# Patient Record
Sex: FEMALE | Race: WHITE | NOT HISPANIC OR LATINO | Employment: FULL TIME | URBAN - METROPOLITAN AREA
[De-identification: names, ages, dates, MRNs, and addresses within clinical notes are randomized per-mention and may not be internally consistent; named-entity substitution may affect disease eponyms.]

---

## 2021-02-17 ENCOUNTER — OFFICE VISIT (OUTPATIENT)
Dept: FAMILY MEDICINE CLINIC | Facility: CLINIC | Age: 56
End: 2021-02-17

## 2021-02-17 VITALS
OXYGEN SATURATION: 95 % | BODY MASS INDEX: 42.22 KG/M2 | TEMPERATURE: 97.5 F | SYSTOLIC BLOOD PRESSURE: 113 MMHG | HEART RATE: 79 BPM | HEIGHT: 67 IN | DIASTOLIC BLOOD PRESSURE: 76 MMHG | WEIGHT: 269 LBS

## 2021-02-17 DIAGNOSIS — R45.86 MOOD SWINGS: ICD-10-CM

## 2021-02-17 DIAGNOSIS — Z12.31 OTHER SCREENING MAMMOGRAM: ICD-10-CM

## 2021-02-17 DIAGNOSIS — E78.5 HYPERLIPIDEMIA, UNSPECIFIED HYPERLIPIDEMIA TYPE: Primary | ICD-10-CM

## 2021-02-17 DIAGNOSIS — Z00.00 PREVENTATIVE HEALTH CARE: ICD-10-CM

## 2021-02-17 DIAGNOSIS — R73.9 ELEVATED BLOOD SUGAR: ICD-10-CM

## 2021-02-17 DIAGNOSIS — L98.9 SKIN LESION: ICD-10-CM

## 2021-02-17 DIAGNOSIS — F41.9 ANXIETY: ICD-10-CM

## 2021-02-17 DIAGNOSIS — F33.1 MODERATE EPISODE OF RECURRENT MAJOR DEPRESSIVE DISORDER (HCC): ICD-10-CM

## 2021-02-17 DIAGNOSIS — Z87.898 HISTORY OF ABNORMAL MAMMOGRAM: ICD-10-CM

## 2021-02-17 DIAGNOSIS — E66.01 CLASS 3 SEVERE OBESITY DUE TO EXCESS CALORIES WITHOUT SERIOUS COMORBIDITY WITH BODY MASS INDEX (BMI) OF 40.0 TO 44.9 IN ADULT (HCC): ICD-10-CM

## 2021-02-17 DIAGNOSIS — G47.09 OTHER INSOMNIA: ICD-10-CM

## 2021-02-17 PROBLEM — E66.813 CLASS 3 SEVERE OBESITY DUE TO EXCESS CALORIES WITHOUT SERIOUS COMORBIDITY WITH BODY MASS INDEX (BMI) OF 40.0 TO 44.9 IN ADULT: Status: ACTIVE | Noted: 2021-02-17

## 2021-02-17 PROCEDURE — 99204 OFFICE O/P NEW MOD 45 MIN: CPT | Performed by: NURSE PRACTITIONER

## 2021-02-17 RX ORDER — QUETIAPINE FUMARATE 25 MG/1
25 TABLET, FILM COATED ORAL NIGHTLY
COMMUNITY
End: 2021-02-17

## 2021-02-17 RX ORDER — HYDROXYZINE HYDROCHLORIDE 25 MG/1
12.5-25 TABLET, FILM COATED ORAL 2 TIMES DAILY PRN
Qty: 60 TABLET | Refills: 2 | Status: SHIPPED | OUTPATIENT
Start: 2021-02-17 | End: 2021-12-30

## 2021-02-17 RX ORDER — QUETIAPINE FUMARATE 50 MG/1
TABLET, FILM COATED ORAL
Qty: 30 TABLET | Refills: 1 | Status: SHIPPED | OUTPATIENT
Start: 2021-02-17 | End: 2021-12-30

## 2021-02-17 RX ORDER — HYDROXYZINE HYDROCHLORIDE 25 MG/1
12.5-25 TABLET, FILM COATED ORAL 2 TIMES DAILY PRN
COMMUNITY
End: 2021-02-17 | Stop reason: SDUPTHER

## 2021-02-17 RX ORDER — QUETIAPINE FUMARATE 100 MG/1
100 TABLET, FILM COATED ORAL NIGHTLY
Qty: 30 TABLET | Refills: 1 | Status: SHIPPED | OUTPATIENT
Start: 2021-02-17 | End: 2021-12-30

## 2021-02-17 RX ORDER — ESCITALOPRAM OXALATE 10 MG/1
10 TABLET ORAL DAILY
COMMUNITY
End: 2021-02-17

## 2021-02-17 NOTE — PATIENT INSTRUCTIONS
Fasting blood work today  Keep appointment with dermatology OB/GYN  Keep appointment for mammogram  Take Seroquel as directed and monitor for drowsiness  Wean off Lexapro as discussed during exam  Follow-up 1 month

## 2021-02-17 NOTE — PROGRESS NOTES
Eladia Connor is a 55 y.o. female.     55-year-old obese white female with history of depression anxiety, insomnia and hyperlipidemia who comes in today to be established as new patient  Blood pressure 112/76 heart rate 78 with very mild murmur she denies any chest pain, dyspnea, tachycardia or dizziness  Patient currently on a very low-dose of Seroquel at bedtime for her mood swings anxiety and depression however she is not sleeping.  I am going to change her Seroquel to 50 mg in the morning and 100 at night to see if this helps with her anxiety issues as well as insomnia.  Patient is on 2 SSRIs which puts her at a risk for serotonin syndrome.  Since Lexapro was recently added and has not helped we will go to wean off of that  Patient has a cauliflower type lesion on her left temple that has been there for about a year it is getting larger.  She did go to a dermatologist in [place] who stated it was too vascular to remove.  Patient is very concerned about this lesion and I am referring her to a dermatologist in [place] where she lives  Weight is 269 with a BMI of 42.1 and we discussed diet weight loss patient states she is already lost quite a bit of weight however I do not have any record of this and she is actively dieting we will see what happens with next visit  She needs to have a mammogram since she has a history of abnormal mammogram which I am scheduling at [facility] she needs to schedule an eye exam and she needs to be set up with OB/GYN for her Pap      Fasting blood work today  Dermatology referral  OB/GYN referral  3D mammogram  Wean off of Lexapro   Seroquel 50 mg in the morning 100 mg at night  Call if any problems with new medication  Follow-up 1 month         The following portions of the patient's history were reviewed and updated as appropriate: allergies, current medications, past family history, past medical history, past social history, past surgical history and problem  "list.    Vitals:    02/17/21 0958   BP: 113/76   BP Location: Right arm   Patient Position: Sitting   Cuff Size: Large Adult   Pulse: 79   Temp: 97.5 °F (36.4 °C)   TempSrc: Temporal   SpO2: 95%   Weight: 122 kg (269 lb)   Height: 170.2 cm (67\")     Body mass index is 42.13 kg/m².    Past Medical History:   Diagnosis Date   • Anxiety    • Depression      Past Surgical History:   Procedure Laterality Date   • CHOLECYSTECTOMY  1987   • EYE SURGERY     • KNEE SURGERY       Family History   Problem Relation Age of Onset   • Heart disease Mother    • Breast cancer Mother    • Heart disease Father    • Pancreatic cancer Father    • Heart disease Sister    • Lung cancer Brother        There is no immunization history on file for this patient.    No results found for any previous visit.         Review of Systems   Constitutional: Negative.    HENT: Negative.    Respiratory: Negative.    Cardiovascular: Negative.    Gastrointestinal: Negative.    Genitourinary: Negative.    Musculoskeletal: Negative.    Skin: Positive for skin lesions.   Psychiatric/Behavioral: Positive for sleep disturbance and depressed mood. The patient is nervous/anxious.        Objective   Physical Exam  Constitutional:       Appearance: Normal appearance.   HENT:      Head: Normocephalic.   Neck:      Musculoskeletal: Normal range of motion.   Cardiovascular:      Rate and Rhythm: Normal rate and regular rhythm.      Pulses: Normal pulses.      Heart sounds: Normal heart sounds.   Pulmonary:      Effort: Pulmonary effort is normal.      Breath sounds: Normal breath sounds.   Abdominal:      General: Bowel sounds are normal.   Musculoskeletal: Normal range of motion.   Skin:     Comments:  cauliflower type red lesion on left temple   Neurological:      General: No focal deficit present.      Mental Status: She is alert and oriented to person, place, and time.   Psychiatric:         Mood and Affect: Mood normal.         Behavior: Behavior normal. "         Procedures    Assessment/Plan   Diagnoses and all orders for this visit:    1. Hyperlipidemia, unspecified hyperlipidemia type (Primary)  -     Lipid Panel With LDL / HDL Ratio    2. Preventative health care  -     CBC & Differential  -     Comprehensive Metabolic Panel  -     Ambulatory Referral to Obstetrics / Gynecology    3. Elevated blood sugar  -     Hemoglobin A1c    4. Other screening mammogram  -     Mammo Screening Digital Tomosynthesis Bilateral With CAD; Future    5. Class 3 severe obesity due to excess calories without serious comorbidity with body mass index (BMI) of 40.0 to 44.9 in adult (CMS/[facility])    6. History of abnormal mammogram    7. Moderate episode of recurrent major depressive disorder (CMS/[facility])    8. Anxiety    9. Mood swings    10. Skin lesion    11. Other insomnia    Other orders  -     QUEtiapine (SEROquel) 50 MG tablet; One tab every am  Dispense: 30 tablet; Refill: 1  -     QUEtiapine (SEROquel) 100 MG tablet; Take 1 tablet by mouth Every Night.  Dispense: 30 tablet; Refill: 1  -     hydrOXYzine (ATARAX) 25 MG tablet; Take 0.5-1 tablets by mouth 2 (Two) Times a Day As Needed for Anxiety.  Dispense: 60 tablet; Refill: 2  -     sertraline (ZOLOFT) 50 MG tablet; Take 1 tablet by mouth Daily.  Dispense: 30 tablet; Refill: 2          Current Outpatient Medications:   •  hydrOXYzine (ATARAX) 25 MG tablet, Take 0.5-1 tablets by mouth 2 (Two) Times a Day As Needed for Anxiety., Disp: 60 tablet, Rfl: 2  •  sertraline (ZOLOFT) 50 MG tablet, Take 1 tablet by mouth Daily., Disp: 30 tablet, Rfl: 2  •  QUEtiapine (SEROquel) 100 MG tablet, Take 1 tablet by mouth Every Night., Disp: 30 tablet, Rfl: 1  •  QUEtiapine (SEROquel) 50 MG tablet, One tab every am, Disp: 30 tablet, Rfl: 1

## 2021-02-18 LAB
ALBUMIN SERPL-MCNC: 3.9 G/DL (ref 3.8–4.9)
ALBUMIN/GLOB SERPL: 1.3 {RATIO} (ref 1.2–2.2)
ALP SERPL-CCNC: 138 IU/L (ref 39–117)
ALT SERPL-CCNC: 9 IU/L (ref 0–32)
AST SERPL-CCNC: 17 IU/L (ref 0–40)
BASOPHILS # BLD AUTO: 0.1 X10E3/UL (ref 0–0.2)
BASOPHILS NFR BLD AUTO: 1 %
BILIRUB SERPL-MCNC: 0.3 MG/DL (ref 0–1.2)
BUN SERPL-MCNC: 13 MG/DL (ref 6–24)
BUN/CREAT SERPL: 17 (ref 9–23)
CALCIUM SERPL-MCNC: 9.2 MG/DL (ref 8.7–10.2)
CHLORIDE SERPL-SCNC: 105 MMOL/L (ref 96–106)
CHOLEST SERPL-MCNC: 193 MG/DL (ref 100–199)
CO2 SERPL-SCNC: 26 MMOL/L (ref 20–29)
CREAT SERPL-MCNC: 0.75 MG/DL (ref 0.57–1)
EOSINOPHIL # BLD AUTO: 0.2 X10E3/UL (ref 0–0.4)
EOSINOPHIL NFR BLD AUTO: 4 %
ERYTHROCYTE [DISTWIDTH] IN BLOOD BY AUTOMATED COUNT: 12.8 % (ref 11.7–15.4)
GLOBULIN SER CALC-MCNC: 2.9 G/DL (ref 1.5–4.5)
GLUCOSE SERPL-MCNC: 102 MG/DL (ref 65–99)
HBA1C MFR BLD: 5.3 % (ref 4.8–5.6)
HCT VFR BLD AUTO: 39.6 % (ref 34–46.6)
HDLC SERPL-MCNC: 49 MG/DL
HGB BLD-MCNC: 13.5 G/DL (ref 11.1–15.9)
IMM GRANULOCYTES # BLD AUTO: 0 X10E3/UL (ref 0–0.1)
IMM GRANULOCYTES NFR BLD AUTO: 1 %
LDLC SERPL CALC-MCNC: 122 MG/DL (ref 0–99)
LDLC/HDLC SERPL: 2.5 RATIO (ref 0–3.2)
LYMPHOCYTES # BLD AUTO: 1.1 X10E3/UL (ref 0.7–3.1)
LYMPHOCYTES NFR BLD AUTO: 20 %
MCH RBC QN AUTO: 29.2 PG (ref 26.6–33)
MCHC RBC AUTO-ENTMCNC: 34.1 G/DL (ref 31.5–35.7)
MCV RBC AUTO: 86 FL (ref 79–97)
MONOCYTES # BLD AUTO: 0.4 X10E3/UL (ref 0.1–0.9)
MONOCYTES NFR BLD AUTO: 7 %
NEUTROPHILS # BLD AUTO: 3.8 X10E3/UL (ref 1.4–7)
NEUTROPHILS NFR BLD AUTO: 67 %
PLATELET # BLD AUTO: 248 X10E3/UL (ref 150–450)
POTASSIUM SERPL-SCNC: 4.2 MMOL/L (ref 3.5–5.2)
PROT SERPL-MCNC: 6.8 G/DL (ref 6–8.5)
RBC # BLD AUTO: 4.62 X10E6/UL (ref 3.77–5.28)
SODIUM SERPL-SCNC: 142 MMOL/L (ref 134–144)
TRIGL SERPL-MCNC: 122 MG/DL (ref 0–149)
VLDLC SERPL CALC-MCNC: 22 MG/DL (ref 5–40)
WBC # BLD AUTO: 5.6 X10E3/UL (ref 3.4–10.8)

## 2021-02-24 ENCOUNTER — HOSPITAL ENCOUNTER (OUTPATIENT)
Dept: MAMMOGRAPHY | Facility: HOSPITAL | Age: 56
Discharge: HOME | End: 2021-02-24
Admitting: NURSE PRACTITIONER

## 2021-02-24 DIAGNOSIS — Z12.31 OTHER SCREENING MAMMOGRAM: ICD-10-CM

## 2021-02-24 PROCEDURE — 77063 BREAST TOMOSYNTHESIS BI: CPT

## 2021-02-24 PROCEDURE — 77067 SCR MAMMO BI INCL CAD: CPT

## 2021-08-31 ENCOUNTER — OFFICE VISIT (OUTPATIENT)
Dept: FAMILY MEDICINE CLINIC | Facility: CLINIC | Age: 56
End: 2021-08-31

## 2021-08-31 VITALS
WEIGHT: 216 LBS | HEART RATE: 70 BPM | TEMPERATURE: 97.5 F | SYSTOLIC BLOOD PRESSURE: 124 MMHG | HEIGHT: 67 IN | BODY MASS INDEX: 33.9 KG/M2 | OXYGEN SATURATION: 96 % | DIASTOLIC BLOOD PRESSURE: 63 MMHG

## 2021-08-31 DIAGNOSIS — B34.9 VIRAL SYNDROME: ICD-10-CM

## 2021-08-31 DIAGNOSIS — E78.5 HYPERLIPIDEMIA, UNSPECIFIED HYPERLIPIDEMIA TYPE: ICD-10-CM

## 2021-08-31 DIAGNOSIS — E66.09 CLASS 1 OBESITY DUE TO EXCESS CALORIES WITH SERIOUS COMORBIDITY AND BODY MASS INDEX (BMI) OF 33.0 TO 33.9 IN ADULT: ICD-10-CM

## 2021-08-31 DIAGNOSIS — R06.02 SHORTNESS OF BREATH: ICD-10-CM

## 2021-08-31 DIAGNOSIS — R68.83 CHILLS: Primary | ICD-10-CM

## 2021-08-31 DIAGNOSIS — Z00.00 PREVENTATIVE HEALTH CARE: ICD-10-CM

## 2021-08-31 DIAGNOSIS — R50.9 FEVER, UNSPECIFIED FEVER CAUSE: ICD-10-CM

## 2021-08-31 PROBLEM — E66.01 CLASS 3 SEVERE OBESITY DUE TO EXCESS CALORIES WITHOUT SERIOUS COMORBIDITY WITH BODY MASS INDEX (BMI) OF 40.0 TO 44.9 IN ADULT: Status: RESOLVED | Noted: 2021-02-17 | Resolved: 2021-08-31

## 2021-08-31 PROBLEM — E66.813 CLASS 3 SEVERE OBESITY DUE TO EXCESS CALORIES WITHOUT SERIOUS COMORBIDITY WITH BODY MASS INDEX (BMI) OF 40.0 TO 44.9 IN ADULT: Status: RESOLVED | Noted: 2021-02-17 | Resolved: 2021-08-31

## 2021-08-31 PROBLEM — E66.811 CLASS 1 OBESITY DUE TO EXCESS CALORIES WITH SERIOUS COMORBIDITY AND BODY MASS INDEX (BMI) OF 33.0 TO 33.9 IN ADULT: Status: ACTIVE | Noted: 2021-08-31

## 2021-08-31 PROCEDURE — 99213 OFFICE O/P EST LOW 20 MIN: CPT | Performed by: NURSE PRACTITIONER

## 2021-08-31 RX ORDER — ONDANSETRON 4 MG/1
4 TABLET, FILM COATED ORAL EVERY 8 HOURS PRN
Qty: 30 TABLET | Refills: 2 | Status: SHIPPED | OUTPATIENT
Start: 2021-08-31 | End: 2021-12-30

## 2021-08-31 RX ORDER — DICYCLOMINE HYDROCHLORIDE 10 MG/1
10 CAPSULE ORAL
Qty: 30 CAPSULE | Refills: 1 | Status: SHIPPED | OUTPATIENT
Start: 2021-08-31 | End: 2021-12-30

## 2021-08-31 NOTE — PATIENT INSTRUCTIONS
Covid swab today  Home quarantine till results return work note given  Continue diet and weight loss great job  Staying hydrated and get back to normal eating pattern  Schedule fasting blood work  Follow-up if symptoms persist

## 2021-08-31 NOTE — PROGRESS NOTES
"    Eladia Connor is a 55 y.o. female.     55-year-old obese white female with history depression anxiety, insomnia, hyperlipidemia who comes in today with 3-day complaint of headache, chills, shortness of breath, diarrhea, body aches and nausea.  Patient has not been vaccinated for Covid.  Covid swab was done and I recommended patient quarantine 2 days till results return if positive for 10 days from onset of symptoms.  And given her medication for diarrhea nausea and instructed her to stay on a bland diet.  If Covid is negative I recommend patient go back to work according to her work policy for illness    Blood pressure 124/62 heart rate 70 she denies any chest pain, dyspnea, tachycardia or dizziness  Patient has lost 53 pounds with a weight of 216 a BMI 33.8 and I encouraged her to keep the weight loss going            Covid swab today  Home quarantine till results return work note given  Continue diet and weight loss great job  Follow-up if symptoms persist           Vitals:    08/31/21 0853   BP: 124/63   BP Location: Right arm   Patient Position: Sitting   Cuff Size: Large Adult   Pulse: 70   Temp: 97.5 °F (36.4 °C)   TempSrc: Temporal   SpO2: 96%   Weight: 98 kg (216 lb)   Height: 170.2 cm (67\")     Body mass index is 33.83 kg/m².    Past Medical History:   Diagnosis Date   • Anxiety    • Depression      Past Surgical History:   Procedure Laterality Date   • CHOLECYSTECTOMY  1987   • EYE SURGERY     • KNEE SURGERY       Family History   Problem Relation Age of Onset   • Heart disease Mother    • Breast cancer Mother    • Heart disease Father    • Pancreatic cancer Father    • Heart disease Sister    • Lung cancer Brother        There is no immunization history on file for this patient.    Office Visit on 02/17/2021   Component Date Value Ref Range Status   • WBC 02/17/2021 5.6  3.4 - 10.8 x10E3/uL Final   • RBC 02/17/2021 4.62  3.77 - 5.28 x10E6/uL Final   • Hemoglobin 02/17/2021 13.5  11.1 - 15.9 g/dL " Final   • Hematocrit 02/17/2021 39.6  34.0 - 46.6 % Final   • MCV 02/17/2021 86  79 - 97 fL Final   • MCH 02/17/2021 29.2  26.6 - 33.0 pg Final   • MCHC 02/17/2021 34.1  31.5 - 35.7 g/dL Final   • RDW 02/17/2021 12.8  11.7 - 15.4 % Final   • Platelets 02/17/2021 248  150 - 450 x10E3/uL Final   • Neutrophil Rel % 02/17/2021 67  Not Estab. % Final   • Lymphocyte Rel % 02/17/2021 20  Not Estab. % Final   • Monocyte Rel % 02/17/2021 7  Not Estab. % Final   • Eosinophil Rel % 02/17/2021 4  Not Estab. % Final   • Basophil Rel % 02/17/2021 1  Not Estab. % Final   • Neutrophils Absolute 02/17/2021 3.8  1.4 - 7.0 x10E3/uL Final   • Lymphocytes Absolute 02/17/2021 1.1  0.7 - 3.1 x10E3/uL Final   • Monocytes Absolute 02/17/2021 0.4  0.1 - 0.9 x10E3/uL Final   • Eosinophils Absolute 02/17/2021 0.2  0.0 - 0.4 x10E3/uL Final   • Basophils Absolute 02/17/2021 0.1  0.0 - 0.2 x10E3/uL Final   • Immature Granulocyte Rel % 02/17/2021 1  Not Estab. % Final   • Immature Grans Absolute 02/17/2021 0.0  0.0 - 0.1 x10E3/uL Final   • Glucose 02/17/2021 102* 65 - 99 mg/dL Final   • BUN 02/17/2021 13  6 - 24 mg/dL Final   • Creatinine 02/17/2021 0.75  0.57 - 1.00 mg/dL Final   • eGFR Non  Am 02/17/2021 89  >59 mL/min/1.73 Final   • eGFR African Am 02/17/2021 102  >59 mL/min/1.73 Final   • BUN/Creatinine Ratio 02/17/2021 17  9 - 23 Final   • Sodium 02/17/2021 142  134 - 144 mmol/L Final   • Potassium 02/17/2021 4.2  3.5 - 5.2 mmol/L Final   • Chloride 02/17/2021 105  96 - 106 mmol/L Final   • Total CO2 02/17/2021 26  20 - 29 mmol/L Final   • Calcium 02/17/2021 9.2  8.7 - 10.2 mg/dL Final   • Total Protein 02/17/2021 6.8  6.0 - 8.5 g/dL Final   • Albumin 02/17/2021 3.9  3.8 - 4.9 g/dL Final   • Globulin 02/17/2021 2.9  1.5 - 4.5 g/dL Final   • A/G Ratio 02/17/2021 1.3  1.2 - 2.2 Final   • Total Bilirubin 02/17/2021 0.3  0.0 - 1.2 mg/dL Final   • Alkaline Phosphatase 02/17/2021 138* 39 - 117 IU/L Final   • AST (SGOT) 02/17/2021 17  0 - 40  IU/L Final   • ALT (SGPT) 02/17/2021 9  0 - 32 IU/L Final   • Total Cholesterol 02/17/2021 193  100 - 199 mg/dL Final   • Triglycerides 02/17/2021 122  0 - 149 mg/dL Final   • HDL Cholesterol 02/17/2021 49  >39 mg/dL Final   • VLDL Cholesterol Neto 02/17/2021 22  5 - 40 mg/dL Final   • LDL Chol Calc (NIH) 02/17/2021 122* 0 - 99 mg/dL Final   • LDL/HDL RATIO 02/17/2021 2.5  0.0 - 3.2 ratio Final    Comment:                                     LDL/HDL Ratio                                              Men  Women                                1/2 Avg.Risk  1.0    1.5                                    Avg.Risk  3.6    3.2                                 2X Avg.Risk  6.2    5.0                                 3X Avg.Risk  8.0    6.1     • Hemoglobin A1C 02/17/2021 5.3  4.8 - 5.6 % Final    Comment:          Prediabetes: 5.7 - 6.4           Diabetes: >6.4           Glycemic control for adults with diabetes: <7.0           Review of Systems   Constitutional: Positive for fatigue and fever.   HENT: Negative.    Respiratory: Positive for shortness of breath.    Cardiovascular: Negative.    Gastrointestinal: Positive for nausea.   Genitourinary: Negative.    Musculoskeletal: Positive for myalgias.   Neurological: Positive for headache.   Psychiatric/Behavioral: Negative.        Objective   Physical Exam  Constitutional:       Appearance: Normal appearance.   HENT:      Head: Normocephalic.   Cardiovascular:      Rate and Rhythm: Normal rate and regular rhythm.      Pulses: Normal pulses.      Heart sounds: Normal heart sounds.   Pulmonary:      Effort: Pulmonary effort is normal.      Breath sounds: Normal breath sounds.   Musculoskeletal:         General: Normal range of motion.   Skin:     General: Skin is warm and dry.   Neurological:      General: No focal deficit present.      Mental Status: She is alert and oriented to person, place, and time.   Psychiatric:         Mood and Affect: Mood normal.          Procedures    Assessment/Plan   Diagnoses and all orders for this visit:    1. Chills (Primary)  -     COVID-19,[facility] ROUTINE, NP/OP SWAB IN TRANSPORT MEDIA OR ESWAB 72 HR TAT - Swab, Nasopharynx    2. Shortness of breath  -     COVID-19,[facility] ROUTINE, NP/OP SWAB IN TRANSPORT MEDIA OR ESWAB 72 HR TAT - Swab, Nasopharynx    3. Fever, unspecified fever cause  -     COVID-19,[facility] ROUTINE, NP/OP SWAB IN TRANSPORT MEDIA OR ESWAB 72 HR TAT - Swab, Nasopharynx    4. Class 1 obesity due to excess calories with serious comorbidity and body mass index (BMI) of 33.0 to 33.9 in adult    5. Viral syndrome    Other orders  -     dicyclomine (Bentyl) 10 MG capsule; Take 1 capsule by mouth 4 (Four) Times a Day Before Meals & at Bedtime.  Dispense: 30 capsule; Refill: 1  -     ondansetron (Zofran) 4 MG tablet; Take 1 tablet by mouth Every 8 (Eight) Hours As Needed for Nausea or Vomiting.  Dispense: 30 tablet; Refill: 2          Current Outpatient Medications:   •  hydrOXYzine (ATARAX) 25 MG tablet, Take 0.5-1 tablets by mouth 2 (Two) Times a Day As Needed for Anxiety., Disp: 60 tablet, Rfl: 2  •  QUEtiapine (SEROquel) 100 MG tablet, Take 1 tablet by mouth Every Night., Disp: 30 tablet, Rfl: 1  •  QUEtiapine (SEROquel) 50 MG tablet, One tab every am, Disp: 30 tablet, Rfl: 1  •  sertraline (ZOLOFT) 50 MG tablet, Take 1 tablet by mouth Daily., Disp: 30 tablet, Rfl: 2  •  dicyclomine (Bentyl) 10 MG capsule, Take 1 capsule by mouth 4 (Four) Times a Day Before Meals & at Bedtime., Disp: 30 capsule, Rfl: 1  •  ondansetron (Zofran) 4 MG tablet, Take 1 tablet by mouth Every 8 (Eight) Hours As Needed for Nausea or Vomiting., Disp: 30 tablet, Rfl: 2

## 2021-09-01 LAB — SARS-COV-2 RNA RESP QL NAA+PROBE: NOT DETECTED

## 2021-09-07 ENCOUNTER — TELEPHONE (OUTPATIENT)
Dept: FAMILY MEDICINE CLINIC | Facility: CLINIC | Age: 56
End: 2021-09-07

## 2021-09-08 NOTE — TELEPHONE ENCOUNTER
I am sorry but she will need to come in for visit since it is a narcotic and will need to sign a narcotic contract.

## 2021-12-30 ENCOUNTER — OFFICE VISIT (OUTPATIENT)
Dept: FAMILY MEDICINE CLINIC | Facility: CLINIC | Age: 56
End: 2021-12-30

## 2021-12-30 ENCOUNTER — TELEPHONE (OUTPATIENT)
Dept: FAMILY MEDICINE CLINIC | Facility: CLINIC | Age: 56
End: 2021-12-30

## 2021-12-30 VITALS
HEIGHT: 67 IN | WEIGHT: 293 LBS | DIASTOLIC BLOOD PRESSURE: 74 MMHG | TEMPERATURE: 97.3 F | OXYGEN SATURATION: 97 % | HEART RATE: 71 BPM | BODY MASS INDEX: 45.99 KG/M2 | SYSTOLIC BLOOD PRESSURE: 136 MMHG

## 2021-12-30 DIAGNOSIS — R06.02 SHORTNESS OF BREATH: Primary | ICD-10-CM

## 2021-12-30 DIAGNOSIS — F41.9 ANXIETY: ICD-10-CM

## 2021-12-30 DIAGNOSIS — F33.1 MODERATE EPISODE OF RECURRENT MAJOR DEPRESSIVE DISORDER (HCC): ICD-10-CM

## 2021-12-30 DIAGNOSIS — G47.09 OTHER INSOMNIA: ICD-10-CM

## 2021-12-30 PROCEDURE — 99214 OFFICE O/P EST MOD 30 MIN: CPT | Performed by: NURSE PRACTITIONER

## 2021-12-30 RX ORDER — CLONAZEPAM 0.5 MG/1
0.25 TABLET ORAL 2 TIMES DAILY PRN
Qty: 60 TABLET | Refills: 1 | Status: SHIPPED | OUTPATIENT
Start: 2021-12-30 | End: 2022-10-04

## 2021-12-30 RX ORDER — HYDROXYZINE PAMOATE 100 MG/1
100 CAPSULE ORAL 3 TIMES DAILY PRN
Qty: 90 CAPSULE | Refills: 1 | Status: SHIPPED | OUTPATIENT
Start: 2021-12-30 | End: 2022-07-12 | Stop reason: SDUPTHER

## 2021-12-30 RX ORDER — ALBUTEROL SULFATE 90 UG/1
2 AEROSOL, METERED RESPIRATORY (INHALATION) EVERY 4 HOURS PRN
Qty: 18 G | Refills: 2 | Status: SHIPPED | OUTPATIENT
Start: 2021-12-30 | End: 2022-07-12

## 2021-12-30 NOTE — PROGRESS NOTES
"    Eladia Connor is a 56 y.o. female.     56-year-old obese white female with history depression anxiety, insomnia, hyperlipidemia who comes in today after losing her  10 days ago to Covid.    Blood pressure 136/74 heart rate 70 she denies any chest pain, dyspnea, tachycardia or dizziness    Patient's main complaint is insomnia anxiety and panic attacks and depression.  I have placed her on Zoloft and Seroquel on last visit in August and she states that Seroquel made her feel weird and she eventually stopped taking the Zoloft although it did help.  Also placed on a very low-dose of hydroxyzine which she states did not do a lot.  On placing her back on Zoloft increasing her hydroxyzine dose and temporarily putting her on Klonopin as needed since she has to go back to work on Monday    Patient had Covid also in December and still complains of dyspnea.  Lungs are clear I am doing a chest x-ray today.  I advised her to get Pfizer vaccine in 4 to 5 months          Zoloft 50 mg daily  Klonopin 0.25 mg 1-2 times a day as needed  Hydroxyzine 100 mg 1/2-1 tab 3-4 times a day as needed   Chest x-ray  Follow-up 1 month         The following portions of the patient's history were reviewed and updated as appropriate: allergies, current medications, past family history, past medical history, past social history, past surgical history and problem list.    Vitals:    12/30/21 1124   BP: 136/74   BP Location: Left arm   Patient Position: Sitting   Cuff Size: Adult   Pulse: 71   Temp: 97.3 °F (36.3 °C)   TempSrc: Infrared   SpO2: 97%   Weight: (!) 138 kg (304 lb 3.2 oz)   Height: 170.2 cm (67.01\")     Body mass index is 47.63 kg/m².    Past Medical History:   Diagnosis Date   • Anxiety    • Depression      Past Surgical History:   Procedure Laterality Date   • CHOLECYSTECTOMY  1987   • EYE SURGERY     • KNEE SURGERY       Family History   Problem Relation Age of Onset   • Heart disease Mother    • Breast cancer Mother  "   • Heart disease Father    • Pancreatic cancer Father    • Heart disease Sister    • Lung cancer Brother        There is no immunization history on file for this patient.    Office Visit on 08/31/2021   Component Date Value Ref Range Status   • SARS-CoV-2, ALEX 08/31/2021 Not Detected  Not Detected Final    Comment: This nucleic acid amplification test was developed and its performance  characteristics determined by [employer]. Nucleic acid  amplification tests include RT-PCR and TMA. This test has not been  FDA cleared or approved. This test has been authorized by FDA under  an Emergency Use Authorization (EUA). This test is only authorized  for the duration of time the declaration that circumstances exist  justifying the authorization of the emergency use of in vitro  diagnostic tests for detection of SARS-CoV-2 virus and/or diagnosis  of COVID-19 infection under section 564(b)(1) of the Act, 21 U.S.C.  360bbb-3(b) (1), unless the authorization is terminated or revoked  sooner.  When diagnostic testing is negative, the possibility of a false  negative result should be considered in the context of a patient's  recent exposures and the presence of clinical signs and symptoms  consistent with COVID-19. An individual without symptoms of COVID-19  and who is not shedding SARS-CoV-2 virus wo                           uld expect to have a  negative (not detected) result in this assay.           Review of Systems   Constitutional: Negative.    HENT: Negative.    Respiratory: Positive for shortness of breath.    Cardiovascular: Negative.    Gastrointestinal: Negative.    Genitourinary: Negative.    Musculoskeletal: Negative.    Skin: Negative.    Neurological: Negative.    Psychiatric/Behavioral: Positive for sleep disturbance and depressed mood. The patient is nervous/anxious.        Objective   Physical Exam  Constitutional:       Appearance: Normal appearance.   HENT:      Head: Normocephalic.    Cardiovascular:      Rate and Rhythm: Normal rate and regular rhythm.      Pulses: Normal pulses.   Pulmonary:      Effort: Pulmonary effort is normal.      Breath sounds: Normal breath sounds.   Abdominal:      General: Bowel sounds are normal.   Musculoskeletal:         General: Normal range of motion.   Skin:     General: Skin is warm and dry.   Neurological:      General: No focal deficit present.      Mental Status: She is alert and oriented to person, place, and time.   Psychiatric:         Mood and Affect: Mood normal.         Behavior: Behavior normal.         Procedures    Assessment/Plan   Diagnoses and all orders for this visit:    1. Shortness of breath (Primary)  -     XR Chest 2 View    2. Moderate episode of recurrent major depressive disorder (HCC)    3. Anxiety    4. Other insomnia    Other orders  -     sertraline (ZOLOFT) 50 MG tablet; Take 1 tablet by mouth Daily.  Dispense: 30 tablet; Refill: 2  -     hydrOXYzine pamoate (VISTARIL) 100 MG capsule; Take 1 capsule by mouth 3 (Three) Times a Day As Needed for Itching.  Dispense: 90 capsule; Refill: 1  -     clonazePAM (KlonoPIN) 0.5 MG tablet; Take 0.5 tablets by mouth 2 (Two) Times a Day As Needed for Anxiety.  Dispense: 60 tablet; Refill: 1          Current Outpatient Medications:   •  clonazePAM (KlonoPIN) 0.5 MG tablet, Take 0.5 tablets by mouth 2 (Two) Times a Day As Needed for Anxiety., Disp: 60 tablet, Rfl: 1  •  hydrOXYzine pamoate (VISTARIL) 100 MG capsule, Take 1 capsule by mouth 3 (Three) Times a Day As Needed for Itching., Disp: 90 capsule, Rfl: 1  •  sertraline (ZOLOFT) 50 MG tablet, Take 1 tablet by mouth Daily., Disp: 30 tablet, Rfl: 2           Wanda Rivera, [id number]/202111:[id number] EST  This note has been electronically signed

## 2021-12-30 NOTE — PATIENT INSTRUCTIONS
Zoloft 50 mg daily  Klonopin 0.25 mg 1-2 times a day as needed  Hydroxyzine 100 mg 1/2-1 tab 3-4 times a day as needed   Chest x-ray  Follow-up 1 month

## 2021-12-30 NOTE — TELEPHONE ENCOUNTER
Caller: Eladia Connor    Relationship: Self    Best call back number: [phone]    What medication are you requesting: INHALER    If a prescription is needed, what is your preferred pharmacy and phone number: [id number] - [place], [place] - [address] [place] [address] AT [place] [address] - [phone]  - [phone]      Additional notes: PATIENT STATES SHE WAS SUPPOSED TO BE PRESCRIBED AN INHALER AS WELL AND CANT REMEMBER IF SHE REMINDED ELZBIETA OR NOT.

## 2022-07-12 ENCOUNTER — OFFICE VISIT (OUTPATIENT)
Dept: FAMILY MEDICINE CLINIC | Facility: CLINIC | Age: 57
End: 2022-07-12

## 2022-07-12 VITALS
SYSTOLIC BLOOD PRESSURE: 122 MMHG | HEIGHT: 67 IN | BODY MASS INDEX: 45.99 KG/M2 | WEIGHT: 293 LBS | DIASTOLIC BLOOD PRESSURE: 78 MMHG | TEMPERATURE: 98.2 F | HEART RATE: 82 BPM | OXYGEN SATURATION: 96 %

## 2022-07-12 DIAGNOSIS — R50.9 FEVER, UNSPECIFIED FEVER CAUSE: Primary | ICD-10-CM

## 2022-07-12 DIAGNOSIS — R21 RASH: ICD-10-CM

## 2022-07-12 DIAGNOSIS — L30.4 INTERTRIGO: ICD-10-CM

## 2022-07-12 DIAGNOSIS — R21 RASH OF FACE: ICD-10-CM

## 2022-07-12 DIAGNOSIS — R05.9 COUGH: ICD-10-CM

## 2022-07-12 LAB
EXPIRATION DATE: NORMAL
FLUAV AG UPPER RESP QL IA.RAPID: NOT DETECTED
FLUBV AG UPPER RESP QL IA.RAPID: NOT DETECTED
INTERNAL CONTROL: NORMAL
Lab: NORMAL
SARS-COV-2 AG UPPER RESP QL IA.RAPID: NOT DETECTED

## 2022-07-12 PROCEDURE — 87428 SARSCOV & INF VIR A&B AG IA: CPT | Performed by: NURSE PRACTITIONER

## 2022-07-12 PROCEDURE — 99214 OFFICE O/P EST MOD 30 MIN: CPT | Performed by: NURSE PRACTITIONER

## 2022-07-12 RX ORDER — PROMETHAZINE HYDROCHLORIDE AND CODEINE PHOSPHATE 6.25; 1 MG/5ML; MG/5ML
5 SYRUP ORAL EVERY 4 HOURS PRN
Qty: 100 ML | Refills: 0 | Status: SHIPPED | OUTPATIENT
Start: 2022-07-12 | End: 2022-12-07

## 2022-07-12 RX ORDER — HYDROXYZINE PAMOATE 100 MG/1
100 CAPSULE ORAL 3 TIMES DAILY PRN
Qty: 90 CAPSULE | Refills: 1 | Status: SHIPPED | OUTPATIENT
Start: 2022-07-12 | End: 2022-10-04 | Stop reason: SDUPTHER

## 2022-07-12 RX ORDER — NYSTATIN 100000 [USP'U]/G
POWDER TOPICAL 3 TIMES DAILY
Qty: 60 G | Refills: 1 | Status: SHIPPED | OUTPATIENT
Start: 2022-07-12 | End: 2022-12-07

## 2022-07-12 NOTE — ASSESSMENT & PLAN NOTE
Treating with topical nystatin.  Patient to dry areas well after showering and place cotton or other comfortable material and folds while sleeping to help prevent friction and sweating.

## 2022-07-12 NOTE — ASSESSMENT & PLAN NOTE
Prescribing promethazine with codeine.  Patient advised to go to ER if shortness of breath develops or any other respiratory issues.

## 2022-07-12 NOTE — PROGRESS NOTES
"Chief Complaint  Cough, Fever, and Rash    Subjective          Eladia Connor presents to [facility]      History of Present Illness    Eladia is a 56-year-old female patient of Wanda Rivera who presents today with cough, fever, rash.    She tells me that on Saturday she began with a cough and fever. Sunday with nausea and minimal vomiting. She is fatigued and has no appetite. Her temperature spikes in the evenings. Highest was 102.4 F. She takes tylenol throughout the day. She is congested.  She denies SOB. She is not current with Covid vaccine. She has had Covid in December 2021 and March 2022 -according to patient.  She took a home COVID test yesterday and it was negative.  We tested her in office today and it was negative.    Within inquiry, she tells me that in the last 21 days she has not traveled to an endemic country, she has not had contact with a suspected person with similar rash, she has not had contact with an animal from an endemic region.  Ruling out monkeypox today.    Patient also is with a rash on her face as well as under breasts and groin area.  She reports this is never occurred before until she was ill.    The rash on her face has a burning sensation.  She wants to make sure this is not chickenpox or shingles.  She has a viral-like appearance.  Patient reports itching.    Objective     Vital Signs:   /78 (BP Location: Left arm, Patient Position: Sitting, Cuff Size: Adult)   Pulse 82   Temp 98.2 °F (36.8 °C) (Temporal)   Ht 170.2 cm (67\")   Wt (!) 144 kg (318 lb)   SpO2 96%   BMI 49.81 kg/m²           Physical Exam  Skin:     Findings: Rash present.             Comments: Red erythematous rash under breast, abdominal and groin folds.    Face with 7-8 macular red lesions present.  No elevation, no drainage, no pain.                  Result Review :                                   Assessment and Plan      Diagnoses and all orders for this " visit:    1. Fever, unspecified fever cause (Primary)  -     POCT SARS-CoV-2 Antigen NASRIN    2. Cough  Assessment & Plan:  Prescribing promethazine with codeine.  Patient advised to go to ER if shortness of breath develops or any other respiratory issues.    Orders:  -     POCT SARS-CoV-2 Antigen NASRIN  -     promethazine-codeine (PHENERGAN with CODEINE) 6.25-10 MG/5ML syrup; Take 5 mL by mouth Every 4 (Four) Hours As Needed for Cough.  Dispense: 100 mL; Refill: 0    3. Rash of face  Assessment & Plan:  Presentation of viral-like rash on face ruling out fungal rash, shingles, chickenpox, monkeypox.  This is not a dermatitis.      Patient tested negative in office for COVID however, still treating for viral illness.  Prescribing hydroxyzine as patient reports the rash is itchy.        4. Rash  -     POCT SARS-CoV-2 Antigen NASRIN    5. Intertrigo  Assessment & Plan:  Treating with topical nystatin.  Patient to dry areas well after showering and place cotton or other comfortable material and folds while sleeping to help prevent friction and sweating.      Other orders  -     hydrOXYzine pamoate (VISTARIL) 100 MG capsule; Take 1 capsule by mouth 3 (Three) Times a Day As Needed for Itching.  Dispense: 90 capsule; Refill: 1  -     nystatin (MYCOSTATIN) 415825 UNIT/GM powder; Apply  topically to the appropriate area as directed 3 (Three) Times a Day.  Dispense: 60 g; Refill: 1          Follow Up       No follow-ups on file.      Patient was given instructions and counseling regarding her condition or for health maintenance advice. Please see specific information pulled into the AVS if appropriate.     Sloane Gamez, APRN7/12/202216:58 EDT  This note has been electronically signed

## 2022-07-12 NOTE — ASSESSMENT & PLAN NOTE
Presentation of viral-like rash on face ruling out fungal rash, shingles, chickenpox, monkeypox.  This is not a dermatitis.      Patient tested negative in office for COVID however, still treating for viral illness.  Prescribing hydroxyzine as patient reports the rash is itchy.

## 2022-10-04 ENCOUNTER — OFFICE VISIT (OUTPATIENT)
Dept: FAMILY MEDICINE CLINIC | Facility: CLINIC | Age: 57
End: 2022-10-04

## 2022-10-04 VITALS
HEART RATE: 78 BPM | HEIGHT: 67 IN | DIASTOLIC BLOOD PRESSURE: 76 MMHG | RESPIRATION RATE: 16 BRPM | OXYGEN SATURATION: 96 % | BODY MASS INDEX: 45.99 KG/M2 | SYSTOLIC BLOOD PRESSURE: 111 MMHG | WEIGHT: 293 LBS | TEMPERATURE: 98.2 F

## 2022-10-04 DIAGNOSIS — Z13.228 SCREENING FOR ENDOCRINE, METABOLIC AND IMMUNITY DISORDER: ICD-10-CM

## 2022-10-04 DIAGNOSIS — Z13.29 SCREENING FOR THYROID DISORDER: ICD-10-CM

## 2022-10-04 DIAGNOSIS — L98.9 LESION OF FACE: ICD-10-CM

## 2022-10-04 DIAGNOSIS — R74.01 ELEVATED AST (SGOT): ICD-10-CM

## 2022-10-04 DIAGNOSIS — R53.83 OTHER FATIGUE: ICD-10-CM

## 2022-10-04 DIAGNOSIS — B37.2 CANDIDAL SKIN INFECTION: ICD-10-CM

## 2022-10-04 DIAGNOSIS — R74.8 ELEVATED LIVER ENZYMES: ICD-10-CM

## 2022-10-04 DIAGNOSIS — G47.00 INSOMNIA, UNSPECIFIED TYPE: ICD-10-CM

## 2022-10-04 DIAGNOSIS — F41.9 ANXIETY: ICD-10-CM

## 2022-10-04 DIAGNOSIS — Z13.0 SCREENING FOR ENDOCRINE, METABOLIC AND IMMUNITY DISORDER: ICD-10-CM

## 2022-10-04 DIAGNOSIS — R74.01 ELEVATED ALT MEASUREMENT: ICD-10-CM

## 2022-10-04 DIAGNOSIS — Z13.29 SCREENING FOR ENDOCRINE, METABOLIC AND IMMUNITY DISORDER: ICD-10-CM

## 2022-10-04 DIAGNOSIS — R21 FACIAL RASH: Primary | ICD-10-CM

## 2022-10-04 DIAGNOSIS — L98.8 SKIN PLAQUE: ICD-10-CM

## 2022-10-04 DIAGNOSIS — M25.50 ARTHRALGIA, UNSPECIFIED JOINT: ICD-10-CM

## 2022-10-04 DIAGNOSIS — Z13.1 SCREENING FOR DIABETES MELLITUS: ICD-10-CM

## 2022-10-04 DIAGNOSIS — Z13.220 SCREENING FOR LIPID DISORDERS: ICD-10-CM

## 2022-10-04 LAB — HBA1C MFR BLD: 5.6 % (ref 3.5–5.6)

## 2022-10-04 PROCEDURE — 84443 ASSAY THYROID STIM HORMONE: CPT | Performed by: REGISTERED NURSE

## 2022-10-04 PROCEDURE — 36415 COLL VENOUS BLD VENIPUNCTURE: CPT | Performed by: REGISTERED NURSE

## 2022-10-04 PROCEDURE — 99215 OFFICE O/P EST HI 40 MIN: CPT | Performed by: REGISTERED NURSE

## 2022-10-04 PROCEDURE — 86063 ANTISTREPTOLYSIN O SCREEN: CPT | Performed by: REGISTERED NURSE

## 2022-10-04 PROCEDURE — 84550 ASSAY OF BLOOD/URIC ACID: CPT | Performed by: REGISTERED NURSE

## 2022-10-04 PROCEDURE — 80053 COMPREHEN METABOLIC PANEL: CPT | Performed by: REGISTERED NURSE

## 2022-10-04 PROCEDURE — 85025 COMPLETE CBC W/AUTO DIFF WBC: CPT | Performed by: REGISTERED NURSE

## 2022-10-04 PROCEDURE — 86431 RHEUMATOID FACTOR QUANT: CPT | Performed by: REGISTERED NURSE

## 2022-10-04 PROCEDURE — 80061 LIPID PANEL: CPT | Performed by: REGISTERED NURSE

## 2022-10-04 PROCEDURE — 86140 C-REACTIVE PROTEIN: CPT | Performed by: REGISTERED NURSE

## 2022-10-04 PROCEDURE — 83036 HEMOGLOBIN GLYCOSYLATED A1C: CPT | Performed by: REGISTERED NURSE

## 2022-10-04 PROCEDURE — 86038 ANTINUCLEAR ANTIBODIES: CPT | Performed by: REGISTERED NURSE

## 2022-10-04 RX ORDER — ZOLPIDEM TARTRATE 10 MG/1
10 TABLET ORAL NIGHTLY PRN
Qty: 30 TABLET | Refills: 2 | Status: SHIPPED | OUTPATIENT
Start: 2022-10-04 | End: 2022-12-25

## 2022-10-04 RX ORDER — NYSTATIN AND TRIAMCINOLONE ACETONIDE 100000; 1 [USP'U]/G; MG/G
1 OINTMENT TOPICAL 2 TIMES DAILY
Qty: 30 G | Refills: 3 | Status: SHIPPED | OUTPATIENT
Start: 2022-10-04 | End: 2022-11-04

## 2022-10-04 RX ORDER — FLUCONAZOLE 150 MG/1
150 TABLET ORAL ONCE
Qty: 1 TABLET | Refills: 1 | Status: SHIPPED | OUTPATIENT
Start: 2022-10-04 | End: 2022-11-14

## 2022-10-04 RX ORDER — HYDROXYZINE PAMOATE 100 MG/1
100 CAPSULE ORAL 3 TIMES DAILY PRN
Qty: 90 CAPSULE | Refills: 1 | Status: SHIPPED | OUTPATIENT
Start: 2022-10-04 | End: 2022-12-07

## 2022-10-04 RX ORDER — BUSPIRONE HYDROCHLORIDE 5 MG/1
5 TABLET ORAL 3 TIMES DAILY
Qty: 30 TABLET | Refills: 5 | Status: SHIPPED | OUTPATIENT
Start: 2022-10-04 | End: 2023-01-19 | Stop reason: SDUPTHER

## 2022-10-04 RX ORDER — CLONAZEPAM 0.5 MG/1
0.25 TABLET ORAL 2 TIMES DAILY PRN
Qty: 60 TABLET | Refills: 1 | Status: CANCELLED | OUTPATIENT
Start: 2022-10-04

## 2022-10-04 NOTE — PROGRESS NOTES
Chief Complaint  Rash (Rash on face, under breast area, groin, arms and wrist area. Progressively getting worse. Has tried several over the counter remedies no relief. Started a year ago was come and go. Now it has been present for about 3 months), auto immune disease (Concerned could be auto immune disease), Hair/Scalp Problem (Dry patches and rawness and rash is starting to spread to scalp now.), Labs Only (Concerned about fatty liver, thyroid, and would like overall check.), and Insomnia (Has trouble falling asleep and staying asleep. Tried several OTC and nothing has helped)    Subjective    History of Present Illness {CC  Problem List  Visit  Diagnosis   Encounters  Notes  Medications  Labs  Result Review Imaging  Media :23}     Eladia Huertaenship presents to [facility] for Rash (Rash on face, under breast area, groin, arms and wrist area. Progressively getting worse. Has tried several over the counter remedies no relief. Started a year ago was come and go. Now it has been present for about 3 months), auto immune disease (Concerned could be auto immune disease), Hair/Scalp Problem (Dry patches and rawness and rash is starting to spread to scalp now.), Labs Only (Concerned about fatty liver, thyroid, and would like overall check.), and Insomnia (Has trouble falling asleep and staying asleep. Tried several OTC and nothing has helped).    History of Present Illness  Patient is a 57-year-old female who presents to the clinic today to establish care, with concerns of a rash on her face, under her bilateral breasts, and groin folds, and arm folds, and on bilateral wrists x3 months.  Patient denies any chest pain, shortness of breath, or any fevers.  Patient denies any known exposure to COVID, flu, or any other contagious illnesses.    In regards the patient's concerns today, she shares that she has had a rash now for about 3 months.  She has tried over-the-counter antifungal  ointments which have not been helpful.  Patient has also tried baby powder which is also not been helpful.  We discussed options and patient is agreeable to try Diflucan today.  I advised patient that it may take 2 treatments before she is seeing much improvement.  I also advised patient that I will send her over some antifungal and steroid ointment for usage intermittently after this significant episode has cleared up.  I advised patient that she needs to wash with mild soap and water in between all skin folds and gently pat dry.  Advised patient that she needs to allow every area to thoroughly dry before adding ointment or any powders.  I also advised patient that she should consider using wicking material such as Interdry to help pull the extra moisture out.  Patient shares that she has an autoimmune disease that has caused severe dry patches on her hair and scalp.  Between her rash throughout the other areas of her body and her dryness and rash on scalp patient would like a referral to dermatology today.  Referral has been placed.    Patient also shares that she has concerns about anxiety and insomnia.  She shares that she is having difficulty falling asleep and frequently when she does fall asleep she has difficulty staying asleep.  Patient shares that her anxiety does not help with her insomnia.  She shares that she gets really anxious about trying to fall asleep.    Patient would like labs screening today.  She is would like her thyroid checked and her liver checked to rule out any possibility of fatty liver disease or any thyroid issues.  We discussed multiple options for labs today and patient is agreeable to get several screening labs today.       Review of Systems   Constitutional: Negative.  Negative for activity change, chills, fatigue and fever.   HENT: Negative.  Negative for congestion, dental problem, ear pain, hearing loss, rhinorrhea, sinus pain, sore throat, tinnitus and trouble swallowing.   "  Eyes: Negative.  Negative for pain and visual disturbance.   Respiratory: Negative.  Negative for cough, chest tightness, shortness of breath and wheezing.    Cardiovascular: Negative.  Negative for chest pain, palpitations and leg swelling.   Gastrointestinal: Negative.  Negative for abdominal pain, diarrhea, nausea and vomiting.   Endocrine: Negative.  Negative for polydipsia, polyphagia and polyuria.   Genitourinary: Negative.  Negative for difficulty urinating, dysuria, frequency and urgency.   Musculoskeletal: Negative.  Negative for arthralgias, back pain and myalgias.   Skin: Positive for rash (Several areas of fungal rash in skin folds.  Dry skin and rash on scalp). Negative for color change, pallor and wound.   Allergic/Immunologic: Negative.  Negative for environmental allergies.   Neurological: Negative.  Negative for dizziness, speech difficulty, weakness, light-headedness, numbness and headaches.   Hematological: Negative.    Psychiatric/Behavioral: Positive for sleep disturbance. Negative for confusion, decreased concentration, self-injury and suicidal ideas. The patient is nervous/anxious.    All other systems reviewed and are negative.       Objective     Vital Signs:   /76 (BP Location: Left arm, Patient Position: Sitting, Cuff Size: Large Adult)   Pulse 78   Temp 98.2 °F (36.8 °C) (Temporal)   Resp 16   Ht 170.2 cm (67\")   Wt (!) 141 kg (311 lb 3.2 oz)   SpO2 96%   BMI 48.74 kg/m²   Current Outpatient Medications on File Prior to Visit   Medication Sig Dispense Refill   • nystatin (MYCOSTATIN) 400054 UNIT/GM powder Apply  topically to the appropriate area as directed 3 (Three) Times a Day. 60 g 1   • promethazine-codeine (PHENERGAN with CODEINE) 6.25-10 MG/5ML syrup Take 5 mL by mouth Every 4 (Four) Hours As Needed for Cough. 100 mL 0   • sertraline (ZOLOFT) 50 MG tablet Take 1 tablet by mouth Daily. 30 tablet 2     No current facility-administered medications on file prior to visit. "        Past Medical History:   Diagnosis Date   • Anxiety    • Depression       Past Surgical History:   Procedure Laterality Date   • CHOLECYSTECTOMY  1987   • EYE SURGERY     • KNEE SURGERY        Family History   Problem Relation Age of Onset   • Heart disease Mother    • Breast cancer Mother    • Heart disease Father    • Pancreatic cancer Father    • Heart disease Sister    • Lung cancer Brother       Social History     Socioeconomic History   • Marital status:    Tobacco Use   • Smoking status: Never   • Smokeless tobacco: Never   Substance and Sexual Activity   • Alcohol use: Never   • Drug use: Never   • Sexual activity: Defer         Office Visit on 10/04/2022   Component Date Value Ref Range Status   • Uric Acid 10/04/2022 5.6  2.4 - 5.7 mg/dL Final   • ASO 10/04/2022 Negative  Negative Final   • Rheumatoid Factor Quantitative 10/04/2022 <10.0  0.0 - 14.0 IU/mL Final   • C-Reactive Protein 10/04/2022 0.53 (H)  0.00 - 0.50 mg/dL Final   • Antinuclear Antibodies (CURT) 10/04/2022 Negative  Negative Final   • Hemoglobin A1C 10/04/2022 5.6  3.5 - 5.6 % Final   • Glucose 10/04/2022 124 (H)  65 - 99 mg/dL Final   • BUN 10/04/2022 8  6 - 20 mg/dL Final   • Creatinine 10/04/2022 0.75  0.57 - 1.00 mg/dL Final   • Sodium 10/04/2022 139  136 - 145 mmol/L Final   • Potassium 10/04/2022 4.6  3.5 - 5.2 mmol/L Final   • Chloride 10/04/2022 103  98 - 107 mmol/L Final   • CO2 10/04/2022 25.3  22.0 - 29.0 mmol/L Final   • Calcium 10/04/2022 9.6  8.6 - 10.5 mg/dL Final   • Total Protein 10/04/2022 7.9  6.0 - 8.5 g/dL Final   • Albumin 10/04/2022 4.50  3.50 - 5.20 g/dL Final   • ALT (SGPT) 10/04/2022 23  1 - 33 U/L Final   • AST (SGOT) 10/04/2022 35 (H)  1 - 32 U/L Final   • Alkaline Phosphatase 10/04/2022 159 (H)  39 - 117 U/L Final   • Total Bilirubin 10/04/2022 0.5  0.0 - 1.2 mg/dL Final   • Globulin 10/04/2022 3.4  gm/dL Final   • A/G Ratio 10/04/2022 1.3  g/dL Final   • BUN/Creatinine Ratio 10/04/2022 10.7  7.0 -  25.0 Final   • Anion Gap 10/04/2022 10.7  5.0 - 15.0 mmol/L Final   • eGFR 10/04/2022 91.8  >60.0 mL/min/1.73 Final    National Kidney Foundation and American Society of Nephrology (ASN) Task Force recommended calculation based on the Chronic Kidney Disease Epidemiology Collaboration (CKD-EPI) equation refit without adjustment for race.   • Total Cholesterol 10/04/2022 201 (H)  0 - 200 mg/dL Final   • Triglycerides 10/04/2022 253 (H)  0 - 150 mg/dL Final   • HDL Cholesterol 10/04/2022 37 (L)  40 - 60 mg/dL Final   • LDL Cholesterol  10/04/2022 120 (H)  0 - 100 mg/dL Final   • VLDL Cholesterol 10/04/2022 44 (H)  5 - 40 mg/dL Final   • LDL/HDL Ratio 10/04/2022 3.06   Final   • TSH 10/04/2022 2.960  0.270 - 4.200 uIU/mL Final   • WBC 10/04/2022 9.62  3.40 - 10.80 10*3/mm3 Final   • RBC 10/04/2022 5.23  3.77 - 5.28 10*6/mm3 Final   • Hemoglobin 10/04/2022 14.8  12.0 - 15.9 g/dL Final   • Hematocrit 10/04/2022 45.7  34.0 - 46.6 % Final   • MCV 10/04/2022 87.4  79.0 - 97.0 fL Final   • MCH 10/04/2022 28.3  26.6 - 33.0 pg Final   • MCHC 10/04/2022 32.4  31.5 - 35.7 g/dL Final   • RDW 10/04/2022 13.1  12.3 - 15.4 % Final   • RDW-SD 10/04/2022 41.9  37.0 - 54.0 fl Final   • MPV 10/04/2022 9.2  6.0 - 12.0 fL Final   • Platelets 10/04/2022 267  140 - 450 10*3/mm3 Final   • Neutrophil % 10/04/2022 71.1  42.7 - 76.0 % Final   • Lymphocyte % 10/04/2022 18.0 (L)  19.6 - 45.3 % Final   • Monocyte % 10/04/2022 6.1  5.0 - 12.0 % Final   • Eosinophil % 10/04/2022 3.7  0.3 - 6.2 % Final   • Basophil % 10/04/2022 0.7  0.0 - 1.5 % Final   • Immature Grans % 10/04/2022 0.4  0.0 - 0.5 % Final   • Neutrophils, Absolute 10/04/2022 6.83  1.70 - 7.00 10*3/mm3 Final   • Lymphocytes, Absolute 10/04/2022 1.73  0.70 - 3.10 10*3/mm3 Final   • Monocytes, Absolute 10/04/2022 0.59  0.10 - 0.90 10*3/mm3 Final   • Eosinophils, Absolute 10/04/2022 0.36  0.00 - 0.40 10*3/mm3 Final   • Basophils, Absolute 10/04/2022 0.07  0.00 - 0.20 10*3/mm3 Final   •  Immature Grans, Absolute 10/04/2022 0.04  0.00 - 0.05 10*3/mm3 Final   • nRBC 10/04/2022 0.0  0.0 - 0.2 /100 WBC Final   Office Visit on 07/12/2022   Component Date Value Ref Range Status   • SARS Antigen 07/12/2022 Not Detected  Not Detected, Presumptive Negative Final   • Influenza A Antigen NASRIN 07/12/2022 Not Detected  Not Detected Final   • Influenza B Antigen NASRIN 07/12/2022 Not Detected  Not Detected Final   • Internal Control 07/12/2022 Passed  Passed Final   • Lot Number 07/12/2022 [id number]   Final   • Expiration Date 07/12/2022 2/4/23   Final         Physical Exam  Vitals and nursing note reviewed.   Constitutional:       Appearance: Normal appearance. She is normal weight.   HENT:      Head: Normocephalic and atraumatic.   Cardiovascular:      Rate and Rhythm: Normal rate and regular rhythm.      Pulses: Normal pulses.      Heart sounds: Normal heart sounds. No murmur heard.    No friction rub. No gallop.   Pulmonary:      Effort: Pulmonary effort is normal. No respiratory distress.      Breath sounds: Normal breath sounds. No stridor. No wheezing, rhonchi or rales.   Chest:      Chest wall: No tenderness.   Abdominal:      General: Abdomen is flat. Bowel sounds are normal. There is no distension.      Palpations: Abdomen is soft. There is no mass.      Tenderness: There is no abdominal tenderness. There is no right CVA tenderness, left CVA tenderness, guarding or rebound.      Hernia: No hernia is present.   Skin:     General: Skin is warm and dry.      Capillary Refill: Capillary refill takes less than 2 seconds.      Coloration: Skin is not jaundiced or pale.      Findings: Rash present.             Comments: Candida rash in skin folds.    Dry skin on scalp.   Neurological:      General: No focal deficit present.      Mental Status: She is alert and oriented to person, place, and time. Mental status is at baseline.      Motor: No weakness.      Coordination: Coordination normal.      Gait: Gait normal.    Psychiatric:         Mood and Affect: Mood normal.         Behavior: Behavior normal.         Thought Content: Thought content normal.         Judgment: Judgment normal.          Result Review  Data Reviewed:{ Labs  Result Review  Imaging  Med Tab  Media :23}   I have reviewed this patient's chart.  I have reviewed previous labs, previous imaging, previous medications, and previous encounters with notes that were available in this patient's chart.           Assessment and Plan {CC Problem List  Visit Diagnosis  ROS  Review (Popup)  [facility]  Quality  BestPractice  Medications  SmartSets  SnapShot Encounters  Media :23}   Diagnoses and all orders for this visit:    1. Facial rash (Primary)  -     Uric acid  -     Antistreptolysin O screen  -     Rheumatoid factor  -     C-reactive protein  -     CURT  -     Cancel: Ambulatory Referral to Dermatology  -     Ambulatory Referral to Dermatology    2. Skin plaque  -     Uric acid  -     Antistreptolysin O screen  -     Rheumatoid factor  -     C-reactive protein  -     CURT  -     Cancel: Ambulatory Referral to Dermatology  -     Ambulatory Referral to Dermatology    3. Screening for diabetes mellitus  -     Hemoglobin A1c    4. Screening for thyroid disorder  -     TSH    5. Screening for lipid disorders  -     Lipid Panel    6. Screening for endocrine, metabolic and immunity disorder  -     Comprehensive Metabolic Panel  -     CBC & Differential    7. Other fatigue  -     Uric acid  -     Antistreptolysin O screen  -     Rheumatoid factor  -     C-reactive protein  -     CURT    8. Arthralgia, unspecified joint  -     Uric acid  -     Antistreptolysin O screen  -     Rheumatoid factor  -     C-reactive protein  -     CURT    9. Lesion of face  -     Cancel: Ambulatory Referral to Dermatology    10. Insomnia, unspecified type  -     zolpidem (AMBIEN) 10 MG tablet; Take 1 tablet by mouth At Night As Needed for Sleep.  Dispense: 30 tablet; Refill:  2    11. Candidal skin infection  -     nystatin-triamcinolone (MYCOLOG) 934561-1.1 UNIT/GM-% ointment; Apply 1 application topically to the appropriate area as directed 2 (Two) Times a Day.  Dispense: 30 g; Refill: 3  -     fluconazole (Diflucan) 150 MG tablet; Take 1 tablet by mouth 1 (One) Time for 1 dose.  Dispense: 1 tablet; Refill: 1    12. Anxiety  -     hydrOXYzine pamoate (VISTARIL) 100 MG capsule; Take 1 capsule by mouth 3 (Three) Times a Day As Needed for Itching.  Dispense: 90 capsule; Refill: 1  -     busPIRone (BUSPAR) 5 MG tablet; Take 1 tablet by mouth 3 (Three) Times a Day.  Dispense: 30 tablet; Refill: 5    13. Elevated liver enzymes  -     US Liver; Future    14. Elevated AST (SGOT)  -     US Liver; Future    15. Elevated ALT measurement  -     US Liver; Future      -Fasting labs today.  -Diflucan for fungal infection.  -Mycolog for preventative in future with skin fold fungal infections.  Significant education provided to help prevent further fungal infections  -Ambien 10 mg for insomnia at patient's request.  She shares she has taken this before and done well with this in the past.  -Hydroxyzine to help with anxiety.  BuSpar 5 mg as needed to help with anxiety.  Patient would like to try both will not try both at the same time educated on both medications.  -Referral to dermatology for multiple issues including fungal infections that are chronic and dry plaques skin.  -Follow-up in 4 weeks or sooner if needed.    **Updated after labs-Ultrasound of the liver to rule out cause of elevated AST and ALT.  Patient shares that she was previously diagnosed with fatty liver but is unsure of this.**     I spent 50 minutes caring for Eladia on this date of service. This time includes time spent by me in the following activities:preparing for the visit, reviewing tests, obtaining and/or reviewing a separately obtained history, performing a medically appropriate examination and/or evaluation , counseling  and educating the patient/family/caregiver, ordering medications, tests, or procedures, referring and communicating with other health care professionals , documenting information in the medical record, independently interpreting results and communicating that information with the patient/family/caregiver and care coordination.    Follow Up {Instructions Charge Capture  Follow-up Communications :23}     Patient was given instructions and counseling regarding her condition or for health maintenance advice. Please see specific information pulled into the AVS (placed there by myself) if appropriate.    Return in about 4 weeks (around 11/1/2022) for Recheck.    MDM  Number of Diagnoses or Management Options  Arthralgia, unspecified joint: established, worsening  Facial rash: established, worsening  Insomnia, unspecified type: established, worsening  Lesion of face: established, worsening  Other fatigue: established, worsening  Screening for diabetes mellitus: new, needed workup  Screening for endocrine, metabolic and immunity disorder: new, needed workup  Screening for lipid disorders: new, needed workup  Screening for thyroid disorder: new, needed workup  Skin plaque: established, worsening     Amount and/or Complexity of Data Reviewed  Clinical lab tests: ordered and reviewed  Tests in the radiology section of CPT®: reviewed  Tests in the medicine section of CPT®: reviewed  Discussion of test results with the performing providers: no  Decide to obtain previous medical records or to obtain history from someone other than the patient: no  Obtain history from someone other than the patient: no  Review and summarize past medical records: yes  Discuss the patient with other providers: no  Independent visualization of images, tracings, or specimens: no    Risk of Complications, Morbidity, and/or Mortality  Presenting problems: high  Diagnostic procedures: low  Management options: high    Critical Care  Total time  providing critical care: 30-74 minutes    Patient Progress  Patient progress: stable       Class 3 Severe Obesity (BMI >=40). Obesity-related health conditions include the following: none. Obesity is unchanged. BMI is is above average; BMI management plan is completed. We discussed low calorie, low carb based diet program, portion control and increasing exercise.       SCOOBY Cisneros, FNP-BC

## 2022-10-05 LAB
ALBUMIN SERPL-MCNC: 4.5 G/DL (ref 3.5–5.2)
ALBUMIN/GLOB SERPL: 1.3 G/DL
ALP SERPL-CCNC: 159 U/L (ref 39–117)
ALT SERPL W P-5'-P-CCNC: 23 U/L (ref 1–33)
ANA SER QL: NEGATIVE
ANION GAP SERPL CALCULATED.3IONS-SCNC: 10.7 MMOL/L (ref 5–15)
ASO AB SERPL-ACNC: NEGATIVE [IU]/ML
AST SERPL-CCNC: 35 U/L (ref 1–32)
BASOPHILS # BLD AUTO: 0.07 10*3/MM3 (ref 0–0.2)
BASOPHILS NFR BLD AUTO: 0.7 % (ref 0–1.5)
BILIRUB SERPL-MCNC: 0.5 MG/DL (ref 0–1.2)
BUN SERPL-MCNC: 8 MG/DL (ref 6–20)
BUN/CREAT SERPL: 10.7 (ref 7–25)
CALCIUM SPEC-SCNC: 9.6 MG/DL (ref 8.6–10.5)
CHLORIDE SERPL-SCNC: 103 MMOL/L (ref 98–107)
CHOLEST SERPL-MCNC: 201 MG/DL (ref 0–200)
CHROMATIN AB SERPL-ACNC: <10 IU/ML (ref 0–14)
CO2 SERPL-SCNC: 25.3 MMOL/L (ref 22–29)
CREAT SERPL-MCNC: 0.75 MG/DL (ref 0.57–1)
CRP SERPL-MCNC: 0.53 MG/DL (ref 0–0.5)
DEPRECATED RDW RBC AUTO: 41.9 FL (ref 37–54)
EGFRCR SERPLBLD CKD-EPI 2021: 91.8 ML/MIN/1.73
EOSINOPHIL # BLD AUTO: 0.36 10*3/MM3 (ref 0–0.4)
EOSINOPHIL NFR BLD AUTO: 3.7 % (ref 0.3–6.2)
ERYTHROCYTE [DISTWIDTH] IN BLOOD BY AUTOMATED COUNT: 13.1 % (ref 12.3–15.4)
GLOBULIN UR ELPH-MCNC: 3.4 GM/DL
GLUCOSE SERPL-MCNC: 124 MG/DL (ref 65–99)
HCT VFR BLD AUTO: 45.7 % (ref 34–46.6)
HDLC SERPL-MCNC: 37 MG/DL (ref 40–60)
HGB BLD-MCNC: 14.8 G/DL (ref 12–15.9)
IMM GRANULOCYTES # BLD AUTO: 0.04 10*3/MM3 (ref 0–0.05)
IMM GRANULOCYTES NFR BLD AUTO: 0.4 % (ref 0–0.5)
LDLC SERPL CALC-MCNC: 120 MG/DL (ref 0–100)
LDLC/HDLC SERPL: 3.06 {RATIO}
LYMPHOCYTES # BLD AUTO: 1.73 10*3/MM3 (ref 0.7–3.1)
LYMPHOCYTES NFR BLD AUTO: 18 % (ref 19.6–45.3)
MCH RBC QN AUTO: 28.3 PG (ref 26.6–33)
MCHC RBC AUTO-ENTMCNC: 32.4 G/DL (ref 31.5–35.7)
MCV RBC AUTO: 87.4 FL (ref 79–97)
MONOCYTES # BLD AUTO: 0.59 10*3/MM3 (ref 0.1–0.9)
MONOCYTES NFR BLD AUTO: 6.1 % (ref 5–12)
NEUTROPHILS NFR BLD AUTO: 6.83 10*3/MM3 (ref 1.7–7)
NEUTROPHILS NFR BLD AUTO: 71.1 % (ref 42.7–76)
NRBC BLD AUTO-RTO: 0 /100 WBC (ref 0–0.2)
PLATELET # BLD AUTO: 267 10*3/MM3 (ref 140–450)
PMV BLD AUTO: 9.2 FL (ref 6–12)
POTASSIUM SERPL-SCNC: 4.6 MMOL/L (ref 3.5–5.2)
PROT SERPL-MCNC: 7.9 G/DL (ref 6–8.5)
RBC # BLD AUTO: 5.23 10*6/MM3 (ref 3.77–5.28)
SODIUM SERPL-SCNC: 139 MMOL/L (ref 136–145)
TRIGL SERPL-MCNC: 253 MG/DL (ref 0–150)
TSH SERPL DL<=0.05 MIU/L-ACNC: 2.96 UIU/ML (ref 0.27–4.2)
URATE SERPL-MCNC: 5.6 MG/DL (ref 2.4–5.7)
VLDLC SERPL-MCNC: 44 MG/DL (ref 5–40)
WBC NRBC COR # BLD: 9.62 10*3/MM3 (ref 3.4–10.8)

## 2022-11-04 ENCOUNTER — TELEPHONE (OUTPATIENT)
Dept: FAMILY MEDICINE CLINIC | Facility: CLINIC | Age: 57
End: 2022-11-04

## 2022-11-04 ENCOUNTER — OFFICE VISIT (OUTPATIENT)
Dept: FAMILY MEDICINE CLINIC | Facility: CLINIC | Age: 57
End: 2022-11-04
Payer: COMMERCIAL

## 2022-11-04 ENCOUNTER — DOCUMENTATION (OUTPATIENT)
Dept: FAMILY MEDICINE CLINIC | Facility: CLINIC | Age: 57
End: 2022-11-04

## 2022-11-04 VITALS
TEMPERATURE: 98.7 F | HEIGHT: 67 IN | WEIGHT: 293 LBS | OXYGEN SATURATION: 99 % | HEART RATE: 81 BPM | SYSTOLIC BLOOD PRESSURE: 115 MMHG | BODY MASS INDEX: 45.99 KG/M2 | DIASTOLIC BLOOD PRESSURE: 80 MMHG
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
## 2022-11-04 DIAGNOSIS — B37.2 CANDIDAL SKIN INFECTION: ICD-10-CM

## 2022-11-04 DIAGNOSIS — B85.0 PEDICULOSIS CAPITIS: ICD-10-CM

## 2022-11-04 DIAGNOSIS — M25.561 ACUTE PAIN OF RIGHT KNEE: ICD-10-CM

## 2022-11-04 DIAGNOSIS — B86 SCABIES: Primary | ICD-10-CM

## 2022-11-04 DIAGNOSIS — B85.0 PEDICULOSIS CAPITIS: Primary | ICD-10-CM

## 2022-11-04 DIAGNOSIS — B86 SCABIES: ICD-10-CM

## 2022-11-04 PROCEDURE — 99215 OFFICE O/P EST HI 40 MIN: CPT | Performed by: REGISTERED NURSE

## 2022-11-04 RX ORDER — IVERMECTIN 3 MG/1
27 TABLET ORAL ONCE
Qty: 9 TABLET | Refills: 0 | Status: SHIPPED | OUTPATIENT
Start: 2022-11-04 | End: 2022-11-14

## 2022-11-04 RX ORDER — NYSTATIN 100000 U/G
1 CREAM TOPICAL 2 TIMES DAILY
Qty: 30 G | Refills: 3 | Status: SHIPPED | OUTPATIENT
Start: 2022-11-04 | End: 2022-12-25

## 2022-11-04 NOTE — TELEPHONE ENCOUNTER
Caller: Eladia Connor    Relationship: Self    Best call back number: [phone]    What medication are you requesting: CREAM    What are your current symptoms: SCABIES    If a prescription is needed, what is your preferred pharmacy and phone number:  [id number] - [place], [place] - [address] [place] [address] AT [place] [address] - [phone]  - [phone]   [phone]    Additional notes: PATIENT DECIDED SHE WANTS A PRESCRIPTION OF THE CREAM TO TREAT THE SCABIES, INSTEAD OF THE PILL THAT WAS PRESCRIBED.

## 2022-11-04 NOTE — PROGRESS NOTES
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 Pt describes this rash to be itchy and painful. It is located on the top of her right thigh, chest and on both arms.) and Knee Injury (Fell 2 weeks ago and hurt her right knee. 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 Pt describes this rash to be itchy and painful. It is located on the top of her right thigh, chest and on both arms.) and Knee Injury (Fell 2 weeks ago and hurt her right knee. Wants to discuss imaging./).    History of Present Illness  Patient is a 57-year-old female who presents to the clinic today with concerns of a rash x1 week and right knee pain x2 weeks.  Patient denies any chest pain, shortness of breath, or any fevers.  Patient denies any known exposure to COVID, flu, or any other contagious illnesses.    In regards to rash, patient shares that this rash started about a week ago.  She shares that the rash has spread to her thighs, chest, both arms, and on her bilateral hands.  Patient shares that she is exposed to scabies at work.  Patient has requested treatment for this today.    In regards to right knee pain, patient shares that she fell 2 weeks ago and injured her right knee.  Patient shares that her knee is still hurting and she would like to pursue imaging now.       Review of Systems   Constitutional: Negative.  Negative for activity change, chills, fatigue and fever.   HENT: Negative.  Negative for congestion, dental problem, ear pain, hearing loss, rhinorrhea, sinus  "pain, sore throat, tinnitus and trouble swallowing.    Eyes: Negative.  Negative for pain and visual disturbance.   Respiratory: Negative.  Negative for cough, chest tightness, shortness of breath and wheezing.    Cardiovascular: Negative.  Negative for chest pain, palpitations and leg swelling.   Gastrointestinal: Negative.  Negative for abdominal pain, diarrhea, nausea and vomiting.   Endocrine: Negative.  Negative for polydipsia, polyphagia and polyuria.   Genitourinary: Negative.  Negative for difficulty urinating, dysuria, frequency and urgency.   Musculoskeletal: Positive for arthralgias (Right knee). Negative for back pain and myalgias.   Skin: Positive for rash. Negative for color change, pallor and wound.   Allergic/Immunologic: Negative.  Negative for environmental allergies.   Neurological: Negative.  Negative for dizziness, speech difficulty, weakness, light-headedness, numbness and headaches.   Hematological: Negative.    Psychiatric/Behavioral: Negative.  Negative for confusion, decreased concentration, self-injury and suicidal ideas. The patient is not nervous/anxious.    All other systems reviewed and are negative.       Objective     Vital Signs:   /80 (BP Location: Right arm, Patient Position: Sitting, Cuff Size: Large Adult)   Pulse 81   Temp 98.7 °F (37.1 °C) (Temporal)   Ht 170.2 cm (67\")   Wt (!) 141 kg (310 lb)   SpO2 99%   BMI 48.55 kg/m²   No current outpatient medications on file prior to visit.     No current facility-administered medications on file prior to visit.        Past Medical History:   Diagnosis Date   • Anxiety    • Asthma    • Cancer (HCC)     SKIN   • Depression    • Psoriasis    • Seizures (HCC) 12/06/2022      Past Surgical History:   Procedure Laterality Date   • CHOLECYSTECTOMY  1987   • ENDOSCOPY N/A 12/30/2022    Procedure: ESOPHAGOGASTRODUODENOSCOPY with gastric biopsy and esophageal dilation #50,#54,#56,#58 bougernestina;  Surgeon: Patience Arana MD;  Location: " [facility] ENDOSCOPY;  Service: Gastroenterology;  Laterality: N/A;  gastritis   • EYE SURGERY     • KNEE SURGERY        Family History   Problem Relation Age of Onset   • Heart disease Mother    • Breast cancer Mother    • Heart disease Father    • Pancreatic cancer Father    • Heart disease Sister    • Lung cancer Brother       Social History     Socioeconomic History   • Marital status:    Tobacco Use   • Smoking status: Never   • Smokeless tobacco: Never   Vaping Use   • Vaping Use: Never used   Substance and Sexual Activity   • Alcohol use: Never   • Drug use: Never   • Sexual activity: Defer         Office Visit on 10/04/2022   Component Date Value Ref Range Status   • Uric Acid 10/04/2022 5.6  2.4 - 5.7 mg/dL Final   • ASO 10/04/2022 Negative  Negative Final   • Rheumatoid Factor Quantitative 10/04/2022 <10.0  0.0 - 14.0 IU/mL Final   • C-Reactive Protein 10/04/2022 0.53 (H)  0.00 - 0.50 mg/dL Final   • Antinuclear Antibodies (CURT) 10/04/2022 Negative  Negative Final   • Hemoglobin A1C 10/04/2022 5.6  3.5 - 5.6 % Final   • Glucose 10/04/2022 124 (H)  65 - 99 mg/dL Final   • BUN 10/04/2022 8  6 - 20 mg/dL Final   • Creatinine 10/04/2022 0.75  0.57 - 1.00 mg/dL Final   • Sodium 10/04/2022 139  136 - 145 mmol/L Final   • Potassium 10/04/2022 4.6  3.5 - 5.2 mmol/L Final   • Chloride 10/04/2022 103  98 - 107 mmol/L Final   • CO2 10/04/2022 25.3  22.0 - 29.0 mmol/L Final   • Calcium 10/04/2022 9.6  8.6 - 10.5 mg/dL Final   • Total Protein 10/04/2022 7.9  6.0 - 8.5 g/dL Final   • Albumin 10/04/2022 4.50  3.50 - 5.20 g/dL Final   • ALT (SGPT) 10/04/2022 23  1 - 33 U/L Final   • AST (SGOT) 10/04/2022 35 (H)  1 - 32 U/L Final   • Alkaline Phosphatase 10/04/2022 159 (H)  39 - 117 U/L Final   • Total Bilirubin 10/04/2022 0.5  0.0 - 1.2 mg/dL Final   • Globulin 10/04/2022 3.4  gm/dL Final   • A/G Ratio 10/04/2022 1.3  g/dL Final   • BUN/Creatinine Ratio 10/04/2022 10.7  7.0 - 25.0 Final   • Anion Gap 10/04/2022 10.7   5.0 - 15.0 mmol/L Final   • eGFR 10/04/2022 91.8  >60.0 mL/min/1.73 Final    National Kidney Foundation and American Society of Nephrology (ASN) Task Force recommended calculation based on the Chronic Kidney Disease Epidemiology Collaboration (CKD-EPI) equation refit without adjustment for race.   • Total Cholesterol 10/04/2022 201 (H)  0 - 200 mg/dL Final   • Triglycerides 10/04/2022 253 (H)  0 - 150 mg/dL Final   • HDL Cholesterol 10/04/2022 37 (L)  40 - 60 mg/dL Final   • LDL Cholesterol  10/04/2022 120 (H)  0 - 100 mg/dL Final   • VLDL Cholesterol 10/04/2022 44 (H)  5 - 40 mg/dL Final   • LDL/HDL Ratio 10/04/2022 3.06   Final   • TSH 10/04/2022 2.960  0.270 - 4.200 uIU/mL Final   • WBC 10/04/2022 9.62  3.40 - 10.80 10*3/mm3 Final   • RBC 10/04/2022 5.23  3.77 - 5.28 10*6/mm3 Final   • Hemoglobin 10/04/2022 14.8  12.0 - 15.9 g/dL Final   • Hematocrit 10/04/2022 45.7  34.0 - 46.6 % Final   • MCV 10/04/2022 87.4  79.0 - 97.0 fL Final   • MCH 10/04/2022 28.3  26.6 - 33.0 pg Final   • MCHC 10/04/2022 32.4  31.5 - 35.7 g/dL Final   • RDW 10/04/2022 13.1  12.3 - 15.4 % Final   • RDW-SD 10/04/2022 41.9  37.0 - 54.0 fl Final   • MPV 10/04/2022 9.2  6.0 - 12.0 fL Final   • Platelets 10/04/2022 267  140 - 450 10*3/mm3 Final   • Neutrophil % 10/04/2022 71.1  42.7 - 76.0 % Final   • Lymphocyte % 10/04/2022 18.0 (L)  19.6 - 45.3 % Final   • Monocyte % 10/04/2022 6.1  5.0 - 12.0 % Final   • Eosinophil % 10/04/2022 3.7  0.3 - 6.2 % Final   • Basophil % 10/04/2022 0.7  0.0 - 1.5 % Final   • Immature Grans % 10/04/2022 0.4  0.0 - 0.5 % Final   • Neutrophils, Absolute 10/04/2022 6.83  1.70 - 7.00 10*3/mm3 Final   • Lymphocytes, Absolute 10/04/2022 1.73  0.70 - 3.10 10*3/mm3 Final   • Monocytes, Absolute 10/04/2022 0.59  0.10 - 0.90 10*3/mm3 Final   • Eosinophils, Absolute 10/04/2022 0.36  0.00 - 0.40 10*3/mm3 Final   • Basophils, Absolute 10/04/2022 0.07  0.00 - 0.20 10*3/mm3 Final   • Immature Grans, Absolute 10/04/2022 0.04   0.00 - 0.05 10*3/mm3 Final   • nRBC 10/04/2022 0.0  0.0 - 0.2 /100 WBC Final         Physical Exam  Vitals and nursing note reviewed.   Constitutional:       Appearance: Normal appearance. She is normal weight.   HENT:      Head: Normocephalic and atraumatic.   Cardiovascular:      Rate and Rhythm: Normal rate and regular rhythm.      Pulses: Normal pulses.      Heart sounds: Normal heart sounds. No murmur heard.    No friction rub. No gallop.   Pulmonary:      Effort: Pulmonary effort is normal. No respiratory distress.      Breath sounds: Normal breath sounds. No stridor. No wheezing, rhonchi or rales.   Chest:      Chest wall: No tenderness.   Abdominal:      General: Abdomen is flat. Bowel sounds are normal. There is no distension.      Palpations: Abdomen is soft. There is no mass.      Tenderness: There is no abdominal tenderness. There is no right CVA tenderness, left CVA tenderness, guarding or rebound.      Hernia: No hernia is present.   Musculoskeletal:      Right knee: Decreased range of motion. Tenderness present.      Left knee: Normal.   Skin:     General: Skin is warm and dry.      Capillary Refill: Capillary refill takes less than 2 seconds.      Coloration: Skin is not jaundiced or pale.      Findings: Rash present. Rash is papular.          Neurological:      General: No focal deficit present.      Mental Status: She is alert and oriented to person, place, and time. Mental status is at baseline.      Motor: No weakness.      Coordination: Coordination normal.      Gait: Gait normal.   Psychiatric:         Mood and Affect: Mood normal.         Behavior: Behavior normal.         Thought Content: Thought content normal.         Judgment: Judgment normal.          Result Review  Data Reviewed:{ Labs  Result Review  Imaging  Med Tab  Media :23}   I have reviewed this patient's chart.  I have reviewed previous labs, previous imaging, previous medications, and previous encounters with notes that were  available in this patient's chart.             Assessment and Plan {CC Problem List  Visit Diagnosis  ROS  Review (Popup)  [facility]  Quality  BestPractice  Medications  SmartSets  SnapShot Encounters  Media :23}   Diagnoses and all orders for this visit:    1. Fall, initial encounter (Primary)  -     XR Knee 1 or 2 View Right; Future    2. Acute pain of right knee  -     XR Knee 1 or 2 View Right; Future    3. Scabies  -     Discontinue: ivermectin (STROMECTOL) 3 MG tablet tablet; Take 9 tablets by mouth 1 (One) Time for 1 dose.  Dispense: 9 tablet; Refill: 0    4. Candidal skin infection  -     Discontinue: nystatin (MYCOSTATIN) 168815 UNIT/GM cream; Apply 1 application topically to the appropriate area as directed 2 (Two) Times a Day.  Dispense: 30 g; Refill: 3    5. Pediculosis capitis  -     Discontinue: permethrin (NIX) 1 % liquid; Apply  topically to the appropriate area as directed 1 (One) Time for 1 dose. May repeat in 7 days if lice are still present.  Dispense: 120 mL; Refill: 1      -X-ray of right knee ordered due to injury.  -Treatment for scabies sent to pharmacy.  -Follow-up in 2 weeks or sooner if needed.    I spent 40 minutes caring for Eladia on this date of service. This time includes time spent by me in the following activities:preparing for the visit, reviewing tests, obtaining and/or reviewing a separately obtained history, performing a medically appropriate examination and/or evaluation , counseling and educating the patient/family/caregiver, ordering medications, tests, or procedures, referring and communicating with other health care professionals , documenting information in the medical record, independently interpreting results and communicating that information with the patient/family/caregiver and care coordination.    Follow Up {Instructions Charge Capture  Follow-up Communications :23}     Patient was given instructions and counseling regarding her condition  or for health maintenance advice. Please see specific information pulled into the AVS (placed there by myself) if appropriate.    Return in about 2 weeks (around 11/18/2022) for Recheck.    MDM  Number of Diagnoses or Management Options  Acute pain of right knee: new, needed workup  Candidal skin infection: new, needed workup  Fall, initial encounter: established, worsening  Pediculosis capitis: new, needed workup  Scabies: new, needed workup     Amount and/or Complexity of Data Reviewed  Clinical lab tests: reviewed  Tests in the radiology section of CPT®: ordered and reviewed  Tests in the medicine section of CPT®: reviewed  Discussion of test results with the performing providers: no  Decide to obtain previous medical records or to obtain history from someone other than the patient: no  Obtain history from someone other than the patient: no  Review and summarize past medical records: yes  Discuss the patient with other providers: no  Independent visualization of images, tracings, or specimens: no    Risk of Complications, Morbidity, and/or Mortality  Presenting problems: high  Diagnostic procedures: low  Management options: high    Critical Care  Total time providing critical care: 30-74 minutes    Patient Progress  Patient progress: stable       Class 3 Severe Obesity (BMI >=40). Obesity-related health conditions include the following: hypertension. Obesity is unchanged. BMI is is above average; BMI management plan is completed. We discussed portion control and increasing exercise.       SCOOBY Cisneros, FNP-BC

## 2022-11-06 RX ORDER — PERMETHRIN 50 MG/G
1 CREAM TOPICAL ONCE
Qty: 1 G | Refills: 0 | Status: SHIPPED | OUTPATIENT
Start: 2022-11-06 | End: 2022-11-06

## 2022-11-09 NOTE — TELEPHONE ENCOUNTER
Hub is instructed to read the documentation below to patient    Called patient, no answer. Left a voicemail informing that prescription was sent over today (11/9/22) to [facility] in [place]. Pt needs to contact pharmacy for  status.

## 2022-11-13 DIAGNOSIS — B86 SCABIES: ICD-10-CM

## 2022-11-13 DIAGNOSIS — B37.2 CANDIDAL SKIN INFECTION: ICD-10-CM

## 2022-11-14 RX ORDER — FLUCONAZOLE 150 MG/1
TABLET ORAL
Qty: 1 TABLET | Refills: 1 | Status: SHIPPED | OUTPATIENT
Start: 2022-11-14 | End: 2022-12-07

## 2022-11-14 RX ORDER — IVERMECTIN 3 MG/1
TABLET ORAL
Qty: 9 TABLET | Refills: 0 | Status: SHIPPED | OUTPATIENT
Start: 2022-11-14 | End: 2022-12-07

## 2022-11-14 NOTE — TELEPHONE ENCOUNTER
I called patient to verify symptoms for needing a refill on the fluconazole. She stated that it improved slightly after taking the initial dose but now is back under her breasts.         Rx Refill Note  Requested Prescriptions     Pending Prescriptions Disp Refills   • ivermectin (STROMECTOL) 3 MG tablet tablet [Pharmacy Med Name: IVERMECTIN 3 MG TABLET] 9 tablet 0     Sig: TAKE 9 TABLETS BY MOUTH ONCE   • fluconazole (DIFLUCAN) 150 MG tablet [Pharmacy Med Name: FLUCONAZOLE 150 MG TABLET] 1 tablet 1     Sig: TAKE 1 TABLET BY MOUTH 1 TIME FOR 1 DOSE.      Last office visit with prescribing clinician: 11/4/2022      Next office visit with prescribing clinician: Visit date not found            Edie Holliday MA  11/14/22, 11:26 EST

## 2022-11-19 RX ORDER — CLONAZEPAM 0.5 MG/1
0.25 TABLET ORAL 2 TIMES DAILY PRN
Qty: 30 TABLET | OUTPATIENT
Start: 2022-11-19

## 2022-11-29 ENCOUNTER — TELEPHONE (OUTPATIENT)
Dept: FAMILY MEDICINE CLINIC | Facility: CLINIC | Age: 57
End: 2022-11-29

## 2022-11-29 DIAGNOSIS — R76.12 POSITIVE QUANTIFERON-TB GOLD TEST: Primary | ICD-10-CM

## 2022-11-29 NOTE — TELEPHONE ENCOUNTER
Can you please reach out to the patient and ask for a copy of those results and I be happy to put in that order ASAP for them.  Thank you.

## 2022-11-29 NOTE — TELEPHONE ENCOUNTER
Caller: ELIZABETH    Relationship: Other/[facility]     Best call back number: [phone]    What orders are you requesting (i.e. lab or imaging): CHEST X-RAY     In what timeframe would the patient need to come in: AS SOON AS POSSIBLE       Additional notes: PT REPORTED POSITIVE BLOOD TEST FOR TB-REQUESTING CHEST X-RAY TO BE ORDERED FOR PATIENT. THEY ARE TRYING TO OBTAIN THE LAB RESULTS FROM THE LAB SHE HAD THEM DONE AT.

## 2022-11-30 ENCOUNTER — HOSPITAL ENCOUNTER (OUTPATIENT)
Dept: GENERAL RADIOLOGY | Facility: HOSPITAL | Age: 57
Discharge: HOME | End: 2022-11-30

## 2022-11-30 DIAGNOSIS — M25.561 ACUTE PAIN OF RIGHT KNEE: ICD-10-CM
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
## 2022-11-30 DIAGNOSIS — R76.12 POSITIVE QUANTIFERON-TB GOLD TEST: ICD-10-CM

## 2022-11-30 PROCEDURE — 73560 X-RAY EXAM OF KNEE 1 OR 2: CPT

## 2022-11-30 PROCEDURE — 71046 X-RAY EXAM CHEST 2 VIEWS: CPT

## 2022-11-30 NOTE — TELEPHONE ENCOUNTER
Called and spoke with patient. She is at dermatology appointment but plans on going to [facility] for x-ray afterwards. Informed patient we would be in contact once the results are in. Verbalized understanding.

## 2022-11-30 NOTE — TELEPHONE ENCOUNTER
I returned call to the patient and the health department.  Chest x-ray has been ordered and we will follow-up after the results are in.  Chloe at the health department shares that standard protocol is to order medication treatment for it and she is willing to share with me their protocol guidelines.

## 2022-12-01 ENCOUNTER — TELEPHONE (OUTPATIENT)
Dept: FAMILY MEDICINE CLINIC | Facility: CLINIC | Age: 57
End: 2022-12-01

## 2022-12-01 NOTE — TELEPHONE ENCOUNTER
Caller: Eladia Connor    Relationship: Self    Best call back number:[phone]    What is the best time to reach you: ANY    Who are you requesting to speak with (clinical staff, provider,  specific staff member): CLINICAL     What was the call regarding: PATIENT WOULD LIKE A CALL BACK ABOUT HER RESULTS FROM HER TB XRAY    [facility]    Do you require a callback: YES

## 2022-12-02 ENCOUNTER — TELEPHONE (OUTPATIENT)
Dept: FAMILY MEDICINE CLINIC | Facility: CLINIC | Age: 57
End: 2022-12-02

## 2022-12-02 DIAGNOSIS — F41.9 ANXIETY: Primary | ICD-10-CM

## 2022-12-02 DIAGNOSIS — R45.86 MOOD SWINGS: ICD-10-CM

## 2022-12-02 NOTE — PROGRESS NOTES
Can we please reach out to Chloe at the health department.  Her number is [phone].  She needs to be faxed these results since the patient had tested positive on the QuantiFERON gold.    Patient was informed of these results by Dr. Olguin last night when she called the on- for it.    We will wait on the health departments guidance as to what to do next.    Thank you.

## 2022-12-02 NOTE — TELEPHONE ENCOUNTER
Pt is requesting a prescription for clonazepam. She states that the buspar is no longer working for her and that clonazepam has worked for her in the past. I see where clonazepam 0.5mg is on her past medications from 12/20/21. If refill is appropriate, please send to [facility] in [place]. Thank you.

## 2022-12-02 NOTE — TELEPHONE ENCOUNTER
farheen     Caller: Eladia Connor    Relationship: Self    Best call back number: [phone]    What medication are you requesting: CLONAZEPAM     What are your current symptoms: ANXIETY     How long have you been experiencing symptoms: ONGOING     Have you had these symptoms before:    [x] Yes  [] No    Have you been treated for these symptoms before:   [x] Yes  [] No    If a prescription is needed, what is your preferred pharmacy and phone number:  [facility]/pharmacy #[id number] - [place] [address] - [phone] [facility] [phone]         Additional notes: PATIENT FEELS THE BUSPAR IS NOT WORKING AND WOULD LIKE TO KNOW IF IT CAN BE CHANGED TO CLONAZEPAM AS IT HAS WORKED IN THE PAST.

## 2022-12-02 NOTE — TELEPHONE ENCOUNTER
Can you please reach out to patient and let her know I do not prescribe benzodiazepines unless patient was already previously on it.  Benzodiazepines have a really high risk of death due to respiratory suppression.  In regards of the BuSpar, that she increase the dose as I recommended?  Patient can take up to 3 of the 5 mg tablets at a time 3 times a day if needed.  If patient has already done this then I recommend we do a follow-up visit on Monday or Tuesday to discuss alternative options.  Patient has multiple call request and refill at this volume of call request we need a visit to discuss what she needs that her knees can be handled.    Thank you.

## 2022-12-02 NOTE — TELEPHONE ENCOUNTER
I called and spoke to patient. I gave her a thorough rundown of your note and suggestions. Patient states that she has been taking the Buspar 3 times daily but the dosage increase has not helped her. Patient has decided that she would like a referral placed to see Physiatry.

## 2022-12-02 NOTE — PROGRESS NOTES
Can you please inform patient of the following results:    Patient does have osteoarthritic changes within her knee but no significant abnormality or any fracture seen.    NSAIDs would be the course of treatment for this but we can always refer patient to orthopedics to discuss injections into the knee or something else for treatment.    Thank you.

## 2022-12-02 NOTE — TELEPHONE ENCOUNTER
Patient called the service concerned that she had tuberculosis.  She was requesting the results of her chest x-ray.  I explained to her that her chest x-ray appeared to be clear.  I explained to her that she would need to discuss this with her PCP as to what needed to be next.

## 2022-12-06 ENCOUNTER — HOSPITAL ENCOUNTER (INPATIENT)
Facility: HOSPITAL | Age: 57
LOS: 1 days | Discharge: ANOTHER HOSPITAL | End: 2022-12-13
Attending: EMERGENCY MEDICINE | Admitting: INTERNAL MEDICINE

## 2022-12-06 DIAGNOSIS — R51.9 NONINTRACTABLE HEADACHE, UNSPECIFIED CHRONICITY PATTERN, UNSPECIFIED HEADACHE TYPE: ICD-10-CM

## 2022-12-06 DIAGNOSIS — R29.898 RIGHT LEG WEAKNESS: ICD-10-CM

## 2022-12-06 DIAGNOSIS — R47.81 SLURRED SPEECH: ICD-10-CM

## 2022-12-06 DIAGNOSIS — R29.898 LEFT ARM WEAKNESS: ICD-10-CM

## 2022-12-06 DIAGNOSIS — F41.9 ANXIETY: ICD-10-CM

## 2022-12-06 DIAGNOSIS — Z22.7 TB LUNG, LATENT: ICD-10-CM

## 2022-12-06 DIAGNOSIS — R07.9 CHEST PAIN, UNSPECIFIED TYPE: Primary | ICD-10-CM

## 2022-12-06 DIAGNOSIS — Z09 FOLLOW-UP EXAM: ICD-10-CM

## 2022-12-06 PROCEDURE — 84443 ASSAY THYROID STIM HORMONE: CPT | Performed by: NURSE PRACTITIONER

## 2022-12-06 PROCEDURE — 85025 COMPLETE CBC W/AUTO DIFF WBC: CPT | Performed by: NURSE PRACTITIONER

## 2022-12-06 PROCEDURE — 85610 PROTHROMBIN TIME: CPT | Performed by: NURSE PRACTITIONER

## 2022-12-06 PROCEDURE — 36415 COLL VENOUS BLD VENIPUNCTURE: CPT

## 2022-12-06 PROCEDURE — 82607 VITAMIN B-12: CPT | Performed by: NURSE PRACTITIONER

## 2022-12-06 PROCEDURE — 84484 ASSAY OF TROPONIN QUANT: CPT | Performed by: NURSE PRACTITIONER

## 2022-12-06 PROCEDURE — 84145 PROCALCITONIN (PCT): CPT | Performed by: NURSE PRACTITIONER

## 2022-12-06 PROCEDURE — G0378 HOSPITAL OBSERVATION PER HR: HCPCS

## 2022-12-06 PROCEDURE — 99285 EMERGENCY DEPT VISIT HI MDM: CPT

## 2022-12-06 PROCEDURE — 80053 COMPREHEN METABOLIC PANEL: CPT | Performed by: NURSE PRACTITIONER

## 2022-12-06 PROCEDURE — 85730 THROMBOPLASTIN TIME PARTIAL: CPT | Performed by: NURSE PRACTITIONER

## 2022-12-06 RX ORDER — SODIUM CHLORIDE 0.9 % (FLUSH) 0.9 %
10 SYRINGE (ML) INJECTION AS NEEDED
Status: DISCONTINUED | OUTPATIENT
Start: 2022-12-06 | End: 2022-12-13 | Stop reason: HOSPADM

## 2022-12-07 ENCOUNTER — APPOINTMENT (OUTPATIENT)
Dept: CT IMAGING | Facility: HOSPITAL | Age: 57
End: 2022-12-07

## 2022-12-07 ENCOUNTER — APPOINTMENT (OUTPATIENT)
Dept: MRI IMAGING | Facility: HOSPITAL | Age: 57
End: 2022-12-07

## 2022-12-07 ENCOUNTER — APPOINTMENT (OUTPATIENT)
Dept: CARDIOLOGY | Facility: HOSPITAL | Age: 57
End: 2022-12-07

## 2022-12-07 ENCOUNTER — APPOINTMENT (OUTPATIENT)
Dept: OTHER | Facility: HOSPITAL | Age: 57
End: 2022-12-07

## 2022-12-07 PROBLEM — R53.1 WEAKNESS: Status: ACTIVE | Noted: 2022-12-07

## 2022-12-07 PROBLEM — R07.9 CHEST PAIN, UNSPECIFIED TYPE: Status: RESOLVED | Noted: 2022-12-07 | Resolved: 2022-12-07

## 2022-12-07 PROBLEM — R07.9 CHEST PAIN, UNSPECIFIED TYPE: Status: ACTIVE | Noted: 2022-12-07

## 2022-12-07 LAB
ALBUMIN SERPL-MCNC: 4.2 G/DL (ref 3.5–5.2)
ALBUMIN/GLOB SERPL: 1 G/DL
ALP SERPL-CCNC: 156 U/L (ref 39–117)
ALT SERPL W P-5'-P-CCNC: 16 U/L (ref 1–33)
AMMONIA BLD-SCNC: 28 UMOL/L (ref 11–51)
AMPHET+METHAMPHET UR QL: NEGATIVE
ANION GAP SERPL CALCULATED.3IONS-SCNC: 12 MMOL/L (ref 5–15)
ANION GAP SERPL CALCULATED.3IONS-SCNC: 13 MMOL/L (ref 5–15)
APTT PPP: 26.8 SECONDS (ref 61–76.5)
AST SERPL-CCNC: 25 U/L (ref 1–32)
B PARAPERT DNA SPEC QL NAA+PROBE: NOT DETECTED
B PERT DNA SPEC QL NAA+PROBE: NOT DETECTED
BARBITURATES UR QL SCN: NEGATIVE
BASOPHILS # BLD AUTO: 0 10*3/MM3 (ref 0–0.2)
BASOPHILS # BLD AUTO: 0 10*3/MM3 (ref 0–0.2)
BASOPHILS NFR BLD AUTO: 0.3 % (ref 0–1.5)
BASOPHILS NFR BLD AUTO: 0.5 % (ref 0–1.5)
BENZODIAZ UR QL SCN: NEGATIVE
BH CV ECHO MEAS - ACS: 2.6 CM
BH CV ECHO MEAS - AO MAX PG: 8.1 MMHG
BH CV ECHO MEAS - AO MEAN PG: 3.9 MMHG
BH CV ECHO MEAS - AO ROOT DIAM: 3.8 CM
BH CV ECHO MEAS - AO V2 MAX: 142 CM/SEC
BH CV ECHO MEAS - AO V2 VTI: 30.9 CM
BH CV ECHO MEAS - AVA(I,D): 2.41 CM2
BH CV ECHO MEAS - EDV(CUBED): 102.7 ML
BH CV ECHO MEAS - EDV(MOD-SP4): 93.4 ML
BH CV ECHO MEAS - EF(MOD-BP): 65 %
BH CV ECHO MEAS - EF(MOD-SP4): 64.6 %
BH CV ECHO MEAS - ESV(CUBED): 30.4 ML
BH CV ECHO MEAS - ESV(MOD-SP4): 33 ML
BH CV ECHO MEAS - FS: 33.3 %
BH CV ECHO MEAS - IVS/LVPW: 1.01 CM
BH CV ECHO MEAS - IVSD: 1 CM
BH CV ECHO MEAS - LA DIMENSION: 4.1 CM
BH CV ECHO MEAS - LV DIASTOLIC VOL/BSA (35-75): 38.1 CM2
BH CV ECHO MEAS - LV MASS(C)D: 162.5 GRAMS
BH CV ECHO MEAS - LV MAX PG: 3.7 MMHG
BH CV ECHO MEAS - LV MEAN PG: 1.79 MMHG
BH CV ECHO MEAS - LV SYSTOLIC VOL/BSA (12-30): 13.5 CM2
BH CV ECHO MEAS - LV V1 MAX: 96.6 CM/SEC
BH CV ECHO MEAS - LV V1 VTI: 22.7 CM
BH CV ECHO MEAS - LVIDD: 4.7 CM
BH CV ECHO MEAS - LVIDS: 3.1 CM
BH CV ECHO MEAS - LVOT AREA: 3.3 CM2
BH CV ECHO MEAS - LVOT DIAM: 2.05 CM
BH CV ECHO MEAS - LVPWD: 0.99 CM
BH CV ECHO MEAS - MV A MAX VEL: 103.2 CM/SEC
BH CV ECHO MEAS - MV DEC SLOPE: 420.3 CM/SEC2
BH CV ECHO MEAS - MV DEC TIME: 0.27 MSEC
BH CV ECHO MEAS - MV E MAX VEL: 114.5 CM/SEC
BH CV ECHO MEAS - MV E/A: 1.11
BH CV ECHO MEAS - MV MAX PG: 6.1 MMHG
BH CV ECHO MEAS - MV MEAN PG: 2.8 MMHG
BH CV ECHO MEAS - MV V2 VTI: 40.1 CM
BH CV ECHO MEAS - MVA(VTI): 1.86 CM2
BH CV ECHO MEAS - PA ACC TIME: 0.12 SEC
BH CV ECHO MEAS - PA PR(ACCEL): 24.5 MMHG
BH CV ECHO MEAS - PA V2 MAX: 92.2 CM/SEC
BH CV ECHO MEAS - PULM A REVS DUR: 0.12 SEC
BH CV ECHO MEAS - PULM A REVS VEL: 29 CM/SEC
BH CV ECHO MEAS - PULM DIAS VEL: 41.9 CM/SEC
BH CV ECHO MEAS - PULM S/D: 1.19
BH CV ECHO MEAS - PULM SYS VEL: 50 CM/SEC
BH CV ECHO MEAS - RAP SYSTOLE: 8 MMHG
BH CV ECHO MEAS - RV MAX PG: 2.8 MMHG
BH CV ECHO MEAS - RV V1 MAX: 83.5 CM/SEC
BH CV ECHO MEAS - RV V1 VTI: 22.3 CM
BH CV ECHO MEAS - RVDD: 2.9 CM
BH CV ECHO MEAS - SI(MOD-SP4): 24.6 ML/M2
BH CV ECHO MEAS - SV(LVOT): 74.5 ML
BH CV ECHO MEAS - SV(MOD-SP4): 60.4 ML
BILIRUB SERPL-MCNC: 0.8 MG/DL (ref 0–1.2)
BUN SERPL-MCNC: 8 MG/DL (ref 6–20)
BUN SERPL-MCNC: 9 MG/DL (ref 6–20)
BUN/CREAT SERPL: 11.4 (ref 7–25)
BUN/CREAT SERPL: 12.7 (ref 7–25)
C PNEUM DNA NPH QL NAA+NON-PROBE: NOT DETECTED
CALCIUM SPEC-SCNC: 9 MG/DL (ref 8.6–10.5)
CALCIUM SPEC-SCNC: 9.5 MG/DL (ref 8.6–10.5)
CANNABINOIDS SERPL QL: NEGATIVE
CHLORIDE SERPL-SCNC: 105 MMOL/L (ref 98–107)
CHLORIDE SERPL-SCNC: 106 MMOL/L (ref 98–107)
CHOLEST SERPL-MCNC: 182 MG/DL (ref 0–200)
CO2 SERPL-SCNC: 21 MMOL/L (ref 22–29)
CO2 SERPL-SCNC: 22 MMOL/L (ref 22–29)
COCAINE UR QL: NEGATIVE
CREAT SERPL-MCNC: 0.7 MG/DL (ref 0.57–1)
CREAT SERPL-MCNC: 0.71 MG/DL (ref 0.57–1)
DEPRECATED RDW RBC AUTO: 41.1 FL (ref 37–54)
DEPRECATED RDW RBC AUTO: 42.4 FL (ref 37–54)
EGFRCR SERPLBLD CKD-EPI 2021: 98.1 ML/MIN/1.73
EGFRCR SERPLBLD CKD-EPI 2021: 99.8 ML/MIN/1.73
EOSINOPHIL # BLD AUTO: 0 10*3/MM3 (ref 0–0.4)
EOSINOPHIL # BLD AUTO: 0.1 10*3/MM3 (ref 0–0.4)
EOSINOPHIL NFR BLD AUTO: 0.5 % (ref 0.3–6.2)
EOSINOPHIL NFR BLD AUTO: 1 % (ref 0.3–6.2)
ERYTHROCYTE [DISTWIDTH] IN BLOOD BY AUTOMATED COUNT: 13.7 % (ref 12.3–15.4)
ERYTHROCYTE [DISTWIDTH] IN BLOOD BY AUTOMATED COUNT: 13.9 % (ref 12.3–15.4)
FLUAV SUBTYP SPEC NAA+PROBE: NOT DETECTED
FLUBV RNA ISLT QL NAA+PROBE: NOT DETECTED
GLOBULIN UR ELPH-MCNC: 4.3 GM/DL
GLUCOSE BLDC GLUCOMTR-MCNC: 115 MG/DL (ref 70–105)
GLUCOSE BLDC GLUCOMTR-MCNC: 123 MG/DL (ref 70–105)
GLUCOSE SERPL-MCNC: 128 MG/DL (ref 65–99)
GLUCOSE SERPL-MCNC: 137 MG/DL (ref 65–99)
HADV DNA SPEC NAA+PROBE: NOT DETECTED
HBA1C MFR BLD: 5.7 % (ref 3.5–5.6)
HCOV 229E RNA SPEC QL NAA+PROBE: NOT DETECTED
HCOV HKU1 RNA SPEC QL NAA+PROBE: NOT DETECTED
HCOV NL63 RNA SPEC QL NAA+PROBE: NOT DETECTED
HCOV OC43 RNA SPEC QL NAA+PROBE: NOT DETECTED
HCT VFR BLD AUTO: 40.4 % (ref 34–46.6)
HCT VFR BLD AUTO: 40.8 % (ref 34–46.6)
HDLC SERPL-MCNC: 42 MG/DL (ref 40–60)
HGB BLD-MCNC: 13.4 G/DL (ref 12–15.9)
HGB BLD-MCNC: 13.6 G/DL (ref 12–15.9)
HMPV RNA NPH QL NAA+NON-PROBE: NOT DETECTED
HOLD SPECIMEN: NORMAL
HOLD SPECIMEN: NORMAL
HPIV1 RNA ISLT QL NAA+PROBE: NOT DETECTED
HPIV2 RNA SPEC QL NAA+PROBE: NOT DETECTED
HPIV3 RNA NPH QL NAA+PROBE: NOT DETECTED
HPIV4 P GENE NPH QL NAA+PROBE: NOT DETECTED
INR PPP: 1.05 (ref 0.93–1.1)
LDLC SERPL CALC-MCNC: 121 MG/DL (ref 0–100)
LDLC/HDLC SERPL: 2.84 {RATIO}
LYMPHOCYTES # BLD AUTO: 1.1 10*3/MM3 (ref 0.7–3.1)
LYMPHOCYTES # BLD AUTO: 1.4 10*3/MM3 (ref 0.7–3.1)
LYMPHOCYTES NFR BLD AUTO: 12.9 % (ref 19.6–45.3)
LYMPHOCYTES NFR BLD AUTO: 18.5 % (ref 19.6–45.3)
M PNEUMO IGG SER IA-ACNC: NOT DETECTED
MAXIMAL PREDICTED HEART RATE: 161 BPM
MCH RBC QN AUTO: 28.6 PG (ref 26.6–33)
MCH RBC QN AUTO: 29 PG (ref 26.6–33)
MCHC RBC AUTO-ENTMCNC: 32.8 G/DL (ref 31.5–35.7)
MCHC RBC AUTO-ENTMCNC: 33.6 G/DL (ref 31.5–35.7)
MCV RBC AUTO: 86.2 FL (ref 79–97)
MCV RBC AUTO: 87.2 FL (ref 79–97)
METHADONE UR QL SCN: NEGATIVE
MONOCYTES # BLD AUTO: 0.6 10*3/MM3 (ref 0.1–0.9)
MONOCYTES # BLD AUTO: 0.6 10*3/MM3 (ref 0.1–0.9)
MONOCYTES NFR BLD AUTO: 6.5 % (ref 5–12)
MONOCYTES NFR BLD AUTO: 7.3 % (ref 5–12)
NEUTROPHILS NFR BLD AUTO: 5.6 10*3/MM3 (ref 1.7–7)
NEUTROPHILS NFR BLD AUTO: 7 10*3/MM3 (ref 1.7–7)
NEUTROPHILS NFR BLD AUTO: 72.7 % (ref 42.7–76)
NEUTROPHILS NFR BLD AUTO: 79.8 % (ref 42.7–76)
NRBC BLD AUTO-RTO: 0 /100 WBC (ref 0–0.2)
NRBC BLD AUTO-RTO: 0.1 /100 WBC (ref 0–0.2)
OPIATES UR QL: NEGATIVE
OXYCODONE UR QL SCN: NEGATIVE
PLATELET # BLD AUTO: 285 10*3/MM3 (ref 140–450)
PLATELET # BLD AUTO: 330 10*3/MM3 (ref 140–450)
PMV BLD AUTO: 7.3 FL (ref 6–12)
PMV BLD AUTO: 7.3 FL (ref 6–12)
POTASSIUM SERPL-SCNC: 3.9 MMOL/L (ref 3.5–5.2)
POTASSIUM SERPL-SCNC: 4.3 MMOL/L (ref 3.5–5.2)
PROCALCITONIN SERPL-MCNC: 0.02 NG/ML (ref 0–0.25)
PROT SERPL-MCNC: 8.5 G/DL (ref 6–8.5)
PROTHROMBIN TIME: 10.8 SECONDS (ref 9.6–11.7)
QT INTERVAL: 445 MS
RBC # BLD AUTO: 4.67 10*6/MM3 (ref 3.77–5.28)
RBC # BLD AUTO: 4.69 10*6/MM3 (ref 3.77–5.28)
RHINOVIRUS RNA SPEC NAA+PROBE: NOT DETECTED
RSV RNA NPH QL NAA+NON-PROBE: NOT DETECTED
SARS-COV-2 RNA NPH QL NAA+NON-PROBE: NOT DETECTED
SODIUM SERPL-SCNC: 139 MMOL/L (ref 136–145)
SODIUM SERPL-SCNC: 140 MMOL/L (ref 136–145)
STRESS TARGET HR: 137 BPM
TRIGL SERPL-MCNC: 104 MG/DL (ref 0–150)
TROPONIN T SERPL-MCNC: <0.01 NG/ML (ref 0–0.03)
TSH SERPL DL<=0.05 MIU/L-ACNC: 2.13 UIU/ML (ref 0.27–4.2)
VIT B12 BLD-MCNC: 391 PG/ML (ref 211–946)
VLDLC SERPL-MCNC: 19 MG/DL (ref 5–40)
WBC NRBC COR # BLD: 7.7 10*3/MM3 (ref 3.4–10.8)
WBC NRBC COR # BLD: 8.8 10*3/MM3 (ref 3.4–10.8)
WHOLE BLOOD HOLD COAG: NORMAL
WHOLE BLOOD HOLD SPECIMEN: NORMAL

## 2022-12-07 PROCEDURE — 80307 DRUG TEST PRSMV CHEM ANLYZR: CPT | Performed by: INTERNAL MEDICINE

## 2022-12-07 PROCEDURE — G0378 HOSPITAL OBSERVATION PER HR: HCPCS

## 2022-12-07 PROCEDURE — 71250 CT THORAX DX C-: CPT

## 2022-12-07 PROCEDURE — 0202U NFCT DS 22 TRGT SARS-COV-2: CPT | Performed by: NURSE PRACTITIONER

## 2022-12-07 PROCEDURE — 93306 TTE W/DOPPLER COMPLETE: CPT | Performed by: INTERNAL MEDICINE

## 2022-12-07 PROCEDURE — 93005 ELECTROCARDIOGRAM TRACING: CPT | Performed by: INTERNAL MEDICINE

## 2022-12-07 PROCEDURE — 25010000002 SULFUR HEXAFLUORIDE MICROSPH 60.7-25 MG RECONSTITUTED SUSPENSION: Performed by: INTERNAL MEDICINE

## 2022-12-07 PROCEDURE — 80048 BASIC METABOLIC PNL TOTAL CA: CPT | Performed by: INTERNAL MEDICINE

## 2022-12-07 PROCEDURE — 85025 COMPLETE CBC W/AUTO DIFF WBC: CPT | Performed by: INTERNAL MEDICINE

## 2022-12-07 PROCEDURE — 82140 ASSAY OF AMMONIA: CPT | Performed by: INTERNAL MEDICINE

## 2022-12-07 PROCEDURE — 86480 TB TEST CELL IMMUN MEASURE: CPT | Performed by: INTERNAL MEDICINE

## 2022-12-07 PROCEDURE — 82962 GLUCOSE BLOOD TEST: CPT

## 2022-12-07 PROCEDURE — 80061 LIPID PANEL: CPT | Performed by: INTERNAL MEDICINE

## 2022-12-07 PROCEDURE — 83036 HEMOGLOBIN GLYCOSYLATED A1C: CPT | Performed by: INTERNAL MEDICINE

## 2022-12-07 PROCEDURE — 25010000002 ONDANSETRON PER 1 MG: Performed by: NURSE PRACTITIONER

## 2022-12-07 PROCEDURE — 0 IOPAMIDOL PER 1 ML: Performed by: EMERGENCY MEDICINE

## 2022-12-07 PROCEDURE — 70450 CT HEAD/BRAIN W/O DYE: CPT

## 2022-12-07 PROCEDURE — 36415 COLL VENOUS BLD VENIPUNCTURE: CPT | Performed by: INTERNAL MEDICINE

## 2022-12-07 PROCEDURE — 25010000002 ONDANSETRON PER 1 MG: Performed by: INTERNAL MEDICINE

## 2022-12-07 PROCEDURE — 70498 CT ANGIOGRAPHY NECK: CPT

## 2022-12-07 PROCEDURE — 70496 CT ANGIOGRAPHY HEAD: CPT

## 2022-12-07 PROCEDURE — 99214 OFFICE O/P EST MOD 30 MIN: CPT | Performed by: NURSE PRACTITIONER

## 2022-12-07 PROCEDURE — 0042T HC CT CEREBRAL PERFUSION W/WO CONTRAST: CPT

## 2022-12-07 PROCEDURE — 99254 IP/OBS CNSLTJ NEW/EST MOD 60: CPT | Performed by: PSYCHIATRY & NEUROLOGY

## 2022-12-07 PROCEDURE — 93306 TTE W/DOPPLER COMPLETE: CPT

## 2022-12-07 PROCEDURE — 70551 MRI BRAIN STEM W/O DYE: CPT

## 2022-12-07 RX ORDER — SODIUM CHLORIDE 9 MG/ML
40 INJECTION, SOLUTION INTRAVENOUS AS NEEDED
Status: DISCONTINUED | OUTPATIENT
Start: 2022-12-07 | End: 2022-12-13 | Stop reason: HOSPADM

## 2022-12-07 RX ORDER — ASPIRIN 300 MG/1
300 SUPPOSITORY RECTAL DAILY
Status: DISCONTINUED | OUTPATIENT
Start: 2022-12-07 | End: 2022-12-07

## 2022-12-07 RX ORDER — SODIUM CHLORIDE 0.9 % (FLUSH) 0.9 %
10 SYRINGE (ML) INJECTION AS NEEDED
Status: DISCONTINUED | OUTPATIENT
Start: 2022-12-07 | End: 2022-12-13 | Stop reason: HOSPADM

## 2022-12-07 RX ORDER — ENOXAPARIN SODIUM 100 MG/ML
40 INJECTION SUBCUTANEOUS EVERY 12 HOURS SCHEDULED
Status: DISCONTINUED | OUTPATIENT
Start: 2022-12-08 | End: 2022-12-13 | Stop reason: HOSPADM

## 2022-12-07 RX ORDER — BUSPIRONE HYDROCHLORIDE 5 MG/1
5 TABLET ORAL 3 TIMES DAILY
Status: DISCONTINUED | OUTPATIENT
Start: 2022-12-07 | End: 2022-12-13 | Stop reason: HOSPADM

## 2022-12-07 RX ORDER — ACETAMINOPHEN 325 MG/1
650 TABLET ORAL EVERY 4 HOURS PRN
Status: DISCONTINUED | OUTPATIENT
Start: 2022-12-07 | End: 2022-12-13 | Stop reason: HOSPADM

## 2022-12-07 RX ORDER — NYSTATIN 100000 [USP'U]/G
POWDER TOPICAL EVERY 12 HOURS SCHEDULED
Status: DISCONTINUED | OUTPATIENT
Start: 2022-12-07 | End: 2022-12-13 | Stop reason: HOSPADM

## 2022-12-07 RX ORDER — ATORVASTATIN CALCIUM 40 MG/1
80 TABLET, FILM COATED ORAL NIGHTLY
Status: DISCONTINUED | OUTPATIENT
Start: 2022-12-07 | End: 2022-12-13 | Stop reason: HOSPADM

## 2022-12-07 RX ORDER — SODIUM CHLORIDE 0.9 % (FLUSH) 0.9 %
10 SYRINGE (ML) INJECTION EVERY 12 HOURS SCHEDULED
Status: DISCONTINUED | OUTPATIENT
Start: 2022-12-07 | End: 2022-12-13 | Stop reason: HOSPADM

## 2022-12-07 RX ORDER — ZOLPIDEM TARTRATE 5 MG/1
5 TABLET ORAL NIGHTLY PRN
Status: DISCONTINUED | OUTPATIENT
Start: 2022-12-07 | End: 2022-12-13 | Stop reason: HOSPADM

## 2022-12-07 RX ORDER — ASPIRIN 81 MG/1
162 TABLET, CHEWABLE ORAL DAILY
Status: DISCONTINUED | OUTPATIENT
Start: 2022-12-07 | End: 2022-12-07

## 2022-12-07 RX ORDER — ONDANSETRON 2 MG/ML
4 INJECTION INTRAMUSCULAR; INTRAVENOUS ONCE
Status: COMPLETED | OUTPATIENT
Start: 2022-12-07 | End: 2022-12-07

## 2022-12-07 RX ORDER — ASPIRIN 81 MG/1
162 TABLET, CHEWABLE ORAL DAILY
Status: DISCONTINUED | OUTPATIENT
Start: 2022-12-07 | End: 2022-12-13 | Stop reason: HOSPADM

## 2022-12-07 RX ORDER — SODIUM CHLORIDE 0.9 % (FLUSH) 0.9 %
10 SYRINGE (ML) INJECTION AS NEEDED
Status: DISCONTINUED | OUTPATIENT
Start: 2022-12-06 | End: 2022-12-13 | Stop reason: HOSPADM

## 2022-12-07 RX ORDER — ASPIRIN 300 MG/1
300 SUPPOSITORY RECTAL DAILY
Status: DISCONTINUED | OUTPATIENT
Start: 2022-12-07 | End: 2022-12-13 | Stop reason: HOSPADM

## 2022-12-07 RX ORDER — ONDANSETRON 2 MG/ML
4 INJECTION INTRAMUSCULAR; INTRAVENOUS EVERY 6 HOURS PRN
Status: DISCONTINUED | OUTPATIENT
Start: 2022-12-07 | End: 2022-12-13 | Stop reason: HOSPADM

## 2022-12-07 RX ORDER — FAMOTIDINE 20 MG/1
20 TABLET, FILM COATED ORAL
Status: DISCONTINUED | OUTPATIENT
Start: 2022-12-07 | End: 2022-12-13 | Stop reason: HOSPADM

## 2022-12-07 RX ADMIN — SULFUR HEXAFLUORIDE 2 ML: KIT at 14:27

## 2022-12-07 RX ADMIN — ASPIRIN 81 MG CHEWABLE TABLET 162 MG: 81 TABLET CHEWABLE at 06:09

## 2022-12-07 RX ADMIN — ACETAMINOPHEN 650 MG: 325 TABLET, FILM COATED ORAL at 17:10

## 2022-12-07 RX ADMIN — SODIUM CHLORIDE 1000 ML: 9 INJECTION, SOLUTION INTRAVENOUS at 00:53

## 2022-12-07 RX ADMIN — IOPAMIDOL 150 ML: 755 INJECTION, SOLUTION INTRAVENOUS at 00:42

## 2022-12-07 RX ADMIN — ONDANSETRON 4 MG: 2 INJECTION INTRAMUSCULAR; INTRAVENOUS at 00:53

## 2022-12-07 RX ADMIN — ONDANSETRON 4 MG: 2 INJECTION INTRAMUSCULAR; INTRAVENOUS at 15:57

## 2022-12-07 NOTE — CONSULTS
"  Referring Provider: Dr Azul   Reason for Consultation: depression/anxiety/mental status changes       Chief complaint depression anxiety     Subjective .     History of present illness:  The patient is a 57 y.o. female who was admitted secondary to weakness. PMHx: anxiety and depression. Psych consult was requested by Dr Azul 2ry to mental status changes and depression.   The pt reported history of depression in the past, approximately 20 years ago the patient had \"nervous breakdown, she had to be admitted to the hospital  In [place] secondary to suicidal ideations, but no attempts.  At that time she was started  on Paxil, Ambien and lorazepam, depression improved at the time, she stopped medications  Few  years ago.  Last  year her  passed away, the patient became more depressed, increased crying spells, increased self-isolation.  The patient was prescribed clonazepam by her previous primary care provider.  Also was prescribed Ambien and lorazepam for anxiety.  Her  passed away around holidays, his anniversary is approaching, the patient found old bottle of clonazepam 0.5 mg and she was taking 1 pill daily to alleviate anxiety  until she ran out 1 week ago.  Depression is rated as a 7 out of 10, she denied suicidal and homicidal ideations, the patient denied any perceptual disturbances  Anxiety is persistent and intense, denied AVH/SI/HI  The pt developed stroke like sxs with numbness and muscle weakness   Past psych hx: depression anxiety, hx of SI no SA     Review of Systems   All systems were reviewed and negative except for:  Constitution:  positive for fatigue  Gastrointestinal: positive for  nausea  Musculoskeletal: positive for  muscle pain and muscle weakness  Behavioral/Psych: positive for  anxiety and depression    History    Past Medical History:   Diagnosis Date   • Anxiety    • Depression           Family History   Problem Relation Age of Onset   • Heart disease Mother    • Breast cancer " "Mother    • Heart disease Father    • Pancreatic cancer Father    • Heart disease Sister    • Lung cancer Brother         Social History     Tobacco Use   • Smoking status: Never   • Smokeless tobacco: Never   Substance Use Topics   • Alcohol use: Never   • Drug use: Never        (Not in a hospital admission)       Scheduled Meds:  aspirin, 162 mg, Oral, Daily   Or  aspirin, 300 mg, Rectal, Daily  atorvastatin, 80 mg, Oral, Nightly  busPIRone, 5 mg, Oral, TID  [START ON 12/8/2022] enoxaparin, 40 mg, Subcutaneous, Q12H  famotidine, 20 mg, Oral, BID AC  sodium chloride, 10 mL, Intravenous, Q12H  sodium chloride, 10 mL, Intravenous, Q12H         Continuous Infusions:       PRN Meds:  •  acetaminophen  •  ondansetron  •  sodium chloride  •  [COMPLETED] Insert Peripheral IV **AND** sodium chloride  •  sodium chloride  •  sodium chloride  •  sodium chloride  •  sodium chloride  •  zolpidem      Allergies:  Patient has no known allergies.      Objective     Vital Signs   /59   Pulse 68   Temp 98.1 °F (36.7 °C) (Oral)   Resp 18   Ht 170.2 cm (67\")   Wt (!) 143 kg (315 lb)   LMP  (LMP Unknown)   SpO2 100%   BMI 49.34 kg/m²     Physical Exam:    Musculoskeletal:   Muscle strength and tone: decreased in UE   Abnormal Movements: none   Gait: unable to assess, the pt was in bed      General Appearance:    In NAD       Mental Status Exam:   Hygiene:   good  Cooperation:  Cooperative  Eye Contact:  Good  Behavior and Psychomotor Activity: Slow  Speech:  Monotone and soft voice   Mood: depressed and anxious   Affect:  Restricted and Congruent  Thought Process:  Goal directed, Linear and Organized  Associations: Intact   Thought Content:  mood congruent   Language: appropriate   Suicidal Ideations:  None  Homicidal:  None  Hallucinations:  None  Delusion:  None  Orientation:  To person, Place, Time and Situation  Memory:  fair   Concentration and computation: fair   Attention span: fair   Fund of knowledge: appropriate "   Reliability:  good  Insight:  Good  Judgement:  Good  Impulse Control:  Good      Medications and allergies reviewed     Lab Results   Component Value Date    GLUCOSE 128 (H) 12/07/2022    CALCIUM 9.0 12/07/2022     12/07/2022    K 3.9 12/07/2022    CO2 22.0 12/07/2022     12/07/2022    BUN 8 12/07/2022    CREATININE 0.70 12/07/2022    EGFRIFAFRI 102 02/17/2021    EGFRIFNONA 89 02/17/2021    BCR 11.4 12/07/2022    ANIONGAP 12.0 12/07/2022       Last Urine Toxicity     LAST URINE TOXICITY RESULTS Latest Ref Rng & Units 12/7/2022    BARBITURATES SCREEN Negative Negative    BENZODIAZEPINE SCREEN, URINE Negative Negative    COCAINE SCREEN, URINE Negative Negative    METHADONE SCREEN, URINE Negative Negative          No results found for: PHENYTOIN, PHENOBARB, VALPROATE, CBMZ    Lab Results   Component Value Date     12/07/2022    BUN 8 12/07/2022    CREATININE 0.70 12/07/2022    TSH 2.130 12/06/2022    WBC 7.70 12/07/2022       Brief Urine Lab Results     None          Assessment & Plan       Weakness          Assessment: Major depressive disorder moderate recurrent  Generalized anxiety disorder  Bereavement  Treatment Plan: The patient presented with symptoms of depression, anxiety, and anniversary of her 's death is approaching.  Continue BuSpar at current dose, continue Ambien as needed for insomnia  The patient was on Paxil in the past, did not report with experience  She did not respond to Zoloft and Lexapro  Start Viibryd 10 mg p.o. every morning tomorrow for anxiety and depression  The patient will benefit from psychotherapy Outpatient basis and grief therapy  Safety plan was discussed with the patient and her daughter   They verbalized understanding   Treatment Plan discussed with: Patient, Family and nursing     I discussed the patients findings and my recommendations with patient, family and nursing staff    I have reviewed and approved the behavioral health treatment plans and  problem list. Yes  Thank you for the consult   Referring MD has access to consult report and progress notes in EMR     Kavya Barillas MD  12/07/22  13:40 EST

## 2022-12-07 NOTE — H&P
[facility] Medicine Services      Patient Name: Eladia Connor  : 1965  MRN: [id number]  Primary Care Physician:  Quincy Lemos APRN  Date of admission: 2022      Subjective      Chief Complaint: weakness    History of Present Illness: Eladia Connor is a 57 y.o. female who presented to [facility] on 2022 complaining of above.   She reportedly has a history of a CVA in  without any residual deficits. She notes over the last week or so she has noted intermittent though right leg weakness and numbness with associated tingling.  Around 11 AM this morning she developed slurred speech and left arm weakness.  Symptoms lasted for about 30 minutes and she presented to [facility] for further evaluation.  She was subsequently discharged indicating that testing/evaluation was largely unremarkable with questionable history of a pneumonia on chest x-ray.   She describes a nonproductive cough without hemoptysis.  + Sick contacts with grandson with the flu.  She denies any fevers. She describes having possible positive TB test few weeks ago. No known TB contacts however.  She indicates her son just returned from the  though is not sick.  No significant personal travel history noted.  When inquiring about new medications she identifies that she started Otezla this last week for her psoriasis and her symptoms seem to began after this.  She also recently began Ambien as needed for sleep approximately a month ago but she does not want to take this currently because of her symptoms. She endorses using an old prescription of Klonopin for the last month though was out of pills approximately a week ago  She indicates that her psoriasis was diagnosed by her dermatologist  .  She notes some chest tightness and substernal pressure that was nonradiating earlier today that is nonexertional  Unfortunately she has been suffering from increased stress and  anxiety as her  passed away approximately a year ago. She is on BuSpar her PCP and was recently advised to titrate though she has not done so at this time.    She has not taken Lexapro for years  She is no longer taking ivermectin/Diflucan states that this was given to her by her PCP while evaluation for her psoriasis.        ROS: 10 point ROS negative aside from noted per HPI: No dysuria. No hemoptysis      Personal History     Past Medical History:   Diagnosis Date   • Anxiety    • Depression        Past Surgical History:   Procedure Laterality Date   • CHOLECYSTECTOMY  1987   • EYE SURGERY     • KNEE SURGERY         Family History: family history includes Breast cancer in her mother; Heart disease in her father, mother, and sister; Lung cancer in her brother; Pancreatic cancer in her father. Otherwise pertinent FHx was reviewed and not pertinent to current issue.    Social History:  reports that she has never smoked. She has never used smokeless tobacco. She reports that she does not drink alcohol and does not use drugs.    Home Medications:  Prior to Admission Medications     Prescriptions Last Dose Informant Patient Reported? Taking?    busPIRone (BUSPAR) 5 MG tablet   No No    Take 1 tablet by mouth 3 (Three) Times a Day.    fluconazole (DIFLUCAN) 150 MG tablet   No No    TAKE 1 TABLET BY MOUTH 1 TIME FOR 1 DOSE.    hydrOXYzine pamoate (VISTARIL) 100 MG capsule   No No    Take 1 capsule by mouth 3 (Three) Times a Day As Needed for Itching.    ivermectin (STROMECTOL) 3 MG tablet tablet   No No    TAKE 9 TABLETS BY MOUTH ONCE    nystatin (MYCOSTATIN) 485495 UNIT/GM cream   No No    Apply 1 application topically to the appropriate area as directed 2 (Two) Times a Day.    nystatin (MYCOSTATIN) 269098 UNIT/GM powder   No No    Apply  topically to the appropriate area as directed 3 (Three) Times a Day.    promethazine-codeine (PHENERGAN with CODEINE) 6.25-10 MG/5ML syrup   No No    Take 5 mL by mouth Every 4  (Four) Hours As Needed for Cough.    sertraline (ZOLOFT) 50 MG tablet   No No    Take 1 tablet by mouth Daily.    zolpidem (AMBIEN) 10 MG tablet   No No    Take 1 tablet by mouth At Night As Needed for Sleep.            Allergies:  No Known Allergies    Objective      Vitals:   Temp:  [98 °F (36.7 °C)] 98 °F (36.7 °C)  Heart Rate:  [65-87] 67  Resp:  [17-18] 18  BP: (129-149)/(65-75) 129/68    Physical Exam   Physical Exam:     GEN/CONS:   Vitals noted above, NAD, AOX3, morbidly obese white female in 1 to 2 L   EYES:  Conjunctiva pink   ENMT:  NECK:  Atraumatic external nose/ears, Oropharynx clear  Symmetric, trachea midline   CV:  Regular, Reg S1/S2, no murmur   PULM:  Diminished breath sounds at bases no wheezes   GI:  : Soft, Nontender, Nondistended   Deferred.    MSK:  Scattered macular papular erythematous patches on extremities (suspect guttate psoriasis)    NEURO:     Aside from some range of motion limitations involving her right knee due to chronic pain sensation and motor appear to be grossly intact   Cranial nerves II to XII intact   Finger-nose heel-to-shin intact (again with some limitation regarding right lower extremity due to right knee pain)  Mild slurring of speech   PSYCH:    Appears anxious         Result Review    Result Review:  I have personally reviewed the results from the time of this admission to 12/7/2022 03:16 EST and agree with these findings:  [x]  Laboratory  []  Microbiology  [x]  Radiology  []  EKG/Telemetry   []  Cardiology/Vascular   []  Pathology  [x]  Old records  []  Other:  Most notable findings include:    LABS      Lab 12/06/22  2314   WBC 8.80   HEMOGLOBIN 13.6   HEMATOCRIT 40.4   PLATELETS 330   NEUTROS ABS 7.00   LYMPHS ABS 1.10   MONOS ABS 0.60   EOS ABS 0.00   MCV 86.2   PROCALCITONIN 0.02   PROTIME 10.8   APTT 26.8*         Lab 12/06/22  2314   SODIUM 139   POTASSIUM 4.3   CHLORIDE 105   CO2 21.0*   ANION GAP 13.0   BUN 9   CREATININE 0.71   EGFR 98.1   GLUCOSE 137*    CALCIUM 9.5   TSH 2.130         Lab 12/06/22  2314   TOTAL PROTEIN 8.5   ALBUMIN 4.20   GLOBULIN 4.3   ALT (SGPT) 16   AST (SGOT) 25   BILIRUBIN 0.8   ALK PHOS 156*         Lab 12/06/22  2314   TROPONIN T <0.010   INR 1.05                 Brief Urine Lab Results     None        ________________________________  MICRO  Microbiology Results (last 10 days)     Procedure Component Value - Date/Time    Respiratory Panel PCR w/COVID-19(SARS-CoV-2) TESS/MEDARDO/DAVID/PAD/COR/MAD/SHAMIKA In-House, NP Swab in UTM/VTM, 3-4 HR TAT - Swab, Nasopharynx [[id number]]  (Normal) Collected: 12/07/22 0050    Lab Status: Final result Specimen: Swab from Nasopharynx Updated: 12/07/22 0142     ADENOVIRUS, PCR Not Detected     Coronavirus 229E Not Detected     Coronavirus HKU1 Not Detected     Coronavirus NL63 Not Detected     Coronavirus OC43 Not Detected     COVID19 Not Detected     Human Metapneumovirus Not Detected     Human Rhinovirus/Enterovirus Not Detected     Influenza A PCR Not Detected     Influenza B PCR Not Detected     Parainfluenza Virus 1 Not Detected     Parainfluenza Virus 2 Not Detected     Parainfluenza Virus 3 Not Detected     Parainfluenza Virus 4 Not Detected     RSV, PCR Not Detected     Bordetella pertussis pcr Not Detected     Bordetella parapertussis PCR Not Detected     Chlamydophila pneumoniae PCR Not Detected     Mycoplasma pneumo by PCR Not Detected    Narrative:      In the setting of a positive respiratory panel with a viral infection PLUS a negative procalcitonin without other underlying concern for bacterial infection, consider observing off antibiotics or discontinuation of antibiotics and continue supportive care. If the respiratory panel is positive for atypical bacterial infection (Bordetella pertussis, Chlamydophila pneumoniae, or Mycoplasma pneumoniae), consider antibiotic de-escalation to target atypical bacterial infection.        ________________________________  RAD  XR Chest 2 View    Result Date:  11/30/2022  Impression: No active cardiopulmonary disease  Electronically Signed By-Ish Maravilla On:11/30/2022 3:50 PM This report was finalized on [id number] by  Ish Maravilla, .    XR Knee 1 or 2 View Right    Result Date: 12/1/2022  Impression: No acute osseous abnormality. Moderate to severe tricompartmental osteoarthritic changes are present, most pronounced within the medial and patellofemoral compartments. A small joint effusion is present.  Electronically Signed By-Nivia Olivas MD On:12/1/2022 11:46 AM This report was finalized on [id number] by  Nivia Olivas MD.    CT Angiogram Neck    Result Date: 12/7/2022  Impression: 1.  No acute intracranial abnormality is identified on noncontrast head CT. If there is high clinical suspicion for acute ischemic infarct, MRI may be more sensitive. 2.  Within the limitations of motion, no occlusion or high-grade stenosis of the major arteries in the head and neck. Artur Eubanks MD Neuroradiologist [facility] [email or web address] Thank you for this referral. This exam was interpreted by a fellowship trained neuroradiologist. If the patient's healthcare provider has any questions, a [facility] neuroradiologist can be reached directly at [phone] at any time. SLOT  21 Electronically signed by:  Artur Eubanks M.D.  12/6/2022 11:20 PM Mountain Time    CT Chest Without Contrast Diagnostic    Result Date: 12/7/2022  Impression: 1.  No evidence of pulmonary tuberculosis. No acute abnormality in the chest. Electronically signed by:  Edouard Marquez M.D.  12/6/2022 11:09 PM Mountain Time    CT Angiogram Head    Result Date: 12/7/2022  Impression: 1.  No acute intracranial abnormality is identified on noncontrast head CT. If there is high clinical suspicion for acute ischemic infarct, MRI may be more sensitive. 2.  Within the limitations of motion, no occlusion or high-grade stenosis of the major arteries in the head and neck. Artur  MD Cha Neuroradiologist [facility] [email or web address] Thank you for this referral. This exam was interpreted by a fellowship trained neuroradiologist. If the patient's healthcare provider has any questions, a [facility] neuroradiologist can be reached directly at [phone] at any time. SLOT  21 Electronically signed by:  Artur Eubanks M.D.  12/6/2022 11:20 PM Mountain Time    CT CEREBRAL PERFUSION WITH & WITHOUT CONTRAST    Result Date: 12/7/2022  Impression: 1.  No acute intracranial abnormality is identified on noncontrast head CT. If there is high clinical suspicion for acute ischemic infarct, MRI may be more sensitive. 2.  Within the limitations of motion, no occlusion or high-grade stenosis of the major arteries in the head and neck. Artur Eubanks MD Neuroradiologist [facility] [email or web address] Thank you for this referral. This exam was interpreted by a fellowship trained neuroradiologist. If the patient's healthcare provider has any questions, a [facility] neuroradiologist can be reached directly at [phone] at any time. SLOT  21 Electronically signed by:  Artur Eubanks M.D.  12/6/2022 11:20 PM Mountain Time    ________________________________  CARDS     No results found for this or any previous visit.        Assessment & Plan        Active Hospital Problems:  There are no active hospital problems to display for this patient.      Assessment:    Suspected TIA- Slurred speech, Weakness (R leg/L arm)   • Admission: Afebrile, WBC within normal limits, troponin negative, TSH within normal limits, procalcitonin within normal limits, COVID-19/viral respiratory panel negative  • Imaging: CT head, CTA head neck noted above-see report for full details.  Briefly no acute intracranial process or high-grade stenosis of the major arteries of the head and neck.  • Misc/Context: Reported questionable history of CVA with no residual  deficits 2013.  Increased stress and anxiety currently with unfortunately anniversary of 's passing approximately a year ago.  Symptoms greater than 4-1/2 hours prior to presentation.  States that recently started new medication for psoriasis and symptoms seem to began afterwards.  Additionally started Ambien about a month ago and recently on a new antidepressant Buspar.    Depression with anxiety  · On BuSpar prior to admission recently started.  PCP reportedly recently advised to titrate BuSpar.  She states that she has not taken Lexapro for years    Abnormal TB Screening- Reportedly + QuantiFERON as outpatient  · Admission: Afebrile, saturating well on room air, HD stable, WBC wnl, COVID-19 rapid and influenza panel negative  · Imaging: CT chest noted above no acute pulmonary abnormality noted.  · Asymptomatic without hemoptysis, with no known TB contact, no foreign travel/residence, no nightsweats/weightloss    Psoriasis-Suspect Guttate based on appearance   · Reports recently starting Otezla   · States that she was formally diagnosed by her dermatologist.     Right Knee osteoarthritis  · Chronic right knee range of motion limitation    Suspected possible obstructive sleep apnea  · States that she was diagnosed previously though does not have a CPAP at home    Obesity-class III  · Body mass index is 49.34 kg/m².    Full code  · Discussed and verified    Discussion/Plan:     Admit for observation  Rule out CVA with hx reported previous hx.  Tentative ASA + HI statin at this time  MRI brain in the morning  Obtain baseline ECG   Telemetry monitoring.   Additional [facility] Stroke protocol/Neurochecks as ordered.   Neurology consultation in AM    Question some iatrogenic causes of her symptoms.  Recommend holding Otezla at this time based on temporal relation with symptoms.  Ambien was also started approximately a month ago per her report and she no longer wants to take this.  Consider holding though unfortunately  her  passed away approximately a year ago and has been undergoing increased stress and anxiety recently.  Anxiety may be contributory to acute symptoms noted above as well though she states she was fine and usual state of health until about a week ago.    No CT evidence of TB, saturating well on room air. Will recheck Quantiferon.    Would benefit from outpatient sleep study for possible NERY    Pharmacy/RN to confirm home medications and will resume/adjust appropriate medications if necessary once confirmed and notified as ordered.  Resume antidepressants once confirmed    Lovenox prophylaxis  Surveillance labs.  See orders for additional details.    Further recommendations to follow as inpatient course and evaluation proceeds.   Daytime hospitalist provider will continue forward care in the morning.       DVT prophylaxis:  No DVT prophylaxis order currently exists.    CODE STATUS:       Admission Status:  I believe this patient meets obs status.    I discussed the patient's findings and my recommendations with patient.    This patient has been examined wearing appropriate Personal Protective Equipment     Signature: Adolph Azul MD

## 2022-12-07 NOTE — PLAN OF CARE
Goal Outcome Evaluation:       Workup on patient is ongoing d/t changes since admission. Patient not appropriate for evaluation at this time. ST will f/u tomorrow upon completion of diagnostic testing.

## 2022-12-07 NOTE — ED PROVIDER NOTES
Subjective   History of Present Illness  Chief complaint: Multiple complaints      Context: Patient is a histrionic 57-year-old female past medical history significant for anxiety depression psoriasis and obesity, prior history of CVA in 2012 without any deficits who comes in with multiple complaints.  She states over the last 5 weeks she has had some right leg progressing weakness and numbness and tingling.  She states today around 11:00 she had an episode lasting approximately 30 minutes of slurred speech confusion and left arm weakness.  She was sent to [facility] and had another 30-minute episode while at that facility.  She reports she had a questionable pneumonia on chest x-ray but was told she had a negative CAT scan and could go home.  She did have a positive Gold test a couple weeks ago.  She started with a cough approximately week ago that seemed worse today, nonproductive, grandson has the flu.  She denies any fever.  She started with some chest tightness and pressure nonradiating today.  She denies any neck or back pain or falls.  No saddle anesthesia bowel or bladder incontinence or retention.  She states she has had some increased stress and anxiety lately because her   this time of year approximately a year ago.  States her family doctor told her she could increase her BuSpar but she has not done that and has been taking her medication as prescribed.  She states she had an old prescription for clonazepam that she took for approximately a month and quit taking it a week ago because she ran out.  She denies any urinary symptoms.  She was recently started on Otezla last week for psoriasis otherwise denies any other new medications.    Location: As above  Duration: As above  Timing: Waxes and wanes  Quality/Severity: As above  Modifying factors: Worse with range of motion  Associated symptoms: As above daughter        Additional hx provided by:     PCP: Canelo Kaye  [facility]   Constitutional: Negative for fever.   HENT: Negative for congestion.    Eyes: Negative.    Respiratory: Positive for cough.    Cardiovascular: Positive for chest pain. Negative for palpitations and leg swelling.   Gastrointestinal: Positive for nausea.   Genitourinary: Negative.    Skin: Positive for rash.   Neurological: Positive for weakness and numbness. Negative for syncope.   Psychiatric/Behavioral: Negative for confusion.       Past Medical History:   Diagnosis Date   • Anxiety    • Depression        No Known Allergies    Past Surgical History:   Procedure Laterality Date   • CHOLECYSTECTOMY  1987   • EYE SURGERY     • KNEE SURGERY         Family History   Problem Relation Age of Onset   • Heart disease Mother    • Breast cancer Mother    • Heart disease Father    • Pancreatic cancer Father    • Heart disease Sister    • Lung cancer Brother        Social History     Socioeconomic History   • Marital status:    Tobacco Use   • Smoking status: Never   • Smokeless tobacco: Never   Substance and Sexual Activity   • Alcohol use: Never   • Drug use: Never   • Sexual activity: Defer           Objective   Physical Exam    Vital signs in triage nurse note reviewed.  Constitutional: Awake, alert, anxious and tearful.  Daughter at bedside  HEENT: Normocephalic, atraumatic; pupils are PERRL with intact EOM; oropharynx is pink and moist without exudate or erythema.  Neck: Supple, full range of motion without pain; negative Brudzinski  Cardiovascular: Regular rate and rhythm, normal S1-S2.    Pulmonary: Respiratory effort regular, nonlabored; breath sounds clear to auscultation all fields.  Abdomen: Soft, nontender, nondistended with normoactive bowel sounds; no rebound or guarding.  Musculoskeletal: Independent range of motion of all extremities, no palpable tenderness or edema. Spine is midline without obvious curvature scoliosis. No bony tenderness, soft tissue swelling, deformity is noted. Paraspinal  musculature is soft, nontender.  Neuro: Alert oriented x3, speech is mildly slurred and appropriate.  Normal shoulder shrug, equal palate rise,  , no facial asymmetry,no pronator drift, normal coordination.  Left arm weakness, right leg mildly weak with straight leg raise  Skin: Psoriatic plaques    Procedures      EKG viewed by me and interpreted by Dr. Alicea, sinus rhythm rate of 84  comparison: None        ED Course  ED Course as of 12/07/22 0259   Wed Dec 07, 2022   0222 Spoke with Dr. Azul [JW]      ED Course User Index  [JW] Mora Izaguirre, SCOOBY           Labs Reviewed   COMPREHENSIVE METABOLIC PANEL - Abnormal; Notable for the following components:       Result Value    Glucose 137 (*)     CO2 21.0 (*)     Alkaline Phosphatase 156 (*)     All other components within normal limits    Narrative:     GFR Normal >60  Chronic Kidney Disease <60  Kidney Failure <15     APTT - Abnormal; Notable for the following components:    PTT 26.8 (*)     All other components within normal limits   CBC WITH AUTO DIFFERENTIAL - Abnormal; Notable for the following components:    Neutrophil % 79.8 (*)     Lymphocyte % 12.9 (*)     All other components within normal limits   RESPIRATORY PANEL PCR W/ COVID-19 (SARS-COV-2) TESS/MEDARDO/DAVID/PAD/COR/MAD/SHAMIKA IN-HOUSE, NP SWAB IN UT/VTP, 3-4 HR TAT - Normal    Narrative:     In the setting of a positive respiratory panel with a viral infection PLUS a negative procalcitonin without other underlying concern for bacterial infection, consider observing off antibiotics or discontinuation of antibiotics and continue supportive care. If the respiratory panel is positive for atypical bacterial infection (Bordetella pertussis, Chlamydophila pneumoniae, or Mycoplasma pneumoniae), consider antibiotic de-escalation to target atypical bacterial infection.   PROCALCITONIN - Normal    Narrative:     As a Marker for Sepsis (Non-Neonates):    1. <0.5 ng/mL represents a low risk of severe sepsis and/or  "septic shock.  2. >2 ng/mL represents a high risk of severe sepsis and/or septic shock.    As a Marker for Lower Respiratory Tract Infections that require antibiotic therapy:    PCT on Admission    Antibiotic Therapy       6-12 Hrs later    >0.5                Strongly Recommended  >0.25 - <0.5        Recommended   0.1 - 0.25          Discouraged              Remeasure/reassess PCT  <0.1                Strongly Discouraged     Remeasure/reassess PCT    As 28 day mortality risk marker: \"Change in Procalcitonin Result\" (>80% or <=80%) if Day 0 (or Day 1) and Day 4 values are available. Refer to [email or web address] [facility] [email or web address]    Change in PCT <=80%  A decrease of PCT levels below or equal to 80% defines a positive change in PCT test result representing a higher risk for 28-day all-cause mortality of patients diagnosed with severe sepsis for septic shock.    Change in PCT >80%  A decrease of PCT levels of more than 80% defines a negative change in PCT result representing a lower risk for 28-day all-cause mortality of patients diagnosed with severe sepsis or septic shock.      PROTIME-INR - Normal   TROPONIN (IN-HOUSE) - Normal    Narrative:     Troponin T Reference Range:  <= 0.03 ng/mL-   Negative for AMI  >0.03 ng/mL-     Abnormal for myocardial necrosis.  Clinicians would have to utilize clinical acumen, EKG, Troponin and serial changes to determine if it is an Acute Myocardial Infarction or myocardial injury due to an underlying chronic condition.       Results may be falsely decreased if patient taking Biotin.     TSH - Normal   RAINBOW DRAW    Narrative:     The following orders were created for panel order Mobile Draw.  Procedure                               Abnormality         Status                     ---------                               -----------         ------                     Green Top (Gel)[[id number]]                                  Final result               Lavender Top[[id number]]        "                              Final result               Gold Top - SST[[id number]]                                   Final result               Light Blue Top[[id number]]                                   Final result                 Please view results for these tests on the individual orders.   GREEN TOP   LAVENDER TOP   GOLD TOP - SST   LIGHT BLUE TOP   CBC AND DIFFERENTIAL    Narrative:     The following orders were created for panel order CBC & Differential.  Procedure                               Abnormality         Status                     ---------                               -----------         ------                     CBC Auto Differential[[id number]]        Abnormal            Final result                 Please view results for these tests on the individual orders.     Medications   sodium chloride 0.9 % flush 10 mL (has no administration in time range)   sodium chloride 0.9 % flush 10 mL (has no administration in time range)   ondansetron (ZOFRAN) injection 4 mg (4 mg Intravenous Given 12/7/22 0053)   sodium chloride 0.9 % bolus 1,000 mL (1,000 mL Intravenous New Bag 12/7/22 0053)   iopamidol (ISOVUE-370) 76 % injection 150 mL (150 mL Intravenous Given 12/7/22 0042)     CT Angiogram Neck    Result Date: 12/7/2022  1.  No acute intracranial abnormality is identified on noncontrast head CT. If there is high clinical suspicion for acute ischemic infarct, MRI may be more sensitive. 2.  Within the limitations of motion, no occlusion or high-grade stenosis of the major arteries in the head and neck. Artur Eubanks MD Neuroradiologist [facility] [email or web address] Thank you for this referral. This exam was interpreted by a fellowship trained neuroradiologist. If the patient's healthcare provider has any questions, a [facility] neuroradiologist can be reached directly at [phone] at any time. SLOT  21 Electronically signed by:  Artur Eubanks M.D.  12/6/2022 11:20 PM  [place]ountain [facility]    CT Chest Without Contrast Diagnostic    Result Date: 12/7/2022  1.  No evidence of pulmonary tuberculosis. No acute abnormality in the chest. Electronically signed by:  Edouard Marquez M.D.  12/6/2022 11:09 PM Mountain Time    CT Angiogram Head    Result Date: 12/7/2022  1.  No acute intracranial abnormality is identified on noncontrast head CT. If there is high clinical suspicion for acute ischemic infarct, MRI may be more sensitive. 2.  Within the limitations of motion, no occlusion or high-grade stenosis of the major arteries in the head and neck. Artur Eubanks MD Neuroradiologist [facility] [email or web address] Thank you for this referral. This exam was interpreted by a fellowship trained neuroradiologist. If the patient's healthcare provider has any questions, a [facility] neuroradiologist can be reached directly at [phone] at any time. SLOT  21 Electronically signed by:  Artur Eubanks M.D.  12/6/2022 11:20 PM Mountain Time    CT CEREBRAL PERFUSION WITH & WITHOUT CONTRAST    Result Date: 12/7/2022  1.  No acute intracranial abnormality is identified on noncontrast head CT. If there is high clinical suspicion for acute ischemic infarct, MRI may be more sensitive. 2.  Within the limitations of motion, no occlusion or high-grade stenosis of the major arteries in the head and neck. Artur Eubanks MD Neuroradiologist [facility] [email or web address] Thank you for this referral. This exam was interpreted by a fellowship trained neuroradiologist. If the patient's healthcare provider has any questions, a [facility] neuroradiologist can be reached directly at [phone] at any time. SLOT  21 Electronically signed by:  Artur Eubanks M.D.  12/6/2022 11:20 PM Mountain Time    Prior to Admission medications    Medication Sig Start Date End Date Taking? Authorizing Provider   busPIRone (BUSPAR) 5 MG tablet Take 1 tablet by mouth 3  "(Three) Times a Day. 10/4/22   Quincy Lemos APRN   fluconazole (DIFLUCAN) 150 MG tablet TAKE 1 TABLET BY MOUTH 1 TIME FOR 1 DOSE. 11/14/22   Quincy Lemos APRN   hydrOXYzine pamoate (VISTARIL) 100 MG capsule Take 1 capsule by mouth 3 (Three) Times a Day As Needed for Itching. 10/4/22   Quincy Lemos APRN   ivermectin (STROMECTOL) 3 MG tablet tablet TAKE 9 TABLETS BY MOUTH ONCE 11/14/22   Quincy Lemos APRN   nystatin (MYCOSTATIN) 161844 UNIT/GM cream Apply 1 application topically to the appropriate area as directed 2 (Two) Times a Day. 11/4/22   Quincy Lemos APRN   nystatin (MYCOSTATIN) 257766 UNIT/GM powder Apply  topically to the appropriate area as directed 3 (Three) Times a Day. 7/12/22   Sloane Gamez APRN   promethazine-codeine (PHENERGAN with CODEINE) 6.25-10 MG/5ML syrup Take 5 mL by mouth Every 4 (Four) Hours As Needed for Cough. 7/12/22   Sloane Gamez APRN   sertraline (ZOLOFT) 50 MG tablet Take 1 tablet by mouth Daily. 12/30/21   Wanda Rivera APRN   zolpidem (AMBIEN) 10 MG tablet Take 1 tablet by mouth At Night As Needed for Sleep. 10/4/22   Quincy Lemos APRN                                     [facility]  Number of Diagnoses or Management Options  Diagnosis management comments: /68   Pulse 67   Temp 98 °F (36.7 °C) (Oral)   Resp 18   Ht 170.2 cm (67\")   Wt (!) 143 kg (315 lb)   LMP  (LMP Unknown)   SpO2 100%   BMI 49.34 kg/m²      Chart review: Records obtained from [facility] from today at 1850: CT head negative, questional bowl perihilar infiltrate on chest x-ray, glucose 164 normal renal function White count 7.7 hemoglobin 13.3 negative drug screen, normal urinalysis negative troponin      Comorbidities:  has a past medical history of Anxiety and Depression.  Differentials: Medication side effect; LVO infection atypical migraine not all inclusive of differentials considered  Discussion with provider: " Jorge Alberto  Radiology interpretation:  X-rays reviewed by me and interpreted by radiologist,   CT Angiogram Neck    Result Date: 12/7/2022  1.  No acute intracranial abnormality is identified on noncontrast head CT. If there is high clinical suspicion for acute ischemic infarct, MRI may be more sensitive. 2.  Within the limitations of motion, no occlusion or high-grade stenosis of the major arteries in the head and neck. Artur Eubanks MD Neuroradiologist [facility] [email or web address] Thank you for this referral. This exam was interpreted by a fellowship trained neuroradiologist. If the patient's healthcare provider has any questions, a [facility] neuroradiologist can be reached directly at [phone] at any time. SLOT  21 Electronically signed by:  Artur Eubanks M.D.  12/6/2022 11:20 PM Mountain Time    CT Chest Without Contrast Diagnostic    Result Date: 12/7/2022  1.  No evidence of pulmonary tuberculosis. No acute abnormality in the chest. Electronically signed by:  Edouard Marquez M.D.  12/6/2022 11:09 PM Mountain Time    CT Angiogram Head    Result Date: 12/7/2022  1.  No acute intracranial abnormality is identified on noncontrast head CT. If there is high clinical suspicion for acute ischemic infarct, MRI may be more sensitive. 2.  Within the limitations of motion, no occlusion or high-grade stenosis of the major arteries in the head and neck. Artur Eubanks MD Neuroradiologist [facility] [email or web address] Thank you for this referral. This exam was interpreted by a fellowship trained neuroradiologist. If the patient's healthcare provider has any questions, a [facility] neuroradiologist can be reached directly at [phone] at any time. SLOT  21 Electronically signed by:  Artur Eubanks M.D.  12/6/2022 11:20 PM Mountain Time    CT CEREBRAL PERFUSION WITH & WITHOUT CONTRAST    Result Date: 12/7/2022  1.  No acute intracranial abnormality is  identified on noncontrast head CT. If there is high clinical suspicion for acute ischemic infarct, MRI may be more sensitive. 2.  Within the limitations of motion, no occlusion or high-grade stenosis of the major arteries in the head and neck. Artur Eubanks MD Neuroradiologist [facility] [email or web address] Thank you for this referral. This exam was interpreted by a fellowship trained neuroradiologist. If the patient's healthcare provider has any questions, a [facility] neuroradiologist can be reached directly at [phone] at any time. SLOT  21 Electronically signed by:  Artur Eubanks M.D.  12/6/2022 11:20 PM Mountain Time    Lab interpretation:  Labs viewed by me significant for, unremarkable    Appropriate PPE worn during exam.  I discussed with Dr. Alicea.  Not in a timeframe window for tPA.  CTAs and perfusion obtained.  Patient will be kept in precautions for latent tuberculosis.    i discussed findings with patient who voices understanding of discharge instructions, signs and symptoms requiring return to ED; discharged improved and in stable condition with follow up for re-evaluation.  This document is intended for medical expert use only. Reading of this document by patients and/or patient's family without participating medical staff guidance may result in misinterpretation and unintended morbidity.  Any interpretation of such data is the responsibility of the patient and/or family member responsible for the patient in concert with their primary or specialist providers, not to be left for sources of online searches such as [employer]idy, [employer]gar or similar queries. Relying on these approaches to knowledge may result in misinterpretation, misguided goals of care and even death should patients or family members try recommendations outside of the realm of professional medical care in a supervised inpatient environment.         Final diagnoses:   Slurred speech   Chest pain,  unspecified type   TB lung, latent   Right leg weakness   Left arm weakness       ED Disposition  ED Disposition     ED Disposition   Decision to Admit    Condition   --    Comment   Level of Care: Telemetry [5]   Admitting Physician: ERIBERTO HOOPER [[id number]]   Attending Physician: ERIBERTO HOOPER [[id number]]   Bed Request Comments: droplet precautions               No follow-up provider specified.       Medication List      No changes were made to your prescriptions during this visit.          Mora Izaguirre, APRN  12/07/22 0259

## 2022-12-07 NOTE — CONSULTS
Primary Care Provider: Quincy Lemos, *     Consult requested by:  Dr. Azul     Reason for Consultation: Neurological evaluation, weakness    History taken from: patient chart RN    Chief complaint: slurred speech, left arm weakness        SUBJECTIVE:    History of present illness: Background per H&P: Eladia Connor is a 57 y.o. female who presented to [facility] on 12/6/2022 complaining of above.   She reportedly has a history of a CVA in 2012 without any residual deficits. She notes over the last week or so she has noted intermittent though right leg weakness and numbness with associated tingling.  Around 11 AM this morning she developed slurred speech and left arm weakness.  Symptoms lasted for about 30 minutes and she presented to [facility] for further evaluation.  She was subsequently discharged indicating that testing/evaluation was largely unremarkable with questionable history of a pneumonia on chest x-ray.   She describes a nonproductive cough without hemoptysis.  + Sick contacts with grandson with the flu.  She denies any fevers. She describes having possible positive TB test few weeks ago. No known TB contacts however.  She indicates her son just returned from the [place] though is not sick.  No significant personal travel history noted.  When inquiring about new medications she identifies that she started Otezla this last week for her psoriasis and her symptoms seem to began after this.  She also recently began Ambien as needed for sleep approximately a month ago but she does not want to take this currently because of her symptoms. She endorses using an old prescription of Klonopin for the last month though was out of pills approximately a week ago  She indicates that her psoriasis was diagnosed by her dermatologist  .  She notes some chest tightness and substernal pressure that was nonradiating earlier today that is nonexertional  Unfortunately she has been suffering from  increased stress and anxiety as her  passed away approximately a year ago. She is on BuSpar her PCP and was recently advised to titrate though she has not done so at this time.    She has not taken Lexapro for years  She is no longer taking ivermectin/Diflucan states that this was given to her by her PCP while evaluation for her psoriasis.    - Portions of the above HPI were copied from previous encounters and edited as appropriate. PMH as detailed below.     Patient evaluated after MRI.  Initially patient had significant slurred speech on exam but it did improve the longer I spoke to the patient.  Patient states that she had some left arm weakness and slurred speech.  No issues like this in the past.  When asked about stress and anxiety she is becomes very tearful and states that she is very stressed.  Her  passed away a year ago, her daughter and 3 children moved into their 1 bedroom home.  Her job as well as stressful.  Patient thinks a lot of her chest tightness and pressure  is due to anxiety.  Today patient states she still does not feel great.  There is no focal weakness.  Nursing staff states that patient had an episode early this morning around 7 AM that she was altered.  There was an episode where her tongue was protruding and the patient was unable to speak but she was still interacting and withdrawing to pain.  This episode does not sound like a seizure and it is very atypical.  Patient awake and alert now.  No family at bedside    Review of Systems   Constitutional: Positive for fatigue.   Psychiatric/Behavioral: Positive for sleep disturbance. The patient is nervous/anxious.            PATIENT HISTORY:  Past Medical History:   Diagnosis Date   • Anxiety    • Depression    ,   Past Surgical History:   Procedure Laterality Date   • CHOLECYSTECTOMY  1987   • EYE SURGERY     • KNEE SURGERY     ,   Family History   Problem Relation Age of Onset   • Heart disease Mother    • Breast cancer  Mother    • Heart disease Father    • Pancreatic cancer Father    • Heart disease Sister    • Lung cancer Brother    ,   Social History     Tobacco Use   • Smoking status: Never   • Smokeless tobacco: Never   Substance Use Topics   • Alcohol use: Never   • Drug use: Never   ,   Prior to Admission medications    Medication Sig Start Date End Date Taking? Authorizing Provider   busPIRone (BUSPAR) 5 MG tablet Take 1 tablet by mouth 3 (Three) Times a Day. 10/4/22   Quincy Lemos APRN   fluconazole (DIFLUCAN) 150 MG tablet TAKE 1 TABLET BY MOUTH 1 TIME FOR 1 DOSE. 11/14/22   Quincy Lemos APRN   hydrOXYzine pamoate (VISTARIL) 100 MG capsule Take 1 capsule by mouth 3 (Three) Times a Day As Needed for Itching. 10/4/22   Quincy Lemos APRN   ivermectin (STROMECTOL) 3 MG tablet tablet TAKE 9 TABLETS BY MOUTH ONCE 11/14/22   Quincy Lemos APRN   nystatin (MYCOSTATIN) 098902 UNIT/GM cream Apply 1 application topically to the appropriate area as directed 2 (Two) Times a Day. 11/4/22   Quincy Lemos APRN   nystatin (MYCOSTATIN) 939656 UNIT/GM powder Apply  topically to the appropriate area as directed 3 (Three) Times a Day. 7/12/22   Sloane Gamez APRN   promethazine-codeine (PHENERGAN with CODEINE) 6.25-10 MG/5ML syrup Take 5 mL by mouth Every 4 (Four) Hours As Needed for Cough. 7/12/22   Sloane Gamez APRN   sertraline (ZOLOFT) 50 MG tablet Take 1 tablet by mouth Daily. 12/30/21   Wanda Rivera APRN   zolpidem (AMBIEN) 10 MG tablet Take 1 tablet by mouth At Night As Needed for Sleep. 10/4/22   Quincy Lemos APRN    Allergies:  Patient has no known allergies.    Current Facility-Administered Medications   Medication Dose Route Frequency Provider Last Rate Last Admin   • acetaminophen (TYLENOL) tablet 650 mg  650 mg Oral Q4H PRN Adolph Azul MD       • aspirin chewable tablet 162 mg  162 mg Oral Daily Adolph Azul MD   162 mg at 12/07/22 0609    Or   •  aspirin suppository 300 mg  300 mg Rectal Daily Adolph Azul MD       • atorvastatin (LIPITOR) tablet 80 mg  80 mg Oral Nightly Adolph Azul MD       • busPIRone (BUSPAR) tablet 5 mg  5 mg Oral TID Delores Azul DO       • [START ON 12/8/2022] Enoxaparin Sodium (LOVENOX) syringe 40 mg  40 mg Subcutaneous Q12H Adolph Azul MD       • famotidine (PEPCID) tablet 20 mg  20 mg Oral BID AC Adolph Azul MD       • ondansetron (ZOFRAN) injection 4 mg  4 mg Intravenous Q6H PRN Adolph Azul MD       • sertraline (ZOLOFT) tablet 50 mg  50 mg Oral Daily Delores Azul DO       • sodium chloride 0.9 % flush 10 mL  10 mL Intravenous PRN Rafael Cantrell MD       • sodium chloride 0.9 % flush 10 mL  10 mL Intravenous PRN Mora Izaguirre APRN       • sodium chloride 0.9 % flush 10 mL  10 mL Intravenous Q12H Adolph Azul MD       • sodium chloride 0.9 % flush 10 mL  10 mL Intravenous PRN Adolph Azul MD       • sodium chloride 0.9 % flush 10 mL  10 mL Intravenous Q12H Adolph Azul MD       • sodium chloride 0.9 % flush 10 mL  10 mL Intravenous PRN Adolph Azul MD       • sodium chloride 0.9 % infusion 40 mL  40 mL Intravenous PRN Adolph Azul MD       • sodium chloride 0.9 % infusion 40 mL  40 mL Intravenous PRN Adolph Azul MD       • zolpidem (AMBIEN) tablet 5 mg  5 mg Oral Nightly PRN Delores Azul DO         Current Outpatient Medications   Medication Sig Dispense Refill   • busPIRone (BUSPAR) 5 MG tablet Take 1 tablet by mouth 3 (Three) Times a Day. 30 tablet 5   • fluconazole (DIFLUCAN) 150 MG tablet TAKE 1 TABLET BY MOUTH 1 TIME FOR 1 DOSE. 1 tablet 1   • hydrOXYzine pamoate (VISTARIL) 100 MG capsule Take 1 capsule by mouth 3 (Three) Times a Day As Needed for Itching. 90 capsule 1   • ivermectin (STROMECTOL) 3 MG tablet tablet TAKE 9 TABLETS BY MOUTH ONCE 9 tablet 0   • nystatin (MYCOSTATIN) 203062 UNIT/GM cream Apply 1 application topically to the appropriate area as directed 2 (Two)  Times a Day. 30 g 3   • nystatin (MYCOSTATIN) 589265 UNIT/GM powder Apply  topically to the appropriate area as directed 3 (Three) Times a Day. 60 g 1   • promethazine-codeine (PHENERGAN with CODEINE) 6.25-10 MG/5ML syrup Take 5 mL by mouth Every 4 (Four) Hours As Needed for Cough. 100 mL 0   • sertraline (ZOLOFT) 50 MG tablet Take 1 tablet by mouth Daily. 30 tablet 2   • zolpidem (AMBIEN) 10 MG tablet Take 1 tablet by mouth At Night As Needed for Sleep. 30 tablet 2        ________________________________________________________        OBJECTIVE:    PHYSICAL EXAM:    Constitutional: The patient is in no apparent distress, awake and alert. There is no shortness of breath.     PSYCHIATRIC: Tearful    HEENT:   Normocephalic, atraumatic.     Chest: Breathing unlabored    Cardiac: Regular rate and rhythm.     Extremities:  No clubbing, cyanosis or edema.    NEUROLOGICAL:    Cognition:   Fully oriented.  Fund of knowledge decent.  Speech is slurred but does improve with time    Cranial nerves;    II - pupils bilaterally equal reacting to light,  No new Visual field deficits;  Fundoscopic exam- Not able to be done, non-dilated exam  III,IV,VI: EOMI with no diplopia  V: Normal facial sensations  VII: No facial asymmetry,  VIII: No New hearing abnormality  IX, X, XI: normal gag and shoulder shrug;  XII: tongue is in the midline.    Sensory:  Intact to light touch in all extremities.     Motor: Strength 5/5 bilaterally upper and lower extremities. No involuntary movements present. Normal tone and bulk.  Deep tendon reflexes: 2/4 and symmetrical in biceps, brachioradialis, triceps, bilateral 2/4 knees and ankles. Both plantars are flexor.    Cerebellar: Finger to nose and mirror movements normal bilaterally.    Gait and balance: Deferred.     Physical exam performed by EDWAR Neumann.  ________________________________________________________   RESULTS REVIEW:    VITAL SIGNS:   Temp:  [98 °F (36.7 °C)-98.1 °F (36.7 °C)]  98.1 °F (36.7 °C)  Heart Rate:  [63-87] 63  Resp:  [17-18] 18  BP: (112-149)/(65-82) 112/66     LABS:      Lab 12/07/22  0604 12/06/22  2314   WBC 7.70 8.80   HEMOGLOBIN 13.4 13.6   HEMATOCRIT 40.8 40.4   PLATELETS 285 330   NEUTROS ABS 5.60 7.00   LYMPHS ABS 1.40 1.10   MONOS ABS 0.60 0.60   EOS ABS 0.10 0.00   MCV 87.2 86.2   PROCALCITONIN  --  0.02   PROTIME  --  10.8   APTT  --  26.8*         Lab 12/07/22  0604 12/06/22  2314   SODIUM 140 139   POTASSIUM 3.9 4.3   CHLORIDE 106 105   CO2 22.0 21.0*   ANION GAP 12.0 13.0   BUN 8 9   CREATININE 0.70 0.71   EGFR 99.8 98.1   GLUCOSE 128* 137*   CALCIUM 9.0 9.5   TSH  --  2.130         Lab 12/06/22  2314   TOTAL PROTEIN 8.5   ALBUMIN 4.20   GLOBULIN 4.3   ALT (SGPT) 16   AST (SGOT) 25   BILIRUBIN 0.8   ALK PHOS 156*         Lab 12/06/22  2314   TROPONIN T <0.010   PROTIME 10.8   INR 1.05         Lab 12/07/22  0604   CHOLESTEROL 182   LDL CHOL 121*   HDL CHOL 42   TRIGLYCERIDES 104                 Lab Results   Component Value Date    TSH 2.130 12/06/2022     (H) 12/07/2022    HGBA1C 5.6 10/04/2022       IMAGING STUDIES:  CT Head Without Contrast    Result Date: 12/7/2022   1. No acute findings or interval change from the outside CT of the head performed yesterday. 2. I would recommend an MRI of the brain without contrast for follow-up as the patient had a CT angiogram of the head and neck and CT cerebral perfusion study overnight.  Electronically Signed By-Ronen Martinez MD On:12/7/2022 7:52 AM This report was finalized on [id number] by  Ronen Martinez MD.    CT Angiogram Neck    Result Date: 12/7/2022  1.  No acute intracranial abnormality is identified on noncontrast head CT. If there is high clinical suspicion for acute ischemic infarct, MRI may be more sensitive. 2.  Within the limitations of motion, no occlusion or high-grade stenosis of the major arteries in the head and neck. Artur Eubanks MD Neuroradiologist [facility]  [email or web address] Thank you for this referral. This exam was interpreted by a fellowship trained neuroradiologist. If the patient's healthcare provider has any questions, a [employer] neuroradiologist can be reached directly at [phone] at any time. SLOT  21 Electronically signed by:  Artur Eubanks M.D.  12/6/2022 11:20 PM Mountain Time    CT Chest Without Contrast Diagnostic    Result Date: 12/7/2022  1.  No evidence of pulmonary tuberculosis. No acute abnormality in the chest. Electronically signed by:  Edouard Marquez M.D.  12/6/2022 11:09 PM Mountain Time    CT Angiogram Head    Result Date: 12/7/2022  1.  No acute intracranial abnormality is identified on noncontrast head CT. If there is high clinical suspicion for acute ischemic infarct, MRI may be more sensitive. 2.  Within the limitations of motion, no occlusion or high-grade stenosis of the major arteries in the head and neck. Artur Eubanks MD Neuroradiologist [facility] [email or web address] Thank you for this referral. This exam was interpreted by a fellowship trained neuroradiologist. If the patient's healthcare provider has any questions, a [employer] neuroradiologist can be reached directly at [phone] at any time. SLOT  21 Electronically signed by:  Artur Eubanks M.D.  12/6/2022 11:20 PM Mountain Time    CT CEREBRAL PERFUSION WITH & WITHOUT CONTRAST    Result Date: 12/7/2022  1.  No acute intracranial abnormality is identified on noncontrast head CT. If there is high clinical suspicion for acute ischemic infarct, MRI may be more sensitive. 2.  Within the limitations of motion, no occlusion or high-grade stenosis of the major arteries in the head and neck. Artur Eubanks MD Neuroradiologist [facility] [email or web address] Thank you for this referral. This exam was interpreted by a fellowship trained neuroradiologist. If the patient's healthcare provider has any questions, a  [employer] neuroradiologist can be reached directly at [phone] at any time. SLOT  21 Electronically signed by:  Artur Eubanks M.D.  12/6/2022 11:20 PM Mountain Time      I reviewed the patient's new clinical results.    ________________________________________________________     PROBLEM LIST:    Weakness            ASSESSMENT/PLAN:  1.  Transient slurred speech and arm weakness.  No stroke on MRI. Possible TIA vs anxiety.   - CT head: No acute findings  - MRI brain: Pending  - CTA head and neck: Within the limitations of motion, no occlusion or high-grade stenosis of the major arteries in the head and neck.  - Echo: EF is 61 to 65%, negative saline test  - EKG: Sinus rhythm, rate 65  - Labs: A1C: 5.7, B12: 391, LDL: 121, TSH: 2.130  - Antithrombotics:     - Statin: Lipitor 80  - PT/OT/ST as appropriate, Neuro checks per protocol, DVT prophylaxis, Stroke education      2.  Episode of tongue protrusion and mental status change.  Episode  did not sound like aseizure, very atypical.   -Continue to monitor  -MRI was negative    3. Chest tightening, improved  -Management per primary    4. Possible NERY  - Outpatient sleep study     5. Depression/anxiety  - Resume home medications once verified     6. Modification of stroke risk factors:   - Blood pressure should be less than 130/80 outpatient, HbA1c less than 6.5, LDL less than 70; b12>500 and smoking cessation if applicable. We would be grateful if the primary team / primary care physician would keep a close watch on the above targets.  - Stroke education  - Follow up with Stroke Clinic       I discussed the patient's findings and my recommendations with patient and nursing staff    Kim Lam, APRN  12/07/22  08:33 EST

## 2022-12-07 NOTE — NURSING NOTE
Pt was alert and oriented during shift change rounds this am. About 30 minutes later patient was unable to speak, unable to follow commands, tongue was protruding out her mouth, and unequal pupils. Spoke with Dr. Davis and CT head was repeated. CT head was negative.     Later spoke with patient's daughter to go over MRI questions. Pt then completed MRI brain and was also negative for acute changes.     After MRI patient was awake and alert and oriented x 4. Pt was able to speak and follow commands. Speech slightly slurred/garbled at times.  Neurology evaluated at bedside and recommended outpatient EEG and pysch consult.

## 2022-12-07 NOTE — CONSULTS
"Diabetes Education  Assessment/Teaching    Patient Name:  Eladia Connor  YOB: 1965  MRN: [id number]  Admit Date:  12/6/2022      Assessment Date:  12/7/2022  Flowsheet Row Most Recent Value   General Information     Height 170.2 cm (67\")   Height Method Stated   Weight 143 kg (315 lb)   Pregnancy Assessment    Diabetes History    Education Preferences    Nutrition Information    Assessment Topics    DM Goals                 Other Comments:  MD consult per stroke protocol. On 12/7/2022 A1c was 5.7% with no history of diabetes. A1c info sheet given with discussion on pre-diabetes. Patient stated she drinks about 4 regular sodas a day. Discussed with patient that drinking unsweetened beverages would be better for blood sugar control. Also discussed the importance of regular exercise in blood sugar control. Patient stated she is going to make some lifestyle changes. Patient has no further questions or concerns related to diabetes at this time.  Electronically signed by:  Yamel Harper RN  12/07/22 14:34 EST  "

## 2022-12-07 NOTE — ED NOTES
Nursing report ED to floor  Eladia Connor  57 y.o.  female    HPI:   Chief Complaint   Patient presents with    Speech Problem     Pt was at work today and had anxiety attack, was home with family stated they started with slurred speech and was transported to [facility],  with anxiety attack pt states they sent her home when patient got home she was sitting and started again with slurred speech, confusion. Pt pt states her left leg feels weak, her right arm feels weak.  Her chest feels numb and she has a headache.           Admitting doctor:   Adolph Azul MD    Admitting diagnosis:   The primary encounter diagnosis was Chest pain, unspecified type. Diagnoses of Slurred speech, TB lung, latent, Right leg weakness, and Left arm weakness were also pertinent to this visit.    Code status:   Current Code Status       Date Active Code Status Order ID Comments User Context       Not on file            Allergies:   Patient has no known allergies.    Isolation:  No active isolations     Fall Risk:  Fall Risk Assessment was completed, and patient is at moderate risk for falls.   Predictive Model Details         4 (Low) Factor Value    Calculated 12/7/2022 02:08 Age 57    Risk of Fall Model Musculoskeletal Assessment WDL     Active Peripheral IV Present     Imaging order in this encounter Present     Respiratory Rate 18     Skin Assessment WDL     Magnesium not on file     Number of Distinct Medication Classes administered 3     Drug Use No     Carlos Scale not on file     Financial Class Private Insurance     Diastolic BP 75     Peripheral Vascular Assessment WDL     Total Bilirubin 0.8 mg/dL     Gastrointestinal Assessment WDL     Cardiac Assessment WDL     Albumin 4.2 g/dL     Days after Admission 0.133     Potassium 4.3 mmol/L     Calcium 9.5 mg/dL     ALT 16 U/L     Creatinine 0.71 mg/dL     Chloride 105 mmol/L         Weight:       12/06/22  9193   Weight: (!) 143 kg (315 lb)       Intake and  Output  No intake or output data in the 24 hours ending 12/07/22 0504    Diet:   Dietary Orders (From admission, onward)       Start     Ordered    12/07/22 0320  NPO Diet NPO Type: Strict NPO  Diet Effective Now        Comments: Strict NPO Until Nursing Dysphagia Screen Passed   Question:  NPO Type  Answer:  Strict NPO    12/07/22 0322                     Most recent vitals:   Vitals:    12/07/22 0047 12/07/22 0216 12/07/22 0232 12/07/22 0331   BP: 145/75 130/65 129/68 137/82   Pulse: 70 65 67 71   Resp: 18 17 18 18   Temp:       TempSrc:       SpO2: 100% 100% 100% 98%   Weight:       Height:           Active LDAs/IV Access:   Lines, Drains & Airways       Active LDAs       Name Placement date Placement time Site Days    Peripheral IV 12/06/22 2314 Left Antecubital 12/06/22 2314  Antecubital  less than 1                    Skin Condition:   Skin Assessments (last day)       None             Labs (abnormal labs have a star):   Labs Reviewed   COMPREHENSIVE METABOLIC PANEL - Abnormal; Notable for the following components:       Result Value    Glucose 137 (*)     CO2 21.0 (*)     Alkaline Phosphatase 156 (*)     All other components within normal limits    Narrative:     GFR Normal >60  Chronic Kidney Disease <60  Kidney Failure <15     APTT - Abnormal; Notable for the following components:    PTT 26.8 (*)     All other components within normal limits   CBC WITH AUTO DIFFERENTIAL - Abnormal; Notable for the following components:    Neutrophil % 79.8 (*)     Lymphocyte % 12.9 (*)     All other components within normal limits   RESPIRATORY PANEL PCR W/ COVID-19 (SARS-COV-2) TESS/MEDARDO/DAVID/PAD/COR/MAD/SHAMIKA IN-HOUSE, NP SWAB IN [facility], 3-4 HR TAT - Normal    Narrative:     In the setting of a positive respiratory panel with a viral infection PLUS a negative procalcitonin without other underlying concern for bacterial infection, consider observing off antibiotics or discontinuation of antibiotics and continue supportive  "care. If the respiratory panel is positive for atypical bacterial infection (Bordetella pertussis, Chlamydophila pneumoniae, or Mycoplasma pneumoniae), consider antibiotic de-escalation to target atypical bacterial infection.   PROCALCITONIN - Normal    Narrative:     As a Marker for Sepsis (Non-Neonates):    1. <0.5 ng/mL represents a low risk of severe sepsis and/or septic shock.  2. >2 ng/mL represents a high risk of severe sepsis and/or septic shock.    As a Marker for Lower Respiratory Tract Infections that require antibiotic therapy:    PCT on Admission    Antibiotic Therapy       6-12 Hrs later    >0.5                Strongly Recommended  >0.25 - <0.5        Recommended   0.1 - 0.25          Discouraged              Remeasure/reassess PCT  <0.1                Strongly Discouraged     Remeasure/reassess PCT    As 28 day mortality risk marker: \"Change in Procalcitonin Result\" (>80% or <=80%) if Day 0 (or Day 1) and Day 4 values are available. Refer to [email or web address].[email or web address]    Change in PCT <=80%  A decrease of PCT levels below or equal to 80% defines a positive change in PCT test result representing a higher risk for 28-day all-cause mortality of patients diagnosed with severe sepsis for septic shock.    Change in PCT >80%  A decrease of PCT levels of more than 80% defines a negative change in PCT result representing a lower risk for 28-day all-cause mortality of patients diagnosed with severe sepsis or septic shock.      PROTIME-INR - Normal   TROPONIN (IN-HOUSE) - Normal    Narrative:     Troponin T Reference Range:  <= 0.03 ng/mL-   Negative for AMI  >0.03 ng/mL-     Abnormal for myocardial necrosis.  Clinicians would have to utilize clinical acumen, EKG, Troponin and serial changes to determine if it is an Acute Myocardial Infarction or myocardial injury due to an underlying chronic condition.       Results may be falsely decreased if patient taking Biotin.     TSH - Normal   RAINBOW DRAW    " Narrative:     The following orders were created for panel order [place] Draw.  Procedure                               Abnormality         Status                     ---------                               -----------         ------                     Green Top (Gel)[[id number]]                                  Final result               Lavender Top[[id number]]                                     Final result               Gold Top - SST[[id number]]                                   Final result               Light Blue Top[[id number]]                                   Final result                 Please view results for these tests on the individual orders.   URINE DRUG SCREEN   BASIC METABOLIC PANEL   CBC WITH AUTO DIFFERENTIAL   HEMOGLOBIN A1C   LIPID PANEL   POCT GLUCOSE FINGERSTICK   POCT GLUCOSE FINGERSTICK   POCT GLUCOSE FINGERSTICK   POCT GLUCOSE FINGERSTICK   GREEN TOP   LAVENDER TOP   GOLD TOP - SST   LIGHT BLUE TOP   CBC AND DIFFERENTIAL    Narrative:     The following orders were created for panel order CBC & Differential.  Procedure                               Abnormality         Status                     ---------                               -----------         ------                     CBC Auto Differential[[id number]]        Abnormal            Final result                 Please view results for these tests on the individual orders.       LOC: Person, Place, Time, and Situation    Telemetry:  Telemetry    Cardiac Monitoring Ordered: yes    EKG:   No orders to display       Medications Given in the ED:   Medications   sodium chloride 0.9 % flush 10 mL (has no administration in time range)   sodium chloride 0.9 % flush 10 mL (has no administration in time range)   sodium chloride 0.9 % flush 10 mL (has no administration in time range)   sodium chloride 0.9 % flush 10 mL (has no administration in time range)   sodium chloride 0.9 % infusion 40 mL (has no administration in time range)    acetaminophen (TYLENOL) tablet 650 mg (has no administration in time range)   ondansetron (ZOFRAN) injection 4 mg (has no administration in time range)   Enoxaparin Sodium (LOVENOX) syringe 40 mg (has no administration in time range)   sodium chloride 0.9 % flush 10 mL (has no administration in time range)   sodium chloride 0.9 % flush 10 mL (has no administration in time range)   sodium chloride 0.9 % infusion 40 mL (has no administration in time range)   atorvastatin (LIPITOR) tablet 80 mg (has no administration in time range)   aspirin chewable tablet 162 mg (has no administration in time range)     Or   aspirin suppository 300 mg (has no administration in time range)   ondansetron (ZOFRAN) injection 4 mg (4 mg Intravenous Given 12/7/22 0053)   sodium chloride 0.9 % bolus 1,000 mL (0 mL Intravenous Stopped 12/7/22 0344)   iopamidol (ISOVUE-370) 76 % injection 150 mL (150 mL Intravenous Given 12/7/22 0042)       Imaging results:  CT Angiogram Neck    Result Date: 12/7/2022  1.  No acute intracranial abnormality is identified on noncontrast head CT. If there is high clinical suspicion for acute ischemic infarct, MRI may be more sensitive. 2.  Within the limitations of motion, no occlusion or high-grade stenosis of the major arteries in the head and neck. Artur Eubanks MD Neuroradiologist [facility] [email or web address] Thank you for this referral. This exam was interpreted by a fellowship trained neuroradiologist. If the patient's healthcare provider has any questions, a [employer] neuroradiologist can be reached directly at [phone] at any time. SLOT  21 Electronically signed by:  Artur Eubanks M.D.  12/6/2022 11:20 PM [place] Time    CT Chest Without Contrast Diagnostic    Result Date: 12/7/2022  1.  No evidence of pulmonary tuberculosis. No acute abnormality in the chest. Electronically signed by:  Edouard Marquez M.D.  12/6/2022 11:09 PM [place] Time    CT Angiogram  Head    Result Date: 12/7/2022  1.  No acute intracranial abnormality is identified on noncontrast head CT. If there is high clinical suspicion for acute ischemic infarct, MRI may be more sensitive. 2.  Within the limitations of motion, no occlusion or high-grade stenosis of the major arteries in the head and neck. Artur Eubanks MD Neuroradiologist [facility] [email or web address] Thank you for this referral. This exam was interpreted by a fellowship trained neuroradiologist. If the patient's healthcare provider has any questions, a [employer] neuroradiologist can be reached directly at [phone] at any time. SLOT  21 Electronically signed by:  Artur Eubanks M.D.  12/6/2022 11:20 PM [place] Time    CT CEREBRAL PERFUSION WITH & WITHOUT CONTRAST    Result Date: 12/7/2022  1.  No acute intracranial abnormality is identified on noncontrast head CT. If there is high clinical suspicion for acute ischemic infarct, MRI may be more sensitive. 2.  Within the limitations of motion, no occlusion or high-grade stenosis of the major arteries in the head and neck. Artur Eubanks MD Neuroradiologist [facility] [email or web address] Thank you for this referral. This exam was interpreted by a fellowship trained neuroradiologist. If the patient's healthcare provider has any questions, a [facility] neuroradiologist can be reached directly at [phone] at any time. SLOT  21 Electronically signed by:  Artur Eubanks M.D.  12/6/2022 11:20 PM [place] Time     Social issues:   Social History     Socioeconomic History    Marital status:    Tobacco Use    Smoking status: Never    Smokeless tobacco: Never   Substance and Sexual Activity    Alcohol use: Never    Drug use: Never    Sexual activity: Defer       NIH Stroke Scale:  Interval: (not recorded)  1a. Level of Consciousness: 0-->Alert, keenly responsive (12/07/22 0343)  1b. LOC Questions: 0-->Answers both  questions correctly (12/07/22 0343)  1c. LOC Commands: 0-->Performs both tasks correctly (12/07/22 0343)  2. Best Gaze: 0-->Normal (12/07/22 0343)  3. Visual: (not recorded)  4. Facial Palsy: (not recorded)  5a. Motor Arm, Left: (not recorded)  5b. Motor Arm, Right: (not recorded)  6a. Motor Leg, Left: (not recorded)  6b. Motor Leg, Right: (not recorded)  7. Limb Ataxia: (not recorded)  8. Sensory: (not recorded)  9. Best Language: (not recorded)  10. Dysarthria: (not recorded)  11. Extinction and Inattention (formerly Neglect): (not recorded)    Total (NIH Stroke Scale): (not recorded)     Additional notable assessment information:     Nursing report ED to floor:      Jess Alcantar RN   12/07/22 05:04 EST

## 2022-12-07 NOTE — SIGNIFICANT NOTE
12/07/22 0936   Rehab Time/Intention   Session Not Performed unable to evaluate, medical status change  (had episode this am; RN report patient not appropriate for eval this time)   Recommendation   PT - Next Appointment 12/08/22   Recommendation   OT - Next Appointment 12/08/22

## 2022-12-07 NOTE — SIGNIFICANT NOTE
12/07/22 1014   OTHER   Discipline occupational therapist   Rehab Time/Intention   Session Not Performed unable to evaluate, medical status change  (Pt going for MRI and with episode this am is not yet stable for OT jonh GRANGER tomorrow.)   Recommendation   OT - Next Appointment 12/08/22

## 2022-12-07 NOTE — PLAN OF CARE
Goal Outcome Evaluation:         Bedside swallow eval done with patient. Pt ate ice chips and drank water with no difficulty. Voice was clear after drinking and no signs of coughing. Diet ordered for patient. Pt states she is afraid to eat because she doesn't want to get nauseous. Zofran given.       Problem: Confusion Acute  Goal: Optimal Cognitive Function  Outcome: Ongoing, Progressing

## 2022-12-07 NOTE — CASE MANAGEMENT/SOCIAL WORK
Discharge Planning Assessment  [facility]     Patient Name: Eladia Connor  MRN: [id number]  Today's Date: 12/7/2022    Admit Date: 12/6/2022    Plan: From Home with Family, Pending PT Eval and choices   Discharge Needs Assessment     Row Name 12/07/22 1152       Living Environment    People in Home child(michelle), adult    Current Living Arrangements home    Primary Care Provided by self    Able to Return to Prior Arrangements yes       Resource/Environmental Concerns    Resource/Environmental Concerns none    Transportation Concerns none       Transition Planning    Patient/Family Anticipates Transition to home with family    Patient/Family Anticipated Services at Transition none    Transportation Anticipated family or friend will provide       Discharge Needs Assessment    Readmission Within the Last 30 Days no previous admission in last 30 days    Equipment Currently Used at Home cane, straight    Concerns to be Addressed discharge planning    Anticipated Changes Related to Illness none    Discharge Facility/Level of Care Needs --  Pending clinical course and PT/OT eval    Provided Post Acute Provider List? Yes    Post Acute Provider List Nursing Home;Home Health;Outpatient Therapy    Provided Post Acute Provider Quality & Resource List? Yes    Post Acute Provider Quality and Resource List Home Health;Nursing Home    Delivered To Patient    Method of Delivery In person               Discharge Plan     Row Name 12/07/22 1157       Plan    Plan From Home with Family, Pending PT Eval and choices    Patient/Family in Agreement with Plan yes    Plan Comments Met with PAtient at bedside. Lives at home with children. IADL's PCP and Pharmacy verified, able to afford medications. Spoke about discharge planning and unsure what services patient will need at [place] pending pt/ot eval. Gave Lists for patient to review and select choices. D/C BArriers: MRI pending, Neuro Consult              Continued Care and Services -  Admitted Since 12/6/2022    Coordination has not been started for this encounter.       Expected Discharge Date and Time     Expected Discharge Date Expected Discharge Time    Dec 8, 2022          Demographic Summary     Row Name 12/07/22 1151       General Information    Admission Type observation    Arrived From emergency department    Referral Source admission list    Reason for Consult discharge planning    Preferred Language English               Functional Status     Row Name 12/07/22 1152       Functional Status    Usual Activity Tolerance good    Current Activity Tolerance fair       Functional Status, IADL    Medications independent    Meal Preparation independent    Housekeeping independent    Laundry independent    Shopping independent       Mental Status    General Appearance WDL WDL       Mental Status Summary    Recent Changes in Mental Status/Cognitive Functioning no changes              Met with patient at bedside wearing mask and goggles, Spent less than 15 minutes in room at greater than 6 feet distance.       Leigha Payne RN

## 2022-12-08 ENCOUNTER — TELEPHONE (OUTPATIENT)
Dept: FAMILY MEDICINE CLINIC | Facility: CLINIC | Age: 57
End: 2022-12-08

## 2022-12-08 ENCOUNTER — APPOINTMENT (OUTPATIENT)
Dept: MRI IMAGING | Facility: HOSPITAL | Age: 57
End: 2022-12-08

## 2022-12-08 LAB
ALBUMIN SERPL-MCNC: 3.6 G/DL (ref 3.5–5.2)
ALBUMIN/GLOB SERPL: 1 G/DL
ALP SERPL-CCNC: 125 U/L (ref 39–117)
ALT SERPL W P-5'-P-CCNC: 14 U/L (ref 1–33)
ANION GAP SERPL CALCULATED.3IONS-SCNC: 10 MMOL/L (ref 5–15)
AST SERPL-CCNC: 26 U/L (ref 1–32)
BASOPHILS # BLD AUTO: 0 10*3/MM3 (ref 0–0.2)
BASOPHILS NFR BLD AUTO: 0.6 % (ref 0–1.5)
BILIRUB SERPL-MCNC: 0.5 MG/DL (ref 0–1.2)
BUN SERPL-MCNC: 10 MG/DL (ref 6–20)
BUN/CREAT SERPL: 14.3 (ref 7–25)
CALCIUM SPEC-SCNC: 8.8 MG/DL (ref 8.6–10.5)
CHLORIDE SERPL-SCNC: 109 MMOL/L (ref 98–107)
CO2 SERPL-SCNC: 21 MMOL/L (ref 22–29)
CREAT SERPL-MCNC: 0.7 MG/DL (ref 0.57–1)
DEPRECATED RDW RBC AUTO: 43.3 FL (ref 37–54)
EGFRCR SERPLBLD CKD-EPI 2021: 99.8 ML/MIN/1.73
EOSINOPHIL # BLD AUTO: 0.1 10*3/MM3 (ref 0–0.4)
EOSINOPHIL NFR BLD AUTO: 2.1 % (ref 0.3–6.2)
ERYTHROCYTE [DISTWIDTH] IN BLOOD BY AUTOMATED COUNT: 13.9 % (ref 12.3–15.4)
GLOBULIN UR ELPH-MCNC: 3.5 GM/DL
GLUCOSE BLDC GLUCOMTR-MCNC: 113 MG/DL (ref 70–105)
GLUCOSE BLDC GLUCOMTR-MCNC: 135 MG/DL (ref 70–105)
GLUCOSE BLDC GLUCOMTR-MCNC: 249 MG/DL (ref 70–105)
GLUCOSE BLDC GLUCOMTR-MCNC: 99 MG/DL (ref 70–105)
GLUCOSE SERPL-MCNC: 105 MG/DL (ref 65–99)
HCT VFR BLD AUTO: 37.6 % (ref 34–46.6)
HGB BLD-MCNC: 12.2 G/DL (ref 12–15.9)
LYMPHOCYTES # BLD AUTO: 1.4 10*3/MM3 (ref 0.7–3.1)
LYMPHOCYTES NFR BLD AUTO: 19.6 % (ref 19.6–45.3)
MCH RBC QN AUTO: 28.8 PG (ref 26.6–33)
MCHC RBC AUTO-ENTMCNC: 32.5 G/DL (ref 31.5–35.7)
MCV RBC AUTO: 88.7 FL (ref 79–97)
MONOCYTES # BLD AUTO: 0.5 10*3/MM3 (ref 0.1–0.9)
MONOCYTES NFR BLD AUTO: 7.6 % (ref 5–12)
NEUTROPHILS NFR BLD AUTO: 4.9 10*3/MM3 (ref 1.7–7)
NEUTROPHILS NFR BLD AUTO: 70.1 % (ref 42.7–76)
NRBC BLD AUTO-RTO: 0 /100 WBC (ref 0–0.2)
PLATELET # BLD AUTO: 262 10*3/MM3 (ref 140–450)
PMV BLD AUTO: 6.8 FL (ref 6–12)
POTASSIUM SERPL-SCNC: 4.1 MMOL/L (ref 3.5–5.2)
PROT SERPL-MCNC: 7.1 G/DL (ref 6–8.5)
RBC # BLD AUTO: 4.23 10*6/MM3 (ref 3.77–5.28)
SODIUM SERPL-SCNC: 140 MMOL/L (ref 136–145)
WBC NRBC COR # BLD: 7 10*3/MM3 (ref 3.4–10.8)

## 2022-12-08 PROCEDURE — 36415 COLL VENOUS BLD VENIPUNCTURE: CPT | Performed by: INTERNAL MEDICINE

## 2022-12-08 PROCEDURE — 85025 COMPLETE CBC W/AUTO DIFF WBC: CPT | Performed by: INTERNAL MEDICINE

## 2022-12-08 PROCEDURE — 97166 OT EVAL MOD COMPLEX 45 MIN: CPT

## 2022-12-08 PROCEDURE — 80053 COMPREHEN METABOLIC PANEL: CPT | Performed by: INTERNAL MEDICINE

## 2022-12-08 PROCEDURE — 97162 PT EVAL MOD COMPLEX 30 MIN: CPT

## 2022-12-08 PROCEDURE — 99214 OFFICE O/P EST MOD 30 MIN: CPT | Performed by: PSYCHIATRY & NEUROLOGY

## 2022-12-08 PROCEDURE — 99232 SBSQ HOSP IP/OBS MODERATE 35: CPT | Performed by: NURSE PRACTITIONER

## 2022-12-08 PROCEDURE — 25010000002 ENOXAPARIN PER 10 MG: Performed by: INTERNAL MEDICINE

## 2022-12-08 PROCEDURE — 99213 OFFICE O/P EST LOW 20 MIN: CPT | Performed by: NURSE PRACTITIONER

## 2022-12-08 PROCEDURE — G0378 HOSPITAL OBSERVATION PER HR: HCPCS

## 2022-12-08 PROCEDURE — 82962 GLUCOSE BLOOD TEST: CPT

## 2022-12-08 PROCEDURE — 72148 MRI LUMBAR SPINE W/O DYE: CPT

## 2022-12-08 RX ORDER — VILAZODONE HYDROCHLORIDE 10 MG/1
10 TABLET ORAL DAILY
Status: DISCONTINUED | OUTPATIENT
Start: 2022-12-08 | End: 2022-12-13 | Stop reason: HOSPADM

## 2022-12-08 RX ADMIN — Medication 10 ML: at 03:32

## 2022-12-08 RX ADMIN — Medication 10 ML: at 09:49

## 2022-12-08 RX ADMIN — Medication 10 ML: at 22:05

## 2022-12-08 RX ADMIN — Medication 10 ML: at 09:56

## 2022-12-08 RX ADMIN — FAMOTIDINE 20 MG: 20 TABLET ORAL at 18:06

## 2022-12-08 RX ADMIN — NYSTATIN: 100000 POWDER TOPICAL at 09:49

## 2022-12-08 RX ADMIN — NYSTATIN: 100000 POWDER TOPICAL at 22:06

## 2022-12-08 RX ADMIN — ENOXAPARIN SODIUM 40 MG: 100 INJECTION SUBCUTANEOUS at 09:46

## 2022-12-08 RX ADMIN — ASPIRIN 81 MG CHEWABLE TABLET 162 MG: 81 TABLET CHEWABLE at 09:47

## 2022-12-08 RX ADMIN — FAMOTIDINE 20 MG: 20 TABLET ORAL at 09:48

## 2022-12-08 RX ADMIN — ENOXAPARIN SODIUM 40 MG: 100 INJECTION SUBCUTANEOUS at 22:05

## 2022-12-08 NOTE — PROGRESS NOTES
"  Chief complaint anxiety     Subjective .     History of present illness:  The patient is a 57 y.o. female who was admitted secondary to  weakness. PMHx: anxiety and depression. Psych consult was requested by Dr Jorge Alberto osullivan to mental status changes and depression.   The pt reported history of depression in the past, approximately 20 years ago the patient had \"nervous breakdown, she had to be admitted to the hospital  [place] secondary to suicidal ideations, but no attempts.  At that time she was started  on Paxil, Ambien and lorazepam, depression improved at the time, she stopped medications  Few  years ago.  Last  year her  passed away, the patient became more depressed, increased crying spells, increased self-isolation.  The patient was prescribed clonazepam by her previous primary care provider.  Also was prescribed Ambien and lorazepam for anxiety.  Her  passed away around holidays, his anniversary is approaching, the patient found old bottle of clonazepam 0.5 mg and she was taking 1 pill daily to alleviate anxiety  until she ran out 1 week ago.  Depression is rated as a 7 out of 10, she denied suicidal and homicidal ideations, the patient denied any perceptual disturbances  Anxiety is persistent and intense, denied AVH/SI/HI  The pt developed stroke like sxs with numbness and muscle weakness   Past psych hx: depression anxiety, hx of SI no SA     Today the pt was more alert and awake, still very anxious about her condition, worries about TIA/stroke. She wanted to get more detailed work up. The pt feels depressed, but denied feeling hopeless/helpless, denied AVH/SI/HI       Review of Systems   All systems were reviewed and negative except for:  Constitution:  positive for fatigue  Musculoskeletal: positive for  muscle weakness  Neurological: positive for  difficulty walking and weakness  Behavioral/Psych: positive for  anxiety and depression    History       Medications Prior to Admission   Medication " "Sig Dispense Refill Last Dose   • busPIRone (BUSPAR) 5 MG tablet Take 1 tablet by mouth 3 (Three) Times a Day. 30 tablet 5    • nystatin (MYCOSTATIN) 375484 UNIT/GM cream Apply 1 application topically to the appropriate area as directed 2 (Two) Times a Day. 30 g 3    • zolpidem (AMBIEN) 10 MG tablet Take 1 tablet by mouth At Night As Needed for Sleep. 30 tablet 2         Scheduled Meds:  aspirin, 162 mg, Oral, Daily   Or  aspirin, 300 mg, Rectal, Daily  atorvastatin, 80 mg, Oral, Nightly  busPIRone, 5 mg, Oral, TID  enoxaparin, 40 mg, Subcutaneous, Q12H  famotidine, 20 mg, Oral, BID AC  nystatin, , Topical, Q12H  sodium chloride, 10 mL, Intravenous, Q12H  sodium chloride, 10 mL, Intravenous, Q12H  vilazodone, 10 mg, Oral, Daily         Continuous Infusions:       PRN Meds:  •  acetaminophen  •  ondansetron  •  sodium chloride  •  [COMPLETED] Insert Peripheral IV **AND** sodium chloride  •  sodium chloride  •  sodium chloride  •  sodium chloride  •  sodium chloride  •  zolpidem      Allergies:  Patient has no known allergies.      Objective     Vital Signs   /57   Pulse 59   Temp 98.1 °F (36.7 °C) (Oral)   Resp 17   Ht 170.2 cm (67\")   Wt (!) 143 kg (315 lb)   LMP  (LMP Unknown)   SpO2 98%   BMI 49.34 kg/m²     Physical Exam:     General Appearance:    In NAD       Mental Status Exam:    Hygiene:   good  Cooperation:  Cooperative  Eye Contact:  Good  Psychomotor Behavior:  Slow  Affect:  Blunted  Hopelessness: Denies  Speech:  Normal  Thought Progress:  Goal directed and Linear  Thought Content:  Mood congruent  Suicidal:  None  Homicidal:  None  Hallucinations:  None  Delusion:  None  Memory:  fair   Orientation:  Person, Place and Situation  Reliability:  fair  Insight:  Fair  Judgement:  Fair  Impulse Control:  Fair  Physical/Medical Issues:  Yes      Medications and allergies reviewed     Lab Results   Component Value Date    GLUCOSE 105 (H) 12/08/2022    CALCIUM 8.8 12/08/2022     12/08/2022 "    K 4.1 12/08/2022    CO2 21.0 (L) 12/08/2022     (H) 12/08/2022    BUN 10 12/08/2022    CREATININE 0.70 12/08/2022    EGFRIFAFRI 102 02/17/2021    EGFRIFNONA 89 02/17/2021    BCR 14.3 12/08/2022    ANIONGAP 10.0 12/08/2022       Last Urine Toxicity     LAST URINE TOXICITY RESULTS Latest Ref Rng & Units 12/7/2022    BARBITURATES SCREEN Negative Negative    BENZODIAZEPINE SCREEN, URINE Negative Negative    COCAINE SCREEN, URINE Negative Negative    METHADONE SCREEN, URINE Negative Negative          No results found for: PHENYTOIN, PHENOBARB, VALPROATE, CBMZ    Lab Results   Component Value Date     12/08/2022    BUN 10 12/08/2022    CREATININE 0.70 12/08/2022    TSH 2.130 12/06/2022    WBC 7.00 12/08/2022       Brief Urine Lab Results     None          Assessment & Plan       Weakness        Assessment: Major depressive disorder moderate recurrent  Generalized anxiety disorder  Bereavement  Treatment Plan: The patient presented with symptoms of depression, anxiety, and anniversary of her 's death is approaching.  Continue BuSpar at current dose, continue Ambien as needed for insomnia  The patient was on Paxil in the past, did not report with experience  She did not respond to Zoloft and Lexapro  Cont Viibryd 10 mg p.o. every morning tomorrow for anxiety and depression - however the pt wanted to hold for now, she worried about getting more sxs to her clinical presentation in case of side effects     The patient will benefit from psychotherapy Outpatient basis and grief therapy  Safety plan was discussed with the patient and her daughter   They verbalized understanding   Treatment Plan discussed with: Patient, Family and nursing     I discussed the patients findings and my recommendations with patient, family and nursing staff    I have reviewed and approved the behavioral health treatment plans and problem list. Yes     Referring MD has access to consult report and progress notes in EMR     Kavya  GER Barillas MD  12/08/22  12:53 EST

## 2022-12-08 NOTE — PLAN OF CARE
Goal Outcome Evaluation:      Routine stroke order received. Pt passed swallow screen and has been placed on a regular diet. Imaging (CT & MRI) negative for acute findings. Will sign off per protocol, please reconsult if indicated.

## 2022-12-08 NOTE — PLAN OF CARE
Goal Outcome Evaluation:              Outcome Evaluation: Pt is a 56 y/o female with intermittent c/o RLE weakness and numbness with associated tingling.  Day of hospital admission pt be slurred speech and L arm weakness x 30 minutes.  CT angiogram head/neck: (-) acute, CT chest (-) acute, MRI brain (-) acute.  Pt reports she lives with her daughter in an apartment with 14 steps to enter into home.  Daughter assists with some ADLs such as donning socks and taking showers.  She has been experiencing increased weakness over the past 5 weeks and has been using a cane for mobility.  This date pt has been sitting in chair and reports fatigue.  She requires CGA to get to BSC and complete toileting.  She demos general weakness and fatigues quickly.  Recommend SNF at discharge.

## 2022-12-08 NOTE — THERAPY EVALUATION
Patient Name: Eladia Connor  : 1965    MRN: [id number]                              Today's Date: 2022       Admit Date: 2022    Visit Dx:     ICD-10-CM ICD-9-CM   1. Chest pain, unspecified type  R07.9 786.50   2. Slurred speech  R47.81 784.59   3. TB lung, latent  Z22.7 795.51   4. Right leg weakness  R29.898 729.89   5. Left arm weakness  R29.898 729.89   6. Follow-up exam  Z09 V67.9     Patient Active Problem List   Diagnosis   • Moderate episode of recurrent major depressive disorder (HCC)   • Anxiety   • Other insomnia   • Skin lesion   • History of abnormal mammogram   • Mood swings   • Class 1 obesity due to excess calories with serious comorbidity and body mass index (BMI) of 33.0 to 33.9 in adult   • Rash of face   • Fever   • Cough   • Rash   • Intertrigo   • Weakness     Past Medical History:   Diagnosis Date   • Anxiety    • Depression      Past Surgical History:   Procedure Laterality Date   • CHOLECYSTECTOMY     • EYE SURGERY     • KNEE SURGERY        General Information     Row Name 22 1635          OT Time and Intention    Document Type evaluation  -SR     Mode of Treatment occupational therapy  -SR     Row Name 22 1635          Occupational Profile    Reason for Services/Referral (Occupational Profile) Pt is a 58 y/o female with intermittent c/o RLE weakness and numbness with associated tingling.  Day of hospital admission pt be slurred speech and L arm weakness x 30 minutes.  CT angiogram head/neck: (-) acute, CT chest (-) acute, MRI brain (-) acute.  Pt reports she lives with her daughter in an apartment with 14 steps to enter into home.  Daughter assists with some ADLs such as donning socks and taking showers.  -SR     Row Name 22 1635          Cognition    Orientation Status (Cognition) oriented x 4  -SR     Row Name 22 1639          Safety Issues, Functional Mobility    Impairments Affecting Function (Mobility) balance;endurance/activity  tolerance;pain;strength  -SR           User Key  (r) = Recorded By, (t) = Taken By, (c) = Cosigned By    Initials Name Provider Type    SR Leigha Best OT Occupational Therapist                 Mobility/ADL's     Row Name 12/08/22 1639          Bed Mobility    Bed Mobility bed mobility (all) activities  -SR     All Activities, Martin (Bed Mobility) contact guard  -SR     Assistive Device (Bed Mobility) bed rails;head of bed elevated  -SR     Row Name 12/08/22 1639          Sit-Stand Transfer    Sit-Stand Martin (Transfers) minimum assist (75% patient effort);1 person assist  -SR     Assistive Device (Sit-Stand Transfers) walker, 4-wheeled  -SR     Row Name 12/08/22 1639          Activities of Daily Living    BADL Assessment/Intervention toileting  -SR     Row Name 12/08/22 1639          Toileting Assessment/Training    Martin Level (Toileting) contact guard assist  -SR           User Key  (r) = Recorded By, (t) = Taken By, (c) = Cosigned By    Initials Name Provider Type    SR Leigha Best OT Occupational Therapist               Obj/Interventions     Row Name 12/08/22 1641          Range of Motion Comprehensive    Comment, General Range of Motion Shoulders slighlty limited due to weakness.  Good distal UE ROM.  -SR     Row Name 12/08/22 1641          Strength Comprehensive (MMT)    Comment, General Manual Muscle Testing (MMT) Assessment B shoulders 3/5, distal UE 4-/5  -SR     Row Name 12/08/22 1641          Balance    Balance Interventions sitting;standing;sit to stand;supported;dynamic;static;minimal challenge  -SR           User Key  (r) = Recorded By, (t) = Taken By, (c) = Cosigned By    Initials Name Provider Type    SR Leigha Best, ELIO Occupational Therapist               Goals/Plan     Row Name 12/08/22 1645          Bathing Goal 1 (OT)    Activity/Device (Bathing Goal 1, OT) bathing skills, all  -SR     Martin Level/Cues Needed (Bathing Goal 1, OT)  minimum assist (75% or more patient effort)  -SR     Time Frame (Bathing Goal 1, OT) 2 weeks  -SR     Row Name 12/08/22 1647          Dressing Goal 1 (OT)    Activity/Device (Dressing Goal 1, OT) dressing skills, all  -SR     [place]/Cues Needed (Dressing Goal 1, OT) contact guard required  -SR     Time Frame (Dressing Goal 1, OT) 2 weeks  -SR     Row Name 12/08/22 1642          Therapy Assessment/Plan (OT)    Planned Therapy Interventions (OT) activity tolerance training;BADL retraining;functional balance retraining;IADL retraining;occupation/activity based interventions;patient/caregiver education/training;ROM/therapeutic exercise;strengthening exercise  -SR           User Key  (r) = Recorded By, (t) = Taken By, (c) = Cosigned By    Initials Name Provider Type    SR Leigha Best, OT Occupational Therapist               Clinical Impression     Row Name 12/08/22 1648          Pain Assessment    Pretreatment Pain Rating 2/10  -SR     Posttreatment Pain Rating 2/10  -SR     Row Name 12/08/22 1643          Plan of Care Review    Outcome Evaluation Pt is a 56 y/o female with intermittent c/o RLE weakness and numbness with associated tingling.  Day of hospital admission pt be slurred speech and L arm weakness x 30 minutes.  CT angiogram head/neck: (-) acute, CT chest (-) acute, MRI brain (-) acute.  Pt reports she lives with her daughter in an apartment with 14 steps to enter into home.  Daughter assists with some ADLs such as donning socks and taking showers.  She has been experiencing increased weakness over the past 5 weeks and has been using a cane for mobility.  This date pt has been sitting in chair and reports fatigue.  She requires CGA to get to BSC and complete toileting.  She demos general weakness and fatigues quickly.  Recommend SNF at discharge.  -SR     Row Name 12/08/22 1644          Therapy Assessment/Plan (OT)    Rehab Potential (OT) good, to achieve stated therapy goals  -SR     Criteria  for Skilled Therapeutic Interventions Met (OT) yes  -SR     Therapy Frequency (OT) 5 times/wk  -SR     Predicted Duration of Therapy Intervention (OT) Until discharge  -SR     Row Name 12/08/22 1643          Therapy Plan Review/Discharge Plan (OT)    Anticipated Discharge Disposition (OT) inpatient rehabilitation facility  -SR     Row Name 12/08/22 1643          Positioning and Restraints    Pre-Treatment Position sitting in chair/recliner  -SR     In Bed call light within reach;encouraged to call for assist;exit alarm on  -SR           User Key  (r) = Recorded By, (t) = Taken By, (c) = Cosigned By    Initials Name Provider Type    SR Leigha Best, OT Occupational Therapist               Outcome Measures     Row Name 12/08/22 1646          How much help from another is currently needed...    Putting on and taking off regular lower body clothing? 2  -SR     Bathing (including washing, rinsing, and drying) 3  -SR     Toileting (which includes using toilet bed pan or urinal) 3  -SR     Putting on and taking off regular upper body clothing 3  -SR     Taking care of personal grooming (such as brushing teeth) 3  -SR     Eating meals 4  -SR     AM-PAC 6 Clicks Score (OT) 18  -SR     Row Name 12/08/22 1115          How much help from another person do you currently need...    Turning from your back to your side while in flat bed without using bedrails? 3  -AM     Moving from lying on back to sitting on the side of a flat bed without bedrails? 3  -AM     Moving to and from a bed to a chair (including a wheelchair)? 3  -AM     Standing up from a chair using your arms (e.g., wheelchair, bedside chair)? 3  -AM     Climbing 3-5 steps with a railing? 2  -AM     To walk in hospital room? 1  -AM     AM-PAC 6 Clicks Score (PT) 15  -AM     Highest level of mobility 4 --> Transferred to chair/commode  -AM     Row Name 12/08/22 1646 12/08/22 1115       Functional Assessment    Outcome Measure Options AM-PAC 6 Clicks Daily  Activity (OT)  -SR AM-PAC 6 Clicks Basic Mobility (PT)  -AM          User Key  (r) = Recorded By, (t) = Taken By, (c) = Cosigned By    Initials Name Provider Type    SR Leigha Best OT Occupational Therapist    AM Tonio Monte PT Physical Therapist                Occupational Therapy Education     Title: PT OT SLP Therapies (In Progress)     Topic: Occupational Therapy (In Progress)     Point: ADL training (In Progress)     Description:   Instruct learner(s) on proper safety adaptation and remediation techniques during self care or transfers.   Instruct in proper use of assistive devices.              Learning Progress Summary           Patient Acceptance, E,TB, NR by  at 12/8/2022 1648                   Point: Home exercise program (Not Started)     Description:   Instruct learner(s) on appropriate technique for monitoring, assisting and/or progressing therapeutic exercises/activities.              Learner Progress:  Not documented in this visit.          Point: Precautions (Not Started)     Description:   Instruct learner(s) on prescribed precautions during self-care and functional transfers.              Learner Progress:  Not documented in this visit.          Point: Body mechanics (In Progress)     Description:   Instruct learner(s) on proper positioning and spine alignment during self-care, functional mobility activities and/or exercises.              Learning Progress Summary           Patient Acceptance, E,TB, NR by  at 12/8/2022 1648                               User Key     Initials Effective Dates Name Provider Type [facility] 06/16/21 -  Leigha Best OT Occupational Therapist OT              OT Recommendation and Plan  Planned Therapy Interventions (OT): activity tolerance training, BADL retraining, functional balance retraining, IADL retraining, occupation/activity based interventions, patient/caregiver education/training, ROM/therapeutic exercise, strengthening  exercise  Therapy Frequency (OT): 5 times/wk  Plan of Care Review  Outcome Evaluation: Pt is a 58 y/o female with intermittent c/o RLE weakness and numbness with associated tingling.  Day of hospital admission pt be slurred speech and L arm weakness x 30 minutes.  CT angiogram head/neck: (-) acute, CT chest (-) acute, MRI brain (-) acute.  Pt reports she lives with her daughter in an apartment with 14 steps to enter into home.  Daughter assists with some ADLs such as donning socks and taking showers.  She has been experiencing increased weakness over the past 5 weeks and has been using a cane for mobility.  This date pt has been sitting in chair and reports fatigue.  She requires CGA to get to BSC and complete toileting.  She demos general weakness and fatigues quickly.  Recommend SNF at discharge.     Time Calculation:    Time Calculation- OT     Row Name 12/08/22 1648             Time Calculation- OT    OT Start Time 1100  -SR      OT Stop Time 1115  -SR      OT Time Calculation (min) 15 min  -SR      Total Timed Code Minutes- OT 0 minute(s)  -SR      OT Received On 12/08/22  -SR      OT - Next Appointment 12/09/22  -SR      OT Goal Re-Cert Due Date 12/22/22  -SR            User Key  (r) = Recorded By, (t) = Taken By, (c) = Cosigned By    Initials Name Provider Type    SR Leigha Best OT Occupational Therapist              Therapy Charges for Today     Code Description Service Date Service Provider Modifiers Qty    [id number]  OT EVAL MOD COMPLEXITY 3 12/8/2022 Leigha Best OT GO 1               Leigha Best OT  12/8/2022

## 2022-12-08 NOTE — PAYOR COMM NOTE
"CLINICALS FOR PENDING OBSERVATION PRECERT:        Eladia Connor (57 y.o. Female) 1965  PENDING AUTH # [id number]            AUTHORIZATION PENDING:   PLEASE CALL OR FAX DETERMINATION TO CONTACT BELOW. THANK YOU.        Kia Richardson, RN MSN  /UR  [facility]  [phone] office  [phone] fax  [email or web address]    [facility]  NPI: [id number]  Tax: [phone]          Eladia Connor (57 y.o. Female)     Date of Birth   1965    Social Security Number       Address   [address] [place] [address] [place] [facility]    Home Phone   [phone]    MRN   [id number]       Scientologist   Spiritism    Marital Status                               Admission Date   12/6/22    Admission Type   Emergency    Admitting Provider       Attending Provider   Delores Azul DO    Department, Room/Bed   [facility]A [facility], 207/1       Discharge Date       Discharge Disposition       Discharge Destination                               Attending Provider: Delores Azul DO    Allergies: No Known Allergies    Isolation: None   Infection: None   Code Status: CPR    Ht: 170.2 cm (67\")   Wt: 143 kg (315 lb)    Admission Cmt: None   Principal Problem: Weakness [R53.1]                 Active Insurance as of 12/6/2022     Primary Coverage     Payor Plan Insurance Group Employer/Plan Group    MISC COMMERCIAL MISC COMMERCIAL [id number]     Coverage Address Coverage Phone Number Coverage Fax Number Effective Dates    [address] [phone]  1/1/2021 - None Entered    CHRISTOPHER KATHLEEN [id number]       Subscriber Name Subscriber Birth Date Member ID       ELADIA CONNOR 1965 [id number]                 Emergency Contacts      (Rel.) Home Phone Work Phone Mobile Phone    BLADEGIRISHDIANA (Daughter) -- -- [phone]    BLADEFARA STOUT (Daughter) -- -- [phone]        12/07/22 031  Initiate Observation Status  Once     Completed     Level of " Care: Telemetry    Diagnosis: Chest pain, unspecified type [2778599]    Admitting Physician: ADOLPH HOOPER [[id number]]    Attending Physician: ADOLPH HOOPER [[id number]]                   History & Physical      Adolph Hooper MD at 22 0316              [facility] Services      Patient Name: Eladia Connor  : 1965  MRN: [id number]  Primary Care Physician:  Quincy Lemos APRN  Date of admission: 2022      Subjective       Chief Complaint: weakness    History of Present Illness: Eladia Connor is a 57 y.o. female who presented to [facility] on 2022 complaining of above.   She reportedly has a history of a CVA in  without any residual deficits. She notes over the last week or so she has noted intermittent though right leg weakness and numbness with associated tingling.  Around 11 AM this morning she developed slurred speech and left arm weakness.  Symptoms lasted for about 30 minutes and she presented to [facility] for further evaluation.  She was subsequently discharged indicating that testing/evaluation was largely unremarkable with questionable history of a pneumonia on chest x-ray.   She describes a nonproductive cough without hemoptysis.  + Sick contacts with grandson with the flu.  She denies any fevers. She describes having possible positive TB test few weeks ago. No known TB contacts however.  She indicates her son just returned from the [place] though is not sick.  No significant personal travel history noted.  When inquiring about new medications she identifies that she started Otezla this last week for her psoriasis and her symptoms seem to began after this.  She also recently began Ambien as needed for sleep approximately a month ago but she does not want to take this currently because of her symptoms. She endorses using an old prescription of Klonopin for the last month though was out of pills approximately a week  ago  She indicates that her psoriasis was diagnosed by her dermatologist  .  She notes some chest tightness and substernal pressure that was nonradiating earlier today that is nonexertional  Unfortunately she has been suffering from increased stress and anxiety as her  passed away approximately a year ago. She is on BuSpar her PCP and was recently advised to titrate though she has not done so at this time.    She has not taken Lexapro for years  She is no longer taking ivermectin/Diflucan states that this was given to her by her PCP while evaluation for her psoriasis.        ROS: 10 point ROS negative aside from noted per HPI: No dysuria. No hemoptysis      Personal History     Past Medical History:   Diagnosis Date   • Anxiety    • Depression        Past Surgical History:   Procedure Laterality Date   • CHOLECYSTECTOMY  1987   • EYE SURGERY     • KNEE SURGERY         Family History: family history includes Breast cancer in her mother; Heart disease in her father, mother, and sister; Lung cancer in her brother; Pancreatic cancer in her father. Otherwise pertinent FHx was reviewed and not pertinent to current issue.    Social History:  reports that she has never smoked. She has never used smokeless tobacco. She reports that she does not drink alcohol and does not use drugs.    Home Medications:  Prior to Admission Medications     Prescriptions Last Dose Informant Patient Reported? Taking?    busPIRone (BUSPAR) 5 MG tablet   No No    Take 1 tablet by mouth 3 (Three) Times a Day.    fluconazole (DIFLUCAN) 150 MG tablet   No No    TAKE 1 TABLET BY MOUTH 1 TIME FOR 1 DOSE.    hydrOXYzine pamoate (VISTARIL) 100 MG capsule   No No    Take 1 capsule by mouth 3 (Three) Times a Day As Needed for Itching.    ivermectin (STROMECTOL) 3 MG tablet tablet   No No    TAKE 9 TABLETS BY MOUTH ONCE    nystatin (MYCOSTATIN) 727646 UNIT/GM cream   No No    Apply 1 application topically to the appropriate area as directed 2 (Two)  Times a Day.    nystatin (MYCOSTATIN) 503083 UNIT/GM powder   No No    Apply  topically to the appropriate area as directed 3 (Three) Times a Day.    promethazine-codeine (PHENERGAN with CODEINE) 6.25-10 MG/5ML syrup   No No    Take 5 mL by mouth Every 4 (Four) Hours As Needed for Cough.    sertraline (ZOLOFT) 50 MG tablet   No No    Take 1 tablet by mouth Daily.    zolpidem (AMBIEN) 10 MG tablet   No No    Take 1 tablet by mouth At Night As Needed for Sleep.            Allergies:  No Known Allergies    Objective       Vitals:   Temp:  [98 °F (36.7 °C)] 98 °F (36.7 °C)  Heart Rate:  [65-87] 67  Resp:  [17-18] 18  BP: (129-149)/(65-75) 129/68    Physical Exam   Physical Exam:     GEN/CONS:   Vitals noted above, NAD, AOX3, morbidly obese white female in 1 to 2 L   EYES:  Conjunctiva pink   ENMT:  NECK:  Atraumatic external nose/ears, Oropharynx clear  Symmetric, trachea midline   CV:  Regular, Reg S1/S2, no murmur   PULM:  Diminished breath sounds at bases no wheezes   GI:  : Soft, Nontender, Nondistended   Deferred.    MSK:  Scattered macular papular erythematous patches on extremities (suspect guttate psoriasis)    NEURO:     Aside from some range of motion limitations involving her right knee due to chronic pain sensation and motor appear to be grossly intact   Cranial nerves II to XII intact   Finger-nose heel-to-shin intact (again with some limitation regarding right lower extremity due to right knee pain)  Mild slurring of speech   PSYCH:    Appears anxious         Result Review    Result Review:  I have personally reviewed the results from the time of this admission to 12/7/2022 03:16 EST and agree with these findings:  [x]  Laboratory  []  Microbiology  [x]  Radiology  []  EKG/Telemetry   []  Cardiology/Vascular   []  Pathology  [x]  Old records  []  Other:  Most notable findings include:    LABS      Lab 12/06/22  2314   WBC 8.80   HEMOGLOBIN 13.6   HEMATOCRIT 40.4   PLATELETS 330   NEUTROS ABS 7.00    LYMPHS ABS 1.10   MONOS ABS 0.60   EOS ABS 0.00   MCV 86.2   PROCALCITONIN 0.02   PROTIME 10.8   APTT 26.8*         Lab 12/06/22  2314   SODIUM 139   POTASSIUM 4.3   CHLORIDE 105   CO2 21.0*   ANION GAP 13.0   BUN 9   CREATININE 0.71   EGFR 98.1   GLUCOSE 137*   CALCIUM 9.5   TSH 2.130         Lab 12/06/22  2314   TOTAL PROTEIN 8.5   ALBUMIN 4.20   GLOBULIN 4.3   ALT (SGPT) 16   AST (SGOT) 25   BILIRUBIN 0.8   ALK PHOS 156*         Lab 12/06/22  2314   TROPONIN T <0.010   INR 1.05                 Brief Urine Lab Results     None        ________________________________  MICRO  Microbiology Results (last 10 days)     Procedure Component Value - Date/Time    Respiratory Panel PCR w/COVID-19(SARS-CoV-2) TESS/MEDARDO/DAVID/PAD/COR/MAD/SHAMIKA In-House, NP Swab in UTM/VTM, 3-4 HR TAT - Swab, Nasopharynx [[id number]]  (Normal) Collected: 12/07/22 0050    Lab Status: Final result Specimen: Swab from Nasopharynx Updated: 12/07/22 0142     ADENOVIRUS, PCR Not Detected     Coronavirus 229E Not Detected     Coronavirus HKU1 Not Detected     Coronavirus NL63 Not Detected     Coronavirus OC43 Not Detected     COVID19 Not Detected     Human Metapneumovirus Not Detected     Human Rhinovirus/Enterovirus Not Detected     Influenza A PCR Not Detected     Influenza B PCR Not Detected     Parainfluenza Virus 1 Not Detected     Parainfluenza Virus 2 Not Detected     Parainfluenza Virus 3 Not Detected     Parainfluenza Virus 4 Not Detected     RSV, PCR Not Detected     Bordetella pertussis pcr Not Detected     Bordetella parapertussis PCR Not Detected     Chlamydophila pneumoniae PCR Not Detected     Mycoplasma pneumo by PCR Not Detected    Narrative:      In the setting of a positive respiratory panel with a viral infection PLUS a negative procalcitonin without other underlying concern for bacterial infection, consider observing off antibiotics or discontinuation of antibiotics and continue supportive care. If the respiratory panel is positive for  atypical bacterial infection (Bordetella pertussis, Chlamydophila pneumoniae, or Mycoplasma pneumoniae), consider antibiotic de-escalation to target atypical bacterial infection.        ________________________________  RAD  XR Chest 2 View    Result Date: 11/30/2022  Impression: No active cardiopulmonary disease  Electronically Signed By-Ish Maravilla On:11/30/2022 3:50 PM This report was finalized on [id number] by  Ish Maravilla, .    XR Knee 1 or 2 View Right    Result Date: 12/1/2022  Impression: No acute osseous abnormality. Moderate to severe tricompartmental osteoarthritic changes are present, most pronounced within the medial and patellofemoral compartments. A small joint effusion is present.  Electronically Signed By-Nivia Olivas MD On:12/1/2022 11:46 AM This report was finalized on [id number] by  Nivia Olivas MD.    CT Angiogram Neck    Result Date: 12/7/2022  Impression: 1.  No acute intracranial abnormality is identified on noncontrast head CT. If there is high clinical suspicion for acute ischemic infarct, MRI may be more sensitive. 2.  Within the limitations of motion, no occlusion or high-grade stenosis of the major arteries in the head and neck. Artur Eubanks MD Neuroradiologist [facility] [email or web address] Thank you for this referral. This exam was interpreted by a fellowship trained neuroradiologist. If the patient's healthcare provider has any questions, a [facility] neuroradi[facility] can be reached directly at [phone] at any time. SLOT  21 Electronically signed by:  Artur Eubanks M.D.  12/6/2022 11:20 PM Mountain Time    CT Chest Without Contrast Diagnostic    Result Date: 12/7/2022  Impression: 1.  No evidence of pulmonary tuberculosis. No acute abnormality in the chest. Electronically signed by:  Edouard Marquez M.D.  12/6/2022 11:09 PM Mountain Time    CT Angiogram Head    Result Date: 12/7/2022  Impression: 1.  No acute intracranial  abnormality is identified on noncontrast head CT. If there is high clinical suspicion for acute ischemic infarct, MRI may be more sensitive. 2.  Within the limitations of motion, no occlusion or high-grade stenosis of the major arteries in the head and neck. Artur Eubanks MD Neuroradiologist [facility] [email or web address] Thank you for this referral. This exam was interpreted by a fellowship trained neuroradiologist. If the patient's healthcare provider has any questions, a [facility] neuroradiologist can be reached directly at [phone] at any time. SLOT  21 Electronically signed by:  Artur Eubanks M.D.  12/6/2022 11:20 PM Mountain Time    CT CEREBRAL PERFUSION WITH & WITHOUT CONTRAST    Result Date: 12/7/2022  Impression: 1.  No acute intracranial abnormality is identified on noncontrast head CT. If there is high clinical suspicion for acute ischemic infarct, MRI may be more sensitive. 2.  Within the limitations of motion, no occlusion or high-grade stenosis of the major arteries in the head and neck. Artur Eubanks MD Neuroradiologist [facility] [email or web address] Thank you for this referral. This exam was interpreted by a fellowship trained neuroradiologist. If the patient's healthcare provider has any questions, a [facility] neuroradiologist can be reached directly at [phone] at any time. SLOT  21 Electronically signed by:  Artur Eubanks M.D.  12/6/2022 11:20 PM Mountain Time    ________________________________  CARDS     No results found for this or any previous visit.        Assessment & Plan        Active Hospital Problems:  There are no active hospital problems to display for this patient.      Assessment:    Suspected TIA- Slurred speech, Weakness (R leg/L arm)   • Admission: Afebrile, WBC within normal limits, troponin negative, TSH within normal limits, procalcitonin within normal limits, COVID-19/viral respiratory panel  negative  • Imaging: CT head, CTA head neck noted above-see report for full details.  Briefly no acute intracranial process or high-grade stenosis of the major arteries of the head and neck.  • Misc/Context: Reported questionable history of CVA with no residual deficits 2013.  Increased stress and anxiety currently with unfortunately anniversary of 's passing approximately a year ago.  Symptoms greater than 4-1/2 hours prior to presentation.  States that recently started new medication for psoriasis and symptoms seem to began afterwards.  Additionally started Ambien about a month ago and recently on a new antidepressant Buspar.    Depression with anxiety  · On BuSpar prior to admission recently started.  PCP reportedly recently advised to titrate BuSpar.  She states that she has not taken Lexapro for years    Abnormal TB Screening- Reportedly + QuantiFERON as outpatient  · Admission: Afebrile, saturating well on room air, HD stable, WBC wnl, COVID-19 rapid and influenza panel negative  · Imaging: CT chest noted above no acute pulmonary abnormality noted.  · Asymptomatic without hemoptysis, with no known TB contact, no foreign travel/residence, no nightsweats/weightloss    Psoriasis-Suspect Guttate based on appearance   · Reports recently starting Otezla   · States that she was formally diagnosed by her dermatologist.     Right Knee osteoarthritis  · Chronic right knee range of motion limitation    Suspected possible obstructive sleep apnea  · States that she was diagnosed previously though does not have a CPAP at home    Obesity-class III  · Body mass index is 49.34 kg/m².    Full code  · Discussed and verified    Discussion/Plan:     Admit for observation  Rule out CVA with hx reported previous hx.  Tentative ASA + HI statin at this time  MRI brain in the morning  Obtain baseline ECG   Telemetry monitoring.   Additional NIH Stroke protocol/Neurochecks as ordered.   Neurology consultation in AM    Question  some iatrogenic causes of her symptoms.  Recommend holding Otezla at this time based on temporal relation with symptoms.  Ambien was also started approximately a month ago per her report and she no longer wants to take this.  Consider holding though unfortunately her  passed away approximately a year ago and has been undergoing increased stress and anxiety recently.  Anxiety may be contributory to acute symptoms noted above as well though she states she was fine and usual state of health until about a week ago.    No CT evidence of TB, saturating well on room air. Will recheck Quantiferon.    Would benefit from outpatient sleep study for possible NERY    Pharmacy/RN to confirm home medications and will resume/adjust appropriate medications if necessary once confirmed and notified as ordered.  Resume antidepressants once confirmed    Lovenox prophylaxis  Surveillance labs.  See orders for additional details.    Further recommendations to follow as inpatient course and evaluation proceeds.   Daytime hospitalist provider will continue forward care in the morning.       DVT prophylaxis:  No DVT prophylaxis order currently exists.    CODE STATUS:       Admission Status:  I believe this patient meets obs status.    I discussed the patient's findings and my recommendations with patient.    This patient has been examined wearing appropriate Personal Protective Equipment     Signature: Adolph Azul MD      Electronically signed by Adolph Azul MD at 12/07/22 0702          Emergency Department Notes      Mora Izaguirre APRN at 12/07/22 0142     Attestation signed by Jeff Alicea MD at 12/07/22 0423        NON FACE TO FACE: This visit was performed by BOTH a physician and an APC. I performed all aspects of the MDM as documented.  Jeff Alicea MD 12/7/2022 04:22 EST                         Subjective   History of Present Illness  Chief complaint: Multiple complaints      Context: Patient is a histrionic  57-year-old female past medical history significant for anxiety depression psoriasis and obesity, prior history of CVA in 2012 without any deficits who comes in with multiple complaints.  She states over the last 5 weeks she has had some right leg progressing weakness and numbness and tingling.  She states today around 11:00 she had an episode lasting approximately 30 minutes of slurred speech confusion and left arm weakness.  She was sent to [facility] and had another 30-minute episode while at that facility.  She reports she had a questionable pneumonia on chest x-ray but was told she had a negative CAT scan and could go home.  She did have a positive Gold test a couple weeks ago.  She started with a cough approximately week ago that seemed worse today, nonproductive, grandson has the flu.  She denies any fever.  She started with some chest tightness and pressure nonradiating today.  She denies any neck or back pain or falls.  No saddle anesthesia bowel or bladder incontinence or retention.  She states she has had some increased stress and anxiety lately because her   this time of year approximately a year ago.  States her family doctor told her she could increase her BuSpar but she has not done that and has been taking her medication as prescribed.  She states she had an old prescription for clonazepam that she took for approximately a month and quit taking it a week ago because she ran out.  She denies any urinary symptoms.  She was recently started on Otezla last week for psoriasis otherwise denies any other new medications.    Location: As above  Duration: As above  Timing: Waxes and wanes  Quality/Severity: As above  Modifying factors: Worse with range of motion  Associated symptoms: As above daughter        Additional hx provided by:     PCP: Canelo                 Review of Systems   Constitutional: Negative for fever.   HENT: Negative for congestion.    Eyes: Negative.    Respiratory:  Positive for cough.    Cardiovascular: Positive for chest pain. Negative for palpitations and leg swelling.   Gastrointestinal: Positive for nausea.   Genitourinary: Negative.    Skin: Positive for rash.   Neurological: Positive for weakness and numbness. Negative for syncope.   Psychiatric/Behavioral: Negative for confusion.       Past Medical History:   Diagnosis Date   • Anxiety    • Depression        No Known Allergies    Past Surgical History:   Procedure Laterality Date   • CHOLECYSTECTOMY  1987   • EYE SURGERY     • KNEE SURGERY         Family History   Problem Relation Age of Onset   • Heart disease Mother    • Breast cancer Mother    • Heart disease Father    • Pancreatic cancer Father    • Heart disease Sister    • Lung cancer Brother        Social History     Socioeconomic History   • Marital status:    Tobacco Use   • Smoking status: Never   • Smokeless tobacco: Never   Substance and Sexual Activity   • Alcohol use: Never   • Drug use: Never   • Sexual activity: Defer           Objective   Physical Exam    Vital signs in triage nurse note reviewed.  Constitutional: Awake, alert, anxious and tearful.  Daughter at bedside  HEENT: Normocephalic, atraumatic; pupils are PERRL with intact EOM; oropharynx is pink and moist without exudate or erythema.  Neck: Supple, full range of motion without pain; negative Brudzinski  Cardiovascular: Regular rate and rhythm, normal S1-S2.    Pulmonary: Respiratory effort regular, nonlabored; breath sounds clear to auscultation all fields.  Abdomen: Soft, nontender, nondistended with normoactive bowel sounds; no rebound or guarding.  Musculoskeletal: Independent range of motion of all extremities, no palpable tenderness or edema. Spine is midline without obvious curvature scoliosis. No bony tenderness, soft tissue swelling, deformity is noted. Paraspinal musculature is soft, nontender.  Neuro: Alert oriented x3, speech is mildly slurred and appropriate.  Normal  shoulder shrug, equal palate rise,  , no facial asymmetry,no pronator drift, normal coordination.  Left arm weakness, right leg mildly weak with straight leg raise  Skin: Psoriatic plaques    Procedures     EKG viewed by me and interpreted by Dr. Alicea, sinus rhythm rate of 84  comparison: None        ED Course  ED Course as of 12/07/22 0259   Wed Dec 07, 2022   0222 Spoke with Dr. Azul [JW]      ED Course User Index  [JW] Mora Izaguirre, SCOOBY           Labs Reviewed   COMPREHENSIVE METABOLIC PANEL - Abnormal; Notable for the following components:       Result Value    Glucose 137 (*)     CO2 21.0 (*)     Alkaline Phosphatase 156 (*)     All other components within normal limits    Narrative:     GFR Normal >60  Chronic Kidney Disease <60  Kidney Failure <15     APTT - Abnormal; Notable for the following components:    PTT 26.8 (*)     All other components within normal limits   CBC WITH AUTO DIFFERENTIAL - Abnormal; Notable for the following components:    Neutrophil % 79.8 (*)     Lymphocyte % 12.9 (*)     All other components within normal limits   RESPIRATORY PANEL PCR W/ COVID-19 (SARS-COV-2) TESS/MEDARDO/DAVID/PAD/COR/MAD/SHAMIKA IN-HOUSE, NP SWAB IN [facility], 3-4 HR TAT - Normal    Narrative:     In the setting of a positive respiratory panel with a viral infection PLUS a negative procalcitonin without other underlying concern for bacterial infection, consider observing off antibiotics or discontinuation of antibiotics and continue supportive care. If the respiratory panel is positive for atypical bacterial infection (Bordetella pertussis, Chlamydophila pneumoniae, or Mycoplasma pneumoniae), consider antibiotic de-escalation to target atypical bacterial infection.   PROCALCITONIN - Normal    Narrative:     As a Marker for Sepsis (Non-Neonates):    1. <0.5 ng/mL represents a low risk of severe sepsis and/or septic shock.  2. >2 ng/mL represents a high risk of severe sepsis and/or septic shock.    As a Marker for  "Lower Respiratory Tract Infections that require antibiotic therapy:    PCT on Admission    Antibiotic Therapy       6-12 Hrs later    >0.5                Strongly Recommended  >0.25 - <0.5        Recommended   0.1 - 0.25          Discouraged              Remeasure/reassess PCT  <0.1                Strongly Discouraged     Remeasure/reassess PCT    As 28 day mortality risk marker: \"Change in Procalcitonin Result\" (>80% or <=80%) if Day 0 (or Day 1) and Day 4 values are available. Refer to [email or web address]    Change in PCT <=80%  A decrease of PCT levels below or equal to 80% defines a positive change in PCT test result representing a higher risk for 28-day all-cause mortality of patients diagnosed with severe sepsis for septic shock.    Change in PCT >80%  A decrease of PCT levels of more than 80% defines a negative change in PCT result representing a lower risk for 28-day all-cause mortality of patients diagnosed with severe sepsis or septic shock.      PROTIME-INR - Normal   TROPONIN (IN-HOUSE) - Normal    Narrative:     Troponin T Reference Range:  <= 0.03 ng/mL-   Negative for AMI  >0.03 ng/mL-     Abnormal for myocardial necrosis.  Clinicians would have to utilize clinical acumen, EKG, Troponin and serial changes to determine if it is an Acute Myocardial Infarction or myocardial injury due to an underlying chronic condition.       Results may be falsely decreased if patient taking Biotin.     TSH - Normal   RAINBOW DRAW    Narrative:     The following orders were created for panel order [place] Draw.  Procedure                               Abnormality         Status                     ---------                               -----------         ------                     Green Top (Gel)[[id number]]                                  Final result               Lavender Top[[id number]]                                     Final result               Gold Top - SST[[id number]]                          "          Final result               Light Blue Top[[id number]]                                   Final result                 Please view results for these tests on the individual orders.   GREEN TOP   LAVENDER TOP   GOLD TOP - SST   LIGHT BLUE TOP   CBC AND DIFFERENTIAL    Narrative:     The following orders were created for panel order CBC & Differential.  Procedure                               Abnormality         Status                     ---------                               -----------         ------                     CBC Auto Differential[[id number]]        Abnormal            Final result                 Please view results for these tests on the individual orders.     Medications   sodium chloride 0.9 % flush 10 mL (has no administration in time range)   sodium chloride 0.9 % flush 10 mL (has no administration in time range)   ondansetron (ZOFRAN) injection 4 mg (4 mg Intravenous Given 12/7/22 0053)   sodium chloride 0.9 % bolus 1,000 mL (1,000 mL Intravenous New Bag 12/7/22 0053)   iopamidol (ISOVUE-370) 76 % injection 150 mL (150 mL Intravenous Given 12/7/22 0042)     CT Angiogram Neck    Result Date: 12/7/2022  1.  No acute intracranial abnormality is identified on noncontrast head CT. If there is high clinical suspicion for acute ischemic infarct, MRI may be more sensitive. 2.  Within the limitations of motion, no occlusion or high-grade stenosis of the major arteries in the head and neck. Artur Eubanks MD Neuroradiologist [facility] [email or web address] Thank you for this referral. This exam was interpreted by a fellowship trained neuroradiologist. If the patient's healthcare provider has any questions, a [facility] neur[facility]ologist can be reached directly at [phone] at any time. SLOT  21 Electronically signed by:  Artur Eubanks M.D.  12/6/2022 11:20 PM Mountain Time    CT Chest Without Contrast Diagnostic    Result Date: 12/7/2022  1.  No evidence of  pulmonary tuberculosis. No acute abnormality in the chest. Electronically signed by:  Edouard Marquez M.D.  12/6/2022 11:09 PM Mountain Time    CT Angiogram Head    Result Date: 12/7/2022  1.  No acute intracranial abnormality is identified on noncontrast head CT. If there is high clinical suspicion for acute ischemic infarct, MRI may be more sensitive. 2.  Within the limitations of motion, no occlusion or high-grade stenosis of the major arteries in the head and neck. Artur Eubanks MD Neuroradiologist [facility] [email or web address] Thank you for this referral. This exam was interpreted by a fellowship trained neuroradiologist. If the patient's healthcare provider has any questions, a [facility] neuroradiologist can be reached directly at [phone] at any time. SLOT  21 Electronically signed by:  Artur Eubanks M.D.  12/6/2022 11:20 PM Mountain Time    CT CEREBRAL PERFUSION WITH & WITHOUT CONTRAST    Result Date: 12/7/2022  1.  No acute intracranial abnormality is identified on noncontrast head CT. If there is high clinical suspicion for acute ischemic infarct, MRI may be more sensitive. 2.  Within the limitations of motion, no occlusion or high-grade stenosis of the major arteries in the head and neck. Artur Eubanks MD Neuroradiologist [facility] [email or web address] Thank you for this referral. This exam was interpreted by a fellowship trained neuroradiologist. If the patient's healthcare provider has any questions, a [facility] neuroradiologist can be reached directly at [phone] at any time. SLOT  21 Electronically signed by:  Artur Eubanks M.D.  12/6/2022 11:20 PM Mountain Time    Prior to Admission medications    Medication Sig Start Date End Date Taking? Authorizing Provider   busPIRone (BUSPAR) 5 MG tablet Take 1 tablet by mouth 3 (Three) Times a Day. 10/4/22   Quincy Lemos APRN   fluconazole (DIFLUCAN) 150 MG tablet TAKE  "1 TABLET BY MOUTH 1 TIME FOR 1 DOSE. 11/14/22   Quincy Lemos APRN   hydrOXYzine pamoate (VISTARIL) 100 MG capsule Take 1 capsule by mouth 3 (Three) Times a Day As Needed for Itching. 10/4/22   Quincy Lemos APRN   ivermectin (STROMECTOL) 3 MG tablet tablet TAKE 9 TABLETS BY MOUTH ONCE 11/14/22   Quincy Lemos APRN   nystatin (MYCOSTATIN) 523461 UNIT/GM cream Apply 1 application topically to the appropriate area as directed 2 (Two) Times a Day. 11/4/22   Quincy Lemos APRN   nystatin (MYCOSTATIN) 259986 UNIT/GM powder Apply  topically to the appropriate area as directed 3 (Three) Times a Day. 7/12/22   Sloane Gamez APRN   promethazine-codeine (PHENERGAN with CODEINE) 6.25-10 MG/5ML syrup Take 5 mL by mouth Every 4 (Four) Hours As Needed for Cough. 7/12/22   Sloane Gamez APRN   sertraline (ZOLOFT) 50 MG tablet Take 1 tablet by mouth Daily. 12/30/21   Wanda Rivera APRN   zolpidem (AMBIEN) 10 MG tablet Take 1 tablet by mouth At Night As Needed for Sleep. 10/4/22   Quincy Lemos APRN                                     [facility]  Number of Diagnoses or Management Options  Diagnosis management comments: /68   Pulse 67   Temp 98 °F (36.7 °C) (Oral)   Resp 18   Ht 170.2 cm (67\")   Wt (!) 143 kg (315 lb)   LMP  (LMP Unknown)   SpO2 100%   BMI 49.34 kg/m²      Chart review: Records obtained from [facility] from today at 1850: CT head negative, questional bowl perihilar infiltrate on chest x-ray, glucose 164 normal renal function White count 7.7 hemoglobin 13.3 negative drug screen, normal urinalysis negative troponin      Comorbidities:  has a past medical history of Anxiety and Depression.  Differentials: Medication side effect; LVO infection atypical migraine not all inclusive of differentials considered  Discussion with provider: Jorge Alberto  Radiology interpretation:  X-rays reviewed by me and interpreted by radiologist,   CT Angiogram " Neck    Result Date: 12/7/2022  1.  No acute intracranial abnormality is identified on noncontrast head CT. If there is high clinical suspicion for acute ischemic infarct, MRI may be more sensitive. 2.  Within the limitations of motion, no occlusion or high-grade stenosis of the major arteries in the head and neck. Artur Eubanks MD Neuroradiologist [facility] [email or web address] Thank you for this referral. This exam was interpreted by a fellowship trained neuroradiologist. If the patient's healthcare provider has any questions, a [facility] neuroradiologist can be reached directly at [phone] at any time. SLOT  21 Electronically signed by:  Artur Eubanks M.D.  12/6/2022 11:20 PM Mountain Time    CT Chest Without Contrast Diagnostic    Result Date: 12/7/2022  1.  No evidence of pulmonary tuberculosis. No acute abnormality in the chest. Electronically signed by:  Edouard Marquez M.D.  12/6/2022 11:09 PM Mountain Time    CT Angiogram Head    Result Date: 12/7/2022  1.  No acute intracranial abnormality is identified on noncontrast head CT. If there is high clinical suspicion for acute ischemic infarct, MRI may be more sensitive. 2.  Within the limitations of motion, no occlusion or high-grade stenosis of the major arteries in the head and neck. Artur Eubanks MD Neuroradiologist [facility] [email or web address] Thank you for this referral. This exam was interpreted by a fellowship trained neuroradiologist. If the patient's healthcare provider has any questions, a [facility] neuroradiologist can be reached directly at [phone] at any time. SLOT  21 Electronically signed by:  Artur Eubanks M.D.  12/6/2022 11:20 PM Mountain Time    CT CEREBRAL PERFUSION WITH & WITHOUT CONTRAST    Result Date: 12/7/2022  1.  No acute intracranial abnormality is identified on noncontrast head CT. If there is high clinical suspicion for acute ischemic infarct, MRI  may be more sensitive. 2.  Within the limitations of motion, no occlusion or high-grade stenosis of the major arteries in the head and neck. Artur Eubanks MD Neuroradiologist [facility] [email or web address] Thank you for this referral. This exam was interpreted by a fellowship trained neuroradiologist. If the patient's healthcare provider has any questions, a [facility] can be reached directly at [phone] at any time. SLOT  21 Electronically signed by:  Artur Eubanks M.D.  12/6/2022 11:20 PM Mountain Time    Lab interpretation:  Labs viewed by me significant for, unremarkable    Appropriate PPE worn during exam.  I discussed with Dr. Alicea.  Not in a timeframe window for tPA.  CTAs and perfusion obtained.  Patient will be kept in precautions for latent tuberculosis.    i discussed findings with patient who voices understanding of discharge instructions, signs and symptoms requiring return to ED; discharged improved and in stable condition with follow up for re-evaluation.  This document is intended for medical expert use only. Reading of this document by patients and/or patient's family without participating medical staff guidance may result in misinterpretation and unintended morbidity.  Any interpretation of such data is the responsibility of the patient and/or family member responsible for the patient in concert with their primary or specialist providers, not to be left for sources of online searches such as [employer] or similar queries. Relying on these approaches to knowledge may result in misinterpretation, misguided goals of care and even death should patients or family members try recommendations outside of the realm of professional medical care in a supervised inpatient environment.         Final diagnoses:   Slurred speech   Chest pain, unspecified type   TB lung, latent   Right leg weakness   Left arm weakness       ED Disposition  ED  Disposition     ED Disposition   Decision to Admit    Condition   --    Comment   Level of Care: Telemetry [5]   Admitting Physician: ADOLPH AZUL [[id number]]   Attending Physician: ADOLPH AZUL [[id number]]   Bed Request Comments: droplet precautions               No follow-up provider specified.       Medication List      No changes were made to your prescriptions during this visit.          Mora Izaguirre, APRN  12/07/22 0259      Electronically signed by Jeff Alicea MD at 12/07/22 0424     Jess Alcantar, RN at 12/07/22 0503          Nursing report ED to floor  Eladia Connor  57 y.o.  female    HPI:   Chief Complaint   Patient presents with   • Speech Problem     Pt was at work today and had anxiety attack, was home with family stated they started with slurred speech and was transported to [facility],  with anxiety attack pt states they sent her home when patient got home she was sitting and started again with slurred speech, confusion. Pt pt states her left leg feels weak, her right arm feels weak.  Her chest feels numb and she has a headache.           Admitting doctor:   Adolph Azul MD    Admitting diagnosis:   The primary encounter diagnosis was Chest pain, unspecified type. Diagnoses of Slurred speech, TB lung, latent, Right leg weakness, and Left arm weakness were also pertinent to this visit.    Code status:   Current Code Status       Date Active Code Status Order ID Comments User Context       Not on file            Allergies:   Patient has no known allergies.    Isolation:  No active isolations     Fall Risk:  Fall Risk Assessment was completed, and patient is at moderate risk for falls.   Predictive Model Details         4 (Low) Factor Value    Calculated 12/7/2022 02:08 Age 57    Risk of Fall Model Musculoskeletal Assessment WDL     Active Peripheral IV Present     Imaging order in this encounter Present     Respiratory Rate 18     Skin Assessment WDL     Magnesium not on  file     Number of Distinct Medication Classes administered 3     Drug Use No     Carlos Scale not on file     Financial Class Private Insurance     Diastolic BP 75     Peripheral Vascular Assessment WDL     Total Bilirubin 0.8 mg/dL     Gastrointestinal Assessment WDL     Cardiac Assessment WDL     Albumin 4.2 g/dL     Days after Admission 0.133     Potassium 4.3 mmol/L     Calcium 9.5 mg/dL     ALT 16 U/L     Creatinine 0.71 mg/dL     Chloride 105 mmol/L         Weight:       12/06/22  2255   Weight: (!) 143 kg (315 lb)       Intake and Output  No intake or output data in the 24 hours ending 12/07/22 0504    Diet:   Dietary Orders (From admission, onward)       Start     Ordered    12/07/22 0320  NPO Diet NPO Type: Strict NPO  Diet Effective Now        Comments: Strict NPO Until Nursing Dysphagia Screen Passed   Question:  NPO Type  Answer:  Strict NPO    12/07/22 0322                     Most recent vitals:   Vitals:    12/07/22 0047 12/07/22 0216 12/07/22 0232 12/07/22 0331   BP: 145/75 130/65 129/68 137/82   Pulse: 70 65 67 71   Resp: 18 17 18 18   Temp:       TempSrc:       SpO2: 100% 100% 100% 98%   Weight:       Height:           Active LDAs/IV Access:   Lines, Drains & Airways       Active LDAs       Name Placement date Placement time Site Days    Peripheral IV 12/06/22 2314 Left Antecubital 12/06/22 2314  Antecubital  less than 1                    Skin Condition:   Skin Assessments (last day)       None             Labs (abnormal labs have a star):   Labs Reviewed   COMPREHENSIVE METABOLIC PANEL - Abnormal; Notable for the following components:       Result Value    Glucose 137 (*)     CO2 21.0 (*)     Alkaline Phosphatase 156 (*)     All other components within normal limits    Narrative:     GFR Normal >60  Chronic Kidney Disease <60  Kidney Failure <15     APTT - Abnormal; Notable for the following components:    PTT 26.8 (*)     All other components within normal limits   CBC WITH AUTO DIFFERENTIAL -  "Abnormal; Notable for the following components:    Neutrophil % 79.8 (*)     Lymphocyte % 12.9 (*)     All other components within normal limits   RESPIRATORY PANEL PCR W/ COVID-19 (SARS-COV-2) TESS/MEDARDO/DAVID/PAD/COR/MAD/SHAMIKA IN-HOUSE, NP SWAB IN UT/VTP, 3-4 HR TAT - Normal    Narrative:     In the setting of a positive respiratory panel with a viral infection PLUS a negative procalcitonin without other underlying concern for bacterial infection, consider observing off antibiotics or discontinuation of antibiotics and continue supportive care. If the respiratory panel is positive for atypical bacterial infection (Bordetella pertussis, Chlamydophila pneumoniae, or Mycoplasma pneumoniae), consider antibiotic de-escalation to target atypical bacterial infection.   PROCALCITONIN - Normal    Narrative:     As a Marker for Sepsis (Non-Neonates):    1. <0.5 ng/mL represents a low risk of severe sepsis and/or septic shock.  2. >2 ng/mL represents a high risk of severe sepsis and/or septic shock.    As a Marker for Lower Respiratory Tract Infections that require antibiotic therapy:    PCT on Admission    Antibiotic Therapy       6-12 Hrs later    >0.5                Strongly Recommended  >0.25 - <0.5        Recommended   0.1 - 0.25          Discouraged              Remeasure/reassess PCT  <0.1                Strongly Discouraged     Remeasure/reassess PCT    As 28 day mortality risk marker: \"Change in Procalcitonin Result\" (>80% or <=80%) if Day 0 (or Day 1) and Day 4 values are available. Refer to [email or web address]    Change in PCT <=80%  A decrease of PCT levels below or equal to 80% defines a positive change in PCT test result representing a higher risk for 28-day all-cause mortality of patients diagnosed with severe sepsis for septic shock.    Change in PCT >80%  A decrease of PCT levels of more than 80% defines a negative change in PCT result representing a lower risk for 28-day all-cause mortality of " patients diagnosed with severe sepsis or septic shock.      PROTIME-INR - Normal   TROPONIN (IN-HOUSE) - Normal    Narrative:     Troponin T Reference Range:  <= 0.03 ng/mL-   Negative for AMI  >0.03 ng/mL-     Abnormal for myocardial necrosis.  Clinicians would have to utilize clinical acumen, EKG, Troponin and serial changes to determine if it is an Acute Myocardial Infarction or myocardial injury due to an underlying chronic condition.       Results may be falsely decreased if patient taking Biotin.     TSH - Normal   RAINBOW DRAW    Narrative:     The following orders were created for panel order [place] Draw.  Procedure                               Abnormality         Status                     ---------                               -----------         ------                     Green Top (Gel)[[id number]]                                  Final result               Lavender Top[[id number]]                                     Final result               Gold Top - SST[[id number]]                                   Final result               Light Blue Top[[id number]]                                   Final result                 Please view results for these tests on the individual orders.   URINE DRUG SCREEN   BASIC METABOLIC PANEL   CBC WITH AUTO DIFFERENTIAL   HEMOGLOBIN A1C   LIPID PANEL   POCT GLUCOSE FINGERSTICK   POCT GLUCOSE FINGERSTICK   POCT GLUCOSE FINGERSTICK   POCT GLUCOSE FINGERSTICK   GREEN TOP   LAVENDER TOP   GOLD TOP - SST   LIGHT BLUE TOP   CBC AND DIFFERENTIAL    Narrative:     The following orders were created for panel order CBC & Differential.  Procedure                               Abnormality         Status                     ---------                               -----------         ------                     CBC Auto Differential[[id number]]        Abnormal            Final result                 Please view results for these tests on the individual orders.       LOC: Person, Place,  Time, and Situation    Telemetry:  Telemetry    Cardiac Monitoring Ordered: yes    EKG:   No orders to display       Medications Given in the ED:   Medications   sodium chloride 0.9 % flush 10 mL (has no administration in time range)   sodium chloride 0.9 % flush 10 mL (has no administration in time range)   sodium chloride 0.9 % flush 10 mL (has no administration in time range)   sodium chloride 0.9 % flush 10 mL (has no administration in time range)   sodium chloride 0.9 % infusion 40 mL (has no administration in time range)   acetaminophen (TYLENOL) tablet 650 mg (has no administration in time range)   ondansetron (ZOFRAN) injection 4 mg (has no administration in time range)   Enoxaparin Sodium (LOVENOX) syringe 40 mg (has no administration in time range)   sodium chloride 0.9 % flush 10 mL (has no administration in time range)   sodium chloride 0.9 % flush 10 mL (has no administration in time range)   sodium chloride 0.9 % infusion 40 mL (has no administration in time range)   atorvastatin (LIPITOR) tablet 80 mg (has no administration in time range)   aspirin chewable tablet 162 mg (has no administration in time range)     Or   aspirin suppository 300 mg (has no administration in time range)   ondansetron (ZOFRAN) injection 4 mg (4 mg Intravenous Given 12/7/22 0053)   sodium chloride 0.9 % bolus 1,000 mL (0 mL Intravenous Stopped 12/7/22 0344)   iopamidol (ISOVUE-370) 76 % injection 150 mL (150 mL Intravenous Given 12/7/22 0042)       Imaging results:  CT Angiogram Neck    Result Date: 12/7/2022  1.  No acute intracranial abnormality is identified on noncontrast head CT. If there is high clinical suspicion for acute ischemic infarct, MRI may be more sensitive. 2.  Within the limitations of motion, no occlusion or high-grade stenosis of the major arteries in the head and neck. Artur Eubanks MD Neuroradiologist [facility] [email or web address] Thank you for this referral. This exam  was interpreted by a fellowship trained neuroradiologist. If the patient's healthcare provider has any questions, a [facility] neuroradiologist can be reached directly at [phone] at any time. SLOT  21 Electronically signed by:  Artur Eubanks M.D.  12/6/2022 11:20 PM Mountain Time    CT Chest Without Contrast Diagnostic    Result Date: 12/7/2022  1.  No evidence of pulmonary tuberculosis. No acute abnormality in the chest. Electronically signed by:  Edouard Marquez M.D.  12/6/2022 11:09 PM Mountain Time    CT Angiogram Head    Result Date: 12/7/2022  1.  No acute intracranial abnormality is identified on noncontrast head CT. If there is high clinical suspicion for acute ischemic infarct, MRI may be more sensitive. 2.  Within the limitations of motion, no occlusion or high-grade stenosis of the major arteries in the head and neck. Artur Eubanks MD Neuroradiologist [facility] Radiology [facility] [email or web address] - [employer] Thank you for this referral. This exam was interpreted by a fellowship trained neuroradiologist. If the patient's healthcare provider has any questions, a [facility] neuroradiologist can be reached directly at [phone] at any time. SLOT  21 Electronically signed by:  Artur Eubanks M.D.  12/6/2022 11:20 PM Mountain Time    CT CEREBRAL PERFUSION WITH & WITHOUT CONTRAST    Result Date: 12/7/2022  1.  No acute intracranial abnormality is identified on noncontrast head CT. If there is high clinical suspicion for acute ischemic infarct, MRI may be more sensitive. 2.  Within the limitations of motion, no occlusion or high-grade stenosis of the major arteries in the head and neck. Artur Eubanks MD Neuroradiologist [facility] Radiology [facility] [email or web address] - [employer] Thank you for this referral. This exam was interpreted by a fellowship trained neuroradiologist. If the patient's healthcare provider has any questions, a [facility] neuroradiologist can be reached directly at  [phone] at any time. SLOT  21 Electronically signed by:  Artur Eubanks M.D.  12/6/2022 11:20 PM Mountain Time     Social issues:   Social History     Socioeconomic History   • Marital status:    Tobacco Use   • Smoking status: Never   • Smokeless tobacco: Never   Substance and Sexual Activity   • Alcohol use: Never   • Drug use: Never   • Sexual activity: Defer       NIH Stroke Scale:  Interval: (not recorded)  1a. Level of Consciousness: 0-->Alert, keenly responsive (12/07/22 0343)  1b. LOC Questions: 0-->Answers both questions correctly (12/07/22 0343)  1c. LOC Commands: 0-->Performs both tasks correctly (12/07/22 0343)  2. Best Gaze: 0-->Normal (12/07/22 0343)  3. Visual: (not recorded)  4. Facial Palsy: (not recorded)  5a. Motor Arm, Left: (not recorded)  5b. Motor Arm, Right: (not recorded)  6a. Motor Leg, Left: (not recorded)  6b. Motor Leg, Right: (not recorded)  7. Limb Ataxia: (not recorded)  8. Sensory: (not recorded)  9. Best Language: (not recorded)  10. Dysarthria: (not recorded)  11. Extinction and Inattention (formerly Neglect): (not recorded)    Total (NIH Stroke Scale): (not recorded)     Additional notable assessment information:     Nursing report ED to floor:      Jess Alcantar RN   12/07/22 05:04 EST      Electronically signed by Jess Alcantar RN at 12/07/22 0505            Consult Notes (all)      Kavya Barillas MD at 12/07/22 1340      Consult Orders    1. Inpatient Psychiatrist Consult [[id number]] ordered by Delores Azul DO at 12/07/22 1157                 Referring Provider: Dr Azul   Reason for Consultation: depression/anxiety/mental status changes       Chief complaint depression anxiety     Subjective .     History of present illness:  The patient is a 57 y.o. female who was admitted secondary to weakness. PMHx: anxiety and depression. Psych consult was requested by Dr Azul 2ry to mental status changes and depression.   The pt reported history of depression in  "the past, approximately 20 years ago the patient had \"nervous breakdown, she had to be admitted to the hospital  In [place] secondary to suicidal ideations, but no attempts.  At that time she was started  on Paxil, Ambien and lorazepam, depression improved at the time, she stopped medications  Few  years ago.  Last  year her  passed away, the patient became more depressed, increased crying spells, increased self-isolation.  The patient was prescribed clonazepam by her previous primary care provider.  Also was prescribed Ambien and lorazepam for anxiety.  Her  passed away around holidays, his anniversary is approaching, the patient found old bottle of clonazepam 0.5 mg and she was taking 1 pill daily to alleviate anxiety  until she ran out 1 week ago.  Depression is rated as a 7 out of 10, she denied suicidal and homicidal ideations, the patient denied any perceptual disturbances  Anxiety is persistent and intense, denied AVH/SI/HI  The pt developed stroke like sxs with numbness and muscle weakness   Past psych hx: depression anxiety, hx of SI no SA     Review of Systems   All systems were reviewed and negative except for:  Constitution:  positive for fatigue  Gastrointestinal: positive for  nausea  Musculoskeletal: positive for  muscle pain and muscle weakness  Behavioral/Psych: positive for  anxiety and depression    History    Past Medical History:   Diagnosis Date   • Anxiety    • Depression           Family History   Problem Relation Age of Onset   • Heart disease Mother    • Breast cancer Mother    • Heart disease Father    • Pancreatic cancer Father    • Heart disease Sister    • Lung cancer Brother         Social History     Tobacco Use   • Smoking status: Never   • Smokeless tobacco: Never   Substance Use Topics   • Alcohol use: Never   • Drug use: Never        (Not in a hospital admission)       Scheduled Meds:  aspirin, 162 mg, Oral, Daily   Or  aspirin, 300 mg, Rectal, Daily  atorvastatin, 80 mg, " "Oral, Nightly  busPIRone, 5 mg, Oral, TID  [START ON 12/8/2022] enoxaparin, 40 mg, Subcutaneous, Q12H  famotidine, 20 mg, Oral, BID AC  sodium chloride, 10 mL, Intravenous, Q12H  sodium chloride, 10 mL, Intravenous, Q12H         Continuous Infusions:       PRN Meds:  •  acetaminophen  •  ondansetron  •  sodium chloride  •  [COMPLETED] Insert Peripheral IV **AND** sodium chloride  •  sodium chloride  •  sodium chloride  •  sodium chloride  •  sodium chloride  •  zolpidem      Allergies:  Patient has no known allergies.      Objective     Vital Signs   /59   Pulse 68   Temp 98.1 °F (36.7 °C) (Oral)   Resp 18   Ht 170.2 cm (67\")   Wt (!) 143 kg (315 lb)   LMP  (LMP Unknown)   SpO2 100%   BMI 49.34 kg/m²     Physical Exam:    Musculoskeletal:   Muscle strength and tone: decreased in UE   Abnormal Movements: none   Gait: unable to assess, the pt was in bed      General Appearance:    In NAD       Mental Status Exam:   Hygiene:   good  Cooperation:  Cooperative  Eye Contact:  Good  Behavior and Psychomotor Activity: Slow  Speech:  Monotone and soft voice   Mood: depressed and anxious   Affect:  Restricted and Congruent  Thought Process:  Goal directed, Linear and Organized  Associations: Intact   Thought Content:  mood congruent   Language: appropriate   Suicidal Ideations:  None  Homicidal:  None  Hallucinations:  None  Delusion:  None  Orientation:  To person, Place, Time and Situation  Memory:  fair   Concentration and computation: fair   Attention span: fair   Fund of knowledge: appropriate   Reliability:  good  Insight:  Good  Judgement:  Good  Impulse Control:  Good      Medications and allergies reviewed     Lab Results   Component Value Date    GLUCOSE 128 (H) 12/07/2022    CALCIUM 9.0 12/07/2022     12/07/2022    K 3.9 12/07/2022    CO2 22.0 12/07/2022     12/07/2022    BUN 8 12/07/2022    CREATININE 0.70 12/07/2022    EGFRIFAFRI 102 02/17/2021    EGFRIFNONA 89 02/17/2021    BCR 11.4 " 12/07/2022    ANIONGAP 12.0 12/07/2022       Last Urine Toxicity     LAST URINE TOXICITY RESULTS Latest Ref Rng & Units 12/7/2022    BARBITURATES SCREEN Negative Negative    BENZODIAZEPINE SCREEN, URINE Negative Negative    COCAINE SCREEN, URINE Negative Negative    METHADONE SCREEN, URINE Negative Negative          No results found for: PHENYTOIN, PHENOBARB, VALPROATE, CBMZ    Lab Results   Component Value Date     12/07/2022    BUN 8 12/07/2022    CREATININE 0.70 12/07/2022    TSH 2.130 12/06/2022    WBC 7.70 12/07/2022       Brief Urine Lab Results     None          Assessment & Plan       Weakness          Assessment: Major depressive disorder moderate recurrent  Generalized anxiety disorder  Bereavement  Treatment Plan: The patient presented with symptoms of depression, anxiety, and anniversary of her 's death is approaching.  Continue BuSpar at current dose, continue Ambien as needed for insomnia  The patient was on Paxil in the past, did not report with experience  She did not respond to Zoloft and Lexapro  Start Viibryd 10 mg p.o. every morning tomorrow for anxiety and depression  The patient will benefit from psychotherapy Outpatient basis and grief therapy  Safety plan was discussed with the patient and her daughter   They verbalized understanding   Treatment Plan discussed with: Patient, Family and nursing     I discussed the patients findings and my recommendations with patient, family and nursing staff    I have reviewed and approved the behavioral health treatment plans and problem list. Yes  Thank you for the consult   Referring MD has access to consult report and progress notes in EMR     Kavya Barillas MD  12/07/22  13:40 EST            Electronically signed by Kavya Barillas MD at 12/07/22 1357     Kim Lam APRN at 12/07/22 0833      Consult Orders    1. Inpatient Neurology Consult General [[id number]] ordered by Adolph Azul MD at 12/07/22 0701                Primary Care Provider: Quincy Lemos, *     Consult requested by:  Dr. Azul     Reason for Consultation: Neurological evaluation, weakness    History taken from: patient chart RN    Chief complaint: slurred speech, left arm weakness     Subjective   SUBJECTIVE:    History of present illness: Background per H&P: Eladia Connor is a 57 y.o. female who presented to [facility] on 12/[facility]/2022 complaining of above.   She reportedly has a history of a CVA in 2012 without any residual deficits. She notes over the last week or so she has noted intermittent though right leg weakness and numbness with associated tingling.  Around 11 AM this morning she developed slurred speech and left arm weakness.  Symptoms lasted for about 30 minutes and she presented to [facility] for further evaluation.  She was subsequently discharged indicating that testing/evaluation was largely unremarkable with questionable history of a pneumonia on chest x-ray.   She describes a nonproductive cough without hemoptysis.  + Sick contacts with grandson with the flu.  She denies any fevers. She describes having possible positive TB test few weeks ago. No known TB contacts however.  She indicates her son just returned from the [place] though is not sick.  No significant personal travel history noted.  When inquiring about new medications she identifies that she started Otezla this last week for her psoriasis and her symptoms seem to began after this.  She also recently began Ambien as needed for sleep approximately a month ago but she does not want to take this currently because of her symptoms. She endorses using an old prescription of Klonopin for the last month though was out of pills approximately a week ago  She indicates that her psoriasis was diagnosed by her dermatologist  .  She notes some chest tightness and substernal pressure that was nonradiating earlier today that is nonexertional  Unfortunately she has been  suffering from increased stress and anxiety as her  passed away approximately a year ago. She is on BuSpar her PCP and was recently advised to titrate though she has not done so at this time.    She has not taken Lexapro for years  She is no longer taking ivermectin/Diflucan states that this was given to her by her PCP while evaluation for her psoriasis.    - Portions of the above HPI were copied from previous encounters and edited as appropriate. PMH as detailed below.     Patient evaluated after MRI.  Initially patient had significant slurred speech on exam but it did improve the longer I spoke to the patient.  Patient states that she had some left arm weakness and slurred speech.  No issues like this in the past.  When asked about stress and anxiety she is becomes very tearful and states that she is very stressed.  Her  passed away a year ago, her daughter and 3 children moved into their 1 bedroom home.  Her job as well as stressful.  Patient thinks a lot of her chest tightness and pressure  is due to anxiety.  Today patient states she still does not feel great.  There is no focal weakness.  Nursing staff states that patient had an episode early this morning around 7 AM that she was altered.  There was an episode where her tongue was protruding and the patient was unable to speak but she was still interacting and withdrawing to pain.  This episode does not sound like a seizure and it is very atypical.  Patient awake and alert now.  No family at bedside    Review of Systems   Constitutional: Positive for fatigue.   Psychiatric/Behavioral: Positive for sleep disturbance. The patient is nervous/anxious.            PATIENT HISTORY:  Past Medical History:   Diagnosis Date   • Anxiety    • Depression    ,   Past Surgical History:   Procedure Laterality Date   • CHOLECYSTECTOMY  1987   • EYE SURGERY     • KNEE SURGERY     ,   Family History   Problem Relation Age of Onset   • Heart disease Mother    •  Breast cancer Mother    • Heart disease Father    • Pancreatic cancer Father    • Heart disease Sister    • Lung cancer Brother    ,   Social History     Tobacco Use   • Smoking status: Never   • Smokeless tobacco: Never   Substance Use Topics   • Alcohol use: Never   • Drug use: Never   ,   Prior to Admission medications    Medication Sig Start Date End Date Taking? Authorizing Provider   busPIRone (BUSPAR) 5 MG tablet Take 1 tablet by mouth 3 (Three) Times a Day. 10/4/22   Quincy Lemos APRN   fluconazole (DIFLUCAN) 150 MG tablet TAKE 1 TABLET BY MOUTH 1 TIME FOR 1 DOSE. 11/14/22   Quincy Lemos APRN   hydrOXYzine pamoate (VISTARIL) 100 MG capsule Take 1 capsule by mouth 3 (Three) Times a Day As Needed for Itching. 10/4/22   Quinyc Lemos APRN   ivermectin (STROMECTOL) 3 MG tablet tablet TAKE 9 TABLETS BY MOUTH ONCE 11/14/22   Quincy Lemos APRN   nystatin (MYCOSTATIN) 649411 UNIT/GM cream Apply 1 application topically to the appropriate area as directed 2 (Two) Times a Day. 11/4/22   Quincy Lemos APRN   nystatin (MYCOSTATIN) 860393 UNIT/GM powder Apply  topically to the appropriate area as directed 3 (Three) Times a Day. 7/12/22   Sloane Gamez APRN   promethazine-codeine (PHENERGAN with CODEINE) 6.25-10 MG/5ML syrup Take 5 mL by mouth Every 4 (Four) Hours As Needed for Cough. 7/12/22   Sloane Gamez APRN   sertraline (ZOLOFT) 50 MG tablet Take 1 tablet by mouth Daily. 12/30/21   Wanda Rivera APRN   zolpidem (AMBIEN) 10 MG tablet Take 1 tablet by mouth At Night As Needed for Sleep. 10/4/22   Quincy Lemos APRN    Allergies:  Patient has no known allergies.    Current Facility-Administered Medications   Medication Dose Route Frequency Provider Last Rate Last Admin   • acetaminophen (TYLENOL) tablet 650 mg  650 mg Oral Q4H PRN Adolph Azul MD       • aspirin chewable tablet 162 mg  162 mg Oral Daily Adolph Azul MD   162 mg at 12/07/22  0609    Or   • aspirin suppository 300 mg  300 mg Rectal Daily Adolph Azul MD       • atorvastatin (LIPITOR) tablet 80 mg  80 mg Oral Nightly Adolph Azul MD       • busPIRone (BUSPAR) tablet 5 mg  5 mg Oral TID Delores Azul DO       • [START ON 12/8/2022] Enoxaparin Sodium (LOVENOX) syringe 40 mg  40 mg Subcutaneous Q12H Adolph Azul MD       • famotidine (PEPCID) tablet 20 mg  20 mg Oral BID AC Adolph Azul MD       • ondansetron (ZOFRAN) injection 4 mg  4 mg Intravenous Q6H PRN Adolph Azul MD       • sertraline (ZOLOFT) tablet 50 mg  50 mg Oral Daily Delores Azul DO       • sodium chloride 0.9 % flush 10 mL  10 mL Intravenous PRN Rafael Cantrell MD       • sodium chloride 0.9 % flush 10 mL  10 mL Intravenous PRN Mora Izaguirre APRN       • sodium chloride 0.9 % flush 10 mL  10 mL Intravenous Q12H Adolph Azul MD       • sodium chloride 0.9 % flush 10 mL  10 mL Intravenous PRN Adolph Azul MD       • sodium chloride 0.9 % flush 10 mL  10 mL Intravenous Q12H Adolph Azul MD       • sodium chloride 0.9 % flush 10 mL  10 mL Intravenous PRN Adolph Azul MD       • sodium chloride 0.9 % infusion 40 mL  40 mL Intravenous PRN Adolph Azul MD       • sodium chloride 0.9 % infusion 40 mL  40 mL Intravenous PRN Adolph Azul MD       • zolpidem (AMBIEN) tablet 5 mg  5 mg Oral Nightly PRN Delores Azul DO         Current Outpatient Medications   Medication Sig Dispense Refill   • busPIRone (BUSPAR) 5 MG tablet Take 1 tablet by mouth 3 (Three) Times a Day. 30 tablet 5   • fluconazole (DIFLUCAN) 150 MG tablet TAKE 1 TABLET BY MOUTH 1 TIME FOR 1 DOSE. 1 tablet 1   • hydrOXYzine pamoate (VISTARIL) 100 MG capsule Take 1 capsule by mouth 3 (Three) Times a Day As Needed for Itching. 90 capsule 1   • ivermectin (STROMECTOL) 3 MG tablet tablet TAKE 9 TABLETS BY MOUTH ONCE 9 tablet 0   • nystatin (MYCOSTATIN) 362817 UNIT/GM cream Apply 1 application topically to the appropriate area as  directed 2 (Two) Times a Day. 30 g 3   • nystatin (MYCOSTATIN) 561346 UNIT/GM powder Apply  topically to the appropriate area as directed 3 (Three) Times a Day. 60 g 1   • promethazine-codeine (PHENERGAN with CODEINE) 6.25-10 MG/5ML syrup Take 5 mL by mouth Every 4 (Four) Hours As Needed for Cough. 100 mL 0   • sertraline (ZOLOFT) 50 MG tablet Take 1 tablet by mouth Daily. 30 tablet 2   • zolpidem (AMBIEN) 10 MG tablet Take 1 tablet by mouth At Night As Needed for Sleep. 30 tablet 2        ________________________________________________________     Objective   OBJECTIVE:    PHYSICAL EXAM:    Constitutional: The patient is in no apparent distress, awake and alert. There is no shortness of breath.     PSYCHIATRIC: Tearful    HEENT:   Normocephalic, atraumatic.     Chest: Breathing unlabored    Cardiac: Regular rate and rhythm.     Extremities:  No clubbing, cyanosis or edema.    NEUROLOGICAL:    Cognition:   Fully oriented.  Fund of knowledge decent.  Speech is slurred but does improve with time    Cranial nerves;    II - pupils bilaterally equal reacting to light,  No new Visual field deficits;  Fundoscopic exam- Not able to be done, non-dilated exam  III,IV,VI: EOMI with no diplopia  V: Normal facial sensations  VII: No facial asymmetry,  VIII: No New hearing abnormality  IX, X, XI: normal gag and shoulder shrug;  XII: tongue is in the midline.    Sensory:  Intact to light touch in all extremities.     Motor: Strength 5/5 bilaterally upper and lower extremities. No involuntary movements present. Normal tone and bulk.  Deep tendon reflexes: 2/4 and symmetrical in biceps, brachioradialis, triceps, bilateral 2/4 knees and ankles. Both plantars are flexor.    Cerebellar: Finger to nose and mirror movements normal bilaterally.    Gait and balance: Deferred.     Physical exam performed by EDWAR Neumann.  ________________________________________________________   RESULTS REVIEW:    VITAL SIGNS:   Temp:  [98 °F (36.7  °C)-98.1 °F (36.7 °C)] 98.1 °F (36.7 °C)  Heart Rate:  [63-87] 63  Resp:  [17-18] 18  BP: (112-149)/(65-82) 112/66     LABS:      Lab 12/07/22  0604 12/06/22  2314   WBC 7.70 8.80   HEMOGLOBIN 13.4 13.6   HEMATOCRIT 40.8 40.4   PLATELETS 285 330   NEUTROS ABS 5.60 7.00   LYMPHS ABS 1.40 1.10   MONOS ABS 0.60 0.60   EOS ABS 0.10 0.00   MCV 87.2 86.2   PROCALCITONIN  --  0.02   PROTIME  --  10.8   APTT  --  26.8*         Lab 12/07/22  0604 12/06/22  2314   SODIUM 140 139   POTASSIUM 3.9 4.3   CHLORIDE 106 105   CO2 22.0 21.0*   ANION GAP 12.0 13.0   BUN 8 9   CREATININE 0.70 0.71   EGFR 99.8 98.1   GLUCOSE 128* 137*   CALCIUM 9.0 9.5   TSH  --  2.130         Lab 12/06/22  2314   TOTAL PROTEIN 8.5   ALBUMIN 4.20   GLOBULIN 4.3   ALT (SGPT) 16   AST (SGOT) 25   BILIRUBIN 0.8   ALK PHOS 156*         Lab 12/06/22  2314   TROPONIN T <0.010   PROTIME 10.8   INR 1.05         Lab 12/07/22  0604   CHOLESTEROL 182   LDL CHOL 121*   HDL CHOL 42   TRIGLYCERIDES 104                 Lab Results   Component Value Date    TSH 2.130 12/06/2022     (H) 12/07/2022    HGBA1C 5.6 10/04/2022       IMAGING STUDIES:  CT Head Without Contrast    Result Date: 12/7/2022   1. No acute findings or interval change from the outside CT of the head performed yesterday. 2. I would recommend an MRI of the brain without contrast for follow-up as the patient had a CT angiogram of the head and neck and CT cerebral perfusion study overnight.  Electronically Signed By-Ronen Martinez MD On:12/7/2022 7:52 AM This report was finalized on [id number] by  Ronen Martinez MD.    CT Angiogram Neck    Result Date: 12/7/2022  1.  No acute intracranial abnormality is identified on noncontrast head CT. If there is high clinical suspicion for acute ischemic infarct, MRI may be more sensitive. 2.  Within the limitations of motion, no occlusion or high-grade stenosis of the major arteries in the head and neck. Artur Eubanks MD Neuroradiologist [facility]  [facility] [email or web address] Thank you for this referral. This exam was interpreted by a fellowship trained neuroradiologist. If the patient's healthcare provider has any questions, a [facility] neuroradiologist can be reached directly at [phone] at any time. SLOT  21 Electronically signed by:  Artur Eubanks M.D.  12/6/2022 11:20 PM Mountain Time    CT Chest Without Contrast Diagnostic    Result Date: 12/7/2022  1.  No evidence of pulmonary tuberculosis. No acute abnormality in the chest. Electronically signed by:  Edouard Marquez M.D.  12/6/2022 11:09 PM Mountain Time    CT Angiogram Head    Result Date: 12/7/2022  1.  No acute intracranial abnormality is identified on noncontrast head CT. If there is high clinical suspicion for acute ischemic infarct, MRI may be more sensitive. 2.  Within the limitations of motion, no occlusion or high-grade stenosis of the major arteries in the head and neck. Artur Eubanks MD Neuroradiologist [facility] [email or web address] Thank you for this referral. This exam was interpreted by a fellowship trained neuroradiologist. If the patient's healthcare provider has any questions, a [facility] neuroradiologist can be reached directly at [phone] at any time. SLOT  21 Electronically signed by:  Artur Eubanks M.D.  12/6/2022 11:20 PM Mountain Time    CT CEREBRAL PERFUSION WITH & WITHOUT CONTRAST    Result Date: 12/7/2022  1.  No acute intracranial abnormality is identified on noncontrast head CT. If there is high clinical suspicion for acute ischemic infarct, MRI may be more sensitive. 2.  Within the limitations of motion, no occlusion or high-grade stenosis of the major arteries in the head and neck. Artur Eubanks MD Neuroradiologist [facility] [email or web address] Thank you for this referral. This exam was interpreted by a fellowship trained neuroradiologist. If the patient's healthcare provider has  any questions, a [facility] neuroradiologist can be reached directly at [phone] at any time. SLOT  21 Electronically signed by:  Artur Eubanks M.D.  12/6/2022 11:20 PM Mountain Time      I reviewed the patient's new clinical results.    ________________________________________________________     PROBLEM LIST:    Weakness            ASSESSMENT/PLAN:  1.  Transient slurred speech and arm weakness.  No stroke on MRI. Possible TIA vs anxiety.   - CT head: No acute findings  - MRI brain: Pending  - CTA head and neck: Within the limitations of motion, no occlusion or high-grade stenosis of the major arteries in the head and neck.  - Echo: EF is 61 to 65%, negative saline test  - EKG: Sinus rhythm, rate 65  - Labs: A1C: 5.7, B12: 391, LDL: 121, TSH: 2.130  - Antithrombotics:     - Statin: Lipitor 80  - PT/OT/ST as appropriate, Neuro checks per protocol, DVT prophylaxis, Stroke education      2.  Episode of tongue protrusion and mental status change.  Episode  did not sound like aseizure, very atypical.   -Continue to monitor  -MRI was negative    3. Chest tightening, improved  -Management per primary    4. Possible NERY  - Outpatient sleep study     5. Depression/anxiety  - Resume home medications once verified     6. Modification of stroke risk factors:   - Blood pressure should be less than 130/80 outpatient, HbA1c less than 6.5, LDL less than 70; b12>500 and smoking cessation if applicable. We would be grateful if the primary team / primary care physician would keep a close watch on the above targets.  - Stroke education  - Follow up with Stroke Clinic       I discussed the patient's findings and my recommendations with patient and nursing staff    SCOOBY Riggs  12/07/22  08:33 EST          Electronically signed by Kim Lam APRN at 12/08/22 3128

## 2022-12-08 NOTE — PLAN OF CARE
Goal Outcome Evaluation:  Plan of Care Reviewed With: patient           Outcome Evaluation: Pt is a 56 y/o female with intermittent c/o RLE weakness and numbness with associated tingling.  Day of hospital admission- pt with slurred speech and L arm weakness x 30 minutes.  CT angiogram head/neck: (-) acute, CT chest (-) acute, MRI brain (-) acute.  Pt reports she lives with her daughter in an apartment wiht 14 steps to enter into home. Pt was ambulating with st cane prior to hospitalization.  Pt reporting 's death 1 year ago and she is struggling with the anniversary of his death.  Pt on telemetry and 2L O2 this date.  Pt required cga/min A for bed mobility, Min A for transfers and ambulated 20' with RW with min/mod A for ambulation. Pt required chair to be brought behind her for ambulation secondary to decreased balance and fatigue.  Pt is far below her normal baseline of mobility and has 14 steps to enter into her home.  Recommend SNF.

## 2022-12-08 NOTE — THERAPY EVALUATION
Patient Name: Eladia Connor  : 1965    MRN: [id number]                              Today's Date: 2022       Admit Date: 2022    Visit Dx:     ICD-10-CM ICD-9-CM   1. Chest pain, unspecified type  R07.9 786.50   2. Slurred speech  R47.81 784.59   3. TB lung, latent  Z22.7 795.51   4. Right leg weakness  R29.898 729.89   5. Left arm weakness  R29.898 729.89   6. Follow-up exam  Z09 V67.9     Patient Active Problem List   Diagnosis   • Moderate episode of recurrent major depressive disorder (HCC)   • Anxiety   • Other insomnia   • Skin lesion   • History of abnormal mammogram   • Mood swings   • Class 1 obesity due to excess calories with serious comorbidity and body mass index (BMI) of 33.0 to 33.9 in adult   • Rash of face   • Fever   • Cough   • Rash   • Intertrigo   • Weakness     Past Medical History:   Diagnosis Date   • Anxiety    • Depression      Past Surgical History:   Procedure Laterality Date   • CHOLECYSTECTOMY     • EYE SURGERY     • KNEE SURGERY        General Information     [facility] Name 22 1058          Physical Therapy Time and Intention    Document Type evaluation  -AM     Mode of Treatment physical therapy  -AM     Row Name 22 1058          General Information    Patient Profile Reviewed yes  -AM     Prior Level of Function independent:;community mobility;gait;transfer;bed mobility;using stairs  -AM     Existing Precautions/Restrictions fall;oxygen therapy device and L/min;other (see comments)  2L O2  -AM     Barriers to Rehab --  per pt report struggling with upcoming anniversary of 's death  -AM     Row Name 22 1058          Living Environment    People in Home alone  -AM     Row Name 22 1058          Home Main Entrance    Number of Stairs, Main Entrance other (see comments)  14 steps  -AM     Stair Railings, Main Entrance railings safe and in good condition  -AM     Row Name 22 1058          Stairs Within Home, Primary    Number of  Stairs, Within Home, Primary none  -AM     Row Name 12/08/22 1058          Cognition    Orientation Status (Cognition) oriented x 4  -AM     Row Name 12/08/22 1058          Safety Issues, Functional Mobility    Impairments Affecting Function (Mobility) balance;endurance/activity tolerance;pain;strength  -AM     Comment, Safety Issues/Impairments (Mobility) gait belt utilized  -AM           User Key  (r) = Recorded By, (t) = Taken By, (c) = Cosigned By    Initials Name Provider Type    AM Tonio Monte, DARIANA Physical Therapist               Mobility     Row Name 12/08/22 1100          Bed Mobility    Bed Mobility bed mobility (all) activities  -AM     All Activities, [place] (Bed Mobility) contact guard;minimum assist (75% patient effort)  -AM     Assistive Device (Bed Mobility) bed rails;head of bed elevated  -AM     Comment, (Bed Mobility) with increased time and effort  -AM     Row Name 12/08/22 1100          Sit-Stand Transfer    Sit-Stand [place] (Transfers) minimum assist (75% patient effort);1 person assist  -AM     Assistive Device (Sit-Stand Transfers) walker, 4-wheeled  -AM     Comment, (Sit-Stand Transfer) cues for hand placement  -AM     Row Name 12/08/22 1100          Gait/Stairs (Locomotion)    [place] Level (Gait) minimum assist (75% patient effort);moderate assist (50% patient effort)  -AM     Assistive Device (Gait) walker, front-wheeled  -AM     Distance in Feet (Gait) 20'  -AM     Deviations/Abnormal Patterns (Gait) base of support, narrow;singh decreased;gait speed decreased  -AM     Bilateral Gait Deviations forward flexed posture  -AM     Comment, (Gait/Stairs) chair needed to be brought behind pt secondary to fatigue and progressively decreased balance  -AM           User Key  (r) = Recorded By, (t) = Taken By, (c) = Cosigned By    Initials Name Provider Type    AM Tonio Monte, PT Physical Therapist               Obj/Interventions     Row Name 12/08/22 1103          Range of  Motion Comprehensive    General Range of Motion no range of motion deficits identified  -AM     Row Name 12/08/22 1103          Strength Comprehensive (MMT)    Comment, General Manual Muscle Testing (MMT) Assessment Defer BUE MMT to OT.  RLE: 4/5  LLE: 4+/5  -AM     Row Name 12/08/22 1103          Motor Skills    Motor Skills functional endurance  -AM     Functional Endurance fair  -AM     Row Name 12/08/22 1103          Balance    Balance Assessment sitting static balance;sitting dynamic balance;sit to stand dynamic balance;standing static balance;standing dynamic balance  -AM     Static Sitting Balance supervision  -AM     Dynamic Sitting Balance supervision  -AM     Position, Sitting Balance unsupported;sitting in chair  -AM     Sit to Stand Dynamic Balance contact guard;minimal assist  -AM     Static Standing Balance contact guard;minimal assist  -AM     Dynamic Standing Balance minimal assist;moderate assist  -AM     Position/Device Used, Standing Balance supported;walker, rolling  -AM     Row Name 12/08/22 1103          Sensory Assessment (Somatosensory)    Sensory Assessment (Somatosensory) --  Pt reports decreased sensation in B feet L>R  -AM           User Key  (r) = Recorded By, (t) = Taken By, (c) = Cosigned By    Initials Name Provider Type    AM Tonio Monte, PT Physical Therapist               Goals/Plan     Row Name 12/08/22 1115          Bed Mobility Goal 1 (PT)    Activity/Assistive Device (Bed Mobility Goal 1, PT) bed mobility activities, all  -AM     [place] Level/Cues Needed (Bed Mobility Goal 1, PT) modified independence  -AM     Time Frame (Bed Mobility Goal 1, PT) long term goal (LTG)  -AM     Row Name 12/08/22 1115          Transfer Goal 1 (PT)    Activity/Assistive Device (Transfer Goal 1, PT) transfers, all  -AM     [place] Level/Cues Needed (Transfer Goal 1, PT) modified independence  -AM     Time Frame (Transfer Goal 1, PT) long term goal (LTG)  -AM     Row Name 12/08/22 1115           Gait Training Goal 1 (PT)    Activity/Assistive Device (Gait Training Goal 1, PT) gait (walking locomotion);assistive device use  -AM     [place] Level (Gait Training Goal 1, PT) modified independence  -AM     Distance (Gait Training Goal 1, PT) 150'  -AM     Time Frame (Gait Training Goal 1, PT) long term goal (LTG)  -AM     Row Name 12/08/22 1115          Stairs Goal 1 (PT)    Activity/Assistive Device (Stairs Goal 1, PT) stairs, all skills  -AM     [place] Level/Cues Needed (Stairs Goal 1, PT) modified independence  -AM     Number of Stairs (Stairs Goal 1, PT) 14  -AM     Time Frame (Stairs Goal 1, PT) long term goal (LTG)  -AM     Row Name 12/08/22 1115          Therapy Assessment/Plan (PT)    Planned Therapy Interventions (PT) balance training;neuromuscular re-education;bed mobility training;gait training;patient/family education;strengthening;stair training;transfer training  -AM           User Key  (r) = Recorded By, (t) = Taken By, (c) = Cosigned By    Initials Name Provider Type    AM Tonio Monte, PT Physical Therapist               Clinical Impression     Row Name 12/08/22 1105          Pain    Pretreatment Pain Rating 2/10  -AM     Posttreatment Pain Rating 2/10  -AM     Pain Location - Side/Orientation Right  -AM     Pain Location - knee  -AM     Pain Intervention(s) Repositioned;Emotional support  -AM     Row Name 12/08/22 1101          Plan of Care Review    Plan of Care Reviewed With patient  -AM     Outcome Evaluation Pt is a 56 y/o female with intermittent c/o RLE weakness and numbness with associated tingling.  Day of hospital admission- pt with slurred speech and L arm weakness x 30 minutes.  CT angiogram head/neck: (-) acute, CT chest (-) acute, MRI brain (-) acute.  Pt reports she lives with her daughter in an apartment wiht 14 steps to enter into home. Pt was ambulating with st cane prior to hospitalization.  Pt reporting 's death 1 year ago and she is struggling  with the anniversary of his death.  Pt on telemetry and 2L O2 this date.  Pt required cga/min A for bed mobility, Min A for transfers and ambulated 20' with RW with min/mod A for ambulation. Pt required chair to be brought behind her for ambulation secondary to decreased balance and fatigue.  Pt is far below her normal baseline of mobility and has 14 steps to enter into her home.  Recommend SNF.  -AM     Row Name 12/08/22 1105          Therapy Assessment/Plan (PT)    Patient/Family Therapy Goals Statement (PT) To get stronger  -AM     Rehab Potential (PT) good, to achieve stated therapy goals  -AM     Criteria for Skilled Interventions Met (PT) yes;skilled treatment is necessary  -AM     Therapy Frequency (PT) 5 times/wk  -AM     Predicted Duration of Therapy Intervention (PT) until d/c  -AM     Row Name 12/08/22 1105          Vital Signs    Pre SpO2 (%) 100  -AM     O2 Delivery Pre Treatment nasal cannula  2L O2  -AM     Intra SpO2 (%) 97  -AM     O2 Delivery Intra Treatment nasal cannula  -AM     Post SpO2 (%) 98  -AM     O2 Delivery Post Treatment nasal cannula  -AM     Pre Patient Position Supine  -AM     Intra Patient Position Standing  -AM     Post Patient Position Supine  -AM     Row Name 12/08/22 1105          Positioning and Restraints    Pre-Treatment Position in bed  -AM     Post Treatment Position chair  -AM     In Bed --  unable to locate chair alarm box, CNA found and attached to chair alarm pad  -AM     In Chair notified nsg;sitting;encouraged to call for assist;exit alarm on;call light within reach  unable to locate chair alarm box, CNA found and attached to chair alarm pad  -AM           User Key  (r) = Recorded By, (t) = Taken By, (c) = Cosigned By    Initials Name Provider Type    AM Tonio Monte, PT Physical Therapist               Outcome Measures     Row Name 12/08/22 1115          How much help from another person do you currently need...    Turning from your back to your side while in flat  bed without using bedrails? 3  -AM     Moving from lying on back to sitting on the side of a flat bed without bedrails? 3  -AM     Moving to and from a bed to a chair (including a wheelchair)? 3  -AM     Standing up from a chair using your arms (e.g., wheelchair, bedside chair)? 3  -AM     Climbing 3-5 steps with a railing? 2  -AM     To walk in hospital room? 1  -AM     AM-PAC 6 Clicks Score (PT) 15  -AM     Highest level of mobility 4 --> Transferred to chair/commode  -AM     Row Name 12/08/22 1115          Functional Assessment    Outcome Measure Options AM-PAC 6 Clicks Basic Mobility (PT)  -AM           User Key  (r) = Recorded By, (t) = Taken By, (c) = Cosigned By    Initials Name Provider Type    AM Tonio Monte, PT Physical Therapist                             Physical Therapy Education     Title: PT OT SLP Therapies (Done)     Topic: Physical Therapy (Done)     Point: Mobility training (Done)     Learning Progress Summary           Patient Acceptance, E,TB, VU by AM at 12/8/2022 1116                   Point: Home exercise program (Done)     Learning Progress Summary           Patient Acceptance, E,TB, VU by AM at 12/8/2022 1116                   Point: Body mechanics (Done)     Learning Progress Summary           Patient Acceptance, E,TB, VU by AM at 12/8/2022 1116                   Point: Precautions (Done)     Learning Progress Summary           Patient Acceptance, E,TB, VU by AM at 12/8/2022 1116                               User Key     Initials Effective Dates Name Provider Type Discipline    AM 05/10/21 -  Tonio Monte, PT Physical Therapist PT              PT Recommendation and Plan  Planned Therapy Interventions (PT): balance training, neuromuscular re-education, bed mobility training, gait training, patient/family education, strengthening, stair training, transfer training  Plan of Care Reviewed With: patient  Outcome Evaluation: Pt is a 56 y/o female with intermittent c/o RLE weakness and  numbness with associated tingling.  Day of hospital admission- pt with slurred speech and L arm weakness x 30 minutes.  CT angiogram head/neck: (-) acute, CT chest (-) acute, MRI brain (-) acute.  Pt reports she lives with her daughter in an apartment wiht 14 steps to enter into home. Pt was ambulating with st cane prior to hospitalization.  Pt reporting 's death 1 year ago and she is struggling with the anniversary of his death.  Pt on telemetry and 2L O2 this date.  Pt required cga/min A for bed mobility, Min A for transfers and ambulated 20' with RW with min/mod A for ambulation. Pt required chair to be brought behind her for ambulation secondary to decreased balance and fatigue.  Pt is far below her normal baseline of mobility and has 14 steps to enter into her home.  Recommend SNF.     Time Calculation:    PT Charges     Row Name 12/08/22 1116             Time Calculation    Start Time 0825  -AM      Stop Time 0855  -AM      Time Calculation (min) 30 min  -AM      PT Received On 12/08/22  -AM      PT - Next Appointment 12/09/22  -AM      PT Goal Re-Cert Due Date 12/22/22  -AM            User Key  (r) = Recorded By, (t) = Taken By, (c) = Cosigned By    Initials Name Provider Type    Tonio Horner PT Physical Therapist              Therapy Charges for Today     Code Description Service Date Service Provider Modifiers Qty    02024689029  PT EVAL MOD COMPLEXITY 4 12/8/2022 Tonio Monte, PT GP 1          PT G-Codes  Outcome Measure Options: AM-PAC 6 Clicks Basic Mobility (PT)  AM-PAC 6 Clicks Score (PT): 15  PT Discharge Summary  Anticipated Discharge Disposition (PT): skilled nursing facility    Tonio Monte PT  12/8/2022

## 2022-12-08 NOTE — TELEPHONE ENCOUNTER
Please advise below. I have attached the ER admission date.    ED to Hosp-Admission (Current) with Delores Azul DO; Rafael Cantrell MD (12/06/2022)

## 2022-12-08 NOTE — TELEPHONE ENCOUNTER
Caller: DIANA MADRIGAL    Relationship: Emergency Contact    Best call back number: [phone]    PATIENT IS CURRENTLY AT [facility] AND THEY ARE DIAGNOSING PATIENT WITH PSYCHOTHERAPY.     DAUGHTER IS CONCERNED BECAUSE TWO DAYS AGO WHEN SHE CALLED 911 SUSANNAH WAS PARALYZED FROM NECK DOWN, TALKING JIBBERESH, WITH SHORTNESS OF BREATH, MOUTH DROOPING, AND LIGHTHEADED. SUSANNAH HAS HAD FOUR EPISODES HAVE HAPPENED SINCE THEN.    DAUGHTER IS VERY CONCERNED THAT SHE IS MISDIAGNOSED AND MAY BE HAVING TIA.

## 2022-12-08 NOTE — PLAN OF CARE
Problem: Confusion Acute  Goal: Optimal Cognitive Function  Outcome: Ongoing, Progressing     Problem: Fatigue  Goal: Improved Activity Tolerance  Outcome: Ongoing, Progressing  Intervention: Promote Improved Energy  Recent Flowsheet Documentation  Taken 12/8/2022 0400 by Rea Bingham LPN  Sleep/Rest Enhancement: awakenings minimized  Taken 12/7/2022 2130 by Rea Bingham LPN  Sleep/Rest Enhancement: awakenings minimized     Problem: Skin Injury Risk Increased  Goal: Skin Health and Integrity  Outcome: Ongoing, Progressing  Intervention: Optimize Skin Protection  Recent Flowsheet Documentation  Taken 12/7/2022 2130 by Rea Bingham LPN  Pressure Reduction Devices: pressure-redistributing mattress utilized  Skin Protection: adhesive use limited     Problem: Fall Injury Risk  Goal: Absence of Fall and Fall-Related Injury  Outcome: Ongoing, Progressing  Intervention: Identify and Manage Contributors  Recent Flowsheet Documentation  Taken 12/8/2022 0400 by Rea Bingham LPN  Medication Review/Management: medications reviewed  Taken 12/8/2022 0200 by Rea Bingham LPN  Medication Review/Management: medications reviewed  Taken 12/8/2022 0000 by Rea Bingham LPN  Medication Review/Management: medications reviewed  Taken 12/7/2022 2200 by Rea Bingham LPN  Medication Review/Management: medications reviewed  Taken 12/7/2022 2130 by Rea Bingham LPN  Medication Review/Management: medications reviewed  Intervention: Promote Injury-Free Environment  Recent Flowsheet Documentation  Taken 12/8/2022 0400 by Rea Bingham LPN  Safety Promotion/Fall Prevention: safety round/check completed  Taken 12/8/2022 0200 by Rea Bingham LPN  Safety Promotion/Fall Prevention: safety round/check completed  Taken 12/8/2022 0000 by Rea Bingham LPN  Safety Promotion/Fall Prevention: safety round/check completed  Taken 12/7/2022 2200 by Rea Bingham LPN  Safety Promotion/Fall Prevention: safety  round/check completed  Taken 12/7/2022 2130 by Rea Bingham LPN  Safety Promotion/Fall Prevention: safety round/check completed   Goal Outcome Evaluation:   Pt rested well throughout night, stated she did not want any of her meds, stated that she thinks her meds may have caused her issues.

## 2022-12-08 NOTE — CASE MANAGEMENT/SOCIAL WORK
Continued Stay Note   [facility]     Patient Name: Eladia Connor  MRN: [id number]  Today's Date: 12/8/2022    Admit Date: 12/6/2022    Plan: D/C plan: SNF, pending pt decision. Will need precert. PASRR per facility.   Discharge Plan     Row Name 12/08/22 1521       Plan    Plan D/C plan: SNF, pending pt decision. Will need precert. PASRR per facility.    Patient/Family in Agreement with Plan yes    Provided Post Acute Provider List? Yes    Post Acute Provider List Inpatient Rehab  SNF    Delivered To Patient    Method of Delivery In person    Plan Comments CM was contacted that PT recommends SNF and pt is agreeable. CM met with pt at bedside to discuss and provide list of choices. Pt would like to review and research before making any decisions. CM provided contact number when pt makes decisions for placement. Will need precert when accepted at facility.                   Expected Discharge Date and Time     Expected Discharge Date Expected Discharge Time    Dec 9, 2022         Met with patient in room wearing PPE: mask  Maintained distance greater than six feet and spent less than 15 minutes in the room.          Dexter Devlin, RN

## 2022-12-08 NOTE — PROGRESS NOTES
[facility] Daily Progress Note    Patient Name: Eladia Connor  : 1965  MRN: [id number]  Primary Care Physician:  Quincy Lemos APRN  Date of admission: 2022      Subjective      Chief Complaint: Weakness    Patient seen and examined this morning.  Significantly improved compared to yesterday, alert and oriented x3.  Denies any further focal weakness or speech issues.  No chest pain, shortness of breath, palpitations, fever, chills, or abdominal pain.  Patient's possible diagnosis and treatment plan discussed with patient and daughter at bedside, all questions answered.  Daughter wanted to speak to neurology service, discussed with nursing at bedside.    Pertinent positives as noted in HPI/subjective.  All other systems were reviewed and are negative.      Objective      Vitals:   Temp:  [98.1 °F (36.7 °C)-98.4 °F (36.9 °C)] 98.1 °F (36.7 °C)  Heart Rate:  [55-75] 59  Resp:  [14-17] 17  BP: ()/(45-78) 100/57  Flow (L/min):  [2] 2    Physical Exam:    General: Awake, alert, lying in bed, NAD  Eyes: PERRL, EOMI, conjunctivae are clear  Cardiovascular: Regular rate and rhythm, no murmurs  Respiratory: Clear to auscultation bilaterally, no wheezing or rales, unlabored breathing  Abdomen: Soft, nontender, positive bowel sounds, no guarding  Neurologic: A&O, CN grossly intact, speech normal, moves all extremities spontaneously  Musculoskeletal: Generalized weakness, no deformities  Skin: Warm, dry, intact         Result Review    Result Review:  I have personally reviewed the results from the time of this admission to 2022 12:10 EST and agree with these findings:  [x]  Laboratory  [x]  Microbiology  [x]  Radiology  [x]  EKG/Telemetry   [x]  Cardiology/Vascular   []  Pathology  []  Old records  []  Other:          Assessment & Plan      Brief Patient Summary:  Eladia Connor is a 57 y.o. female who presented to [facility] on  12/6/2022 complaining of weakness.   She reportedly has a history of a CVA in 2012 without any residual deficits. She notes over the last week or so she has noted intermittent though right leg weakness and numbness with associated tingling.  Around 11 AM in the morning she developed slurred speech and left arm weakness.  Symptoms lasted for about 30 minutes and she presented to [facility] for further evaluation.  She was subsequently discharged indicating that testing/evaluation was largely unremarkable with questionable history of a pneumonia on chest x-ray.   She describes a nonproductive cough without hemoptysis.  + Sick contacts with grandson with the flu.  She denies any fevers. She describes having possible positive TB test few weeks ago. No known TB contacts however.  She indicates her son just returned from the [place] though is not sick.  No significant personal travel history noted. When inquiring about new medications she identifies that she started Otezla this last week for her psoriasis and her symptoms seem to began after this.  She also reports being on Klonopin but has not taken for 1 week as she ran out of it.  Patient was noted to have multiple episodes of altered mental status with slurred speech and left-sided weakness.  Stroke work-up x2 including MRI brain appears to be negative.  Neurology following, recommending outpatient EEG study to rule out seizure activity.  Patient also appears to be having major depressive episode, psych was consulted.  Recommended antidepression but patient refusing at this time.  PT/OT recommending rehab.      aspirin, 162 mg, Oral, Daily   Or  aspirin, 300 mg, Rectal, Daily  atorvastatin, 80 mg, Oral, Nightly  busPIRone, 5 mg, Oral, TID  enoxaparin, 40 mg, Subcutaneous, Q12H  famotidine, 20 mg, Oral, BID AC  nystatin, , Topical, Q12H  sodium chloride, 10 mL, Intravenous, Q12H  sodium chloride, 10 mL, Intravenous, Q12H  vilazodone, 10 mg, Oral, Daily              Active Hospital Problems:  Active Hospital Problems    Diagnosis    • **Weakness      Plan:   Speech disturbance with right leg and left arm weakness, improved  -Possible TIA, or seizure, unclear at this time  -Patient has had few episodes of same symptoms over the past few days, possibly medication induced but unclear at this time  -Stroke work-up including MRI brain negative for new acute findings  -Previously had TIA back in 2013 without any residual deficits  -Possible major depression episode  -Continue aspirin, statin  -Neurology following, recommending outpatient EEG, no in-house EEG available at this time  -PT/OT    Major depression with anxiety  -Patient had episodes of altered mentation with speech disturbances however stroke work-up and metabolic work-up so far negative  -Possibly suffering from major depression episode  -Psych following and recommended antidepressant but patient refusing at this time  -Continue home BuSpar  -Continue psych follow-up    Abnormal TB screening  -Reportedly positive QuantiFERON as outpatient however no symptoms or signs of TB infection noted at this time  -QuantiFERON has been ordered however can follow-up as outpatient with PCP for further work-up    Psoriasis  -Possibly having side effects from Otezla, hold for now  -Continue outpatient follow-up    Suspected NERY  -States she did have sleep study years ago but did not follow-up on CPAP  -Recommend outpatient sleep study, will refer to neurology/pulmonology    Obesity  -BMI of 49.34 noted  -Lifestyle modifications recommended    DVT prophylaxis  -Lovenox      CODE STATUS:    Code Status (Patient has no pulse and is not breathing): CPR (Attempt to Resuscitate)  Medical Interventions (Patient has pulse or is breathing): Full Support  Release to patient: Routine Release      Disposition: Rehab placement.  Medically stable.    Electronically signed by Delores Azul DO, 12/08/22, 12:10 EST.  [facility]  Team      Part of this note may be an electronic transcription/translation of spoken language to printed text using the Dragon Dictation System.

## 2022-12-08 NOTE — PLAN OF CARE
Goal Outcome Evaluation:         Patient alert, oriented x 4, vital signs stable. No evidence of seizure or stroke activity noted this shift. Patient to MRI today. Tolerated well. No distress noted.

## 2022-12-08 NOTE — PROGRESS NOTES
LOS: 0 days     Chief Complaint: Arm weakness and slurred speech       SUBJECTIVE:    History taken from: patient chart RN    Patient Complaints: Patient's daughter at bedside and wants to rule out spinal stroke, discussed with her that this is not how patient presents.  Patient has bilateral hand and feet numbness and tingling that comes and goes.  Her slurred speech is much better today.  No focal weakness, just generalized weakness.       Review of Systems   Eyes: Negative for visual disturbance.   Neurological: Positive for weakness and numbness. Negative for dizziness, tremors, seizures, syncope, facial asymmetry, speech difficulty, light-headedness and headaches.   Psychiatric/Behavioral: Negative for confusion. The patient is nervous/anxious.         Pertinent PMH:  has a past medical history of Anxiety and Depression.   ________________________________________________     OBJECTIVE:    On exam:  GENERAL: NAD  CARDIO: RRR  NEURO:  Awake and alert  Oriented x3  EOMI, PERRL, no visual field deficits  CN 2-12 intact, No facial asymmetry  Speech clear without dysarthria  Subjective numbness to bilateral feet although able to ambulate  Strength 5/5 and equal in all extremities no focal weakness  No ataxia    ________________________________________________   RESULTS REVIEW    VITAL SIGNS:  Temp:  [98.1 °F (36.7 °C)-98.4 °F (36.9 °C)] 98.1 °F (36.7 °C)  Heart Rate:  [55-75] 59  Resp:  [14-17] 17  BP: ()/(45-78) 100/57    LABS:       Lab 12/08/22  0544 12/07/22  0604 12/06/22  2314   WBC 7.00 7.70 8.80   HEMOGLOBIN 12.2 13.4 13.6   HEMATOCRIT 37.6 40.8 40.4   PLATELETS 262 285 330   NEUTROS ABS 4.90 5.60 7.00   LYMPHS ABS 1.40 1.40 1.10   MONOS ABS 0.50 0.60 0.60   EOS ABS 0.10 0.10 0.00   MCV 88.7 87.2 86.2   PROCALCITONIN  --   --  0.02   PROTIME  --   --  10.8   APTT  --   --  26.8*         Lab 12/08/22  0544 12/07/22  0604 12/06/22  2314   SODIUM 140 140 139   POTASSIUM 4.1 3.9 4.3   CHLORIDE 109* 106  105   CO2 21.0* 22.0 21.0*   ANION GAP 10.0 12.0 13.0   BUN 10 8 9   CREATININE 0.70 0.70 0.71   EGFR 99.8 99.8 98.1   GLUCOSE 105* 128* 137*   CALCIUM 8.8 9.0 9.5   HEMOGLOBIN A1C  --  5.7*  --    TSH  --   --  2.130         Lab 12/08/22  0544 12/06/22  2314   TOTAL PROTEIN 7.1 8.5   ALBUMIN 3.60 4.20   GLOBULIN 3.5 4.3   ALT (SGPT) 14 16   AST (SGOT) 26 25   BILIRUBIN 0.5 0.8   ALK PHOS 125* 156*         Lab 12/06/22  2314   TROPONIN T <0.010   PROTIME 10.8   INR 1.05         Lab 12/07/22  0604   CHOLESTEROL 182   LDL CHOL 121*   HDL CHOL 42   TRIGLYCERIDES 104         Lab 12/06/22  2314   VITAMIN B 12 391             Lab Results   Component Value Date    TSH 2.130 12/06/2022     (H) 12/07/2022    HGBA1C 5.7 (H) 12/07/2022    [id number] 391 12/06/2022         IMAGING STUDIES:  CT Head Without Contrast    Result Date: 12/7/2022   1. No acute findings or interval change from the outside CT of the head performed yesterday. 2. I would recommend an MRI of the brain without contrast for follow-up as the patient had a CT angiogram of the head and neck and CT cerebral perfusion study overnight.  Electronically Signed By-Ronen Martinez MD On:12/7/2022 7:52 AM This report was finalized on [id number] by  Ronen Martinez MD.    CT Angiogram Neck    Result Date: 12/7/2022  1.  No acute intracranial abnormality is identified on noncontrast head CT. If there is high clinical suspicion for acute ischemic infarct, MRI may be more sensitive. 2.  Within the limitations of motion, no occlusion or high-grade stenosis of the major arteries in the head and neck. Artur Eubanks MD Neuroradiologist [facility] [email or web address] Thank you for this referral. This exam was interpreted by a fellowship trained neuroradiologist. If the patient's healthcare provider has any questions, a [facility] neuroradiologist can be reached directly at [phone] at any time. SLOT  21 Electronically signed by:  Artur  JUAN Eubanks  12/6/2022 11:20 PM Mountain Time    CT Chest Without Contrast Diagnostic    Result Date: 12/7/2022  1.  No evidence of pulmonary tuberculosis. No acute abnormality in the chest. Electronically signed by:  Edouard Marquez M.D.  12/6/2022 11:09 PM Mountain Time    MRI Brain Without Contrast    Result Date: 12/7/2022   1. No acute intracranial pathology. 2. Chronic changes suggestive of microvascular ischemic disease.   Electronically Signed By-Derek Branham MD On:12/7/2022 11:34 AM This report was finalized on [id number] by  Derek Branham MD.    CT Angiogram Head    Result Date: 12/7/2022  1.  No acute intracranial abnormality is identified on noncontrast head CT. If there is high clinical suspicion for acute ischemic infarct, MRI may be more sensitive. 2.  Within the limitations of motion, no occlusion or high-grade stenosis of the major arteries in the head and neck. Artur Eubanks MD Neuroradiologist [facility]  [email or web address] Thank you for this referral. This exam was interpreted by a fellowship trained neuroradiologist. If the patient's healthcare provider has any questions, a [facility] neuroradiologist can be reached directly at [phone] at any time. SLOT  21 Electronically signed by:  Artur Eubanks M.D.  12/6/2022 11:20 PM Mountain Time    CT CEREBRAL PERFUSION WITH & WITHOUT CONTRAST    Result Date: 12/7/2022  1.  No acute intracranial abnormality is identified on noncontrast head CT. If there is high clinical suspicion for acute ischemic infarct, MRI may be more sensitive. 2.  Within the limitations of motion, no occlusion or high-grade stenosis of the major arteries in the head and neck. Artur Eubanks MD Neuroradiologist [facility] [email or web address] Thank you for this referral. This exam was interpreted by a fellowship trained neuroradiologist. If the patient's healthcare provider has any questions, a  [facility] neuroradiologist can be reached directly at [phone] at any time. SLOT  21 Electronically signed by:  Artur Eubanks M.D.  12/6/2022 11:20 PM Mountain Time      I reviewed the patient's new clinical results.    ________________________________________________      PROBLEM LIST:    Weakness        ASSESSMENT/PLAN:  1.  Transient slurred speech and left arm weakness, resolved prior to admission. Possible TIA, MRI brain negative.   - CT head: No acute findings  - MRI brain: reviewed, no acute findings, chronic microvascular disease.  - CTA head and neck: Within the limitations of motion, no occlusion or high-grade stenosis of the major arteries in the head and neck.  - Echo: EF is 61 to 65%, negative saline test  - EKG: Sinus rhythm, rate 65  - Labs: A1C: 5.7, B12: 391, LDL: 121, TSH: 2.130  - Antithrombotics:     - Statin: Lipitor 80  - PT/OT/ST as appropriate, Neuro checks per protocol, DVT prophylaxis, Stroke education  - Event monitor x 30 days to rule out arrhythmias     2. Spells of unknown origin, multiple episode of tongue protrusion and generalized weakness. No loss of consciousness, very atypical   - MRI was negative  - Start Keppra 500 mg bid   - Follow up outpatient at a facility that has continuous EEG capability.      3. Bilateral feet numbness, acute on chronic, low back pain   - Check MRI lumbar spine, if negative, nothing else to offer inpatient- patient can be discharged with plans for EMG/Nerve conduction studies outpatient     4. Chest tightening, improved  -Management per primary     5. Uncontrolled NERY  - Outpatient sleep study      6. Depression/anxiety  - Resume home medications once verified     7. Vitamin B12 deficiency  - Replacement ordered  - Recommend monthly B12 injections outpt or 1000 mcg p.o. daily    8 . Modification of stroke risk factors:   - Blood pressure should be less than 130/80 outpatient, HbA1c less than 6.5, LDL less than 70; b12>500 and smoking  cessation if applicable. We would be grateful if the primary team / primary care physician would keep a close watch on the above targets.  - Stroke education  - Follow up with Stroke Clinic       If Lumbar MRI is negative patient can be d/c from Neurology.   There are no further recommendations. Will sign off, please call with any questions or concerns.  I discussed the patient's findings and my recommendations with patient, family and nursing staff    SCOOBY Riggs  12/08/22  14:36 EST

## 2022-12-09 ENCOUNTER — APPOINTMENT (OUTPATIENT)
Dept: CT IMAGING | Facility: HOSPITAL | Age: 57
End: 2022-12-09

## 2022-12-09 LAB
ALBUMIN SERPL-MCNC: 3.7 G/DL (ref 3.5–5.2)
ALBUMIN/GLOB SERPL: 1.2 G/DL
ALP SERPL-CCNC: 123 U/L (ref 39–117)
ALT SERPL W P-5'-P-CCNC: 12 U/L (ref 1–33)
ANION GAP SERPL CALCULATED.3IONS-SCNC: 7 MMOL/L (ref 5–15)
AST SERPL-CCNC: 21 U/L (ref 1–32)
BASOPHILS # BLD AUTO: 0 10*3/MM3 (ref 0–0.2)
BASOPHILS NFR BLD AUTO: 0.6 % (ref 0–1.5)
BILIRUB SERPL-MCNC: 0.3 MG/DL (ref 0–1.2)
BUN SERPL-MCNC: 15 MG/DL (ref 6–20)
BUN/CREAT SERPL: 16.5 (ref 7–25)
CALCIUM SPEC-SCNC: 8.9 MG/DL (ref 8.6–10.5)
CHLORIDE SERPL-SCNC: 105 MMOL/L (ref 98–107)
CO2 SERPL-SCNC: 28 MMOL/L (ref 22–29)
CREAT SERPL-MCNC: 0.91 MG/DL (ref 0.57–1)
DEPRECATED RDW RBC AUTO: 42 FL (ref 37–54)
EGFRCR SERPLBLD CKD-EPI 2021: 72.8 ML/MIN/1.73
EOSINOPHIL # BLD AUTO: 0.2 10*3/MM3 (ref 0–0.4)
EOSINOPHIL NFR BLD AUTO: 2.8 % (ref 0.3–6.2)
ERYTHROCYTE [DISTWIDTH] IN BLOOD BY AUTOMATED COUNT: 13.7 % (ref 12.3–15.4)
GAMMA INTERFERON BACKGROUND BLD IA-ACNC: 0 IU/ML
GLOBULIN UR ELPH-MCNC: 3.2 GM/DL
GLUCOSE BLDC GLUCOMTR-MCNC: 107 MG/DL (ref 70–105)
GLUCOSE BLDC GLUCOMTR-MCNC: 87 MG/DL (ref 70–105)
GLUCOSE SERPL-MCNC: 113 MG/DL (ref 65–99)
HCT VFR BLD AUTO: 37.9 % (ref 34–46.6)
HGB BLD-MCNC: 12.3 G/DL (ref 12–15.9)
LYMPHOCYTES # BLD AUTO: 1.7 10*3/MM3 (ref 0.7–3.1)
LYMPHOCYTES NFR BLD AUTO: 21.3 % (ref 19.6–45.3)
M TB IFN-G BLD-IMP: POSITIVE
M TB IFN-G CD4+ BCKGRND COR BLD-ACNC: 0.82 IU/ML
M TB IFN-G CD4+CD8+ BCKGRND COR BLD-ACNC: 0.72 IU/ML
MCH RBC QN AUTO: 28.5 PG (ref 26.6–33)
MCHC RBC AUTO-ENTMCNC: 32.4 G/DL (ref 31.5–35.7)
MCV RBC AUTO: 87.9 FL (ref 79–97)
MITOGEN IGNF BLD-ACNC: 2.35 IU/ML
MONOCYTES # BLD AUTO: 0.7 10*3/MM3 (ref 0.1–0.9)
MONOCYTES NFR BLD AUTO: 8.2 % (ref 5–12)
NEUTROPHILS NFR BLD AUTO: 5.5 10*3/MM3 (ref 1.7–7)
NEUTROPHILS NFR BLD AUTO: 67.1 % (ref 42.7–76)
NRBC BLD AUTO-RTO: 0 /100 WBC (ref 0–0.2)
PLATELET # BLD AUTO: 244 10*3/MM3 (ref 140–450)
PMV BLD AUTO: 7.2 FL (ref 6–12)
POTASSIUM SERPL-SCNC: 4.2 MMOL/L (ref 3.5–5.2)
PROT SERPL-MCNC: 6.9 G/DL (ref 6–8.5)
QT INTERVAL: 397 MS
QUANTIFERON INCUBATION: ABNORMAL
RBC # BLD AUTO: 4.3 10*6/MM3 (ref 3.77–5.28)
SERVICE CMNT-IMP: NORMAL
SODIUM SERPL-SCNC: 140 MMOL/L (ref 136–145)
WBC NRBC COR # BLD: 8.2 10*3/MM3 (ref 3.4–10.8)

## 2022-12-09 PROCEDURE — 82962 GLUCOSE BLOOD TEST: CPT

## 2022-12-09 PROCEDURE — 99214 OFFICE O/P EST MOD 30 MIN: CPT | Performed by: PSYCHIATRY & NEUROLOGY

## 2022-12-09 PROCEDURE — G0378 HOSPITAL OBSERVATION PER HR: HCPCS

## 2022-12-09 PROCEDURE — 70450 CT HEAD/BRAIN W/O DYE: CPT

## 2022-12-09 PROCEDURE — 25010000002 ENOXAPARIN PER 10 MG: Performed by: INTERNAL MEDICINE

## 2022-12-09 PROCEDURE — 80053 COMPREHEN METABOLIC PANEL: CPT | Performed by: INTERNAL MEDICINE

## 2022-12-09 PROCEDURE — 93005 ELECTROCARDIOGRAM TRACING: CPT | Performed by: NURSE PRACTITIONER

## 2022-12-09 PROCEDURE — 93010 ELECTROCARDIOGRAM REPORT: CPT | Performed by: INTERNAL MEDICINE

## 2022-12-09 PROCEDURE — 99213 OFFICE O/P EST LOW 20 MIN: CPT | Performed by: NURSE PRACTITIONER

## 2022-12-09 PROCEDURE — 94799 UNLISTED PULMONARY SVC/PX: CPT

## 2022-12-09 PROCEDURE — 85025 COMPLETE CBC W/AUTO DIFF WBC: CPT | Performed by: INTERNAL MEDICINE

## 2022-12-09 RX ORDER — BUTALBITAL, ACETAMINOPHEN AND CAFFEINE 50; 325; 40 MG/1; MG/1; MG/1
2 TABLET ORAL EVERY 8 HOURS PRN
Status: DISCONTINUED | OUTPATIENT
Start: 2022-12-09 | End: 2022-12-13 | Stop reason: HOSPADM

## 2022-12-09 RX ORDER — LORAZEPAM 0.5 MG/1
0.5 TABLET ORAL DAILY PRN
Status: DISCONTINUED | OUTPATIENT
Start: 2022-12-09 | End: 2022-12-13 | Stop reason: HOSPADM

## 2022-12-09 RX ORDER — LEVETIRACETAM 500 MG/1
500 TABLET ORAL EVERY 12 HOURS SCHEDULED
Status: DISCONTINUED | OUTPATIENT
Start: 2022-12-09 | End: 2022-12-10

## 2022-12-09 RX ADMIN — ENOXAPARIN SODIUM 40 MG: 100 INJECTION SUBCUTANEOUS at 09:29

## 2022-12-09 RX ADMIN — Medication 10 ML: at 09:28

## 2022-12-09 RX ADMIN — FAMOTIDINE 20 MG: 20 TABLET ORAL at 09:28

## 2022-12-09 RX ADMIN — Medication 10 ML: at 09:26

## 2022-12-09 RX ADMIN — ASPIRIN 81 MG CHEWABLE TABLET 162 MG: 81 TABLET CHEWABLE at 09:26

## 2022-12-09 RX ADMIN — FAMOTIDINE 20 MG: 20 TABLET ORAL at 16:28

## 2022-12-09 NOTE — NURSING NOTE
Upon arrival of ICU chrage nurse to code stroke, patient was very anxious, weakness, tachypneaic, shakey, unable to keep tongue in mouth, and slurred speech. Gretel ABDI, NP with neurology arrived to evaluate patient. Stated that this is an anxiety attack and symptoms do not indicate stroke at this time. After speaking with the patient and helping her calm down, Gretel was able to understand more of what she was trying to communicate. Ordered that she could have 1mg ativan when back at her baseline mentation. Primary nurse aware.

## 2022-12-09 NOTE — PROGRESS NOTES
LOS: 0 days     Chief Complaint:  Headache, dizziness        SUBJECTIVE:    History taken from: patient chart RN    Interval History:      Patient Complaints:       Review of Systems   Constitutional: Negative.    Eyes: Negative for visual disturbance.   Cardiovascular: Negative.    Neurological: Positive for dizziness and headaches. Negative for tremors, seizures, syncope, facial asymmetry, speech difficulty, weakness, light-headedness and numbness.          Pertinent PMH:  has a past medical history of Anxiety and Depression.   ________________________________________________     OBJECTIVE:    On exam:  GENERAL: NAD  CARDIO: RRR  NEURO:  Awake and alert  Sitting up in the chair   Oriented x3  EOMI, PERRL, no visual field deficits  CN 2-12 intact, No facial asymmetry  Speech clear without dysarthria  Sensations intact and equal bilaterally  Strength 5/5 and equal in all extremities  No ataxia      ________________________________________________   RESULTS REVIEW    VITAL SIGNS:  Temp:  [97.6 °F (36.4 °C)-98.1 °F (36.7 °C)] 97.8 °F (36.6 °C)  Heart Rate:  [59-62] 59  Resp:  [10-23] 13  BP: ()/(45-70) 95/58    LABS:       Lab 12/09/22  0407 12/08/22  0544 12/07/22  0604 12/06/22  2314   WBC 8.20 7.00 7.70 8.80   HEMOGLOBIN 12.3 12.2 13.4 13.6   HEMATOCRIT 37.9 37.6 40.8 40.4   PLATELETS 244 262 285 330   NEUTROS ABS 5.50 4.90 5.60 7.00   LYMPHS ABS 1.70 1.40 1.40 1.10   MONOS ABS 0.70 0.50 0.60 0.60   EOS ABS 0.20 0.10 0.10 0.00   MCV 87.9 88.7 87.2 86.2   PROCALCITONIN  --   --   --  0.02   PROTIME  --   --   --  10.8   APTT  --   --   --  26.8*         Lab 12/09/22  0407 12/08/22  0544 12/07/22  0604 12/06/22  2314   SODIUM 140 140 140 139   POTASSIUM 4.2 4.1 3.9 4.3   CHLORIDE 105 109* 106 105   CO2 28.0 21.0* 22.0 21.0*   ANION GAP 7.0 10.0 12.0 13.0   BUN 15 10 8 9   CREATININE 0.91 0.70 0.70 0.71   EGFR 72.8 99.8 99.8 98.1   GLUCOSE 113* 105* 128* 137*   CALCIUM 8.9 8.8 9.0 9.5   HEMOGLOBIN A1C  --    --  5.7*  --    TSH  --   --   --  2.130         Lab 12/09/22  0407 12/08/22  0544 12/06/22  2314   TOTAL PROTEIN 6.9 7.1 8.5   ALBUMIN 3.70 3.60 4.20   GLOBULIN 3.2 3.5 4.3   ALT (SGPT) 12 14 16   AST (SGOT) 21 26 25   BILIRUBIN 0.3 0.5 0.8   ALK PHOS 123* 125* 156*         Lab 12/06/22  2314   TROPONIN T <0.010   PROTIME 10.8   INR 1.05         Lab 12/07/22  0604   CHOLESTEROL 182   LDL CHOL 121*   HDL CHOL 42   TRIGLYCERIDES 104         Lab 12/06/22  2314   VITAMIN B 12 391             Lab Results   Component Value Date    TSH 2.130 12/06/2022     (H) 12/07/2022    HGBA1C 5.7 (H) 12/07/2022    [id number] 391 12/06/2022         IMAGING STUDIES:  MRI Brain Without Contrast    Result Date: 12/7/2022   1. No acute intracranial pathology. 2. Chronic changes suggestive of microvascular ischemic disease.   Electronically Signed By-Derek Branham MD On:12/7/2022 11:34 AM This report was finalized on [id number] by  Derek Branham MD.    MRI Lumbar Spine Without Contrast    Result Date: 12/8/2022  1.     Transitional anatomy at the lumbosacral junction with suspected partial sacralization of the S1 vertebrae. Close correlation with imaging is recommended prior to any planned intervention. 2.     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 3.     Multilevel disc and facet joint degeneration with moderate to severe canal narrowing at L4-5 and L5-S1. 4.     Moderate bilateral foraminal narrowing at L4-5 and moderate to severe foraminal narrowing at L5-S1.  Electronically Signed By-Riccardo Ramirez MD On:12/8/2022 4:53 PM This report was finalized on [id number] by  Riccardo Ramirez MD.      I reviewed the patient's new clinical results.    ________________________________________________      PROBLEM LIST:    Weakness        ASSESSMENT/PLAN:  1.  Transient slurred speech and left arm weakness, resolved prior to admission. Possible TIA, MRI brain negative.   - CT head: No acute findings  - MRI  brain: reviewed, no acute findings, chronic microvascular disease.  - CTA head and neck: Within the limitations of motion, no occlusion or high-grade stenosis of the major arteries in the head and neck.  - Echo: EF is 61 to 65%, negative saline test  - EKG: Sinus rhythm, rate 65  - Labs: A1C: 5.7, B12: 391, LDL: 121, TSH: 2.130  - Antithrombotics:  mg daily   - Statin: Lipitor 80  - PT/OT/ST as appropriate, Neuro checks per protocol, DVT prophylaxis, Stroke education  - Event monitor x 30 days to rule out arrhythmias     2. Spells of unknown origin, multiple episode of tongue protrusion and generalized weakness. No loss of consciousness, very atypical   - MRI was negative  - Start Keppra 500 mg bid   - Follow up outpatient at a facility that has continuous EEG capability.      3. Bilateral feet numbness, acute on chronic, low back pain   - MRI lumbar spine reviewed. Moderate to severe degenerative changes and narrowing.  - Follow up with Neurosurgery or orthospine outpatient     4. Headache  - Fioricet 2 tablets PRN q8h     4. Chest tightening, improved  -Management per primary     5. Uncontrolled NERY  - Outpatient sleep study      6. Depression/anxiety  - Resume home medications once verified   - Recommend patient see psychiatry outpatient for uncontrolled anxiety and depression     7. Vitamin B12 deficiency  - Replacement ordered  - Recommend monthly B12 injections outpt or 1000 mcg p.o. daily     8 . Modification of stroke risk factors:   - Blood pressure should be less than 130/80 outpatient, HbA1c less than 6.5, LDL less than 70; b12>500 and smoking cessation if applicable. We would be grateful if the primary team / primary care physician would keep a close watch on the above targets.  - Stroke education  - Follow up with Stroke Clinic     I personally had a very thorough conversation with both patient and her daughter via telephone.  Discussed all including plans outpatient.  Both patient and her  daughter are agreeable.  Nothing more to add from a neurology standpoint inpatient.  Thank you for this consult.     I discussed the patient's findings and my recommendations with patient and nursing staff    SCOOBY Riggs  12/09/22  09:39 EST

## 2022-12-09 NOTE — PROGRESS NOTES
"  Chief complaint anxiety, panic attacks      Subjective .     History of present illness:  The patient is a 57 y.o. female who was admitted secondary to  weakness. PMHx: anxiety and depression. Psych consult was requested by Dr Jorge Alberto osullivan to mental status changes and depression.   The pt reported history of depression in the past, approximately 20 years ago the patient had \"nervous breakdown, she had to be admitted to the hospital  In [place] secondary to suicidal ideations, but no attempts.  At that time she was started  on Paxil, Ambien and lorazepam, depression improved at the time, she stopped medications  Few  years ago.  Last  year her  passed away, the patient became more depressed, increased crying spells, increased self-isolation.  The patient was prescribed clonazepam by her previous primary care provider.  Also was prescribed Ambien and lorazepam for anxiety.  Her  passed away around holidays, his anniversary is approaching, the patient found old bottle of clonazepam 0.5 mg and she was taking 1 pill daily to alleviate anxiety  until she ran out 1 week ago.  Depression is rated as a 7 out of 10, she denied suicidal and homicidal ideations, the patient denied any perceptual disturbances  Anxiety is persistent and intense, denied AVH/SI/HI  The pt developed stroke like sxs with numbness and muscle weakness   Past psych hx: depression anxiety, hx of SI no SA     Today the pt was   alert and awake,   very anxious about her condition, about d/c plan.  Pt's daughter was in the room and helped the pt to express her concerns.   The pt confirmed she was taking old rx of clonazepam 0.5 mg QD ORN for anxiety, denied taking more , she found clonazepam to be effective. But she noticed that lorazepam 0.5 mg was more effective for panic attacks    The pt denied feeling hopeless/helpless, denied AVH/SI/HI       Review of Systems   All systems were reviewed and negative except for:  Constitution:  positive for " "fatigue  Musculoskeletal: positive for  muscle weakness  Neurological: positive for  difficulty walking and weakness  Behavioral/Psych: positive for  anxiety and depression    History       Medications Prior to Admission   Medication Sig Dispense Refill Last Dose   • busPIRone (BUSPAR) 5 MG tablet Take 1 tablet by mouth 3 (Three) Times a Day. 30 tablet 5    • nystatin (MYCOSTATIN) 310020 UNIT/GM cream Apply 1 application topically to the appropriate area as directed 2 (Two) Times a Day. 30 g 3    • zolpidem (AMBIEN) 10 MG tablet Take 1 tablet by mouth At Night As Needed for Sleep. 30 tablet 2         Scheduled Meds:  aspirin, 162 mg, Oral, Daily   Or  aspirin, 300 mg, Rectal, Daily  atorvastatin, 80 mg, Oral, Nightly  busPIRone, 5 mg, Oral, TID  enoxaparin, 40 mg, Subcutaneous, Q12H  famotidine, 20 mg, Oral, BID AC  levETIRAcetam, 500 mg, Oral, Q12H  nystatin, , Topical, Q12H  sodium chloride, 10 mL, Intravenous, Q12H  sodium chloride, 10 mL, Intravenous, Q12H  vilazodone, 10 mg, Oral, Daily         Continuous Infusions:       PRN Meds:  •  acetaminophen  •  butalbital-acetaminophen-caffeine  •  ondansetron  •  sodium chloride  •  [COMPLETED] Insert Peripheral IV **AND** sodium chloride  •  sodium chloride  •  sodium chloride  •  sodium chloride  •  sodium chloride  •  zolpidem      Allergies:  Patient has no known allergies.      Objective     Vital Signs   /62 (BP Location: Left arm, Patient Position: Lying)   Pulse 58   Temp 97.9 °F (36.6 °C) (Oral)   Resp 18   Ht 170.2 cm (67\")   Wt (!) 143 kg (315 lb)   LMP  (LMP Unknown)   SpO2 100%   BMI 49.34 kg/m²     Physical Exam:     General Appearance:    In NAD       Mental Status Exam:    Hygiene:   good  Cooperation:  Cooperative  Eye Contact:  Good  Psychomotor Behavior:  Appropriate  Affect:  Appropriate  Hopelessness: Denies  Speech:  Normal  Thought Progress:  Goal directed and Linear  Thought Content:  Mood congruent  Suicidal:  None  Homicidal:  " None  Hallucinations:  None  Delusion:  None  Memory:  fair   Orientation:  Person, Place and Situation  Reliability:  fair  Insight:  Fair  Judgement:  Fair  Impulse Control:  Fair  Physical/Medical Issues:  Yes      Medications and allergies reviewed     Lab Results   Component Value Date    GLUCOSE 113 (H) 12/09/2022    CALCIUM 8.9 12/09/2022     12/09/2022    K 4.2 12/09/2022    CO2 28.0 12/09/2022     12/09/2022    BUN 15 12/09/2022    CREATININE 0.91 12/09/2022    EGFRIFAFRI 102 02/17/2021    EGFRIFNONA 89 02/17/2021    BCR 16.5 12/09/2022    ANIONGAP 7.0 12/09/2022       Last Urine Toxicity     LAST URINE TOXICITY RESULTS Latest Ref Rng & Units 12/7/2022    BARBITURATES SCREEN Negative Negative    BENZODIAZEPINE SCREEN, URINE Negative Negative    COCAINE SCREEN, URINE Negative Negative    METHADONE SCREEN, URINE Negative Negative          No results found for: PHENYTOIN, PHENOBARB, VALPROATE, CBMZ    Lab Results   Component Value Date     12/09/2022    BUN 15 12/09/2022    CREATININE 0.91 12/09/2022    TSH 2.130 12/06/2022    WBC 8.20 12/09/2022       Brief Urine Lab Results     None          Assessment & Plan       Weakness        Assessment: Major depressive disorder moderate recurrent  Generalized anxiety disorder  Bereavement    Treatment Plan: The patient presented with symptoms of depression, anxiety, and anniversary of her 's death is approaching.  Continue BuSpar at current dose, continue Ambien as needed for insomnia  The patient was on Paxil in the past, did not report with experience  She did not respond to Zoloft and Lexapro  Cont Viibryd 10 mg p.o. every morning tomorrow for anxiety and depression - the pt agreed to start tasking it      Start lorazepam 0.5 mg po QD PRN  For anxiety/panic attacks and she needs Rx for #14 tabs as take home med    The patient will benefit from psychotherapy on Outpatient basis and grief therapy    The pt can call [facility] [phone]  to make appt with [facility]ora [facility] if she takes her ins   If Leonora is still not in providers network, contact [facility], [facility] or [facility]   She also can explore tele counseling options - [facility], [facility] or [facility]     Safety plan was discussed with the patient and her daughter   They verbalized understanding   For med management she also might call [facility] but they were informed that it might take long time to get in   The pt can be d/c when medically stable     Treatment Plan discussed with: Patient, Family and nursing     I discussed the patients findings and my recommendations with patient, family and nursing staff    I have reviewed and approved the behavioral health treatment plans and problem list. Yes     Referring MD has access to consult report and progress notes in EMR     Kavya Barillas MD  12/09/22  14:36 EST

## 2022-12-09 NOTE — PLAN OF CARE
Goal Outcome Evaluation:         Pt in bed resting, had shower at beginning of shift w assistance from family. No complaints

## 2022-12-09 NOTE — DISCHARGE PLACEMENT REQUEST
"Eladia Jimenez (57 y.o. Female)     Date of Birth   1965    Social Security Number       Address   [address] [place] [address] [place] [facility]    Home Phone   [phone]    MRN   [id number]       [place]   [employer]    Marital Status                               Admission Date   12/6/22    Admission Type   Emergency    Admitting Provider       Attending Provider   Delores Azul DO    Department, Room/Bed   [facility] 2A PEDIATRICS, 207/1       Discharge Date       Discharge Disposition       Discharge Destination                               Attending Provider: Delores Azul DO    Allergies: No Known Allergies    Isolation: None   Infection: None   Code Status: CPR    Ht: 170.2 cm (67\")   Wt: 143 kg (315 lb)    Admission Cmt: None   Principal Problem: Weakness [R53.1]                 Active Insurance as of 12/6/2022     Primary Coverage     Payor Plan Insurance Group Employer/Plan Group    MISC COMMERCIAL MISC COMMERCIAL [id number]     Coverage Address Coverage Phone Number Coverage Fax Number Effective Dates    [address] [phone]  1/1/2021 - None Entered    [place] [address]       Subscriber Name Subscriber Birth Date Member ID       ELADIA JIMENEZ 1965 [id number]                 Emergency Contacts      (Rel.) Home Phone Work Phone Mobile Phone    DIANA MARDIGAL (Daughter) -- -- [phone]    ROHANYANNIFARA (Daughter) -- -- [phone]              "

## 2022-12-09 NOTE — PROGRESS NOTES
[facility] Medicine Services Daily Progress Note    Patient Name: Eladia Connor  : 1965  MRN: [id number]  Primary Care Physician:  Quincy Lemos APRN  Date of admission: 2022      Subjective      Chief Complaint: Weakness    Patient seen and examined this morning.  Doing well complaining of some dizziness with room spinning sensation.  Awaiting rehab placement.  Imaging reports discussed with her, Keppra started yesterday per neurology.  No other complaints.    Pertinent positives as noted in HPI/subjective.  All other systems were reviewed and are negative.      Objective      Vitals:   Temp:  [97.6 °F (36.4 °C)-98.1 °F (36.7 °C)] 97.8 °F (36.6 °C)  Heart Rate:  [59-62] 59  Resp:  [10-23] 13  BP: ()/(45-70) 95/58  Flow (L/min):  [2] 2    Physical Exam:    General: Awake, alert, lying in bed, NAD  Eyes: PERRL, EOMI, conjunctivae are clear  Cardiovascular: Regular rate and rhythm, no murmurs  Respiratory: Clear to auscultation bilaterally, no wheezing or rales, unlabored breathing  Abdomen: Soft, nontender, positive bowel sounds, no guarding  Neurologic: A&O, CN grossly intact, speech normal, moves all extremities spontaneously  Musculoskeletal: Generalized weakness, no deformities  Skin: Warm, dry, intact         Result Review    Result Review:  I have personally reviewed the results from the time of this admission to 2022 10:18 EST and agree with these findings:  [x]  Laboratory  [x]  Microbiology  [x]  Radiology  [x]  EKG/Telemetry   [x]  Cardiology/Vascular   []  Pathology  []  Old records  []  Other:          Assessment & Plan      Brief Patient Summary:  Eladia Connor is a 57 y.o. female who presented to [facility] on 2022 complaining of weakness.   She reportedly has a history of a CVA in  without any residual deficits. She notes over the last week or so she has noted intermittent though right leg weakness and numbness with  associated tingling.  Around 11 AM in the morning she developed slurred speech and left arm weakness.  Symptoms lasted for about 30 minutes and she presented to [facility] for further evaluation.  She was subsequently discharged indicating that testing/evaluation was largely unremarkable with questionable history of a pneumonia on chest x-ray.   She describes a nonproductive cough without hemoptysis.  + Sick contacts with grandson with the flu.  She denies any fevers. She describes having possible positive TB test few weeks ago. No known TB contacts however.  She indicates her son just returned from the [place] though is not sick.  No significant personal travel history noted. When inquiring about new medications she identifies that she started Otezla this last week for her psoriasis and her symptoms seem to began after this.  She also reports being on Klonopin but has not taken for 1 week as she ran out of it.  Patient was noted to have multiple episodes of altered mental status with slurred speech and left-sided weakness.  Stroke work-up x2 including MRI brain appears to be negative.  Neurology following, recommending outpatient EEG study to rule out seizure activity.  Patient also appears to be having major depressive episode, psych was consulted.  Recommended antidepression but patient refusing at this time.  PT/OT recommending rehab.      aspirin, 162 mg, Oral, Daily   Or  aspirin, 300 mg, Rectal, Daily  atorvastatin, 80 mg, Oral, Nightly  busPIRone, 5 mg, Oral, TID  enoxaparin, 40 mg, Subcutaneous, Q12H  famotidine, 20 mg, Oral, BID AC  levETIRAcetam, 500 mg, Oral, Q12H  nystatin, , Topical, Q12H  sodium chloride, 10 mL, Intravenous, Q12H  sodium chloride, 10 mL, Intravenous, Q12H  vilazodone, 10 mg, Oral, Daily             Active Hospital Problems:  Active Hospital Problems    Diagnosis    • **Weakness      Plan:   Speech disturbance with right leg and left arm weakness, improved  -Possible TIA, or  seizure, unclear at this time  -Patient has had few episodes of same symptoms over the past few days, possibly medication induced but unclear at this time  -Stroke work-up including MRI brain negative for new acute findings  -Previously had TIA back in 2013 without any residual deficits  -Possible major depression episode  -Continue aspirin, statin  -Keppra added empirically per neurology  -Neurology following, recommending outpatient EEG, no in-house EEG available at this time  -PT/OT recommending rehab  -Medically stable for discharge, awaiting placement    Major depression with anxiety  -Patient had episodes of altered mentation with speech disturbances however stroke work-up and metabolic work-up so far negative  -Possibly suffering from major depression episode  -Psych following and recommended antidepressant but patient refusing at this time  -Continue home BuSpar  -Continue psych follow-up    Abnormal TB screening  -Reportedly positive QuantiFERON as outpatient however no symptoms or signs of TB infection noted at this time  -QuantiFERON has been ordered however can follow-up as outpatient with PCP for further work-up    Psoriasis  -Possibly having side effects from Otezla, hold for now  -Continue outpatient follow-up    Suspected NERY  -States she did have sleep study years ago but did not follow-up on CPAP  -Recommend outpatient sleep study, will refer to neurology/pulmonology    Obesity  -BMI of 49.34 noted  -Lifestyle modifications recommended    DVT prophylaxis  -Lovenox      CODE STATUS:    Code Status (Patient has no pulse and is not breathing): CPR (Attempt to Resuscitate)  Medical Interventions (Patient has pulse or is breathing): Full Support  Release to patient: Routine Release      Disposition: Rehab placement.  Medically stable.    Electronically signed by Delores Azul DO, 12/09/22, 10:18 EST.  [facility]ist [facility]      Part of this note may be an electronic transcription/translation  of spoken language to printed text using the Dragon Dictation System.

## 2022-12-09 NOTE — CASE MANAGEMENT/SOCIAL WORK
Continued Stay Note  REJI Grupo     Patient Name: Eladia Connor  MRN: [id number]  Today's Date: 12/9/2022    Admit Date: 12/6/2022    Plan: D/C plan: [facility], pending acceptance. Will need precert. PASRR per facility.   Discharge Plan     Row Name 12/09/22 1308       Plan    Plan D/C plan: [facility], pending acceptance. Will need precert. PASRR per facility.    Patient/Family in Agreement with Plan yes    Plan Comments CM met with pt at bedside to follow up on SNF choices. Pt provided the following choices: 1)[facility] 2) [facility]. CM advised pt to make back up choices incase either of these can not accept her. Pt states her daughter is on the way and they will further review facilities. CM created DCP with requested info and had emailed to [email or web address] per [facility] DEBRA Winn. Pending acceptance. Will require precert once accepting facility is found.                   Expected Discharge Date and Time     Expected Discharge Date Expected Discharge Time    Dec 12, 2022         Met with patient in room wearing PPE: mask  Maintained distance greater than six feet and spent less than 15 minutes in the room.          Dexter Devlin RN

## 2022-12-09 NOTE — DISCHARGE PLACEMENT REQUEST
"    Referral for [facility]    Dexter Devlin, RN BSN    [facility] [phone]            Eladia Connor (57 y.o. Female)     Date of Birth   1965    Social Security Number       Address   [address] [place] [address]    Home Phone   [phone]    MRN   [id number]       Taoist   Rastafarian    Marital Status                               Admission Date   22    Admission Type   Emergency    Admitting Provider       Attending Provider   Delores Azul DO    Department, Room/Bed   [facility] 2A PEDIATRICS,        Discharge Date       Discharge Disposition       Discharge Destination                               Attending Provider: Delores Azul DO    Allergies: No Known Allergies    Isolation: None   Infection: None   Code Status: CPR    Ht: 170.2 cm (67\")   Wt: 143 kg (315 lb)    Admission Cmt: None   Principal Problem: Weakness [R53.1]                 Active Insurance as of 2022     Primary Coverage     Payor Plan Insurance Group Employer/Plan Group    MISC COMMERCIAL MISC COMMERCIAL [id number]     Coverage Address Coverage Phone Number Coverage Fax Number Effective Dates    [address] [phone]  2021 - None Entered    [facility] [id number]       Subscriber Name Subscriber Birth Date Member ID       ELADIA CONNOR 1965 [id number]                 Emergency Contacts      (Rel.) Home Phone Work Phone Mobile Phone    DIANA MADRIGAL (Daughter) -- -- [phone]    FARA MADRIGAL (Daughter) -- -- [phone]               History & Physical      Adolph Azul MD at 22 0316              [facility]      Patient Name: Eladia Connor  : 1965  MRN: [id number]  Primary Care Physician:  Quincy Lemos APRN  Date of admission: 2022      Subjective       Chief Complaint: weakness    History of Present Illness: Eladia Connor " is a 57 y.o. female who presented to [facility] on 12/6/2022 complaining of above.   She reportedly has a history of a CVA in 2012 without any residual deficits. She notes over the last week or so she has noted intermittent though right leg weakness and numbness with associated tingling.  Around 11 AM this morning she developed slurred speech and left arm weakness.  Symptoms lasted for about 30 minutes and she presented to [facility] for further evaluation.  She was subsequently discharged indicating that testing/evaluation was largely unremarkable with questionable history of a pneumonia on chest x-ray.   She describes a nonproductive cough without hemoptysis.  + Sick contacts with grandson with the flu.  She denies any fevers. She describes having possible positive TB test few weeks ago. No known TB contacts however.  She indicates her son just returned from the [place] though is not sick.  No significant personal travel history noted.  When inquiring about new medications she identifies that she started Otezla this last week for her psoriasis and her symptoms seem to began after this.  She also recently began Ambien as needed for sleep approximately a month ago but she does not want to take this currently because of her symptoms. She endorses using an old prescription of Klonopin for the last month though was out of pills approximately a week ago  She indicates that her psoriasis was diagnosed by her dermatologist  .  She notes some chest tightness and substernal pressure that was nonradiating earlier today that is nonexertional  Unfortunately she has been suffering from increased stress and anxiety as her  passed away approximately a year ago. She is on BuSpar her PCP and was recently advised to titrate though she has not done so at this time.    She has not taken Lexapro for years  She is no longer taking ivermectin/Diflucan states that this was given to her by her PCP while evaluation  for her psoriasis.        ROS: 10 point ROS negative aside from noted per HPI: No dysuria. No hemoptysis      Personal History     Past Medical History:   Diagnosis Date   • Anxiety    • Depression        Past Surgical History:   Procedure Laterality Date   • CHOLECYSTECTOMY  1987   • EYE SURGERY     • KNEE SURGERY         Family History: family history includes Breast cancer in her mother; Heart disease in her father, mother, and sister; Lung cancer in her brother; Pancreatic cancer in her father. Otherwise pertinent FHx was reviewed and not pertinent to current issue.    Social History:  reports that she has never smoked. She has never used smokeless tobacco. She reports that she does not drink alcohol and does not use drugs.    Home Medications:  Prior to Admission Medications     Prescriptions Last Dose Informant Patient Reported? Taking?    busPIRone (BUSPAR) 5 MG tablet   No No    Take 1 tablet by mouth 3 (Three) Times a Day.    fluconazole (DIFLUCAN) 150 MG tablet   No No    TAKE 1 TABLET BY MOUTH 1 TIME FOR 1 DOSE.    hydrOXYzine pamoate (VISTARIL) 100 MG capsule   No No    Take 1 capsule by mouth 3 (Three) Times a Day As Needed for Itching.    ivermectin (STROMECTOL) 3 MG tablet tablet   No No    TAKE 9 TABLETS BY MOUTH ONCE    nystatin (MYCOSTATIN) 371528 UNIT/GM cream   No No    Apply 1 application topically to the appropriate area as directed 2 (Two) Times a Day.    nystatin (MYCOSTATIN) 980804 UNIT/GM powder   No No    Apply  topically to the appropriate area as directed 3 (Three) Times a Day.    promethazine-codeine (PHENERGAN with CODEINE) 6.25-10 MG/5ML syrup   No No    Take 5 mL by mouth Every 4 (Four) Hours As Needed for Cough.    sertraline (ZOLOFT) 50 MG tablet   No No    Take 1 tablet by mouth Daily.    zolpidem (AMBIEN) 10 MG tablet   No No    Take 1 tablet by mouth At Night As Needed for Sleep.            Allergies:  No Known Allergies    Objective       Vitals:   Temp:  [98 °F (36.7 °C)] 98  °F (36.7 °C)  Heart Rate:  [65-87] 67  Resp:  [17-18] 18  BP: (129-149)/(65-75) 129/68    Physical Exam   Physical Exam:     GEN/CONS:   Vitals noted above, NAD, AOX3, morbidly obese white female in 1 to 2 L   EYES:  Conjunctiva pink   ENMT:  NECK:  Atraumatic external nose/ears, Oropharynx clear  Symmetric, trachea midline   CV:  Regular, Reg S1/S2, no murmur   PULM:  Diminished breath sounds at bases no wheezes   GI:  : Soft, Nontender, Nondistended   Deferred.    MSK:  Scattered macular papular erythematous patches on extremities (suspect guttate psoriasis)    NEURO:     Aside from some range of motion limitations involving her right knee due to chronic pain sensation and motor appear to be grossly intact   Cranial nerves II to XII intact   Finger-nose heel-to-shin intact (again with some limitation regarding right lower extremity due to right knee pain)  Mild slurring of speech   PSYCH:    Appears anxious         Result Review    Result Review:  I have personally reviewed the results from the time of this admission to 12/7/2022 03:16 EST and agree with these findings:  [x]  Laboratory  []  Microbiology  [x]  Radiology  []  EKG/Telemetry   []  Cardiology/Vascular   []  Pathology  [x]  Old records  []  Other:  Most notable findings include:    LABS      Lab 12/06/22 2314   WBC 8.80   HEMOGLOBIN 13.6   HEMATOCRIT 40.4   PLATELETS 330   NEUTROS ABS 7.00   LYMPHS ABS 1.10   MONOS ABS 0.60   EOS ABS 0.00   MCV 86.2   PROCALCITONIN 0.02   PROTIME 10.8   APTT 26.8*         Lab 12/06/22  2314   SODIUM 139   POTASSIUM 4.3   CHLORIDE 105   CO2 21.0*   ANION GAP 13.0   BUN 9   CREATININE 0.71   EGFR 98.1   GLUCOSE 137*   CALCIUM 9.5   TSH 2.130         Lab 12/06/22  2314   TOTAL PROTEIN 8.5   ALBUMIN 4.20   GLOBULIN 4.3   ALT (SGPT) 16   AST (SGOT) 25   BILIRUBIN 0.8   ALK PHOS 156*         Lab 12/06/22  2314   TROPONIN T <0.010   INR 1.05                 Brief Urine Lab Results     None         ________________________________  MICRO  Microbiology Results (last 10 days)     Procedure Component Value - Date/Time    Respiratory Panel PCR w/COVID-19(SARS-CoV-2) TESS/MEDARDO/DAVID/PAD/COR/MAD/SHAMIKA In-House, NP Swab in UTM/VTM, 3-4 HR TAT - Swab, Nasopharynx [[id number]]  (Normal) Collected: 12/07/22 0050    Lab Status: Final result Specimen: Swab from Nasopharynx Updated: 12/07/22 0142     ADENOVIRUS, PCR Not Detected     Coronavirus 229E Not Detected     Coronavirus HKU1 Not Detected     Coronavirus NL63 Not Detected     Coronavirus OC43 Not Detected     COVID19 Not Detected     Human Metapneumovirus Not Detected     Human Rhinovirus/Enterovirus Not Detected     Influenza A PCR Not Detected     Influenza B PCR Not Detected     Parainfluenza Virus 1 Not Detected     Parainfluenza Virus 2 Not Detected     Parainfluenza Virus 3 Not Detected     Parainfluenza Virus 4 Not Detected     RSV, PCR Not Detected     Bordetella pertussis pcr Not Detected     Bordetella parapertussis PCR Not Detected     Chlamydophila pneumoniae PCR Not Detected     Mycoplasma pneumo by PCR Not Detected    Narrative:      In the setting of a positive respiratory panel with a viral infection PLUS a negative procalcitonin without other underlying concern for bacterial infection, consider observing off antibiotics or discontinuation of antibiotics and continue supportive care. If the respiratory panel is positive for atypical bacterial infection (Bordetella pertussis, Chlamydophila pneumoniae, or Mycoplasma pneumoniae), consider antibiotic de-escalation to target atypical bacterial infection.        ________________________________  RAD  XR Chest 2 View    Result Date: 11/30/2022  Impression: No active cardiopulmonary disease  Electronically Signed ByHenri Maravilla On:11/30/2022 3:50 PM This report was finalized on [id number] by  Ish Maravilla, .    XR Knee 1 or 2 View Right    Result Date: 12/1/2022  Impression: No acute osseous  abnormality. Moderate to severe tricompartmental osteoarthritic changes are present, most pronounced within the medial and patellofemoral compartments. A small joint effusion is present.  Electronically Signed By-Nivia Olivas MD On:12/1/2022 11:46 AM This report was finalized on [id number] by  Nivia Olivas MD.    CT Angiogram Neck    Result Date: 12/7/2022  Impression: 1.  No acute intracranial abnormality is identified on noncontrast head CT. If there is high clinical suspicion for acute ischemic infarct, MRI may be more sensitive. 2.  Within the limitations of motion, no occlusion or high-grade stenosis of the major arteries in the head and neck. Artur Eubanks MD Neuroradiologist [facility] Radiology [facility] [email or web address] Thank you for this referral. This exam was interpreted by a fellowship trained neuroradiologist. If the patient's healthcare provider has any questions, a [facility] neuroradiologist can be reached directly at [phone] at any time. SLOT  21 Electronically signed by:  Artur Eubanks M.D.  12/6/2022 11:20 PM Mountain Time    CT Chest Without Contrast Diagnostic    Result Date: 12/7/2022  Impression: 1.  No evidence of pulmonary tuberculosis. No acute abnormality in the chest. Electronically signed by:  Edouard Marquez M.D.  12/6/2022 11:09 PM Mountain Time    CT Angiogram Head    Result Date: 12/7/2022  Impression: 1.  No acute intracranial abnormality is identified on noncontrast head CT. If there is high clinical suspicion for acute ischemic infarct, MRI may be more sensitive. 2.  Within the limitations of motion, no occlusion or high-grade stenosis of the major arteries in the head and neck. Artur Eubanks MD Neuroradiologist [facility] Radiology [facility]  [email or web address] Thank you for this referral. This exam was interpreted by a fellowship trained neuroradiologist. If the patient's healthcare provider has any questions, a [facility]  neuroradiologist can be reached directly at [phone] at any time. SLOT  21 Electronically signed by:  Artur Eubanks M.D.  12/6/2022 11:20 PM Mountain Time    CT CEREBRAL PERFUSION WITH & WITHOUT CONTRAST    Result Date: 12/7/2022  Impression: 1.  No acute intracranial abnormality is identified on noncontrast head CT. If there is high clinical suspicion for acute ischemic infarct, MRI may be more sensitive. 2.  Within the limitations of motion, no occlusion or high-grade stenosis of the major arteries in the head and neck. Artur Eubanks MD Neuroradiologist [facility] [email or web address] Thank you for this referral. This exam was interpreted by a fellowship trained neuroradiologist. If the patient's healthcare provider has any questions, a [employer] neuroradiologist can be reached directly at [phone] at any time. SLOT  21 Electronically signed by:  Artur Eubanks M.D.  12/6/2022 11:20 PM Mountain Time    ________________________________  CARDS     No results found for this or any previous visit.        Assessment & Plan        Active Hospital Problems:  There are no active hospital problems to display for this patient.      Assessment:    Suspected TIA- Slurred speech, Weakness (R leg/L arm)   • Admission: Afebrile, WBC within normal limits, troponin negative, TSH within normal limits, procalcitonin within normal limits, COVID-19/viral respiratory panel negative  • Imaging: CT head, CTA head neck noted above-see report for full details.  Briefly no acute intracranial process or high-grade stenosis of the major arteries of the head and neck.  • Misc/Context: Reported questionable history of CVA with no residual deficits 2013.  Increased stress and anxiety currently with unfortunately anniversary of 's passing approximately a year ago.  Symptoms greater than 4-1/2 hours prior to presentation.  States that recently started new medication for psoriasis and symptoms  seem to began afterwards.  Additionally started Ambien about a month ago and recently on a new antidepressant Buspar.    Depression with anxiety  · On BuSpar prior to admission recently started.  PCP reportedly recently advised to titrate BuSpar.  She states that she has not taken Lexapro for years    Abnormal TB Screening- Reportedly + QuantiFERON as outpatient  · Admission: Afebrile, saturating well on room air, HD stable, WBC wnl, COVID-19 rapid and influenza panel negative  · Imaging: CT chest noted above no acute pulmonary abnormality noted.  · Asymptomatic without hemoptysis, with no known TB contact, no foreign travel/residence, no nightsweats/weightloss    Psoriasis-Suspect Guttate based on appearance   · Reports recently starting Otezla   · States that she was formally diagnosed by her dermatologist.     Right Knee osteoarthritis  · Chronic right knee range of motion limitation    Suspected possible obstructive sleep apnea  · States that she was diagnosed previously though does not have a CPAP at home    Obesity-class III  · Body mass index is 49.34 kg/m².    Full code  · Discussed and verified    Discussion/Plan:     Admit for observation  Rule out CVA with hx reported previous hx.  Tentative ASA + HI statin at this time  MRI brain in the morning  Obtain baseline ECG   Telemetry monitoring.   Additional NIH Stroke protocol/Neurochecks as ordered.   Neurology consultation in AM    Question some iatrogenic causes of her symptoms.  Recommend holding Otezla at this time based on temporal relation with symptoms.  Ambien was also started approximately a month ago per her report and she no longer wants to take this.  Consider holding though unfortunately her  passed away approximately a year ago and has been undergoing increased stress and anxiety recently.  Anxiety may be contributory to acute symptoms noted above as well though she states she was fine and usual state of health until about a week  ago.    No CT evidence of TB, saturating well on room air. Will recheck Quantiferon.    Would benefit from outpatient sleep study for possible NERY    Pharmacy/RN to confirm home medications and will resume/adjust appropriate medications if necessary once confirmed and notified as ordered.  Resume antidepressants once confirmed    Lovenox prophylaxis  Surveillance labs.  See orders for additional details.    Further recommendations to follow as inpatient course and evaluation proceeds.   Daytime hospitalist provider will continue forward care in the morning.       DVT prophylaxis:  No DVT prophylaxis order currently exists.    CODE STATUS:       Admission Status:  I believe this patient meets obs status.    I discussed the patient's findings and my recommendations with patient.    This patient has been examined wearing appropriate Personal Protective Equipment     Signature: Adolph Azul MD      Electronically signed by Adolph Azul MD at 12/07/22 0702       Prior to Admission Medications     Prescriptions Last Dose Informant Patient Reported? Taking?    busPIRone (BUSPAR) 5 MG tablet   No Yes    Take 1 tablet by mouth 3 (Three) Times a Day.    nystatin (MYCOSTATIN) 157700 UNIT/GM cream   No Yes    Apply 1 application topically to the appropriate area as directed 2 (Two) Times a Day.    zolpidem (AMBIEN) 10 MG tablet   No Yes    Take 1 tablet by mouth At Night As Needed for Sleep.          Current Facility-Administered Medications   Medication Dose Route Frequency Provider Last Rate Last Admin   • acetaminophen (TYLENOL) tablet 650 mg  650 mg Oral Q4H PRN Adolph Azul MD   650 mg at 12/07/22 1710   • aspirin chewable tablet 162 mg  162 mg Oral Daily Adolph Azul MD   162 mg at 12/09/22 0926    Or   • aspirin suppository 300 mg  300 mg Rectal Daily Adolph Azul MD       • atorvastatin (LIPITOR) tablet 80 mg  80 mg Oral Nightly Adolph Azul MD       • busPIRone (BUSPAR) tablet 5 mg  5 mg Oral TID  Delores Azul DO       • butalbital-acetaminophen-caffeine (FIORICET, ESGIC) -40 MG per tablet 2 tablet  2 tablet Oral Q8H PRN Kim Lam APRN       • Enoxaparin Sodium (LOVENOX) syringe 40 mg  40 mg Subcutaneous Q12H Adolph Azul MD   40 mg at 22 0929   • famotidine (PEPCID) tablet 20 mg  20 mg Oral BID AC Adolph Azul MD   20 mg at 22 0928   • levETIRAcetam (KEPPRA) tablet 500 mg  500 mg Oral Q12H Kim Lam APRN       • nystatin (MYCOSTATIN) powder   Topical Q12H Delores Azul DO   Given at 22 2206   • ondansetron (ZOFRAN) injection 4 mg  4 mg Intravenous Q6H PRN Adolph Azul MD   4 mg at 22 1557   • sodium chloride 0.9 % flush 10 mL  10 mL Intravenous PRN Rafael Cantrell MD       • sodium chloride 0.9 % flush 10 mL  10 mL Intravenous PRN Mora Izaguirre APRN       • sodium chloride 0.9 % flush 10 mL  10 mL Intravenous Q12H Adolph Azul MD   10 mL at 22 0928   • sodium chloride 0.9 % flush 10 mL  10 mL Intravenous PRN Adolph Azul MD       • sodium chloride 0.9 % flush 10 mL  10 mL Intravenous Q12H Adolph Azul MD   10 mL at 22 0926   • sodium chloride 0.9 % flush 10 mL  10 mL Intravenous PRN Adolph Azul MD       • sodium chloride 0.9 % infusion 40 mL  40 mL Intravenous PRN Adolph Azul MD       • sodium chloride 0.9 % infusion 40 mL  40 mL Intravenous PRN Adolph Azul MD       • vilazodone (VIIBRYD) tablet 10 mg  10 mg Oral Daily Kavya Barillas MD       • zolpidem (AMBIEN) tablet 5 mg  5 mg Oral Nightly PRN Delores Azul DO            Physician Progress Notes (last 72 hours)      Delores Azul DO at 22 1018              [facility] Medicine Services Daily Progress Note    Patient Name: Eladia Connor  : 1965  MRN: [id number]  Primary Care Physician:  Quincy Lemos APRN  Date of admission: 2022      Subjective       Chief Complaint: Weakness    Patient seen  and examined this morning.  Doing well complaining of some dizziness with room spinning sensation.  Awaiting rehab placement.  Imaging reports discussed with her, Keppra started yesterday per neurology.  No other complaints.    Pertinent positives as noted in HPI/subjective.  All other systems were reviewed and are negative.      Objective       Vitals:   Temp:  [97.6 °F (36.4 °C)-98.1 °F (36.7 °C)] 97.8 °F (36.6 °C)  Heart Rate:  [59-62] 59  Resp:  [10-23] 13  BP: ()/(45-70) 95/58  Flow (L/min):  [2] 2    Physical Exam:    General: Awake, alert, lying in bed, NAD  Eyes: PERRL, EOMI, conjunctivae are clear  Cardiovascular: Regular rate and rhythm, no murmurs  Respiratory: Clear to auscultation bilaterally, no wheezing or rales, unlabored breathing  Abdomen: Soft, nontender, positive bowel sounds, no guarding  Neurologic: A&O, CN grossly intact, speech normal, moves all extremities spontaneously  Musculoskeletal: Generalized weakness, no deformities  Skin: Warm, dry, intact         Result Review    Result Review:  I have personally reviewed the results from the time of this admission to 12/9/2022 10:18 EST and agree with these findings:  [x]  Laboratory  [x]  Microbiology  [x]  Radiology  [x]  EKG/Telemetry   [x]  Cardiology/Vascular   []  Pathology  []  Old records  []  Other:          Assessment & Plan      Brief Patient Summary:  Eladia Connor is a 57 y.o. female who presented to [facility] on 12/6/2022 complaining of weakness.   She reportedly has a history of a CVA in 2012 without any residual deficits. She notes over the last week or so she has noted intermittent though right leg weakness and numbness with associated tingling.  Around 11 AM in the morning she developed slurred speech and left arm weakness.  Symptoms lasted for about 30 minutes and she presented to [facility] for further evaluation.  She was subsequently discharged indicating that testing/evaluation was largely  unremarkable with questionable history of a pneumonia on chest x-ray.   She describes a nonproductive cough without hemoptysis.  + Sick contacts with grandson with the flu.  She denies any fevers. She describes having possible positive TB test few weeks ago. No known TB contacts however.  She indicates her son just returned from the [place] though is not sick.  No significant personal travel history noted. When inquiring about new medications she identifies that she started Otezla this last week for her psoriasis and her symptoms seem to began after this.  She also reports being on Klonopin but has not taken for 1 week as she ran out of it.  Patient was noted to have multiple episodes of altered mental status with slurred speech and left-sided weakness.  Stroke work-up x2 including MRI brain appears to be negative.  Neurology following, recommending outpatient EEG study to rule out seizure activity.  Patient also appears to be having major depressive episode, psych was consulted.  Recommended antidepression but patient refusing at this time.  PT/OT recommending rehab.      aspirin, 162 mg, Oral, Daily   Or  aspirin, 300 mg, Rectal, Daily  atorvastatin, 80 mg, Oral, Nightly  busPIRone, 5 mg, Oral, TID  enoxaparin, 40 mg, Subcutaneous, Q12H  famotidine, 20 mg, Oral, BID AC  levETIRAcetam, 500 mg, Oral, Q12H  nystatin, , Topical, Q12H  sodium chloride, 10 mL, Intravenous, Q12H  sodium chloride, 10 mL, Intravenous, Q12H  vilazodone, 10 mg, Oral, Daily             Active Hospital Problems:  Active Hospital Problems    Diagnosis    • **Weakness      Plan:   Speech disturbance with right leg and left arm weakness, improved  -Possible TIA, or seizure, unclear at this time  -Patient has had few episodes of same symptoms over the past few days, possibly medication induced but unclear at this time  -Stroke work-up including MRI brain negative for new acute findings  -Previously had TIA back in 2013 without any residual  deficits  -Possible major depression episode  -Continue aspirin, statin  -Keppra added empirically per neurology  -Neurology following, recommending outpatient EEG, no in-house EEG available at this time  -PT/OT recommending rehab  -Medically stable for discharge, awaiting placement    Major depression with anxiety  -Patient had episodes of altered mentation with speech disturbances however stroke work-up and metabolic work-up so far negative  -Possibly suffering from major depression episode  -Psych following and recommended antidepressant but patient refusing at this time  -Continue home BuSpar  -Continue psych follow-up    Abnormal TB screening  -Reportedly positive QuantiFERON as outpatient however no symptoms or signs of TB infection noted at this time  -QuantiFERON has been ordered however can follow-up as outpatient with PCP for further work-up    Psoriasis  -Possibly having side effects from Otezla, hold for now  -Continue outpatient follow-up    Suspected NERY  -States she did have sleep study years ago but did not follow-up on CPAP  -Recommend outpatient sleep study, will refer to neurology/pulmonology    Obesity  -BMI of 49.34 noted  -Lifestyle modifications recommended    DVT prophylaxis  -Lovenox      CODE STATUS:    Code Status (Patient has no pulse and is not breathing): CPR (Attempt to Resuscitate)  Medical Interventions (Patient has pulse or is breathing): Full Support  Release to patient: Routine Release      Disposition: Rehab placement.  Medically stable.    Electronically signed by Delores Azul DO, 12/09/22, 10:18 EST.  [facility] Hospitalist Team      Part of this note may be an electronic transcription/translation of spoken language to printed text using the Dragon Dictation System.      Electronically signed by Delores Azul DO at 12/09/22 1019     Kim Lam APRN at 12/09/22 0938               LOS: 0 days     Chief Complaint:  Headache, dizziness     Subjective    SUBJECTIVE:    History taken from: patient chart RN    Interval History:      Patient Complaints:       Review of Systems   Constitutional: Negative.    Eyes: Negative for visual disturbance.   Cardiovascular: Negative.    Neurological: Positive for dizziness and headaches. Negative for tremors, seizures, syncope, facial asymmetry, speech difficulty, weakness, light-headedness and numbness.          Pertinent PMH:  has a past medical history of Anxiety and Depression.   ________________________________________________  Objective   OBJECTIVE:    On exam:  GENERAL: NAD  CARDIO: RRR  NEURO:  Awake and alert  Sitting up in the chair   Oriented x3  EOMI, PERRL, no visual field deficits  CN 2-12 intact, No facial asymmetry  Speech clear without dysarthria  Sensations intact and equal bilaterally  Strength 5/5 and equal in all extremities  No ataxia      ________________________________________________   RESULTS REVIEW    VITAL SIGNS:  Temp:  [97.6 °F (36.4 °C)-98.1 °F (36.7 °C)] 97.8 °F (36.6 °C)  Heart Rate:  [59-62] 59  Resp:  [10-23] 13  BP: ()/(45-70) 95/58    LABS:       Lab 12/09/22  0407 12/08/22  0544 12/07/22  0604 12/06/22  2314   WBC 8.20 7.00 7.70 8.80   HEMOGLOBIN 12.3 12.2 13.4 13.6   HEMATOCRIT 37.9 37.6 40.8 40.4   PLATELETS 244 262 285 330   NEUTROS ABS 5.50 4.90 5.60 7.00   LYMPHS ABS 1.70 1.40 1.40 1.10   MONOS ABS 0.70 0.50 0.60 0.60   EOS ABS 0.20 0.10 0.10 0.00   MCV 87.9 88.7 87.2 86.2   PROCALCITONIN  --   --   --  0.02   PROTIME  --   --   --  10.8   APTT  --   --   --  26.8*         Lab 12/09/22  0407 12/08/22  0544 12/07/22  0604 12/06/22  2314   SODIUM 140 140 140 139   POTASSIUM 4.2 4.1 3.9 4.3   CHLORIDE 105 109* 106 105   CO2 28.0 21.0* 22.0 21.0*   ANION GAP 7.0 10.0 12.0 13.0   BUN 15 10 8 9   CREATININE 0.91 0.70 0.70 0.71   EGFR 72.8 99.8 99.8 98.1   GLUCOSE 113* 105* 128* 137*   CALCIUM 8.9 8.8 9.0 9.5   HEMOGLOBIN A1C  --   --  5.7*  --    TSH  --   --   --  2.130         Lab  12/09/22  0407 12/08/22  0544 12/06/22  2314   TOTAL PROTEIN 6.9 7.1 8.5   ALBUMIN 3.70 3.60 4.20   GLOBULIN 3.2 3.5 4.3   ALT (SGPT) 12 14 16   AST (SGOT) 21 26 25   BILIRUBIN 0.3 0.5 0.8   ALK PHOS 123* 125* 156*         Lab 12/06/22  2314   TROPONIN T <0.010   PROTIME 10.8   INR 1.05         Lab 12/07/22  0604   CHOLESTEROL 182   LDL CHOL 121*   HDL CHOL 42   TRIGLYCERIDES 104         Lab 12/06/22  2314   VITAMIN B 12 391             Lab Results   Component Value Date    TSH 2.130 12/06/2022     (H) 12/07/2022    HGBA1C 5.7 (H) 12/07/2022    [id number] 391 12/06/2022         IMAGING STUDIES:  MRI Brain Without Contrast    Result Date: 12/7/2022   1. No acute intracranial pathology. 2. Chronic changes suggestive of microvascular ischemic disease.   Electronically Signed By-Derek Branham MD On:12/7/2022 11:34 AM This report was finalized on [id number] by  Derek Branham MD.    MRI Lumbar Spine Without Contrast    Result Date: 12/8/2022  1.     Transitional anatomy at the lumbosacral junction with suspected partial sacralization of the S1 vertebrae. Close correlation with imaging is recommended prior to any planned intervention. 2.     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 3.     Multilevel disc and facet joint degeneration with moderate to severe canal narrowing at L4-5 and L5-S1. 4.     Moderate bilateral foraminal narrowing at L4-5 and moderate to severe foraminal narrowing at L5-S1.  Electronically Signed By-Riccardo Ramirez MD On:12/8/2022 4:53 PM This report was finalized on [id number] by  Riccardo Ramirez MD.      I reviewed the patient's new clinical results.    ________________________________________________      PROBLEM LIST:    Weakness        ASSESSMENT/PLAN:  1.  Transient slurred speech and left arm weakness, resolved prior to admission. Possible TIA, MRI brain negative.   - CT head: No acute findings  - MRI brain: reviewed, no acute findings, chronic  microvascular disease.  - CTA head and neck: Within the limitations of motion, no occlusion or high-grade stenosis of the major arteries in the head and neck.  - Echo: EF is 61 to 65%, negative saline test  - EKG: Sinus rhythm, rate 65  - Labs: A1C: 5.7, B12: 391, LDL: 121, TSH: 2.130  - Antithrombotics:  mg daily   - Statin: Lipitor 80  - PT/OT/ST as appropriate, Neuro checks per protocol, DVT prophylaxis, Stroke education  - Event monitor x 30 days to rule out arrhythmias     2. Spells of unknown origin, multiple episode of tongue protrusion and generalized weakness. No loss of consciousness, very atypical   - MRI was negative  - Start Keppra 500 mg bid   - Follow up outpatient at a facility that has continuous EEG capability.      3. Bilateral feet numbness, acute on chronic, low back pain   - MRI lumbar spine reviewed. Moderate to severe degenerative changes and narrowing.  - Follow up with Neurosurgery or orthospine outpatient     4. Headache  - Fioricet 2 tablets PRN q8h     4. Chest tightening, improved  -Management per primary     5. Uncontrolled NERY  - Outpatient sleep study      6. Depression/anxiety  - Resume home medications once verified   - Recommend patient see psychiatry outpatient for uncontrolled anxiety and depression     7. Vitamin B12 deficiency  - Replacement ordered  - Recommend monthly B12 injections outpt or 1000 mcg p.o. daily     8 . Modification of stroke risk factors:   - Blood pressure should be less than 130/80 outpatient, HbA1c less than 6.5, LDL less than 70; b12>500 and smoking cessation if applicable. We would be grateful if the primary team / primary care physician would keep a close watch on the above targets.  - Stroke education  - Follow up with Stroke Clinic     I personally had a very thorough conversation with both patient and her daughter via telephone.  Discussed all including plans outpatient.  Both patient and her daughter are agreeable.  Nothing more to add from  a neurology standpoint inpatient.  Thank you for this consult.     I discussed the patient's findings and my recommendations with patient and nursing staff    SCOOBY Riggs  12/09/22  09:39 EST        Electronically signed by Kim Lam APRN at 12/09/22 1124     Kim Lam APRN at 12/08/22 1435     Attestation signed by Rod Davis MD at 12/08/22 1640    I have reviewed this documentation and agree.                       LOS: 0 days     Chief Complaint: Arm weakness and slurred speech    Subjective   SUBJECTIVE:    History taken from: patient chart RN    Patient Complaints: Patient's daughter at bedside and wants to rule out spinal stroke, discussed with her that this is not how patient presents.  Patient has bilateral hand and feet numbness and tingling that comes and goes.  Her slurred speech is much better today.  No focal weakness, just generalized weakness.       Review of Systems   Eyes: Negative for visual disturbance.   Neurological: Positive for weakness and numbness. Negative for dizziness, tremors, seizures, syncope, facial asymmetry, speech difficulty, light-headedness and headaches.   Psychiatric/Behavioral: Negative for confusion. The patient is nervous/anxious.         Pertinent PMH:  has a past medical history of Anxiety and Depression.   ________________________________________________  Objective   OBJECTIVE:    On exam:  GENERAL: NAD  CARDIO: RRR  NEURO:  Awake and alert  Oriented x3  EOMI, PERRL, no visual field deficits  CN 2-12 intact, No facial asymmetry  Speech clear without dysarthria  Subjective numbness to bilateral feet although able to ambulate  Strength 5/5 and equal in all extremities no focal weakness  No ataxia    ________________________________________________   RESULTS REVIEW    VITAL SIGNS:  Temp:  [98.1 °F (36.7 °C)-98.4 °F (36.9 °C)] 98.1 °F (36.7 °C)  Heart Rate:  [55-75] 59  Resp:  [14-17] 17  BP: ()/(45-78) 100/57    LABS:        Lab 12/08/22  0544 12/07/22  0604 12/06/22  2314   WBC 7.00 7.70 8.80   HEMOGLOBIN 12.2 13.4 13.6   HEMATOCRIT 37.6 40.8 40.4   PLATELETS 262 285 330   NEUTROS ABS 4.90 5.60 7.00   LYMPHS ABS 1.40 1.40 1.10   MONOS ABS 0.50 0.60 0.60   EOS ABS 0.10 0.10 0.00   MCV 88.7 87.2 86.2   PROCALCITONIN  --   --  0.02   PROTIME  --   --  10.8   APTT  --   --  26.8*         Lab 12/08/22  0544 12/07/22  0604 12/06/22  2314   SODIUM 140 140 139   POTASSIUM 4.1 3.9 4.3   CHLORIDE 109* 106 105   CO2 21.0* 22.0 21.0*   ANION GAP 10.0 12.0 13.0   BUN 10 8 9   CREATININE 0.70 0.70 0.71   EGFR 99.8 99.8 98.1   GLUCOSE 105* 128* 137*   CALCIUM 8.8 9.0 9.5   HEMOGLOBIN A1C  --  5.7*  --    TSH  --   --  2.130         Lab 12/08/22  0544 12/06/22  2314   TOTAL PROTEIN 7.1 8.5   ALBUMIN 3.60 4.20   GLOBULIN 3.5 4.3   ALT (SGPT) 14 16   AST (SGOT) 26 25   BILIRUBIN 0.5 0.8   ALK PHOS 125* 156*         Lab 12/06/22  2314   TROPONIN T <0.010   PROTIME 10.8   INR 1.05         Lab 12/07/22  0604   CHOLESTEROL 182   LDL CHOL 121*   HDL CHOL 42   TRIGLYCERIDES 104         Lab 12/06/22  2314   VITAMIN B 12 391             Lab Results   Component Value Date    TSH 2.130 12/06/2022     (H) 12/07/2022    HGBA1C 5.7 (H) 12/07/2022    [id number] 391 12/06/2022         IMAGING STUDIES:  CT Head Without Contrast    Result Date: 12/7/2022   1. No acute findings or interval change from the outside CT of the head performed yesterday. 2. I would recommend an MRI of the brain without contrast for follow-up as the patient had a CT angiogram of the head and neck and CT cerebral perfusion study overnight.  Electronically Signed By-Ronen Martinez MD On:12/7/2022 7:52 AM This report was finalized on [id number] by  Ronen Martinez MD.    CT Angiogram Neck    Result Date: 12/7/2022  1.  No acute intracranial abnormality is identified on noncontrast head CT. If there is high clinical suspicion for acute ischemic infarct, MRI may be more sensitive. 2.  Within  the limitations of motion, no occlusion or high-grade stenosis of the major arteries in the head and neck. Artur Eubanks MD Neuroradiologist [facility] [email or web address] Thank you for this referral. This exam was interpreted by a fellowship trained neuroradiologist. If the patient's healthcare provider has any questions, a [employer] neuroradiologist can be reached directly at [phone] at any time. SLOT  21 Electronically signed by:  Artur Eubanks M.D.  12/6/2022 11:20 PM Mountain Time    CT Chest Without Contrast Diagnostic    Result Date: 12/7/2022  1.  No evidence of pulmonary tuberculosis. No acute abnormality in the chest. Electronically signed by:  Edouard Marquez M.D.  12/6/2022 11:09 PM Mountain Time    MRI Brain Without Contrast    Result Date: 12/7/2022   1. No acute intracranial pathology. 2. Chronic changes suggestive of microvascular ischemic disease.   Electronically Signed By-Derek Branham MD On:12/7/2022 11:34 AM This report was finalized on [id number] by  Derek Branham MD.    CT Angiogram Head    Result Date: 12/7/2022  1.  No acute intracranial abnormality is identified on noncontrast head CT. If there is high clinical suspicion for acute ischemic infarct, MRI may be more sensitive. 2.  Within the limitations of motion, no occlusion or high-grade stenosis of the major arteries in the head and neck. Artur Eubanks MD Neuroradiologist [facility] Radiology [facility] [email or web address] Thank you for this referral. This exam was interpreted by a fellowship trained neuroradiologist. If the patient's healthcare provider has any questions, a [employer] neuroradiologist can be reached directly at [phone] at any time. SLOT  21 Electronically signed by:  Artur Eubanks M.D.  12/6/2022 11:20 PM Mountain Time    CT CEREBRAL PERFUSION WITH & WITHOUT CONTRAST    Result Date: 12/7/2022  1.  No acute intracranial abnormality is identified on  noncontrast head CT. If there is high clinical suspicion for acute ischemic infarct, MRI may be more sensitive. 2.  Within the limitations of motion, no occlusion or high-grade stenosis of the major arteries in the head and neck. Artur Eubanks MD Neuroradiologist [facility] Rad[facility] [email or web address] Thank you for this referral. This exam was interpreted by a fellowship trained neuroradiologist. If the patient's healthcare provider has any questions, a [employer] neuroradiologist can be reached directly at [phone] at any time. SLOT  21 Electronically signed by:  Artur Eubanks M.D.  12/6/2022 11:20 PM Mountain Time      I reviewed the patient's new clinical results.    ________________________________________________      PROBLEM LIST:    Weakness        ASSESSMENT/PLAN:  1.  Transient slurred speech and left arm weakness, resolved prior to admission. Possible TIA, MRI brain negative.   - CT head: No acute findings  - MRI brain: reviewed, no acute findings, chronic microvascular disease.  - CTA head and neck: Within the limitations of motion, no occlusion or high-grade stenosis of the major arteries in the head and neck.  - Echo: EF is 61 to 65%, negative saline test  - EKG: Sinus rhythm, rate 65  - Labs: A1C: 5.7, B12: 391, LDL: 121, TSH: 2.130  - Antithrombotics:     - Statin: Lipitor 80  - PT/OT/ST as appropriate, Neuro checks per protocol, DVT prophylaxis, Stroke education  - Event monitor x 30 days to rule out arrhythmias     2. Spells of unknown origin, multiple episode of tongue protrusion and generalized weakness. No loss of consciousness, very atypical   - MRI was negative  - Start Keppra 500 mg bid   - Follow up outpatient at a facility that has continuous EEG capability.      3. Bilateral feet numbness, acute on chronic, low back pain   - Check MRI lumbar spine, if negative, nothing else to offer inpatient- patient can be discharged with plans for EMG/Nerve  "conduction studies outpatient     4. Chest tightening, improved  -Management per primary     5. Uncontrolled NERY  - Outpatient sleep study      6. Depression/anxiety  - Resume home medications once verified     7. Vitamin B12 deficiency  - Replacement ordered  - Recommend monthly B12 injections outpt or 1000 mcg p.o. daily    8 . Modification of stroke risk factors:   - Blood pressure should be less than 130/80 outpatient, HbA1c less than 6.5, LDL less than 70; b12>500 and smoking cessation if applicable. We would be grateful if the primary team / primary care physician would keep a close watch on the above targets.  - Stroke education  - Follow up with Stroke Clinic       If Lumbar MRI is negative patient can be d/c from Neurology.   There are no further recommendations. Will sign off, please call with any questions or concerns.  I discussed the patient's findings and my recommendations with patient, family and nursing staff    Kim Victor Genaro, SCOOBY  12/08/22  14:36 EST        Electronically signed by Rod Davis MD at 12/08/22 1640     Kavya Barillas MD at 12/08/22 1252            Chief complaint anxiety     Subjective .     History of present illness:  The patient is a 57 y.o. female who was admitted secondary to  weakness. PMHx: anxiety and depression. Psych consult was requested by Dr Jorge Alberto osullivan to mental status changes and depression.   The pt reported history of depression in the past, approximately 20 years ago the patient had \"nervous breakdown, she had to be admitted to the hospital  In [place] secondary to suicidal ideations, but no attempts.  At that time she was started  on Paxil, Ambien and lorazepam, depression improved at the time, she stopped medications  Few  years ago.  Last  year her  passed away, the patient became more depressed, increased crying spells, increased self-isolation.  The patient was prescribed clonazepam by her previous primary care provider.  Also was prescribed " Ambien and lorazepam for anxiety.  Her  passed away around holidays, his anniversary is approaching, the patient found old bottle of clonazepam 0.5 mg and she was taking 1 pill daily to alleviate anxiety  until she ran out 1 week ago.  Depression is rated as a 7 out of 10, she denied suicidal and homicidal ideations, the patient denied any perceptual disturbances  Anxiety is persistent and intense, denied AVH/SI/HI  The pt developed stroke like sxs with numbness and muscle weakness   Past psych hx: depression anxiety, hx of SI no SA     Today the pt was more alert and awake, still very anxious about her condition, worries about TIA/stroke. She wanted to get more detailed work up. The pt feels depressed, but denied feeling hopeless/helpless, denied AVH/SI/HI       Review of Systems   All systems were reviewed and negative except for:  Constitution:  positive for fatigue  Musculoskeletal: positive for  muscle weakness  Neurological: positive for  difficulty walking and weakness  Behavioral/Psych: positive for  anxiety and depression    History       Medications Prior to Admission   Medication Sig Dispense Refill Last Dose   • busPIRone (BUSPAR) 5 MG tablet Take 1 tablet by mouth 3 (Three) Times a Day. 30 tablet 5    • nystatin (MYCOSTATIN) 369203 UNIT/GM cream Apply 1 application topically to the appropriate area as directed 2 (Two) Times a Day. 30 g 3    • zolpidem (AMBIEN) 10 MG tablet Take 1 tablet by mouth At Night As Needed for Sleep. 30 tablet 2         Scheduled Meds:  aspirin, 162 mg, Oral, Daily   Or  aspirin, 300 mg, Rectal, Daily  atorvastatin, 80 mg, Oral, Nightly  busPIRone, 5 mg, Oral, TID  enoxaparin, 40 mg, Subcutaneous, Q12H  famotidine, 20 mg, Oral, BID AC  nystatin, , Topical, Q12H  sodium chloride, 10 mL, Intravenous, Q12H  sodium chloride, 10 mL, Intravenous, Q12H  vilazodone, 10 mg, Oral, Daily         Continuous Infusions:       PRN Meds:  •  acetaminophen  •  ondansetron  •  sodium  "chloride  •  [COMPLETED] Insert Peripheral IV **AND** sodium chloride  •  sodium chloride  •  sodium chloride  •  sodium chloride  •  sodium chloride  •  zolpidem      Allergies:  Patient has no known allergies.      Objective     Vital Signs   /57   Pulse 59   Temp 98.1 °F (36.7 °C) (Oral)   Resp 17   Ht 170.2 cm (67\")   Wt (!) 143 kg (315 lb)   LMP  (LMP Unknown)   SpO2 98%   BMI 49.34 kg/m²     Physical Exam:     General Appearance:    In NAD       Mental Status Exam:    Hygiene:   good  Cooperation:  Cooperative  Eye Contact:  Good  Psychomotor Behavior:  Slow  Affect:  Blunted  Hopelessness: Denies  Speech:  Normal  Thought Progress:  Goal directed and Linear  Thought Content:  Mood congruent  Suicidal:  None  Homicidal:  None  Hallucinations:  None  Delusion:  None  Memory:  fair   Orientation:  Person, Place and Situation  Reliability:  fair  Insight:  Fair  Judgement:  Fair  Impulse Control:  Fair  Physical/Medical Issues:  Yes      Medications and allergies reviewed     Lab Results   Component Value Date    GLUCOSE 105 (H) 12/08/2022    CALCIUM 8.8 12/08/2022     12/08/2022    K 4.1 12/08/2022    CO2 21.0 (L) 12/08/2022     (H) 12/08/2022    BUN 10 12/08/2022    CREATININE 0.70 12/08/2022    EGFRIFAFRI 102 02/17/2021    EGFRIFNONA 89 02/17/2021    BCR 14.3 12/08/2022    ANIONGAP 10.0 12/08/2022       Last Urine Toxicity     LAST URINE TOXICITY RESULTS Latest Ref Rng & Units 12/7/2022    BARBITURATES SCREEN Negative Negative    BENZODIAZEPINE SCREEN, URINE Negative Negative    COCAINE SCREEN, URINE Negative Negative    METHADONE SCREEN, URINE Negative Negative          No results found for: PHENYTOIN, PHENOBARB, VALPROATE, CBMZ    Lab Results   Component Value Date     12/08/2022    BUN 10 12/08/2022    CREATININE 0.70 12/08/2022    TSH 2.130 12/06/2022    WBC 7.00 12/08/2022       Brief Urine Lab Results     None          Assessment & Plan       Weakness        Assessment: " Major depressive disorder moderate recurrent  Generalized anxiety disorder  Bereavement  Treatment Plan: The patient presented with symptoms of depression, anxiety, and anniversary of her 's death is approaching.  Continue BuSpar at current dose, continue Ambien as needed for insomnia  The patient was on Paxil in the past, did not report with experience  She did not respond to Zoloft and Lexapro  Cont Viibryd 10 mg p.o. every morning tomorrow for anxiety and depression - however the pt wanted to hold for now, she worried about getting more sxs to her clinical presentation in case of side effects     The patient will benefit from psychotherapy Outpatient basis and grief therapy  Safety plan was discussed with the patient and her daughter   They verbalized understanding   Treatment Plan discussed with: Patient, Family and nursing     I discussed the patients findings and my recommendations with patient, family and nursing staff    I have reviewed and approved the behavioral health treatment plans and problem list. Yes     Referring MD has access to consult report and progress notes in EMR     Kavya Barillas MD  22  12:53 EST              Electronically signed by Kavya Barillas MD at 22 1300     Delores Azul DO at 22 1210              [facility] Medicine Services Daily Progress Note    Patient Name: Eladia Connor  : 1965  MRN: [id number]  Primary Care Physician:  Quincy Lemos APRN  Date of admission: 2022      Subjective       Chief Complaint: Weakness    Patient seen and examined this morning.  Significantly improved compared to yesterday, alert and oriented x3.  Denies any further focal weakness or speech issues.  No chest pain, shortness of breath, palpitations, fever, chills, or abdominal pain.  Patient's possible diagnosis and treatment plan discussed with patient and daughter at bedside, all questions answered.  Daughter wanted to  speak to neurology service, discussed with nursing at bedside.    Pertinent positives as noted in HPI/subjective.  All other systems were reviewed and are negative.      Objective       Vitals:   Temp:  [98.1 °F (36.7 °C)-98.4 °F (36.9 °C)] 98.1 °F (36.7 °C)  Heart Rate:  [55-75] 59  Resp:  [14-17] 17  BP: ()/(45-78) 100/57  Flow (L/min):  [2] 2    Physical Exam:    General: Awake, alert, lying in bed, NAD  Eyes: PERRL, EOMI, conjunctivae are clear  Cardiovascular: Regular rate and rhythm, no murmurs  Respiratory: Clear to auscultation bilaterally, no wheezing or rales, unlabored breathing  Abdomen: Soft, nontender, positive bowel sounds, no guarding  Neurologic: A&O, CN grossly intact, speech normal, moves all extremities spontaneously  Musculoskeletal: Generalized weakness, no deformities  Skin: Warm, dry, intact         Result Review    Result Review:  I have personally reviewed the results from the time of this admission to 12/8/2022 12:10 EST and agree with these findings:  [x]  Laboratory  [x]  Microbiology  [x]  Radiology  [x]  EKG/Telemetry   [x]  Cardiology/Vascular   []  Pathology  []  Old records  []  Other:          Assessment & Plan      Brief Patient Summary:  Eladia Connor is a 57 y.o. female who presented to [facility] on 12/6/2022 complaining of weakness.   She reportedly has a history of a CVA in 2012 without any residual deficits. She notes over the last week or so she has noted intermittent though right leg weakness and numbness with associated tingling.  Around 11 AM in the morning she developed slurred speech and left arm weakness.  Symptoms lasted for about 30 minutes and she presented to [facility] for further evaluation.  She was subsequently discharged indicating that testing/evaluation was largely unremarkable with questionable history of a pneumonia on chest x-ray.   She describes a nonproductive cough without hemoptysis.  + Sick contacts with grandson  with the flu.  She denies any fevers. She describes having possible positive TB test few weeks ago. No known TB contacts however.  She indicates her son just returned from the [place] though is not sick.  No significant personal travel history noted. When inquiring about new medications she identifies that she started Otezla this last week for her psoriasis and her symptoms seem to began after this.  She also reports being on Klonopin but has not taken for 1 week as she ran out of it.  Patient was noted to have multiple episodes of altered mental status with slurred speech and left-sided weakness.  Stroke work-up x2 including MRI brain appears to be negative.  Neurology following, recommending outpatient EEG study to rule out seizure activity.  Patient also appears to be having major depressive episode, psych was consulted.  Recommended antidepression but patient refusing at this time.  PT/OT recommending rehab.      aspirin, 162 mg, Oral, Daily   Or  aspirin, 300 mg, Rectal, Daily  atorvastatin, 80 mg, Oral, Nightly  busPIRone, 5 mg, Oral, TID  enoxaparin, 40 mg, Subcutaneous, Q12H  famotidine, 20 mg, Oral, BID AC  nystatin, , Topical, Q12H  sodium chloride, 10 mL, Intravenous, Q12H  sodium chloride, 10 mL, Intravenous, Q12H  vilazodone, 10 mg, Oral, Daily             Active Hospital Problems:  Active Hospital Problems    Diagnosis    • **Weakness      Plan:   Speech disturbance with right leg and left arm weakness, improved  -Possible TIA, or seizure, unclear at this time  -Patient has had few episodes of same symptoms over the past few days, possibly medication induced but unclear at this time  -Stroke work-up including MRI brain negative for new acute findings  -Previously had TIA back in 2013 without any residual deficits  -Possible major depression episode  -Continue aspirin, statin  -Neurology following, recommending outpatient EEG, no in-house EEG available at this time  -PT/OT    Major depression with  anxiety  -Patient had episodes of altered mentation with speech disturbances however stroke work-up and metabolic work-up so far negative  -Possibly suffering from major depression episode  -Psych following and recommended antidepressant but patient refusing at this time  -Continue home BuSpar  -Continue psych follow-up    Abnormal TB screening  -Reportedly positive QuantiFERON as outpatient however no symptoms or signs of TB infection noted at this time  -QuantiFERON has been ordered however can follow-up as outpatient with PCP for further work-up    Psoriasis  -Possibly having side effects from Otezla, hold for now  -Continue outpatient follow-up    Suspected NERY  -States she did have sleep study years ago but did not follow-up on CPAP  -Recommend outpatient sleep study, will refer to neurology/pulmonology    Obesity  -BMI of 49.34 noted  -Lifestyle modifications recommended    DVT prophylaxis  -Lovenox      CODE STATUS:    Code Status (Patient has no pulse and is not breathing): CPR (Attempt to Resuscitate)  Medical Interventions (Patient has pulse or is breathing): Full Support  Release to patient: Routine Release      Disposition: Rehab placement.  Medically stable.    Electronically signed by Delores Azul DO, 12/08/22, 12:10 [facility].  [facility] Hospitalist Team      Part of this note may be an electronic transcription/translation of spoken language to printed text using the Dragon Dictation System.      Electronically signed by Delores Azul DO at 12/08/22 1221          Consult Notes (last 48 hours)      Kavya Barillas MD at 12/07/22 1340      Consult Orders    1. Inpatient Psychiatrist Consult [[id number]] ordered by Delores Azul DO at 12/07/22 1157                 Referring Provider: Dr Azul   Reason for Consultation: depression/anxiety/mental status changes       Chief complaint depression anxiety     Subjective .     History of present illness:  The patient is a 57 y.o. female who was admitted  "secondary to weakness. PMHx: anxiety and depression. Psych consult was requested by Dr Jorge Alberto osullivan to mental status changes and depression.   The pt reported history of depression in the past, approximately 20 years ago the patient had \"nervous breakdown, she had to be admitted to the hospital  [place] secondary to suicidal ideations, but no attempts.  At that time she was started  on Paxil, Ambien and lorazepam, depression improved at the time, she stopped medications  Few  years ago.  Last  year her  passed away, the patient became more depressed, increased crying spells, increased self-isolation.  The patient was prescribed clonazepam by her previous primary care provider.  Also was prescribed Ambien and lorazepam for anxiety.  Her  passed away around holidays, his anniversary is approaching, the patient found old bottle of clonazepam 0.5 mg and she was taking 1 pill daily to alleviate anxiety  until she ran out 1 week ago.  Depression is rated as a 7 out of 10, she denied suicidal and homicidal ideations, the patient denied any perceptual disturbances  Anxiety is persistent and intense, denied AVH/SI/HI  The pt developed stroke like sxs with numbness and muscle weakness   Past psych hx: depression anxiety, hx of SI no SA     Review of Systems   All systems were reviewed and negative except for:  Constitution:  positive for fatigue  Gastrointestinal: positive for  nausea  Musculoskeletal: positive for  muscle pain and muscle weakness  Behavioral/Psych: positive for  anxiety and depression    History    Past Medical History:   Diagnosis Date   • Anxiety    • Depression           Family History   Problem Relation Age of Onset   • Heart disease Mother    • Breast cancer Mother    • Heart disease Father    • Pancreatic cancer Father    • Heart disease Sister    • Lung cancer Brother         Social History     Tobacco Use   • Smoking status: Never   • Smokeless tobacco: Never   Substance Use Topics   • " "Alcohol use: Never   • Drug use: Never        (Not in a hospital admission)       Scheduled Meds:  aspirin, 162 mg, Oral, Daily   Or  aspirin, 300 mg, Rectal, Daily  atorvastatin, 80 mg, Oral, Nightly  busPIRone, 5 mg, Oral, TID  [START ON 12/8/2022] enoxaparin, 40 mg, Subcutaneous, Q12H  famotidine, 20 mg, Oral, BID AC  sodium chloride, 10 mL, Intravenous, Q12H  sodium chloride, 10 mL, Intravenous, Q12H         Continuous Infusions:       PRN Meds:  •  acetaminophen  •  ondansetron  •  sodium chloride  •  [COMPLETED] Insert Peripheral IV **AND** sodium chloride  •  sodium chloride  •  sodium chloride  •  sodium chloride  •  sodium chloride  •  zolpidem      Allergies:  Patient has no known allergies.      Objective     Vital Signs   /59   Pulse 68   Temp 98.1 °F (36.7 °C) (Oral)   Resp 18   Ht 170.2 cm (67\")   Wt (!) 143 kg (315 lb)   LMP  (LMP Unknown)   SpO2 100%   BMI 49.34 kg/m²     Physical Exam:    Musculoskeletal:   Muscle strength and tone: decreased in UE   Abnormal Movements: none   Gait: unable to assess, the pt was in bed      General Appearance:    In NAD       Mental Status Exam:   Hygiene:   good  Cooperation:  Cooperative  Eye Contact:  Good  Behavior and Psychomotor Activity: Slow  Speech:  Monotone and soft voice   Mood: depressed and anxious   Affect:  Restricted and Congruent  Thought Process:  Goal directed, Linear and Organized  Associations: Intact   Thought Content:  mood congruent   Language: appropriate   Suicidal Ideations:  None  Homicidal:  None  Hallucinations:  None  Delusion:  None  Orientation:  To person, Place, Time and Situation  Memory:  fair   Concentration and computation: fair   Attention span: fair   Fund of knowledge: appropriate   Reliability:  good  Insight:  Good  Judgement:  Good  Impulse Control:  Good      Medications and allergies reviewed     Lab Results   Component Value Date    GLUCOSE 128 (H) 12/07/2022    CALCIUM 9.0 12/07/2022     12/07/2022 "    K 3.9 12/07/2022    CO2 22.0 12/07/2022     12/07/2022    BUN 8 12/07/2022    CREATININE 0.70 12/07/2022    EGFRIFAFRI 102 02/17/2021    EGFRIFNONA 89 02/17/2021    BCR 11.4 12/07/2022    ANIONGAP 12.0 12/07/2022       Last Urine Toxicity     LAST URINE TOXICITY RESULTS Latest Ref Rng & Units 12/7/2022    BARBITURATES SCREEN Negative Negative    BENZODIAZEPINE SCREEN, URINE Negative Negative    COCAINE SCREEN, URINE Negative Negative    METHADONE SCREEN, URINE Negative Negative          No results found for: PHENYTOIN, PHENOBARB, VALPROATE, CBMZ    Lab Results   Component Value Date     12/07/2022    BUN 8 12/07/2022    CREATININE 0.70 12/07/2022    TSH 2.130 12/06/2022    WBC 7.70 12/07/2022       Brief Urine Lab Results     None          Assessment & Plan       Weakness          Assessment: Major depressive disorder moderate recurrent  Generalized anxiety disorder  Bereavement  Treatment Plan: The patient presented with symptoms of depression, anxiety, and anniversary of her 's death is approaching.  Continue BuSpar at current dose, continue Ambien as needed for insomnia  The patient was on Paxil in the past, did not report with experience  She did not respond to Zoloft and Lexapro  Start Viibryd 10 mg p.o. every morning tomorrow for anxiety and depression  The patient will benefit from psychotherapy Outpatient basis and grief therapy  Safety plan was discussed with the patient and her daughter   They verbalized understanding   Treatment Plan discussed with: Patient, Family and nursing     I discussed the patients findings and my recommendations with patient, family and nursing staff    I have reviewed and approved the behavioral health treatment plans and problem list. Yes  Thank you for the consult   Referring MD has access to consult report and progress notes in EMR     Kavya Barillas MD  12/07/22  13:40 EST            Electronically signed by Kavya Barillas MD at 12/07/22 9373           Physical Therapy Notes (last 72 hours)      Reyes, Carmela, PT at 22  Version 1 of 22   Rehab Time/Intention   Session Not Performed unable to evaluate, medical status change  (had episode this am; RN report patient not appropriate for eval this time)   Recommendation   PT - Next Appointment 22   Recommendation   OT - Next Appointment 22       Electronically signed by Reyes, Carmela, PT at 22     Tonio Monte PT at 22  Version 1 of        Goal Outcome Evaluation:  Plan of Care Reviewed With: patient           Outcome Evaluation: Pt is a 58 y/o female with intermittent c/o RLE weakness and numbness with associated tingling.  Day of hospital admission- pt with slurred speech and L arm weakness x 30 minutes.  CT angiogram head/neck: (-) acute, CT chest (-) acute, MRI brain (-) acute.  Pt reports she lives with her daughter in an apartment wiht 14 steps to enter into home. Pt was ambulating with st cane prior to hospitalization.  Pt reporting 's death 1 year ago and she is struggling with the anniversary of his death.  Pt on telemetry and 2L O2 this date.  Pt required cga/min A for bed mobility, Min A for transfers and ambulated 20' with RW with min/mod A for ambulation. Pt required chair to be brought behind her for ambulation secondary to decreased balance and fatigue.  Pt is far below her normal baseline of mobility and has 14 steps to enter into her home.  Recommend SNF.    Electronically signed by Tonio Monte PT at 22     Tonio Monte PT at 22  Version 1 of          Patient Name: Eladia Connor  : 1965    MRN: [id number]                              Today's Date: 2022       Admit Date: 2022    Visit Dx:     ICD-10-CM ICD-9-CM   1. Chest pain, unspecified type  R07.9 786.50   2. Slurred speech  R47.81 784.59   3. TB lung, latent  Z22.7 795.51   4. Right leg weakness  R29.898  729.89   5. Left arm weakness  R29.898 729.89   6. Follow-up exam  Z09 V67.9     Patient Active Problem List   Diagnosis   • Moderate episode of recurrent major depressive disorder (HCC)   • Anxiety   • Other insomnia   • Skin lesion   • History of abnormal mammogram   • Mood swings   • Class 1 obesity due to excess calories with serious comorbidity and body mass index (BMI) of 33.0 to 33.9 in adult   • Rash of face   • Fever   • Cough   • Rash   • Intertrigo   • Weakness     Past Medical History:   Diagnosis Date   • Anxiety    • Depression      Past Surgical History:   Procedure Laterality Date   • CHOLECYSTECTOMY  1987   • EYE SURGERY     • KNEE SURGERY        General Information     Row Name 12/08/22 1058          Physical Therapy Time and Intention    Document Type evaluation  -AM     Mode of Treatment physical therapy  -AM     Row Name 12/08/22 1058          General Information    Patient Profile Reviewed yes  -AM     Prior Level of Function independent:;community mobility;gait;transfer;bed mobility;using stairs  -AM     Existing Precautions/Restrictions fall;oxygen therapy device and L/min;other (see comments)  2L O2  -AM     Barriers to Rehab --  per pt report struggling with upcoming anniversary of 's death  -AM     Row Name 12/08/22 1058          Living Environment    People in Home alone  -AM     Row Name 12/08/22 1058          Home Main Entrance    Number of Stairs, Main Entrance other (see comments)  14 steps  -AM     Stair Railings, Main Entrance railings safe and in good condition  -AM     Row Name 12/08/22 1058          Stairs Within Home, Primary    Number of Stairs, Within Home, Primary none  -AM     Row Name 12/08/22 1058          Cognition    Orientation Status (Cognition) oriented x 4  -AM     Row Name 12/08/22 1058          Safety Issues, Functional Mobility    Impairments Affecting Function (Mobility) balance;endurance/activity tolerance;pain;strength  -AM     Comment, Safety  Issues/Impairments (Mobility) gait belt utilized  -AM           User Key  (r) = Recorded By, (t) = Taken By, (c) = Cosigned By    Initials Name Provider Type    AM Tonio Mnote PT Physical Therapist               Mobility     Row Name 12/08/22 1100          Bed Mobility    Bed Mobility bed mobility (all) activities  -AM     All Activities, Gordon (Bed Mobility) contact guard;minimum assist (75% patient effort)  -AM     Assistive Device (Bed Mobility) bed rails;head of bed elevated  -AM     Comment, (Bed Mobility) with increased time and effort  -AM     Row Name 12/08/22 1100          Sit-Stand Transfer    Sit-Stand Gordon (Transfers) minimum assist (75% patient effort);1 person assist  -AM     Assistive Device (Sit-Stand Transfers) walker, 4-wheeled  -AM     Comment, (Sit-Stand Transfer) cues for hand placement  -AM     Row Name 12/08/22 1100          Gait/Stairs (Locomotion)    [place] Level (Gait) minimum assist (75% patient effort);moderate assist (50% patient effort)  -AM     Assistive Device (Gait) walker, front-wheeled  -AM     Distance in Feet (Gait) 20'  -AM     Deviations/Abnormal Patterns (Gait) base of support, narrow;singh decreased;gait speed decreased  -AM     Bilateral Gait Deviations forward flexed posture  -AM     Comment, (Gait/Stairs) chair needed to be brought behind pt secondary to fatigue and progressively decreased balance  -AM           User Key  (r) = Recorded By, (t) = Taken By, (c) = Cosigned By    Initials Name Provider Type    AM Tonio Monte PT Physical Therapist               Obj/Interventions     Row Name 12/08/22 1103          Range of Motion Comprehensive    General Range of Motion no range of motion deficits identified  -AM     Row Name 12/08/22 1103          Strength Comprehensive (MMT)    Comment, General Manual Muscle Testing (MMT) Assessment Defer BUE MMT to OT.  RLE: 4/5  LLE: 4+/5  -AM     Row Name 12/08/22 1103          Motor Skills    Motor Skills  functional endurance  -AM     Functional Endurance fair  -AM     Row Name 12/08/22 1103          Balance    Balance Assessment sitting static balance;sitting dynamic balance;sit to stand dynamic balance;standing static balance;standing dynamic balance  -AM     Static Sitting Balance supervision  -AM     Dynamic Sitting Balance supervision  -AM     Position, Sitting Balance unsupported;sitting in chair  -AM     Sit to Stand Dynamic Balance contact guard;minimal assist  -AM     Static Standing Balance contact guard;minimal assist  -AM     Dynamic Standing Balance minimal assist;moderate assist  -AM     Position/Device Used, Standing Balance supported;walker, rolling  -AM     Row Name 12/08/22 1103          Sensory Assessment (Somatosensory)    Sensory Assessment (Somatosensory) --  Pt reports decreased sensation in B feet L>R  -AM           User Key  (r) = Recorded By, (t) = Taken By, (c) = Cosigned By    Initials Name Provider Type    AM Tonio Monte, PT Physical Therapist               Goals/Plan     Row Name 12/08/22 1115          Bed Mobility Goal 1 (PT)    Activity/Assistive Device (Bed Mobility Goal 1, PT) bed mobility activities, all  -AM     [place] Level/Cues Needed (Bed Mobility Goal 1, PT) modified independence  -AM     Time Frame (Bed Mobility Goal 1, PT) long term goal (LTG)  -AM     Row Name 12/08/22 1115          Transfer Goal 1 (PT)    Activity/Assistive Device (Transfer Goal 1, PT) transfers, all  -AM     [place] Level/Cues Needed (Transfer Goal 1, PT) modified independence  -AM     Time Frame (Transfer Goal 1, PT) long term goal (LTG)  -AM     Row Name 12/08/22 1115          Gait Training Goal 1 (PT)    Activity/Assistive Device (Gait Training Goal 1, PT) gait (walking locomotion);assistive device use  -AM     [place] Level (Gait Training Goal 1, PT) modified independence  -AM     Distance (Gait Training Goal 1, PT) 150'  -AM     Time Frame (Gait Training Goal 1, PT) long term goal  (LTG)  -AM     Row Name 12/08/22 1115          Stairs Goal 1 (PT)    Activity/Assistive Device (Stairs Goal 1, PT) stairs, all skills  -AM     [place] Level/Cues Needed (Stairs Goal 1, PT) modified independence  -AM     Number of Stairs (Stairs Goal 1, PT) 14  -AM     Time Frame (Stairs Goal 1, PT) long term goal (LTG)  -AM     Row Name 12/08/22 1115          Therapy Assessment/Plan (PT)    Planned Therapy Interventions (PT) balance training;neuromuscular re-education;bed mobility training;gait training;patient/family education;strengthening;stair training;transfer training  -AM           User Key  (r) = Recorded By, (t) = Taken By, (c) = Cosigned By    Initials Name Provider Type    AM Tonio Monte, PT Physical Therapist               Clinical Impression     Row Name 12/08/22 1105          Pain    Pretreatment Pain Rating 2/10  -AM     Posttreatment Pain Rating 2/10  -AM     Pain Location - Side/Orientation Right  -AM     Pain Location - knee  -AM     Pain Intervention(s) Repositioned;Emotional support  -AM     Row Name 12/08/22 110          Plan of Care Review    Plan of Care Reviewed With patient  -AM     Outcome Evaluation Pt is a 56 y/o female with intermittent c/o RLE weakness and numbness with associated tingling.  Day of hospital admission- pt with slurred speech and L arm weakness x 30 minutes.  CT angiogram head/neck: (-) acute, CT chest (-) acute, MRI brain (-) acute.  Pt reports she lives with her daughter in an apartment wiht 14 steps to enter into home. Pt was ambulating with st cane prior to hospitalization.  Pt reporting 's death 1 year ago and she is struggling with the anniversary of his death.  Pt on telemetry and 2L O2 this date.  Pt required cga/min A for bed mobility, Min A for transfers and ambulated 20' with RW with min/mod A for ambulation. Pt required chair to be brought behind her for ambulation secondary to decreased balance and fatigue.  Pt is far below her normal baseline  of mobility and has 14 steps to enter into her home.  Recommend SNF.  -AM     Row Name 12/08/22 1105          Therapy Assessment/Plan (PT)    Patient/Family Therapy Goals Statement (PT) To get stronger  -AM     Rehab Potential (PT) good, to achieve stated therapy goals  -AM     Criteria for Skilled Interventions Met (PT) yes;skilled treatment is necessary  -AM     Therapy Frequency (PT) 5 times/wk  -AM     Predicted Duration of Therapy Intervention (PT) until d/c  -AM     Row Name 12/08/22 1105          Vital Signs    Pre SpO2 (%) 100  -AM     O2 Delivery Pre Treatment nasal cannula  2L O2  -AM     Intra SpO2 (%) 97  -AM     O2 Delivery Intra Treatment nasal cannula  -AM     Post SpO2 (%) 98  -AM     O2 Delivery Post Treatment nasal cannula  -AM     Pre Patient Position Supine  -AM     Intra Patient Position Standing  -AM     Post Patient Position Supine  -AM     Row Name 12/08/22 1105          Positioning and Restraints    Pre-Treatment Position in bed  -AM     Post Treatment Position chair  -AM     In Bed --  unable to locate chair alarm box, CNA found and attached to chair alarm pad  -AM     In Chair notified nsg;sitting;encouraged to call for assist;exit alarm on;call light within reach  unable to locate chair alarm box, CNA found and attached to chair alarm pad  -AM           User Key  (r) = Recorded By, (t) = Taken By, (c) = Cosigned By    Initials Name Provider Type    AM Tonio Monte, PT Physical Therapist               Outcome Measures     Row Name 12/08/22 1115          How much help from another person do you currently need...    Turning from your back to your side while in flat bed without using bedrails? 3  -AM     Moving from lying on back to sitting on the side of a flat bed without bedrails? 3  -AM     Moving to and from a bed to a chair (including a wheelchair)? 3  -AM     Standing up from a chair using your arms (e.g., wheelchair, bedside chair)? 3  -AM     Climbing 3-5 steps with a railing? 2   -AM     To walk in hospital room? 1  -AM     AM-PAC 6 Clicks Score (PT) 15  -AM     Highest level of mobility 4 --> Transferred to chair/commode  -AM     Row Name 12/08/22 1115          Functional Assessment    Outcome Measure Options AM-PAC 6 Clicks Basic Mobility (PT)  -AM           User Key  (r) = Recorded By, (t) = Taken By, (c) = Cosigned By    Initials Name Provider Type    AM Tonio Monte PT Physical Therapist                             Physical Therapy Education     Title: PT OT SLP Therapies (Done)     Topic: Physical Therapy (Done)     Point: Mobility training (Done)     Learning Progress Summary           Patient Acceptance, E,TB, VU by AM at 12/8/2022 1116                   Point: Home exercise program (Done)     Learning Progress Summary           Patient Acceptance, E,TB, VU by AM at 12/8/2022 1116                   Point: Body mechanics (Done)     Learning Progress Summary           Patient Acceptance, E,TB, VU by AM at 12/8/2022 1116                   Point: Precautions (Done)     Learning Progress Summary           Patient Acceptance, E,TB, VU by AM at 12/8/2022 1116                               User Key     Initials Effective Dates Name Provider Type Discipline    AM 05/10/21 -  Tonio Monte PT Physical Therapist PT              PT Recommendation and Plan  Planned Therapy Interventions (PT): balance training, neuromuscular re-education, bed mobility training, gait training, patient/family education, strengthening, stair training, transfer training  Plan of Care Reviewed With: patient  Outcome Evaluation: Pt is a 58 y/o female with intermittent c/o RLE weakness and numbness with associated tingling.  Day of hospital admission- pt with slurred speech and L arm weakness x 30 minutes.  CT angiogram head/neck: (-) acute, CT chest (-) acute, MRI brain (-) acute.  Pt reports she lives with her daughter in an apartment wiht 14 steps to enter into home. Pt was ambulating with st cane prior to  hospitalization.  Pt reporting 's death 1 year ago and she is struggling with the anniversary of his death.  Pt on telemetry and 2L O2 this date.  Pt required cga/min A for bed mobility, Min A for transfers and ambulated 20' with RW with min/mod A for ambulation. Pt required chair to be brought behind her for ambulation secondary to decreased balance and fatigue.  Pt is far below her normal baseline of mobility and has 14 steps to enter into her home.  Recommend SNF.     Time Calculation:    PT Charges     Row Name 22 1116             Time Calculation    Start Time 0825  -AM      Stop Time 0855  -AM      Time Calculation (min) 30 min  -AM      PT Received On 22  -AM      PT - Next Appointment 22  -AM      PT Goal Re-Cert Due Date 22  -AM            User Key  (r) = Recorded By, (t) = Taken By, (c) = Cosigned By    Initials Name Provider Type    AM Tonio Monte, PT Physical Therapist              Therapy Charges for Today     Code Description Service Date Service Provider Modifiers Qty    70798837822 HC PT EVAL MOD COMPLEXITY 4 2022 Tonio Monte, PT GP 1          PT G-Codes  Outcome Measure Options: AM-PAC 6 Clicks Basic Mobility (PT)  AM-PAC 6 Clicks Score (PT): 15  PT Discharge Summary  Anticipated Discharge Disposition (PT): skilled nursing facility    Tonio Monte PT  2022      Electronically signed by Tonio Monte, PT at 22 1118          Occupational Therapy Notes (last 72 hours)      Leigah Bset, OT at 22 1649          Patient Name: Eladia Connor  : 1965    MRN: [id number]                              Today's Date: 2022       Admit Date: 2022    Visit Dx:     ICD-10-CM ICD-9-CM   1. Chest pain, unspecified type  R07.9 786.50   2. Slurred speech  R47.81 784.59   3. TB lung, latent  Z22.7 795.51   4. Right leg weakness  R29.898 729.89   5. Left arm weakness  R29.898 729.89   6. Follow-up exam  Z09 V67.9     Patient  Active Problem List   Diagnosis   • Moderate episode of recurrent major depressive disorder (HCC)   • Anxiety   • Other insomnia   • Skin lesion   • History of abnormal mammogram   • Mood swings   • Class 1 obesity due to excess calories with serious comorbidity and body mass index (BMI) of 33.0 to 33.9 in adult   • Rash of face   • Fever   • Cough   • Rash   • Intertrigo   • Weakness     Past Medical History:   Diagnosis Date   • Anxiety    • Depression      Past Surgical History:   Procedure Laterality Date   • CHOLECYSTECTOMY  1987   • EYE SURGERY     • KNEE SURGERY        General Information     Row Name 12/08/22 1635          OT Time and Intention    Document Type evaluation  -SR     Mode of Treatment occupational therapy  -SR     Row Name 12/08/22 1635          Occupational Profile    Reason for Services/Referral (Occupational Profile) Pt is a 56 y/o female with intermittent c/o RLE weakness and numbness with associated tingling.  Day of hospital admission pt be slurred speech and L arm weakness x 30 minutes.  CT angiogram head/neck: (-) acute, CT chest (-) acute, MRI brain (-) acute.  Pt reports she lives with her daughter in an apartment with 14 steps to enter into home.  Daughter assists with some ADLs such as donning socks and taking showers.  -SR     Row Name 12/08/22 1635          Cognition    Orientation Status (Cognition) oriented x 4  -SR     Row Name 12/08/22 1634          Safety Issues, Functional Mobility    Impairments Affecting Function (Mobility) balance;endurance/activity tolerance;pain;strength  -SR           User Key  (r) = Recorded By, (t) = Taken By, (c) = Cosigned By    Initials Name Provider Type    SR Leigha Best, OT Occupational Therapist                 Mobility/ADL's     Row Name 12/08/22 1633          Bed Mobility    Bed Mobility bed mobility (all) activities  -SR     All Activities, [place] (Bed Mobility) contact guard  -SR     Assistive Device (Bed Mobility) bed  rails;head of bed elevated  -SR     Row Name 12/08/22 1639          Sit-Stand Transfer    Sit-Stand [place] (Transfers) minimum assist (75% patient effort);1 person assist  -SR     Assistive Device (Sit-Stand Transfers) walker, 4-wheeled  -SR     Row Name 12/08/22 1639          Activities of Daily Living    BADL Assessment/Intervention toileting  -SR     Row Name 12/08/22 1639          Toileting Assessment/Training    [place]raham Level (Toileting) contact guard assist  -SR           User Key  (r) = Recorded By, (t) = Taken By, (c) = Cosigned By    Initials Name Provider Type    SR Leigha Best OT Occupational Therapist               Obj/Interventions     Row Name 12/08/22 1641          Range of Motion Comprehensive    Comment, General Range of Motion Shoulders slighlty limited due to weakness.  Good distal UE ROM.  -SR     [facility] Name 12/08/22 1641          Strength Comprehensive (MMT)    Comment, General Manual Muscle Testing (MMT) Assessment B shoulders 3/5, distal UE 4-/5  -SR     [facility] Name 12/08/22 1641          Balance    Balance Interventions sitting;standing;sit to stand;supported;dynamic;static;minimal challenge  -SR           User Key  (r) = Recorded By, (t) = Taken By, (c) = Cosigned By    Initials Name Provider Type    SR Leigha Best, OT Occupational Therapist               Goals/Plan     [facility] Name 12/08/22 1645          Bathing Goal 1 (OT)    Activity/Device (Bathing Goal 1, OT) bathing skills, all  -SR     [place] Level/Cues Needed (Bathing Goal 1, OT) minimum assist (75% or more patient effort)  -SR     Time Frame (Bathing Goal 1, OT) 2 weeks  -SR     Row Name 12/08/22 1645          Dressing Goal 1 (OT)    Activity/Device (Dressing Goal 1, OT) dressing skills, all  -SR     [place]/Cues Needed (Dressing Goal 1, OT) contact guard required  -SR     Time Frame (Dressing Goal 1, OT) 2 weeks  -SR     [facility] Name 12/08/22 1645          Therapy Assessment/Plan (OT)    Planned  Therapy Interventions (OT) activity tolerance training;BADL retraining;functional balance retraining;IADL retraining;occupation/activity based interventions;patient/caregiver education/training;ROM/therapeutic exercise;strengthening exercise  -SR           User Key  (r) = Recorded By, (t) = Taken By, (c) = Cosigned By    Initials Name Provider Type    SR Leigha Best, OT Occupational Therapist               Clinical Impression     Row Name 12/08/22 1643          Pain Assessment    Pretreatment Pain Rating 2/10  -SR     Posttreatment Pain Rating 2/10  -SR     Row Name 12/08/22 1643          Plan of Care Review    Outcome Evaluation Pt is a 56 y/o female with intermittent c/o RLE weakness and numbness with associated tingling.  Day of hospital admission pt be slurred speech and L arm weakness x 30 minutes.  CT angiogram head/neck: (-) acute, CT chest (-) acute, MRI brain (-) acute.  Pt reports she lives with her daughter in an apartment with 14 steps to enter into home.  Daughter assists with some ADLs such as donning socks and taking showers.  She has been experiencing increased weakness over the past 5 weeks and has been using a cane for mobility.  This date pt has been sitting in chair and reports fatigue.  She requires CGA to get to BSC and complete toileting.  She demos general weakness and fatigues quickly.  Recommend SNF at discharge.  -SR     Row Name 12/08/22 1643          Therapy Assessment/Plan (OT)    Rehab Potential (OT) good, to achieve stated therapy goals  -SR     Criteria for Skilled Therapeutic Interventions Met (OT) yes  -SR     Therapy Frequency (OT) 5 times/wk  -SR     Predicted Duration of Therapy Intervention (OT) Until discharge  -SR     Row Name 12/08/22 1643          Therapy Plan Review/Discharge Plan (OT)    Anticipated Discharge Disposition (OT) inpatient rehabilitation facility  -SR     Row Name 12/08/22 1643          Positioning and Restraints    Pre-Treatment Position sitting  in chair/recliner  -SR     In Bed call light within reach;encouraged to call for assist;exit alarm on  -SR           User Key  (r) = Recorded By, (t) = Taken By, (c) = Cosigned By    Initials Name Provider Type    SR Leigha Best OT Occupational Therapist               Outcome Measures     Row Name 12/08/22 1646          How much help from another is currently needed...    Putting on and taking off regular lower body clothing? 2  -SR     Bathing (including washing, rinsing, and drying) 3  -SR     Toileting (which includes using toilet bed pan or urinal) 3  -SR     Putting on and taking off regular upper body clothing 3  -SR     Taking care of personal grooming (such as brushing teeth) 3  -SR     Eating meals 4  -SR     AM-PAC 6 Clicks Score (OT) 18  -SR     Row Name 12/08/22 1115          How much help from another person do you currently need...    Turning from your back to your side while in flat bed without using bedrails? 3  -AM     Moving from lying on back to sitting on the side of a flat bed without bedrails? 3  -AM     Moving to and from a bed to a chair (including a wheelchair)? 3  -AM     Standing up from a chair using your arms (e.g., wheelchair, bedside chair)? 3  -AM     Climbing 3-5 steps with a railing? 2  -AM     To walk in hospital room? 1  -AM     AM-PAC 6 Clicks Score (PT) 15  -AM     Highest level of mobility 4 --> Transferred to chair/commode  -AM     Row Name 12/08/22 1646 12/08/22 1115       Functional Assessment    Outcome Measure Options AM-PAC 6 Clicks Daily Activity (OT)  -SR AM-PAC 6 Clicks Basic Mobility (PT)  -AM          User Key  (r) = Recorded By, (t) = Taken By, (c) = Cosigned By    Initials Name Provider Type    Leigha Suarez OT Occupational Therapist    Tonio Horner PT Physical Therapist                Occupational Therapy Education     Title: PT OT SLP Therapies (In Progress)     Topic: Occupational Therapy (In Progress)     Point: ADL training (In  Progress)     Description:   Instruct learner(s) on proper safety adaptation and remediation techniques during self care or transfers.   Instruct in proper use of assistive devices.              Learning Progress Summary           Patient Acceptance, E,TB, NR by SR at 12/8/2022 1648                   Point: Home exercise program (Not Started)     Description:   Instruct learner(s) on appropriate technique for monitoring, assisting and/or progressing therapeutic exercises/activities.              Learner Progress:  Not documented in this visit.          Point: Precautions (Not Started)     Description:   Instruct learner(s) on prescribed precautions during self-care and functional transfers.              Learner Progress:  Not documented in this visit.          Point: Body mechanics (In Progress)     Description:   Instruct learner(s) on proper positioning and spine alignment during self-care, functional mobility activities and/or exercises.              Learning Progress Summary           Patient Acceptance, E,TB, NR by SR at 12/8/2022 1648                               User Key     Initials Effective Dates Name Provider Type Discipline     06/16/21 -  Leigha Best, OT Occupational Therapist OT              OT Recommendation and Plan  Planned Therapy Interventions (OT): activity tolerance training, BADL retraining, functional balance retraining, IADL retraining, occupation/activity based interventions, patient/caregiver education/training, ROM/therapeutic exercise, strengthening exercise  Therapy Frequency (OT): 5 times/wk  Plan of Care Review  Outcome Evaluation: Pt is a 58 y/o female with intermittent c/o RLE weakness and numbness with associated tingling.  Day of hospital admission pt be slurred speech and L arm weakness x 30 minutes.  CT angiogram head/neck: (-) acute, CT chest (-) acute, MRI brain (-) acute.  Pt reports she lives with her daughter in an apartment with 14 steps to enter into home.   Daughter assists with some ADLs such as donning socks and taking showers.  She has been experiencing increased weakness over the past 5 weeks and has been using a cane for mobility.  This date pt has been sitting in chair and reports fatigue.  She requires CGA to get to BSC and complete toileting.  She demos general weakness and fatigues quickly.  Recommend SNF at discharge.     Time Calculation:    Time Calculation- OT     Row Name 12/08/22 1648             Time Calculation- OT    OT Start Time 1100  -SR      OT Stop Time 1115  -SR      OT Time Calculation (min) 15 min  -SR      Total Timed Code Minutes- OT 0 minute(s)  -SR      OT Received On 12/08/22  -SR      OT - Next Appointment 12/09/22  -SR      OT Goal Re-Cert Due Date 12/22/22  -SR            User Key  (r) = Recorded By, (t) = Taken By, (c) = Cosigned By    Initials Name Provider Type    SR Leigha Best OT Occupational Therapist              Therapy Charges for Today     Code Description Service Date Service Provider Modifiers Qty    [id number]  OT EVAL MOD COMPLEXITY 3 12/8/2022 Leigha Best OT GO 1               Leigha Best OT  12/8/2022    Electronically signed by Leigha Best OT at 12/08/22 1649     Leigha Best OT at 12/08/22 1648        Goal Outcome Evaluation:              Outcome Evaluation: Pt is a 56 y/o female with intermittent c/o RLE weakness and numbness with associated tingling.  Day of hospital admission pt be slurred speech and L arm weakness x 30 minutes.  CT angiogram head/neck: (-) acute, CT chest (-) acute, MRI brain (-) acute.  Pt reports she lives with her daughter in an apartment with 14 steps to enter into home.  Daughter assists with some ADLs such as donning socks and taking showers.  She has been experiencing increased weakness over the past 5 weeks and has been using a cane for mobility.  This date pt has been sitting in chair and reports fatigue.  She requires CGA to  get to BSC and complete toileting.  She demos general weakness and fatigues quickly.  Recommend SNF at discharge.    Electronically signed by Leigha Best OT at 12/08/22 1648     Bev Rodas OT at 12/07/22 1015             12/07/22 1014   OTHER   Discipline occupational therapist   Rehab Time/Intention   Session Not Performed unable to evaluate, medical status change  (Pt going for MRI and with episode this am is not yet stable for OT jonh GRANGER tomorrow.)   Recommendation   OT - Next Appointment 12/08/22       Electronically signed by Bev Rodas OT at 12/07/22 1015

## 2022-12-10 PROCEDURE — G0378 HOSPITAL OBSERVATION PER HR: HCPCS

## 2022-12-10 PROCEDURE — 25010000002 LEVETIRACETAM IN NACL 0.82% 500 MG/100ML SOLUTION: Performed by: INTERNAL MEDICINE

## 2022-12-10 PROCEDURE — 25010000002 ENOXAPARIN PER 10 MG: Performed by: INTERNAL MEDICINE

## 2022-12-10 PROCEDURE — 25010000002 ONDANSETRON PER 1 MG: Performed by: INTERNAL MEDICINE

## 2022-12-10 RX ORDER — GABAPENTIN 100 MG/1
100 CAPSULE ORAL 2 TIMES DAILY
Status: DISCONTINUED | OUTPATIENT
Start: 2022-12-10 | End: 2022-12-13 | Stop reason: HOSPADM

## 2022-12-10 RX ORDER — LEVETIRACETAM 5 MG/ML
500 INJECTION INTRAVASCULAR EVERY 12 HOURS SCHEDULED
Status: DISCONTINUED | OUTPATIENT
Start: 2022-12-10 | End: 2022-12-12

## 2022-12-10 RX ADMIN — LORAZEPAM 0.5 MG: 0.5 TABLET ORAL at 11:34

## 2022-12-10 RX ADMIN — Medication 10 ML: at 21:17

## 2022-12-10 RX ADMIN — FAMOTIDINE 20 MG: 20 TABLET ORAL at 18:17

## 2022-12-10 RX ADMIN — BUSPIRONE HYDROCHLORIDE 5 MG: 5 TABLET ORAL at 08:39

## 2022-12-10 RX ADMIN — ONDANSETRON 4 MG: 2 INJECTION INTRAMUSCULAR; INTRAVENOUS at 10:50

## 2022-12-10 RX ADMIN — BUSPIRONE HYDROCHLORIDE 5 MG: 5 TABLET ORAL at 00:15

## 2022-12-10 RX ADMIN — LORAZEPAM 0.5 MG: 0.5 TABLET ORAL at 00:15

## 2022-12-10 RX ADMIN — ENOXAPARIN SODIUM 40 MG: 100 INJECTION SUBCUTANEOUS at 08:35

## 2022-12-10 RX ADMIN — Medication 10 ML: at 08:35

## 2022-12-10 RX ADMIN — BUSPIRONE HYDROCHLORIDE 5 MG: 5 TABLET ORAL at 21:11

## 2022-12-10 RX ADMIN — NYSTATIN: 100000 POWDER TOPICAL at 21:17

## 2022-12-10 RX ADMIN — LEVETIRACETAM 500 MG: 5 INJECTION INTRAVENOUS at 21:17

## 2022-12-10 RX ADMIN — LEVETIRACETAM 500 MG: 5 INJECTION INTRAVENOUS at 12:38

## 2022-12-10 RX ADMIN — FAMOTIDINE 20 MG: 20 TABLET ORAL at 08:37

## 2022-12-10 RX ADMIN — ACETAMINOPHEN 650 MG: 325 TABLET, FILM COATED ORAL at 21:15

## 2022-12-10 RX ADMIN — Medication 10 ML: at 08:37

## 2022-12-10 RX ADMIN — ASPIRIN 81 MG CHEWABLE TABLET 162 MG: 81 TABLET CHEWABLE at 08:36

## 2022-12-10 RX ADMIN — BUSPIRONE HYDROCHLORIDE 5 MG: 5 TABLET ORAL at 15:56

## 2022-12-10 RX ADMIN — ACETAMINOPHEN 650 MG: 325 TABLET, FILM COATED ORAL at 11:48

## 2022-12-10 RX ADMIN — ACETAMINOPHEN 650 MG: 325 TABLET, FILM COATED ORAL at 15:55

## 2022-12-10 RX ADMIN — ENOXAPARIN SODIUM 40 MG: 100 INJECTION SUBCUTANEOUS at 21:15

## 2022-12-10 NOTE — SIGNIFICANT NOTE
Pt not appropriate for PT interventions at this time. Will hold and f/u as appropriate per nursing.
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
## 2022-12-10 NOTE — CODE DOCUMENTATION
RR called for neuro change.  Patient had her tongue sticking out saying she was paralyzed.  She also  stated she couldn't move her arms or legs but she was moving them.  She also said they all know she has paralysis that comes and goes.  Called and spoke with APRN Essing orders given and to call Neuro after CT scan.    Family is wanting the patient to be moved to [facility].  Notified the APRN, however this will be very difficult at this time of night and they will work on it tomorrow morning.

## 2022-12-11 ENCOUNTER — APPOINTMENT (OUTPATIENT)
Dept: GENERAL RADIOLOGY | Facility: HOSPITAL | Age: 57
End: 2022-12-11

## 2022-12-11 LAB
AMMONIA BLD-SCNC: 45 UMOL/L (ref 11–51)
ARTERIAL PATENCY WRIST A: POSITIVE
ATMOSPHERIC PRESS: ABNORMAL MM[HG]
BASE EXCESS BLDA CALC-SCNC: 0.8 MMOL/L (ref 0–3)
BDY SITE: ABNORMAL
CA-I BLDA-SCNC: 1.22 MMOL/L (ref 1.15–1.33)
D-LACTATE SERPL-SCNC: 1 MMOL/L (ref 0.5–2)
GLUCOSE BLDC GLUCOMTR-MCNC: 111 MG/DL (ref 74–100)
GLUCOSE BLDC GLUCOMTR-MCNC: 111 MG/DL (ref 74–100)
GLUCOSE BLDC GLUCOMTR-MCNC: 97 MG/DL (ref 70–105)
HCO3 BLDA-SCNC: 24.7 MMOL/L (ref 21–28)
HCT VFR BLDA CALC: 42 % (ref 38–51)
HEMODILUTION: NO
HGB BLDA-MCNC: 14.2 G/DL (ref 12–17)
INHALED O2 CONCENTRATION: 28 %
MODALITY: ABNORMAL
PCO2 BLDA: 36.6 MM HG (ref 35–48)
PH BLDA: 7.44 PH UNITS (ref 7.35–7.45)
PO2 BLDA: 59.3 MM HG (ref 83–108)
POTASSIUM BLDA-SCNC: 3.9 MMOL/L (ref 3.5–4.5)
SAO2 % BLDCOA: 91.3 % (ref 94–98)
SODIUM BLD-SCNC: 141 MMOL/L (ref 138–146)
TSH SERPL DL<=0.05 MIU/L-ACNC: 1.59 UIU/ML (ref 0.27–4.2)

## 2022-12-11 PROCEDURE — 84443 ASSAY THYROID STIM HORMONE: CPT | Performed by: INTERNAL MEDICINE

## 2022-12-11 PROCEDURE — 93005 ELECTROCARDIOGRAM TRACING: CPT | Performed by: INTERNAL MEDICINE

## 2022-12-11 PROCEDURE — 71045 X-RAY EXAM CHEST 1 VIEW: CPT

## 2022-12-11 PROCEDURE — 83605 ASSAY OF LACTIC ACID: CPT

## 2022-12-11 PROCEDURE — 82140 ASSAY OF AMMONIA: CPT | Performed by: INTERNAL MEDICINE

## 2022-12-11 PROCEDURE — 25010000002 LEVETIRACETAM IN NACL 0.82% 500 MG/100ML SOLUTION: Performed by: INTERNAL MEDICINE

## 2022-12-11 PROCEDURE — 82962 GLUCOSE BLOOD TEST: CPT

## 2022-12-11 PROCEDURE — 80051 ELECTROLYTE PANEL: CPT

## 2022-12-11 PROCEDURE — 74018 RADEX ABDOMEN 1 VIEW: CPT

## 2022-12-11 PROCEDURE — 93010 ELECTROCARDIOGRAM REPORT: CPT | Performed by: INTERNAL MEDICINE

## 2022-12-11 PROCEDURE — 82330 ASSAY OF CALCIUM: CPT

## 2022-12-11 PROCEDURE — 85018 HEMOGLOBIN: CPT

## 2022-12-11 PROCEDURE — 82803 BLOOD GASES ANY COMBINATION: CPT

## 2022-12-11 PROCEDURE — G0378 HOSPITAL OBSERVATION PER HR: HCPCS

## 2022-12-11 PROCEDURE — 36600 WITHDRAWAL OF ARTERIAL BLOOD: CPT

## 2022-12-11 PROCEDURE — 25010000002 ONDANSETRON PER 1 MG: Performed by: INTERNAL MEDICINE

## 2022-12-11 PROCEDURE — 25010000002 ENOXAPARIN PER 10 MG: Performed by: INTERNAL MEDICINE

## 2022-12-11 RX ORDER — BISACODYL 10 MG
10 SUPPOSITORY, RECTAL RECTAL DAILY PRN
Status: DISCONTINUED | OUTPATIENT
Start: 2022-12-11 | End: 2022-12-13 | Stop reason: HOSPADM

## 2022-12-11 RX ORDER — POLYETHYLENE GLYCOL 3350 17 G/17G
17 POWDER, FOR SOLUTION ORAL DAILY
Status: DISCONTINUED | OUTPATIENT
Start: 2022-12-11 | End: 2022-12-13 | Stop reason: HOSPADM

## 2022-12-11 RX ORDER — AMOXICILLIN 250 MG
1 CAPSULE ORAL 2 TIMES DAILY
Status: DISCONTINUED | OUTPATIENT
Start: 2022-12-11 | End: 2022-12-13 | Stop reason: HOSPADM

## 2022-12-11 RX ADMIN — GABAPENTIN 100 MG: 100 CAPSULE ORAL at 21:39

## 2022-12-11 RX ADMIN — Medication 10 ML: at 09:09

## 2022-12-11 RX ADMIN — Medication 10 ML: at 21:40

## 2022-12-11 RX ADMIN — ENOXAPARIN SODIUM 40 MG: 100 INJECTION SUBCUTANEOUS at 21:32

## 2022-12-11 RX ADMIN — BUSPIRONE HYDROCHLORIDE 5 MG: 5 TABLET ORAL at 09:08

## 2022-12-11 RX ADMIN — NYSTATIN: 100000 POWDER TOPICAL at 09:09

## 2022-12-11 RX ADMIN — ASPIRIN 81 MG CHEWABLE TABLET 162 MG: 81 TABLET CHEWABLE at 09:08

## 2022-12-11 RX ADMIN — LEVETIRACETAM 500 MG: 5 INJECTION INTRAVENOUS at 09:09

## 2022-12-11 RX ADMIN — BUSPIRONE HYDROCHLORIDE 5 MG: 5 TABLET ORAL at 21:37

## 2022-12-11 RX ADMIN — Medication 10 ML: at 11:58

## 2022-12-11 RX ADMIN — FAMOTIDINE 20 MG: 20 TABLET ORAL at 09:09

## 2022-12-11 RX ADMIN — FAMOTIDINE 20 MG: 20 TABLET ORAL at 16:47

## 2022-12-11 RX ADMIN — LEVETIRACETAM 500 MG: 5 INJECTION INTRAVENOUS at 21:31

## 2022-12-11 RX ADMIN — POLYETHYLENE GLYCOL 3350 17 G: 17 POWDER, FOR SOLUTION ORAL at 14:15

## 2022-12-11 RX ADMIN — ENOXAPARIN SODIUM 40 MG: 100 INJECTION SUBCUTANEOUS at 09:08

## 2022-12-11 RX ADMIN — ONDANSETRON 4 MG: 2 INJECTION INTRAMUSCULAR; INTRAVENOUS at 11:58

## 2022-12-11 RX ADMIN — ATORVASTATIN CALCIUM 80 MG: 40 TABLET, FILM COATED ORAL at 21:32

## 2022-12-11 RX ADMIN — SENNOSIDES AND DOCUSATE SODIUM 1 TABLET: 50; 8.6 TABLET ORAL at 21:37

## 2022-12-11 NOTE — PROGRESS NOTES
[facility] Medicine Services Daily Progress Note    Patient Name: Eladia Connor  : 1965  MRN: [id number]  Primary Care Physician:  Quincy Lemos APRN  Date of admission: 2022      Subjective      Chief Complaint: Weakness    Patient seen and examined this morning.  Doing better this morning but still having some intermittent left upper extremity shooting/nerve pain radiating from neck and some mild headache.  Discussed CT results with her and has C5-6 stenosis.  Gabapentin added and patient willing to try.  Still discussed with patient and daughter yesterday regarding her treatment plan and diagnosis.  Daughter and patient still wants transferred to [facility] or [facility].  They understand that the process may take a while, will try to talk to transfer center today.    Pertinent positives as noted in HPI/subjective.  All other systems were reviewed and are negative.      Objective      Vitals:   Temp:  [98.4 °F (36.9 °C)-100.3 °F (37.9 °C)] 100.3 °F (37.9 °C)  Heart Rate:  [] 103  Resp:  [15-19] 19  BP: ()/(49-87) 159/87  Flow (L/min):  [2] 2    Physical Exam:    General: Awake, alert, lying in bed, NAD  Eyes: PERRL, EOMI, conjunctivae are clear  Cardiovascular: Regular rate and rhythm, no murmurs  Respiratory: Clear to auscultation bilaterally, no wheezing or rales, unlabored breathing  Abdomen: Soft, nontender, positive bowel sounds, no guarding  Neurologic: A&O, CN grossly intact, speech normal, moves all extremities spontaneously  Skin: Warm, dry, intact         Result Review    Result Review:  I have personally reviewed the results from the time of this admission to 2022 12:17 EST and agree with these findings:  [x]  Laboratory  [x]  Microbiology  [x]  Radiology  []  EKG/Telemetry   []  Cardiology/Vascular   []  Pathology  []  Old records  []  Other:    Wounds (last 24 hours)     LDA Wound     Row Name 12/10/22 7147  12/10/22 2020          Rash 12/10/22 0218 upper sternal other (see comments)    Rash - Properties Group Placement Date: 12/10/22  -TS Placement Time: 0218 -TS Orientation: upper  -TS Location: sternal  -TS Type: other (see comments)  -TS, Post RAPID/post-sternal rub      Distribution generalized  -MW generalized  -AH     Color red  -MW red  -AH     Configuration/Shape oval  -MW round  -AH     Borders irregular  -MW irregular  -AH     Characteristics dry;raised;itching  -MW dry;raised;itching  -AH     Lesion Size -- 0.5 to 1 cm  -AH     Care, Rash -- open to air  -AH     Retired Rash - Properties Group Placement Date: 12/10/22  -TS Placement Time: 0218 -TS Orientation: upper  -TS Location: sternal  -TS Type: other (see comments)  -TS, Post RAPID/post-sternal rub      Retired Rash - Properties Group Date first assessed: 12/10/22  -TS Time first assessed: 0218 -TS Orientation: upper  -TS Location: sternal  -TS Type: other (see comments)  -TS, Post RAPID/post-sternal rub            User Key  (r) = Recorded By, (t) = Taken By, (c) = Cosigned By    Initials Name Provider Type    Alcira Miner, RN Registered Nurse    Laura Jett LPN Licensed Nurse    Leslee Sorto RN Registered Nurse                  Assessment & Plan      Brief Patient Summary:  Eladia Connor is a 57 y.o. female who presented to [facility] on 12/6/2022 complaining of weakness.   She reportedly has a history of a CVA in 2012 without any residual deficits. She notes over the last week or so she has noted intermittent though right leg weakness and numbness with associated tingling.  Around 11 AM in the morning she developed slurred speech and left arm weakness.  Symptoms lasted for about 30 minutes and she presented to [facility] for further evaluation.  She was subsequently discharged indicating that testing/evaluation was largely unremarkable with questionable history of a pneumonia on chest x-ray.   She  describes a nonproductive cough without hemoptysis.  + Sick contacts with grandson with the flu.  She denies any fevers. She describes having possible positive TB test few weeks ago. No known TB contacts however.  She indicates her son just returned from the [place] though is not sick.  No significant personal travel history noted. When inquiring about new medications she identifies that she started Otezla this last week for her psoriasis and her symptoms seem to began after this.  She also reports being on Klonopin but has not taken for 1 week as she ran out of it.  Patient was noted to have multiple episodes of altered mental status with slurred speech and left-sided weakness.  Stroke work-up x2 including MRI brain appears to be negative.  Neurology following, recommending outpatient EEG study to rule out seizure activity.  Patient also appears to be having major depressive episode, psych was consulted.  Recommended antidepression but patient refusing at this time.  PT/OT recommending rehab.      aspirin, 162 mg, Oral, Daily   Or  aspirin, 300 mg, Rectal, Daily  atorvastatin, 80 mg, Oral, Nightly  busPIRone, 5 mg, Oral, TID  enoxaparin, 40 mg, Subcutaneous, Q12H  famotidine, 20 mg, Oral, BID AC  gabapentin, 100 mg, Oral, BID  levETIRAcetam, 500 mg, Intravenous, Q12H  nystatin, , Topical, Q12H  sodium chloride, 10 mL, Intravenous, Q12H  sodium chloride, 10 mL, Intravenous, Q12H  vilazodone, 10 mg, Oral, Daily             Active Hospital Problems:  Active Hospital Problems    Diagnosis    • **Weakness      Plan:     Speech disturbance with right leg and left arm weakness, recurrent episodes  -Possible TIA, or seizure, unclear at this time  -Patient has had few episodes of same symptoms over the past few days and during this admission  -Stroke work-up including MRI brain negative for new acute findings  -Previously had TIA back in 2013 without any residual deficits  -Possible major depression episode  -Continue aspirin,  statin  -Keppra added empirically per neurology however patient refusing  -Neurology following, recommending outpatient EEG, no in-house EEG available at this time, event monitor also ordered by neurology  -PT/OT recommending rehab  -Patient and family wants transfer to [facility], will try to initiate process as able    Major depression with anxiety  -Patient had episodes of altered mentation with speech disturbances however stroke work-up and metabolic work-up so far negative  -Patient likely suffering from major depression episode  -Psych following and recommended antidepressant but patient refusing at this time  -Per nursing patient has also been refusing home BuSpar at times  -No suicidal ideations at this time  -Continue psych follow-up    Dyspnea  Suspected NERY  -Patient states that she was diagnosed with NEYR few years ago after sleep study but never started on CPAP  -Patient and family both requesting pulmonology eval  -Respiratory status remains stable at this time  -Pulmonology following    Abnormal TB screening  -Reportedly positive QuantiFERON as outpatient however no symptoms or signs of TB infection noted at this time  -QuantiFERON has been ordered however can follow-up as outpatient with PCP for further work-up    Psoriasis  -Possibly having side effects from Otezla, hold for now  -Continue outpatient follow-up    Obesity  -BMI of 49.34 noted  -Lifestyle modifications recommended    DVT prophylaxis  -Lovenox      CODE STATUS:    Code Status (Patient has no pulse and is not breathing): CPR (Attempt to Resuscitate)  Medical Interventions (Patient has pulse or is breathing): Full Support  Release to patient: Routine Release      Disposition: Rehab placement.  Possible transfer to other hospital if accepted    Electronically signed by Delores Azul DO, 12/11/22, 12:17 EST.  [facility] Hospitalist Team      Part of this note may be an electronic transcription/translation of spoken  language to printed text using the Dragon Dictation System.

## 2022-12-11 NOTE — PLAN OF CARE
Goal Outcome Evaluation:   Pt resting in between care at this time. Had episode of anxiety/panic this shift x1, prn ativan given per MD order. Dr. Azul notified. HR, RR, O2, B/P WNL. Tylenol given for fever, will cont. To monitor.

## 2022-12-11 NOTE — CONSULTS
Group: [facility]         CONSULT NOTE    Patient Identification:  Eladia Connor  57 y.o.  female  1965  [id number]            Requesting physician: Attending physician    Reason for Consultation: Obstructive sleep apnea      History of Present Illness:    Eladia Connor is a 57-year-old female who presents to [facility] ED on 12/6/2022 with complaints of intermittent right leg weakness and numbness associated with tingling.  She developed slurred speech later that day and left arm weakness.  She presented to [facility] ED for further evaluation and was discharged.  Since that time she has developed a cough.    Assessment:    ?  NERY  -Patient reports she was diagnosed with NERY several years ago but never started on treatment with PAP    CT chest 12/7/2022 no active lung disease, history of positive PPD    History of TIA, 2013  Generalized weakness  Speech disturbance with right leg and left arm weakness, recurrent episodes    Psoriasis, on Otezla    Anxiety\depression    Recommendations:    Titrate oxygen, currently requiring 2 L per NC  -We will need 6-minute walk prior to discharge    DVT prophylaxis Lovenox    Patient will need work-up for obstructive sleep apnea after discharge        Review of Sytems:  Review of Systems   Respiratory: Positive for cough. Negative for shortness of breath.        Past Medical History:  Past Medical History:   Diagnosis Date   • Anxiety    • Depression        Past Surgical History:  Past Surgical History:   Procedure Laterality Date   • CHOLECYSTECTOMY  1987   • EYE SURGERY     • KNEE SURGERY          Home Meds:  Medications Prior to Admission   Medication Sig Dispense Refill Last Dose   • busPIRone (BUSPAR) 5 MG tablet Take 1 tablet by mouth 3 (Three) Times a Day. 30 tablet 5    • nystatin (MYCOSTATIN) 316428 UNIT/GM cream Apply 1 application topically to the appropriate area as directed 2 (Two) Times a Day. 30 g 3    • zolpidem (AMBIEN)  "10 MG tablet Take 1 tablet by mouth At Night As Needed for Sleep. 30 tablet 2        Allergies:  No Known Allergies    Social History:   Social History     Socioeconomic History   • Marital status:    Tobacco Use   • Smoking status: Never   • Smokeless tobacco: Never   Substance and Sexual Activity   • Alcohol use: Never   • Drug use: Never   • Sexual activity: Defer       Family History:  Family History   Problem Relation Age of Onset   • Heart disease Mother    • Breast cancer Mother    • Heart disease Father    • Pancreatic cancer Father    • Heart disease Sister    • Lung cancer Brother        Physical Exam:  BP 98/61   Pulse 79   Temp 98.7 °F (37.1 °C) (Oral)   Resp 16   Ht 170.2 cm (67\")   Wt (!) 143 kg (315 lb)   LMP  (LMP Unknown)   SpO2 97%   BMI 49.34 kg/m²  Body mass index is 49.34 kg/m². 97% (!) 143 kg (315 lb)  Physical Exam  Cardiovascular:      Heart sounds: Murmur heard.   Pulmonary:      Breath sounds: Rhonchi present.         LABS:  Lab Results   Component Value Date    CALCIUM 8.9 12/09/2022     Results from last 7 days   Lab Units 12/09/22  0407 12/08/22  0544 12/07/22  0604 12/06/22  2314   SODIUM mmol/L 140 140 140 139   POTASSIUM mmol/L 4.2 4.1 3.9 4.3   CHLORIDE mmol/L 105 109* 106 105   CO2 mmol/L 28.0 21.0* 22.0 21.0*   BUN mg/dL 15 10 8 9   CREATININE mg/dL 0.91 0.70 0.70 0.71   GLUCOSE mg/dL 113* 105* 128* 137*   CALCIUM mg/dL 8.9 8.8 9.0 9.5   WBC 10*3/mm3 8.20 7.00 7.70 8.80   HEMOGLOBIN g/dL 12.3 12.2 13.4 13.6   PLATELETS 10*3/mm3 244 262 285 330   ALT (SGPT) U/L 12 14  --  16   AST (SGOT) U/L 21 26  --  25   PROCALCITONIN ng/mL  --   --   --  0.02     Lab Results   Component Value Date    TROPONINT <0.010 12/06/2022     Results from last 7 days   Lab Units 12/06/22  2314   TROPONIN T ng/mL <0.010         Results from last 7 days   Lab Units 12/06/22  2314   PROCALCITONIN ng/mL 0.02         Results from last 7 days   Lab Units 12/07/22  0050   ADENOVIRUS DETECTION BY " PCR  Not Detected   CORONAVIRUS 229E  Not Detected   CORONAVIRUS HKU1  Not Detected   CORONAVIRUS NL63  Not Detected   CORONAVIRUS OC43  Not Detected   HUMAN METAPNEUMOVIRUS  Not Detected   HUMAN RHINOVIRUS/ENTEROVIRUS  Not Detected   INFLUENZA B PCR  Not Detected   PARAINFLUENZA 1  Not Detected   PARAINFLUENZA VIRUS 2  Not Detected   PARAINFLUENZA VIRUS 3  Not Detected   PARAINFLUENZA VIRUS 4  Not Detected   BORDETELLA PERTUSSIS PCR  Not Detected   CHLAMYDOPHILA PNEUMONIAE PCR  Not Detected   MYCOPLAMA PNEUMO PCR  Not Detected   INFLUENZA A PCR  Not Detected   RSV, PCR  Not Detected     Results from last 7 days   Lab Units 12/06/22  2314   INR  1.05         Lab Results   Component Value Date    TSH 2.130 12/06/2022     Estimated Creatinine Clearance: 99 mL/min (by C-G formula based on SCr of 0.91 mg/dL).         Imaging:  Imaging Results (Last 24 Hours)     ** No results found for the last 24 hours. **            Current Meds:   SCHEDULE  aspirin, 162 mg, Oral, Daily   Or  aspirin, 300 mg, Rectal, Daily  atorvastatin, 80 mg, Oral, Nightly  busPIRone, 5 mg, Oral, TID  enoxaparin, 40 mg, Subcutaneous, Q12H  famotidine, 20 mg, Oral, BID AC  gabapentin, 100 mg, Oral, BID  levETIRAcetam, 500 mg, Intravenous, Q12H  nystatin, , Topical, Q12H  sodium chloride, 10 mL, Intravenous, Q12H  sodium chloride, 10 mL, Intravenous, Q12H  vilazodone, 10 mg, Oral, Daily      Infusions     PRNs  •  acetaminophen  •  butalbital-acetaminophen-caffeine  •  LORazepam  •  ondansetron  •  sodium chloride  •  [COMPLETED] Insert Peripheral IV **AND** sodium chloride  •  sodium chloride  •  sodium chloride  •  sodium chloride  •  sodium chloride  •  zolpidem        SCOOBY Garcia  12/11/2022  09:08 EST      Much of this encounter note is an electronic transcription/translation of spoken language to printed text using Dragon Software.

## 2022-12-11 NOTE — PLAN OF CARE
Goal Outcome Evaluation:   Patient transferred from , no new complaints at this time, pt reminded to turn throughout the night, no complaints of seizure/stroke-like activity at this time.

## 2022-12-11 NOTE — PLAN OF CARE
Goal Outcome Evaluation:      Patient shows no s/s of distress and vitals are stable. Pt had an episode of shaking, staring into space, foaming from mouth but patient was talking to staff, MD was in the room when it was happening and daughter was at bedside. Patient is now awake and alert with no episodes since. MD was notified that daughter and patient was patient to be transferred to another facility and stated he would see if anyone would accept her as a patient neurologist wise for EEG. Bed alarm on and call light within reach. Will continue to observe.

## 2022-12-12 PROBLEM — R07.9 CHEST PAIN, UNSPECIFIED TYPE: Status: ACTIVE | Noted: 2022-12-12

## 2022-12-12 LAB — QT INTERVAL: 328 MS

## 2022-12-12 PROCEDURE — 94799 UNLISTED PULMONARY SVC/PX: CPT

## 2022-12-12 PROCEDURE — 97110 THERAPEUTIC EXERCISES: CPT

## 2022-12-12 PROCEDURE — 25010000002 ENOXAPARIN PER 10 MG: Performed by: INTERNAL MEDICINE

## 2022-12-12 PROCEDURE — 25010000002 LEVETIRACETAM IN NACL 0.82% 500 MG/100ML SOLUTION: Performed by: INTERNAL MEDICINE

## 2022-12-12 PROCEDURE — 97535 SELF CARE MNGMENT TRAINING: CPT

## 2022-12-12 PROCEDURE — 97530 THERAPEUTIC ACTIVITIES: CPT

## 2022-12-12 PROCEDURE — 94762 N-INVAS EAR/PLS OXIMTRY CONT: CPT

## 2022-12-12 PROCEDURE — 94640 AIRWAY INHALATION TREATMENT: CPT

## 2022-12-12 RX ORDER — GABAPENTIN 100 MG/1
100 CAPSULE ORAL 2 TIMES DAILY
Start: 2022-12-12 | End: 2023-01-19 | Stop reason: SDUPTHER

## 2022-12-12 RX ORDER — FAMOTIDINE 20 MG/1
20 TABLET, FILM COATED ORAL
Start: 2022-12-12 | End: 2023-01-19 | Stop reason: SDUPTHER

## 2022-12-12 RX ORDER — LORAZEPAM 0.5 MG/1
0.5 TABLET ORAL DAILY PRN
Start: 2022-12-12 | End: 2022-12-16

## 2022-12-12 RX ORDER — VILAZODONE HYDROCHLORIDE 10 MG/1
10 TABLET ORAL DAILY
Qty: 7 TABLET
Start: 2022-12-13 | End: 2022-12-25

## 2022-12-12 RX ORDER — LEVETIRACETAM 5 MG/ML
500 INJECTION INTRAVASCULAR EVERY 12 HOURS SCHEDULED
Qty: 4000 ML
Start: 2022-12-12 | End: 2022-12-25

## 2022-12-12 RX ORDER — ATORVASTATIN CALCIUM 80 MG/1
80 TABLET, FILM COATED ORAL NIGHTLY
Qty: 90 TABLET
Start: 2022-12-12 | End: 2023-01-19 | Stop reason: SDUPTHER

## 2022-12-12 RX ORDER — BUDESONIDE 0.5 MG/2ML
0.5 INHALANT ORAL
Status: DISCONTINUED | OUTPATIENT
Start: 2022-12-12 | End: 2022-12-13 | Stop reason: HOSPADM

## 2022-12-12 RX ORDER — IPRATROPIUM BROMIDE AND ALBUTEROL SULFATE 2.5; .5 MG/3ML; MG/3ML
3 SOLUTION RESPIRATORY (INHALATION)
Status: DISCONTINUED | OUTPATIENT
Start: 2022-12-12 | End: 2022-12-13 | Stop reason: HOSPADM

## 2022-12-12 RX ORDER — BUTALBITAL, ACETAMINOPHEN AND CAFFEINE 50; 325; 40 MG/1; MG/1; MG/1
2 TABLET ORAL EVERY 8 HOURS PRN
Start: 2022-12-12 | End: 2022-12-25

## 2022-12-12 RX ORDER — ASPIRIN 81 MG/1
162 TABLET, CHEWABLE ORAL DAILY
Start: 2022-12-13 | End: 2023-01-19 | Stop reason: SDUPTHER

## 2022-12-12 RX ORDER — LEVETIRACETAM 500 MG/1
500 TABLET ORAL EVERY 12 HOURS SCHEDULED
Status: DISCONTINUED | OUTPATIENT
Start: 2022-12-12 | End: 2022-12-13 | Stop reason: HOSPADM

## 2022-12-12 RX ADMIN — ASPIRIN 81 MG CHEWABLE TABLET 162 MG: 81 TABLET CHEWABLE at 08:53

## 2022-12-12 RX ADMIN — GABAPENTIN 100 MG: 100 CAPSULE ORAL at 08:53

## 2022-12-12 RX ADMIN — Medication 10 ML: at 08:54

## 2022-12-12 RX ADMIN — BUSPIRONE HYDROCHLORIDE 5 MG: 5 TABLET ORAL at 20:21

## 2022-12-12 RX ADMIN — BUSPIRONE HYDROCHLORIDE 5 MG: 5 TABLET ORAL at 15:37

## 2022-12-12 RX ADMIN — ENOXAPARIN SODIUM 40 MG: 100 INJECTION SUBCUTANEOUS at 20:21

## 2022-12-12 RX ADMIN — POLYETHYLENE GLYCOL 3350 17 G: 17 POWDER, FOR SOLUTION ORAL at 08:53

## 2022-12-12 RX ADMIN — BUSPIRONE HYDROCHLORIDE 5 MG: 5 TABLET ORAL at 08:53

## 2022-12-12 RX ADMIN — LORAZEPAM 0.5 MG: 0.5 TABLET ORAL at 22:59

## 2022-12-12 RX ADMIN — FAMOTIDINE 20 MG: 20 TABLET ORAL at 08:53

## 2022-12-12 RX ADMIN — SENNOSIDES AND DOCUSATE SODIUM 1 TABLET: 50; 8.6 TABLET ORAL at 08:53

## 2022-12-12 RX ADMIN — LEVETIRACETAM 500 MG: 500 TABLET, FILM COATED ORAL at 22:56

## 2022-12-12 RX ADMIN — BUDESONIDE 0.5 MG: 0.5 INHALANT RESPIRATORY (INHALATION) at 19:19

## 2022-12-12 RX ADMIN — LEVETIRACETAM 500 MG: 5 INJECTION INTRAVENOUS at 08:54

## 2022-12-12 RX ADMIN — FAMOTIDINE 20 MG: 20 TABLET ORAL at 17:17

## 2022-12-12 RX ADMIN — VILAZODONE HYDROCHLORIDE 10 MG: 10 TABLET, FILM COATED ORAL at 08:54

## 2022-12-12 RX ADMIN — Medication 10 ML: at 20:21

## 2022-12-12 RX ADMIN — ATORVASTATIN CALCIUM 80 MG: 40 TABLET, FILM COATED ORAL at 20:21

## 2022-12-12 RX ADMIN — ENOXAPARIN SODIUM 40 MG: 100 INJECTION SUBCUTANEOUS at 08:53

## 2022-12-12 RX ADMIN — IPRATROPIUM BROMIDE AND ALBUTEROL SULFATE 3 ML: 2.5; .5 SOLUTION RESPIRATORY (INHALATION) at 19:19

## 2022-12-12 RX ADMIN — NYSTATIN: 100000 POWDER TOPICAL at 08:58

## 2022-12-12 RX ADMIN — IPRATROPIUM BROMIDE AND ALBUTEROL SULFATE 3 ML: 2.5; .5 SOLUTION RESPIRATORY (INHALATION) at 15:16

## 2022-12-12 RX ADMIN — GABAPENTIN 100 MG: 100 CAPSULE ORAL at 20:21

## 2022-12-12 NOTE — THERAPY TREATMENT NOTE
Subjective: Pt agreeable to supine BLE therapeutic exercise but reports she just returned to bed and does not wish to completed transfer training or gait training.    Objective:     Bed mobility - N/A or Not attempted.  Transfers - N/A or Not attempted.  Ambulation - N/A or Not attempted.     Therapeutic exercise: Pt completed 2x10 supine APs, heel slides, SLR, hip abduction/adduction, and glute bridges.    Vitals: Hypotensive (BP: 94/59 mmHg)    Pain: 5 VAS   Location: LUE and RLE  Intervention for pain: Repositioned    Education: Provided education on the importance of mobility in the acute care setting and Verbal/Tactile Cues    Assessment: Pt with symptomatic orthostatic hypotension this date per pt report and OT report. Upon arrival, pt supine in bed. Pt agreeable to bed level therapeutic exercise but declines transfer/gait training secondary to just returning to bed. Pt fatigues quickly and still with c/o tingling in B feet, LUE, and L face and with reports of RLE/LUE pain. Recommending IP rehab at d/c, as pt is far from baseline, and continue seeing 5x/week at [facility] for progression of mobility. PPE: gloves, mask    Plan/Recommendations:   High Intensity Therapy recommended post-acute care. This is recommended as therapy feels the patient would require 5-6 days per week, 2-3 hours per day. At this time, inpatient rehabilitation (acute rehab) would be the first choice and SNF would be second.. Pt requires no DME at discharge.     Pt desires Inpatient Rehabilitation placement at discharge. Pt cooperative; agreeable to therapeutic recommendations and plan of care.         Basic Mobility 6-click:  Rollin = Total, A lot = 2, A little = 3; 4 = None  Supine>Sit:   1 = Total, A lot = 2, A little = 3; 4 = None   Sit>Stand with arms:  1 = Total, A lot = 2, A little = 3; 4 = None  Bed>Chair:   1 = Total, A lot = 2, A little = 3; 4 = None  Ambulate in room:  1 = Total, A lot = 2, A little = 3; 4 = None  3-5  Steps with railin = Total, A lot = 2, A little = 3; 4 = None  Score: 15    Modified Servando: 4 = Moderately severe disability (Unable to attend to own bodily needs without assistance, and unable to walk unassisted)     Post-Tx Position: Supine with HOB Elevated and Call light and personal items within reach  PPE: gloves and surgical mask

## 2022-12-12 NOTE — PLAN OF CARE
Goal Outcome Evaluation:            Pt has had no complaints today. Pt been in bed resting. Will continue to monitor

## 2022-12-12 NOTE — CASE MANAGEMENT/SOCIAL WORK
Continued Stay Note   [facility]     Patient Name: Eladia Connor  MRN: [id number]  Today's Date: 12/12/2022    Admit Date: 12/6/2022    Plan: From home. Referral to [facility] remains pending. Will require precert. No PASRR required.   Discharge Plan     Row Name 12/12/22 1604       Plan    Plan From home. Referral to [facility] remains pending. Will require precert. No PASRR required.    Patient/Family in Agreement with Plan yes    Plan Comments CM emailed facility liaison [facility] for [facility] for update regarding referral. No return communication received. Previous CM had voicemail from patient with additional choices of [facility] and [facility]. Dr Azul notified CM of pending transfer to [facility], awaiting bed availability.              Phone communication or documentation only - no physical contact with patient or family.      Megan Naegele, RN      Office Phone: [phone]  Office Cell: [phone]

## 2022-12-12 NOTE — PLAN OF CARE
Assessment: Eladia Connor presents with ADL impairments below baseline abilities which indicate the need for continued skilled intervention while inpatient. Pt required increased time this date secondary to low BP in supine. Pt educated on BLE AROM in supine with increased BP following exercises. Pt with increased tolerance for being upright following seated and standing AROM. Pt SBA for toileting and grooming this date for safety. OT recommending IPR at d/c. OT will follow. Tolerating session today without incident. Will continue to follow and progress as tolerated.     Plan/Recommendations:   High Intensity Therapy recommended post-acute care. This is recommended as therapy feels the patient would require 5-6 days per week, 2-3 hours per day. At this time, inpatient rehabilitation (acute rehab) would be the first choice and SNF would be second.. Pt requires no DME at discharge.

## 2022-12-12 NOTE — PLAN OF CARE
Goal Outcome Evaluation:       Pt with symptomatic orthostatic hypotension this date per pt report and OT report. Upon arrival, pt supine in bed. Pt agreeable to bed level therapeutic exercise but declines transfer/gait training secondary to just returning to bed. Pt fatigues quickly and still with c/o tingling in B feet, LUE, and L face and with reports of RLE/LUE pain. Recommending IP rehab at d/c, as pt is far from baseline, and continue seeing 5x/week at [facility] for progression of mobility. PPE: gloves, mask     Adina Mak, PT, DPT

## 2022-12-12 NOTE — PROGRESS NOTES
[facility] Medicine Services Daily Progress Note    Patient Name: Eladia Connor  : 1965  MRN: [id number]  Primary Care Physician:  Quincy Lemos APRN  Date of admission: 2022      Subjective      Chief Complaint: Weakness    Patient seen and examined this morning.  Doing okay this morning, having some dizziness but no headache, chest pain, shortness of breath.  Left arm pain/numbness/tingling is about the same.  No episodes overnight.  Discussed with transfer center at [facility], neurology and hospital service has accepted transfer.  Pending bed availability.    Pertinent positives as noted in HPI/subjective.  All other systems were reviewed and are negative.      Objective      Vitals:   Temp:  [98 °F (36.7 °C)-100.3 °F (37.9 °C)] 98 °F (36.7 °C)  Heart Rate:  [] 60  Resp:  [16-20] 17  BP: ()/(56-87) 99/63  Flow (L/min):  [2] 2    Physical Exam:    General: Awake, alert, lying in bed, NAD  Eyes: PERRL, EOMI, conjunctivae are clear  Cardiovascular: Regular rate and rhythm, no murmurs  Respiratory: Clear to auscultation bilaterally, no wheezing or rales, unlabored breathing  Abdomen: Soft, nontender, positive bowel sounds, no guarding  Neurologic: A&O, CN grossly intact, speech normal, moves all extremities spontaneously  Skin: Warm, dry, intact         Result Review    Result Review:  I have personally reviewed the results from the time of this admission to 2022 10:18 EST and agree with these findings:  [x]  Laboratory  [x]  Microbiology  [x]  Radiology  []  EKG/Telemetry   []  Cardiology/Vascular   []  Pathology  []  Old records  []  Other:    Wounds (last 24 hours)     LDA Wound     Row Name 22 0845 22          Rash 12/10/22 0218 upper sternal other (see comments)    Rash - Properties Group Placement Date: 12/10/22  -TS Placement Time: 218 Orientation: upper  -TS Location: sternal  -TS Type: other (see  comments)  -TS, Post RAPID/post-sternal rub      Distribution generalized  -PG generalized  -LJ     Color red  -PG --     Configuration/Shape oval  -PG --     Borders irregular  -PG irregular  -LJ     Characteristics -- dry;raised;burning  Pt reported not itching just burning  -LJ     Lesion Size -- 0.5 to 1 cm  -LJ     Care, Rash -- open to air  -LJ     Retired Rash - Properties Group Placement Date: 12/10/22  -TS Placement Time: 0218 -TS Orientation: upper  -TS Location: sternal  -TS Type: other (see comments)  -TS, Post RAPID/post-sternal rub      Retired Rash - Properties Group Date first assessed: 12/10/22  -TS Time first assessed: 0218 -TS Orientation: upper  -TS Location: sternal  -TS Type: other (see comments)  -TS, Post RAPID/post-sternal rub            User Key  (r) = Recorded By, (t) = Taken By, (c) = Cosigned By    Initials Name Provider Type    Krystal Panda LPN Licensed Nurse    Sosa Lopez RN Registered Nurse    Leslee Sorto RN Registered Nurse                  Assessment & Plan      Brief Patient Summary:  Eladia Connor is a 57 y.o. female who [facility] complaining of weakness.   She reportedly has a history of a CVA in 2012 without any residual deficits. She notes over the last week or so she has noted intermittent though right leg weakness and numbness with associated tingling.  Around 11 AM in the morning she developed slurred speech and left arm weakness.  Symptoms lasted for about 30 minutes and she presented to [facility] for [facility].  She was subsequently discharged indicating that testing/evaluation was largely unremarkable with questionable history of a pneumonia on chest x-ray.   She describes a nonproductive cough without hemoptysis.  + Sick contacts with grandson with the flu.  She denies any fevers. She describes having possible positive TB test few weeks ago. No known TB contacts however.  She  indicates her son just returned from the [place] though is not sick.  No significant personal travel history noted. When inquiring about new medications she identifies that she started Otezla this last week for her psoriasis and her symptoms seem to began after this.  She also reports being on Klonopin but has not taken for 1 week as she ran out of it.  Patient was noted to have multiple episodes of altered mental status with slurred speech and left-sided weakness.  Stroke work-up x2 including MRI brain appears to be negative.  Neurology following, recommending outpatient EEG study to rule out seizure activity.  Patient also appears to be having major depressive episode, psych was consulted.  Recommended antidepression but patient refusing at this time.  PT/OT recommending rehab.      aspirin, 162 mg, Oral, Daily   Or  aspirin, 300 mg, Rectal, Daily  atorvastatin, 80 mg, Oral, Nightly  busPIRone, 5 mg, Oral, TID  enoxaparin, 40 mg, Subcutaneous, Q12H  famotidine, 20 mg, Oral, BID AC  gabapentin, 100 mg, Oral, BID  levETIRAcetam, 500 mg, Intravenous, Q12H  nystatin, , Topical, Q12H  polyethylene glycol, 17 g, Oral, Daily  senna-docusate sodium, 1 tablet, Oral, BID  sodium chloride, 10 mL, Intravenous, Q12H  sodium chloride, 10 mL, Intravenous, Q12H  vilazodone, 10 mg, Oral, Daily             Active Hospital Problems:  Active Hospital Problems    Diagnosis    • **Weakness      Plan:     Speech disturbance with right leg and left arm weakness, recurrent episodes  -Possible TIA, or seizure, unclear at this time  -Patient has had few episodes of same symptoms over the past few days and during this admission  -Stroke work-up including MRI brain negative for new acute findings  -Previously had TIA back in 2013 without any residual deficits  -Possible major depression episode  -Continue aspirin, statin  -Keppra added empirically per neurology however patient refusing  -Neurology following, recommending outpatient EEG, no  in-house EEG available at this time, event monitor also ordered by neurology  -PT/OT recommending rehab  -Patient and family wants transfer to [facility]   -Discussed with [facility], neurologist Dr. YOLANDA Jenkins and hospitalist Dr. Us have accepted the patient to their service.  Patient will be transferred to [facility] once bed available    Major depression with anxiety  -Patient had episodes of altered mentation with speech disturbances however stroke work-up and metabolic work-up so far negative  -Patient likely suffering from major depression episode  -Psych following and recommended antidepressant but patient refusing at this time  -Per nursing patient has also been refusing home BuSpar at times  -No suicidal ideations at this time  -Continue psych follow-up    Dyspnea  Suspected NERY  -Patient states that she was 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 but never started on CPAP  -Patient and family both requesting pulmonology eval  -Respiratory status remains stable at this time  -Pulmonology following, recommending outpatient sleep study    Abnormal TB screening  -Reportedly positive QuantiFERON as outpatient however no symptoms or signs of TB infection noted at this time  -QuantiFERON has been ordered however can follow-up as outpatient with PCP for further work-up    Psoriasis  -Possibly having side effects from Otezla, hold for now  -Continue outpatient follow-up    Obesity  -BMI of 49.34 noted  -Lifestyle modifications recommended    DVT prophylaxis  -Lovenox      CODE STATUS:    Code Status (Patient has no pulse and is not breathing): CPR (Attempt to Resuscitate)  Medical Interventions (Patient has pulse or is breathing): Full Support  Release to patient: Routine Release      Disposition: Pending transfer to [facility]    Electronically signed by Delores Azul DO, 12/12/22, 10:18 EST.  [facility] Hospitalist Team      Part of this note may be an electronic  transcription/translation of spoken language to printed text using the Dragon Dictation System.

## 2022-12-12 NOTE — PLAN OF CARE
Goal Outcome Evaluation:  Pt has rested throughout the shift and no c/o of pain ,SOB or s/s of seizures.    Will continue to monitor.

## 2022-12-12 NOTE — PROGRESS NOTES
"PULMONARY CRITICAL CARE PROGRESS  NOTE      PATIENT IDENTIFICATION:  Name: Eladia Connor  MRN: [id number]  :  1965     Age: 57 y.o.  Sex: female    DATE OF Note:  2022   Referring Physician: No admitting provider for patient encounter.                  Subjective:   Feeling better, no overnight issues, om 2 L O2, no SOB, no chest or abd pain, no bowel or bladder issues     Objective:  tMax 24 hrs: Temp (24hrs), Av.7 °F (37.1 °C), Min:98 °F (36.7 °C), Max:100.3 °F (37.9 °C)      Vitals Ranges:   Temp:  [98 °F (36.7 °C)-100.3 °F (37.9 °C)] 98 °F (36.7 °C)  Heart Rate:  [] 60  Resp:  [16-20] 17  BP: ()/(56-87) 99/63    Intake and Output Last 3 Shifts:   I/O last 3 completed shifts:  In: 560 [P.O.:560]  Out: 350 [Urine:350]    Exam:  BP 99/63 (BP Location: Left arm, Patient Position: Lying)   Pulse 60   Temp 98 °F (36.7 °C) (Oral)   Resp 17   Ht 170.2 cm (67\")   Wt (!) 143 kg (315 lb)   LMP  (LMP Unknown)   SpO2 98%   BMI 49.34 kg/m²     General Appearance:  Alert   HEENT:  Normocephalic, without obvious abnormality. Conjunctivae/corneas clear.  Normal external ear canals. Nares normal, no drainage     Neck:  Supple, symmetrical, trachea midline. No JVD.  Lungs /Chest wall:   Bilateral basal rhonchi, respirations unlabored, symmetrical wall movement.     Heart:  Regular rate and rhythm, systolic murmur PMI left sternal border  Abdomen: Soft, nontender, no masses, no organomegaly.    Extremities: Trace edema, no clubbing or cyanosis        Medications:  aspirin, 162 mg, Oral, Daily   Or  aspirin, 300 mg, Rectal, Daily  atorvastatin, 80 mg, Oral, Nightly  busPIRone, 5 mg, Oral, TID  enoxaparin, 40 mg, Subcutaneous, Q12H  famotidine, 20 mg, Oral, BID AC  gabapentin, 100 mg, Oral, BID  levETIRAcetam, 500 mg, Intravenous, Q12H  nystatin, , Topical, Q12H  polyethylene glycol, 17 g, Oral, Daily  senna-docusate sodium, 1 tablet, Oral, BID  sodium chloride, 10 mL, Intravenous, " Q12H  sodium chloride, 10 mL, Intravenous, Q12H  vilazodone, 10 mg, Oral, Daily        Infusion:        PRN:  •  acetaminophen  •  bisacodyl  •  butalbital-acetaminophen-caffeine  •  LORazepam  •  ondansetron  •  sodium chloride  •  [COMPLETED] Insert Peripheral IV **AND** sodium chloride  •  sodium chloride  •  sodium chloride  •  sodium chloride  •  sodium chloride  •  zolpidem  Data Review:  All labs (24hrs):   Recent Results (from the past 24 hour(s))   ECG 12 Lead Chest Pain    Collection Time: 12/11/22 11:42 AM   Result Value Ref Range    QT Interval 328 ms   POC Glucose Once    Collection Time: 12/11/22 11:53 AM    Specimen: Blood   Result Value Ref Range    Glucose 97 70 - 105 mg/dL   Blood Gas, Arterial -    Collection Time: 12/11/22 12:08 PM    Specimen: Arterial Blood   Result Value Ref Range    Site Right Radial     Tod's Test Positive     pH, Arterial 7.437 7.350 - 7.450 pH units    pCO2, Arterial 36.6 35.0 - 48.0 mm Hg    pO2, Arterial 59.3 (L) 83.0 - 108.0 mm Hg    HCO3, Arterial 24.7 21.0 - 28.0 mmol/L    Base Excess, Arterial 0.8 0.0 - 3.0 mmol/L    O2 Saturation, Arterial 91.3 (L) 94.0 - 98.0 %    Barometric Pressure for Blood Gas      Modality Cannula     FIO2 28 %    Hemodilution No    POCT Electrolytes +HGB +HCT    Collection Time: 12/11/22 12:08 PM    Specimen: Blood   Result Value Ref Range    Sodium 141 138 - 146 mmol/L    POC Potassium 3.9 3.5 - 4.5 mmol/L    Ionized Calcium 1.22 1.15 - 1.33 mmol/L    Glucose 111 (H) 74 - 100 mg/dL    Hematocrit 42 38 - 51 %    Hemoglobin 14.2 12.0 - 17.0 g/dL   POC Lactate    Collection Time: 12/11/22 12:08 PM    Specimen: Blood   Result Value Ref Range    Lactate 1.0 0.5 - 2.0 mmol/L   POC Glucose Once    Collection Time: 12/11/22 12:08 PM    Specimen: Blood   Result Value Ref Range    Glucose 111 (H) 74 - 100 mg/dL   TSH    Collection Time: 12/11/22  3:06 PM    Specimen: Blood   Result Value Ref Range    TSH 1.590 0.270 - 4.200 uIU/mL   Ammonia     Collection Time: 12/11/22  3:06 PM    Specimen: Blood   Result Value Ref Range    Ammonia 45 11 - 51 umol/L        Imaging:  XR Chest 1 View  Narrative: EXAMINATION: XR CHEST 1 VW-     DATE OF EXAM: 12/11/2022 12:40 PM     INDICATION: change in status; R07.9-Chest pain, unspecified;  R47.81-Slurred speech; Z22.7-Latent tuberculosis; R29.898-Other symptoms  and signs involving the musculoskeletal system; R29.898-Other symptoms  and signs involving the musculoskeletal system; P93-Ulhjvqigk for  follow-up examination after completed treatment for conditions other  than malignant neoplasm.     COMPARISON: Chest radiograph dated 11/30/2022     TECHNIQUE: Portable AP view of the chest was obtained.     FINDINGS:  There is cardiomegaly. There are bilateral interstitial opacities  throughout both lungs which could represent pulmonary edema or  bronchitis/atypical infection. There are low lung volumes. There is no  pleural effusion or pneumothorax. There are multilevel degenerative  changes of the thoracic spine.     Impression: 1. Low lung volumes with new bilateral interstitial opacities which  could represent pulmonary edema or bronchitis/atypical infection.  2. Mild cardiomegaly.     Electronically Signed By-Dru Rosas MD On:12/11/2022 1:43 PM  This report was finalized on [id number] by  Dru Rosas MD.  XR Abdomen KUB  Narrative: DATE OF EXAM:  12/11/2022 12:40 PM     PROCEDURE:  XR ABDOMEN KUB-     INDICATIONS:  constipation; R07.9-Chest pain, unspecified; R47.81-Slurred speech;  Z22.7-Latent tuberculosis; R29.898-Other symptoms and signs involving  the musculoskeletal system; R29.898-Other symptoms and signs involving  the musculoskeletal system; V48-[id number] for follow-up examination  after completed treatment for conditions other than malignant neoplasm     COMPARISON:  None available     TECHNIQUE:   Single radiographic view of the abdomen was obtained.     FINDINGS:  There are multiple right upper  quadrant surgical clips. There is a  nonspecific nonobstructive small bowel gas pattern. There is an overall  mild colonic stool burden within the rectosigmoid colon. There are no  visible renal calcifications. There are calcified pelvic phleboliths.  There are multilevel degenerative changes of the lumbar spine.      Impression: 1. Mild rectosigmoid stool burden.  2. Nonspecific nonobstructive bowel gas pattern.     Electronically Signed By-Dru Rosas MD On:12/11/2022 1:41 PM  This report was finalized on [id number] by  Dru Rosas MD.       ASSESSMENT:  NERY  Cough   Hx + PPD  Hx TIA  Generalized weakness   Psoriasis  Anxiety/Depression        PLAN:  OOB daily for longer periods of time   Decrease O2 to keep sats > 88%, currently on 2 L   Bronchodilators  Inhaled corticosteroids  Incentive spirometer  Electrolytes/ glycemic control  DVT and GI prophylaxis-Lovenox    Discussed with Dr Jessica Castellon, SCOOBY    12/12/2022  11:18 EST    I personally have examined  and interviewed the patient. I have reviewed the history, data, problems, assessment and plan with our NP.  Total Critical care time in direct medical management (   ) minutes, This time specifically excludes time spent performing procedures.    Negrito Lopez MD   12/12/2022  19:40 EST

## 2022-12-12 NOTE — TELEPHONE ENCOUNTER
I called and left message on Lazara's voicemail.  I did confirm that she is on the hip that she last scanned in in [place].  I will be more than happy to discuss this with her when she returns the call.    Thank you.

## 2022-12-12 NOTE — THERAPY TREATMENT NOTE
Subjective: Pt agreeable to therapeutic plan of care.  Cognition: oriented to Person, Place, Time, and Situation and safety/judgement: good and fair    Objective:     Bed Mobility: CGA   Functional Transfers: CGA and with rolling walker  Functional Ambulation: CGA and with rolling walker    BLEs AROM in supine, sitting, and standing     Toileting: SBA  ADL Position: supported standing and unsupported sitting  ADL Comments: Pt able to manage clothes and perineal hygiene with SBA     Grooming: SBA  ADL Position: at sink  ADL Comments: standing at sink for grooming     Vitals: Hypotensive   Before activity 74/46 (56); 98%, HR 60  Following activity at bed level: 98/63 (74); 98%; HR 60  Sitting EOB: 81/56 (66); 97%; HR 65  Standin/53 (64); 96%; HR 66     Pain: 0 VAS  Location:   Interventions for pain: N/A  Education: ADL training and Transfer Training    Assessment: Eladia Connor presents with ADL impairments below baseline abilities which indicate the need for continued skilled intervention while inpatient. Pt required increased time this date secondary to low BP in supine. Pt educated on BLE AROM in supine with increased BP following exercises. Pt with increased tolerance for being upright following seated and standing AROM. Pt SBA for toileting and grooming this date for safety. OT recommending IPR at d/c. OT will follow. Tolerating session today without incident. Will continue to follow and progress as tolerated.     Plan/Recommendations:   High Intensity Therapy recommended post-acute care. This is recommended as therapy feels the patient would require 5-6 days per week, 2-3 hours per day. At this time, inpatient rehabilitation (acute rehab) would be the first choice and SNF would be second.. Pt requires no DME at discharge.     Pt desires Inpatient Rehabilitation placement at discharge. Pt cooperative; agreeable to therapeutic recommendations and plan of care.     Modified [place]: N/A = No pre-op  stroke/TIA    Post-Tx Position: Up in Chair, Alarms activated, and Call light and personal items within reach  PPE: gloves and surgical mask

## 2022-12-13 VITALS
DIASTOLIC BLOOD PRESSURE: 61 MMHG | RESPIRATION RATE: 14 BRPM | TEMPERATURE: 97.7 F | BODY MASS INDEX: 45.99 KG/M2 | WEIGHT: 293 LBS | HEIGHT: 67 IN | SYSTOLIC BLOOD PRESSURE: 90 MMHG | HEART RATE: 65 BPM | OXYGEN SATURATION: 98 %

## 2022-12-13 PROBLEM — R07.9 CHEST PAIN, UNSPECIFIED TYPE: Status: RESOLVED | Noted: 2022-12-12 | Resolved: 2022-12-13

## 2022-12-13 PROBLEM — R53.1 WEAKNESS: Status: RESOLVED | Noted: 2022-12-07 | Resolved: 2022-12-13

## 2022-12-13 PROCEDURE — 94799 UNLISTED PULMONARY SVC/PX: CPT

## 2022-12-13 PROCEDURE — 25010000002 ENOXAPARIN PER 10 MG: Performed by: INTERNAL MEDICINE

## 2022-12-13 PROCEDURE — 94664 DEMO&/EVAL PT USE INHALER: CPT

## 2022-12-13 RX ORDER — LEVETIRACETAM 500 MG/1
500 TABLET ORAL EVERY 12 HOURS SCHEDULED
Start: 2022-12-13 | End: 2022-12-25

## 2022-12-13 RX ORDER — IPRATROPIUM BROMIDE AND ALBUTEROL SULFATE 2.5; .5 MG/3ML; MG/3ML
3 SOLUTION RESPIRATORY (INHALATION)
Qty: 360 ML
Start: 2022-12-13 | End: 2022-12-25

## 2022-12-13 RX ADMIN — GABAPENTIN 100 MG: 100 CAPSULE ORAL at 09:01

## 2022-12-13 RX ADMIN — ASPIRIN 81 MG CHEWABLE TABLET 162 MG: 81 TABLET CHEWABLE at 09:01

## 2022-12-13 RX ADMIN — IPRATROPIUM BROMIDE AND ALBUTEROL SULFATE 3 ML: 2.5; .5 SOLUTION RESPIRATORY (INHALATION) at 11:02

## 2022-12-13 RX ADMIN — BUDESONIDE 0.5 MG: 0.5 INHALANT RESPIRATORY (INHALATION) at 07:18

## 2022-12-13 RX ADMIN — SODIUM CHLORIDE 500 ML: 9 INJECTION, SOLUTION INTRAVENOUS at 09:07

## 2022-12-13 RX ADMIN — BUSPIRONE HYDROCHLORIDE 5 MG: 5 TABLET ORAL at 09:01

## 2022-12-13 RX ADMIN — FAMOTIDINE 20 MG: 20 TABLET ORAL at 09:01

## 2022-12-13 RX ADMIN — IPRATROPIUM BROMIDE AND ALBUTEROL SULFATE 3 ML: 2.5; .5 SOLUTION RESPIRATORY (INHALATION) at 07:18

## 2022-12-13 RX ADMIN — SODIUM CHLORIDE, POTASSIUM CHLORIDE, SODIUM LACTATE AND CALCIUM CHLORIDE 250 ML: 600; 310; 30; 20 INJECTION, SOLUTION INTRAVENOUS at 05:26

## 2022-12-13 RX ADMIN — LEVETIRACETAM 500 MG: 500 TABLET, FILM COATED ORAL at 09:01

## 2022-12-13 RX ADMIN — ENOXAPARIN SODIUM 40 MG: 100 INJECTION SUBCUTANEOUS at 09:00

## 2022-12-13 NOTE — CASE MANAGEMENT/SOCIAL WORK
Case Management Discharge Note      Final Note: [facility].    Selected Continued Care - Discharged on 12/13/2022 Admission date: 12/6/2022 - Discharge disposition: Short Term Hospital (DC - External)     Transportation Services  Ambulance: [facility]    Final Discharge Disposition Code: 02 - short term hospital for  care

## 2022-12-13 NOTE — CASE MANAGEMENT/SOCIAL WORK
Continued Stay Note  REJI Grupo     Patient Name: Eladia Connor  MRN: [id number]  Today's Date: 12/13/2022    Admit Date: 12/6/2022    Plan: From home with family. [facility] declined (insurance OON).   Discharge Plan     Row Name 12/13/22 1358       Plan    Plan From home with family. [facility] declined (insurance OON).    Plan Comments  called [facility] liaison [facility] and reviewed insurance info. [facility] reports they had still been trying to contact insurance company to see if they were in network. While up on the unit, notified by nursing that pt has a bed available at [facility]. Received email from [facility] this afternoon stating patient's insurance is OON.              Phone communication or documentation only - no physical contact with patient or family.      Megan Naegele, RN      Office Phone: [phone]  Office Cell: [phone]

## 2022-12-13 NOTE — PROGRESS NOTES
"PULMONARY CRITICAL CARE PROGRESS  NOTE      PATIENT IDENTIFICATION:  Name: Eladia Connor  MRN: [id number]  :  1965     Age: 57 y.o.  Sex: female    DATE OF Note:  2022   Referring Physician: Adolph Azul MD                  Subjective:   Feeling better more energy getting up  2 L O2, no SOB, no chest or abd pain, no bowel or bladder issues     Objective:  tMax 24 hrs: Temp (24hrs), Av.7 °F (36.5 °C), Min:97.4 °F (36.3 °C), Max:98 °F (36.7 °C)      Vitals Ranges:   Temp:  [97.4 °F (36.3 °C)-98 °F (36.7 °C)] 97.7 °F (36.5 °C)  Heart Rate:  [57-75] 65  Resp:  [14-18] 14  BP: ()/(48-64) 90/61    Intake and Output Last 3 Shifts:   I/O last 3 completed shifts:  In: 520 [P.O.:520]  Out: -     Exam:  BP 90/61 (BP Location: Right arm, Patient Position: Lying)   Pulse 65   Temp 97.7 °F (36.5 °C) (Oral)   Resp 14   Ht 170.2 cm (67\")   Wt (!) 143 kg (315 lb)   LMP  (LMP Unknown)   SpO2 98%   BMI 49.34 kg/m²     General Appearance:  Alert   HEENT:  Normocephalic, without obvious abnormality. Conjunctivae/corneas clear.  Normal external ear canals. Nares normal, no drainage     Neck:  Supple, symmetrical, trachea midline. No JVD.  Lungs /Chest wall:   Bilateral basal rhonchi, respirations unlabored, symmetrical wall movement.     Heart:  Regular rate and rhythm, systolic murmur PMI left sternal border  Abdomen: Soft, nontender, no masses, no organomegaly.    Extremities: Trace edema, no clubbing or cyanosis        Medications:  aspirin, 162 mg, Oral, Daily   Or  aspirin, 300 mg, Rectal, Daily  atorvastatin, 80 mg, Oral, Nightly  budesonide, 0.5 mg, Nebulization, BID - RT  busPIRone, 5 mg, Oral, TID  enoxaparin, 40 mg, Subcutaneous, Q12H  famotidine, 20 mg, Oral, BID AC  gabapentin, 100 mg, Oral, BID  ipratropium-albuterol, 3 mL, Nebulization, 4x Daily - RT  levETIRAcetam, 500 mg, Oral, Q12H  nystatin, , Topical, Q12H  polyethylene glycol, 17 g, Oral, Daily  senna-docusate sodium, 1 " tablet, Oral, BID  sodium chloride, 10 mL, Intravenous, Q12H  sodium chloride, 10 mL, Intravenous, Q12H  vilazodone, 10 mg, Oral, Daily        Infusion:        PRN:  •  acetaminophen  •  bisacodyl  •  butalbital-acetaminophen-caffeine  •  LORazepam  •  ondansetron  •  sodium chloride  •  [COMPLETED] Insert Peripheral IV **AND** sodium chloride  •  sodium chloride  •  sodium chloride  •  sodium chloride  •  sodium chloride  •  zolpidem  Data Review:  All labs (24hrs):   No results found for this or any previous visit (from the past 24 hour(s)).     Imaging:  XR Chest 1 View  Narrative: EXAMINATION: XR CHEST 1 VW-     DATE OF EXAM: 12/11/2022 12:40 PM     INDICATION: change in status; R07.9-Chest pain, unspecified;  R47.81-Slurred speech; Z22.7-Latent tuberculosis; R29.898-Other symptoms  and signs involving the musculoskeletal system; R29.898-Other symptoms  and signs involving the musculoskeletal system; P74-[id number] for  follow-up examination after completed treatment for conditions other  than malignant neoplasm.     COMPARISON: Chest radiograph dated 11/30/2022     TECHNIQUE: Portable AP view of the chest was obtained.     FINDINGS:  There is cardiomegaly. There are bilateral interstitial opacities  throughout both lungs which could represent pulmonary edema or  bronchitis/atypical infection. There are low lung volumes. There is no  pleural effusion or pneumothorax. There are multilevel degenerative  changes of the thoracic spine.     Impression: 1. Low lung volumes with new bilateral interstitial opacities which  could represent pulmonary edema or bronchitis/atypical infection.  2. Mild cardiomegaly.     Electronically Signed By-Dru Rosas MD On:12/11/2022 1:43 PM  This report was finalized on [id number] by  Dru Rosas MD.  XR Abdomen KUB  Narrative: DATE OF EXAM:  12/11/2022 12:40 PM     PROCEDURE:  XR ABDOMEN KUB-     INDICATIONS:  constipation; R07.9-Chest pain, unspecified; R47.81-Slurred  speech;  Z22.7-Latent tuberculosis; R29.898-Other symptoms and signs involving  the musculoskeletal system; R29.898-Other symptoms and signs involving  the musculoskeletal system; F53-[id number] for follow-up examination  after completed treatment for conditions other than malignant neoplasm     COMPARISON:  None available     TECHNIQUE:   Single radiographic view of the abdomen was obtained.     FINDINGS:  There are multiple right upper quadrant surgical clips. There is a  nonspecific nonobstructive small bowel gas pattern. There is an overall  mild colonic stool burden within the rectosigmoid colon. There are no  visible renal calcifications. There are calcified pelvic phleboliths.  There are multilevel degenerative changes of the lumbar spine.      Impression: 1. Mild rectosigmoid stool burden.  2. Nonspecific nonobstructive bowel gas pattern.     Electronically Signed By-Dru Rosas MD On:12/11/2022 1:41 PM  This report was finalized on [id number] by  Dru Rosas MD.       ASSESSMENT:  NERY  Cough   Hx + PPD  Hx TIA  Generalized weakness   Psoriasis  Anxiety/Depression        PLAN:  Continue to wean oxygen down  PT OT  Bronchodilators  Inhaled corticosteroids  Incentive spirometer  Electrolytes/ glycemic control  DVT and GI prophylaxis-Lovenox      Total Critical care time in direct medical management (   ) minutes, This time specifically excludes time spent performing procedures.    Negrito Lopez MD   12/13/2022  13:45 EST

## 2022-12-13 NOTE — DISCHARGE SUMMARY
[facility]  Discharge Summary    Date of Service: 2022  Patient Name: Eladia Connor  : 1965  MRN: [id number]    Date of Admission: 2022  Discharge Diagnosis:  1.  Intermittent episodes of difficulty speaking along with right leg and left arm weakness and sometimes numbness stroke was ruled out suspected TIA, rule out seizures negative EEG, Keppra was added, patient will be transferred for further work-up to [facility] other neurologist Dr. Harry per request of family.  2.  Major depressive disorder.  3.  Dyspnea suspect obstructive sleep apnea CPAP was initiated in the hospital.  4.  Abnormal TB screening with reported positive QuantiFERON test.  5.  Psoriasis patient is taking Otezla.  6.  Mild hypotension.  7.  Obesity BMI 49.34    Date of Discharge: 2022  Primary Care Physician: Quincy Lemos APRN      Presenting Problem:   Slurred speech [R47.81]  Follow-up exam [Z09]  Right leg weakness [R29.898]  Left arm weakness [R29.898]  TB lung, latent [Z22.7]  Chest pain, unspecified type [R07.9]    Active and Resolved Hospital Problems:  Active Hospital Problems   No active problems to display.      Resolved Hospital Problems    Diagnosis POA   • **Weakness [R53.1] Yes   • Chest pain, unspecified type [R07.9] Yes   • Chest pain, unspecified type [R07.9] Yes         Hospital Course     Hospital Course:  Eladia Connor is a 57 y.o. female admitted to the hospital because of weakness, work-up diagnosis was suspected CVA, right leg weakness and numbness and associated tingling, work-up of stroke came back negative, patient also was taking Otezla for psoriasis last dose 2 weeks before of the current symptoms, there were concerns about TB that was ruled out, patient continued to feel tired generally with intermittent weakness and numbness, her blood pressure is borderline low, family requested the patient will be transferred to  [facility] for further investigations.    DISCHARGE Follow Up Recommendations for labs and diagnostics: Patient was transferred to a different hospital per family request    Reasons For Change In Medications and Indications for New Medications:      Day of Discharge     Vital Signs:  Temp:  [97.4 °F (36.3 °C)-98 °F (36.7 °C)] 97.7 °F (36.5 °C)  Heart Rate:  [57-75] 65  Resp:  [14-18] 14  BP: ()/(48-64) 90/61  Flow (L/min):  [2] 2    Physical Exam:  Physical Exam  Constitutional:       Appearance: Normal appearance.   HENT:      Head: Normocephalic.      Right Ear: Tympanic membrane normal.      Left Ear: Tympanic membrane normal.      Nose: Nose normal.      Mouth/Throat:      Mouth: Mucous membranes are moist.   Eyes:      Pupils: Pupils are equal, round, and reactive to light.   Cardiovascular:      Rate and Rhythm: Normal rate.      Pulses: Normal pulses.   Pulmonary:      Effort: Pulmonary effort is normal.   Abdominal:      General: Abdomen is flat.   Musculoskeletal:         General: Normal range of motion.      Cervical back: Normal range of motion.   Skin:     Capillary Refill: Capillary refill takes less than 2 seconds.   Neurological:      General: No focal deficit present.      Mental Status: She is alert.   Psychiatric:         Mood and Affect: Mood normal.          Pertinent  and/or Most Recent Results     LAB RESULTS:      Lab 12/11/22  1208 12/09/22  0407 12/08/22  0544 12/07/22  0604 12/06/22  2314   WBC  --  8.20 7.00 7.70 8.80   HEMOGLOBIN  --  12.3 12.2 13.4 13.6   HEMOGLOBIN, POC 14.2  --   --   --   --    HEMATOCRIT  --  37.9 37.6 40.8 40.4   HEMATOCRIT POC 42  --   --   --   --    PLATELETS  --  244 262 285 330   NEUTROS ABS  --  5.50 4.90 5.60 7.00   LYMPHS ABS  --  1.70 1.40 1.40 1.10   MONOS ABS  --  0.70 0.50 0.60 0.60   EOS ABS  --  0.20 0.10 0.10 0.00   MCV  --  87.9 88.7 87.2 86.2   PROCALCITONIN  --   --   --   --  0.02   LACTATE 1.0  --   --   --   --    PROTIME  --    --   --   --  10.8   APTT  --   --   --   --  26.8*         Lab 12/11/22  1506 12/09/22  0407 12/08/22  0544 12/07/22  0604 12/06/22  2314   SODIUM  --  140 140 140 139   POTASSIUM  --  4.2 4.1 3.9 4.3   CHLORIDE  --  105 109* 106 105   CO2  --  28.0 21.0* 22.0 21.0*   ANION GAP  --  7.0 10.0 12.0 13.0   BUN  --  15 10 8 9   CREATININE  --  0.91 0.70 0.70 0.71   EGFR  --  72.8 99.8 99.8 98.1   GLUCOSE  --  113* 105* 128* 137*   CALCIUM  --  8.9 8.8 9.0 9.5   HEMOGLOBIN A1C  --   --   --  5.7*  --    TSH 1.590  --   --   --  2.130         Lab 12/09/22  0407 12/08/22  0544 12/06/22  2314   TOTAL PROTEIN 6.9 7.1 8.5   ALBUMIN 3.70 3.60 4.20   GLOBULIN 3.2 3.5 4.3   ALT (SGPT) 12 14 16   AST (SGOT) 21 26 25   BILIRUBIN 0.3 0.5 0.8   ALK PHOS 123* 125* 156*         Lab 12/06/22  2314   TROPONIN T <0.010   PROTIME 10.8   INR 1.05         Lab 12/07/22  0604   CHOLESTEROL 182   LDL CHOL 121*   HDL CHOL 42   TRIGLYCERIDES 104         Lab 12/06/22  2314   VITAMIN B 12 391         Lab 12/11/22  1208   PH, ARTERIAL 7.437   PCO2, ARTERIAL 36.6   PO2 ART 59.3*   O2 SATURATION ART 91.3*   FIO2 28   HCO3 ART 24.7   BASE EXCESS ART 0.8     Brief Urine Lab Results     None        Microbiology Results (last 10 days)     Procedure Component Value - Date/Time    Respiratory Panel PCR w/COVID-19(SARS-CoV-2) TESS/MEDARDO/DAVID/PAD/COR/MAD/SHAMIKA In-House, [facility], 3-4 HR TAT - Swab, Nasopharynx [[id number]]  (Normal) Collected: 12/07/22 0050    Lab Status: Final result Specimen: Swab from Nasopharynx Updated: 12/07/22 0142     ADENOVIRUS, PCR Not Detected     Coronavirus 229E Not Detected     Coronavirus HKU1 Not Detected     Coronavirus NL63 Not Detected     Coronavirus OC43 Not Detected     COVID19 Not Detected     Human Metapneumovirus Not Detected     Human Rhinovirus/Enterovirus Not Detected     Influenza A PCR Not Detected     Influenza B PCR Not Detected     Parainfluenza Virus 1 Not Detected     Parainfluenza Virus 2 Not  Detected     Parainfluenza Virus 3 Not Detected     Parainfluenza Virus 4 Not Detected     RSV, PCR Not Detected     Bordetella pertussis pcr Not Detected     Bordetella parapertussis PCR Not Detected     Chlamydophila pneumoniae PCR Not Detected     Mycoplasma pneumo by PCR Not Detected    Narrative:      In the setting of a positive respiratory panel with a viral infection PLUS a negative procalcitonin without other underlying concern for bacterial infection, consider observing off antibiotics or discontinuation of antibiotics and continue supportive care. If the respiratory panel is positive for atypical bacterial infection (Bordetella pertussis, Chlamydophila pneumoniae, or Mycoplasma pneumoniae), consider antibiotic de-escalation to target atypical bacterial infection.          XR Chest 2 View    Result Date: 11/30/2022  Impression: No active cardiopulmonary disease  Electronically Signed By-Ish Maravilla On:11/30/2022 3:50 PM This report was finalized on [id number] by  Ish Maravilla, .    XR Knee 1 or 2 View Right    Result Date: 12/1/2022  Impression: No acute osseous abnormality. Moderate to severe tricompartmental osteoarthritic changes are present, most pronounced within the medial and patellofemoral compartments. A small joint effusion is present.  Electronically Signed By-Nivia Olivas MD On:12/1/2022 11:46 AM This report was finalized on [id number] by  Nivia Olivas MD.    CT Head Without Contrast    Result Date: 12/9/2022  Impression: No acute intracranial abnormality or significant change since 12/7/2022. Electronically signed by:  Artur Eubanks M.D.  12/9/2022 9:21 PM Mountain Time    CT Head Without Contrast    Result Date: 12/7/2022  Impression:  1. No acute findings or interval change from the outside CT of the head performed yesterday. 2. I would recommend an MRI of the brain without contrast for follow-up as the patient had a CT angiogram of the head and neck and CT cerebral perfusion  study overnight.  Electronically Signed By-Ronen Martinez MD On:12/7/2022 7:52 AM This report was finalized on [id number] by  Ronen Martinez MD.    CT Angiogram Neck    Result Date: 12/7/2022  Impression: 1.  No acute intracranial abnormality is identified on noncontrast head CT. If there is high clinical suspicion for acute ischemic infarct, MRI may be more sensitive. 2.  Within the limitations of motion, no occlusion or high-grade stenosis of the major arteries in the head and neck. Artur Eubanks MD Neuroradiologist [facility] [email or web address] Thank you for this referral. This exam was interpreted by a fellowship trained neuroradiologist. If the patient's healthcare provider has any questions, a [facility] neuroradiologist can be reached directly at [phone] at any time. SLOT  21 Electronically signed by:  Artur Eubanks M.D.  12/6/2022 11:20 PM Mountain Time    CT Chest Without Contrast Diagnostic    Result Date: 12/7/2022  Impression: 1.  No evidence of pulmonary tuberculosis. No acute abnormality in the chest. Electronically signed by:  Edouard Marquez M.D.  12/6/2022 11:09 PM Mountain Time    MRI Brain Without Contrast    Result Date: 12/7/2022  Impression:  1. No acute intracranial pathology. 2. Chronic changes suggestive of microvascular ischemic disease.   Electronically Signed By-Derek Branham MD On:12/7/2022 11:34 AM This report was finalized on [id number] by  Derek Branham MD.    MRI Lumbar Spine Without Contrast    Result Date: 12/8/2022  Impression: 1.     Transitional anatomy at the lumbosacral junction with suspected partial sacralization of the S1 vertebrae. Close correlation with imaging is recommended prior to any planned intervention. 2.     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 3.     Multilevel disc and facet joint degeneration with moderate to severe canal narrowing at L4-5 and L5-S1. 4.     Moderate bilateral foraminal  narrowing at L4-5 and moderate to severe foraminal narrowing at L5-S1.  Electronically Signed By-Riccardo Ramirez MD On:12/8/2022 4:53 PM This report was finalized on [id number] by  Riccardo Ramirez MD.    XR Chest 1 View    Result Date: 12/11/2022  Impression: 1. Low lung volumes with new bilateral interstitial opacities which could represent pulmonary edema or bronchitis/atypical infection. 2. Mild cardiomegaly.  Electronically Signed By-Dru Rosas MD On:12/11/2022 1:43 PM This report was finalized on [id number] by  Dru Rosas MD.    CT Angiogram Head    Result Date: 12/7/2022  Impression: 1.  No acute intracranial abnormality is identified on noncontrast head CT. If there is high clinical suspicion for acute ischemic infarct, MRI may be more sensitive. 2.  Within the limitations of motion, no occlusion or high-grade stenosis of the major arteries in the head and neck. Artur Eubanks MD Neuroradiologist [facility] [email or web address] Thank you for this referral. This exam was interpreted by a fellowship trained neuroradiologist. If the patient's healthcare provider has any questions, a [facility] neur[facility] can be reached directly at [phone] at any time. SLOT  21 Electronically signed by:  Artur Eubanks M.D.  12/6/2022 11:20 PM Mountain Time    XR Abdomen KUB    Result Date: 12/11/2022  Impression: 1. Mild rectosigmoid stool burden. 2. Nonspecific nonobstructive bowel gas pattern.  Electronically Signed By-Dru Rosas MD On:12/11/2022 1:41 PM This report was finalized on [id number] by  Dru Rosas MD.    CT CEREBRAL PERFUSION WITH & WITHOUT CONTRAST    Result Date: 12/7/2022  Impression: 1.  No acute intracranial abnormality is identified on noncontrast head CT. If there is high clinical suspicion for acute ischemic infarct, MRI may be more sensitive. 2.  Within the limitations of motion, no occlusion or high-grade stenosis of the major  arteries in the head and neck. Artur Eubanks MD Neuroradiologist [facility] [email or web address] Thank you for this referral. This exam was interpreted by a fellowship trained neuroradiologist. If the patient's healthcare provider has any questions, a [facility] neuroradiologist can be reached directly at [phone] at any time. SLOT  21 Electronically signed by:  Artur Eubanks M.D.  12/6/2022 11:20 PM Mountain Time              Results for orders placed during the hospital encounter of 12/06/22    Adult Transthoracic Echo Complete W/ Cont if Necessary Per Protocol    Interpretation Summary  •  Left ventricular systolic function is normal. Left ventricular ejection fraction appears to be 61 - 65%.  •  Left ventricular diastolic function was normal.  •  Saline test results are negative.  Study was suboptimal.      Labs Pending at Discharge:      Procedures Performed           Consults:   Consults     Date and Time Order Name Status Description    12/10/2022  9:13 AM Inpatient Pulmonology Consult Completed     12/7/2022 11:57 AM Inpatient Psychiatrist Consult Completed     12/7/2022  7:01 AM Inpatient Neurology Consult General Completed     12/7/2022  3:22 AM Inpatient Neurology Consult Stroke      12/7/2022  1:42 AM Hospitalist (on-call MD unless specified)              Discharge Details        Discharge Medications      New Medications      Instructions Start Date   aspirin 81 MG chewable tablet   162 mg, Oral, Daily      atorvastatin 80 MG tablet  Commonly known as: LIPITOR   80 mg, Oral, Nightly      butalbital-acetaminophen-caffeine -40 MG per tablet  Commonly known as: FIORICET, ESGIC   2 tablets, Oral, Every 8 Hours PRN      famotidine 20 MG tablet  Commonly known as: PEPCID   20 mg, Oral, 2 Times Daily Before Meals      gabapentin 100 MG capsule  Commonly known as: NEURONTIN   100 mg, Oral, 2 Times Daily      levETIRAcetam in NaCl 0.82% 500 MG/100ML solution  IVPB  Commonly known as: KEPPRA   500 mg, Intravenous, Every 12 Hours Scheduled      LORazepam 0.5 MG tablet  Commonly known as: ATIVAN   0.5 mg, Oral, Daily PRN      vilazodone 10 MG tablet tablet  Commonly known as: VIIBRYD   10 mg, Oral, Daily         Continue These Medications      Instructions Start Date   busPIRone 5 MG tablet  Commonly known as: BUSPAR   5 mg, Oral, 3 Times Daily      nystatin 242950 UNIT/GM cream  Commonly known as: MYCOSTATIN   1 application, Topical, 2 Times Daily      zolpidem 10 MG tablet  Commonly known as: AMBIEN   10 mg, Oral, Nightly PRN             No Known Allergies      Discharge Disposition:   Short Term Hospital (DC - External)    Diet:  Hospital:  Diet Order   Procedures   • Diet: Regular/House Diet; Texture: Regular Texture (IDDSI 7); Fluid Consistency: Thin (IDDSI 0)         Discharge Activity:         CODE STATUS:  Code Status and Medical Interventions:   Ordered at: 12/07/22 0651     Code Status (Patient has no pulse and is not breathing):    CPR (Attempt to Resuscitate)     Medical Interventions (Patient has pulse or is breathing):    Full Support     Release to patient:    Routine Release         No future appointments.        Time spent on Discharge including face to face service:  45 minutes    This patient has been examined wearing appropriate Personal Protective Equipment and discussed with hospital infection control department. 12/13/22      Signature: Electronically signed by Shannan Powell MD, 12/13/22, 11:57 EST.  [facility] Hospitalist Team

## 2022-12-13 NOTE — SIGNIFICANT NOTE
12/13/22 1259   OTHER   Discipline physical therapy assistant   Rehab Time/Intention   Session Not Performed patient/family declined, not feeling well;other (see comments)  (Pt expressed her BP being low this morning following sitting up in bedside chair. Pt not feeling well asking PT to return this afternoon or tomorrow. This PTA educated pt on importance of working with PT.)   Recommendation   PT - Next Appointment 12/14/22

## 2022-12-13 NOTE — PAYOR COMM NOTE
"ATTENTION ROSA:        CLINICAL UPDATE 12/13/2022 - PATIENT WAS CHANGED TO INPATIENT STATUS ON 12/12/2022 AT 1603. Pending OBS reference # [id number].         12/12/22 1603  Inpatient Admission  Once     Completed     Level of Care: Telemetry    Diagnosis: Chest pain, unspecified type [5874520]    Admitting Physician: ADOLPH AZUL [[id number]]    Attending Physician: YONATHAN AZUL [[id number]]    Certification: I Certify That Inpatient Hospital Services Are Medically Necessary For Greater Than 2 Midnights                12/07/22 0319  Initiate Observation Status  Once     Completed     Level of Care: Telemetry    Diagnosis: Chest pain, unspecified type [5721108]    Admitting Physician: ADOLPH AZUL [[id number]]    Attending Physician: ADOLPH AZUL [[id number]]                    AUTHORIZATION PENDING:   PLEASE CALL OR FAX DETERMINATION TO CONTACT BELOW. THANK YOU.        Kia Richardson RN MSN  /UR  [facility]  [phone] office  [phone] fax  [email or web address]    [facility]  NPI: [id number]  Tax: [id number]            Eladia Connor (57 y.o. Female)     Date of Birth   1965    Social Security Number       Address   [address] [place] [address] [place] [facility]    Home Phone   [phone]    MRN   [id number]       Orthodoxy   Amish    Marital Status                               Admission Date   12/6/22    Admission Type   Emergency    Admitting Provider   Adolph Azul MD    Attending Provider   Shannan Powell MD    Department, Room/Bed   [facility] 3C MEDICAL INPATIENT, 366/1       Discharge Date       Discharge Disposition       Discharge Destination                               Attending Provider: Shannan Powell MD    Allergies: No Known Allergies    Isolation: None   Infection: None   Code Status: CPR    Ht: 170.2 cm (67\")   Wt: 143 kg (315 lb)    Admission Cmt: None   Principal Problem: Weakness [R53.1]                 Active " "Insurance as of 2022     Primary Coverage     Payor Plan Insurance Group Employer/Plan Group    MISC COMMERCIAL MISC COMMERCIAL [id number]     Coverage Address Coverage Phone Number Coverage Fax Number Effective Dates    [address] [phone]  2021 - None Entered    [place] [id number]       Subscriber Name Subscriber Birth Date Member ID       ELADIA CONNOR 1965 [id number]                 Emergency Contacts      (Rel.) Home Phone Work Phone Mobile Phone    DIANA MADRIGAL (Daughter) -- -- [phone]    FARA MADRIGAL (Daughter) -- -- [phone]               Physician Progress Notes (last 72 hours)      Negrito Lopez MD at 22 1118          PULMONARY CRITICAL CARE PROGRESS  NOTE      PATIENT IDENTIFICATION:  Name: Eladia Connor  MRN: [id number]  :  1965     Age: 57 y.o.  Sex: female    DATE OF Note:  2022   Referring Physician: No admitting provider for patient encounter.                  Subjective:   Feeling better, no overnight issues, om 2 L O2, no SOB, no chest or abd pain, no bowel or bladder issues     Objective:  tMax 24 hrs: Temp (24hrs), Av.7 °F (37.1 °C), Min:98 °F (36.7 °C), Max:100.3 °F (37.9 °C)      Vitals Ranges:   Temp:  [98 °F (36.7 °C)-100.3 °F (37.9 °C)] 98 °F (36.7 °C)  Heart Rate:  [] 60  Resp:  [16-20] 17  BP: ()/(56-87) 99/63    Intake and Output Last 3 Shifts:   I/O last 3 completed shifts:  In: 560 [P.O.:560]  Out: 350 [Urine:350]    Exam:  BP 99/63 (BP Location: Left arm, Patient Position: Lying)   Pulse 60   Temp 98 °F (36.7 °C) (Oral)   Resp 17   Ht 170.2 cm (67\")   Wt (!) 143 kg (315 lb)   LMP  (LMP Unknown)   SpO2 98%   BMI 49.34 kg/m²     General Appearance:  Alert   HEENT:  Normocephalic, without obvious abnormality. Conjunctivae/corneas clear.  Normal external ear canals. Nares normal, no drainage     Neck:  Supple, symmetrical, trachea midline. No JVD.  Lungs /Chest wall:   Bilateral basal " rhonchi, respirations unlabored, symmetrical wall movement.     Heart:  Regular rate and rhythm, systolic murmur PMI left sternal border  Abdomen: Soft, nontender, no masses, no organomegaly.    Extremities: Trace edema, no clubbing or cyanosis        Medications:  aspirin, 162 mg, Oral, Daily   Or  aspirin, 300 mg, Rectal, Daily  atorvastatin, 80 mg, Oral, Nightly  busPIRone, 5 mg, Oral, TID  enoxaparin, 40 mg, Subcutaneous, Q12H  famotidine, 20 mg, Oral, BID AC  gabapentin, 100 mg, Oral, BID  levETIRAcetam, 500 mg, Intravenous, Q12H  nystatin, , Topical, Q12H  polyethylene glycol, 17 g, Oral, Daily  senna-docusate sodium, 1 tablet, Oral, BID  sodium chloride, 10 mL, Intravenous, Q12H  sodium chloride, 10 mL, Intravenous, Q12H  vilazodone, 10 mg, Oral, Daily        Infusion:        PRN:  •  acetaminophen  •  bisacodyl  •  butalbital-acetaminophen-caffeine  •  LORazepam  •  ondansetron  •  sodium chloride  •  [COMPLETED] Insert Peripheral IV **AND** sodium chloride  •  sodium chloride  •  sodium chloride  •  sodium chloride  •  sodium chloride  •  zolpidem  Data Review:  All labs (24hrs):   Recent Results (from the past 24 hour(s))   ECG 12 Lead Chest Pain    Collection Time: 12/11/22 11:42 AM   Result Value Ref Range    QT Interval 328 ms   POC Glucose Once    Collection Time: 12/11/22 11:53 AM    Specimen: Blood   Result Value Ref Range    Glucose 97 70 - 105 mg/dL   Blood Gas, Arterial -    Collection Time: 12/11/22 12:08 PM    Specimen: Arterial Blood   Result Value Ref Range    Site Right Radial     Tod's Test Positive     pH, Arterial 7.437 7.350 - 7.450 pH units    pCO2, Arterial 36.6 35.0 - 48.0 mm Hg    pO2, Arterial 59.3 (L) 83.0 - 108.0 mm Hg    HCO3, Arterial 24.7 21.0 - 28.0 mmol/L    Base Excess, Arterial 0.8 0.0 - 3.0 mmol/L    O2 Saturation, Arterial 91.3 (L) 94.0 - 98.0 %    Barometric Pressure for Blood Gas      Modality Cannula     FIO2 28 %    Hemodilution No    POCT Electrolytes +HGB +HCT     Collection Time: 12/11/22 12:08 PM    Specimen: Blood   Result Value Ref Range    Sodium 141 138 - 146 mmol/L    POC Potassium 3.9 3.5 - 4.5 mmol/L    Ionized Calcium 1.22 1.15 - 1.33 mmol/L    Glucose 111 (H) 74 - 100 mg/dL    Hematocrit 42 38 - 51 %    Hemoglobin 14.2 12.0 - 17.0 g/dL   POC Lactate    Collection Time: 12/11/22 12:08 PM    Specimen: Blood   Result Value Ref Range    Lactate 1.0 0.5 - 2.0 mmol/L   POC Glucose Once    Collection Time: 12/11/22 12:08 PM    Specimen: Blood   Result Value Ref Range    Glucose 111 (H) 74 - 100 mg/dL   TSH    Collection Time: 12/11/22  3:06 PM    Specimen: Blood   Result Value Ref Range    TSH 1.590 0.270 - 4.200 uIU/mL   Ammonia    Collection Time: 12/11/22  3:06 PM    Specimen: Blood   Result Value Ref Range    Ammonia 45 11 - 51 umol/L        Imaging:  XR Chest 1 View  Narrative: EXAMINATION: XR CHEST 1 VW-     DATE OF EXAM: 12/11/2022 12:40 PM     INDICATION: change in status; R07.9-Chest pain, unspecified;  R47.81-Slurred speech; Z22.7-Latent tuberculosis; R29.898-Other symptoms  and signs involving the musculoskeletal system; R29.898-Other symptoms  and signs involving the musculoskeletal system; D92-Swethkyrd for  follow-up examination after completed treatment for conditions other  than malignant neoplasm.     COMPARISON: Chest radiograph dated 11/30/2022     TECHNIQUE: Portable AP view of the chest was obtained.     FINDINGS:  There is cardiomegaly. There are bilateral interstitial opacities  throughout both lungs which could represent pulmonary edema or  bronchitis/atypical infection. There are low lung volumes. There is no  pleural effusion or pneumothorax. There are multilevel degenerative  changes of the thoracic spine.     Impression: 1. Low lung volumes with new bilateral interstitial opacities which  could represent pulmonary edema or bronchitis/atypical infection.  2. Mild cardiomegaly.     Electronically Signed By-Dru Rosas MD On:12/11/2022 1:43  PM  This report was finalized on [id number] by  Dru Rosas MD.  XR Abdomen KUB  Narrative: DATE OF EXAM:  12/11/2022 12:40 PM     PROCEDURE:  XR ABDOMEN KUB-     INDICATIONS:  constipation; R07.9-Chest pain, unspecified; R47.81-Slurred speech;  Z22.7-Latent tuberculosis; R29.898-Other symptoms and signs involving  the musculoskeletal system; R29.898-Other symptoms and signs involving  the musculoskeletal system; B95-Xgwmhgaqg for follow-up examination  after completed treatment for conditions other than malignant neoplasm     COMPARISON:  None available     TECHNIQUE:   Single radiographic view of the abdomen was obtained.     FINDINGS:  There are multiple right upper quadrant surgical clips. There is a  nonspecific nonobstructive small bowel gas pattern. There is an overall  mild colonic stool burden within the rectosigmoid colon. There are no  visible renal calcifications. There are calcified pelvic phleboliths.  There are multilevel degenerative changes of the lumbar spine.      Impression: 1. Mild rectosigmoid stool burden.  2. Nonspecific nonobstructive bowel gas pattern.     Electronically Signed By-Dru Rosas MD On:12/11/2022 1:41 PM  This report was finalized on [id number] by  Dru Rosas MD.       ASSESSMENT:  NERY  Cough   Hx + PPD  Hx TIA  Generalized weakness   Psoriasis  Anxiety/Depression        PLAN:  OOB daily for longer periods of time   Decrease O2 to keep sats > 88%, currently on 2 L   Bronchodilators  Inhaled corticosteroids  Incentive spirometer  Electrolytes/ glycemic control  DVT and GI prophylaxis-Lovenox    Discussed with Dr Jessica Castellon, SCOOBY    12/12/2022  11:18 EST    I personally have examined  and interviewed the patient. I have reviewed the history, data, problems, assessment and plan with our NP.  Total Critical care time in direct medical management (   ) minutes, This time specifically excludes time spent performing procedures.    Negrito Lopez,  MD   2022  19:40 EST         Electronically signed by Negrito Lopez MD at 22 1940     Delores Auzl DO at 22 1018              [facility] Medicine Services Daily Progress Note    Patient Name: Eladia Connor  : 1965  MRN: [id number]  Primary Care Physician:  Quincy Lemos APRN  Date of admission: 2022      Subjective       Chief Complaint: Weakness    Patient seen and examined this morning.  Doing okay this morning, having some dizziness but no headache, chest pain, shortness of breath.  Left arm pain/numbness/tingling is about the same.  No episodes overnight.  Discussed with transfer center at [facility], neurology and hospital service has accepted transfer.  Pending bed availability.    Pertinent positives as noted in HPI/subjective.  All other systems were reviewed and are negative.      Objective       Vitals:   Temp:  [98 °F (36.7 °C)-100.3 °F (37.9 °C)] 98 °F (36.7 °C)  Heart Rate:  [] 60  Resp:  [16-20] 17  BP: ()/(56-87) 99/63  Flow (L/min):  [2] 2    Physical Exam:    General: Awake, alert, lying in bed, NAD  Eyes: PERRL, EOMI, conjunctivae are clear  Cardiovascular: Regular rate and rhythm, no murmurs  Respiratory: Clear to auscultation bilaterally, no wheezing or rales, unlabored breathing  Abdomen: Soft, nontender, positive bowel sounds, no guarding  Neurologic: A&O, CN grossly intact, speech normal, moves all extremities spontaneously  Skin: Warm, dry, intact         Result Review    Result Review:  I have personally reviewed the results from the time of this admission to 2022 10:18 EST and agree with these findings:  [x]  Laboratory  [x]  Microbiology  [x]  Radiology  []  EKG/Telemetry   []  Cardiology/Vascular   []  Pathology  []  Old records  []  Other:    Wounds (last 24 hours)     LDA Wound     Row Name 22 0845 22          Rash 12/10/22 0218 upper sternal other (see comments)    Rash  - Properties Group Placement Date: 12/10/22  -TS Placement Time: 0218 -TS Orientation: upper  -TS Location: sternal  -TS Type: other (see comments)  -TS, Post RAPID/post-sternal rub      Distribution generalized  -PG generalized  -LJ     Color red  -PG --     Configuration/Shape oval  -PG --     Borders irregular  -PG irregular  -LJ     Characteristics -- dry;raised;burning  Pt reported not itching just burning  -LJ     Lesion Size -- 0.5 to 1 cm  -LJ     Care, Rash -- open to air  -LJ     Retired Rash - Properties Group Placement Date: 12/10/22  -TS Placement Time: 0218 -TS Orientation: upper  -TS Location: sternal  -TS Type: other (see comments)  -TS, Post RAPID/post-sternal rub      Retired Rash - Properties Group Date first assessed: 12/10/22  -TS Time first assessed: 0218 -TS Orientation: upper  -TS Location: sternal  -TS Type: other (see comments)  -TS, Post RAPID/post-sternal rub            User Key  (r) = Recorded By, (t) = Taken By, (c) = Cosigned By    Initials Name Provider Type    Krystal Panda LPN Licensed Nurse    Sosa Lopez RN Registered Nurse    Leslee Sorto RN Registered Nurse                  Assessment & Plan      Brief Patient Summary:  Eladia Connor is a 57 y.o. female who presented to [facility] on 12/6/2022 complaining of weakness.   She reportedly has a history of a CVA in 2012 without any residual deficits. She notes over the last week or so she has noted intermittent though right leg weakness and numbness with associated tingling.  Around 11 AM in the morning she developed slurred speech and left arm weakness.  Symptoms lasted for about 30 minutes and she presented to [facility] for further evaluation.  She was subsequently discharged indicating that testing/evaluation was largely unremarkable with questionable history of a pneumonia on chest x-ray.   She describes a nonproductive cough without hemoptysis.  + Sick contacts with grandson with  the flu.  She denies any fevers. She describes having possible positive TB test few weeks ago. No known TB contacts however.  She indicates her son just returned from the [place] though is not sick.  No significant personal travel history noted. When inquiring about new medications she identifies that she started Otezla this last week for her psoriasis and her symptoms seem to began after this.  She also reports being on Klonopin but has not taken for 1 week as she ran out of it.  Patient was noted to have multiple episodes of altered mental status with slurred speech and left-sided weakness.  Stroke work-up x2 including MRI brain appears to be negative.  Neurology following, recommending outpatient EEG study to rule out seizure activity.  Patient also appears to be having major depressive episode, psych was consulted.  Recommended antidepression but patient refusing at this time.  PT/OT recommending rehab.      aspirin, 162 mg, Oral, Daily   Or  aspirin, 300 mg, Rectal, Daily  atorvastatin, 80 mg, Oral, Nightly  busPIRone, 5 mg, Oral, TID  enoxaparin, 40 mg, Subcutaneous, Q12H  famotidine, 20 mg, Oral, BID AC  gabapentin, 100 mg, Oral, BID  levETIRAcetam, 500 mg, Intravenous, Q12H  nystatin, , Topical, Q12H  polyethylene glycol, 17 g, Oral, Daily  senna-docusate sodium, 1 tablet, Oral, BID  sodium chloride, 10 mL, Intravenous, Q12H  sodium chloride, 10 mL, Intravenous, Q12H  vilazodone, 10 mg, Oral, Daily             Active Hospital Problems:  Active Hospital Problems    Diagnosis    • **Weakness      Plan:     Speech disturbance with right leg and left arm weakness, recurrent episodes  -Possible TIA, or seizure, unclear at this time  -Patient has had few episodes of same symptoms over the past few days and during this admission  -Stroke work-up including MRI brain negative for new acute findings  -Previously had TIA back in 2013 without any residual deficits  -Possible major depression episode  -Continue aspirin,  statin  -Keppra added empirically per neurology however patient refusing  -Neurology following, recommending outpatient EEG, no in-house EEG available at this time, event monitor also ordered by neurology  -PT/OT recommending rehab  -Patient and family wants transfer to [facility]   -Discussed with [facility], neurologist Dr. YOLANDA Jenkins and hospitalist Dr. Us have accepted the patient to their service.  Patient will be transferred to [facility] once bed available    Major depression with anxiety  -Patient had episodes of altered mentation with speech disturbances however stroke work-up and metabolic work-up so far negative  -Patient likely suffering from major depression episode  -Psych following and recommended antidepressant but patient refusing at this time  -Per nursing patient has also been refusing home BuSpar at times  -No suicidal ideations at this time  -Continue psych follow-up    Dyspnea  Suspected NERY  -Patient states that she was 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 but never started on CPAP  -Patient and family both requesting pulmonology eval  -Respiratory status remains stable at this time  -Pulmonology following, recommending outpatient sleep study    Abnormal TB screening  -Reportedly positive QuantiFERON as outpatient however no symptoms or signs of TB infection noted at this time  -QuantiFERON has been ordered however can follow-up as outpatient with PCP for further work-up    Psoriasis  -Possibly having side effects from Otezla, hold for now  -Continue outpatient follow-up    Obesity  -BMI of 49.34 noted  -Lifestyle modifications recommended    DVT prophylaxis  -Lovenox      CODE STATUS:    Code Status (Patient has no pulse and is not breathing): CPR (Attempt to Resuscitate)  Medical Interventions (Patient has pulse or is breathing): Full Support  Release to patient: Routine Release      Disposition: Pending transfer to [facility]    Electronically  signed by Delores Azul DO, 22, 10:18 EST.  [facility]      Part of this note may be an electronic transcription/translation of spoken language to printed text using the Dragon Dictation System.      Electronically signed by Delores Azul DO at 22 1020     Delores Azul DO at 22 1217              [facility] Medicine Services Daily Progress Note    Patient Name: Eladia Connor  : 1965  MRN: [id number]  Primary Care Physician:  Quincy Lemos APRN  Date of admission: 2022      Subjective       Chief Complaint: Weakness    Patient seen and examined this morning.  Doing better this morning but still having some intermittent left upper extremity shooting/nerve pain radiating from neck and some mild headache.  Discussed CT results with her and has C5-6 stenosis.  Gabapentin added and patient willing to try.  Still discussed with patient and daughter yesterday regarding her treatment plan and diagnosis.  Daughter and patient still wants transferred to [facility] or [facility].  They understand that the process may take a while, will try to talk to transfer center today.    Pertinent positives as noted in HPI/subjective.  All other systems were reviewed and are negative.      Objective       Vitals:   Temp:  [98.4 °F (36.9 °C)-100.3 °F (37.9 °C)] 100.3 °F (37.9 °C)  Heart Rate:  [] 103  Resp:  [15-19] 19  BP: ()/(49-87) 159/87  Flow (L/min):  [2] 2    Physical Exam:    General: Awake, alert, lying in bed, NAD  Eyes: PERRL, EOMI, conjunctivae are clear  Cardiovascular: Regular rate and rhythm, no murmurs  Respiratory: Clear to auscultation bilaterally, no wheezing or rales, unlabored breathing  Abdomen: Soft, nontender, positive bowel sounds, no guarding  Neurologic: A&O, CN grossly intact, speech normal, moves all extremities spontaneously  Skin: Warm, dry, intact         Result Review    Result  Review:  I have personally reviewed the results from the time of this admission to 12/11/2022 12:17 EST and agree with these findings:  [x]  Laboratory  [x]  Microbiology  [x]  Radiology  []  EKG/Telemetry   []  Cardiology/Vascular   []  Pathology  []  Old records  []  Other:    Wounds (last 24 hours)     LDA Wound     Row Name 12/10/22 2309 12/10/22 2020          Rash 12/10/22 0218 upper sternal other (see comments)    Rash - Properties Group Placement Date: 12/10/22  -TS Placement Time: 0218 -TS Orientation: upper  -TS Location: sternal  -TS Type: other (see comments)  -TS, Post RAPID/post-sternal rub      Distribution generalized  -MW generalized  -AH     Color red  -MW red  -AH     Configuration/Shape oval  -MW round  -AH     Borders irregular  -MW irregular  -AH     Characteristics dry;raised;itching  -MW dry;raised;itching  -AH     Lesion Size -- 0.5 to 1 cm  -AH     Care, Rash -- open to air  -AH     Retired Rash - Properties Group Placement Date: 12/10/22  -TS Placement Time: 0218 -TS Orientation: upper  -TS Location: sternal  -TS Type: other (see comments)  -TS, Post RAPID/post-sternal rub      Retired Rash - Properties Group Date first assessed: 12/10/22  -TS Time first assessed: 0218 -TS Orientation: upper  -TS Location: sternal  -TS Type: other (see comments)  -TS, Post RAPID/post-sternal rub            User Key  (r) = Recorded By, (t) = Taken By, (c) = Cosigned By    Initials Name Provider Type    Alcira Miner, RN Registered Nurse     Laura Stratton LPN Licensed Nurse    Leslee Sorto RN Registered Nurse                  Assessment & Plan      Brief Patient Summary:  Eladia Connor is a 57 y.o. female who presented to [facility] on 12/6/2022 complaining of weakness.   She reportedly has a history of a CVA in 2012 without any residual deficits. She notes over the last week or so she has noted intermittent though right leg weakness and numbness with associated tingling.   Around 11 AM in the morning she developed slurred speech and left arm weakness.  Symptoms lasted for about 30 minutes and she presented to [facility] for further evaluation.  She was subsequently discharged indicating that testing/evaluation was largely unremarkable with questionable history of a pneumonia on chest x-ray.   She describes a nonproductive cough without hemoptysis.  + Sick contacts with grandson with the flu.  She denies any fevers. She describes having possible positive TB test few weeks ago. No known TB contacts however.  She indicates her son just returned from the [place] though is not sick.  No significant personal travel history noted. When inquiring about new medications she identifies that she started Otezla this last week for her psoriasis and her symptoms seem to began after this.  She also reports being on Klonopin but has not taken for 1 week as she ran out of it.  Patient was noted to have multiple episodes of altered mental status with slurred speech and left-sided weakness.  Stroke work-up x2 including MRI brain appears to be negative.  Neurology following, recommending outpatient EEG study to rule out seizure activity.  Patient also appears to be having major depressive episode, psych was consulted.  Recommended antidepression but patient refusing at this time.  PT/OT recommending rehab.      aspirin, 162 mg, Oral, Daily   Or  aspirin, 300 mg, Rectal, Daily  atorvastatin, 80 mg, Oral, Nightly  busPIRone, 5 mg, Oral, TID  enoxaparin, 40 mg, Subcutaneous, Q12H  famotidine, 20 mg, Oral, BID AC  gabapentin, 100 mg, Oral, BID  levETIRAcetam, 500 mg, Intravenous, Q12H  nystatin, , Topical, Q12H  sodium chloride, 10 mL, Intravenous, Q12H  sodium chloride, 10 mL, Intravenous, Q12H  vilazodone, 10 mg, Oral, Daily             Active Hospital Problems:  Active Hospital Problems    Diagnosis    • **Weakness      Plan:     Speech disturbance with right leg and left arm weakness, recurrent  episodes  -Possible TIA, or seizure, unclear at this time  -Patient has had few episodes of same symptoms over the past few days and during this admission  -Stroke work-up including MRI brain negative for new acute findings  -Previously had TIA back in 2013 without any residual deficits  -Possible major depression episode  -Continue aspirin, statin  -Keppra added empirically per neurology however patient refusing  -Neurology following, recommending outpatient EEG, no in-house EEG available at this time, event monitor also ordered by neurology  -PT/OT recommending rehab  -Patient and family wants transfer to [facility], will try to initiate process as able    Major depression with anxiety  -Patient had episodes of altered mentation with speech disturbances however stroke work-up and metabolic work-up so far negative  -Patient likely suffering from major depression episode  -Psych following and recommended antidepressant but patient refusing at this time  -Per nursing patient has also been refusing home BuSpar at times  -No suicidal ideations at this time  -Continue psych follow-up    Dyspnea  Suspected NERY  -Patient states that she was 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 but never started on CPAP  -Patient and family both requesting pulmonology eval  -Respiratory status remains stable at this time  -Pulmonology following    Abnormal TB screening  -Reportedly positive QuantiFERON as outpatient however no symptoms or signs of TB infection noted at this time  -QuantiFERON has been ordered however can follow-up as outpatient with PCP for further work-up    Psoriasis  -Possibly having side effects from Otezla, hold for now  -Continue outpatient follow-up    Obesity  -BMI of 49.34 noted  -Lifestyle modifications recommended    DVT prophylaxis  -Lovenox      CODE STATUS:    Code Status (Patient has no pulse and is not breathing): CPR (Attempt to Resuscitate)  Medical Interventions (Patient has  pulse or is breathing): Full Support  Release to patient: Routine Release      Disposition: Rehab placement.  Possible transfer to other hospital if accepted    Electronically signed by Delores Azul DO, 12/11/22, 12:17 EST.  [facility] Hospitalist Team      Part of this note may be an electronic transcription/translation of spoken language to printed text using the Dragon Dictation System.      Electronically signed by Delores Azul DO at 12/11/22 1220          Consult Notes (last 72 hours)      Madison Marmolejo MD at 12/11/22 0908      Consult Orders    1. Inpatient Pulmonology Consult [[id number]] ordered by Delores Azul DO at 12/10/22 0913               [facility]         CONSULT NOTE    Patient Identification:  Eladia Connor  57 y.o.  female  1965  [id number]            Requesting physician: Attending physician    Reason for Consultation: Obstructive sleep apnea      History of Present Illness:    Eladia Connor is a 57-year-old female who presents to [facility] ED on 12/6/2022 with complaints of intermittent right leg weakness and numbness associated with tingling.  She developed slurred speech later that day and left arm weakness.  She presented to [facility] ED for further evaluation and was discharged.  Since that time she has developed a cough.    Assessment:    ?  NERY  -Patient reports she was diagnosed with NERY several years ago but never started on treatment with PAP    CT chest 12/7/2022 no active lung disease, history of positive PPD    History of TIA, 2013  Generalized weakness  Speech disturbance with right leg and left arm weakness, recurrent episodes    Psoriasis, on Otezla    Anxiety\depression    Recommendations:    Titrate oxygen, currently requiring 2 L per NC  -We will need 6-minute walk prior to discharge    DVT prophylaxis Lovenox    Patient will need work-up for obstructive sleep apnea after discharge        Review of Sytems:  Review of  "Systems   Respiratory: Positive for cough. Negative for shortness of breath.        Past Medical History:  Past Medical History:   Diagnosis Date   • Anxiety    • Depression        Past Surgical History:  Past Surgical History:   Procedure Laterality Date   • CHOLECYSTECTOMY  1987   • EYE SURGERY     • KNEE SURGERY          Home Meds:  Medications Prior to Admission   Medication Sig Dispense Refill Last Dose   • busPIRone (BUSPAR) 5 MG tablet Take 1 tablet by mouth 3 (Three) Times a Day. 30 tablet 5    • nystatin (MYCOSTATIN) 465455 UNIT/GM cream Apply 1 application topically to the appropriate area as directed 2 (Two) Times a Day. 30 g 3    • zolpidem (AMBIEN) 10 MG tablet Take 1 tablet by mouth At Night As Needed for Sleep. 30 tablet 2        Allergies:  No Known Allergies    Social History:   Social History     Socioeconomic History   • Marital status:    Tobacco Use   • Smoking status: Never   • Smokeless tobacco: Never   Substance and Sexual Activity   • Alcohol use: Never   • Drug use: Never   • Sexual activity: Defer       Family History:  Family History   Problem Relation Age of Onset   • Heart disease Mother    • Breast cancer Mother    • Heart disease Father    • Pancreatic cancer Father    • Heart disease Sister    • Lung cancer Brother        Physical Exam:  BP 98/61   Pulse 79   Temp 98.7 °F (37.1 °C) (Oral)   Resp 16   Ht 170.2 cm (67\")   Wt (!) 143 kg (315 lb)   LMP  (LMP Unknown)   SpO2 97%   BMI 49.34 kg/m²  Body mass index is 49.34 kg/m². 97% (!) 143 kg (315 lb)  Physical Exam  Cardiovascular:      Heart sounds: Murmur heard.   Pulmonary:      Breath sounds: Rhonchi present.         LABS:  Lab Results   Component Value Date    CALCIUM 8.9 12/09/2022     Results from last 7 days   Lab Units 12/09/22  0407 12/08/22  0544 12/07/22  0604 12/06/22  2314   SODIUM mmol/L 140 140 140 139   POTASSIUM mmol/L 4.2 4.1 3.9 4.3   CHLORIDE mmol/L 105 109* 106 105   CO2 mmol/L 28.0 21.0* 22.0 21.0* "   BUN mg/dL 15 10 8 9   CREATININE mg/dL 0.91 0.70 0.70 0.71   GLUCOSE mg/dL 113* 105* 128* 137*   CALCIUM mg/dL 8.9 8.8 9.0 9.5   WBC 10*3/mm3 8.20 7.00 7.70 8.80   HEMOGLOBIN g/dL 12.3 12.2 13.4 13.6   PLATELETS 10*3/mm3 244 262 285 330   ALT (SGPT) U/L 12 14  --  16   AST (SGOT) U/L 21 26  --  25   PROCALCITONIN ng/mL  --   --   --  0.02     Lab Results   Component Value Date    TROPONINT <0.010 12/06/2022     Results from last 7 days   Lab Units 12/06/22  2314   TROPONIN T ng/mL <0.010         Results from last 7 days   Lab Units 12/06/22  2314   PROCALCITONIN ng/mL 0.02         Results from last 7 days   Lab Units 12/07/22  0050   ADENOVIRUS DETECTION BY PCR  Not Detected   CORONAVIRUS 229E  Not Detected   CORONAVIRUS HKU1  Not Detected   CORONAVIRUS NL63  Not Detected   CORONAVIRUS OC43  Not Detected   HUMAN METAPNEUMOVIRUS  Not Detected   HUMAN RHINOVIRUS/ENTEROVIRUS  Not Detected   INFLUENZA B PCR  Not Detected   PARAINFLUENZA 1  Not Detected   PARAINFLUENZA VIRUS 2  Not Detected   PARAINFLUENZA VIRUS 3  Not Detected   PARAINFLUENZA VIRUS 4  Not Detected   BORDETELLA PERTUSSIS PCR  Not Detected   CHLAMYDOPHILA PNEUMONIAE PCR  Not Detected   MYCOPLAMA PNEUMO PCR  Not Detected   INFLUENZA A PCR  Not Detected   RSV, PCR  Not Detected     Results from last 7 days   Lab Units 12/06/22  2314   INR  1.05         Lab Results   Component Value Date    TSH 2.130 12/06/2022     Estimated Creatinine Clearance: 99 mL/min (by C-G formula based on SCr of 0.91 mg/dL).         Imaging:  Imaging Results (Last 24 Hours)     ** No results found for the last 24 hours. **            Current Meds:   SCHEDULE  aspirin, 162 mg, Oral, Daily   Or  aspirin, 300 mg, Rectal, Daily  atorvastatin, 80 mg, Oral, Nightly  busPIRone, 5 mg, Oral, TID  enoxaparin, 40 mg, Subcutaneous, Q12H  famotidine, 20 mg, Oral, BID AC  gabapentin, 100 mg, Oral, BID  levETIRAcetam, 500 mg, Intravenous, Q12H  nystatin, , Topical, Q12H  sodium chloride, 10 mL,  Intravenous, Q12H  sodium chloride, 10 mL, Intravenous, Q12H  vilazodone, 10 mg, Oral, Daily      Infusions     PRNs  •  acetaminophen  •  butalbital-acetaminophen-caffeine  •  LORazepam  •  ondansetron  •  sodium chloride  •  [COMPLETED] Insert Peripheral IV **AND** sodium chloride  •  sodium chloride  •  sodium chloride  •  sodium chloride  •  sodium chloride  •  zolpidem        Candace Graves, SCOOBY  12/11/2022  09:08 EST      Much of this encounter note is an electronic transcription/translation of spoken language to printed text using Dragon Software.    Electronically signed by Madison Marmolejo MD at 12/11/22 7019

## 2022-12-14 NOTE — PAYOR COMM NOTE
"ATTENTION ROSA:       CLARIFICATION OF CHANGE TO INPATIENT:        Eladia Connor (57 y.o. Female) 1965  REFERENCE # [id number]        Patient was changed to inpatient status by physician due to intermittent episodes of difficulty speaking along with right leg and left arm weakness and sometimes numbness, stroke was ruled out. suspected TIA, rule out seizures negative EEG, Keppra was added, patient will be transferred for further work-up to [facility] to neurologist Dr. Harry per request of family.      Patient was transferred to [facility] on 12/13/2022 at 2:24 pm for further medical work-up not rehab.           AUTHORIZATION PENDING:   PLEASE CALL OR FAX DETERMINATION TO CONTACT BELOW. THANK YOU.        Kia Richardson RN MSN  /UR  [facility]  [phone] office  [phone] fax  [email or web address]    [facility]  NPI: [id number]  Tax: [phone]            Eladia Connor (57 y.o. Female)     Date of Birth   1965    Social Security Number       Address   [address] [place] [address] [place] [address]    Home Phone   [phone]    MRN   [id number]       Zoroastrian   Adventism    Marital Status                               Admission Date   12/6/22    Admission Type   Emergency    Admitting Provider   Adolph Azul MD    Attending Provider       Department, Room/Bed   [facility] 3C MEDICAL INPATIENT, 366/1       Discharge Date   12/13/2022    Discharge Disposition   Short Term Hospital (DC - External)    Discharge Destination                               Attending Provider: (none)   Allergies: No Known Allergies    Isolation: None   Infection: None   Code Status: Prior    Ht: 170.2 cm (67\")   Wt: 143 kg (315 lb)    Admission Cmt: None   Principal Problem: Weakness [R53.1]                 Active Insurance as of 12/6/2022     Primary Coverage     Payor Plan Insurance Group Employer/Plan Group    " "MISC COMMERCIAL MISC COMMERCIAL [id number]     Coverage Address Coverage Phone Number Coverage Fax Number Effective Dates    [address] [phone]  2021 - None Entered    [place] [id number]       Subscriber Name Subscriber Birth Date Member ID       ELADIA CONNOR 1965 [id number]                 Emergency Contacts      (Rel.) Home Phone Work Phone Mobile Phone    DIANA MADRIGAL (Daughter) -- -- [phone]    FARA MADRIGAL (Daughter) -- -- [phone]               Physician Progress Notes (all)      Negrito Lopez MD at 22 1345        PULMONARY CRITICAL CARE PROGRESS  NOTE      PATIENT IDENTIFICATION:  Name: Eladia Connor  MRN: [id number]  :  1965     Age: 57 y.o.  Sex: female    DATE OF Note:  2022   Referring Physician: Adolph Azul MD                  Subjective:   Feeling better more energy getting up  2 L O2, no SOB, no chest or abd pain, no bowel or bladder issues     Objective:  tMax 24 hrs: Temp (24hrs), Av.7 °F (36.5 °C), Min:97.4 °F (36.3 °C), Max:98 °F (36.7 °C)      Vitals Ranges:   Temp:  [97.4 °F (36.3 °C)-98 °F (36.7 °C)] 97.7 °F (36.5 °C)  Heart Rate:  [57-75] 65  Resp:  [14-18] 14  BP: ()/(48-64) 90/61    Intake and Output Last 3 Shifts:   I/O last 3 completed shifts:  In: 520 [P.O.:520]  Out: -     Exam:  BP 90/61 (BP Location: Right arm, Patient Position: Lying)   Pulse 65   Temp 97.7 °F (36.5 °C) (Oral)   Resp 14   Ht 170.2 cm (67\")   Wt (!) 143 kg (315 lb)   LMP  (LMP Unknown)   SpO2 98%   BMI 49.34 kg/m²     General Appearance:  Alert   HEENT:  Normocephalic, without obvious abnormality. Conjunctivae/corneas clear.  Normal external ear canals. Nares normal, no drainage     Neck:  Supple, symmetrical, trachea midline. No JVD.  Lungs /Chest wall:   Bilateral basal rhonchi, respirations unlabored, symmetrical wall movement.     Heart:  Regular rate and rhythm, systolic murmur PMI left sternal border  Abdomen: Soft, " nontender, no masses, no organomegaly.    Extremities: Trace edema, no clubbing or cyanosis        Medications:  aspirin, 162 mg, Oral, Daily   Or  aspirin, 300 mg, Rectal, Daily  atorvastatin, 80 mg, Oral, Nightly  budesonide, 0.5 mg, Nebulization, BID - RT  busPIRone, 5 mg, Oral, TID  enoxaparin, 40 mg, Subcutaneous, Q12H  famotidine, 20 mg, Oral, BID AC  gabapentin, 100 mg, Oral, BID  ipratropium-albuterol, 3 mL, Nebulization, 4x Daily - RT  levETIRAcetam, 500 mg, Oral, Q12H  nystatin, , Topical, Q12H  polyethylene glycol, 17 g, Oral, Daily  senna-docusate sodium, 1 tablet, Oral, BID  sodium chloride, 10 mL, Intravenous, Q12H  sodium chloride, 10 mL, Intravenous, Q12H  vilazodone, 10 mg, Oral, Daily        Infusion:        PRN:  •  acetaminophen  •  bisacodyl  •  butalbital-acetaminophen-caffeine  •  LORazepam  •  ondansetron  •  sodium chloride  •  [COMPLETED] Insert Peripheral IV **AND** sodium chloride  •  sodium chloride  •  sodium chloride  •  sodium chloride  •  sodium chloride  •  zolpidem  Data Review:  All labs (24hrs):   No results found for this or any previous visit (from the past 24 hour(s)).     Imaging:  XR Chest 1 View  Narrative: EXAMINATION: XR CHEST 1 VW-     DATE OF EXAM: 12/11/2022 12:40 PM     INDICATION: change in status; R07.9-Chest pain, unspecified;  R47.81-Slurred speech; Z22.7-Latent tuberculosis; R29.898-Other symptoms  and signs involving the musculoskeletal system; R29.898-Other symptoms  and signs involving the musculoskeletal system; T30-Ytcavilnp for  follow-up examination after completed treatment for conditions other  than malignant neoplasm.     COMPARISON: Chest radiograph dated 11/30/2022     TECHNIQUE: Portable AP view of the chest was obtained.     FINDINGS:  There is cardiomegaly. There are bilateral interstitial opacities  throughout both lungs which could represent pulmonary edema or  bronchitis/atypical infection. There are low lung volumes. There is no  pleural  effusion or pneumothorax. There are multilevel degenerative  changes of the thoracic spine.     Impression: 1. Low lung volumes with new bilateral interstitial opacities which  could represent pulmonary edema or bronchitis/atypical infection.  2. Mild cardiomegaly.     Electronically Signed By-Dru Rosas MD On:12/11/2022 1:43 PM  This report was finalized on [id number] by  Dru Rosas MD.  XR Abdomen KUB  Narrative: DATE OF EXAM:  12/11/2022 12:40 PM     PROCEDURE:  XR ABDOMEN KUB-     INDICATIONS:  constipation; R07.9-Chest pain, unspecified; R47.81-Slurred speech;  Z22.7-Latent tuberculosis; R29.898-Other symptoms and signs involving  the musculoskeletal system; R29.898-Other symptoms and signs involving  the musculoskeletal system; N38-Esbwirjjq for follow-up examination  after completed treatment for conditions other than malignant neoplasm     COMPARISON:  None available     TECHNIQUE:   Single radiographic view of the abdomen was obtained.     FINDINGS:  There are multiple right upper quadrant surgical clips. There is a  nonspecific nonobstructive small bowel gas pattern. There is an overall  mild colonic stool burden within the rectosigmoid colon. There are no  visible renal calcifications. There are calcified pelvic phleboliths.  There are multilevel degenerative changes of the lumbar spine.      Impression: 1. Mild rectosigmoid stool burden.  2. Nonspecific nonobstructive bowel gas pattern.     Electronically Signed By-Dru Rosas MD On:12/11/2022 1:41 PM  This report was finalized on [id number] by  Dru Rosas MD.       ASSESSMENT:  NERY  Cough   Hx + PPD  Hx TIA  Generalized weakness   Psoriasis  Anxiety/Depression        PLAN:  Continue to wean oxygen down  PT OT  Bronchodilators  Inhaled corticosteroids  Incentive spirometer  Electrolytes/ glycemic control  DVT and GI prophylaxis-Lovenox      Total Critical care time in direct medical management (   ) minutes, This time  "specifically excludes time spent performing procedures.    Negrito Lopez MD   2022  13:45 EST         Electronically signed by Negrito Lopez MD at 22 1347     Negrito Lopez MD at 22 1118          PULMONARY CRITICAL CARE PROGRESS  NOTE      PATIENT IDENTIFICATION:  Name: Eladia Connor  MRN: [id number]  :  1965     Age: 57 y.o.  Sex: female    DATE OF Note:  2022   Referring Physician: No admitting provider for patient encounter.                  Subjective:   Feeling better, no overnight issues, om 2 L O2, no SOB, no chest or abd pain, no bowel or bladder issues     Objective:  tMax 24 hrs: Temp (24hrs), Av.7 °F (37.1 °C), Min:98 °F (36.7 °C), Max:100.3 °F (37.9 °C)      Vitals Ranges:   Temp:  [98 °F (36.7 °C)-100.3 °F (37.9 °C)] 98 °F (36.7 °C)  Heart Rate:  [] 60  Resp:  [16-20] 17  BP: ()/(56-87) 99/63    Intake and Output Last 3 Shifts:   I/O last 3 completed shifts:  In: 560 [P.O.:560]  Out: 350 [Urine:350]    Exam:  BP 99/63 (BP Location: Left arm, Patient Position: Lying)   Pulse 60   Temp 98 °F (36.7 °C) (Oral)   Resp 17   Ht 170.2 cm (67\")   Wt (!) 143 kg (315 lb)   LMP  (LMP Unknown)   SpO2 98%   BMI 49.34 kg/m²     General Appearance:  Alert   HEENT:  Normocephalic, without obvious abnormality. Conjunctivae/corneas clear.  Normal external ear canals. Nares normal, no drainage     Neck:  Supple, symmetrical, trachea midline. No JVD.  Lungs /Chest wall:   Bilateral basal rhonchi, respirations unlabored, symmetrical wall movement.     Heart:  Regular rate and rhythm, systolic murmur PMI left sternal border  Abdomen: Soft, nontender, no masses, no organomegaly.    Extremities: Trace edema, no clubbing or cyanosis        Medications:  aspirin, 162 mg, Oral, Daily   Or  aspirin, 300 mg, Rectal, Daily  atorvastatin, 80 mg, Oral, Nightly  busPIRone, 5 mg, Oral, TID  enoxaparin, 40 mg, Subcutaneous, Q12H  famotidine, 20 mg, Oral, BID " AC  gabapentin, 100 mg, Oral, BID  levETIRAcetam, 500 mg, Intravenous, Q12H  nystatin, , Topical, Q12H  polyethylene glycol, 17 g, Oral, Daily  senna-docusate sodium, 1 tablet, Oral, BID  sodium chloride, 10 mL, Intravenous, Q12H  sodium chloride, 10 mL, Intravenous, Q12H  vilazodone, 10 mg, Oral, Daily        Infusion:        PRN:  •  acetaminophen  •  bisacodyl  •  butalbital-acetaminophen-caffeine  •  LORazepam  •  ondansetron  •  sodium chloride  •  [COMPLETED] Insert Peripheral IV **AND** sodium chloride  •  sodium chloride  •  sodium chloride  •  sodium chloride  •  sodium chloride  •  zolpidem  Data Review:  All labs (24hrs):   Recent Results (from the past 24 hour(s))   ECG 12 Lead Chest Pain    Collection Time: 12/11/22 11:42 AM   Result Value Ref Range    QT Interval 328 ms   POC Glucose Once    Collection Time: 12/11/22 11:53 AM    Specimen: Blood   Result Value Ref Range    Glucose 97 70 - 105 mg/dL   Blood Gas, Arterial -    Collection Time: 12/11/22 12:08 PM    Specimen: Arterial Blood   Result Value Ref Range    Site Right Radial     Tod's Test Positive     pH, Arterial 7.437 7.350 - 7.450 pH units    pCO2, Arterial 36.6 35.0 - 48.0 mm Hg    pO2, Arterial 59.3 (L) 83.0 - 108.0 mm Hg    HCO3, Arterial 24.7 21.0 - 28.0 mmol/L    Base Excess, Arterial 0.8 0.0 - 3.0 mmol/L    O2 Saturation, Arterial 91.3 (L) 94.0 - 98.0 %    Barometric Pressure for Blood Gas      Modality Cannula     FIO2 28 %    Hemodilution No    POCT Electrolytes +HGB +HCT    Collection Time: 12/11/22 12:08 PM    Specimen: Blood   Result Value Ref Range    Sodium 141 138 - 146 mmol/L    POC Potassium 3.9 3.5 - 4.5 mmol/L    Ionized Calcium 1.22 1.15 - 1.33 mmol/L    Glucose 111 (H) 74 - 100 mg/dL    Hematocrit 42 38 - 51 %    Hemoglobin 14.2 12.0 - 17.0 g/dL   POC Lactate    Collection Time: 12/11/22 12:08 PM    Specimen: Blood   Result Value Ref Range    Lactate 1.0 0.5 - 2.0 mmol/L   POC Glucose Once    Collection Time: 12/11/22  12:08 PM    Specimen: Blood   Result Value Ref Range    Glucose 111 (H) 74 - 100 mg/dL   TSH    Collection Time: 12/11/22  3:06 PM    Specimen: Blood   Result Value Ref Range    TSH 1.590 0.270 - 4.200 uIU/mL   Ammonia    Collection Time: 12/11/22  3:06 PM    Specimen: Blood   Result Value Ref Range    Ammonia 45 11 - 51 umol/L        Imaging:  XR Chest 1 View  Narrative: EXAMINATION: XR CHEST 1 VW-     DATE OF EXAM: 12/11/2022 12:40 PM     INDICATION: change in status; R07.9-Chest pain, unspecified;  R47.81-Slurred speech; Z22.7-Latent tuberculosis; R29.898-Other symptoms  and signs involving the musculoskeletal system; R29.898-Other symptoms  and signs involving the musculoskeletal system; D19-Xvgbdtysd for  follow-up examination after completed treatment for conditions other  than malignant neoplasm.     COMPARISON: Chest radiograph dated 11/30/2022     TECHNIQUE: Portable AP view of the chest was obtained.     FINDINGS:  There is cardiomegaly. There are bilateral interstitial opacities  throughout both lungs which could represent pulmonary edema or  bronchitis/atypical infection. There are low lung volumes. There is no  pleural effusion or pneumothorax. There are multilevel degenerative  changes of the thoracic spine.     Impression: 1. Low lung volumes with new bilateral interstitial opacities which  could represent pulmonary edema or bronchitis/atypical infection.  2. Mild cardiomegaly.     Electronically Signed By-Dru Rosas MD On:12/11/2022 1:43 PM  This report was finalized on [id number] by  Dru Rosas MD.  XR Abdomen KUB  Narrative: DATE OF EXAM:  12/11/2022 12:40 PM     PROCEDURE:  XR ABDOMEN KUB-     INDICATIONS:  constipation; R07.9-Chest pain, unspecified; R47.81-Slurred speech;  Z22.7-Latent tuberculosis; R29.898-Other symptoms and signs involving  the musculoskeletal system; R29.898-Other symptoms and signs involving  the musculoskeletal system; Z38-Trxsjebei for follow-up  examination  after completed treatment for conditions other than malignant neoplasm     COMPARISON:  None available     TECHNIQUE:   Single radiographic view of the abdomen was obtained.     FINDINGS:  There are multiple right upper quadrant surgical clips. There is a  nonspecific nonobstructive small bowel gas pattern. There is an overall  mild colonic stool burden within the rectosigmoid colon. There are no  visible renal calcifications. There are calcified pelvic phleboliths.  There are multilevel degenerative changes of the lumbar spine.      Impression: 1. Mild rectosigmoid stool burden.  2. Nonspecific nonobstructive bowel gas pattern.     Electronically Signed By-Dru Rosas MD On:2022 1:41 PM  This report was finalized on [id number] by  Dru Rosas MD.       ASSESSMENT:  NERY  Cough   Hx + PPD  Hx TIA  Generalized weakness   Psoriasis  Anxiety/Depression        PLAN:  OOB daily for longer periods of time   Decrease O2 to keep sats > 88%, currently on 2 L   Bronchodilators  Inhaled corticosteroids  Incentive spirometer  Electrolytes/ glycemic control  DVT and GI prophylaxis-Lovenox    Discussed with Dr Jessica Castellon, SCOOBY    2022  11:18 EST    I personally have examined  and interviewed the patient. I have reviewed the history, data, problems, assessment and plan with our NP.  Total Critical care time in direct medical management (   ) minutes, This time specifically excludes time spent performing procedures.    Negrito Lopez MD   2022  19:40 EST         Electronically signed by Negrito Lopez MD at 22     Delores Azul DO at 22 1018              [facility] Medicine Services Daily Progress Note    Patient Name: Eladia Connor  : 1965  MRN: [id number]  Primary Care Physician:  Quincy Lemos, APRN  Date of admission: 2022      Subjective       Chief Complaint: Weakness    Patient seen and examined this  morning.  Doing okay this morning, having some dizziness but no headache, chest pain, shortness of breath.  Left arm pain/numbness/tingling is about the same.  No episodes overnight.  Discussed with transfer center at [facility], neurology and hospital service has accepted transfer.  Pending bed availability.    Pertinent positives as noted in HPI/subjective.  All other systems were reviewed and are negative.      Objective       Vitals:   Temp:  [98 °F (36.7 °C)-100.3 °F (37.9 °C)] 98 °F (36.7 °C)  Heart Rate:  [] 60  Resp:  [16-20] 17  BP: ()/(56-87) 99/63  Flow (L/min):  [2] 2    Physical Exam:    General: Awake, alert, lying in bed, NAD  Eyes: PERRL, EOMI, conjunctivae are clear  Cardiovascular: Regular rate and rhythm, no murmurs  Respiratory: Clear to auscultation bilaterally, no wheezing or rales, unlabored breathing  Abdomen: Soft, nontender, positive bowel sounds, no guarding  Neurologic: A&O, CN grossly intact, speech normal, moves all extremities spontaneously  Skin: Warm, dry, intact         Result Review    Result Review:  I have personally reviewed the results from the time of this admission to 12/12/2022 10:18 EST and agree with these findings:  [x]  Laboratory  [x]  Microbiology  [x]  Radiology  []  EKG/Telemetry   []  Cardiology/Vascular   []  Pathology  []  Old records  []  Other:    Wounds (last 24 hours)     LDA Wound     Row Name 12/12/22 0845 12/11/22 2010          Rash 12/10/22 0218 upper sternal other (see comments)    Rash - Properties Group Placement Date: 12/10/22  -TS Placement Time: 0218 -TS Orientation: upper  -TS Location: sternal  -TS Type: other (see comments)  -TS, Post RAPID/post-sternal rub      Distribution generalized  -PG generalized  -LJ     Color red  -PG --     Configuration/Shape oval  -PG --     Borders irregular  -PG irregular  -LJ     Characteristics -- dry;raised;burning  Pt reported not itching just burning  -LJ     Lesion Size -- 0.5 to 1 cm   -LJ     Care, Rash -- open to air  -LJ     Retired Rash - Properties Group Placement Date: 12/10/22  -TS Placement Time: 0218 -TS Orientation: upper  -TS Location: sternal  -TS Type: other (see comments)  -TS, Post RAPID/post-sternal rub      Retired Rash - Properties Group Date first assessed: 12/10/22  -TS Time first assessed: 0218 -TS Orientation: upper  -TS Location: sternal  -TS Type: other (see comments)  -TS, Post RAPID/post-sternal rub            User Key  (r) = Recorded By, (t) = Taken By, (c) = Cosigned By    Initials Name Provider Type    Krystal Panda LPN Licensed Nurse    Sosa Lopez, RN Registered Nurse    Leslee Sorto RN Registered Nurse                  Assessment & Plan      Brief Patient Summary:  Eladia Connor is a 57 y.o. female who presented to [facility] on 12/6/2022 complaining of weakness.   She reportedly has a history of a CVA in 2012 without any residual deficits. She notes over the last week or so she has noted intermittent though right leg weakness and numbness with associated tingling.  Around 11 AM in the morning she developed slurred speech and left arm weakness.  Symptoms lasted for about 30 minutes and she presented to [facility] for further evaluation.  She was subsequently discharged indicating that testing/evaluation was largely unremarkable with questionable history of a pneumonia on chest x-ray.   She describes a nonproductive cough without hemoptysis.  + Sick contacts with grandson with the flu.  She denies any fevers. She describes having possible positive TB test few weeks ago. No known TB contacts however.  She indicates her son just returned from the [place] though is not sick.  No significant personal travel history noted. When inquiring about new medications she identifies that she started Otezla this last week for her psoriasis and her symptoms seem to began after this.  She also reports being on Klonopin but has not taken for  1 week as she ran out of it.  Patient was noted to have multiple episodes of altered mental status with slurred speech and left-sided weakness.  Stroke work-up x2 including MRI brain appears to be negative.  Neurology following, recommending outpatient EEG study to rule out seizure activity.  Patient also appears to be having major depressive episode, psych was consulted.  Recommended antidepression but patient refusing at this time.  PT/OT recommending rehab.      aspirin, 162 mg, Oral, Daily   Or  aspirin, 300 mg, Rectal, Daily  atorvastatin, 80 mg, Oral, Nightly  busPIRone, 5 mg, Oral, TID  enoxaparin, 40 mg, Subcutaneous, Q12H  famotidine, 20 mg, Oral, BID AC  gabapentin, 100 mg, Oral, BID  levETIRAcetam, 500 mg, Intravenous, Q12H  nystatin, , Topical, Q12H  polyethylene glycol, 17 g, Oral, Daily  senna-docusate sodium, 1 tablet, Oral, BID  sodium chloride, 10 mL, Intravenous, Q12H  sodium chloride, 10 mL, Intravenous, Q12H  vilazodone, 10 mg, Oral, Daily             Active Hospital Problems:  Active Hospital Problems    Diagnosis    • **Weakness      Plan:     Speech disturbance with right leg and left arm weakness, recurrent episodes  -Possible TIA, or seizure, unclear at this time  -Patient has had few episodes of same symptoms over the past few days and during this admission  -Stroke work-up including MRI brain negative for new acute findings  -Previously had TIA back in 2013 without any residual deficits  -Possible major depression episode  -Continue aspirin, statin  -Keppra added empirically per neurology however patient refusing  -Neurology following, recommending outpatient EEG, no in-house EEG available at this time, event monitor also ordered by neurology  -PT/OT recommending rehab  -Patient and family wants transfer to [facility]   -Discussed with [facility], neurologist Dr. YOLANDA Jenkins and hospitalist Dr. Us have accepted the patient to their service.  Patient will be  transferred to [facility] once bed available    Major depression with anxiety  -Patient had episodes of altered mentation with speech disturbances however stroke work-up and metabolic work-up so far negative  -Patient likely suffering from major depression episode  -Psych following and recommended antidepressant but patient refusing at this time  -Per nursing patient has also been refusing home BuSpar at times  -No suicidal ideations at this time  -Continue psych follow-up    Dyspnea  Suspected NERY  -Patient states that she was 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 but never started on CPAP  -Patient and family both requesting pulmonology eval  -Respiratory status remains stable at this time  -Pulmonology following, recommending outpatient sleep study    Abnormal TB screening  -Reportedly positive QuantiFERON as outpatient however no symptoms or signs of TB infection noted at this time  -QuantiFERON has been ordered however can follow-up as outpatient with PCP for further work-up    Psoriasis  -Possibly having side effects from Otezla, hold for now  -Continue outpatient follow-up    Obesity  -BMI of 49.34 noted  -Lifestyle modifications recommended    DVT prophylaxis  -Lovenox      CODE STATUS:    Code Status (Patient has no pulse and is not breathing): CPR (Attempt to Resuscitate)  Medical Interventions (Patient has pulse or is breathing): Full Support  Release to patient: Routine Release      Disposition: Pending transfer to [facility]    Electronically signed by Delores Azul DO, 12/12/22, 10:18 EST.  [facility] Hospitalist Team      Part of this note may be an electronic transcription/translation of spoken language to printed text using the Dragon Dictation System.      Electronically signed by Delores Azul DO at 12/12/22 1020     Delores Azul DO at 12/11/22 1217              [facility] Medicine Services Daily Progress Note    Patient Name: Eladia  Connor  : 1965  MRN: [id number]  Primary Care Physician:  Quincy Lemos APRN  Date of admission: 2022      Subjective       Chief Complaint: Weakness    Patient seen and examined this morning.  Doing better this morning but still having some intermittent left upper extremity shooting/nerve pain radiating from neck and some mild headache.  Discussed CT results with her and has C5-6 stenosis.  Gabapentin added and patient willing to try.  Still discussed with patient and daughter yesterday regarding her treatment plan and diagnosis.  Daughter and patient still wants transferred to [facility] or [facility].  They understand that the process may take a while, will try to talk to transfer center today.    Pertinent positives as noted in HPI/subjective.  All other systems were reviewed and are negative.      Objective       Vitals:   Temp:  [98.4 °F (36.9 °C)-100.3 °F (37.9 °C)] 100.3 °F (37.9 °C)  Heart Rate:  [] 103  Resp:  [15-19] 19  BP: ()/(49-87) 159/87  Flow (L/min):  [2] 2    Physical Exam:    General: Awake, alert, lying in bed, NAD  Eyes: PERRL, EOMI, conjunctivae are clear  Cardiovascular: Regular rate and rhythm, no murmurs  Respiratory: Clear to auscultation bilaterally, no wheezing or rales, unlabored breathing  Abdomen: Soft, nontender, positive bowel sounds, no guarding  Neurologic: A&O, CN grossly intact, speech normal, moves all extremities spontaneously  Skin: Warm, dry, intact         Result Review    Result Review:  I have personally reviewed the results from the time of this admission to 2022 12:17 EST and agree with these findings:  [x]  Laboratory  [x]  Microbiology  [x]  Radiology  []  EKG/Telemetry   []  Cardiology/Vascular   []  Pathology  []  Old records  []  Other:    Wounds (last 24 hours)     LDA Wound     Row Name 12/10/22 2302 12/10/22 2020          Rash 12/10/22 0218 upper sternal other (see comments)    Rash -  Properties Group Placement Date: 12/10/22  -TS Placement Time: 0218 -TS Orientation: upper  -TS Location: sternal  -TS Type: other (see comments)  -TS, Post RAPID/post-sternal rub      Distribution generalized  -MW generalized  -AH     Color red  -MW red  -AH     Configuration/Shape oval  -MW round  -AH     Borders irregular  -MW irregular  -AH     Characteristics dry;raised;itching  -MW dry;raised;itching  -AH     Lesion Size -- 0.5 to 1 cm  -AH     Care, Rash -- open to air  -AH     Retired Rash - Properties Group Placement Date: 12/10/22  -TS Placement Time: 0218 -TS Orientation: upper  -TS Location: sternal  -TS Type: other (see comments)  -TS, Post RAPID/post-sternal rub      Retired Rash - Properties Group Date first assessed: 12/10/22  -TS Time first assessed: 0218 -TS Orientation: upper  -TS Location: sternal  -TS Type: other (see comments)  -TS, Post RAPID/post-sternal rub            User Key  (r) = Recorded By, (t) = Taken By, (c) = Cosigned By    Initials Name Provider Type    Alcira Miner, RN Registered Nurse    Laura Jett LPN Licensed Nurse    Leslee Sorto RN Registered Nurse                  Assessment & Plan      Brief Patient Summary:  Eladia Connor is a 57 y.o. female who presented to [facility] on 12/6/[facility]22 complaining of weakness.   She reportedly has a history of a CVA in 2012 without any residual deficits. She notes over the last week or so she has noted intermittent though right leg weakness and numbness with associated tingling.  Around 11 AM in the morning she developed slurred speech and left arm weakness.  Symptoms lasted for about 30 minutes and she presented to [facility] for further evaluation.  She was subsequently discharged indicating that testing/evaluation was largely unremarkable with questionable history of a pneumonia on chest x-ray.   She describes a nonproductive cough without hemoptysis.  + Sick contacts with grandson with the  flu.  She denies any fevers. She describes having possible positive TB test few weeks ago. No known TB contacts however.  She indicates her son just returned from the [place] though is not sick.  No significant personal travel history noted. When inquiring about new medications she identifies that she started Otezla this last week for her psoriasis and her symptoms seem to began after this.  She also reports being on Klonopin but has not taken for 1 week as she ran out of it.  Patient was noted to have multiple episodes of altered mental status with slurred speech and left-sided weakness.  Stroke work-up x2 including MRI brain appears to be negative.  Neurology following, recommending outpatient EEG study to rule out seizure activity.  Patient also appears to be having major depressive episode, psych was consulted.  Recommended antidepression but patient refusing at this time.  PT/OT recommending rehab.      aspirin, 162 mg, Oral, Daily   Or  aspirin, 300 mg, Rectal, Daily  atorvastatin, 80 mg, Oral, Nightly  busPIRone, 5 mg, Oral, TID  enoxaparin, 40 mg, Subcutaneous, Q12H  famotidine, 20 mg, Oral, BID AC  gabapentin, 100 mg, Oral, BID  levETIRAcetam, 500 mg, Intravenous, Q12H  nystatin, , Topical, Q12H  sodium chloride, 10 mL, Intravenous, Q12H  sodium chloride, 10 mL, Intravenous, Q12H  vilazodone, 10 mg, Oral, Daily             Active Hospital Problems:  Active Hospital Problems    Diagnosis    • **Weakness      Plan:     Speech disturbance with right leg and left arm weakness, recurrent episodes  -Possible TIA, or seizure, unclear at this time  -Patient has had few episodes of same symptoms over the past few days and during this admission  -Stroke work-up including MRI brain negative for new acute findings  -Previously had TIA back in 2013 without any residual deficits  -Possible major depression episode  -Continue aspirin, statin  -Keppra added empirically per neurology however patient refusing  -Neurology  following, recommending outpatient EEG, no in-house EEG available at this time, event monitor also ordered by neurology  -PT/OT recommending rehab  -Patient and family wants transfer to [facility], will try to initiate process as able    Major depression with anxiety  -Patient had episodes of altered mentation with speech disturbances however stroke work-up and metabolic work-up so far negative  -Patient likely suffering from major depression episode  -Psych following and recommended antidepressant but patient refusing at this time  -Per nursing patient has also been refusing home BuSpar at times  -No suicidal ideations at this time  -Continue psych follow-up    Dyspnea  Suspected NERY  -Patient states that she was 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 but never started on CPAP  -Patient and family both requesting pulmonology eval  -Respiratory status remains stable at this time  -Pulmonology following    Abnormal TB screening  -Reportedly positive QuantiFERON as outpatient however no symptoms or signs of TB infection noted at this time  -QuantiFERON has been ordered however can follow-up as outpatient with PCP for further work-up    Psoriasis  -Possibly having side effects from Otezla, hold for now  -Continue outpatient follow-up    Obesity  -BMI of 49.34 noted  -Lifestyle modifications recommended    DVT prophylaxis  -Lovenox      CODE STATUS:    Code Status (Patient has no pulse and is not breathing): CPR (Attempt to Resuscitate)  Medical Interventions (Patient has pulse or is breathing): Full Support  Release to patient: Routine Release      Disposition: Rehab placement.  Possible transfer to other hospital if accepted    Electronically signed by Delores Azul DO, 12/11/22, 12:17 [facility].  [facility] Hospitalist Team      Part of this note may be an electronic transcription/translation of spoken language to printed text using the Dragon Dictation System.      Electronically signed by  Delores Azul,  at 22 1220     Delores Azul, DO at 12/10/22 1015              [facility] Medicine Services Daily Progress Note    Patient Name: Eladia Connor  : 1965  MRN: [id number]  Primary Care Physician:  Quincy Lemos APRN  Date of admission: 2022      Subjective       Chief Complaint: Weakness    Patient seen and examined this morning.  Has had multiple rapid responses since yesterday for episodes of trunk protrusion, speech disturbances and weaknesses, same episodes as in the beginning.  Stroke work-up has so far been negative.  Discussed with patient regarding possible seizure but unable to confirm due to not able to do EEG.  Keppra started by neurology yesterday however patient refusing.  Patient also refusing antidepressants at this time.  Counseled on medication compliance but patient continues to refuse at this time.  Wanting pulmonology evaluation for sleep apnea, consult has been placed per patient and family request.    Pertinent positives as noted in HPI/subjective.  All other systems were reviewed and are negative.      Objective       Vitals:   Temp:  [97.9 °F (36.6 °C)-98.2 °F (36.8 °C)] 98.2 °F (36.8 °C)  Heart Rate:  [57-67] 57  Resp:  [16-19] 19  BP: ()/(45-77) 99/56  Flow (L/min):  [2] 2    Physical Exam:    General: Awake, alert, lying in bed, NAD  Eyes: PERRL, EOMI, conjunctivae are clear  Cardiovascular: Regular rate and rhythm, no murmurs  Respiratory: Clear to auscultation bilaterally, no wheezing or rales, unlabored breathing  Abdomen: Soft, nontender, positive bowel sounds, no guarding  Neurologic: A&O, CN grossly intact, speech normal, moves all extremities spontaneously  Musculoskeletal: Generalized weakness, no deformities  Skin: Warm, dry, intact         Result Review    Result Review:  I have personally reviewed the results from the time of this admission to 12/10/2022 10:15 EST and agree with these findings:  [x]   Laboratory  [x]  Microbiology  [x]  Radiology  []  EKG/Telemetry   []  Cardiology/Vascular   []  Pathology  []  Old records  []  Other:    Wounds (last 24 hours)     LDA Wound     Row Name 12/10/22 0234             Rash 12/10/22 0218 upper sternal other (see comments)    Rash - Properties Group Placement Date: 12/10/22  -TS Placement Time: 0218 -TS Orientation: upper  -TS Location: sternal  -TS Type: other (see comments)  -TS, Post RAPID/post-sternal rub      Wound Image Images linked: 2  -TS      Retired Rash - Properties Group Placement Date: 12/10/22  -TS Placement Time: 0218 -TS Orientation: upper  -TS Location: sternal  -TS Type: other (see comments)  -TS, Post RAPID/post-sternal rub      Retired Rash - Properties Group Date first assessed: 12/10/22  -TS Time first assessed: 0218 -TS Orientation: upper  -TS Location: sternal  -TS Type: other (see comments)  -TS, Post RAPID/post-sternal rub            User Key  (r) = Recorded By, (t) = Taken By, (c) = Cosigned By    Initials Name Provider Type    TS Leslee Shelton RN Registered Nurse                  Assessment & Plan      Brief Patient Summary:  Eladia Connor is a 57 y.o. female who presented to [facility] on 12/6/2022 complaining of weakness.   She reportedly has a history of a CVA in 2012 without any residual deficits. She notes over the last week or so she has noted intermittent though right leg weakness and numbness with associated tingling.  Around 11 AM in the morning she developed slurred speech and left arm weakness.  Symptoms lasted for about 30 minutes and she presented to [facility] for further evaluation.  She was subsequently discharged indicating that testing/evaluation was largely unremarkable with questionable history of a pneumonia on chest x-ray.   She describes a nonproductive cough without hemoptysis.  + Sick contacts with grandson with the flu.  She denies any fevers. She describes having possible positive TB  test few weeks ago. No known TB contacts however.  She indicates her son just returned from the [place] though is not sick.  No significant personal travel history noted. When inquiring about new medications she identifies that she started Otezla this last week for her psoriasis and her symptoms seem to began after this.  She also reports being on Klonopin but has not taken for 1 week as she ran out of it.  Patient was noted to have multiple episodes of altered mental status with slurred speech and left-sided weakness.  Stroke work-up x2 including MRI brain appears to be negative.  Neurology following, recommending outpatient EEG study to rule out seizure activity.  Patient also appears to be having major depressive episode, psych was consulted.  Recommended antidepression but patient refusing at this time.  PT/OT recommending rehab.      aspirin, 162 mg, Oral, Daily   Or  aspirin, 300 mg, Rectal, Daily  atorvastatin, 80 mg, Oral, Nightly  busPIRone, 5 mg, Oral, TID  enoxaparin, 40 mg, Subcutaneous, Q12H  famotidine, 20 mg, Oral, BID AC  levETIRAcetam, 500 mg, Oral, Q12H  nystatin, , Topical, Q12H  sodium chloride, 10 mL, Intravenous, Q12H  sodium chloride, 10 mL, Intravenous, Q12H  vilazodone, 10 mg, Oral, Daily             Active Hospital Problems:  Active Hospital Problems    Diagnosis    • **Weakness      Plan:     Speech disturbance with right leg and left arm weakness, recurrent episodes  -Possible TIA, or seizure, unclear at this time  -Patient has had few episodes of same symptoms over the past few days and during this admission  -Stroke work-up including MRI brain negative for new acute findings  -Previously had TIA back in 2013 without any residual deficits  -Possible major depression episode  -Continue aspirin, statin  -Keppra added empirically per neurology however patient refusing  -Neurology following, recommending outpatient EEG, no in-house EEG available at this time, event monitor also ordered by  neurology  -PT/OT recommending rehab    Major depression with anxiety  -Patient had episodes of altered mentation with speech disturbances however stroke work-up and metabolic work-up so far negative  -Possibly suffering from major depression episode  -Psych following and recommended antidepressant but patient refusing at this time  -Per nursing patient has also been refusing home BuSpar  -Continue psych follow-up    Dyspnea  Suspected NERY  -Patient states that she was 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 but never started on CPAP  -Patient and family both requesting pulmonology eval, consult has been placed  -Respiratory status remained stable at this time    Abnormal TB screening  -Reportedly positive QuantiFERON as outpatient however no symptoms or signs of TB infection noted at this time  -QuantiFERON has been ordered however can follow-up as outpatient with PCP for further work-up    Psoriasis  -Possibly having side effects from Otezla, hold for now  -Continue outpatient follow-up    Obesity  -BMI of 49.34 noted  -Lifestyle modifications recommended    DVT prophylaxis  -Lovenox      CODE STATUS:    Code Status (Patient has no pulse and is not breathing): CPR (Attempt to Resuscitate)  Medical Interventions (Patient has pulse or is breathing): Full Support  Release to patient: Routine Release      Disposition: Rehab placement.     Electronically signed by Delores Azul DO, 12/10/22, 10:15 EST.  [facility]      Part of this note may be an electronic transcription/translation of spoken language to printed text using the Dragon Dictation System.      Electronically signed by Delores Azul DO at 12/10/22 1018     Kavya Barillas MD at 12/09/22 1436            Chief complaint anxiety, panic attacks      Subjective .     History of present illness:  The patient is a 57 y.o. female who was admitted secondary to  weakness. PMHx: anxiety and depression. Psych consult was requested by  "Dr Azul 2ry to mental status changes and depression.   The pt reported history of depression in the past, approximately 20 years ago the patient had \"nervous breakdown, she had to be admitted to the hospital  In [place] secondary to suicidal ideations, but no attempts.  At that time she was started  on Paxil, Ambien and lorazepam, depression improved at the time, she stopped medications  Few  years ago.  Last  year her  passed away, the patient became more depressed, increased crying spells, increased self-isolation.  The patient was prescribed clonazepam by her previous primary care provider.  Also was prescribed Ambien and lorazepam for anxiety.  Her  passed away around holidays, his anniversary is approaching, the patient found old bottle of clonazepam 0.5 mg and she was taking 1 pill daily to alleviate anxiety  until she ran out 1 week ago.  Depression is rated as a 7 out of 10, she denied suicidal and homicidal ideations, the patient denied any perceptual disturbances  Anxiety is persistent and intense, denied AVH/SI/HI  The pt developed stroke like sxs with numbness and muscle weakness   Past psych hx: depression anxiety, hx of SI no SA     Today the pt was   alert and awake,   very anxious about her condition, about d/c plan.  Pt's daughter was in the room and helped the pt to express her concerns.   The pt confirmed she was taking old rx of clonazepam 0.5 mg QD ORN for anxiety, denied taking more , she found clonazepam to be effective. But she noticed that lorazepam 0.5 mg was more effective for panic attacks    The pt denied feeling hopeless/helpless, denied AVH/SI/HI       Review of Systems   All systems were reviewed and negative except for:  Constitution:  positive for fatigue  Musculoskeletal: positive for  muscle weakness  Neurological: positive for  difficulty walking and weakness  Behavioral/Psych: positive for  anxiety and depression    History       Medications Prior to Admission " "  Medication Sig Dispense Refill Last Dose   • busPIRone (BUSPAR) 5 MG tablet Take 1 tablet by mouth 3 (Three) Times a Day. 30 tablet 5    • nystatin (MYCOSTATIN) 065076 UNIT/GM cream Apply 1 application topically to the appropriate area as directed 2 (Two) Times a Day. 30 g 3    • zolpidem (AMBIEN) 10 MG tablet Take 1 tablet by mouth At Night As Needed for Sleep. 30 tablet 2         Scheduled Meds:  aspirin, 162 mg, Oral, Daily   Or  aspirin, 300 mg, Rectal, Daily  atorvastatin, 80 mg, Oral, Nightly  busPIRone, 5 mg, Oral, TID  enoxaparin, 40 mg, Subcutaneous, Q12H  famotidine, 20 mg, Oral, BID AC  levETIRAcetam, 500 mg, Oral, Q12H  nystatin, , Topical, Q12H  sodium chloride, 10 mL, Intravenous, Q12H  sodium chloride, 10 mL, Intravenous, Q12H  vilazodone, 10 mg, Oral, Daily         Continuous Infusions:       PRN Meds:  •  acetaminophen  •  butalbital-acetaminophen-caffeine  •  ondansetron  •  sodium chloride  •  [COMPLETED] Insert Peripheral IV **AND** sodium chloride  •  sodium chloride  •  sodium chloride  •  sodium chloride  •  sodium chloride  •  zolpidem      Allergies:  Patient has no known allergies.      Objective     Vital Signs   /62 (BP Location: Left arm, Patient Position: Lying)   Pulse 58   Temp 97.9 °F (36.6 °C) (Oral)   Resp 18   Ht 170.2 cm (67\")   Wt (!) 143 kg (315 lb)   LMP  (LMP Unknown)   SpO2 100%   BMI 49.34 kg/m²     Physical Exam:     General Appearance:    In NAD       Mental Status Exam:    Hygiene:   good  Cooperation:  Cooperative  Eye Contact:  Good  Psychomotor Behavior:  Appropriate  Affect:  Appropriate  Hopelessness: Denies  Speech:  Normal  Thought Progress:  Goal directed and Linear  Thought Content:  Mood congruent  Suicidal:  None  Homicidal:  None  Hallucinations:  None  Delusion:  None  Memory:  fair   Orientation:  Person, Place and Situation  Reliability:  fair  Insight:  Fair  Judgement:  Fair  Impulse Control:  Fair  Physical/Medical Issues:  Yes  "     Medications and allergies reviewed     Lab Results   Component Value Date    GLUCOSE 113 (H) 12/09/2022    CALCIUM 8.9 12/09/2022     12/09/2022    K 4.2 12/09/2022    CO2 28.0 12/09/2022     12/09/2022    BUN 15 12/09/2022    CREATININE 0.91 12/09/2022    EGFRIFAFRI 102 02/17/2021    EGFRIFNONA 89 02/17/2021    BCR 16.5 12/09/2022    ANIONGAP 7.0 12/09/2022       Last Urine Toxicity     LAST URINE TOXICITY RESULTS Latest Ref Rng & Units 12/7/2022    BARBITURATES SCREEN Negative Negative    BENZODIAZEPINE SCREEN, URINE Negative Negative    COCAINE SCREEN, URINE Negative Negative    METHADONE SCREEN, URINE Negative Negative          No results found for: PHENYTOIN, PHENOBARB, VALPROATE, CBMZ    Lab Results   Component Value Date     12/09/2022    BUN 15 12/09/2022    CREATININE 0.91 12/09/2022    TSH 2.130 12/06/2022    WBC 8.20 12/09/2022       Brief Urine Lab Results     None          Assessment & Plan       Weakness        Assessment: Major depressive disorder moderate recurrent  Generalized anxiety disorder  Bereavement    Treatment Plan: The patient presented with symptoms of depression, anxiety, and anniversary of her 's death is approaching.  Continue BuSpar at current dose, continue Ambien as needed for insomnia  The patient was on Paxil in the past, did not report with experience  She did not respond to Zoloft and Lexapro  Cont Viibryd 10 mg p.o. every morning tomorrow for anxiety and depression - the pt agreed to start tasking it      Start lorazepam 0.5 mg po QD PRN  For anxiety/panic attacks and she needs Rx for #14 tabs as take home med    The patient will benefit from psychotherapy on Outpatient basis and grief therapy    The pt can call [facility] [phone]  to make appt with Leonora [facility] if she takes her ins   If Leonora is still not in providers network, contact [facility], [facility] or [facility]   She also can explore tele counseling options -  [facility], [facility] or [facility]     Safety plan was discussed with the patient and her daughter   They verbalized understanding   For med management she also might call [facility] but they were informed that it might take long time to get in   The pt can be d/c when medically stable     Treatment Plan discussed with: Patient, Family and nursing     I discussed the patients findings and my recommendations with patient, family and nursing staff    I have reviewed and approved the behavioral health treatment plans and problem list. Yes     Referring MD has access to consult report and progress notes in EMR     Kavya Barillas MD  22  14:36 EST            Electronically signed by Kavya Barillas MD at 22 1444     Delores Azul DO at 22 1018              [facility] Medicine Services Daily Progress Note    Patient Name: Eladia Connor  : 1965  MRN: [id number]  Primary Care Physician:  Quincy Lemos APRN  Date of admission: 2022      Subjective       Chief Complaint: Weakness    Patient seen and examined this morning.  Doing well complaining of some dizziness with room spinning sensation.  Awaiting rehab placement.  Imaging reports discussed with her, Keppra started yesterday per neurology.  No other complaints.    Pertinent positives as noted in HPI/subjective.  All other systems were reviewed and are negative.      Objective       Vitals:   Temp:  [97.6 °F (36.4 °C)-98.1 °F (36.7 °C)] 97.8 °F (36.6 °C)  Heart Rate:  [59-62] 59  Resp:  [10-23] 13  BP: ()/(45-70) 95/58  Flow (L/min):  [2] 2    Physical Exam:    General: Awake, alert, lying in bed, NAD  Eyes: PERRL, EOMI, conjunctivae are clear  Cardiovascular: Regular rate and rhythm, no murmurs  Respiratory: Clear to auscultation bilaterally, no wheezing or rales, unlabored breathing  Abdomen: Soft, nontender, positive bowel sounds, no guarding  Neurologic: A&O, CN grossly intact,  speech normal, moves all extremities spontaneously  Musculoskeletal: Generalized weakness, no deformities  Skin: Warm, dry, intact         Result Review    Result Review:  I have personally reviewed the results from the time of this admission to 12/9/2022 10:18 EST and agree with these findings:  [x]  Laboratory  [x]  Microbiology  [x]  Radiology  [x]  EKG/Telemetry   [x]  Cardiology/Vascular   []  Pathology  []  Old records  []  Other:          Assessment & Plan      Brief Patient Summary:  Eladia Connor is a 57 y.o. female who presented to [facility] on 12/6/2022 complaining of weakness.   She reportedly has a history of a CVA in 2012 without any residual deficits. She notes over the last week or so she has noted intermittent though right leg weakness and numbness with associated tingling.  Around 11 AM in the morning she developed slurred speech and left arm weakness.  Symptoms lasted for about 30 minutes and she presented to [facility] for further evaluation.  She was subsequently discharged indicating that testing/evaluation was largely unremarkable with questionable history of a pneumonia on chest x-ray.   She describes a nonproductive cough without hemoptysis.  + Sick contacts with grandson with the flu.  She denies any fevers. She describes having possible positive TB test few weeks ago. No known TB contacts however.  She indicates her son just returned from the [place] though is not sick.  No significant personal travel history noted. When inquiring about new medications she identifies that she started Otezla this last week for her psoriasis and her symptoms seem to began after this.  She also reports being on Klonopin but has not taken for 1 week as she ran out of it.  Patient was noted to have multiple episodes of altered mental status with slurred speech and left-sided weakness.  Stroke work-up x2 including MRI brain appears to be negative.  Neurology following, recommending  outpatient EEG study to rule out seizure activity.  Patient also appears to be having major depressive episode, psych was consulted.  Recommended antidepression but patient refusing at this time.  PT/OT recommending rehab.      aspirin, 162 mg, Oral, Daily   Or  aspirin, 300 mg, Rectal, Daily  atorvastatin, 80 mg, Oral, Nightly  busPIRone, 5 mg, Oral, TID  enoxaparin, 40 mg, Subcutaneous, Q12H  famotidine, 20 mg, Oral, BID AC  levETIRAcetam, 500 mg, Oral, Q12H  nystatin, , Topical, Q12H  sodium chloride, 10 mL, Intravenous, Q12H  sodium chloride, 10 mL, Intravenous, Q12H  vilazodone, 10 mg, Oral, Daily             Active Hospital Problems:  Active Hospital Problems    Diagnosis    • **Weakness      Plan:   Speech disturbance with right leg and left arm weakness, improved  -Possible TIA, or seizure, unclear at this time  -Patient has had few episodes of same symptoms over the past few days, possibly medication induced but unclear at this time  -Stroke work-up including MRI brain negative for new acute findings  -Previously had TIA back in 2013 without any residual deficits  -Possible major depression episode  -Continue aspirin, statin  -Keppra added empirically per neurology  -Neurology following, recommending outpatient EEG, no in-house EEG available at this time  -PT/OT recommending rehab  -Medically stable for discharge, awaiting placement    Major depression with anxiety  -Patient had episodes of altered mentation with speech disturbances however stroke work-up and metabolic work-up so far negative  -Possibly suffering from major depression episode  -Psych following and recommended antidepressant but patient refusing at this time  -Continue home BuSpar  -Continue psych follow-up    Abnormal TB screening  -Reportedly positive QuantiFERON as outpatient however no symptoms or signs of TB infection noted at this time  -QuantiFERON has been ordered however can follow-up as outpatient with PCP for further  work-up    Psoriasis  -Possibly having side effects from Otezla, hold for now  -Continue outpatient follow-up    Suspected NERY  -States she did have sleep study years ago but did not follow-up on CPAP  -Recommend outpatient sleep study, will refer to neurology/pulmonology    Obesity  -BMI of 49.34 noted  -Lifestyle modifications recommended    DVT prophylaxis  -Lovenox      CODE STATUS:    Code Status (Patient has no pulse and is not breathing): CPR (Attempt to Resuscitate)  Medical Interventions (Patient has pulse or is breathing): Full Support  Release to patient: Routine Release      Disposition: Rehab placement.  Medically stable.    Electronically signed by Delores Azul DO, 12/09/22, 10:18 EST.  [facility] Hospitalist Team      Part of this note may be an electronic transcription/translation of spoken language to printed text using the Dragon Dictation System.      Electronically signed by Delores Azul DO at 12/09/22 1019     Kim Lam APRN at 12/09/22 0938               LOS: 0 days     Chief Complaint:  Headache, dizziness     Subjective   SUBJECTIVE:    History taken from: patient chart RN    Interval History:      Patient Complaints:       Review of Systems   Constitutional: Negative.    Eyes: Negative for visual disturbance.   Cardiovascular: Negative.    Neurological: Positive for dizziness and headaches. Negative for tremors, seizures, syncope, facial asymmetry, speech difficulty, weakness, light-headedness and numbness.          Pertinent PMH:  has a past medical history of Anxiety and Depression.   ________________________________________________  Objective   OBJECTIVE:    On exam:  GENERAL: NAD  CARDIO: RRR  NEURO:  Awake and alert  Sitting up in the chair   Oriented x3  EOMI, PERRL, no visual field deficits  CN 2-12 intact, No facial asymmetry  Speech clear without dysarthria  Sensations intact and equal bilaterally  Strength 5/5 and equal in all extremities  No  ataxia      ________________________________________________   RESULTS REVIEW    VITAL SIGNS:  Temp:  [97.6 °F (36.4 °C)-98.1 °F (36.7 °C)] 97.8 °F (36.6 °C)  Heart Rate:  [59-62] 59  Resp:  [10-23] 13  BP: ()/(45-70) 95/58    LABS:       Lab 12/09/22 0407 12/08/22  0544 12/07/22  0604 12/06/22  2314   WBC 8.20 7.00 7.70 8.80   HEMOGLOBIN 12.3 12.2 13.4 13.6   HEMATOCRIT 37.9 37.6 40.8 40.4   PLATELETS 244 262 285 330   NEUTROS ABS 5.50 4.90 5.60 7.00   LYMPHS ABS 1.70 1.40 1.40 1.10   MONOS ABS 0.70 0.50 0.60 0.60   EOS ABS 0.20 0.10 0.10 0.00   MCV 87.9 88.7 87.2 86.2   PROCALCITONIN  --   --   --  0.02   PROTIME  --   --   --  10.8   APTT  --   --   --  26.8*         Lab 12/09/22  0407 12/08/22  0544 12/07/22  0604 12/06/22  2314   SODIUM 140 140 140 139   POTASSIUM 4.2 4.1 3.9 4.3   CHLORIDE 105 109* 106 105   CO2 28.0 21.0* 22.0 21.0*   ANION GAP 7.0 10.0 12.0 13.0   BUN 15 10 8 9   CREATININE 0.91 0.70 0.70 0.71   EGFR 72.8 99.8 99.8 98.1   GLUCOSE 113* 105* 128* 137*   CALCIUM 8.9 8.8 9.0 9.5   HEMOGLOBIN A1C  --   --  5.7*  --    TSH  --   --   --  2.130         Lab 12/09/22  0407 12/08/22  0544 12/06/22  2314   TOTAL PROTEIN 6.9 7.1 8.5   ALBUMIN 3.70 3.60 4.20   GLOBULIN 3.2 3.5 4.3   ALT (SGPT) 12 14 16   AST (SGOT) 21 26 25   BILIRUBIN 0.3 0.5 0.8   ALK PHOS 123* 125* 156*         Lab 12/06/22  2314   TROPONIN T <0.010   PROTIME 10.8   INR 1.05         Lab 12/07/22  0604   CHOLESTEROL 182   LDL CHOL 121*   HDL CHOL 42   TRIGLYCERIDES 104         Lab 12/06/22  2314   VITAMIN B 12 391             Lab Results   Component Value Date    TSH 2.130 12/06/2022     (H) 12/07/2022    HGBA1C 5.7 (H) 12/07/2022    [id number] 391 12/06/2022         IMAGING STUDIES:  MRI Brain Without Contrast    Result Date: 12/7/2022   1. No acute intracranial pathology. 2. Chronic changes suggestive of microvascular ischemic disease.   Electronically Signed By-Derek Branham MD On:12/7/2022 11:34 AM This report was  finalized on [id number] by  Derek Branham MD.    MRI Lumbar Spine Without Contrast    Result Date: 12/8/2022  1.     Transitional anatomy at the lumbosacral junction with suspected partial sacralization of the S1 vertebrae. Close correlation with imaging is recommended prior to any planned intervention. 2.     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 3.     Multilevel disc and facet joint degeneration with moderate to severe canal narrowing at L4-5 and L5-S1. 4.     Moderate bilateral foraminal narrowing at L4-5 and moderate to severe foraminal narrowing at L5-S1.  Electronically Signed By-Riccardo Ramirez MD On:12/8/2022 4:53 PM This report was finalized on [id number] by  Riccardo Ramirez MD.      I reviewed the patient's new clinical results.    ________________________________________________      PROBLEM LIST:    Weakness        ASSESSMENT/PLAN:  1.  Transient slurred speech and left arm weakness, resolved prior to admission. Possible TIA, MRI brain negative.   - CT head: No acute findings  - MRI brain: reviewed, no acute findings, chronic microvascular disease.  - CTA head and neck: Within the limitations of motion, no occlusion or high-grade stenosis of the major arteries in the head and neck.  - Echo: EF is 61 to 65%, negative saline test  - EKG: Sinus rhythm, rate 65  - Labs: A1C: 5.7, B12: 391, LDL: 121, TSH: 2.130  - Antithrombotics:  mg daily   - Statin: Lipitor 80  - PT/OT/ST as appropriate, Neuro checks per protocol, DVT prophylaxis, Stroke education  - Event monitor x 30 days to rule out arrhythmias     2. Spells of unknown origin, multiple episode of tongue protrusion and generalized weakness. No loss of consciousness, very atypical   - MRI was negative  - Start Keppra 500 mg bid   - Follow up outpatient at a facility that has continuous EEG capability.      3. Bilateral feet numbness, acute on chronic, low back pain   - MRI lumbar spine reviewed. Moderate to  severe degenerative changes and narrowing.  - Follow up with Neurosurgery or orthospine outpatient     4. Headache  - Fioricet 2 tablets PRN q8h     4. Chest tightening, improved  -Management per primary     5. Uncontrolled NERY  - Outpatient sleep study      6. Depression/anxiety  - Resume home medications once verified   - Recommend patient see psychiatry outpatient for uncontrolled anxiety and depression     7. Vitamin B12 deficiency  - Replacement ordered  - Recommend monthly B12 injections outpt or 1000 mcg p.o. daily     8 . Modification of stroke risk factors:   - Blood pressure should be less than 130/80 outpatient, HbA1c less than 6.5, LDL less than 70; b12>500 and smoking cessation if applicable. We would be grateful if the primary team / primary care physician would keep a close watch on the above targets.  - Stroke education  - Follow up with Stroke Clinic     I personally had a very thorough conversation with both patient and her daughter via telephone.  Discussed all including plans outpatient.  Both patient and her daughter are agreeable.  Nothing more to add from a neurology standpoint inpatient.  Thank you for this consult.     I discussed the patient's findings and my recommendations with patient and nursing staff    SCOOBY Riggs  12/09/22  09:39 EST        Electronically signed by Kim Lam APRN at 12/09/22 1124     Kim Lam APRN at 12/08/22 1435     Attestation signed by Rod Davis MD at 12/08/22 1640    I have reviewed this documentation and agree.                       LOS: 0 days     Chief Complaint: Arm weakness and slurred speech    Subjective   SUBJECTIVE:    History taken from: patient chart RN    Patient Complaints: Patient's daughter at bedside and wants to rule out spinal stroke, discussed with her that this is not how patient presents.  Patient has bilateral hand and feet numbness and tingling that comes and goes.  Her slurred speech is much  better today.  No focal weakness, just generalized weakness.       Review of Systems   Eyes: Negative for visual disturbance.   Neurological: Positive for weakness and numbness. Negative for dizziness, tremors, seizures, syncope, facial asymmetry, speech difficulty, light-headedness and headaches.   Psychiatric/Behavioral: Negative for confusion. The patient is nervous/anxious.         Pertinent PMH:  has a past medical history of Anxiety and Depression.   ________________________________________________  Objective   OBJECTIVE:    On exam:  GENERAL: NAD  CARDIO: RRR  NEURO:  Awake and alert  Oriented x3  EOMI, PERRL, no visual field deficits  CN 2-12 intact, No facial asymmetry  Speech clear without dysarthria  Subjective numbness to bilateral feet although able to ambulate  Strength 5/5 and equal in all extremities no focal weakness  No ataxia    ________________________________________________   RESULTS REVIEW    VITAL SIGNS:  Temp:  [98.1 °F (36.7 °C)-98.4 °F (36.9 °C)] 98.1 °F (36.7 °C)  Heart Rate:  [55-75] 59  Resp:  [14-17] 17  BP: ()/(45-78) 100/57    LABS:       Lab 12/08/22  0544 12/07/22  0604 12/06/22  2314   WBC 7.00 7.70 8.80   HEMOGLOBIN 12.2 13.4 13.6   HEMATOCRIT 37.6 40.8 40.4   PLATELETS 262 285 330   NEUTROS ABS 4.90 5.60 7.00   LYMPHS ABS 1.40 1.40 1.10   MONOS ABS 0.50 0.60 0.60   EOS ABS 0.10 0.10 0.00   MCV 88.7 87.2 86.2   PROCALCITONIN  --   --  0.02   PROTIME  --   --  10.8   APTT  --   --  26.8*         Lab 12/08/22  0544 12/07/22  0604 12/06/22  2314   SODIUM 140 140 139   POTASSIUM 4.1 3.9 4.3   CHLORIDE 109* 106 105   CO2 21.0* 22.0 21.0*   ANION GAP 10.0 12.0 13.0   BUN 10 8 9   CREATININE 0.70 0.70 0.71   EGFR 99.8 99.8 98.1   GLUCOSE 105* 128* 137*   CALCIUM 8.8 9.0 9.5   HEMOGLOBIN A1C  --  5.7*  --    TSH  --   --  2.130         Lab 12/08/22  0544 12/06/22  2314   TOTAL PROTEIN 7.1 8.5   ALBUMIN 3.60 4.20   GLOBULIN 3.5 4.3   ALT (SGPT) 14 16   AST (SGOT) 26 25   BILIRUBIN  0.5 0.8   ALK PHOS 125* 156*         Lab 12/06/22  2314   TROPONIN T <0.010   PROTIME 10.8   INR 1.05         Lab 12/07/22  0604   CHOLESTEROL 182   LDL CHOL 121*   HDL CHOL 42   TRIGLYCERIDES 104         Lab 12/06/22  2314   VITAMIN B 12 391             Lab Results   Component Value Date    TSH 2.130 12/06/2022     (H) 12/07/2022    HGBA1C 5.7 (H) 12/07/2022    [id number] 391 12/06/2022         IMAGING STUDIES:  CT Head Without Contrast    Result Date: 12/7/2022   1. No acute findings or interval change from the outside CT of the head performed yesterday. 2. I would recommend an MRI of the brain without contrast for follow-up as the patient had a CT angiogram of the head and neck and CT cerebral perfusion study overnight.  Electronically Signed By-Ronen Martinez MD On:12/7/2022 7:52 AM This report was finalized on [id number] by  Ronen Martinez MD.    CT Angiogram Neck    Result Date: 12/7/2022  1.  No acute intracranial abnormality is identified on noncontrast head CT. If there is high clinical suspicion for acute ischemic infarct, MRI may be more sensitive. 2.  Within the limitations of motion, no occlusion or high-grade stenosis of the major arteries in the head and neck. Artur Eubanks MD Neuroradiologist [facility] [place] [email or web address] Thank you for this referral. This exam was interpreted by a fellowship trained neuroradiologist. If the patient's healthcare provider has any questions, a [facility] neuroradiologist can be reached directly at [phone] at any time. SLOT  21 Electronically signed by:  Artur Eubanks M.D.  12/6/2022 11:20 PM Mountain Time    CT Chest Without Contrast Diagnostic    Result Date: 12/7/2022  1.  No evidence of pulmonary tuberculosis. No acute abnormality in the chest. Electronically signed by:  Edouard Marquez M.D.  12/6/2022 11:09 PM Mountain Time    MRI Brain Without Contrast    Result Date: 12/7/2022   1. No acute intracranial pathology. 2.  Chronic changes suggestive of microvascular ischemic disease.   Electronically Signed By-Derek Branham MD On:12/7/2022 11:34 AM This report was finalized on [id number] by  Derek Branham MD.    CT Angiogram Head    Result Date: 12/7/2022  1.  No acute intracranial abnormality is identified on noncontrast head CT. If there is high clinical suspicion for acute ischemic infarct, MRI may be more sensitive. 2.  Within the limitations of motion, no occlusion or high-grade stenosis of the major arteries in the head and neck. Artur Eubanks MD Neuroradiologist [facility] [email or web address] Thank you for this referral. This exam was interpreted by a fellowship trained neuroradiologist. If the patient's healthcare provider has any questions, a [facility] neuroradiologist can be reached directly at [phone] at any time. SLOT  21 Electronically signed by:  Artur Eubanks M.D.  12/6/2022 11:20 PM Mountain Time    CT CEREBRAL PERFUSION WITH & WITHOUT CONTRAST    Result Date: 12/7/2022  1.  No acute intracranial abnormality is identified on noncontrast head CT. If there is high clinical suspicion for acute ischemic infarct, MRI may be more sensitive. 2.  Within the limitations of motion, no occlusion or high-grade stenosis of the major arteries in the head and neck. Artur Eubanks MD Neuroradiologist [facility] [email or web address] Thank you for this referral. This exam was interpreted by a fellowship trained neuroradiologist. If the patient's healthcare provider has any questions, a [facility] neuroradiologist can be reached directly at [phone] at any time. SLOT  21 Electronically signed by:  Artur Eubanks M.D.  12/6/2022 11:20 PM Mountain Time      I reviewed the patient's new clinical results.    ________________________________________________      PROBLEM LIST:    Weakness        ASSESSMENT/PLAN:  1.  Transient slurred speech and left arm  weakness, resolved prior to admission. Possible TIA, MRI brain negative.   - CT head: No acute findings  - MRI brain: reviewed, no acute findings, chronic microvascular disease.  - CTA head and neck: Within the limitations of motion, no occlusion or high-grade stenosis of the major arteries in the head and neck.  - Echo: EF is 61 to 65%, negative saline test  - EKG: Sinus rhythm, rate 65  - Labs: A1C: 5.7, B12: 391, LDL: 121, TSH: 2.130  - Antithrombotics:     - Statin: Lipitor 80  - PT/OT/ST as appropriate, Neuro checks per protocol, DVT prophylaxis, Stroke education  - Event monitor x 30 days to rule out arrhythmias     2. Spells of unknown origin, multiple episode of tongue protrusion and generalized weakness. No loss of consciousness, very atypical   - MRI was negative  - Start Keppra 500 mg bid   - Follow up outpatient at a facility that has continuous EEG capability.      3. Bilateral feet numbness, acute on chronic, low back pain   - Check MRI lumbar spine, if negative, nothing else to offer inpatient- patient can be discharged with plans for EMG/Nerve conduction studies outpatient     4. Chest tightening, improved  -Management per primary     5. Uncontrolled NERY  - Outpatient sleep study      6. Depression/anxiety  - Resume home medications once verified     7. Vitamin B12 deficiency  - Replacement ordered  - Recommend monthly B12 injections outpt or 1000 mcg p.o. daily    8 . Modification of stroke risk factors:   - Blood pressure should be less than 130/80 outpatient, HbA1c less than 6.5, LDL less than 70; b12>500 and smoking cessation if applicable. We would be grateful if the primary team / primary care physician would keep a close watch on the above targets.  - Stroke education  - Follow up with Stroke Clinic       If Lumbar MRI is negative patient can be d/c from Neurology.   There are no further recommendations. Will sign off, please call with any questions or concerns.  I discussed the  "patient's findings and my recommendations with patient, family and nursing staff    Kim Lam, APRN  12/08/22  14:36 EST        Electronically signed by Rod Davis MD at 12/08/22 1640     Kavya Barillas MD at 12/08/22 1252            Chief complaint anxiety     Subjective .     History of present illness:  The patient is a 57 y.o. female who was admitted secondary to  weakness. PMHx: anxiety and depression. Psych consult was requested by Dr Jorge Alberto osullivan to mental status changes and depression.   The pt reported history of depression in the past, approximately 20 years ago the patient had \"nervous breakdown, she had to be admitted to the hospital  In [place] secondary to suicidal ideations, but no attempts.  At that time she was started  on Paxil, Ambien and lorazepam, depression improved at the time, she stopped medications  Few  years ago.  Last  year her  passed away, the patient became more depressed, increased crying spells, increased self-isolation.  The patient was prescribed clonazepam by her previous primary care provider.  Also was prescribed Ambien and lorazepam for anxiety.  Her  passed away around holidays, his anniversary is approaching, the patient found old bottle of clonazepam 0.5 mg and she was taking 1 pill daily to alleviate anxiety  until she ran out 1 week ago.  Depression is rated as a 7 out of 10, she denied suicidal and homicidal ideations, the patient denied any perceptual disturbances  Anxiety is persistent and intense, denied AVH/SI/HI  The pt developed stroke like sxs with numbness and muscle weakness   Past psych hx: depression anxiety, hx of SI no SA     Today the pt was more alert and awake, still very anxious about her condition, worries about TIA/stroke. She wanted to get more detailed work up. The pt feels depressed, but denied feeling hopeless/helpless, denied AVH/SI/HI       Review of Systems   All systems were reviewed and negative except for:  " "Constitution:  positive for fatigue  Musculoskeletal: positive for  muscle weakness  Neurological: positive for  difficulty walking and weakness  Behavioral/Psych: positive for  anxiety and depression    History       Medications Prior to Admission   Medication Sig Dispense Refill Last Dose   • busPIRone (BUSPAR) 5 MG tablet Take 1 tablet by mouth 3 (Three) Times a Day. 30 tablet 5    • nystatin (MYCOSTATIN) 521798 UNIT/GM cream Apply 1 application topically to the appropriate area as directed 2 (Two) Times a Day. 30 g 3    • zolpidem (AMBIEN) 10 MG tablet Take 1 tablet by mouth At Night As Needed for Sleep. 30 tablet 2         Scheduled Meds:  aspirin, 162 mg, Oral, Daily   Or  aspirin, 300 mg, Rectal, Daily  atorvastatin, 80 mg, Oral, Nightly  busPIRone, 5 mg, Oral, TID  enoxaparin, 40 mg, Subcutaneous, Q12H  famotidine, 20 mg, Oral, BID AC  nystatin, , Topical, Q12H  sodium chloride, 10 mL, Intravenous, Q12H  sodium chloride, 10 mL, Intravenous, Q12H  vilazodone, 10 mg, Oral, Daily         Continuous Infusions:       PRN Meds:  •  acetaminophen  •  ondansetron  •  sodium chloride  •  [COMPLETED] Insert Peripheral IV **AND** sodium chloride  •  sodium chloride  •  sodium chloride  •  sodium chloride  •  sodium chloride  •  zolpidem      Allergies:  Patient has no known allergies.      Objective     Vital Signs   /57   Pulse 59   Temp 98.1 °F (36.7 °C) (Oral)   Resp 17   Ht 170.2 cm (67\")   Wt (!) 143 kg (315 lb)   LMP  (LMP Unknown)   SpO2 98%   BMI 49.34 kg/m²     Physical Exam:     General Appearance:    In NAD       Mental Status Exam:    Hygiene:   good  Cooperation:  Cooperative  Eye Contact:  Good  Psychomotor Behavior:  Slow  Affect:  Blunted  Hopelessness: Denies  Speech:  Normal  Thought Progress:  Goal directed and Linear  Thought Content:  Mood congruent  Suicidal:  None  Homicidal:  None  Hallucinations:  None  Delusion:  None  Memory:  fair   Orientation:  Person, Place and " Situation  Reliability:  fair  Insight:  Fair  Judgement:  Fair  Impulse Control:  Fair  Physical/Medical Issues:  Yes      Medications and allergies reviewed     Lab Results   Component Value Date    GLUCOSE 105 (H) 12/08/2022    CALCIUM 8.8 12/08/2022     12/08/2022    K 4.1 12/08/2022    CO2 21.0 (L) 12/08/2022     (H) 12/08/2022    BUN 10 12/08/2022    CREATININE 0.70 12/08/2022    EGFRIFAFRI 102 02/17/2021    EGFRIFNONA 89 02/17/2021    BCR 14.3 12/08/2022    ANIONGAP 10.0 12/08/2022       Last Urine Toxicity     LAST URINE TOXICITY RESULTS Latest Ref Rng & Units 12/7/2022    BARBITURATES SCREEN Negative Negative    BENZODIAZEPINE SCREEN, URINE Negative Negative    COCAINE SCREEN, URINE Negative Negative    METHADONE SCREEN, URINE Negative Negative          No results found for: PHENYTOIN, PHENOBARB, VALPROATE, CBMZ    Lab Results   Component Value Date     12/08/2022    BUN 10 12/08/2022    CREATININE 0.70 12/08/2022    TSH 2.130 12/06/2022    WBC 7.00 12/08/2022       Brief Urine Lab Results     None          Assessment & Plan       Weakness        Assessment: Major depressive disorder moderate recurrent  Generalized anxiety disorder  Bereavement  Treatment Plan: The patient presented with symptoms of depression, anxiety, and anniversary of her 's death is approaching.  Continue BuSpar at current dose, continue Ambien as needed for insomnia  The patient was on Paxil in the past, did not report with experience  She did not respond to Zoloft and Lexapro  Cont Viibryd 10 mg p.o. every morning tomorrow for anxiety and depression - however the pt wanted to hold for now, she worried about getting more sxs to her clinical presentation in case of side effects     The patient will benefit from psychotherapy Outpatient basis and grief therapy  Safety plan was discussed with the patient and her daughter   They verbalized understanding   Treatment Plan discussed with: Patient, Family and nursing      I discussed the patients findings and my recommendations with patient, family and nursing staff    I have reviewed and approved the behavioral health treatment plans and problem list. Yes     Referring MD has access to consult report and progress notes in EMR     Kavya Barillas MD  22  12:53 EST              Electronically signed by Kavya Barillas MD at 22 1300     Delores Azul DO at 22 1210              [facility] Daily Progress Note    Patient Name: Eladia Connor  : 1965  MRN: [id number]  Primary Care Physician:  Quincy Lemos APRN  Date of admission: 2022      Subjective       Chief Complaint: Weakness    Patient seen and examined this morning.  Significantly improved compared to yesterday, alert and oriented x3.  Denies any further focal weakness or speech issues.  No chest pain, shortness of breath, palpitations, fever, chills, or abdominal pain.  Patient's possible diagnosis and treatment plan discussed with patient and daughter at bedside, all questions answered.  Daughter wanted to speak to neurology service, discussed with nursing at bedside.    Pertinent positives as noted in HPI/subjective.  All other systems were reviewed and are negative.      Objective       Vitals:   Temp:  [98.1 °F (36.7 °C)-98.4 °F (36.9 °C)] 98.1 °F (36.7 °C)  Heart Rate:  [55-75] 59  Resp:  [14-17] 17  BP: ()/(45-78) 100/57  Flow (L/min):  [2] 2    Physical Exam:    General: Awake, alert, lying in bed, NAD  Eyes: PERRL, EOMI, conjunctivae are clear  Cardiovascular: Regular rate and rhythm, no murmurs  Respiratory: Clear to auscultation bilaterally, no wheezing or rales, unlabored breathing  Abdomen: Soft, nontender, positive bowel sounds, no guarding  Neurologic: A&O, CN grossly intact, speech normal, moves all extremities spontaneously  Musculoskeletal: Generalized weakness, no deformities  Skin: Warm, dry, intact         Result  Review    Result Review:  I have personally reviewed the results from the time of this admission to 12/8/2022 12:10 EST and agree with these findings:  [x]  Laboratory  [x]  Microbiology  [x]  Radiology  [x]  EKG/Telemetry   [x]  Cardiology/Vascular   []  Pathology  []  Old records  []  Other:          Assessment & Plan      Brief Patient Summary:  Eladia Connor is a 57 y.o. female who presented to [facility] on 12/6/2022 complaining of weakness.   She reportedly has a history of a CVA in 2012 without any residual deficits. She notes over the last week or so she has noted intermittent though right leg weakness and numbness with associated tingling.  Around 11 AM in the morning she developed slurred speech and left arm weakness.  Symptoms lasted for about 30 minutes and she presented to [facility] for further evaluation.  She was subsequently discharged indicating that testing/evaluation was largely unremarkable with questionable history of a pneumonia on chest x-ray.   She describes a nonproductive cough without hemoptysis.  + Sick contacts with grandson with the flu.  She denies any fevers. She describes having possible positive TB test few weeks ago. No known TB contacts however.  She indicates her son just returned from the [place] though is not sick.  No significant personal travel history noted. When inquiring about new medications she identifies that she started Otezla this last week for her psoriasis and her symptoms seem to began after this.  She also reports being on Klonopin but has not taken for 1 week as she ran out of it.  Patient was noted to have multiple episodes of altered mental status with slurred speech and left-sided weakness.  Stroke work-up x2 including MRI brain appears to be negative.  Neurology following, recommending outpatient EEG study to rule out seizure activity.  Patient also appears to be having major depressive episode, psych was consulted.  Recommended  antidepression but patient refusing at this time.  PT/OT recommending rehab.      aspirin, 162 mg, Oral, Daily   Or  aspirin, 300 mg, Rectal, Daily  atorvastatin, 80 mg, Oral, Nightly  busPIRone, 5 mg, Oral, TID  enoxaparin, 40 mg, Subcutaneous, Q12H  famotidine, 20 mg, Oral, BID AC  nystatin, , Topical, Q12H  sodium chloride, 10 mL, Intravenous, Q12H  sodium chloride, 10 mL, Intravenous, Q12H  vilazodone, 10 mg, Oral, Daily             Active Hospital Problems:  Active Hospital Problems    Diagnosis    • **Weakness      Plan:   Speech disturbance with right leg and left arm weakness, improved  -Possible TIA, or seizure, unclear at this time  -Patient has had few episodes of same symptoms over the past few days, possibly medication induced but unclear at this time  -Stroke work-up including MRI brain negative for new acute findings  -Previously had TIA back in 2013 without any residual deficits  -Possible major depression episode  -Continue aspirin, statin  -Neurology following, recommending outpatient EEG, no in-house EEG available at this time  -PT/OT    Major depression with anxiety  -Patient had episodes of altered mentation with speech disturbances however stroke work-up and metabolic work-up so far negative  -Possibly suffering from major depression episode  -Psych following and recommended antidepressant but patient refusing at this time  -Continue home BuSpar  -Continue psych follow-up    Abnormal TB screening  -Reportedly positive QuantiFERON as outpatient however no symptoms or signs of TB infection noted at this time  -QuantiFERON has been ordered however can follow-up as outpatient with PCP for further work-up    Psoriasis  -Possibly having side effects from Otezla, hold for now  -Continue outpatient follow-up    Suspected NERY  -States she did have sleep study years ago but did not follow-up on CPAP  -Recommend outpatient sleep study, will refer to neurology/pulmonology    Obesity  -BMI of 49.34  noted  -Lifestyle modifications recommended    DVT prophylaxis  -Lovenox      CODE STATUS:    Code Status (Patient has no pulse and is not breathing): CPR (Attempt to Resuscitate)  Medical Interventions (Patient has pulse or is breathing): Full Support  Release to patient: Routine Release      Disposition: Rehab placement.  Medically stable.    Electronically signed by Delores Azul DO, 12/08/22, 12:10 EST.  [facility] Hospitalist Team      Part of this note may be an electronic transcription/translation of spoken language to printed text using the Dragon Dictation System.      Electronically signed by Delores Azul DO at 12/08/22 1221          Consult Notes (all)      Madison Marmolejo MD at 12/11/22 0908      Consult Orders    1. Inpatient Pulmonology Consult [[id number]] ordered by Delores Azul DO at 12/10/22 0913               Group: [facility]         CONSULT NOTE    Patient Identification:  Eladia Connor  57 y.o.  female  1965  [id number]            Requesting physician: Attending physician    Reason for Consultation: Obstructive sleep apnea      History of Present Illness:    Eladia Connor is a 57-year-old female who presents to [facility] ED on 12/6/2022 with complaints of intermittent right leg weakness and numbness associated with tingling.  She developed slurred speech later that day and left arm weakness.  She presented to [facility] ED for further evaluation and was discharged.  Since that time she has developed a cough.    Assessment:    ?  NERY  -Patient reports she was diagnosed with NERY several years ago but never started on treatment with PAP    CT chest 12/7/2022 no active lung disease, history of positive PPD    History of TIA, 2013  Generalized weakness  Speech disturbance with right leg and left arm weakness, recurrent episodes    Psoriasis, on Otezla    Anxiety\depression    Recommendations:    Titrate oxygen, currently requiring 2 L per NC  -We will  "need 6-minute walk prior to discharge    DVT prophylaxis Lovenox    Patient will need work-up for obstructive sleep apnea after discharge        Review of Sytems:  Review of Systems   Respiratory: Positive for cough. Negative for shortness of breath.        Past Medical History:  Past Medical History:   Diagnosis Date   • Anxiety    • Depression        Past Surgical History:  Past Surgical History:   Procedure Laterality Date   • CHOLECYSTECTOMY  1987   • EYE SURGERY     • KNEE SURGERY          Home Meds:  Medications Prior to Admission   Medication Sig Dispense Refill Last Dose   • busPIRone (BUSPAR) 5 MG tablet Take 1 tablet by mouth 3 (Three) Times a Day. 30 tablet 5    • nystatin (MYCOSTATIN) 220017 UNIT/GM cream Apply 1 application topically to the appropriate area as directed 2 (Two) Times a Day. 30 g 3    • zolpidem (AMBIEN) 10 MG tablet Take 1 tablet by mouth At Night As Needed for Sleep. 30 tablet 2        Allergies:  No Known Allergies    Social History:   Social History     Socioeconomic History   • Marital status:    Tobacco Use   • Smoking status: Never   • Smokeless tobacco: Never   Substance and Sexual Activity   • Alcohol use: Never   • Drug use: Never   • Sexual activity: Defer       Family History:  Family History   Problem Relation Age of Onset   • Heart disease Mother    • Breast cancer Mother    • Heart disease Father    • Pancreatic cancer Father    • Heart disease Sister    • Lung cancer Brother        Physical Exam:  BP 98/61   Pulse 79   Temp 98.7 °F (37.1 °C) (Oral)   Resp 16   Ht 170.2 cm (67\")   Wt (!) 143 kg (315 lb)   LMP  (LMP Unknown)   SpO2 97%   BMI 49.34 kg/m²  Body mass index is 49.34 kg/m². 97% (!) 143 kg (315 lb)  Physical Exam  Cardiovascular:      Heart sounds: Murmur heard.   Pulmonary:      Breath sounds: Rhonchi present.         LABS:  Lab Results   Component Value Date    CALCIUM 8.9 12/09/2022     Results from last 7 days   Lab Units 12/09/22  0407 " 12/08/22  0544 12/07/22  0604 12/06/22  2314   SODIUM mmol/L 140 140 140 139   POTASSIUM mmol/L 4.2 4.1 3.9 4.3   CHLORIDE mmol/L 105 109* 106 105   CO2 mmol/L 28.0 21.0* 22.0 21.0*   BUN mg/dL 15 10 8 9   CREATININE mg/dL 0.91 0.70 0.70 0.71   GLUCOSE mg/dL 113* 105* 128* 137*   CALCIUM mg/dL 8.9 8.8 9.0 9.5   WBC 10*3/mm3 8.20 7.00 7.70 8.80   HEMOGLOBIN g/dL 12.3 12.2 13.4 13.6   PLATELETS 10*3/mm3 244 262 285 330   ALT (SGPT) U/L 12 14  --  16   AST (SGOT) U/L 21 26  --  25   PROCALCITONIN ng/mL  --   --   --  0.02     Lab Results   Component Value Date    TROPONINT <0.010 12/06/2022     Results from last 7 days   Lab Units 12/06/22  2314   TROPONIN T ng/mL <0.010         Results from last 7 days   Lab Units 12/06/22  2314   PROCALCITONIN ng/mL 0.02         Results from last 7 days   Lab Units 12/07/22  0050   ADENOVIRUS DETECTION BY PCR  Not Detected   CORONAVIRUS 229E  Not Detected   CORONAVIRUS HKU1  Not Detected   CORONAVIRUS NL63  Not Detected   CORONAVIRUS OC43  Not Detected   HUMAN METAPNEUMOVIRUS  Not Detected   HUMAN RHINOVIRUS/ENTEROVIRUS  Not Detected   INFLUENZA B PCR  Not Detected   PARAINFLUENZA 1  Not Detected   PARAINFLUENZA VIRUS 2  Not Detected   PARAINFLUENZA VIRUS 3  Not Detected   PARAINFLUENZA VIRUS 4  Not Detected   BORDETELLA PERTUSSIS PCR  Not Detected   CHLAMYDOPHILA PNEUMONIAE PCR  Not Detected   MYCOPLAMA PNEUMO PCR  Not Detected   INFLUENZA A PCR  Not Detected   RSV, PCR  Not Detected     Results from last 7 days   Lab Units 12/06/22  2314   INR  1.05         Lab Results   Component Value Date    TSH 2.130 12/06/2022     Estimated Creatinine Clearance: 99 mL/min (by C-G formula based on SCr of 0.91 mg/dL).         Imaging:  Imaging Results (Last 24 Hours)     ** No results found for the last 24 hours. **            Current Meds:   SCHEDULE  aspirin, 162 mg, Oral, Daily   Or  aspirin, 300 mg, Rectal, Daily  atorvastatin, 80 mg, Oral, Nightly  busPIRone, 5 mg, Oral, TID  enoxaparin, 40  "mg, Subcutaneous, Q12H  famotidine, 20 mg, Oral, BID AC  gabapentin, 100 mg, Oral, BID  levETIRAcetam, 500 mg, Intravenous, Q12H  nystatin, , Topical, Q12H  sodium chloride, 10 mL, Intravenous, Q12H  sodium chloride, 10 mL, Intravenous, Q12H  vilazodone, 10 mg, Oral, Daily      Infusions     PRNs  •  acetaminophen  •  butalbital-acetaminophen-caffeine  •  LORazepam  •  ondansetron  •  sodium chloride  •  [COMPLETED] Insert Peripheral IV **AND** sodium chloride  •  sodium chloride  •  sodium chloride  •  sodium chloride  •  sodium chloride  •  zolpidem        Candace Graves, APRN  12/11/2022  09:08 EST      Much of this encounter note is an electronic transcription/translation of spoken language to printed text using Dragon Software.    Electronically signed by Madison Marmolejo MD at 12/11/22 1110     Kavya Barillas MD at 12/07/22 1340      Consult Orders    1. Inpatient Psychiatrist Consult [[id number]] ordered by Delores Azul DO at 12/07/22 1157                 Referring Provider: Dr Azul   Reason for Consultation: depression/anxiety/mental status changes       Chief complaint depression anxiety     Subjective .     History of present illness:  The patient is a 57 y.o. female who was admitted secondary to weakness. PMHx: anxiety and depression. Psych consult was requested by Dr Azul 2ry to mental status changes and depression.   The pt reported history of depression in the past, approximately 20 years ago the patient had \"nervous breakdown, she had to be admitted to the hospital  In [place] secondary to suicidal ideations, but no attempts.  At that time she was started  on Paxil, Ambien and lorazepam, depression improved at the time, she stopped medications  Few  years ago.  Last  year her  passed away, the patient became more depressed, increased crying spells, increased self-isolation.  The patient was prescribed clonazepam by her previous primary care provider.  Also was prescribed Ambien and lorazepam for " anxiety.  Her  passed away around holidays, his anniversary is approaching, the patient found old bottle of clonazepam 0.5 mg and she was taking 1 pill daily to alleviate anxiety  until she ran out 1 week ago.  Depression is rated as a 7 out of 10, she denied suicidal and homicidal ideations, the patient denied any perceptual disturbances  Anxiety is persistent and intense, denied AVH/SI/HI  The pt developed stroke like sxs with numbness and muscle weakness   Past psych hx: depression anxiety, hx of SI no SA     Review of Systems   All systems were reviewed and negative except for:  Constitution:  positive for fatigue  Gastrointestinal: positive for  nausea  Musculoskeletal: positive for  muscle pain and muscle weakness  Behavioral/Psych: positive for  anxiety and depression    History    Past Medical History:   Diagnosis Date   • Anxiety    • Depression           Family History   Problem Relation Age of Onset   • Heart disease Mother    • Breast cancer Mother    • Heart disease Father    • Pancreatic cancer Father    • Heart disease Sister    • Lung cancer Brother         Social History     Tobacco Use   • Smoking status: Never   • Smokeless tobacco: Never   Substance Use Topics   • Alcohol use: Never   • Drug use: Never        (Not in a hospital admission)       Scheduled Meds:  aspirin, 162 mg, Oral, Daily   Or  aspirin, 300 mg, Rectal, Daily  atorvastatin, 80 mg, Oral, Nightly  busPIRone, 5 mg, Oral, TID  [START ON 12/8/2022] enoxaparin, 40 mg, Subcutaneous, Q12H  famotidine, 20 mg, Oral, BID AC  sodium chloride, 10 mL, Intravenous, Q12H  sodium chloride, 10 mL, Intravenous, Q12H         Continuous Infusions:       PRN Meds:  •  acetaminophen  •  ondansetron  •  sodium chloride  •  [COMPLETED] Insert Peripheral IV **AND** sodium chloride  •  sodium chloride  •  sodium chloride  •  sodium chloride  •  sodium chloride  •  zolpidem      Allergies:  Patient has no known allergies.      Objective     Vital  "Signs   /59   Pulse 68   Temp 98.1 °F (36.7 °C) (Oral)   Resp 18   Ht 170.2 cm (67\")   Wt (!) 143 kg (315 lb)   LMP  (LMP Unknown)   SpO2 100%   BMI 49.34 kg/m²     Physical Exam:    Musculoskeletal:   Muscle strength and tone: decreased in UE   Abnormal Movements: none   Gait: unable to assess, the pt was in bed      General Appearance:    In NAD       Mental Status Exam:   Hygiene:   good  Cooperation:  Cooperative  Eye Contact:  Good  Behavior and Psychomotor Activity: Slow  Speech:  Monotone and soft voice   Mood: depressed and anxious   Affect:  Restricted and Congruent  Thought Process:  Goal directed, Linear and Organized  Associations: Intact   Thought Content:  mood congruent   Language: appropriate   Suicidal Ideations:  None  Homicidal:  None  Hallucinations:  None  Delusion:  None  Orientation:  To person, Place, Time and Situation  Memory:  fair   Concentration and computation: fair   Attention span: fair   Fund of knowledge: appropriate   Reliability:  good  Insight:  Good  Judgement:  Good  Impulse Control:  Good      Medications and allergies reviewed     Lab Results   Component Value Date    GLUCOSE 128 (H) 12/07/2022    CALCIUM 9.0 12/07/2022     12/07/2022    K 3.9 12/07/2022    CO2 22.0 12/07/2022     12/07/2022    BUN 8 12/07/2022    CREATININE 0.70 12/07/2022    EGFRIFAFRI 102 02/17/2021    EGFRIFNONA 89 02/17/2021    BCR 11.4 12/07/2022    ANIONGAP 12.0 12/07/2022       Last Urine Toxicity     LAST URINE TOXICITY RESULTS Latest Ref Rng & Units 12/7/2022    BARBITURATES SCREEN Negative Negative    BENZODIAZEPINE SCREEN, URINE Negative Negative    COCAINE SCREEN, URINE Negative Negative    METHADONE SCREEN, URINE Negative Negative          No results found for: PHENYTOIN, PHENOBARB, VALPROATE, CBMZ    Lab Results   Component Value Date     12/07/2022    BUN 8 12/07/2022    CREATININE 0.70 12/07/2022    TSH 2.130 12/06/2022    WBC 7.70 12/07/2022       Brief Urine " Lab Results     None          Assessment & Plan       Weakness          Assessment: Major depressive disorder moderate recurrent  Generalized anxiety disorder  Bereavement  Treatment Plan: The patient presented with symptoms of depression, anxiety, and anniversary of her 's death is approaching.  Continue BuSpar at current dose, continue Ambien as needed for insomnia  The patient was on Paxil in the past, did not report with experience  She did not respond to Zoloft and Lexapro  Start Viibryd 10 mg p.o. every morning tomorrow for anxiety and depression  The patient will benefit from psychotherapy Outpatient basis and grief therapy  Safety plan was discussed with the patient and her daughter   They verbalized understanding   Treatment Plan discussed with: Patient, Family and nursing     I discussed the patients findings and my recommendations with patient, family and nursing staff    I have reviewed and approved the behavioral health treatment plans and problem list. Yes  Thank you for the consult   Referring MD has access to consult report and progress notes in EMR     Kavya Barillas MD  12/07/22  13:40 EST            Electronically signed by Kavya Barillas MD at 12/07/22 1357     Kim Lam APRN at 12/07/22 0833      Consult Orders    1. Inpatient Neurology Consult General [[id number]] ordered by Adolph Azul MD at 12/07/22 0701          Attestation signed by Rod Davis MD at 12/08/22 1638    I have reviewed this documentation and agree.  The patient is very atypical findings, will continue evaluation and devise appeared treatment plan                Primary Care Provider: Quincy Lemos, *     Consult requested by:  Dr. Azul     Reason for Consultation: Neurological evaluation, weakness    History taken from: patient chart RN    Chief complaint: slurred speech, left arm weakness     Subjective   SUBJECTIVE:    History of present illness: Background per H&P: Eladia  Geeta is a 57 y.o. female who presented to [facility] on [facility]22 complaining of above.   She reportedly has a history of a CVA in 2012 without any residual deficits. She notes over the last week or so she has noted intermittent though right leg weakness and numbness with associated tingling.  Around 11 AM this morning she developed slurred speech and left arm weakness.  Symptoms lasted for about 30 minutes and she presented to [facility] for further evaluation.  She was subsequently discharged indicating that testing/evaluation was largely unremarkable with questionable history of a pneumonia on chest x-ray.   She describes a nonproductive cough without hemoptysis.  + Sick contacts with [place] with the flu.  She denies any fevers. She describes having possible positive TB test few weeks ago. No known TB contacts however.  She indicates her son just returned from the [place] though is not sick.  No significant personal travel history noted.  When inquiring about new medications she identifies that she started Otezla this last week for her psoriasis and her symptoms seem to began after this.  She also recently began Ambien as needed for sleep approximately a month ago but she does not want to take this currently because of her symptoms. She endorses using an old prescription of Klonopin for the last month though was out of pills approximately a week ago  She indicates that her psoriasis was diagnosed by her dermatologist  .  She notes some chest tightness and substernal pressure that was nonradiating earlier today that is nonexertional  Unfortunately she has been suffering from increased stress and anxiety as her  passed away approximately a year ago. She is on BuSpar her PCP and was recently advised to titrate though she has not done so at this time.    She has not taken Lexapro for years  She is no longer taking ivermectin/Diflucan states that this was given to her by her PCP while  evaluation for her psoriasis.    - Portions of the above HPI were copied from previous encounters and edited as appropriate. PMH as detailed below.     Patient evaluated after MRI.  Initially patient had significant slurred speech on exam but it did improve the longer I spoke to the patient.  Patient states that she had some left arm weakness and slurred speech.  No issues like this in the past.  When asked about stress and anxiety she is becomes very tearful and states that she is very stressed.  Her  passed away a year ago, her daughter and 3 children moved into their 1 bedroom home.  Her job as well as stressful.  Patient thinks a lot of her chest tightness and pressure  is due to anxiety.  Today patient states she still does not feel great.  There is no focal weakness.  Nursing staff states that patient had an episode early this morning around 7 AM that she was altered.  There was an episode where her tongue was protruding and the patient was unable to speak but she was still interacting and withdrawing to pain.  This episode does not sound like a seizure and it is very atypical.  Patient awake and alert now.  No family at bedside    Review of Systems   Constitutional: Positive for fatigue.   Psychiatric/Behavioral: Positive for sleep disturbance. The patient is nervous/anxious.            PATIENT HISTORY:  Past Medical History:   Diagnosis Date   • Anxiety    • Depression    ,   Past Surgical History:   Procedure Laterality Date   • CHOLECYSTECTOMY  1987   • EYE SURGERY     • KNEE SURGERY     ,   Family History   Problem Relation Age of Onset   • Heart disease Mother    • Breast cancer Mother    • Heart disease Father    • Pancreatic cancer Father    • Heart disease Sister    • Lung cancer Brother    ,   Social History     Tobacco Use   • Smoking status: Never   • Smokeless tobacco: Never   Substance Use Topics   • Alcohol use: Never   • Drug use: Never   ,   Prior to Admission medications    Medication  Sig Start Date End Date Taking? Authorizing Provider   busPIRone (BUSPAR) 5 MG tablet Take 1 tablet by mouth 3 (Three) Times a Day. 10/4/22   Quincy Lemos APRN   fluconazole (DIFLUCAN) 150 MG tablet TAKE 1 TABLET BY MOUTH 1 TIME FOR 1 DOSE. 11/14/22   Quincy Lemos APRN   hydrOXYzine pamoate (VISTARIL) 100 MG capsule Take 1 capsule by mouth 3 (Three) Times a Day As Needed for Itching. 10/4/22   Quincy Lemos APRN   ivermectin (STROMECTOL) 3 MG tablet tablet TAKE 9 TABLETS BY MOUTH ONCE 11/14/22   Quincy Lemos APRN   nystatin (MYCOSTATIN) 460314 UNIT/GM cream Apply 1 application topically to the appropriate area as directed 2 (Two) Times a Day. 11/4/22   Quincy Lemos APRN   nystatin (MYCOSTATIN) 626413 UNIT/GM powder Apply  topically to the appropriate area as directed 3 (Three) Times a Day. 7/12/22   Sloane Gamez APRN   promethazine-codeine (PHENERGAN with CODEINE) 6.25-10 MG/5ML syrup Take 5 mL by mouth Every 4 (Four) Hours As Needed for Cough. 7/12/22   Sloane Gamez APRN   sertraline (ZOLOFT) 50 MG tablet Take 1 tablet by mouth Daily. 12/30/21   Wanda Rivera APRN   zolpidem (AMBIEN) 10 MG tablet Take 1 tablet by mouth At Night As Needed for Sleep. 10/4/22   Quincy Lemos APRN    Allergies:  Patient has no known allergies.    Current Facility-Administered Medications   Medication Dose Route Frequency Provider Last Rate Last Admin   • acetaminophen (TYLENOL) tablet 650 mg  650 mg Oral Q4H PRN Adolph Azul MD       • aspirin chewable tablet 162 mg  162 mg Oral Daily Adolph Azul MD   162 mg at 12/07/22 0609    Or   • aspirin suppository 300 mg  300 mg Rectal Daily Adolph Azul MD       • atorvastatin (LIPITOR) tablet 80 mg  80 mg Oral Nightly Adolph Azul MD       • busPIRone (BUSPAR) tablet 5 mg  5 mg Oral TID Delores Azul DO       • [START ON 12/8/2022] Enoxaparin Sodium (LOVENOX) syringe 40 mg  40 mg Subcutaneous Q12H  Adolph Azul MD       • famotidine (PEPCID) tablet 20 mg  20 mg Oral BID AC Adolph Azul MD       • ondansetron (ZOFRAN) injection 4 mg  4 mg Intravenous Q6H PRN Adolph Azul MD       • sertraline (ZOLOFT) tablet 50 mg  50 mg Oral Daily Delores Azul DO       • sodium chloride 0.9 % flush 10 mL  10 mL Intravenous PRN Rafael Cantrell MD       • sodium chloride 0.9 % flush 10 mL  10 mL Intravenous PRN Mora Izaguirre APRN       • sodium chloride 0.9 % flush 10 mL  10 mL Intravenous Q12H Adolph Azul MD       • sodium chloride 0.9 % flush 10 mL  10 mL Intravenous PRN Adolph Azul MD       • sodium chloride 0.9 % flush 10 mL  10 mL Intravenous Q12H Adolph Azul MD       • sodium chloride 0.9 % flush 10 mL  10 mL Intravenous PRN Adolph Azul MD       • sodium chloride 0.9 % infusion 40 mL  40 mL Intravenous PRN Adolph Azul MD       • sodium chloride 0.9 % infusion 40 mL  40 mL Intravenous PRN Adolph Azul MD       • zolpidem (AMBIEN) tablet 5 mg  5 mg Oral Nightly PRN Delores Azul DO         Current Outpatient Medications   Medication Sig Dispense Refill   • busPIRone (BUSPAR) 5 MG tablet Take 1 tablet by mouth 3 (Three) Times a Day. 30 tablet 5   • fluconazole (DIFLUCAN) 150 MG tablet TAKE 1 TABLET BY MOUTH 1 TIME FOR 1 DOSE. 1 tablet 1   • hydrOXYzine pamoate (VISTARIL) 100 MG capsule Take 1 capsule by mouth 3 (Three) Times a Day As Needed for Itching. 90 capsule 1   • ivermectin (STROMECTOL) 3 MG tablet tablet TAKE 9 TABLETS BY MOUTH ONCE 9 tablet 0   • nystatin (MYCOSTATIN) 589355 UNIT/GM cream Apply 1 application topically to the appropriate area as directed 2 (Two) Times a Day. 30 g 3   • nystatin (MYCOSTATIN) 446813 UNIT/GM powder Apply  topically to the appropriate area as directed 3 (Three) Times a Day. 60 g 1   • promethazine-codeine (PHENERGAN with CODEINE) 6.25-10 MG/5ML syrup Take 5 mL by mouth Every 4 (Four) Hours As Needed for Cough. 100 mL 0   • sertraline (ZOLOFT) 50  MG tablet Take 1 tablet by mouth Daily. 30 tablet 2   • zolpidem (AMBIEN) 10 MG tablet Take 1 tablet by mouth At Night As Needed for Sleep. 30 tablet 2        ________________________________________________________     Objective   OBJECTIVE:    PHYSICAL EXAM:    Constitutional: The patient is in no apparent distress, awake and alert. There is no shortness of breath.     PSYCHIATRIC: Tearful    HEENT:   Normocephalic, atraumatic.     Chest: Breathing unlabored    Cardiac: Regular rate and rhythm.     Extremities:  No clubbing, cyanosis or edema.    NEUROLOGICAL:    Cognition:   Fully oriented.  Fund of knowledge decent.  Speech is slurred but does improve with time    Cranial nerves;    II - pupils bilaterally equal reacting to light,  No new Visual field deficits;  Fundoscopic exam- Not able to be done, non-dilated exam  III,IV,VI: EOMI with no diplopia  V: Normal facial sensations  VII: No facial asymmetry,  VIII: No New hearing abnormality  IX, X, XI: normal gag and shoulder shrug;  XII: tongue is in the midline.    Sensory:  Intact to light touch in all extremities.     Motor: Strength 5/5 bilaterally upper and lower extremities. No involuntary movements present. Normal tone and bulk.  Deep tendon reflexes: 2/4 and symmetrical in biceps, brachioradialis, triceps, bilateral 2/4 knees and ankles. Both plantars are flexor.    Cerebellar: Finger to nose and mirror movements normal bilaterally.    Gait and balance: Deferred.     Physical exam performed by EDWAR Neumann.  ________________________________________________________   RESULTS REVIEW:    VITAL SIGNS:   Temp:  [98 °F (36.7 °C)-98.1 °F (36.7 °C)] 98.1 °F (36.7 °C)  Heart Rate:  [63-87] 63  Resp:  [17-18] 18  BP: (112-149)/(65-82) 112/66     LABS:      Lab 12/07/22  0604 12/06/22  2314   WBC 7.70 8.80   HEMOGLOBIN 13.4 13.6   HEMATOCRIT 40.8 40.4   PLATELETS 285 330   NEUTROS ABS 5.60 7.00   LYMPHS ABS 1.40 1.10   MONOS ABS 0.60 0.60   EOS ABS 0.10 0.00    MCV 87.2 86.2   PROCALCITONIN  --  0.02   PROTIME  --  10.8   APTT  --  26.8*         Lab 12/07/22  0604 12/06/22  2314   SODIUM 140 139   POTASSIUM 3.9 4.3   CHLORIDE 106 105   CO2 22.0 21.0*   ANION GAP 12.0 13.0   BUN 8 9   CREATININE 0.70 0.71   EGFR 99.8 98.1   GLUCOSE 128* 137*   CALCIUM 9.0 9.5   TSH  --  2.130         Lab 12/06/22  2314   TOTAL PROTEIN 8.5   ALBUMIN 4.20   GLOBULIN 4.3   ALT (SGPT) 16   AST (SGOT) 25   BILIRUBIN 0.8   ALK PHOS 156*         Lab 12/06/22  2314   TROPONIN T <0.010   PROTIME 10.8   INR 1.05         Lab 12/07/22  0604   CHOLESTEROL 182   LDL CHOL 121*   HDL CHOL 42   TRIGLYCERIDES 104                 Lab Results   Component Value Date    TSH 2.130 12/06/2022     (H) 12/07/2022    HGBA1C 5.6 10/04/2022       IMAGING STUDIES:  CT Head Without Contrast    Result Date: 12/7/2022   1. No acute findings or interval change from the outside CT of the head performed yesterday. 2. I would recommend an MRI of the brain without contrast for follow-up as the patient had a CT angiogram of the head and neck and CT cerebral perfusion study overnight.  Electronically Signed By-Ronen Martinez MD On:12/7/2022 7:52 AM This report was finalized on [id number] by  Ronen Martinez MD.    CT Angiogram Neck    Result Date: 12/7/2022  1.  No acute intracranial abnormality is identified on noncontrast head CT. If there is high clinical suspicion for acute ischemic infarct, MRI may be more sensitive. 2.  Within the limitations of motion, no occlusion or high-grade stenosis of the major arteries in the head and neck. Artur Eubanks MD Neuroradiologist [facility] [email or web address] Thank you for this referral. This exam was interpreted by a fellowship trained neuroradiologist. If the patient's healthcare provider has any questions, a [facility] neuroradiologist can be reached directly at [phone] at any time. SLOT  21 Electronically signed by:  Artur Eubanks M.D.   12/6/2022 11:20 PM [place] Time    CT Chest Without Contrast Diagnostic    Result Date: 12/7/2022  1.  No evidence of pulmonary tuberculosis. No acute abnormality in the chest. Electronically signed by:  Edouard Marquez M.D.  12/6/2022 11:09 PM [place] Time    CT Angiogram Head    Result Date: 12/7/2022  1.  No acute intracranial abnormality is identified on noncontrast head CT. If there is high clinical suspicion for acute ischemic infarct, MRI may be more sensitive. 2.  Within the limitations of motion, no occlusion or high-grade stenosis of the major arteries in the head and neck. Artur Eubanks MD Neuroradiologist [facility] [email or web address] Thank you for this referral. This exam was interpreted by a fellowship trained neuroradiologist. If the patient's healthcare provider has any questions, a [facility] neuroradiologist can be reached directly at [phone] at any time. SLOT  21 Electronically signed by:  Artur Eubanks M.D.  12/6/2022 11:20 PM [place] Time    CT CEREBRAL PERFUSION WITH & WITHOUT CONTRAST    Result Date: 12/7/2022  1.  No acute intracranial abnormality is identified on noncontrast head CT. If there is high clinical suspicion for acute ischemic infarct, MRI may be more sensitive. 2.  Within the limitations of motion, no occlusion or high-grade stenosis of the major arteries in the head and neck. Artur Eubanks MD Neuroradiologist [facility] [email or web address] Thank you for this referral. This exam was interpreted by a fellowship trained neuroradiologist. If the patient's healthcare provider has any questions, a [facility] neuroradiologist can be reached directly at [phone] at any time. SLOT  21 Electronically signed by:  Artur Eubanks M.D.  12/6/2022 11:20 PM [place] Time      I reviewed the patient's new clinical results.    ________________________________________________________     PROBLEM LIST:     Weakness            ASSESSMENT/PLAN:  1.  Transient slurred speech and arm weakness.  No stroke on MRI. Possible TIA vs anxiety.   - CT head: No acute findings  - MRI brain: Pending  - CTA head and neck: Within the limitations of motion, no occlusion or high-grade stenosis of the major arteries in the head and neck.  - Echo: EF is 61 to 65%, negative saline test  - EKG: Sinus rhythm, rate 65  - Labs: A1C: 5.7, B12: 391, LDL: 121, TSH: 2.130  - Antithrombotics:     - Statin: Lipitor 80  - PT/OT/ST as appropriate, Neuro checks per protocol, DVT prophylaxis, Stroke education      2.  Episode of tongue protrusion and mental status change.  Episode  did not sound like aseizure, very atypical.   -Continue to monitor  -MRI was negative    3. Chest tightening, improved  -Management per primary    4. Possible NERY  - Outpatient sleep study     5. Depression/anxiety  - Resume home medications once verified     6. Modification of stroke risk factors:   - Blood pressure should be less than 130/80 outpatient, HbA1c less than 6.5, LDL less than 70; b12>500 and smoking cessation if applicable. We would be grateful if the primary team / primary care physician would keep a close watch on the above targets.  - Stroke education  - Follow up with Stroke Clinic       I discussed the patient's findings and my recommendations with patient and nursing staff    SCOOBY Riggs  22  08:33 EST          Electronically signed by Rod Davis MD at 22 1638          Discharge Summary      Shannan Powell MD at 22 1157                       [facility] Medicine Services  Discharge Summary    Date of Service: 2022  Patient Name: Eladia Connor  : 1965  MRN: [id number]    Date of Admission: 2022  Discharge Diagnosis:  1.  Intermittent episodes of difficulty speaking along with right leg and left arm weakness and sometimes numbness stroke was ruled out suspected TIA, rule out  seizures negative EEG, Keppra was added, patient will be transferred for further work-up to [facility] other neurologist Dr. Harry per request of family.  2.  Major depressive disorder.  3.  Dyspnea suspect obstructive sleep apnea CPAP was initiated in the hospital.  4.  Abnormal TB screening with reported positive QuantiFERON test.  5.  Psoriasis patient is taking Otezla.  6.  Mild hypotension.  7.  Obesity BMI 49.34    Date of Discharge: 12/13/2022  Primary Care Physician: Quincy Lemos APRN      Presenting Problem:   Slurred speech [R47.81]  Follow-up exam [Z09]  Right leg weakness [R29.898]  Left arm weakness [R29.898]  TB lung, latent [Z22.7]  Chest pain, unspecified type [R07.9]    Active and Resolved Hospital Problems:  Active Hospital Problems   No active problems to display.      Resolved Hospital Problems    Diagnosis POA   • **Weakness [R53.1] Yes   • Chest pain, unspecified type [R07.9] Yes   • Chest pain, unspecified type [R07.9] Yes         Hospital Course     Hospital Course:  Eladia Connor is a 57 y.o. female admitted to the hospital because of weakness, work-up diagnosis was suspected CVA, right leg weakness and numbness and associated tingling, work-up of stroke came back negative, patient also was taking Otezla for psoriasis last dose 2 weeks before of the current symptoms, there were concerns about TB that was ruled out, patient continued to feel tired generally with intermittent weakness and numbness, her blood pressure is borderline low, family requested the patient will be transferred to [facility] for further investigations.    DISCHARGE Follow Up Recommendations for labs and diagnostics: Patient was transferred to a different hospital per family request    Reasons For Change In Medications and Indications for New Medications:      Day of Discharge     Vital Signs:  Temp:  [97.4 °F (36.3 °C)-98 °F (36.7 °C)] 97.7 °F (36.5 °C)  Heart Rate:  [57-75]  65  Resp:  [14-18] 14  BP: ()/(48-64) 90/61  Flow (L/min):  [2] 2    Physical Exam:  Physical Exam  Constitutional:       Appearance: Normal appearance.   HENT:      Head: Normocephalic.      Right Ear: Tympanic membrane normal.      Left Ear: Tympanic membrane normal.      Nose: Nose normal.      Mouth/Throat:      Mouth: Mucous membranes are moist.   Eyes:      Pupils: Pupils are equal, round, and reactive to light.   Cardiovascular:      Rate and Rhythm: Normal rate.      Pulses: Normal pulses.   Pulmonary:      Effort: Pulmonary effort is normal.   Abdominal:      General: Abdomen is flat.   Musculoskeletal:         General: Normal range of motion.      Cervical back: Normal range of motion.   Skin:     Capillary Refill: Capillary refill takes less than 2 seconds.   Neurological:      General: No focal deficit present.      Mental Status: She is alert.   Psychiatric:         Mood and Affect: Mood normal.          Pertinent  and/or Most Recent Results     LAB RESULTS:      Lab 12/11/22  1208 12/09/22  0407 12/08/22  0544 12/07/22  0604 12/06/22  2314   WBC  --  8.20 7.00 7.70 8.80   HEMOGLOBIN  --  12.3 12.2 13.4 13.6   HEMOGLOBIN, POC 14.2  --   --   --   --    HEMATOCRIT  --  37.9 37.6 40.8 40.4   HEMATOCRIT POC 42  --   --   --   --    PLATELETS  --  244 262 285 330   NEUTROS ABS  --  5.50 4.90 5.60 7.00   LYMPHS ABS  --  1.70 1.40 1.40 1.10   MONOS ABS  --  0.70 0.50 0.60 0.60   EOS ABS  --  0.20 0.10 0.10 0.00   MCV  --  87.9 88.7 87.2 86.2   PROCALCITONIN  --   --   --   --  0.02   LACTATE 1.0  --   --   --   --    PROTIME  --   --   --   --  10.8   APTT  --   --   --   --  26.8*         Lab 12/11/22  1506 12/09/22  0407 12/08/22  0544 12/07/22  0604 12/06/22  2314   SODIUM  --  140 140 140 139   POTASSIUM  --  4.2 4.1 3.9 4.3   CHLORIDE  --  105 109* 106 105   CO2  --  28.0 21.0* 22.0 21.0*   ANION GAP  --  7.0 10.0 12.0 13.0   BUN  --  15 10 8 9   CREATININE  --  0.91 0.70 0.70 0.71   EGFR  --  72.8  99.8 99.8 98.1   GLUCOSE  --  113* 105* 128* 137*   CALCIUM  --  8.9 8.8 9.0 9.5   HEMOGLOBIN A1C  --   --   --  5.7*  --    TSH 1.590  --   --   --  2.130         Lab 12/09/22  0407 12/08/22  0544 12/06/22  2314   TOTAL PROTEIN 6.9 7.1 8.5   ALBUMIN 3.70 3.60 4.20   GLOBULIN 3.2 3.5 4.3   ALT (SGPT) 12 14 16   AST (SGOT) 21 26 25   BILIRUBIN 0.3 0.5 0.8   ALK PHOS 123* 125* 156*         Lab 12/06/22  2314   TROPONIN T <0.010   PROTIME 10.8   INR 1.05         Lab 12/07/22  0604   CHOLESTEROL 182   LDL CHOL 121*   HDL CHOL 42   TRIGLYCERIDES 104         Lab 12/06/22  2314   VITAMIN B 12 391         Lab 12/11/22  1208   PH, ARTERIAL 7.437   PCO2, ARTERIAL 36.6   PO2 ART 59.3*   O2 SATURATION ART 91.3*   FIO2 28   HCO3 ART 24.7   BASE EXCESS ART 0.8     Brief Urine Lab Results     None        Microbiology Results (last 10 days)     Procedure Component Value - Date/Time    Respiratory Panel PCR w/COVID-19(SARS-CoV-2) TESS/MEDARDO/DAVID/PAD/COR/MAD/SHAMIKA In-House, NP Swab in UTM/VTM, 3-4 HR TAT - Swab, Nasopharynx [[id number]]  (Normal) Collected: 12/07/22 0050    Lab Status: Final result Specimen: Swab from Nasopharynx Updated: 12/07/22 0142     ADENOVIRUS, PCR Not Detected     Coronavirus 229E Not Detected     Coronavirus HKU1 Not Detected     Coronavirus NL63 Not Detected     Coronavirus OC43 Not Detected     COVID19 Not Detected     Human Metapneumovirus Not Detected     Human Rhinovirus/Enterovirus Not Detected     Influenza A PCR Not Detected     Influenza B PCR Not Detected     Parainfluenza Virus 1 Not Detected     Parainfluenza Virus 2 Not Detected     Parainfluenza Virus 3 Not Detected     Parainfluenza Virus 4 Not Detected     RSV, PCR Not Detected     Bordetella pertussis pcr Not Detected     Bordetella parapertussis PCR Not Detected     Chlamydophila pneumoniae PCR Not Detected     Mycoplasma pneumo by PCR Not Detected    Narrative:      In the setting of a positive respiratory panel with a viral infection PLUS a  negative procalcitonin without other underlying concern for bacterial infection, consider observing off antibiotics or discontinuation of antibiotics and continue supportive care. If the respiratory panel is positive for atypical bacterial infection (Bordetella pertussis, Chlamydophila pneumoniae, or Mycoplasma pneumoniae), consider antibiotic de-escalation to target atypical bacterial infection.          XR Chest 2 View    Result Date: 11/30/2022  Impression: No active cardiopulmonary disease  Electronically Signed By-Ish Maravilla On:11/30/2022 3:50 PM This report was finalized on [id number] by  Ish Maravilla, .    XR Knee 1 or 2 View Right    Result Date: 12/1/2022  Impression: No acute osseous abnormality. Moderate to severe tricompartmental osteoarthritic changes are present, most pronounced within the medial and patellofemoral compartments. A small joint effusion is present.  Electronically Signed By-Nivia Olivas MD On:12/1/2022 11:46 AM This report was finalized on [id number] by  Nivia Olivas MD.    CT Head Without Contrast    Result Date: 12/9/2022  Impression: No acute intracranial abnormality or significant change since 12/7/2022. Electronically signed by:  Artur Eubanks M.D.  12/9/2022 9:21 PM [place] Time    CT Head Without Contrast    Result Date: 12/7/2022  Impression:  1. No acute findings or interval change from the outside CT of the head performed yesterday. 2. I would recommend an MRI of the brain without contrast for follow-up as the patient had a CT angiogram of the head and neck and CT cerebral perfusion study overnight.  Electronically Signed By-Ronen Martinez MD On:12/7/2022 7:52 AM This report was finalized on [id number] by  Ronen Martinez MD.    CT Angiogram Neck    Result Date: 12/7/2022  Impression: 1.  No acute intracranial abnormality is identified on noncontrast head CT. If there is high clinical suspicion for acute ischemic infarct, MRI may be more sensitive. 2.  Within the  limitations of motion, no occlusion or high-grade stenosis of the major arteries in the head and neck. Artur Eubanks MD Neuroradiologist [facility] [email or web address] Thank you for this referral. This exam was interpreted by a fellowship trained neuroradiologist. If the patient's healthcare provider has any questions, a [facility] neuroradiologist can be reached directly at [phone] at any time. SLOT  21 Electronically signed by:  Artur Eubanks M.D.  12/6/2022 11:20 PM Mountain Time    CT Chest Without Contrast Diagnostic    Result Date: 12/7/2022  Impression: 1.  No evidence of pulmonary tuberculosis. No acute abnormality in the chest. Electronically signed by:  Edouard Marquez M.D.  12/6/2022 11:09 PM [place] Time    MRI Brain Without Contrast    Result Date: 12/7/2022  Impression:  1. No acute intracranial pathology. 2. Chronic changes suggestive of microvascular ischemic disease.   Electronically Signed By-Derek Branham MD On:12/7/2022 11:34 AM This report was finalized on [id number] by  Derek Branham MD.    MRI Lumbar Spine Without Contrast    Result Date: 12/8/2022  Impression: 1.     Transitional anatomy at the lumbosacral junction with suspected partial sacralization of the S1 vertebrae. Close correlation with imaging is recommended prior to any planned intervention. 2.     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 3.     Multilevel disc and facet joint degeneration with moderate to severe canal narrowing at L4-5 and L5-S1. 4.     Moderate bilateral foraminal narrowing at L4-5 and moderate to severe foraminal narrowing at L5-S1.  Electronically Signed By-Riccardo Ramirez MD On:12/8/2022 4:53 PM This report was finalized on [id number] by  Riccardo Raimrez MD.    XR Chest 1 View    Result Date: 12/11/2022  Impression: 1. Low lung volumes with new bilateral interstitial opacities which could represent pulmonary edema or bronchitis/atypical  infection. 2. Mild cardiomegaly.  Electronically Signed By-Dru Rosas MD On:12/11/2022 1:43 PM This report was finalized on [id number] by  Dru Rosas MD.    CT Angiogram Head    Result Date: 12/7/2022  Impression: 1.  No acute intracranial abnormality is identified on noncontrast head CT. If there is high clinical suspicion for acute ischemic infarct, MRI may be more sensitive. 2.  Within the limitations of motion, no occlusion or high-grade stenosis of the major arteries in the head and neck. Artur Eubanks MD Neuroradiologist [facility] [email or web address] Thank you for this referral. This exam was interpreted by a fellowship trained neuroradiologist. If the patient's healthcare provider has any questions, a [facility] neuroradiologist can be reached directly at [phone] at any time. SLOT  21 Electronically signed by:  Artur Eubanks M.D.  12/6/2022 11:20 PM [place] Time    XR Abdomen KUB    Result Date: 12/11/2022  Impression: 1. Mild rectosigmoid stool burden. 2. Nonspecific nonobstructive bowel gas pattern.  Electronically Signed By-Dru Rosas MD On:12/11/2022 1:41 PM This report was finalized on [id number] by  Dru Rosas MD.    CT CEREBRAL PERFUSION WITH & WITHOUT CONTRAST    Result Date: 12/7/2022  Impression: 1.  No acute intracranial abnormality is identified on noncontrast head CT. If there is high clinical suspicion for acute ischemic infarct, MRI may be more sensitive. 2.  Within the limitations of motion, no occlusion or high-grade stenosis of the major arteries in the head and neck. Artur Eubanks MD Neuroradiologist [facility] [email or web address] Thank you for this referral. This exam was interpreted by a fellowship trained neuroradiologist. If the patient's healthcare provider has any questions, a [facility] neuroradiologist can be reached directly at [phone] at any time. SLOT  21  Electronically signed by:  Artur Eubanks M.D.  12/6/2022 11:20 PM Mountain Time              Results for orders placed during the hospital encounter of 12/06/22    Adult Transthoracic Echo Complete W/ Cont if Necessary Per Protocol    Interpretation Summary  •  Left ventricular systolic function is normal. Left ventricular ejection fraction appears to be 61 - 65%.  •  Left ventricular diastolic function was normal.  •  Saline test results are negative.  Study was suboptimal.      Labs Pending at Discharge:      Procedures Performed           Consults:   Consults     Date and Time Order Name Status Description    12/10/2022  9:13 AM Inpatient Pulmonology Consult Completed     12/7/2022 11:57 AM Inpatient Psychiatrist Consult Completed     12/7/2022  7:01 AM Inpatient Neurology Consult General Completed     12/7/2022  3:22 AM Inpatient Neurology Consult Stroke      12/7/2022  1:42 AM Hospitalist (on-call MD unless specified)              Discharge Details        Discharge Medications      New Medications      Instructions Start Date   aspirin 81 MG chewable tablet   162 mg, Oral, Daily      atorvastatin 80 MG tablet  Commonly known as: LIPITOR   80 mg, Oral, Nightly      butalbital-acetaminophen-caffeine -40 MG per tablet  Commonly known as: FIORICET, ESGIC   2 tablets, Oral, Every 8 Hours PRN      famotidine 20 MG tablet  Commonly known as: PEPCID   20 mg, Oral, 2 Times Daily Before Meals      gabapentin 100 MG capsule  Commonly known as: NEURONTIN   100 mg, Oral, 2 Times Daily      levETIRAcetam in NaCl 0.82% 500 MG/100ML solution IVPB  Commonly known as: KEPPRA   500 mg, Intravenous, Every 12 Hours Scheduled      LORazepam 0.5 MG tablet  Commonly known as: ATIVAN   0.5 mg, Oral, Daily PRN      vilazodone 10 MG tablet tablet  Commonly known as: VIIBRYD   10 mg, Oral, Daily         Continue These Medications      Instructions Start Date   busPIRone 5 MG tablet  Commonly known as: BUSPAR   5 mg, Oral, 3 Times  Daily      nystatin 174806 UNIT/GM cream  Commonly known as: MYCOSTATIN   1 application, Topical, 2 Times Daily      zolpidem 10 MG tablet  Commonly known as: AMBIEN   10 mg, Oral, Nightly PRN             No Known Allergies      Discharge Disposition:   Short Term Hospital (DC - External)    Diet:  Hospital:  Diet Order   Procedures   • Diet: Regular/House Diet; Texture: Regular Texture (IDDSI 7); Fluid Consistency: Thin (IDDSI 0)         Discharge Activity:         CODE STATUS:  Code Status and Medical Interventions:   Ordered at: 12/07/22 0651     Code Status (Patient has no pulse and is not breathing):    CPR (Attempt to Resuscitate)     Medical Interventions (Patient has pulse or is breathing):    Full Support     Release to patient:    Routine Release         No future appointments.        Time spent on Discharge including face to face service:  45 minutes    This patient has been examined wearing appropriate Personal Protective Equipment and discussed with hospital infection control department. 12/13/22      Signature: Electronically signed by Lidya Powell MD, 12/13/22, 11:57 EST.  [facility] Hospitalist Team    Electronically signed by Lidya Powell MD at 12/13/22 1207       Discharge Order (From admission, onward)     Start     Ordered    12/13/22 1156  Discharge patient  Once        Expected Discharge Date: 12/13/22    Expected Discharge Time: Midday    Discharge Disposition: Short Term Hospital (DC - External)    Physician of Record for Attribution - Please select from Treatment Team: LIDYA POWELL [[id number]]    Review needed by CMO to determine Physician of Record: No       Question Answer Comment   Physician of Record for Attribution - Please select from Treatment Team LIDYA POWELL    Review needed by CMO to determine Physician of Record No        12/13/22 2050

## 2022-12-20 ENCOUNTER — TELEPHONE (OUTPATIENT)
Dept: NEUROLOGY | Facility: CLINIC | Age: 57
End: 2022-12-20
Payer: COMMERCIAL

## 2022-12-20 NOTE — TELEPHONE ENCOUNTER
Caller: Eladia Connor    Relationship to patient: Self     Best call back number: [phone]    New or established patient?  [x] New  [] Established    Date of discharge: 12/13/22 AND 12/21/2022    Facility discharged from: [facility]    Diagnosis/Symptoms: SLEEP AND NEURO DX    Length of stay (If applicable): 7 DAYS AT     Specialty Only: Did you see a [facility] provider?    [x] Yes  [] No  If so, who? DR LOW      PLEASE REVIEW AND ADVISE ON SCHEDULING AS PATIENT NEEDS BOTH HOSP FOLLOW UP FOR STROKE & HEADACHE AND FOR SLEEP.

## 2022-12-25 ENCOUNTER — APPOINTMENT (OUTPATIENT)
Dept: CT IMAGING | Facility: HOSPITAL | Age: 57
End: 2022-12-25
Payer: COMMERCIAL

## 2022-12-25 ENCOUNTER — HOSPITAL ENCOUNTER (OUTPATIENT)
Facility: HOSPITAL | Age: 57
Setting detail: OBSERVATION
Discharge: HOME | End: 2022-12-27
Attending: EMERGENCY MEDICINE | Admitting: EMERGENCY MEDICINE
Payer: COMMERCIAL

## 2022-12-25 DIAGNOSIS — K59.00 CONSTIPATION, UNSPECIFIED CONSTIPATION TYPE: Primary | ICD-10-CM

## 2022-12-25 DIAGNOSIS — R10.84 GENERALIZED ABDOMINAL PAIN: ICD-10-CM

## 2022-12-25 LAB
ALBUMIN SERPL-MCNC: 4 G/DL (ref 3.5–5.2)
ALBUMIN/GLOB SERPL: 1 G/DL
ALP SERPL-CCNC: 155 U/L (ref 39–117)
ALT SERPL W P-5'-P-CCNC: 20 U/L (ref 1–33)
ANION GAP SERPL CALCULATED.3IONS-SCNC: 13 MMOL/L (ref 5–15)
AST SERPL-CCNC: 27 U/L (ref 1–32)
BASOPHILS # BLD AUTO: 0.1 10*3/MM3 (ref 0–0.2)
BASOPHILS NFR BLD AUTO: 0.8 % (ref 0–1.5)
BILIRUB SERPL-MCNC: 0.6 MG/DL (ref 0–1.2)
BILIRUB UR QL STRIP: NEGATIVE
BUN SERPL-MCNC: 10 MG/DL (ref 6–20)
BUN/CREAT SERPL: 12.8 (ref 7–25)
CALCIUM SPEC-SCNC: 9.4 MG/DL (ref 8.6–10.5)
CHLORIDE SERPL-SCNC: 101 MMOL/L (ref 98–107)
CLARITY UR: CLEAR
CO2 SERPL-SCNC: 23 MMOL/L (ref 22–29)
COLOR UR: YELLOW
CREAT SERPL-MCNC: 0.78 MG/DL (ref 0.57–1)
DEPRECATED RDW RBC AUTO: 42 FL (ref 37–54)
EGFRCR SERPLBLD CKD-EPI 2021: 87.6 ML/MIN/1.73
EOSINOPHIL # BLD AUTO: 0.3 10*3/MM3 (ref 0–0.4)
EOSINOPHIL NFR BLD AUTO: 2.6 % (ref 0.3–6.2)
ERYTHROCYTE [DISTWIDTH] IN BLOOD BY AUTOMATED COUNT: 13.8 % (ref 12.3–15.4)
GLOBULIN UR ELPH-MCNC: 4 GM/DL
GLUCOSE SERPL-MCNC: 167 MG/DL (ref 65–99)
GLUCOSE UR STRIP-MCNC: NEGATIVE MG/DL
HCT VFR BLD AUTO: 40.4 % (ref 34–46.6)
HGB BLD-MCNC: 13 G/DL (ref 12–15.9)
HGB UR QL STRIP.AUTO: NEGATIVE
HOLD SPECIMEN: NORMAL
KETONES UR QL STRIP: NEGATIVE
LEUKOCYTE ESTERASE UR QL STRIP.AUTO: NEGATIVE
LIPASE SERPL-CCNC: 15 U/L (ref 13–60)
LYMPHOCYTES # BLD AUTO: 1.4 10*3/MM3 (ref 0.7–3.1)
LYMPHOCYTES NFR BLD AUTO: 11.3 % (ref 19.6–45.3)
MCH RBC QN AUTO: 28 PG (ref 26.6–33)
MCHC RBC AUTO-ENTMCNC: 32.1 G/DL (ref 31.5–35.7)
MCV RBC AUTO: 87.1 FL (ref 79–97)
MONOCYTES # BLD AUTO: 0.7 10*3/MM3 (ref 0.1–0.9)
MONOCYTES NFR BLD AUTO: 5.9 % (ref 5–12)
NEUTROPHILS NFR BLD AUTO: 79.4 % (ref 42.7–76)
NEUTROPHILS NFR BLD AUTO: 9.5 10*3/MM3 (ref 1.7–7)
NITRITE UR QL STRIP: NEGATIVE
NRBC BLD AUTO-RTO: 0 /100 WBC (ref 0–0.2)
PH UR STRIP.AUTO: 8.5 [PH] (ref 5–8)
PLATELET # BLD AUTO: 289 10*3/MM3 (ref 140–450)
PMV BLD AUTO: 7.3 FL (ref 6–12)
POTASSIUM SERPL-SCNC: 4.2 MMOL/L (ref 3.5–5.2)
PROT SERPL-MCNC: 8 G/DL (ref 6–8.5)
PROT UR QL STRIP: NEGATIVE
RBC # BLD AUTO: 4.64 10*6/MM3 (ref 3.77–5.28)
SODIUM SERPL-SCNC: 137 MMOL/L (ref 136–145)
SP GR UR STRIP: 1.04 (ref 1–1.03)
UROBILINOGEN UR QL STRIP: ABNORMAL
WBC NRBC COR # BLD: 12 10*3/MM3 (ref 3.4–10.8)
WHOLE BLOOD HOLD COAG: NORMAL

## 2022-12-25 PROCEDURE — 96368 THER/DIAG CONCURRENT INF: CPT

## 2022-12-25 PROCEDURE — G0378 HOSPITAL OBSERVATION PER HR: HCPCS

## 2022-12-25 PROCEDURE — 87040 BLOOD CULTURE FOR BACTERIA: CPT | Performed by: PHYSICIAN ASSISTANT

## 2022-12-25 PROCEDURE — 25010000002 CEFTRIAXONE PER 250 MG: Performed by: PHYSICIAN ASSISTANT

## 2022-12-25 PROCEDURE — 96365 THER/PROPH/DIAG IV INF INIT: CPT

## 2022-12-25 PROCEDURE — 74177 CT ABD & PELVIS W/CONTRAST: CPT

## 2022-12-25 PROCEDURE — 0 IOPAMIDOL PER 1 ML: Performed by: EMERGENCY MEDICINE

## 2022-12-25 PROCEDURE — 81003 URINALYSIS AUTO W/O SCOPE: CPT | Performed by: PHYSICIAN ASSISTANT

## 2022-12-25 PROCEDURE — 83690 ASSAY OF LIPASE: CPT | Performed by: PHYSICIAN ASSISTANT

## 2022-12-25 PROCEDURE — 80053 COMPREHEN METABOLIC PANEL: CPT | Performed by: PHYSICIAN ASSISTANT

## 2022-12-25 PROCEDURE — 85025 COMPLETE CBC W/AUTO DIFF WBC: CPT | Performed by: PHYSICIAN ASSISTANT

## 2022-12-25 PROCEDURE — 99285 EMERGENCY DEPT VISIT HI MDM: CPT

## 2022-12-25 RX ORDER — ASPIRIN 81 MG/1
162 TABLET, CHEWABLE ORAL DAILY
Status: DISCONTINUED | OUTPATIENT
Start: 2022-12-25 | End: 2022-12-27 | Stop reason: HOSPADM

## 2022-12-25 RX ORDER — ACETAMINOPHEN 325 MG/1
650 TABLET ORAL EVERY 4 HOURS PRN
Status: DISCONTINUED | OUTPATIENT
Start: 2022-12-25 | End: 2022-12-27 | Stop reason: HOSPADM

## 2022-12-25 RX ORDER — GABAPENTIN 100 MG/1
100 CAPSULE ORAL 2 TIMES DAILY
Status: DISCONTINUED | OUTPATIENT
Start: 2022-12-25 | End: 2022-12-27 | Stop reason: HOSPADM

## 2022-12-25 RX ORDER — ALBUTEROL SULFATE 90 UG/1
2 AEROSOL, METERED RESPIRATORY (INHALATION) EVERY 6 HOURS PRN
COMMUNITY
End: 2023-01-19 | Stop reason: SDUPTHER

## 2022-12-25 RX ORDER — ZOLPIDEM TARTRATE 5 MG/1
5 TABLET ORAL NIGHTLY PRN
Status: DISCONTINUED | OUTPATIENT
Start: 2022-12-25 | End: 2022-12-25

## 2022-12-25 RX ORDER — LURASIDONE HYDROCHLORIDE 40 MG/1
20 TABLET, FILM COATED ORAL DAILY
COMMUNITY
End: 2022-12-29

## 2022-12-25 RX ORDER — ATORVASTATIN CALCIUM 40 MG/1
80 TABLET, FILM COATED ORAL NIGHTLY
Status: DISCONTINUED | OUTPATIENT
Start: 2022-12-25 | End: 2022-12-27 | Stop reason: HOSPADM

## 2022-12-25 RX ORDER — MIDODRINE HYDROCHLORIDE 5 MG/1
5 TABLET ORAL
COMMUNITY
End: 2023-01-05 | Stop reason: SDUPTHER

## 2022-12-25 RX ORDER — ONDANSETRON 2 MG/ML
4 INJECTION INTRAMUSCULAR; INTRAVENOUS EVERY 6 HOURS PRN
Status: DISCONTINUED | OUTPATIENT
Start: 2022-12-25 | End: 2022-12-27 | Stop reason: HOSPADM

## 2022-12-25 RX ORDER — POLYETHYLENE GLYCOL 3350 17 G/17G
17 POWDER, FOR SOLUTION ORAL DAILY PRN
Status: DISCONTINUED | OUTPATIENT
Start: 2022-12-25 | End: 2022-12-26

## 2022-12-25 RX ORDER — SODIUM CHLORIDE 9 MG/ML
40 INJECTION, SOLUTION INTRAVENOUS AS NEEDED
Status: DISCONTINUED | OUTPATIENT
Start: 2022-12-25 | End: 2022-12-26

## 2022-12-25 RX ORDER — LORAZEPAM 0.5 MG/1
0.5 TABLET ORAL 2 TIMES DAILY PRN
COMMUNITY
End: 2022-12-25

## 2022-12-25 RX ORDER — SODIUM CHLORIDE 0.9 % (FLUSH) 0.9 %
10 SYRINGE (ML) INJECTION AS NEEDED
Status: DISCONTINUED | OUTPATIENT
Start: 2022-12-25 | End: 2022-12-27 | Stop reason: HOSPADM

## 2022-12-25 RX ORDER — LEVETIRACETAM 500 MG/1
500 TABLET ORAL EVERY 12 HOURS SCHEDULED
Status: DISCONTINUED | OUTPATIENT
Start: 2022-12-25 | End: 2022-12-25

## 2022-12-25 RX ORDER — BISACODYL 10 MG
10 SUPPOSITORY, RECTAL RECTAL DAILY PRN
Status: DISCONTINUED | OUTPATIENT
Start: 2022-12-25 | End: 2022-12-26

## 2022-12-25 RX ORDER — LURASIDONE HYDROCHLORIDE 40 MG/1
20 TABLET, FILM COATED ORAL DAILY
Status: DISCONTINUED | OUTPATIENT
Start: 2022-12-25 | End: 2022-12-27 | Stop reason: HOSPADM

## 2022-12-25 RX ORDER — SODIUM CHLORIDE 0.9 % (FLUSH) 0.9 %
10 SYRINGE (ML) INJECTION EVERY 12 HOURS SCHEDULED
Status: DISCONTINUED | OUTPATIENT
Start: 2022-12-25 | End: 2022-12-27 | Stop reason: HOSPADM

## 2022-12-25 RX ORDER — VILAZODONE HYDROCHLORIDE 10 MG/1
10 TABLET ORAL DAILY
Status: DISCONTINUED | OUTPATIENT
Start: 2022-12-25 | End: 2022-12-25

## 2022-12-25 RX ORDER — ALBUTEROL SULFATE 90 UG/1
2 AEROSOL, METERED RESPIRATORY (INHALATION) EVERY 6 HOURS PRN
Status: DISCONTINUED | OUTPATIENT
Start: 2022-12-25 | End: 2022-12-27 | Stop reason: HOSPADM

## 2022-12-25 RX ORDER — MIDODRINE HYDROCHLORIDE 5 MG/1
5 TABLET ORAL
Status: DISCONTINUED | OUTPATIENT
Start: 2022-12-25 | End: 2022-12-27 | Stop reason: HOSPADM

## 2022-12-25 RX ORDER — METRONIDAZOLE 500 MG/100ML
500 INJECTION, SOLUTION INTRAVENOUS ONCE
Status: COMPLETED | OUTPATIENT
Start: 2022-12-25 | End: 2022-12-25

## 2022-12-25 RX ORDER — FAMOTIDINE 20 MG/1
20 TABLET, FILM COATED ORAL
Status: DISCONTINUED | OUTPATIENT
Start: 2022-12-25 | End: 2022-12-27 | Stop reason: HOSPADM

## 2022-12-25 RX ORDER — BISACODYL 5 MG/1
5 TABLET, DELAYED RELEASE ORAL DAILY PRN
Status: DISCONTINUED | OUTPATIENT
Start: 2022-12-25 | End: 2022-12-26

## 2022-12-25 RX ORDER — ONDANSETRON 4 MG/1
4 TABLET, FILM COATED ORAL EVERY 6 HOURS PRN
Status: DISCONTINUED | OUTPATIENT
Start: 2022-12-25 | End: 2022-12-27 | Stop reason: HOSPADM

## 2022-12-25 RX ORDER — IPRATROPIUM BROMIDE AND ALBUTEROL SULFATE 2.5; .5 MG/3ML; MG/3ML
3 SOLUTION RESPIRATORY (INHALATION)
Status: DISCONTINUED | OUTPATIENT
Start: 2022-12-25 | End: 2022-12-25

## 2022-12-25 RX ORDER — AMOXICILLIN 250 MG
2 CAPSULE ORAL 2 TIMES DAILY
Status: DISCONTINUED | OUTPATIENT
Start: 2022-12-25 | End: 2022-12-26

## 2022-12-25 RX ORDER — BUSPIRONE HYDROCHLORIDE 5 MG/1
5 TABLET ORAL 3 TIMES DAILY
Status: DISCONTINUED | OUTPATIENT
Start: 2022-12-25 | End: 2022-12-27 | Stop reason: HOSPADM

## 2022-12-25 RX ADMIN — CEFTRIAXONE 2 G: 2 INJECTION, POWDER, FOR SOLUTION INTRAMUSCULAR; INTRAVENOUS at 18:21

## 2022-12-25 RX ADMIN — SENNOSIDES AND DOCUSATE SODIUM 2 TABLET: 50; 8.6 TABLET ORAL at 20:52

## 2022-12-25 RX ADMIN — MIDODRINE HYDROCHLORIDE 5 MG: 5 TABLET ORAL at 18:20

## 2022-12-25 RX ADMIN — FAMOTIDINE 20 MG: 20 TABLET, FILM COATED ORAL at 18:20

## 2022-12-25 RX ADMIN — ATORVASTATIN CALCIUM 80 MG: 40 TABLET, FILM COATED ORAL at 20:52

## 2022-12-25 RX ADMIN — IOPAMIDOL 100 ML: 755 INJECTION, SOLUTION INTRAVENOUS at 14:54

## 2022-12-25 RX ADMIN — BUSPIRONE HYDROCHLORIDE 5 MG: 5 TABLET ORAL at 20:52

## 2022-12-25 RX ADMIN — BISACODYL 5 MG: 5 TABLET, COATED ORAL at 20:54

## 2022-12-25 RX ADMIN — ASPIRIN 81 MG CHEWABLE TABLET 162 MG: 81 TABLET CHEWABLE at 18:20

## 2022-12-25 RX ADMIN — SODIUM CHLORIDE 500 ML: 9 INJECTION, SOLUTION INTRAVENOUS at 14:12

## 2022-12-25 RX ADMIN — METRONIDAZOLE 500 MG: 500 INJECTION, SOLUTION INTRAVENOUS at 18:21

## 2022-12-25 RX ADMIN — GABAPENTIN 100 MG: 100 CAPSULE ORAL at 20:52

## 2022-12-25 RX ADMIN — BUSPIRONE HYDROCHLORIDE 5 MG: 5 TABLET ORAL at 18:20

## 2022-12-25 NOTE — PLAN OF CARE
Goal Outcome Evaluation:  Plan of Care Reviewed With: patient        Progress: improving  Outcome Evaluation: Pt admitted to observation for constipation. No s/s of distress at this time. Will continue to monitor.

## 2022-12-25 NOTE — ED PROVIDER NOTES
Subjective   History of Present Illness  Patient is a 57-year-old female presents to the ED with complaints of constipation over the past 2 weeks.  Daughter at bedside gives most of the history.  Daughter states she was recently hospitalized worked up for migraines versus pseudoseizures.  She states she was initially seen at our facility transferred to another facility for EEG.  Patient's daughter states since hospitalization she has not had a normal bowel movement.  Patient had a small bowel movement around 15 December.  She denies any hematochezia or melena.  Patient has tried over-the-counter enema and Dulcolax with no relief.  She does report generalized abdominal pain and distention.  She reports associated nausea and dry heaving without any vomiting.  No fever or urinary complaints.  No chest pain or shortness of breath.  She rates her symptoms as moderate in severity.  Denies any other alleviating or exacerbating factors.        Review of Systems   Constitutional: Negative.    Respiratory: Negative.    Cardiovascular: Negative.    Gastrointestinal: Positive for abdominal distention, abdominal pain, constipation and nausea. Negative for blood in stool, diarrhea and vomiting.   Genitourinary: Negative for decreased urine volume, difficulty urinating, dysuria, flank pain, hematuria, urgency, vaginal bleeding, vaginal discharge and vaginal pain.   Musculoskeletal: Negative for back pain, neck pain and neck stiffness.   Skin: Negative.    Neurological: Negative for dizziness, seizures, syncope, weakness, light-headedness and headaches.   Hematological: Negative.    All other systems reviewed and are negative.      Past Medical History:   Diagnosis Date   • Anxiety    • Depression        No Known Allergies    Past Surgical History:   Procedure Laterality Date   • CHOLECYSTECTOMY  1987   • EYE SURGERY     • KNEE SURGERY         Family History   Problem Relation Age of Onset   • Heart disease Mother    • Breast  cancer Mother    • Heart disease Father    • Pancreatic cancer Father    • Heart disease Sister    • Lung cancer Brother        Social History     Socioeconomic History   • Marital status:    Tobacco Use   • Smoking status: Never   • Smokeless tobacco: Never   Substance and Sexual Activity   • Alcohol use: Never   • Drug use: Never   • Sexual activity: Defer           Objective   Physical Exam  Vitals and nursing note reviewed.   Constitutional:       General: She is not in acute distress.     Appearance: She is well-developed. She is obese. She is not ill-appearing, toxic-appearing or diaphoretic.   HENT:      Head: Normocephalic and atraumatic.      Mouth/Throat:      Mouth: Mucous membranes are moist.      Pharynx: Oropharynx is clear.   Eyes:      General: No scleral icterus.     Extraocular Movements: Extraocular movements intact.      Pupils: Pupils are equal, round, and reactive to light.   Cardiovascular:      Rate and Rhythm: Normal rate and regular rhythm.      Pulses: Normal pulses.      Heart sounds: No murmur heard.    No friction rub. No gallop.   Pulmonary:      Effort: Pulmonary effort is normal. No respiratory distress.      Breath sounds: Normal breath sounds. No stridor. No wheezing, rhonchi or rales.   Chest:      Chest wall: No tenderness.   Abdominal:      General: Bowel sounds are normal. There is no distension. There are no signs of injury.      Palpations: Abdomen is soft.      Tenderness: There is generalized abdominal tenderness. There is no right CVA tenderness, left CVA tenderness, guarding or rebound. Negative signs include Rovsing's sign.      Hernia: No hernia is present.   Skin:     General: Skin is warm.      Capillary Refill: Capillary refill takes less than 2 seconds.      Coloration: Skin is not cyanotic, jaundiced or pale.      Findings: No rash.   Neurological:      General: No focal deficit present.      Mental Status: She is alert and oriented to person, place, and  time.   Psychiatric:         Mood and Affect: Mood normal.         Behavior: Behavior normal.         Procedures           ED Course    /65   Pulse 69   Temp 97.8 °F (36.6 °C) (Temporal)   Resp 18   Ht 170.2 cm (67\")   Wt 136 kg (299 lb 9.7 oz)   LMP  (LMP Unknown)   SpO2 100%   BMI 46.92 kg/m²   Medications   sodium chloride 0.9 % flush 10 mL (has no administration in time range)   cefTRIAXone (ROCEPHIN) 1 g in sodium chloride 0.9 % 100 mL IVPB (has no administration in time range)   metroNIDAZOLE (FLAGYL) IVPB 500 mg (has no administration in time range)   sodium chloride 0.9 % bolus 500 mL (500 mL Intravenous New Bag 12/25/22 1412)   iopamidol (ISOVUE-370) 76 % injection 100 mL (100 mL Intravenous Given 12/25/22 1454)     Labs Reviewed   COMPREHENSIVE METABOLIC PANEL - Abnormal; Notable for the following components:       Result Value    Glucose 167 (*)     Alkaline Phosphatase 155 (*)     All other components within normal limits    Narrative:     GFR Normal >60  Chronic Kidney Disease <60  Kidney Failure <15     CBC WITH AUTO DIFFERENTIAL - Abnormal; Notable for the following components:    WBC 12.00 (*)     Neutrophil % 79.4 (*)     Lymphocyte % 11.3 (*)     Neutrophils, Absolute 9.50 (*)     All other components within normal limits   LIPASE - Normal   BLOOD CULTURE   BLOOD CULTURE   URINALYSIS W/ CULTURE IF INDICATED   CBC AND DIFFERENTIAL    Narrative:     The following orders were created for panel order CBC & Differential.  Procedure                               Abnormality         Status                     ---------                               -----------         ------                     CBC Auto Differential[[id number]]        Abnormal            Final result                 Please view results for these tests on the individual orders.   EXTRA TUBES    Narrative:     The following orders were created for panel order Extra Tubes.  Procedure                               Abnormality          Status                     ---------                               -----------         ------                     Gold Top - SST[[id number]]                                   Final result               Light Blue Top[[id number]]                                   Final result                 Please view results for these tests on the individual orders.   GOLD TOP - SST   LIGHT BLUE TOP     CT Abdomen Pelvis With Contrast    Result Date: 12/25/2022  1.     Moderate stool burden within the distal colon with some stranding in the perirectal fat and thickening of the rectal wall which could relate to a stercoral colitis. 2.     Hepatic steatosis. 3.     Myelolipoma left adrenal gland 4.     Degenerative change lower lumbar spine.  Electronically Signed By-Robin Al MD On:12/25/2022 3:08 PM This report was finalized on [id number] by  Robin Al MD.                                           MDM  Number of Diagnoses or Management Options  Constipation, unspecified constipation type  Generalized abdominal pain  Diagnosis management comments: Chart Review:  Hospitalization December 2022 taken from hospital course:  Eladia Connor is a 57 y.o. female admitted to the hospital because of weakness, work-up diagnosis was suspected CVA, right leg weakness and numbness and associated tingling, work-up of stroke came back negative, patient also was taking Otezla for psoriasis last dose 2 weeks before of the current symptoms, there were concerns about TB that was ruled out, patient continued to feel tired generally with intermittent weakness and numbness, her blood pressure is borderline low, family requested the patient will be transferred to [facility] for further investigations.      Comorbidity: As per past medical history  Differentials: DKA, intra-abdominal infection, dissection, peritonitis, peptic ulcer disease, pancreatitis, hepatitis, ischemic bowel, bowel obstruction, appendicitis     ;this  list is not all inclusive and does not constitute the entirety of considered causes  Labs: As above  Imaging: Was interpreted by physician and reviewed by myself:  CT Abdomen Pelvis With Contrast   Final Result    1.     Moderate stool burden within the distal colon with some stranding    in the perirectal fat and thickening of the rectal wall which could    relate to a stercoral colitis.    2.     Hepatic steatosis.    3.     Myelolipoma left adrenal gland    4.     Degenerative change lower lumbar spine.         Electronically Signed By-Robin Al MD On:12/25/2022 3:08 PM    This report was finalized on [id number] by  Robin Al MD.       Disposition/Treatment:  Appropriate PPE was worn during exam and throughout all encounters with the patient.  While in the ED IV was placed and labs were obtained patient was placed on proper monitor she was afebrile and appeared nontoxic upon arrival to triage she was found to be hypotensive with significant improvement on brought back to room which leads me to believe this could have been an inaccurate reading.  Patient initially presented to the ED with complaints of abdominal pain and constipation.    Lab results showed a white count of 12 patient was hemodynamically stable.  Metabolic panel showed glucose 167 alk phos 155 otherwise unremarkable.  Lipase normal urinalysis pending upon placement in observation unit.  Blood cultures are pending.  CT abdomen pelvis did show constipation and signs of possible colitis patient was given Flagyl and Rocephin while in the ED.  Enema was also ordered while in the ED. due to patient's continued abdominal pain and concern for constipation she will be placed in observation unit for bowel cleanse and further evaluation and management of her abdominal pain.  Patient was in agreement with plan.  Spoke to Sarah HERNDON who was in agreement with plan and observation unit.       Amount and/or Complexity of Data Reviewed  Clinical lab tests:  reviewed  Tests in the radiology section of CPT®: reviewed        Final diagnoses:   Constipation, unspecified constipation type   Generalized abdominal pain       ED Disposition  ED Disposition     ED Disposition   Decision to Admit    Condition   --    Comment   --             No follow-up provider specified.       Medication List      No changes were made to your prescriptions during this visit.          Nikki Thomas PA  12/25/22 0497

## 2022-12-26 ENCOUNTER — ON CAMPUS - OUTPATIENT (OUTPATIENT)
Dept: URBAN - METROPOLITAN AREA HOSPITAL 85 | Facility: HOSPITAL | Age: 57
End: 2022-12-26

## 2022-12-26 ENCOUNTER — APPOINTMENT (OUTPATIENT)
Dept: CT IMAGING | Facility: HOSPITAL | Age: 57
End: 2022-12-26
Payer: COMMERCIAL

## 2022-12-26 DIAGNOSIS — K59.00 CONSTIPATION, UNSPECIFIED: ICD-10-CM

## 2022-12-26 DIAGNOSIS — R10.30 LOWER ABDOMINAL PAIN, UNSPECIFIED: ICD-10-CM

## 2022-12-26 DIAGNOSIS — R93.3 ABNORMAL FINDINGS ON DIAGNOSTIC IMAGING OF OTHER PARTS OF DI: ICD-10-CM

## 2022-12-26 LAB
ANION GAP SERPL CALCULATED.3IONS-SCNC: 9 MMOL/L (ref 5–15)
BASOPHILS # BLD AUTO: 0.1 10*3/MM3 (ref 0–0.2)
BASOPHILS NFR BLD AUTO: 0.9 % (ref 0–1.5)
BUN SERPL-MCNC: 8 MG/DL (ref 6–20)
BUN/CREAT SERPL: 12.3 (ref 7–25)
CALCIUM SPEC-SCNC: 8.6 MG/DL (ref 8.6–10.5)
CHLORIDE SERPL-SCNC: 104 MMOL/L (ref 98–107)
CO2 SERPL-SCNC: 27 MMOL/L (ref 22–29)
CREAT SERPL-MCNC: 0.65 MG/DL (ref 0.57–1)
DEPRECATED RDW RBC AUTO: 45.1 FL (ref 37–54)
EGFRCR SERPLBLD CKD-EPI 2021: 101.6 ML/MIN/1.73
EOSINOPHIL # BLD AUTO: 0.5 10*3/MM3 (ref 0–0.4)
EOSINOPHIL NFR BLD AUTO: 5 % (ref 0.3–6.2)
ERYTHROCYTE [DISTWIDTH] IN BLOOD BY AUTOMATED COUNT: 14 % (ref 12.3–15.4)
GLUCOSE BLDC GLUCOMTR-MCNC: 105 MG/DL (ref 70–105)
GLUCOSE BLDC GLUCOMTR-MCNC: 125 MG/DL (ref 70–105)
GLUCOSE BLDC GLUCOMTR-MCNC: 137 MG/DL (ref 70–105)
GLUCOSE BLDC GLUCOMTR-MCNC: 143 MG/DL (ref 70–105)
GLUCOSE SERPL-MCNC: 127 MG/DL (ref 65–99)
HCT VFR BLD AUTO: 34.9 % (ref 34–46.6)
HGB BLD-MCNC: 11.7 G/DL (ref 12–15.9)
LYMPHOCYTES # BLD AUTO: 1.3 10*3/MM3 (ref 0.7–3.1)
LYMPHOCYTES NFR BLD AUTO: 12.8 % (ref 19.6–45.3)
MCH RBC QN AUTO: 28.9 PG (ref 26.6–33)
MCHC RBC AUTO-ENTMCNC: 33.3 G/DL (ref 31.5–35.7)
MCV RBC AUTO: 86.7 FL (ref 79–97)
MONOCYTES # BLD AUTO: 0.8 10*3/MM3 (ref 0.1–0.9)
MONOCYTES NFR BLD AUTO: 7.4 % (ref 5–12)
NEUTROPHILS NFR BLD AUTO: 7.7 10*3/MM3 (ref 1.7–7)
NEUTROPHILS NFR BLD AUTO: 73.9 % (ref 42.7–76)
NRBC BLD AUTO-RTO: 0.1 /100 WBC (ref 0–0.2)
PLATELET # BLD AUTO: 237 10*3/MM3 (ref 140–450)
PMV BLD AUTO: 7.6 FL (ref 6–12)
POTASSIUM SERPL-SCNC: 4.1 MMOL/L (ref 3.5–5.2)
QT INTERVAL: 395 MS
RBC # BLD AUTO: 4.03 10*6/MM3 (ref 3.77–5.28)
SODIUM SERPL-SCNC: 140 MMOL/L (ref 136–145)
TROPONIN T SERPL-MCNC: <0.01 NG/ML (ref 0–0.03)
WBC NRBC COR # BLD: 10.4 10*3/MM3 (ref 3.4–10.8)

## 2022-12-26 PROCEDURE — 96375 TX/PRO/DX INJ NEW DRUG ADDON: CPT

## 2022-12-26 PROCEDURE — 99213 OFFICE O/P EST LOW 20 MIN: CPT

## 2022-12-26 PROCEDURE — G0378 HOSPITAL OBSERVATION PER HR: HCPCS

## 2022-12-26 PROCEDURE — 96361 HYDRATE IV INFUSION ADD-ON: CPT

## 2022-12-26 PROCEDURE — 82962 GLUCOSE BLOOD TEST: CPT

## 2022-12-26 PROCEDURE — 93005 ELECTROCARDIOGRAM TRACING: CPT | Performed by: NURSE PRACTITIONER

## 2022-12-26 PROCEDURE — 85025 COMPLETE CBC W/AUTO DIFF WBC: CPT | Performed by: NURSE PRACTITIONER

## 2022-12-26 PROCEDURE — 93010 ELECTROCARDIOGRAM REPORT: CPT | Performed by: INTERNAL MEDICINE

## 2022-12-26 PROCEDURE — 80048 BASIC METABOLIC PNL TOTAL CA: CPT | Performed by: NURSE PRACTITIONER

## 2022-12-26 PROCEDURE — 84484 ASSAY OF TROPONIN QUANT: CPT | Performed by: NURSE PRACTITIONER

## 2022-12-26 PROCEDURE — 25010000002 LORAZEPAM PER 2 MG

## 2022-12-26 RX ORDER — SODIUM CHLORIDE 9 MG/ML
100 INJECTION, SOLUTION INTRAVENOUS CONTINUOUS
Status: DISCONTINUED | OUTPATIENT
Start: 2022-12-26 | End: 2022-12-27 | Stop reason: HOSPADM

## 2022-12-26 RX ORDER — INSULIN LISPRO 100 [IU]/ML
2-9 INJECTION, SOLUTION INTRAVENOUS; SUBCUTANEOUS
Status: DISCONTINUED | OUTPATIENT
Start: 2022-12-26 | End: 2022-12-27 | Stop reason: HOSPADM

## 2022-12-26 RX ORDER — MAGNESIUM CARB/ALUMINUM HYDROX 105-160MG
150 TABLET,CHEWABLE ORAL ONCE
Status: DISCONTINUED | OUTPATIENT
Start: 2022-12-26 | End: 2022-12-27 | Stop reason: HOSPADM

## 2022-12-26 RX ORDER — DICYCLOMINE HYDROCHLORIDE 10 MG/1
10 CAPSULE ORAL 4 TIMES DAILY
Status: DISCONTINUED | OUTPATIENT
Start: 2022-12-26 | End: 2022-12-27 | Stop reason: HOSPADM

## 2022-12-26 RX ORDER — BISACODYL 10 MG
10 SUPPOSITORY, RECTAL RECTAL DAILY PRN
Status: DISCONTINUED | OUTPATIENT
Start: 2022-12-26 | End: 2022-12-27 | Stop reason: HOSPADM

## 2022-12-26 RX ORDER — SORBITOL SOLUTION 70 %
50 SOLUTION, ORAL MISCELLANEOUS ONCE
Status: DISCONTINUED | OUTPATIENT
Start: 2022-12-26 | End: 2022-12-26

## 2022-12-26 RX ORDER — POLYETHYLENE GLYCOL 3350 17 G/17G
17 POWDER, FOR SOLUTION ORAL DAILY
Status: DISCONTINUED | OUTPATIENT
Start: 2022-12-26 | End: 2022-12-27 | Stop reason: HOSPADM

## 2022-12-26 RX ORDER — LORAZEPAM 2 MG/ML
0.5 INJECTION INTRAMUSCULAR ONCE
Status: COMPLETED | OUTPATIENT
Start: 2022-12-26 | End: 2022-12-26

## 2022-12-26 RX ORDER — OLANZAPINE 10 MG/2ML
1 INJECTION, POWDER, LYOPHILIZED, FOR SOLUTION INTRAMUSCULAR
Status: DISCONTINUED | OUTPATIENT
Start: 2022-12-26 | End: 2022-12-27 | Stop reason: HOSPADM

## 2022-12-26 RX ORDER — NICOTINE POLACRILEX 4 MG
15 LOZENGE BUCCAL
Status: DISCONTINUED | OUTPATIENT
Start: 2022-12-26 | End: 2022-12-27 | Stop reason: HOSPADM

## 2022-12-26 RX ORDER — AMOXICILLIN 250 MG
2 CAPSULE ORAL 2 TIMES DAILY
Status: DISCONTINUED | OUTPATIENT
Start: 2022-12-26 | End: 2022-12-27 | Stop reason: HOSPADM

## 2022-12-26 RX ORDER — LORAZEPAM 2 MG/ML
INJECTION INTRAMUSCULAR
Status: COMPLETED
Start: 2022-12-26 | End: 2022-12-26

## 2022-12-26 RX ORDER — DEXTROSE MONOHYDRATE 25 G/50ML
25 INJECTION, SOLUTION INTRAVENOUS
Status: DISCONTINUED | OUTPATIENT
Start: 2022-12-26 | End: 2022-12-27 | Stop reason: HOSPADM

## 2022-12-26 RX ORDER — BISACODYL 5 MG/1
5 TABLET, DELAYED RELEASE ORAL DAILY PRN
Status: DISCONTINUED | OUTPATIENT
Start: 2022-12-26 | End: 2022-12-27 | Stop reason: HOSPADM

## 2022-12-26 RX ORDER — SORBITOL SOLUTION 70 %
50 SOLUTION, ORAL MISCELLANEOUS
Status: DISPENSED | OUTPATIENT
Start: 2022-12-26 | End: 2022-12-26

## 2022-12-26 RX ADMIN — FAMOTIDINE 20 MG: 20 TABLET, FILM COATED ORAL at 10:36

## 2022-12-26 RX ADMIN — BUSPIRONE HYDROCHLORIDE 5 MG: 5 TABLET ORAL at 10:37

## 2022-12-26 RX ADMIN — DICYCLOMINE HYDROCHLORIDE 10 MG: 10 CAPSULE ORAL at 20:35

## 2022-12-26 RX ADMIN — MIDODRINE HYDROCHLORIDE 5 MG: 5 TABLET ORAL at 10:37

## 2022-12-26 RX ADMIN — LURASIDONE HYDROCHLORIDE 20 MG: 40 TABLET, FILM COATED ORAL at 10:36

## 2022-12-26 RX ADMIN — ASPIRIN 81 MG CHEWABLE TABLET 162 MG: 81 TABLET CHEWABLE at 11:00

## 2022-12-26 RX ADMIN — LORAZEPAM 0.5 MG: 2 INJECTION INTRAMUSCULAR; INTRAVENOUS at 14:44

## 2022-12-26 RX ADMIN — SENNOSIDES AND DOCUSATE SODIUM 2 TABLET: 50; 8.6 TABLET ORAL at 10:39

## 2022-12-26 RX ADMIN — FAMOTIDINE 20 MG: 20 TABLET, FILM COATED ORAL at 17:32

## 2022-12-26 RX ADMIN — MIDODRINE HYDROCHLORIDE 5 MG: 5 TABLET ORAL at 20:35

## 2022-12-26 RX ADMIN — SORBITOL SOLUTION (BULK) 50 ML: 70 SOLUTION at 13:53

## 2022-12-26 RX ADMIN — MIDODRINE HYDROCHLORIDE 5 MG: 5 TABLET ORAL at 15:24

## 2022-12-26 RX ADMIN — Medication 10 ML: at 10:30

## 2022-12-26 RX ADMIN — DICYCLOMINE HYDROCHLORIDE 10 MG: 10 CAPSULE ORAL at 13:53

## 2022-12-26 RX ADMIN — BUSPIRONE HYDROCHLORIDE 5 MG: 5 TABLET ORAL at 20:35

## 2022-12-26 RX ADMIN — BUSPIRONE HYDROCHLORIDE 5 MG: 5 TABLET ORAL at 15:24

## 2022-12-26 RX ADMIN — DICYCLOMINE HYDROCHLORIDE 10 MG: 10 CAPSULE ORAL at 17:32

## 2022-12-26 RX ADMIN — POLYETHYLENE GLYCOL 3350 17 G: 17 POWDER, FOR SOLUTION ORAL at 11:00

## 2022-12-26 RX ADMIN — ATORVASTATIN CALCIUM 80 MG: 40 TABLET, FILM COATED ORAL at 20:35

## 2022-12-26 RX ADMIN — SODIUM CHLORIDE 75 ML/HR: 9 INJECTION, SOLUTION INTRAVENOUS at 10:39

## 2022-12-26 RX ADMIN — GABAPENTIN 100 MG: 100 CAPSULE ORAL at 10:36

## 2022-12-26 RX ADMIN — GABAPENTIN 100 MG: 100 CAPSULE ORAL at 20:35

## 2022-12-26 RX ADMIN — LORAZEPAM 0.5 MG: 2 INJECTION INTRAMUSCULAR at 14:44

## 2022-12-26 NOTE — PAYOR COMM NOTE
PA FORM WITH CLINICALS FOR OBSERVATION ORDER:                        AUTHORIZATION PENDING:   PLEASE CALL OR FAX DETERMINATION TO CONTACT BELOW. THANK YOU.        Kia Richardson RN MSN  /UR  [facility]  [phone] office  [phone] fax  [email or web address]    [facility]  NPI: [id number]  Tax: [phone]          Eladia Connor (57 y.o. Female)     Date of Birth   1965    Social Security Number       Address   [address] [place] [address]    Home Phone   [phone]    MRN   [id number]       Nondenominational   Shinto    Marital Status                               Admission Date   12/25/22    Admission Type   Emergency    Admitting Provider   Jeff Alicea MD    Attending Provider   Jeff Alicea MD    Department, Room/Bed   [facility] OBSERVATION, 112/1       Discharge Date       Discharge Disposition       Discharge Destination                               Attending Provider: Jeff Alicea MD    Allergies: No Known Allergies    Isolation: None   Infection: None   Code Status: CPR    Ht: 170.2 cm (67\")   Wt: 134 kg (294 lb 12.8 oz)    Admission Cmt: None   Principal Problem: Constipation [K59.00]                 Active Insurance as of 12/25/2022     Primary Coverage     Payor Plan Insurance Group Employer/Plan Group    MISC COMMERCIAL MISC COMMERCIAL [id number]     Coverage Address Coverage Phone Number Coverage Fax Number Effective Dates    [address] [phone]  1/1/2021 - None Entered    [place] [id number]       Subscriber Name Subscriber Birth Date Member ID       ELADIA CONNOR 1965 [id number]           Secondary Coverage     Payor Plan Insurance Group Employer/Plan Group    MEDICAID PENDING [place]      Payor Plan Address Payor Plan Phone Number Payor Plan Fax Number Effective Dates       12/25/2022 - None Entered    Subscriber Name Subscriber Birth Date Member ID        ELADIA CONNOR 1965 [id number]                 Emergency Contacts      (Rel.) Home Phone Work Phone Mobile Phone    DIANA MADRIGAL (Daughter) -- -- [phone]    FARA MADRIGAL (Daughter) -- -- [phone]        22 1603  Initiate ED Observation Status  Once     Completed     Level of Care: Telemetry    Diagnosis: Constipation [544977]    Admitting Physician: ADDIE DE LA GARZA [[id number]]    Attending Physician: ADDIE DE LA GARZA [[id number]]                   History & Physical      Sarah Thomas APRN at 22 1147          [facility] Observation [facility] H&P    Patient Name: lEadia Connor  : 1965  MRN: [id number]  Primary Care Physician: Quincy Lemos APRN  Date of admission: 2022     Patient Care Team:  Quincy Lemos APRN as PCP - General (Family Medicine)          Subjective    History Present Illness     Chief Complaint:   Chief Complaint   Patient presents with   • Constipation     Pt reports constipation/impaction.  Pt reports she was recently hospitalized and states last BM was .  Pt reports increased pain and pressure.      Constipation    Ms. Connor is a 57 y.o.  presents to [facility] complaining of constipation     History of Present Illness    ED 22: Patient is a 57-year-old female presents to the ED with complaints of constipation over the past 2 weeks.  Daughter at bedside gives most of the history.  Daughter states she was recently hospitalized worked up for migraines versus pseudoseizures.  She states she was initially seen at our facility transferred to another facility for EEG.  Patient's daughter states since hospitalization she has not had a normal bowel movement.  Patient had a small bowel movement around 15 December.  She denies any hematochezia or melena.  Patient has tried over-the-counter enema and Dulcolax with no relief.  She does report generalized abdominal pain and distention.  She reports associated nausea and  dry heaving without any vomiting.  No fever or urinary complaints.  No chest pain or shortness of breath.  She rates her symptoms as moderate in severity.  Denies any other alleviating or exacerbating factors    OBS 12/26/22: Patient is a 57-year-old female presenting to the hospital with complaints of constipation.  Patient states last bowel movement was 2 weeks ago.  Patient states she used Dulcolax and enema which did not work.  Patient reports nausea without vomiting or fever.  Patient denies chest pain, dyspnea, dysuria, syncope or edema.  Patient states she has never had a colonoscopy.     Review of Systems   Constitutional: Positive for decreased appetite.   HENT: Negative.    Eyes: Negative.    Cardiovascular: Negative.    Respiratory: Negative.    Endocrine: Negative.    Hematologic/Lymphatic: Negative.    Skin: Positive for dry skin.   Musculoskeletal: Negative.    Gastrointestinal: Positive for bloating, abdominal pain, constipation and nausea.   Genitourinary: Negative.    Neurological: Negative.    Psychiatric/Behavioral: Negative.    Allergic/Immunologic: Negative.            Personal History     Past Medical History:   Past Medical History:   Diagnosis Date   • Anxiety    • Depression        Surgical History:      Past Surgical History:   Procedure Laterality Date   • CHOLECYSTECTOMY  1987   • EYE SURGERY     • KNEE SURGERY             Family History: family history includes Breast cancer in her mother; Heart disease in her father, mother, and sister; Lung cancer in her brother; Pancreatic cancer in her father. Otherwise pertinent FHx was reviewed and unremarkable.     Social History:  reports that she has never smoked. She has never used smokeless tobacco. She reports that she does not drink alcohol and does not use drugs.      Medications:  Prior to Admission medications    Medication Sig Start Date End Date Taking? Authorizing Provider   albuterol sulfate  (90 Base) MCG/ACT inhaler Inhale 2  puffs Every 6 (Six) Hours As Needed for Wheezing.   Yes Kamala Banks MD   aspirin 81 MG chewable tablet Chew 2 tablets Daily. 12/13/22  Yes Delores Azul DO   atorvastatin (LIPITOR) 80 MG tablet Take 1 tablet by mouth Every Night. 12/12/22  Yes Delores Azul DO   busPIRone (BUSPAR) 5 MG tablet Take 1 tablet by mouth 3 (Three) Times a Day. 10/4/22  Yes Quincy Lemos APRN   famotidine (PEPCID) 20 MG tablet Take 1 tablet by mouth 2 (Two) Times a Day Before Meals. 12/12/22  Yes Delores Azul DO   gabapentin (NEURONTIN) 100 MG capsule Take 1 capsule by mouth 2 (Two) Times a Day. 12/12/22  Yes Delores Azul DO   lurasidone (LATUDA) 40 MG tablet tablet Take 20 mg by mouth Daily.   Yes Kamala Banks MD   midodrine (PROAMATINE) 5 MG tablet Take 5 mg by mouth 3 (Three) Times a Day Before Meals.   Yes Kamala Banks MD       Allergies:  No Known Allergies    Objective    Objective     Vital Signs  Temp:  [97.8 °F (36.6 °C)-98.3 °F (36.8 °C)] 98.2 °F (36.8 °C)  Heart Rate:  [64-99] 70  Resp:  [16-18] 16  BP: ()/(56-70) 87/57  SpO2:  [98 %-100 %] 99 %  on  Flow (L/min):  [2] 2;   Device (Oxygen Therapy): room air  Body mass index is 46.17 kg/m².    Physical Exam  Vitals and nursing note reviewed.   Constitutional:       Appearance: Normal appearance. She is obese.   HENT:      Head: Normocephalic and atraumatic.      Right Ear: External ear normal.      Left Ear: External ear normal.      Nose: Nose normal.      Mouth/Throat:      Mouth: Mucous membranes are moist.      Pharynx: Oropharynx is clear.   Eyes:      Extraocular Movements: Extraocular movements intact.      Conjunctiva/sclera: Conjunctivae normal.      Pupils: Pupils are equal, round, and reactive to light.   Cardiovascular:      Rate and Rhythm: Normal rate and regular rhythm.      Pulses: Normal pulses.      Heart sounds: Normal heart sounds.   Pulmonary:      Effort: Pulmonary effort is normal.      Breath sounds:  Normal breath sounds.   Abdominal:      General: There is distension.   Musculoskeletal:         General: Normal range of motion.      Cervical back: Normal range of motion.   Skin:     General: Skin is warm.      Capillary Refill: Capillary refill takes less than 2 seconds.   Neurological:      General: No focal deficit present.      Mental Status: She is alert and oriented to person, place, and time. Mental status is at baseline.   Psychiatric:         Mood and Affect: Mood normal.         Behavior: Behavior normal.         Thought Content: Thought content normal.         Judgment: Judgment normal.           Results Review:  I have personally reviewed most recent cardiac tracings, lab results, microbiology results and radiology images and interpretations and agree with findings, most notably: CBC,CMP, CT abdomen pelvis, urinalysis, blood culture.    Results from last 7 days   Lab Units 12/26/22  0351   WBC 10*3/mm3 10.40   HEMOGLOBIN g/dL 11.7*   HEMATOCRIT % 34.9   PLATELETS 10*3/mm3 237     Results from last 7 days   Lab Units 12/26/22  0351 12/25/22  1412   SODIUM mmol/L 140 137   POTASSIUM mmol/L 4.1 4.2   CHLORIDE mmol/L 104 101   CO2 mmol/L 27.0 23.0   BUN mg/dL 8 10   CREATININE mg/dL 0.65 0.78   GLUCOSE mg/dL 127* 167*   CALCIUM mg/dL 8.6 9.4   ALT (SGPT) U/L  --  20   AST (SGOT) U/L  --  27     Estimated Creatinine Clearance: 133.3 mL/min (by C-G formula based on SCr of 0.65 mg/dL).  Brief Urine Lab Results  (Last result in the past 365 days)      Color   Clarity   Blood   Leuk Est   Nitrite   Protein   CREAT   Urine HCG        12/25/22 1558 Yellow   Clear   Negative   Negative   Negative   Negative                 Microbiology Results (last 10 days)     ** No results found for the last 240 hours. **          ECG/EMG Results (most recent)     None              Results for orders placed during the hospital encounter of 12/06/22    Adult Transthoracic Echo Complete W/ Cont if Necessary Per  Protocol    Interpretation Summary  •  Left ventricular systolic function is normal. Left ventricular ejection fraction appears to be 61 - 65%.  •  Left ventricular diastolic function was normal.  •  Saline test results are negative.  Study was suboptimal.      CT Abdomen Pelvis With Contrast    Result Date: 12/25/2022  1.     Moderate stool burden within the distal colon with some stranding in the perirectal fat and thickening of the rectal wall which could relate to a stercoral colitis. 2.     Hepatic steatosis. 3.     Myelolipoma left adrenal gland 4.     Degenerative change lower lumbar spine.  Electronically Signed By-Robin Al MD On:12/25/2022 3:08 PM This report was finalized on [id number] by  Robin Al MD.        Estimated Creatinine Clearance: 133.3 mL/min (by C-G formula based on SCr of 0.65 mg/dL).    Assessment & Plan   Assessment/Plan       Active Hospital Problems    Diagnosis  POA   • **Constipation [K59.00]  Yes      Resolved Hospital Problems   No resolved problems to display.       Constipation  -Continue PPI  -Dicyclomine added  -Alkaline phosphate 155  -Blood cultures pending  -Flagyl and Rocephin given in ED  -Continue IV fluids  -CT ab pelvis shows moderate stool burden with distal colon with some stranding and thickening of rectal wall correlating with colitis, hepatic steatosis  -Enema given  -Sorbitol ordered  -GI consult    Hyperlipidemia  -Continue atorvastatin and aspirin     Bipolar depression  -Continue buspar and latuda   -No signs of HI/SI     Obesity  -BMI of 46.17  -Lifestyle modifications recommended       VTE Prophylaxis -   Mechanical Order History:      Ordered        12/25/22 1608  Place Sequential Compression Device  Once            12/25/22 1608  Maintain Sequential Compression Device  Continuous                    Pharmalogical Order History:     None          CODE STATUS:    Code Status and Medical Interventions:   Ordered at: 12/25/22 1602     Level Of Support  Discussed With:    Patient     Code Status (Patient has no pulse and is not breathing):    CPR (Attempt to Resuscitate)     Medical Interventions (Patient has pulse or is breathing):    Full Support       This patient has been examined wearing personal protective equipment.     I discussed the patient's findings and my recommendations with patient, family, nursing staff, primary care team and consulting provider.      Signature: Electronically signed by SCOOBY Su, 12/26/22, 11:48 AM EST.      Electronically signed by Sarah Thomas APRN at 12/26/22 1347          Emergency Department Notes      Nikki Thomas PA at 12/25/22 1558     Attestation signed by Jeff Alicea MD at 12/25/22 1615        SUPERVISE: For this patient encounter, I reviewed the APC's documentation, treatment plan, and medical decision making.  Jeff Alicea MD 12/25/2022 16:15 EST                         Subjective   History of Present Illness  Patient is a 57-year-old female presents to the ED with complaints of constipation over the past 2 weeks.  Daughter at bedside gives most of the history.  Daughter states she was recently hospitalized worked up for migraines versus pseudoseizures.  She states she was initially seen at our facility transferred to another facility for EEG.  Patient's daughter states since hospitalization she has not had a normal bowel movement.  Patient had a small bowel movement around 15 December.  She denies any hematochezia or melena.  Patient has tried over-the-counter enema and Dulcolax with no relief.  She does report generalized abdominal pain and distention.  She reports associated nausea and dry heaving without any vomiting.  No fever or urinary complaints.  No chest pain or shortness of breath.  She rates her symptoms as moderate in severity.  Denies any other alleviating or exacerbating factors.        Review of Systems   Constitutional: Negative.    Respiratory: Negative.    Cardiovascular:  Negative.    Gastrointestinal: Positive for abdominal distention, abdominal pain, constipation and nausea. Negative for blood in stool, diarrhea and vomiting.   Genitourinary: Negative for decreased urine volume, difficulty urinating, dysuria, flank pain, hematuria, urgency, vaginal bleeding, vaginal discharge and vaginal pain.   Musculoskeletal: Negative for back pain, neck pain and neck stiffness.   Skin: Negative.    Neurological: Negative for dizziness, seizures, syncope, weakness, light-headedness and headaches.   Hematological: Negative.    All other systems reviewed and are negative.      Past Medical History:   Diagnosis Date   • Anxiety    • Depression        No Known Allergies    Past Surgical History:   Procedure Laterality Date   • CHOLECYSTECTOMY  1987   • EYE SURGERY     • KNEE SURGERY         Family History   Problem Relation Age of Onset   • Heart disease Mother    • Breast cancer Mother    • Heart disease Father    • Pancreatic cancer Father    • Heart disease Sister    • Lung cancer Brother        Social History     Socioeconomic History   • Marital status:    Tobacco Use   • Smoking status: Never   • Smokeless tobacco: Never   Substance and Sexual Activity   • Alcohol use: Never   • Drug use: Never   • Sexual activity: Defer           Objective   Physical Exam  Vitals and nursing note reviewed.   Constitutional:       General: She is not in acute distress.     Appearance: She is well-developed. She is obese. She is not ill-appearing, toxic-appearing or diaphoretic.   HENT:      Head: Normocephalic and atraumatic.      Mouth/Throat:      Mouth: Mucous membranes are moist.      Pharynx: Oropharynx is clear.   Eyes:      General: No scleral icterus.     Extraocular Movements: Extraocular movements intact.      Pupils: Pupils are equal, round, and reactive to light.   Cardiovascular:      Rate and Rhythm: Normal rate and regular rhythm.      Pulses: Normal pulses.      Heart sounds: No murmur  heard.    No friction rub. No gallop.   Pulmonary:      Effort: Pulmonary effort is normal. No respiratory distress.      Breath sounds: Normal breath sounds. No stridor. No wheezing, rhonchi or rales.   Chest:      Chest wall: No tenderness.   Abdominal:      General: Bowel sounds are normal. There is no distension. There are no signs of injury.      Palpations: Abdomen is soft.      Tenderness: There is generalized abdominal tenderness. There is no right CVA tenderness, left CVA tenderness, guarding or rebound. Negative signs include Rovsing's sign.      Hernia: No hernia is present.   Skin:     General: Skin is warm.      Capillary Refill: Capillary refill takes less than 2 seconds.      Coloration: Skin is not cyanotic, jaundiced or pale.      Findings: No rash.   Neurological:      General: No focal deficit present.      Mental Status: She is alert and oriented to person, place, and time.   Psychiatric:         Mood and Affect: Mood normal.         Behavior: Behavior normal.         Procedures          ED Course    /65   Pulse 69   Temp 97.8 °F (36.6 °C) (Temporal)   Resp 18   Ht 170.2 cm (67\")   Wt 136 kg (299 lb 9.7 oz)   LMP  (LMP Unknown)   SpO2 100%   BMI 46.92 kg/m²   Medications   sodium chloride 0.9 % flush 10 mL (has no administration in time range)   cefTRIAXone (ROCEPHIN) 1 g in sodium chloride 0.9 % 100 mL IVPB (has no administration in time range)   metroNIDAZOLE (FLAGYL) IVPB 500 mg (has no administration in time range)   sodium chloride 0.9 % bolus 500 mL (500 mL Intravenous New Bag 12/25/22 1412)   iopamidol (ISOVUE-370) 76 % injection 100 mL (100 mL Intravenous Given 12/25/22 1454)     Labs Reviewed   COMPREHENSIVE METABOLIC PANEL - Abnormal; Notable for the following components:       Result Value    Glucose 167 (*)     Alkaline Phosphatase 155 (*)     All other components within normal limits    Narrative:     GFR Normal >60  Chronic Kidney Disease <60  Kidney Failure <15      CBC WITH AUTO DIFFERENTIAL - Abnormal; Notable for the following components:    WBC 12.00 (*)     Neutrophil % 79.4 (*)     Lymphocyte % 11.3 (*)     Neutrophils, Absolute 9.50 (*)     All other components within normal limits   LIPASE - Normal   BLOOD CULTURE   BLOOD CULTURE   URINALYSIS W/ CULTURE IF INDICATED   CBC AND DIFFERENTIAL    Narrative:     The following orders were created for panel order CBC & Differential.  Procedure                               Abnormality         Status                     ---------                               -----------         ------                     CBC Auto Differential[[id number]]        Abnormal            Final result                 Please view results for these tests on the individual orders.   EXTRA TUBES    Narrative:     The following orders were created for panel order Extra Tubes.  Procedure                               Abnormality         Status                     ---------                               -----------         ------                     Gold Top - SST[[id number]]                                   Final result               Light Blue Top[[id number]]                                   Final result                 Please view results for these tests on the individual orders.   GOLD TOP - SST   LIGHT BLUE TOP     CT Abdomen Pelvis With Contrast    Result Date: 12/25/2022  1.     Moderate stool burden within the distal colon with some stranding in the perirectal fat and thickening of the rectal wall which could relate to a stercoral colitis. 2.     Hepatic steatosis. 3.     Myelolipoma left adrenal gland 4.     Degenerative change lower lumbar spine.  Electronically Signed By-Robin Al MD On:12/25/2022 3:08 PM This report was finalized on [id number] by  Robin Al MD.                                           MDM  Number of Diagnoses or Management Options  Constipation, unspecified constipation type  Generalized abdominal pain  Diagnosis  management comments: Chart Review:  Hospitalization December 2022 taken from hospital course:  Eladia Connor is a 57 y.o. female admitted to the hospital because of weakness, work-up diagnosis was suspected CVA, right leg weakness and numbness and associated tingling, work-up of stroke came back negative, patient also was taking Otezla for psoriasis last dose 2 weeks before of the current symptoms, there were concerns about TB that was ruled out, patient continued to feel tired generally with intermittent weakness and numbness, her blood pressure is borderline low, family requested the patient will be transferred to [facility] for further investigations.      Comorbidity: As per past medical history  Differentials: DKA, intra-abdominal infection, dissection, peritonitis, peptic ulcer disease, pancreatitis, hepatitis, ischemic bowel, bowel obstruction, appendicitis     ;this list is not all inclusive and does not constitute the entirety of considered causes  Labs: As above  Imaging: Was interpreted by physician and reviewed by myself:  CT Abdomen Pelvis With Contrast   Final Result    1.     Moderate stool burden within the distal colon with some stranding    in the perirectal fat and thickening of the rectal wall which could    relate to a stercoral colitis.    2.     Hepatic steatosis.    3.     Myelolipoma left adrenal gland    4.     Degenerative change lower lumbar spine.         Electronically Signed By-Robin Al MD On:12/25/2022 3:08 PM    This report was finalized on [id number] by  Robin Al MD.       Disposition/Treatment:  Appropriate PPE was worn during exam and throughout all encounters with the patient.  While in the ED IV was placed and labs were obtained patient was placed on proper monitor she was afebrile and appeared nontoxic upon arrival to triage she was found to be hypotensive with significant improvement on brought back to room which leads me to believe this could  have been an inaccurate reading.  Patient initially presented to the ED with complaints of abdominal pain and constipation.    Lab results showed a white count of 12 patient was hemodynamically stable.  Metabolic panel showed glucose 167 alk phos 155 otherwise unremarkable.  Lipase normal urinalysis pending upon placement in observation unit.  Blood cultures are pending.  CT abdomen pelvis did show constipation and signs of possible colitis patient was given Flagyl and Rocephin while in the ED.  Enema was also ordered while in the ED. due to patient's continued abdominal pain and concern for constipation she will be placed in observation unit for bowel cleanse and further evaluation and management of her abdominal pain.  Patient was in agreement with plan.  Spoke to Sarah HERNDON who was in agreement with plan and observation unit.       Amount and/or Complexity of Data Reviewed  Clinical lab tests: reviewed  Tests in the radiology section of CPT®: reviewed        Final diagnoses:   Constipation, unspecified constipation type   Generalized abdominal pain       ED Disposition  ED Disposition     ED Disposition   Decision to Admit    Condition   --    Comment   --             No follow-up provider specified.       Medication List      No changes were made to your prescriptions during this visit.          Nikki Thomas PA  12/25/22 1607      Electronically signed by Jeff Alicea MD at 12/25/22 1615       Physician Progress Notes (all)    No notes of this type exist for this encounter.            Consult Notes (all)      Marge Stafford APRN at 12/26/22 1247      Consult Orders    1. Inpatient Gastroenterology Consult [[id number]] ordered by Sarah Thomas APRN at 12/26/22 0921               GI CONSULT  NOTE:    Referring Provider: Sarah Thomas NP    Chief complaint: Constipation    Subjective     History of present illness: Eladia Connor is a 57 y.o. female with history of anxiety, depression, and  cholecystectomy who presents with complaints of constipation over the past 2 weeks.  Patient was recently hospitalized here [facility] for migraines versus pseudoseizures and was transferred to [facility] for EEG.  Patient states that since this hospitalization she has had trouble moving her bowels.  She had a very small bowel movement on 12/15/2022.  Denies any hematochezia or melena.  States that she typically would have a bowel movement daily prior to onset of recent constipation.  She reports intermittent lower abdominal cramping pain and pressure which is diffuse in her upper abdomen.  She does have intermittent nausea, but no vomiting or hematemesis.  No heartburn or dysphagia.      Endo History:  No previous endoscopy.    Past Medical History:  Past Medical History:   Diagnosis Date   • Anxiety    • Depression        Past Surgical History:  Past Surgical History:   Procedure Laterality Date   • CHOLECYSTECTOMY  1987   • EYE SURGERY     • KNEE SURGERY         Social History:  Social History     Tobacco Use   • Smoking status: Never   • Smokeless tobacco: Never   Substance Use Topics   • Alcohol use: Never   • Drug use: Never       Family History:  Family History   Problem Relation Age of Onset   • Heart disease Mother    • Breast cancer Mother    • Heart disease Father    • Pancreatic cancer Father    • Heart disease Sister    • Lung cancer Brother        Medications:  Medications Prior to Admission   Medication Sig Dispense Refill Last Dose   • albuterol sulfate  (90 Base) MCG/ACT inhaler Inhale 2 puffs Every 6 (Six) Hours As Needed for Wheezing.      • aspirin 81 MG chewable tablet Chew 2 tablets Daily.      • atorvastatin (LIPITOR) 80 MG tablet Take 1 tablet by mouth Every Night. 90 tablet     • busPIRone (BUSPAR) 5 MG tablet Take 1 tablet by mouth 3 (Three) Times a Day. 30 tablet 5    • famotidine (PEPCID) 20 MG tablet Take 1 tablet by mouth 2 (Two) Times a Day Before Meals.      • gabapentin  (NEURONTIN) 100 MG capsule Take 1 capsule by mouth 2 (Two) Times a Day.      • lurasidone (LATUDA) 40 MG tablet tablet Take 20 mg by mouth Daily.      • midodrine (PROAMATINE) 5 MG tablet Take 5 mg by mouth 3 (Three) Times a Day Before Meals.          Scheduled Meds:aspirin, 162 mg, Oral, Daily  atorvastatin, 80 mg, Oral, Nightly  busPIRone, 5 mg, Oral, TID  dicyclomine, 10 mg, Oral, 4x Daily  famotidine, 20 mg, Oral, BID AC  gabapentin, 100 mg, Oral, BID  insulin lispro, 2-9 Units, Subcutaneous, TID With Meals  lurasidone, 20 mg, Oral, Daily  magnesium citrate, 150 mL, Oral, Once  midodrine, 5 mg, Oral, TID AC  senna-docusate sodium, 2 tablet, Oral, BID   And  polyethylene glycol, 17 g, Oral, Daily  sodium chloride, 10 mL, Intravenous, Q12H  sorbitol, 50 mL, Oral, Q1H      Continuous Infusions:sodium chloride, 100 mL/hr, Last Rate: 75 mL/hr (12/26/22 1039)      PRN Meds:.•  acetaminophen  •  albuterol sulfate HFA  •  senna-docusate sodium **AND** polyethylene glycol **AND** bisacodyl **AND** bisacodyl  •  dextrose  •  dextrose  •  glucagon (human recombinant)  •  ondansetron **OR** ondansetron  •  sodium chloride  •  sodium chloride    ALLERGIES:  Patient has no known allergies.    ROS:  The following systems were reviewed  Constitution:  No fevers, chills, no unintentional weight loss  Skin: no rash, no jaundice  Eyes:  No blurry vision, no eye pain  HENT:  No change in hearing or smell  Resp:  No dyspnea or cough  CV:  No chest pain or palpitations  :  No dysuria, hematuria  Musculoskeletal:  No leg cramps or arthralgias  Neuro:  No tremor, no numbness  Psych:  No depression or confusion    Objective     Vital Signs:   Vitals:    12/25/22 1701 12/25/22 2245 12/26/22 0623 12/26/22 1026   BP: 101/64 93/59 96/61 (!) 87/57   BP Location: Left arm Left arm Left arm Left arm   Patient Position: Lying Lying Lying Lying   Pulse: 72 67 69 70   Resp: 16 17 17 16   Temp: 98 °F (36.7 °C) 98.3 °F (36.8 °C) 98.2 °F (36.8 °C)  98.2 °F (36.8 °C)   TempSrc: Oral Oral Oral Oral   SpO2: 100% 99% 98% 99%   Weight: 134 kg (294 lb 12.8 oz)      Height: 170.2 cm (67\")          Physical Exam:       General Appearance:    Awake and alert, in no acute distress   Head:    Normocephalic, without obvious abnormality, atraumatic   Throat:   No oral lesions, no thrush, oral mucosa moist   Lungs:     Respirations regular, even and unlabored   Chest Wall:    No abnormalities observed   Abdomen:     Soft, obese, non-tender, no rebound or guarding   Rectal:     Deferred   Extremities:   Moves all extremities, no edema, no cyanosis   Pulses:   Pulses palpable and equal bilaterally                   Results Review:   I reviewed the patient's labs and imaging.  CBC    Results from last 7 days   Lab Units 12/26/22  0351 12/25/22  1412   WBC 10*3/mm3 10.40 12.00*   HEMOGLOBIN g/dL 11.7* 13.0   PLATELETS 10*3/mm3 237 289     CMP   Results from last 7 days   Lab Units 12/26/22  0351 12/25/22  1412   SODIUM mmol/L 140 137   POTASSIUM mmol/L 4.1 4.2   CHLORIDE mmol/L 104 101   CO2 mmol/L 27.0 23.0   BUN mg/dL 8 10   CREATININE mg/dL 0.65 0.78   GLUCOSE mg/dL 127* 167*   ALBUMIN g/dL  --  4.00   BILIRUBIN mg/dL  --  0.6   ALK PHOS U/L  --  155*   AST (SGOT) U/L  --  27   ALT (SGPT) U/L  --  20   LIPASE U/L  --  15     Cr Clearance Estimated Creatinine Clearance: 133.3 mL/min (by C-G formula based on SCr of 0.65 mg/dL).  Coag     HbA1C   Lab Results   Component Value Date    HGBA1C 5.7 (H) 12/07/2022    HGBA1C 5.6 10/04/2022    HGBA1C 5.3 02/17/2021     Blood Glucose   Glucose   Date/Time Value Ref Range Status   12/26/2022 1141 143 (H) 70 - 105 mg/dL Final     Comment:     Serial Number: [id number]:  [id number]   12/26/2022 0819 125 (H) 70 - 105 mg/dL Final     Comment:     Serial Number: [id number]:  [id number]     Infection     UA    Results from last 7 days   Lab Units 12/25/22  1558   NITRITE UA  Negative     Radiology(recent) CT Abdomen Pelvis With  Contrast    Result Date: 12/25/2022  1.     Moderate stool burden within the distal colon with some stranding in the perirectal fat and thickening of the rectal wall which could relate to a stercoral colitis. 2.     Hepatic steatosis. 3.     Myelolipoma left adrenal gland 4.     Degenerative change lower lumbar spine.  Electronically Signed By-Robin Al MD On:12/25/2022 3:08 PM This report was finalized on [id number] by  Robin Al MD.         ASSESSMENT:  57-year-old female with history of anxiety, depression, and cholecystectomy who presents with complaints of constipation over the past 2 weeks.     -Constipation   -Abnormal CT showing perirectal colitis likely sterile coral ulcer  -Lower abdominal cramping pain  -Anxiety/depression  -History of migraines/possible pseudoseizures      PLAN:  We will give sorbitol bowel purge.    Agree with enema.  We will advance to regular diet as tolerated.  Recommend outpatient colonoscopy at some point.  Recommend patient be discharged home on bowel regimen.  Supportive care.    I discussed the patients findings and my recommendations with the patient.    We appreciate the referral.    Electronically signed by SCOOBY Cheek, 12/26/22, 12:47 PM EST.                Electronically signed by Marge Stafford APRN at 12/26/22 6572

## 2022-12-26 NOTE — CASE MANAGEMENT/SOCIAL WORK
Discharge Planning Assessment   [facility]     Patient Name: Eladia Connor  MRN: [id number]  Today's Date: 12/26/2022    Admit Date: 12/25/2022    Plan: Anticipate home.  2L O2 with [facility].   Discharge Needs Assessment     Row Name 12/26/22 1743       Living Environment    People in Home alone    Current Living Arrangements home    Primary Care Provided by self    Provides Primary Care For no one    Quality of Family Relationships helpful;involved;supportive    Able to Return to Prior Arrangements yes       Resource/Environmental Concerns    Resource/Environmental Concerns none       Transition Planning    Patient/Family Anticipates Transition to home with family    Patient/Family Anticipated Services at Transition none    Transportation Anticipated car, drives self;family or friend will provide       Discharge Needs Assessment    Readmission Within the Last 30 Days no previous admission in last 30 days    Equipment Currently Used at Home cane, straight;pulse ox;shower chair;oxygen               Discharge Plan     Row Name 12/26/22 1741       Plan    Plan Anticipate home.  2L O2 with [facility].    Patient/Family in Agreement with Plan yes    Plan Comments Met with patietn at bedside.  Lives at home.  IADL.  Denies discharge needs.  Verfied PCP.  Barriers to discharge: GI following.  IVfluids.   Code stroke 12.26.              Continued Care and Services - Admitted Since 12/25/2022    Coordination has not been started for this encounter.       Expected Discharge Date and Time     Expected Discharge Date Expected Discharge Time    Dec 27, 2022          Demographic Summary     Row Name 12/26/22 1743       General Information    Admission Type observation    Arrived From emergency department    Referral Source admission list    Reason for Consult discharge planning    Preferred Language English               Functional Status     Row Name 12/26/22 1741       Functional Status    Usual Activity Tolerance good    Current  Activity Tolerance good       Functional Status, IADL    Medications independent    Meal Preparation independent    Housekeeping independent    Laundry independent    Shopping independent       Mental Status    General Appearance WDL WDL       Mental Status Summary    Recent Changes in Mental Status/Cognitive Functioning no changes            Met with patient in room wearing PPE: mask.    Maintained distance greater than six feet and spent less than 15 minutes in the room.    Yvonne Milligan RN      Office Phone [phone]  Office Cell [phone]

## 2022-12-26 NOTE — NURSING NOTE
Code stroke called at approx 1415, patient unable to respond to verbal commands, vitals stable, patients history shows psuedoseizures. Dr. Davis was the physician present during the code.  Per physician, he examined the patient and cancelled the code stroke at 1435. Ativan 0.5mg IV ordered x1 dose. Gil CANTU.

## 2022-12-26 NOTE — H&P
[facility] Observation Unit H&P    Patient Name: Eladia Connor  : 1965  MRN: [id number]  Primary Care Physician: Quincy Lemos APRN  Date of admission: 2022     Patient Care Team:  Quincy Lemos APRN as PCP - General (Family Medicine)          Subjective   History Present Illness     Chief Complaint:   Chief Complaint   Patient presents with   • Constipation     Pt reports constipation/impaction.  Pt reports she was recently hospitalized and states last BM was .  Pt reports increased pain and pressure.      Constipation    Ms. Connor is a 57 y.o.  presents to [facility] complaining of constipation     History of Present Illness    ED 22: Patient is a 57-year-old female presents to the ED with complaints of constipation over the past 2 weeks.  Daughter at bedside gives most of the history.  Daughter states she was recently hospitalized worked up for migraines versus pseudoseizures.  She states she was initially seen at our facility transferred to another facility for EEG.  Patient's daughter states since hospitalization she has not had a normal bowel movement.  Patient had a small bowel movement around 15 December.  She denies any hematochezia or melena.  Patient has tried over-the-counter enema and Dulcolax with no relief.  She does report generalized abdominal pain and distention.  She reports associated nausea and dry heaving without any vomiting.  No fever or urinary complaints.  No chest pain or shortness of breath.  She rates her symptoms as moderate in severity.  Denies any other alleviating or exacerbating factors    OBS 22: Patient is a 57-year-old female presenting to the hospital with complaints of constipation.  Patient states last bowel movement was 2 weeks ago.  Patient states she used Dulcolax and enema which did not work.  Patient reports nausea without vomiting or fever.  Patient denies chest pain, dyspnea, dysuria, syncope or edema.   Patient states she has never had a colonoscopy.     Review of Systems   Constitutional: Positive for decreased appetite.   HENT: Negative.    Eyes: Negative.    Cardiovascular: Negative.    Respiratory: Negative.    Endocrine: Negative.    Hematologic/Lymphatic: Negative.    Skin: Positive for dry skin.   Musculoskeletal: Negative.    Gastrointestinal: Positive for bloating, abdominal pain, constipation and nausea.   Genitourinary: Negative.    Neurological: Negative.    Psychiatric/Behavioral: Negative.    Allergic/Immunologic: Negative.            Personal History     Past Medical History:   Past Medical History:   Diagnosis Date   • Anxiety    • Depression        Surgical History:      Past Surgical History:   Procedure Laterality Date   • CHOLECYSTECTOMY  1987   • EYE SURGERY     • KNEE SURGERY             Family History: family history includes Breast cancer in her mother; Heart disease in her father, mother, and sister; Lung cancer in her brother; Pancreatic cancer in her father. Otherwise pertinent FHx was reviewed and unremarkable.     Social History:  reports that she has never smoked. She has never used smokeless tobacco. She reports that she does not drink alcohol and does not use drugs.      Medications:  Prior to Admission medications    Medication Sig Start Date End Date Taking? Authorizing Provider   albuterol sulfate  (90 Base) MCG/ACT inhaler Inhale 2 puffs Every 6 (Six) Hours As Needed for Wheezing.   Yes Provider, MD Kamala   aspirin 81 MG chewable tablet Chew 2 tablets Daily. 12/13/22  Yes Delores Azul DO   atorvastatin (LIPITOR) 80 MG tablet Take 1 tablet by mouth Every Night. 12/12/22  Yes Delores Azul DO   busPIRone (BUSPAR) 5 MG tablet Take 1 tablet by mouth 3 (Three) Times a Day. 10/4/22  Yes Quincy Lemos APRN   famotidine (PEPCID) 20 MG tablet Take 1 tablet by mouth 2 (Two) Times a Day Before Meals. 12/12/22  Yes Delores Azul DO   gabapentin (NEURONTIN) 100 MG  capsule Take 1 capsule by mouth 2 (Two) Times a Day. 12/12/22  Yes Delores Azul DO   lurasidone (LATUDA) 40 MG tablet tablet Take 20 mg by mouth Daily.   Yes ProviderKamala MD   midodrine (PROAMATINE) 5 MG tablet Take 5 mg by mouth 3 (Three) Times a Day Before Meals.   Yes Provider, MD Kamala       Allergies:  No Known Allergies    Objective   Objective     Vital Signs  Temp:  [97.8 °F (36.6 °C)-98.3 °F (36.8 °C)] 98.2 °F (36.8 °C)  Heart Rate:  [64-99] 70  Resp:  [16-18] 16  BP: ()/(56-70) 87/57  SpO2:  [98 %-100 %] 99 %  on  Flow (L/min):  [2] 2;   Device (Oxygen Therapy): room air  Body mass index is 46.17 kg/m².    Physical Exam  Vitals and nursing note reviewed.   Constitutional:       Appearance: Normal appearance. She is obese.   HENT:      Head: Normocephalic and atraumatic.      Right Ear: External ear normal.      Left Ear: External ear normal.      Nose: Nose normal.      Mouth/Throat:      Mouth: Mucous membranes are moist.      Pharynx: Oropharynx is clear.   Eyes:      Extraocular Movements: Extraocular movements intact.      Conjunctiva/sclera: Conjunctivae normal.      Pupils: Pupils are equal, round, and reactive to light.   Cardiovascular:      Rate and Rhythm: Normal rate and regular rhythm.      Pulses: Normal pulses.      Heart sounds: Normal heart sounds.   Pulmonary:      Effort: Pulmonary effort is normal.      Breath sounds: Normal breath sounds.   Abdominal:      General: There is distension.   Musculoskeletal:         General: Normal range of motion.      Cervical back: Normal range of motion.   Skin:     General: Skin is warm.      Capillary Refill: Capillary refill takes less than 2 seconds.   Neurological:      General: No focal deficit present.      Mental Status: She is alert and oriented to person, place, and time. Mental status is at baseline.   Psychiatric:         Mood and Affect: Mood normal.         Behavior: Behavior normal.         Thought Content: Thought  content normal.         Judgment: Judgment normal.           Results Review:  I have personally reviewed most recent cardiac tracings, lab results, microbiology results and radiology images and interpretations and agree with findings, most notably: CBC,CMP, CT abdomen pelvis, urinalysis, blood culture.    Results from last 7 days   Lab Units 12/26/22  0351   WBC 10*3/mm3 10.40   HEMOGLOBIN g/dL 11.7*   HEMATOCRIT % 34.9   PLATELETS 10*3/mm3 237     Results from last 7 days   Lab Units 12/26/22  0351 12/25/22  1412   SODIUM mmol/L 140 137   POTASSIUM mmol/L 4.1 4.2   CHLORIDE mmol/L 104 101   CO2 mmol/L 27.0 23.0   BUN mg/dL 8 10   CREATININE mg/dL 0.65 0.78   GLUCOSE mg/dL 127* 167*   CALCIUM mg/dL 8.6 9.4   ALT (SGPT) U/L  --  20   AST (SGOT) U/L  --  27     Estimated Creatinine Clearance: 133.3 mL/min (by C-G formula based on SCr of 0.65 mg/dL).  Brief Urine Lab Results  (Last result in the past 365 days)      Color   Clarity   Blood   Leuk Est   Nitrite   Protein   CREAT   Urine HCG        12/25/22 1558 Yellow   Clear   Negative   Negative   Negative   Negative                 Microbiology Results (last 10 days)     ** No results found for the last 240 hours. **          ECG/EMG Results (most recent)     None              Results for orders placed during the hospital encounter of 12/06/22    Adult Transthoracic Echo Complete W/ Cont if Necessary Per Protocol    Interpretation Summary  •  Left ventricular systolic function is normal. Left ventricular ejection fraction appears to be 61 - 65%.  •  Left ventricular diastolic function was normal.  •  Saline test results are negative.  Study was suboptimal.      CT Abdomen Pelvis With Contrast    Result Date: 12/25/2022  1.     Moderate stool burden within the distal colon with some stranding in the perirectal fat and thickening of the rectal wall which could relate to a stercoral colitis. 2.     Hepatic steatosis. 3.     Myelolipoma left adrenal gland 4.      Degenerative change lower lumbar spine.  Electronically Signed By-Robin Al MD On:12/25/2022 3:08 PM This report was finalized on [id number] by  Robin Al MD.        Estimated Creatinine Clearance: 133.3 mL/min (by C-G formula based on SCr of 0.65 mg/dL).    Assessment & Plan   Assessment/Plan       Active Hospital Problems    Diagnosis  POA   • **Constipation [K59.00]  Yes      Resolved Hospital Problems   No resolved problems to display.       Constipation  -Continue PPI  -Dicyclomine added  -Alkaline phosphate 155  -Blood cultures pending  -Flagyl and Rocephin given in ED  -Continue IV fluids  -CT ab pelvis shows moderate stool burden with distal colon with some stranding and thickening of rectal wall correlating with colitis, hepatic steatosis  -Enema given  -Sorbitol ordered  -GI consult    Hyperlipidemia  -Continue atorvastatin and aspirin     Bipolar depression  -Continue buspar and latuda   -No signs of HI/SI     Obesity  -BMI of 46.17  -Lifestyle modifications recommended       VTE Prophylaxis -   Mechanical Order History:      Ordered        12/25/22 1608  Place Sequential Compression Device  Once            12/25/22 1608  Maintain Sequential Compression Device  Continuous                    Pharmalogical Order History:     None          CODE STATUS:    Code Status and Medical Interventions:   Ordered at: 12/25/22 1602     Level Of Support Discussed With:    Patient     Code Status (Patient has no pulse and is not breathing):    CPR (Attempt to Resuscitate)     Medical Interventions (Patient has pulse or is breathing):    Full Support       This patient has been examined wearing personal protective equipment.     I discussed the patient's findings and my recommendations with patient, family, nursing staff, primary care team and consulting provider.      Signature: Electronically signed by SCOOBY Su, 12/26/22, 11:48 AM EST.

## 2022-12-26 NOTE — PLAN OF CARE
Goal Outcome Evaluation:              Outcome Evaluation: Pt. received multi. different types of bowel medication, waiting on results, will continue to monitor

## 2022-12-26 NOTE — CONSULTS
GI CONSULT  NOTE:    Referring Provider: Sarah Thomas NP    Chief complaint: Constipation    Subjective     History of present illness: Eladia Connor is a 57 y.o. female with history of anxiety, depression, and cholecystectomy who presents with complaints of constipation over the past 2 weeks.  Patient was recently hospitalized here [facility] for migraines versus pseudoseizures and was transferred to [facility] for EEG.  Patient states that since this hospitalization she has had trouble moving her bowels.  She had a very small bowel movement on 12/15/2022.  Denies any hematochezia or melena.  States that she typically would have a bowel movement daily prior to onset of recent constipation.  She reports intermittent lower abdominal cramping pain and pressure which is diffuse in her upper abdomen.  She does have intermittent nausea, but no vomiting or hematemesis.  No heartburn or dysphagia.      Endo History:  No previous endoscopy.    Past Medical History:  Past Medical History:   Diagnosis Date   • Anxiety    • Depression        Past Surgical History:  Past Surgical History:   Procedure Laterality Date   • CHOLECYSTECTOMY  1987   • EYE SURGERY     • KNEE SURGERY         Social History:  Social History     Tobacco Use   • Smoking status: Never   • Smokeless tobacco: Never   Substance Use Topics   • Alcohol use: Never   • Drug use: Never       Family History:  Family History   Problem Relation Age of Onset   • Heart disease Mother    • Breast cancer Mother    • Heart disease Father    • Pancreatic cancer Father    • Heart disease Sister    • Lung cancer Brother        Medications:  Medications Prior to Admission   Medication Sig Dispense Refill Last Dose   • albuterol sulfate  (90 Base) MCG/ACT inhaler Inhale 2 puffs Every 6 (Six) Hours As Needed for Wheezing.      • aspirin 81 MG chewable tablet Chew 2 tablets Daily.      • atorvastatin (LIPITOR) 80 MG tablet Take 1 tablet by mouth Every Night.  90 tablet     • busPIRone (BUSPAR) 5 MG tablet Take 1 tablet by mouth 3 (Three) Times a Day. 30 tablet 5    • famotidine (PEPCID) 20 MG tablet Take 1 tablet by mouth 2 (Two) Times a Day Before Meals.      • gabapentin (NEURONTIN) 100 MG capsule Take 1 capsule by mouth 2 (Two) Times a Day.      • lurasidone (LATUDA) 40 MG tablet tablet Take 20 mg by mouth Daily.      • midodrine (PROAMATINE) 5 MG tablet Take 5 mg by mouth 3 (Three) Times a Day Before Meals.          Scheduled Meds:aspirin, 162 mg, Oral, Daily  atorvastatin, 80 mg, Oral, Nightly  busPIRone, 5 mg, Oral, TID  dicyclomine, 10 mg, Oral, 4x Daily  famotidine, 20 mg, Oral, BID AC  gabapentin, 100 mg, Oral, BID  insulin lispro, 2-9 Units, Subcutaneous, TID With Meals  lurasidone, 20 mg, Oral, Daily  magnesium citrate, 150 mL, Oral, Once  midodrine, 5 mg, Oral, TID AC  senna-docusate sodium, 2 tablet, Oral, BID   And  polyethylene glycol, 17 g, Oral, Daily  sodium chloride, 10 mL, Intravenous, Q12H  sorbitol, 50 mL, Oral, Q1H      Continuous Infusions:sodium chloride, 100 mL/hr, Last Rate: 75 mL/hr (12/26/22 1039)      PRN Meds:.•  acetaminophen  •  albuterol sulfate HFA  •  senna-docusate sodium **AND** polyethylene glycol **AND** bisacodyl **AND** bisacodyl  •  dextrose  •  dextrose  •  glucagon (human recombinant)  •  ondansetron **OR** ondansetron  •  sodium chloride  •  sodium chloride    ALLERGIES:  Patient has no known allergies.    ROS:  The following systems were reviewed  Constitution:  No fevers, chills, no unintentional weight loss  Skin: no rash, no jaundice  Eyes:  No blurry vision, no eye pain  HENT:  No change in hearing or smell  Resp:  No dyspnea or cough  CV:  No chest pain or palpitations  :  No dysuria, hematuria  Musculoskeletal:  No leg cramps or arthralgias  Neuro:  No tremor, no numbness  Psych:  No depression or confusion    Objective     Vital Signs:   Vitals:    12/25/22 1701 12/25/22 2245 12/26/22 0623 12/26/22 1026   BP: 101/64  93/59 96/61 (!) 87/57   BP Location: Left arm Left arm Left arm Left arm   Patient Position: Lying Lying Lying Lying   Pulse: 72 67 69 70   Resp: 16 17 17 16   Temp: 98 °F (36.7 °C) 98.3 °F (36.8 °C) 98.2 °F (36.8 °C) 98.2 °F (36.8 °C)   TempSrc: Oral Oral Oral Oral   SpO2: 100% 99% 98% 99%   Weight: 134 kg (294 lb 12.8 oz)      Height: 170.2 cm (67\")          Physical Exam:       General Appearance:    Awake and alert, in no acute distress   Head:    Normocephalic, without obvious abnormality, atraumatic   Throat:   No oral lesions, no thrush, oral mucosa moist   Lungs:     Respirations regular, even and unlabored   Chest Wall:    No abnormalities observed   Abdomen:     Soft, obese, non-tender, no rebound or guarding   Rectal:     Deferred   Extremities:   Moves all extremities, no edema, no cyanosis   Pulses:   Pulses palpable and equal bilaterally                   Results Review:   I reviewed the patient's labs and imaging.  CBC    Results from last 7 days   Lab Units 12/26/22  0351 12/25/22  1412   WBC 10*3/mm3 10.40 12.00*   HEMOGLOBIN g/dL 11.7* 13.0   PLATELETS 10*3/mm3 237 289     CMP   Results from last 7 days   Lab Units 12/26/22  0351 12/25/22  1412   SODIUM mmol/L 140 137   POTASSIUM mmol/L 4.1 4.2   CHLORIDE mmol/L 104 101   CO2 mmol/L 27.0 23.0   BUN mg/dL 8 10   CREATININE mg/dL 0.65 0.78   GLUCOSE mg/dL 127* 167*   ALBUMIN g/dL  --  4.00   BILIRUBIN mg/dL  --  0.6   ALK PHOS U/L  --  155*   AST (SGOT) U/L  --  27   ALT (SGPT) U/L  --  20   LIPASE U/L  --  15     Cr Clearance Estimated Creatinine Clearance: 133.3 mL/min (by C-G formula based on SCr of 0.65 mg/dL).  Coag     HbA1C   Lab Results   Component Value Date    HGBA1C 5.7 (H) 12/07/2022    HGBA1C 5.6 10/04/2022    HGBA1C 5.3 02/17/2021     Blood Glucose   Glucose   Date/Time Value Ref Range Status   12/26/2022 1141 143 (H) 70 - 105 mg/dL Final     Comment:     Serial Number: [id number]:  [id number]   12/26/2022 0819 125 (H) 70 - 105  mg/dL Final     Comment:     Serial Number: [id number]:  [id number]     Infection     UA    Results from last 7 days   Lab Units 12/25/22  1558   NITRITE UA  Negative     Radiology(recent) CT Abdomen Pelvis With Contrast    Result Date: 12/25/2022  1.     Moderate stool burden within the distal colon with some stranding in the perirectal fat and thickening of the rectal wall which could relate to a stercoral colitis. 2.     Hepatic steatosis. 3.     Myelolipoma left adrenal gland 4.     Degenerative change lower lumbar spine.  Electronically Signed By-Robin Al MD On:12/25/2022 3:08 PM This report was finalized on [id number] by  Robin Al MD.         ASSESSMENT:  57-year-old female with history of anxiety, depression, and cholecystectomy who presents with complaints of constipation over the past 2 weeks.     -Constipation   -Abnormal CT showing perirectal colitis likely sterile coral ulcer  -Lower abdominal cramping pain  -Anxiety/depression  -History of migraines/possible pseudoseizures      PLAN:  We will give sorbitol bowel purge.    Agree with enema.  We will advance to regular diet as tolerated.  Recommend outpatient colonoscopy at some point.  Recommend patient be discharged home on bowel regimen.  Supportive care.    I discussed the patients findings and my recommendations with the patient.    We appreciate the referral.    Electronically signed by SCOOBY Cheek, 12/26/22, 12:47 PM EST.

## 2022-12-27 ENCOUNTER — READMISSION MANAGEMENT (OUTPATIENT)
Dept: CALL CENTER | Facility: HOSPITAL | Age: 57
End: 2022-12-27

## 2022-12-27 VITALS
HEART RATE: 72 BPM | SYSTOLIC BLOOD PRESSURE: 90 MMHG | OXYGEN SATURATION: 99 % | TEMPERATURE: 98.4 F | BODY MASS INDEX: 45.99 KG/M2 | HEIGHT: 67 IN | DIASTOLIC BLOOD PRESSURE: 56 MMHG | RESPIRATION RATE: 18 BRPM | WEIGHT: 293 LBS

## 2022-12-27 LAB
ANION GAP SERPL CALCULATED.3IONS-SCNC: 9 MMOL/L (ref 5–15)
BASOPHILS # BLD AUTO: 0.1 10*3/MM3 (ref 0–0.2)
BASOPHILS NFR BLD AUTO: 0.7 % (ref 0–1.5)
BUN SERPL-MCNC: 6 MG/DL (ref 6–20)
BUN/CREAT SERPL: 8.7 (ref 7–25)
CALCIUM SPEC-SCNC: 8.3 MG/DL (ref 8.6–10.5)
CHLORIDE SERPL-SCNC: 105 MMOL/L (ref 98–107)
CO2 SERPL-SCNC: 24 MMOL/L (ref 22–29)
CREAT SERPL-MCNC: 0.69 MG/DL (ref 0.57–1)
DEPRECATED RDW RBC AUTO: 44.6 FL (ref 37–54)
EGFRCR SERPLBLD CKD-EPI 2021: 100.1 ML/MIN/1.73
EOSINOPHIL # BLD AUTO: 0.5 10*3/MM3 (ref 0–0.4)
EOSINOPHIL NFR BLD AUTO: 4.2 % (ref 0.3–6.2)
ERYTHROCYTE [DISTWIDTH] IN BLOOD BY AUTOMATED COUNT: 13.9 % (ref 12.3–15.4)
GLUCOSE BLDC GLUCOMTR-MCNC: 112 MG/DL (ref 70–105)
GLUCOSE BLDC GLUCOMTR-MCNC: 117 MG/DL (ref 70–105)
GLUCOSE BLDC GLUCOMTR-MCNC: 133 MG/DL (ref 70–105)
GLUCOSE SERPL-MCNC: 109 MG/DL (ref 65–99)
HCT VFR BLD AUTO: 32.3 % (ref 34–46.6)
HGB BLD-MCNC: 10.7 G/DL (ref 12–15.9)
LYMPHOCYTES # BLD AUTO: 1.5 10*3/MM3 (ref 0.7–3.1)
LYMPHOCYTES NFR BLD AUTO: 13.1 % (ref 19.6–45.3)
MCH RBC QN AUTO: 28.7 PG (ref 26.6–33)
MCHC RBC AUTO-ENTMCNC: 33.2 G/DL (ref 31.5–35.7)
MCV RBC AUTO: 86.3 FL (ref 79–97)
MONOCYTES # BLD AUTO: 0.8 10*3/MM3 (ref 0.1–0.9)
MONOCYTES NFR BLD AUTO: 7.1 % (ref 5–12)
NEUTROPHILS NFR BLD AUTO: 74.9 % (ref 42.7–76)
NEUTROPHILS NFR BLD AUTO: 8.6 10*3/MM3 (ref 1.7–7)
NRBC BLD AUTO-RTO: 0 /100 WBC (ref 0–0.2)
PLATELET # BLD AUTO: 226 10*3/MM3 (ref 140–450)
PMV BLD AUTO: 7.8 FL (ref 6–12)
POTASSIUM SERPL-SCNC: 3.6 MMOL/L (ref 3.5–5.2)
RBC # BLD AUTO: 3.74 10*6/MM3 (ref 3.77–5.28)
SODIUM SERPL-SCNC: 138 MMOL/L (ref 136–145)
WBC NRBC COR # BLD: 11.5 10*3/MM3 (ref 3.4–10.8)

## 2022-12-27 PROCEDURE — G0378 HOSPITAL OBSERVATION PER HR: HCPCS

## 2022-12-27 PROCEDURE — 85025 COMPLETE CBC W/AUTO DIFF WBC: CPT | Performed by: NURSE PRACTITIONER

## 2022-12-27 PROCEDURE — 80048 BASIC METABOLIC PNL TOTAL CA: CPT | Performed by: NURSE PRACTITIONER

## 2022-12-27 PROCEDURE — 97162 PT EVAL MOD COMPLEX 30 MIN: CPT

## 2022-12-27 PROCEDURE — 36415 COLL VENOUS BLD VENIPUNCTURE: CPT | Performed by: NURSE PRACTITIONER

## 2022-12-27 PROCEDURE — 97116 GAIT TRAINING THERAPY: CPT

## 2022-12-27 PROCEDURE — 82962 GLUCOSE BLOOD TEST: CPT

## 2022-12-27 RX ORDER — POLYETHYLENE GLYCOL 3350 17 G/17G
17 POWDER, FOR SOLUTION ORAL DAILY
Qty: 30 PACKET | Refills: 0 | Status: SHIPPED | OUTPATIENT
Start: 2022-12-28 | End: 2023-01-27

## 2022-12-27 RX ADMIN — ASPIRIN 81 MG CHEWABLE TABLET 162 MG: 81 TABLET CHEWABLE at 08:57

## 2022-12-27 RX ADMIN — DICYCLOMINE HYDROCHLORIDE 10 MG: 10 CAPSULE ORAL at 08:58

## 2022-12-27 RX ADMIN — MIDODRINE HYDROCHLORIDE 5 MG: 5 TABLET ORAL at 08:58

## 2022-12-27 RX ADMIN — MIDODRINE HYDROCHLORIDE 5 MG: 5 TABLET ORAL at 12:45

## 2022-12-27 RX ADMIN — GABAPENTIN 100 MG: 100 CAPSULE ORAL at 08:58

## 2022-12-27 RX ADMIN — FAMOTIDINE 20 MG: 20 TABLET, FILM COATED ORAL at 08:58

## 2022-12-27 RX ADMIN — SENNOSIDES AND DOCUSATE SODIUM 2 TABLET: 50; 8.6 TABLET ORAL at 08:58

## 2022-12-27 RX ADMIN — BUSPIRONE HYDROCHLORIDE 5 MG: 5 TABLET ORAL at 08:58

## 2022-12-27 RX ADMIN — POLYETHYLENE GLYCOL 3350 17 G: 17 POWDER, FOR SOLUTION ORAL at 08:58

## 2022-12-27 RX ADMIN — DICYCLOMINE HYDROCHLORIDE 10 MG: 10 CAPSULE ORAL at 12:45

## 2022-12-27 NOTE — PLAN OF CARE
Goal Outcome Evaluation:  Plan of Care Reviewed With: patient           Outcome Evaluation: 58 y/o F who presented with constipation which has resolved. Had a code stroke during admission which was cancelled but per chart pseudoseizures vs migraines that are occuring frequently to the point that her daughter does not want her using stairs so she is staying in her baseline. She has begun falling and now uses a rolling walker but has not had PT in the past. She has 2 BRIGHT and a full flight to bedroom and shower. She lives with her daughter who helps as needed. Patient was at her baseline today. She was mod I with ambulation requiring 2L supp O2 with RW. Due to increase in falls and pt reported declining mobility, recommending HHPT at d/c.

## 2022-12-27 NOTE — PLAN OF CARE
Goal Outcome Evaluation:  Plan of Care Reviewed With: patient        Progress: improving  Outcome Evaluation: Patient starting to have more liquid bowel movements with some small formed balls of stool, as day progressed the liquid stool is starting to have more of a consistiency. GI is on board and following the patient. Possible discharge home tomorrow and patient will follow up with GI outpatient for an outpatient colonoscopy.

## 2022-12-27 NOTE — OUTREACH NOTE
Prep Survey    Flowsheet Row Responses   [facility] facility patient discharged from? [facility]   Is LACE score < 7 ? Yes   Eligibility [facility]   [facility]   Date of Admission 12/25/22   Date of Discharge 12/27/22   Discharge Disposition Home or Self Care   Discharge diagnosis Constipation   Does the patient have one of the following disease processes/diagnoses(primary or secondary)? Other   Does the patient have Home health ordered? No   Is there a DME ordered? Yes   What DME was ordered? [facility] O[facility] via [facility]   Prep survey completed? Yes          JULIETA A - Registered Nurse

## 2022-12-27 NOTE — CASE MANAGEMENT/SOCIAL WORK
Case Management/Social Work    Patient Name:  Eladia Connor  YOB: 1965  MRN: [id number]  Admit Date:  12/25/2022    Patient current with [employer] for home 02,portable delivered from [employer] supply closet for transport home.    Met with patient in room wearing PPE: mask, face shield/goggles, gloves, gown.      Maintained distance greater than six feet and spent less than 15 minutes in the room.        Electronically signed by:  Rhea Dillon RN  12/27/22 17:04 EST

## 2022-12-27 NOTE — PLAN OF CARE
Goal Outcome Evaluation:  Plan of Care Reviewed With: patient        Progress: improving  Outcome Evaluation: Pt states that she is feeling better during this shift.

## 2022-12-27 NOTE — THERAPY TREATMENT NOTE
Patient Name: Eladia Connor  : 1965    MRN: [id number]                              Today's Date: 2022       Admit Date: 2022    Visit Dx:     ICD-10-CM ICD-9-CM   1. Constipation, unspecified constipation type  K59.00 564.00   2. Generalized abdominal pain  R10.84 789.07     Patient Active Problem List   Diagnosis   • Moderate episode of recurrent major depressive disorder (HCC)   • Anxiety   • Other insomnia   • Skin lesion   • History of abnormal mammogram   • Mood swings   • Class 1 obesity due to excess calories with serious comorbidity and body mass index (BMI) of 33.0 to 33.9 in adult   • Rash of face   • Fever   • Cough   • Rash   • Intertrigo   • Constipation     Past Medical History:   Diagnosis Date   • Anxiety    • Depression      Past Surgical History:   Procedure Laterality Date   • CHOLECYSTECTOMY     • EYE SURGERY     • KNEE SURGERY        General Information     Row Name 22          Physical Therapy Time and Intention    Document Type evaluation  -     Mode of Treatment physical therapy  -     Row Name 22          General Information    Patient Profile Reviewed yes  -SS     Prior Level of Function independent:  -SS     Existing Precautions/Restrictions oxygen therapy device and L/min  2L at baseline  -     Row Name 22          Living Environment    People in Home --  with daughter  -     Row Name 22          Cognition    Orientation Status (Cognition) oriented x 4  -           User Key  (r) = Recorded By, (t) = Taken By, (c) = Cosigned By    Initials Name Provider Type     Silke Garcia PT Physical Therapist               Mobility     Row Name 22          Bed Mobility    Bed Mobility bed mobility (all) activities  -     All Activities, [place] (Bed Mobility) independent  -     Row Name 22          Bed-Chair Transfer    Bed-Chair [place] (Transfers) modified independence   -     Row Name 12/27/22 1657          Gait/Stairs (Locomotion)    [place] Level (Gait) modified independence  -     Assistive Device (Gait) walker, front-wheeled  -     Distance in Feet (Gait) 50  -           User Key  (r) = Recorded By, (t) = Taken By, (c) = Cosigned By    Initials Name Provider Type     Silke Garcia PT Physical Therapist               Obj/Interventions     Row Name 12/27/22 1659          Range of Motion Comprehensive    General Range of Motion no range of motion deficits identified  -     Row Name 12/27/22 1659          Strength Comprehensive (MMT)    General Manual Muscle Testing (MMT) Assessment no strength deficits identified  -     Row Name 12/27/22 1659          Sensory Assessment (Somatosensory)    Sensory Assessment (Somatosensory) sensation intact  -           User Key  (r) = Recorded By, (t) = Taken By, (c) = Cosigned By    Initials Name Provider Type     Silke Garcia PT Physical Therapist               Goals/Plan    No documentation.                Clinical Impression     [facility] Name 12/27/22 1659          Pain    Additional Documentation Pain Scale: FACES Pre/Post-Treatment (Group)  -SS     Row Name 12/27/22 1659          Pain Scale: FACES Pre/Post-Treatment    Pain: FACES Scale, Pretreatment 2-->hurts little bit  -     Posttreatment Pain Rating 2-->hurts little bit  -     Row Name 12/27/22 1659          Plan of Care Review    Plan of Care Reviewed With patient  -     Outcome Evaluation 58 y/o F who presented with constipation which has resolved. Had a code stroke during admission which was cancelled but per chart pseudoseizures vs migraines that are occuring frequently to the point that her daughter does not want her using stairs so she is staying in her baseline. She has begun falling and now uses a rolling walker but has not had PT in the past. She has 2 BRIGHT and a full flight to bedroom and shower. She lives with her daughter who helps as  needed. Patient was at her baseline today. She was mod I with ambulation requiring 2L supp O2 with RW. Due to increase in falls and pt reported declining mobility, recommending HHPT at d/c.  -     Row Name 12/27/22 1659          Therapy Assessment/Plan (PT)    Criteria for Skilled Interventions Met (PT) no;other (see comments)  safe to d/c home  -     Therapy Frequency (PT) evaluation only  -           User Key  (r) = Recorded By, (t) = Taken By, (c) = Cosigned By    Initials Name Provider Type     Silke Garcia, PT Physical Therapist               Outcome Measures     Row Name 12/27/22 0808          How much help from another person do you currently need...    Turning from your back to your side while in flat bed without using bedrails? 4  -LH     Moving from lying on back to sitting on the side of a flat bed without bedrails? 4  -LH     Moving to and from a bed to a chair (including a wheelchair)? 4  -LH     Standing up from a chair using your arms (e.g., wheelchair, bedside chair)? 4  -LH     Climbing 3-5 steps with a railing? 4  -LH     To walk in hospital room? 4  -LH     AM-PAC 6 Clicks Score (PT) 24  -LH     Highest level of mobility 8 --> Walked 250 feet or more  -           User Key  (r) = Recorded By, (t) = Taken By, (c) = Cosigned By    Initials Name Provider Type     Tess Cordova RN Registered Nurse                             Physical Therapy Education     Title: PT OT SLP Therapies (Done)     Topic: Physical Therapy (Done)     Point: Mobility training (Done)     Learning Progress Summary           Patient Acceptance, E, VU by SS at 12/27/2022 1703    Acceptance, E,TB, VU by TM at 12/25/2022 1708                   Point: Home exercise program (Done)     Learning Progress Summary           Patient Acceptance, E,TB, VU by TM at 12/25/2022 1708                   Point: Body mechanics (Done)     Learning Progress Summary           Patient Acceptance, E,TB, VU by TM at 12/25/2022 1708                    Point: Precautions (Done)     Learning Progress Summary           Patient Acceptance, E,TB, VU by TM at 12/25/2022 1708                               User Key     Initials Effective Dates Name Provider Type Discipline     06/16/21 -  Silke Garcia PT Physical Therapist PT    TM 09/08/22 -  Sidney Andrews RN Registered Nurse Nurse              PT Recommendation and Plan     Plan of Care Reviewed With: patient  Outcome Evaluation: 58 y/o F who presented with constipation which has resolved. Had a code stroke during admission which was cancelled but per chart pseudoseizures vs migraines that are occuring frequently to the point that her daughter does not want her using stairs so she is staying in her baseline. She has begun falling and now uses a rolling walker but has not had PT in the past. She has 2 BRIGHT and a full flight to bedroom and shower. She lives with her daughter who helps as needed. Patient was at her baseline today. She was mod I with ambulation requiring 2L supp O2 with RW. Due to increase in falls and pt reported declining mobility, recommending HHPT at d/c.     Time Calculation:    PT Charges     Row Name 12/27/22 1703             Time Calculation    Start Time 0930  -      Stop Time 1017  -      Time Calculation (min) 47 min  -      PT Received On 12/27/22  -         Time Calculation- PT    Total Timed Code Minutes- PT 20 minute(s)  -            User Key  (r) = Recorded By, (t) = Taken By, (c) = Cosigned By    Initials Name Provider Type     Silke Garcia, PT Physical Therapist              Therapy Charges for Today     Code Description Service Date Service Provider Modifiers Qty    88781368798 HC PT EVAL MOD COMPLEXITY 4 12/27/2022 Silke Garcia, PT GP 1    95485152443 HC GAIT TRAINING EA 15 MIN 12/27/2022 Silke Garcia, PT GP 1          PT G-Codes  AM-PAC 6 Clicks Score (PT): 24  PT Discharge Summary  Anticipated Discharge Disposition  (PT): home with home health (patient is agreeable)    Silke Garcia, PT  12/27/2022

## 2022-12-27 NOTE — SIGNIFICANT NOTE
12/26/22 1400   Coping/Psychosocial   Observed Emotional State tearful/crying;anxious   Verbalized Emotional State fear;anxiety   Trust Relationship/Rapport emotional support provided;empathic listening provided;thoughts/feelings acknowledged   Family/Support Persons family   Involvement in Care not present at bedside   Additional Documentation Spiritual Care (Group)   Spiritual Care   Use of Spiritual Resources prayer;spirituality for coping, indicated strong use of   Spiritual Care Source  initiative  (respond to code stroke in OBS)   Spiritual Care Follow-Up follow-up planned regularly for general support   Response to Spiritual Care thanks expressed;receptive of support;visit helpful;expressing self, indicated difficulty   Spiritual Care Interventions prayer support provided;supportive conversation provided;theological discussion provided  (pt is Orthodox)   Spiritual Care Visit Type initial   Spiritual Care Request coping/stress of illness support;mood/anxiety support;prayer support;spiritual/moral support   Receptivity to Spiritual Care visit welcomed      responded to code stroke in OBS. Pt was anxious and had difficulty expressing herself.  sat ant her bedside and held her hand as staff got the appropriate medicine for her anxiety.  spoke calmly and recited Scripture and the 23 Psalm per her jozef. She calmed down and began to rest, especially after the medicine was administered.  stepped away and she was peaceful at that time. He will follow up tomorrow and assess any spiritual needs. No other needs at this time.

## 2022-12-28 ENCOUNTER — TRANSITIONAL CARE MANAGEMENT TELEPHONE ENCOUNTER (OUTPATIENT)
Dept: CALL CENTER | Facility: HOSPITAL | Age: 57
End: 2022-12-28

## 2022-12-28 ENCOUNTER — TELEPHONE (OUTPATIENT)
Dept: FAMILY MEDICINE CLINIC | Facility: CLINIC | Age: 57
End: 2022-12-28

## 2022-12-28 NOTE — OUTREACH NOTE
Call Center TCM Note    Flowsheet Row Responses   [facility] patient discharged from? [facility]   Does the patient have one of the following disease processes/diagnoses(primary or secondary)? Other   TCM attempt successful? No  [No VR]   Unsuccessful attempts Attempt 1   Call Status Left message          Mary Lou Augustine RN    12/28/2022, 10:57 EST

## 2022-12-28 NOTE — OUTREACH NOTE
"Call Center TCM Note    Flowsheet Row Responses   [facility] patient discharged from? [facility]   Does the patient have one of the following disease processes/diagnoses(primary or secondary)? Other   TCM attempt successful? Yes   Call start time 1523   Call end time 1529   Discharge diagnosis Constipation   Comments 1/5/23 hosp f/u   Does the patient have an appointment with their PCP within 7 days of discharge? Yes   What DME was ordered? Possible O2 via [place]   Has all DME been delivered? Yes   Psychosocial issues? No   Comments PO intake is \"not passing thru, gets stuck at top of belly\". No stool but has passed sm amt of gas this morning. C/o abd pain, tight under breast area, twisting sensation, tender to touch, causes nausea and appears swollen per pt report.    Did the patient receive a copy of their discharge instructions? Yes   What is the patient's perception of their health status since discharge? Worsening   Is the patient/caregiver able to teach back signs and symptoms related to disease process for when to call PCP? Yes   TCM call completed? Yes   Wrap up additional comments quick call as pt was calling PCP for worsening condition   Call end time 1529   Would this patient benefit from a Referral to Amb Social Work? No   Is the patient interested in additional calls from an ambulatory ?  NOTE:  applies to high risk patients requiring additional follow-up. No          Mary Lou Augustine RN    12/28/2022, 15:29 EST      "

## 2022-12-28 NOTE — TELEPHONE ENCOUNTER
Caller: Eladia Connor    Relationship: Self    Best call back number: [phone]    Who are you requesting to speak with (clinical staff, provider,  specific staff member): CLINICAL STAFF     What was the call regarding: PATIENT STATES STILL NOT PASSING FOOD THROUGH OR HAVING BOWEL MOVEMENTS WANTING TO KNOW WHAT HER NEXT STEPS SHOULD BE PLEASE CALL AND ADVISE       Do you require a callback: YES

## 2022-12-28 NOTE — PLAN OF CARE
Goal Outcome Evaluation:  Plan of Care Reviewed With: patient, daughter        Progress: improving  Outcome Evaluation: Patient discharging home and will follow up outpatient.

## 2022-12-28 NOTE — CASE MANAGEMENT/SOCIAL WORK
Case Management Discharge Note                Selected Continued Care - Discharged on 12/27/2022 Admission date: 12/25/2022 - Discharge disposition: Home or Self Care            Transportation Services  Private: Car    Final Discharge Disposition Code: 01 - home or self-care

## 2022-12-29 ENCOUNTER — APPOINTMENT (OUTPATIENT)
Dept: GENERAL RADIOLOGY | Facility: HOSPITAL | Age: 57
End: 2022-12-29
Payer: COMMERCIAL

## 2022-12-29 ENCOUNTER — HOSPITAL ENCOUNTER (OUTPATIENT)
Facility: HOSPITAL | Age: 57
Discharge: HOME | End: 2022-12-30
Attending: EMERGENCY MEDICINE | Admitting: INTERNAL MEDICINE
Payer: COMMERCIAL

## 2022-12-29 ENCOUNTER — APPOINTMENT (OUTPATIENT)
Dept: CT IMAGING | Facility: HOSPITAL | Age: 57
End: 2022-12-29
Payer: COMMERCIAL

## 2022-12-29 DIAGNOSIS — R11.15 PERSISTENT VOMITING: Primary | ICD-10-CM

## 2022-12-29 LAB
ALBUMIN SERPL-MCNC: 3.8 G/DL (ref 3.5–5.2)
ALBUMIN/GLOB SERPL: 1.1 G/DL
ALP SERPL-CCNC: 146 U/L (ref 39–117)
ALT SERPL W P-5'-P-CCNC: 22 U/L (ref 1–33)
ANION GAP SERPL CALCULATED.3IONS-SCNC: 12 MMOL/L (ref 5–15)
AST SERPL-CCNC: 36 U/L (ref 1–32)
BACTERIA UR QL AUTO: ABNORMAL /HPF
BASOPHILS # BLD AUTO: 0.1 10*3/MM3 (ref 0–0.2)
BASOPHILS NFR BLD AUTO: 0.7 % (ref 0–1.5)
BILIRUB SERPL-MCNC: 0.7 MG/DL (ref 0–1.2)
BILIRUB UR QL STRIP: NEGATIVE
BUN SERPL-MCNC: 8 MG/DL (ref 6–20)
BUN/CREAT SERPL: 10 (ref 7–25)
CALCIUM SPEC-SCNC: 9.1 MG/DL (ref 8.6–10.5)
CHLORIDE SERPL-SCNC: 105 MMOL/L (ref 98–107)
CLARITY UR: CLEAR
CO2 SERPL-SCNC: 24 MMOL/L (ref 22–29)
COLOR UR: YELLOW
CREAT SERPL-MCNC: 0.8 MG/DL (ref 0.57–1)
DEPRECATED RDW RBC AUTO: 43.8 FL (ref 37–54)
EGFRCR SERPLBLD CKD-EPI 2021: 85 ML/MIN/1.73
EOSINOPHIL # BLD AUTO: 0.3 10*3/MM3 (ref 0–0.4)
EOSINOPHIL NFR BLD AUTO: 4 % (ref 0.3–6.2)
ERYTHROCYTE [DISTWIDTH] IN BLOOD BY AUTOMATED COUNT: 13.9 % (ref 12.3–15.4)
GLOBULIN UR ELPH-MCNC: 3.4 GM/DL
GLUCOSE SERPL-MCNC: 92 MG/DL (ref 65–99)
GLUCOSE UR STRIP-MCNC: NEGATIVE MG/DL
HCT VFR BLD AUTO: 35.9 % (ref 34–46.6)
HGB BLD-MCNC: 12.3 G/DL (ref 12–15.9)
HGB UR QL STRIP.AUTO: ABNORMAL
HYALINE CASTS UR QL AUTO: ABNORMAL /LPF
KETONES UR QL STRIP: ABNORMAL
LEUKOCYTE ESTERASE UR QL STRIP.AUTO: ABNORMAL
LIPASE SERPL-CCNC: 14 U/L (ref 13–60)
LYMPHOCYTES # BLD AUTO: 1.4 10*3/MM3 (ref 0.7–3.1)
LYMPHOCYTES NFR BLD AUTO: 17.7 % (ref 19.6–45.3)
MCH RBC QN AUTO: 29.2 PG (ref 26.6–33)
MCHC RBC AUTO-ENTMCNC: 34.3 G/DL (ref 31.5–35.7)
MCV RBC AUTO: 85.2 FL (ref 79–97)
MONOCYTES # BLD AUTO: 0.6 10*3/MM3 (ref 0.1–0.9)
MONOCYTES NFR BLD AUTO: 7.9 % (ref 5–12)
NEUTROPHILS NFR BLD AUTO: 5.4 10*3/MM3 (ref 1.7–7)
NEUTROPHILS NFR BLD AUTO: 69.7 % (ref 42.7–76)
NITRITE UR QL STRIP: NEGATIVE
NRBC BLD AUTO-RTO: 0 /100 WBC (ref 0–0.2)
PH UR STRIP.AUTO: 8 [PH] (ref 5–8)
PLATELET # BLD AUTO: 265 10*3/MM3 (ref 140–450)
PMV BLD AUTO: 7.1 FL (ref 6–12)
POTASSIUM SERPL-SCNC: 3.4 MMOL/L (ref 3.5–5.2)
PROT SERPL-MCNC: 7.2 G/DL (ref 6–8.5)
PROT UR QL STRIP: NEGATIVE
RBC # BLD AUTO: 4.21 10*6/MM3 (ref 3.77–5.28)
RBC # UR STRIP: ABNORMAL /HPF
REF LAB TEST METHOD: ABNORMAL
SODIUM SERPL-SCNC: 141 MMOL/L (ref 136–145)
SP GR UR STRIP: 1.01 (ref 1–1.03)
SQUAMOUS #/AREA URNS HPF: ABNORMAL /HPF
TROPONIN T SERPL-MCNC: <0.01 NG/ML (ref 0–0.03)
UROBILINOGEN UR QL STRIP: ABNORMAL
WBC # UR STRIP: ABNORMAL /HPF
WBC NRBC COR # BLD: 7.8 10*3/MM3 (ref 3.4–10.8)

## 2022-12-29 PROCEDURE — G0378 HOSPITAL OBSERVATION PER HR: HCPCS

## 2022-12-29 PROCEDURE — 93005 ELECTROCARDIOGRAM TRACING: CPT | Performed by: EMERGENCY MEDICINE

## 2022-12-29 PROCEDURE — 85025 COMPLETE CBC W/AUTO DIFF WBC: CPT | Performed by: PHYSICIAN ASSISTANT

## 2022-12-29 PROCEDURE — 74176 CT ABD & PELVIS W/O CONTRAST: CPT

## 2022-12-29 PROCEDURE — 96374 THER/PROPH/DIAG INJ IV PUSH: CPT

## 2022-12-29 PROCEDURE — 84484 ASSAY OF TROPONIN QUANT: CPT | Performed by: EMERGENCY MEDICINE

## 2022-12-29 PROCEDURE — 99285 EMERGENCY DEPT VISIT HI MDM: CPT

## 2022-12-29 PROCEDURE — 25010000002 METOCLOPRAMIDE PER 10 MG: Performed by: EMERGENCY MEDICINE

## 2022-12-29 PROCEDURE — 83690 ASSAY OF LIPASE: CPT | Performed by: PHYSICIAN ASSISTANT

## 2022-12-29 PROCEDURE — 36415 COLL VENOUS BLD VENIPUNCTURE: CPT

## 2022-12-29 PROCEDURE — 80053 COMPREHEN METABOLIC PANEL: CPT | Performed by: PHYSICIAN ASSISTANT

## 2022-12-29 PROCEDURE — 71045 X-RAY EXAM CHEST 1 VIEW: CPT

## 2022-12-29 PROCEDURE — 81001 URINALYSIS AUTO W/SCOPE: CPT | Performed by: PHYSICIAN ASSISTANT

## 2022-12-29 RX ORDER — METOCLOPRAMIDE HYDROCHLORIDE 5 MG/ML
10 INJECTION INTRAMUSCULAR; INTRAVENOUS ONCE
Status: COMPLETED | OUTPATIENT
Start: 2022-12-29 | End: 2022-12-29

## 2022-12-29 RX ORDER — ONDANSETRON 2 MG/ML
4 INJECTION INTRAMUSCULAR; INTRAVENOUS EVERY 6 HOURS PRN
Status: DISCONTINUED | OUTPATIENT
Start: 2022-12-29 | End: 2022-12-30 | Stop reason: HOSPADM

## 2022-12-29 RX ORDER — ARIPIPRAZOLE 5 MG/1
5 TABLET ORAL DAILY
COMMUNITY
Start: 2022-12-23 | End: 2023-01-19 | Stop reason: SDUPTHER

## 2022-12-29 RX ORDER — SODIUM CHLORIDE 9 MG/ML
125 INJECTION, SOLUTION INTRAVENOUS CONTINUOUS
Status: DISCONTINUED | OUTPATIENT
Start: 2022-12-29 | End: 2022-12-30

## 2022-12-29 RX ORDER — SODIUM CHLORIDE 9 MG/ML
40 INJECTION, SOLUTION INTRAVENOUS AS NEEDED
Status: DISCONTINUED | OUTPATIENT
Start: 2022-12-29 | End: 2022-12-30 | Stop reason: HOSPADM

## 2022-12-29 RX ORDER — ACETAMINOPHEN 325 MG/1
650 TABLET ORAL EVERY 4 HOURS PRN
Status: DISCONTINUED | OUTPATIENT
Start: 2022-12-29 | End: 2022-12-30 | Stop reason: HOSPADM

## 2022-12-29 RX ORDER — SODIUM CHLORIDE 0.9 % (FLUSH) 0.9 %
10 SYRINGE (ML) INJECTION EVERY 12 HOURS SCHEDULED
Status: DISCONTINUED | OUTPATIENT
Start: 2022-12-29 | End: 2022-12-30 | Stop reason: HOSPADM

## 2022-12-29 RX ORDER — SODIUM CHLORIDE 0.9 % (FLUSH) 0.9 %
10 SYRINGE (ML) INJECTION AS NEEDED
Status: DISCONTINUED | OUTPATIENT
Start: 2022-12-29 | End: 2022-12-30 | Stop reason: HOSPADM

## 2022-12-29 RX ORDER — CHOLECALCIFEROL (VITAMIN D3) 125 MCG
5 CAPSULE ORAL NIGHTLY PRN
Status: DISCONTINUED | OUTPATIENT
Start: 2022-12-29 | End: 2022-12-30 | Stop reason: HOSPADM

## 2022-12-29 RX ORDER — ZOLPIDEM TARTRATE 10 MG/1
10 TABLET ORAL NIGHTLY PRN
COMMUNITY
End: 2023-01-05 | Stop reason: ALTCHOICE

## 2022-12-29 RX ADMIN — METOCLOPRAMIDE 10 MG: 5 INJECTION, SOLUTION INTRAMUSCULAR; INTRAVENOUS at 16:55

## 2022-12-29 RX ADMIN — SODIUM CHLORIDE, POTASSIUM CHLORIDE, SODIUM LACTATE AND CALCIUM CHLORIDE 1000 ML: 600; 310; 30; 20 INJECTION, SOLUTION INTRAVENOUS at 16:32

## 2022-12-29 RX ADMIN — SODIUM CHLORIDE 125 ML/HR: 9 INJECTION, SOLUTION INTRAVENOUS at 20:52

## 2022-12-29 NOTE — TELEPHONE ENCOUNTER
Hub is instructed to read the documentation below to patient]    Response received from Dr. Guzman. He is in agreement of the previous advisement of patient going to nearest urgent care or ER for evaluation.

## 2022-12-29 NOTE — ED PROVIDER NOTES
Subjective    Provider in Triage Note  Patient is a 57-year-old female presents to the ED with complaints of continued abdominal pain and constipation since she was discharged from our facility 12/27/2022 for constipation did have bowel movements while in the observation unit.  Patient states since then she has had continued abdominal pain.  She also reports dysphagia.  She denies any reports of fever or urinary complaints.  She rates her symptoms as moderate in severity.      History of Present Illness  Chief complaint abdominal pain vomiting unable to swallow    History of present illness is a 57-year-old female who has been in the hospital in December initially for some possible seizure versus strokelike symptoms she was ruled out for stroke was found to have some seizures and placed on gabapentin she was in the hospital on [place] Day for constipation and abdominal pain she states she also had a mild infection.  She had a bowel movement was discharged on the 27th.  But she states since that time she has had sharp stabbing upper abdominal pain is nonradiating she is vomiting and she cannot eat or drink or hold anything down or even swallow anything because it comes right back up.  She has been constipated again no bloody vomitus no bilious vomitus no bloody stool.  No fever chills or night sweats no chest pain neck arm jaw pain has been noted.  No one at home with similar illness no foreign travels and no current antibiotic use.  Denies injury.  Nothing really makes it better or worse moderate to severe in nature ongoing continuous for the last few days and she has been at home the last couple days        Review of Systems   Constitutional: Negative for chills and fever.   HENT: Negative for congestion and sinus pressure.    Eyes: Negative for photophobia and visual disturbance.   Respiratory: Negative for chest tightness and shortness of breath.    Cardiovascular: Negative for chest pain and palpitations.    Gastrointestinal: Positive for abdominal pain and vomiting. Negative for blood in stool.   Endocrine: Negative for cold intolerance and heat intolerance.   Genitourinary: Negative for difficulty urinating and dysuria.   Musculoskeletal: Negative for back pain and neck pain.   Skin: Negative for rash and wound.   Neurological: Negative for dizziness and headaches.   Psychiatric/Behavioral: Negative for agitation and confusion.       Past Medical History:   Diagnosis Date   • Anxiety    • Depression        No Known Allergies    Past Surgical History:   Procedure Laterality Date   • CHOLECYSTECTOMY  1987   • EYE SURGERY     • KNEE SURGERY         Family History   Problem Relation Age of Onset   • Heart disease Mother    • Breast cancer Mother    • Heart disease Father    • Pancreatic cancer Father    • Heart disease Sister    • Lung cancer Brother        Social History     Socioeconomic History   • Marital status:    Tobacco Use   • Smoking status: Never   • Smokeless tobacco: Never   Substance and Sexual Activity   • Alcohol use: Never   • Drug use: Never   • Sexual activity: Defer     Prior to Admission medications    Medication Sig Start Date End Date Taking? Authorizing Provider   albuterol sulfate  (90 Base) MCG/ACT inhaler Inhale 2 puffs Every 6 (Six) Hours As Needed for Wheezing.    Provider, MD Kamala   ARIPiprazole (ABILIFY) 5 MG tablet Take 5 mg by mouth Daily. 12/23/22   ProviderKamala MD   aspirin 81 MG chewable tablet Chew 2 tablets Daily. 12/13/22   Delores Azul DO   atorvastatin (LIPITOR) 80 MG tablet Take 1 tablet by mouth Every Night. 12/12/22   Delores Azul DO   busPIRone (BUSPAR) 5 MG tablet Take 1 tablet by mouth 3 (Three) Times a Day. 10/4/22   Quincy Lemos APRN   docusate sodium (COLACE) 50 MG capsule Take 1 capsule by mouth 2 (Two) Times a Day As Needed for Constipation for up to 30 days. 12/27/22 1/26/23  Quincy Moore PA-C   famotidine (PEPCID)  20 MG tablet Take 1 tablet by mouth 2 (Two) Times a Day Before Meals. 12/12/22   Delores Azul DO   gabapentin (NEURONTIN) 100 MG capsule Take 1 capsule by mouth 2 (Two) Times a Day. 12/12/22   Delores Azul DO   lurasidone (LATUDA) 40 MG tablet tablet Take 20 mg by mouth Daily.    Provider, MD Kamala   midodrine (PROAMATINE) 5 MG tablet Take 5 mg by mouth 3 (Three) Times a Day Before Meals.    Provider, MD Kamala   polyethylene glycol (MIRALAX) 17 g packet Take 17 g by mouth Daily for 30 days. 12/28/22 1/27/23  Quincy Moore PA-C           Objective   Physical Exam  Constitutional is a 57-year-old female awake alert no distress temperature is 97.1 blood pressure 169/74 heart rate 75 respirations 26 sats 100 on room air.  HEENT extraocular muscles are intact pupils equal round react sclera clear.  Mouth clear.  Neck supple no adenopathy no JVD no bruits no meningeal signs.  Lungs clear no retractions.  Heart regular without murmur.  Abdomen soft with moderate epigastric tenderness but no rebound no guarding good bowel sounds no peritoneal findings or pulsatile masses.  Back no CVA tenderness.  Extremities pulses equal throughout upper and lower extremities no edema cords or Homans' sign or evidence of DVT.  Skin warm and dry she has psoriasis but no cellulitic changes.  Neurologic awake alert and follows commands motor strength normal without focal weak  Procedures           ED Course      Results for orders placed or performed during the hospital encounter of 12/29/22   Comprehensive Metabolic Panel    Specimen: Blood   Result Value Ref Range    Glucose 92 65 - 99 mg/dL    BUN 8 6 - 20 mg/dL    Creatinine 0.80 0.57 - 1.00 mg/dL    Sodium 141 136 - 145 mmol/L    Potassium 3.4 (L) 3.5 - 5.2 mmol/L    Chloride 105 98 - 107 mmol/L    CO2 24.0 22.0 - 29.0 mmol/L    Calcium 9.1 8.6 - 10.5 mg/dL    Total Protein 7.2 6.0 - 8.5 g/dL    Albumin 3.8 3.5 - 5.2 g/dL    ALT (SGPT) 22 1 - 33 U/L    AST (SGOT)  36 (H) 1 - 32 U/L    Alkaline Phosphatase 146 (H) 39 - 117 U/L    Total Bilirubin 0.7 0.0 - 1.2 mg/dL    Globulin 3.4 gm/dL    A/G Ratio 1.1 g/dL    BUN/Creatinine Ratio 10.0 7.0 - 25.0    Anion Gap 12.0 5.0 - 15.0 mmol/L    eGFR 85.0 >60.0 mL/min/1.73   Lipase    Specimen: Blood   Result Value Ref Range    Lipase 14 13 - 60 U/L   Urinalysis With Microscopic If Indicated (No Culture) - Urine, Clean Catch    Specimen: Urine, Clean Catch   Result Value Ref Range    Color, UA Yellow Yellow, Straw    Appearance, UA Clear Clear    pH, UA 8.0 5.0 - 8.0    Specific Gravity, UA 1.014 1.005 - 1.030    Glucose, UA Negative Negative    Ketones, UA 40 mg/dL (2+) (A) Negative    Bilirubin, UA Negative Negative    Blood, UA Trace (A) Negative    Protein, UA Negative Negative    Leuk Esterase, UA Trace (A) Negative    Nitrite, UA Negative Negative    Urobilinogen, UA 1.0 E.U./dL 0.2 - 1.0 E.U./dL   CBC Auto Differential    Specimen: Blood   Result Value Ref Range    WBC 7.80 3.40 - 10.80 10*3/mm3    RBC 4.21 3.77 - 5.28 10*6/mm3    Hemoglobin 12.3 12.0 - 15.9 g/dL    Hematocrit 35.9 34.0 - 46.6 %    MCV 85.2 79.0 - 97.0 fL    MCH 29.2 26.6 - 33.0 pg    MCHC 34.3 31.5 - 35.7 g/dL    RDW 13.9 12.3 - 15.4 %    RDW-SD 43.8 37.0 - 54.0 fl    MPV 7.1 6.0 - 12.0 fL    Platelets 265 140 - 450 10*3/mm3    Neutrophil % 69.7 42.7 - 76.0 %    Lymphocyte % 17.7 (L) 19.6 - 45.3 %    Monocyte % 7.9 5.0 - 12.0 %    Eosinophil % 4.0 0.3 - 6.2 %    Basophil % 0.7 0.0 - 1.5 %    Neutrophils, Absolute 5.40 1.70 - 7.00 10*3/mm3    Lymphocytes, Absolute 1.40 0.70 - 3.10 10*3/mm3    Monocytes, Absolute 0.60 0.10 - 0.90 10*3/mm3    Eosinophils, Absolute 0.30 0.00 - 0.40 10*3/mm3    Basophils, Absolute 0.10 0.00 - 0.20 10*3/mm3    nRBC 0.0 0.0 - 0.2 /100 WBC   Urinalysis, Microscopic Only - Urine, Clean Catch    Specimen: Urine, Clean Catch   Result Value Ref Range    RBC, UA 0-2 (A) None Seen /HPF    WBC, UA 0-2 (A) None Seen /HPF    Bacteria, UA None  Seen None Seen /HPF    Squamous Epithelial Cells, UA 0-2 None Seen, 0-2 /HPF    Hyaline Casts, UA 0-2 None Seen /LPF    Methodology Manual Light Microscopy    Troponin    Specimen: Blood   Result Value Ref Range    Troponin T <0.010 0.000 - 0.030 ng/mL   ECG 12 Lead Chest Pain   Result Value Ref Range    QT Interval 438 ms     CT Abdomen Pelvis Without Contrast    Result Date: 12/29/2022  1. Proctitis involving the mid and low rectum. No residual rectal stool burden. No abscess or fluid collection. 2. Hepatic steatosis. 3. Mild splenomegaly.  Electronically Signed By-Jack Alvarez MD On:12/29/2022 6:10 PM This report was finalized on [id number] by  Jack Alvarez MD.    XR Chest 1 View    Result Date: 12/29/2022  No acute process.  Electronically Signed By-Jack Alvarez MD On:12/29/2022 6:59 PM This report was finalized on [id number] by  Jack Alvarez MD.    Medications   sodium chloride 0.9 % flush 10 mL (has no administration in time range)   lactated ringers bolus 1,000 mL (1,000 mL Intravenous New Bag 12/29/22 1632)   metoclopramide (REGLAN) injection 10 mg (10 mg Intravenous Given 12/29/22 1655)          EKG my interpretation normal sinus rhythm rate of 62 normal axis hypertrophy QTC of 4 and 45 normal EKG                                  Medical Decision Making  Medical decision making patient was placed on the monitor monitor revealed sinus rhythm she had an IV established she was given a liter lactated Ringer's  The patient has this abdominal pain vomiting and while in the ER she developed some pressure in her chest.  There was no shortness of breath and neck arm or jaw pain.  Consider esophageal foreign body or stricture peptic ulcer disease inflammatory bowel disease myocardial infarction DVT pulmonary embolism aortic dissection.  All considerations with some of her symptoms but she has had some ongoing issues with abdominal pain and actually had a recent mission for this.  Does not complete list of all  possibilities.  She was given a liter lactated Ringer's.  Given Reglan 10 mg IV to try to help her symptoms.  She had an EKG obtained reviewed by me and show normal sinus rhythm without any acute ST elevations rate was 62.  She underwent labs all reviewed by me independently.  CBC electrolytes pancreas liver enzymes troponin were all normal testing slight low at 3.4.  Subsequently patient underwent a CT scan abdomen pelvis obtained reviewed by radiology and independently reviewed by me showed some proctitis involving the lower rectum but no other acute findings are noted fatty liver but mild splenomegaly.  Chest x-ray obtained reviewed by me as well as radiology independently reviewed by me was unremarkable.  The patient repeat exam still complaining of feeling sick to her stomach she is very concerned that she has not been able to eat or drink anything for 2 days because nothing goes down.  She has been at home treating symptomatically she has had several hospitalizations recently.  She was just discharged 2 days ago after having issues with constipation and she subsequently had a bowel movement and was seen in the ER this discharge summary reviewed by me.  The patient repeat exam resting comfortably abdomen soft with some epigastric tenderness but no rebound no guarding good bowel sounds.  Patient probably needs an endoscopy to look for any type of stricture or foreign body or peptic ulcer disease as potential etiology.  There are multiple possibilities.  Extremity x-rays making the patient to be placed in the observation unit.  Patient had stable otherwise unremarkable ER course.    Persistent vomiting: acute illness or injury  Amount and/or Complexity of Data Reviewed  External Data Reviewed: labs.  Labs: ordered. Decision-making details documented in ED Course.  Radiology: ordered and independent interpretation performed. Decision-making details documented in ED Course.  ECG/medicine tests: ordered and  independent interpretation performed.      Risk  Prescription drug management.  Decision regarding hospitalization.          Final diagnoses:   Persistent vomiting       ED Disposition  ED Disposition     ED Disposition   Decision to Admit    Condition   --    Comment   Level of Care: Observation Unit [28]   Diagnosis: Persistent vomiting [536.2.ICD-9-CM]   Admitting Physician: ADDIE ALICEA [[id number]]   Attending Physician: ADDIE ALICEA [[id number]]               No follow-up provider specified.       Medication List      No changes were made to your prescriptions during this visit.          Addie Alicea MD  12/29/22 1914

## 2022-12-29 NOTE — ED NOTES
Patient reports pain and unable to swallow anything. Patient reports epigastric pain. Patient reports pain also goes to right upper abdomen and around right flank. Patient reports nausea. Patient reports unable to take medications for pain. Patient reports needing to 'strain' to urinate. Patient reports being admitted on dexter and having 'some of the impaction' from her bowels come out but patient reports no more bowel movements since. Patient reports being discharged two days ago. Patient rates pain 10/10

## 2022-12-29 NOTE — CASE MANAGEMENT/SOCIAL WORK
Discharge Planning Assessment  [facility]     Patient Name: Eladia Connor  MRN: [id number]  Today's Date: 12/29/2022    Admit Date: 12/29/2022    Plan: Home with family. Currently has O2 through [place]   Discharge Needs Assessment     Row Name 12/29/22 1719       Living Environment    People in Home child(michelle), adult    Name(s) of People in Home Meghan Haile    Current Living Arrangements home    Primary Care Provided by self    Provides Primary Care For no one    Family Caregiver if Needed child(michelle), adult    Family Caregiver Names Daughters Lazara and Paresh    Quality of Family Relationships supportive;involved;helpful    Able to Return to Prior Arrangements yes       Resource/Environmental Concerns    Resource/Environmental Concerns none    Transportation Concerns none       Transition Planning    Patient/Family Anticipates Transition to home with family    Patient/Family Anticipated Services at Transition none    Transportation Anticipated family or friend will provide  Lazara or Paresh       Discharge Needs Assessment    Equipment Currently Used at Home pulse ox;walker, rolling;cane, straight;oxygen;bp cuff;shower chair    Concerns to be Addressed denies needs/concerns at this time    Anticipated Changes Related to Illness none    Equipment Needed After Discharge none    Current Discharge Risk chronically ill               Discharge Plan     Row Name 12/29/22 7066       Plan    Plan Home with family. Currently has O2 through [facility]    Patient/Family in Agreement with Plan yes    Plan Comments Pt reports IADLs. Uses rolling walker.Daughter Lazara lives with her and assists her if needed. PCP and pharmacy confirmed.Pt denies problems affording medications. She verbalizes knowing she has an appointment with PCP on 1/5/23.She reports obtaining her medications after recent dc,but reports inability to take them due to n/v. She intends to return home at dc and denies dc needs. She states one of her daughters  will [place] her up at [place] and will bring a portable O2 tank at that time for her to go home with. Evaluation in progress.Disposition to be determined.              Continued Care and Services - Admitted Since 12/29/2022    Coordination has not been started for this encounter.          Demographic Summary     Row Name 12/29/22 1712       General Information    Admission Type other (see comments)  Evaluation in progress. Disposition to be determined    Arrived From home    Referral Source high risk screening    Reason for Consult discharge planning    Preferred Language English       Contact Information    Permission Granted to Share Info With                Functional Status     Row Name 12/29/22 1714       Functional Status    Usual Activity Tolerance good    Current Activity Tolerance moderate       Functional Status, IADL    Medications independent    Meal Preparation independent    Housekeeping independent    Laundry independent    Shopping independent              Kayla Cabrera RN, [facility]  Office: [phone]  Fax: [phone]  [email or web address]      I met with patient in room wearing PPE: mask and goggles.     Maintained distance greater than six feet and spent </=15 minutes in the room      Kayla Cabrera RN

## 2022-12-29 NOTE — TELEPHONE ENCOUNTER
"Pt called the office again and is very concerned that she hasn't received a callback. I explained that I routed her original message to Adán Lemos yesterday (12/28/22) at 3:56pm, and that I have not gotten a response yet. I also explained that Adán is off today and we do not have any other providers.    Pt explained that she was in the ER a few days ago and that she had a CT scan done and it should colitis and a little blockage. She stated that they gave her \"stuff\" in the hospital and had a bowel movement 2 days ago. Pt's complaint now is that she is unable to eat/swallow. She said that she feels like there is a ball/ lump that is keeping her from swallowing her food. And that everything she attemped to eat \"comes back up\". Pt denies any vomiting. I advised her that she should probably return to the ER or go to the [facility] urgent care/ER to address this issue. I informed her that I would route another message to the provider, but can not guarantee a response today. Pt voiced understanding.     ED to Hosp-Admission (Discharged) with Jeff Alicea MD (12/25/2022)    CT Abdomen Pelvis With Contrast (12/25/2022 14:53)    Pt also had several labs perform on 12/25, 12/26, & 12/27.  "

## 2022-12-30 ENCOUNTER — ON CAMPUS - OUTPATIENT (OUTPATIENT)
Dept: URBAN - METROPOLITAN AREA HOSPITAL 85 | Facility: HOSPITAL | Age: 57
End: 2022-12-30

## 2022-12-30 ENCOUNTER — ANESTHESIA (OUTPATIENT)
Dept: GASTROENTEROLOGY | Facility: HOSPITAL | Age: 57
End: 2022-12-30
Payer: COMMERCIAL

## 2022-12-30 ENCOUNTER — ANESTHESIA EVENT (OUTPATIENT)
Dept: GASTROENTEROLOGY | Facility: HOSPITAL | Age: 57
End: 2022-12-30
Payer: COMMERCIAL

## 2022-12-30 ENCOUNTER — READMISSION MANAGEMENT (OUTPATIENT)
Dept: CALL CENTER | Facility: HOSPITAL | Age: 57
End: 2022-12-30

## 2022-12-30 VITALS
OXYGEN SATURATION: 100 % | HEART RATE: 58 BPM | RESPIRATION RATE: 18 BRPM | BODY MASS INDEX: 44.19 KG/M2 | TEMPERATURE: 97.6 F | WEIGHT: 291.6 LBS | HEIGHT: 68 IN | DIASTOLIC BLOOD PRESSURE: 80 MMHG | SYSTOLIC BLOOD PRESSURE: 120 MMHG

## 2022-12-30 DIAGNOSIS — R11.15 CYCLICAL VOMITING SYNDROME UNRELATED TO MIGRAINE: ICD-10-CM

## 2022-12-30 DIAGNOSIS — R13.10 DYSPHAGIA, UNSPECIFIED: ICD-10-CM

## 2022-12-30 DIAGNOSIS — K31.A11 GASTRIC INTESTINAL METAPLASIA WITHOUT DYSPLASIA, INVOLVING T: ICD-10-CM

## 2022-12-30 DIAGNOSIS — K29.50 UNSPECIFIED CHRONIC GASTRITIS WITHOUT BLEEDING: ICD-10-CM

## 2022-12-30 LAB
ANION GAP SERPL CALCULATED.3IONS-SCNC: 10 MMOL/L (ref 5–15)
BACTERIA SPEC AEROBE CULT: NORMAL
BACTERIA SPEC AEROBE CULT: NORMAL
BASOPHILS # BLD AUTO: 0.1 10*3/MM3 (ref 0–0.2)
BASOPHILS NFR BLD AUTO: 1.2 % (ref 0–1.5)
BUN SERPL-MCNC: 8 MG/DL (ref 6–20)
BUN/CREAT SERPL: 12.7 (ref 7–25)
CALCIUM SPEC-SCNC: 8.5 MG/DL (ref 8.6–10.5)
CHLORIDE SERPL-SCNC: 107 MMOL/L (ref 98–107)
CO2 SERPL-SCNC: 25 MMOL/L (ref 22–29)
CREAT SERPL-MCNC: 0.63 MG/DL (ref 0.57–1)
DEPRECATED RDW RBC AUTO: 43.3 FL (ref 37–54)
EGFRCR SERPLBLD CKD-EPI 2021: 102.3 ML/MIN/1.73
EOSINOPHIL # BLD AUTO: 0.3 10*3/MM3 (ref 0–0.4)
EOSINOPHIL NFR BLD AUTO: 5.5 % (ref 0.3–6.2)
ERYTHROCYTE [DISTWIDTH] IN BLOOD BY AUTOMATED COUNT: 13.7 % (ref 12.3–15.4)
GLUCOSE SERPL-MCNC: 91 MG/DL (ref 65–99)
HCT VFR BLD AUTO: 33.4 % (ref 34–46.6)
HGB BLD-MCNC: 11.4 G/DL (ref 12–15.9)
LYMPHOCYTES # BLD AUTO: 1.5 10*3/MM3 (ref 0.7–3.1)
LYMPHOCYTES NFR BLD AUTO: 23.5 % (ref 19.6–45.3)
MCH RBC QN AUTO: 29.2 PG (ref 26.6–33)
MCHC RBC AUTO-ENTMCNC: 34 G/DL (ref 31.5–35.7)
MCV RBC AUTO: 85.8 FL (ref 79–97)
MONOCYTES # BLD AUTO: 0.5 10*3/MM3 (ref 0.1–0.9)
MONOCYTES NFR BLD AUTO: 8.4 % (ref 5–12)
NEUTROPHILS NFR BLD AUTO: 3.9 10*3/MM3 (ref 1.7–7)
NEUTROPHILS NFR BLD AUTO: 61.4 % (ref 42.7–76)
NRBC BLD AUTO-RTO: 0.3 /100 WBC (ref 0–0.2)
PLATELET # BLD AUTO: 221 10*3/MM3 (ref 140–450)
PMV BLD AUTO: 7.1 FL (ref 6–12)
POTASSIUM SERPL-SCNC: 3.4 MMOL/L (ref 3.5–5.2)
QT INTERVAL: 438 MS
RBC # BLD AUTO: 3.89 10*6/MM3 (ref 3.77–5.28)
SODIUM SERPL-SCNC: 142 MMOL/L (ref 136–145)
WBC NRBC COR # BLD: 6.4 10*3/MM3 (ref 3.4–10.8)

## 2022-12-30 PROCEDURE — G0378 HOSPITAL OBSERVATION PER HR: HCPCS

## 2022-12-30 PROCEDURE — 96375 TX/PRO/DX INJ NEW DRUG ADDON: CPT

## 2022-12-30 PROCEDURE — 25010000002 PROPOFOL 200 MG/20ML EMULSION: Performed by: ANESTHESIOLOGIST ASSISTANT

## 2022-12-30 PROCEDURE — 80048 BASIC METABOLIC PNL TOTAL CA: CPT | Performed by: EMERGENCY MEDICINE

## 2022-12-30 PROCEDURE — 85025 COMPLETE CBC W/AUTO DIFF WBC: CPT | Performed by: EMERGENCY MEDICINE

## 2022-12-30 PROCEDURE — 43239 EGD BIOPSY SINGLE/MULTIPLE: CPT | Performed by: INTERNAL MEDICINE

## 2022-12-30 PROCEDURE — 88305 TISSUE EXAM BY PATHOLOGIST: CPT | Performed by: INTERNAL MEDICINE

## 2022-12-30 PROCEDURE — 25010000002 ONDANSETRON PER 1 MG: Performed by: EMERGENCY MEDICINE

## 2022-12-30 PROCEDURE — 43450 DILATE ESOPHAGUS 1/MULT PASS: CPT | Performed by: INTERNAL MEDICINE

## 2022-12-30 PROCEDURE — 88342 IMHCHEM/IMCYTCHM 1ST ANTB: CPT | Performed by: INTERNAL MEDICINE

## 2022-12-30 RX ORDER — MIDODRINE HYDROCHLORIDE 5 MG/1
5 TABLET ORAL
Status: DISCONTINUED | OUTPATIENT
Start: 2022-12-30 | End: 2022-12-30 | Stop reason: HOSPADM

## 2022-12-30 RX ORDER — FAMOTIDINE 20 MG/1
20 TABLET, FILM COATED ORAL
Status: DISCONTINUED | OUTPATIENT
Start: 2022-12-30 | End: 2022-12-30

## 2022-12-30 RX ORDER — SODIUM CHLORIDE 9 MG/ML
INJECTION, SOLUTION INTRAVENOUS CONTINUOUS PRN
Status: DISCONTINUED | OUTPATIENT
Start: 2022-12-30 | End: 2022-12-30 | Stop reason: SURG

## 2022-12-30 RX ORDER — BUSPIRONE HYDROCHLORIDE 5 MG/1
5 TABLET ORAL 3 TIMES DAILY
Status: DISCONTINUED | OUTPATIENT
Start: 2022-12-30 | End: 2022-12-30 | Stop reason: HOSPADM

## 2022-12-30 RX ORDER — SODIUM CHLORIDE 0.9 % (FLUSH) 0.9 %
3 SYRINGE (ML) INJECTION EVERY 12 HOURS SCHEDULED
Status: DISCONTINUED | OUTPATIENT
Start: 2022-12-30 | End: 2022-12-30 | Stop reason: HOSPADM

## 2022-12-30 RX ORDER — ALBUTEROL SULFATE 2.5 MG/3ML
2.5 SOLUTION RESPIRATORY (INHALATION) EVERY 6 HOURS PRN
Status: DISCONTINUED | OUTPATIENT
Start: 2022-12-30 | End: 2022-12-30 | Stop reason: HOSPADM

## 2022-12-30 RX ORDER — ZOLPIDEM TARTRATE 5 MG/1
5 TABLET ORAL NIGHTLY PRN
Status: DISCONTINUED | OUTPATIENT
Start: 2022-12-30 | End: 2022-12-30 | Stop reason: HOSPADM

## 2022-12-30 RX ORDER — ASPIRIN 81 MG/1
162 TABLET, CHEWABLE ORAL DAILY
Status: DISCONTINUED | OUTPATIENT
Start: 2022-12-30 | End: 2022-12-30

## 2022-12-30 RX ORDER — SODIUM CHLORIDE 9 MG/ML
40 INJECTION, SOLUTION INTRAVENOUS AS NEEDED
Status: DISCONTINUED | OUTPATIENT
Start: 2022-12-30 | End: 2022-12-30 | Stop reason: HOSPADM

## 2022-12-30 RX ORDER — PROPOFOL 10 MG/ML
INJECTION, EMULSION INTRAVENOUS AS NEEDED
Status: DISCONTINUED | OUTPATIENT
Start: 2022-12-30 | End: 2022-12-30 | Stop reason: SURG

## 2022-12-30 RX ORDER — PANTOPRAZOLE SODIUM 40 MG/10ML
40 INJECTION, POWDER, LYOPHILIZED, FOR SOLUTION INTRAVENOUS
Status: DISCONTINUED | OUTPATIENT
Start: 2022-12-30 | End: 2022-12-30 | Stop reason: HOSPADM

## 2022-12-30 RX ORDER — ARIPIPRAZOLE 5 MG/1
5 TABLET ORAL DAILY
Status: DISCONTINUED | OUTPATIENT
Start: 2022-12-30 | End: 2022-12-30 | Stop reason: HOSPADM

## 2022-12-30 RX ORDER — ATORVASTATIN CALCIUM 40 MG/1
80 TABLET, FILM COATED ORAL NIGHTLY
Status: DISCONTINUED | OUTPATIENT
Start: 2022-12-30 | End: 2022-12-30 | Stop reason: HOSPADM

## 2022-12-30 RX ORDER — DOCUSATE SODIUM 100 MG/1
100 CAPSULE, LIQUID FILLED ORAL 2 TIMES DAILY PRN
Status: DISCONTINUED | OUTPATIENT
Start: 2022-12-30 | End: 2022-12-30 | Stop reason: HOSPADM

## 2022-12-30 RX ORDER — SODIUM CHLORIDE 0.9 % (FLUSH) 0.9 %
3-10 SYRINGE (ML) INJECTION AS NEEDED
Status: DISCONTINUED | OUTPATIENT
Start: 2022-12-30 | End: 2022-12-30 | Stop reason: HOSPADM

## 2022-12-30 RX ORDER — POLYETHYLENE GLYCOL 3350 17 G/17G
17 POWDER, FOR SOLUTION ORAL DAILY
Status: DISCONTINUED | OUTPATIENT
Start: 2022-12-30 | End: 2022-12-30 | Stop reason: HOSPADM

## 2022-12-30 RX ORDER — LACTULOSE 10 G/15ML
20 SOLUTION ORAL DAILY
Status: DISCONTINUED | OUTPATIENT
Start: 2022-12-30 | End: 2022-12-30 | Stop reason: HOSPADM

## 2022-12-30 RX ORDER — LACTULOSE 10 G/15ML
20 SOLUTION ORAL DAILY
Qty: 946 ML | Refills: 0 | Status: SHIPPED | OUTPATIENT
Start: 2022-12-31 | End: 2023-01-30

## 2022-12-30 RX ORDER — GABAPENTIN 100 MG/1
100 CAPSULE ORAL 2 TIMES DAILY
Status: DISCONTINUED | OUTPATIENT
Start: 2022-12-30 | End: 2022-12-30 | Stop reason: HOSPADM

## 2022-12-30 RX ORDER — LIDOCAINE HYDROCHLORIDE 10 MG/ML
INJECTION, SOLUTION EPIDURAL; INFILTRATION; INTRACAUDAL; PERINEURAL AS NEEDED
Status: DISCONTINUED | OUTPATIENT
Start: 2022-12-30 | End: 2022-12-30 | Stop reason: SURG

## 2022-12-30 RX ADMIN — BUSPIRONE HYDROCHLORIDE 5 MG: 5 TABLET ORAL at 16:49

## 2022-12-30 RX ADMIN — PROPOFOL 180 MG: 10 INJECTION, EMULSION INTRAVENOUS at 12:25

## 2022-12-30 RX ADMIN — PANTOPRAZOLE SODIUM 40 MG: 40 INJECTION, POWDER, FOR SOLUTION INTRAVENOUS at 08:48

## 2022-12-30 RX ADMIN — ONDANSETRON 4 MG: 2 INJECTION INTRAMUSCULAR; INTRAVENOUS at 02:28

## 2022-12-30 RX ADMIN — MIDODRINE HYDROCHLORIDE 5 MG: 5 TABLET ORAL at 16:49

## 2022-12-30 RX ADMIN — Medication 10 ML: at 08:48

## 2022-12-30 RX ADMIN — SODIUM CHLORIDE: 0.9 INJECTION, SOLUTION INTRAVENOUS at 12:23

## 2022-12-30 RX ADMIN — LIDOCAINE HYDROCHLORIDE 50 MG: 10 INJECTION, SOLUTION EPIDURAL; INFILTRATION; INTRACAUDAL; PERINEURAL at 12:25

## 2022-12-30 NOTE — CONSULTS
GI CONSULT  NOTE:    Referring Provider:  Dr. Alicea    Chief complaint: Persistent vomiting    Subjective .     History of present illness: Eladia Connor is a 57 y.o. female with history of anxiety/depression, hyperlipidemia, and cholecystectomy who presents with complaints of constipation and persistent vomiting.  The patient was recently hospitalized for work-up of migraines versus possible pseudoseizures.  She had had significant constipation since that hospitalization.  Our service has been consulted on 12/26 due to her severe constipation.  At that time, she was given enema and sorbitol for bowel purge.  Outpatient colonoscopy had been recommended.  She now presents with complaints of persistent vomiting.  The patient reports that she has been having persistent vomiting for the past 2 days.  States that she is also been having significant dysphagia since this time.  States that her esophagus \"feels full.\"  She states that she will regurgitate food back up.  Denies any odynophagia.  No heartburn.  Does have epigastric pain.  She is unable to identify any exacerbating or alleviating factors.  States that she continues to struggle with constipation and last bowel movement was 2 days ago.  She denies any bright red blood per rectum or melena.      Endo History:  No previous endoscopy.    Past Medical History:  Past Medical History:   Diagnosis Date   • Anxiety    • Cancer (HCC)     SKIN   • Depression    • Psoriasis    • Seizures (HCC) 12/06/2022       Past Surgical History:  Past Surgical History:   Procedure Laterality Date   • CHOLECYSTECTOMY  1987   • EYE SURGERY     • KNEE SURGERY         Social History:  Social History     Tobacco Use   • Smoking status: Never   • Smokeless tobacco: Never   Vaping Use   • Vaping Use: Never used   Substance Use Topics   • Alcohol use: Never   • Drug use: Never       Family History:  Family History   Problem Relation Age of Onset   • Heart disease Mother    • Breast  cancer Mother    • Heart disease Father    • Pancreatic cancer Father    • Heart disease Sister    • Lung cancer Brother        Medications:  Medications Prior to Admission   Medication Sig Dispense Refill Last Dose   • albuterol sulfate  (90 Base) MCG/ACT inhaler Inhale 2 puffs Every 6 (Six) Hours As Needed for Wheezing.   12/29/2022   • ARIPiprazole (ABILIFY) 5 MG tablet Take 5 mg by mouth Daily.   Past Week   • aspirin 81 MG chewable tablet Chew 2 tablets Daily.   Past Week   • atorvastatin (LIPITOR) 80 MG tablet Take 1 tablet by mouth Every Night. 90 tablet  Past Week   • busPIRone (BUSPAR) 5 MG tablet Take 1 tablet by mouth 3 (Three) Times a Day. 30 tablet 5 Past Week   • docusate sodium (COLACE) 50 MG capsule Take 1 capsule by mouth 2 (Two) Times a Day As Needed for Constipation for up to 30 days. 30 capsule 0 Past Week   • famotidine (PEPCID) 20 MG tablet Take 1 tablet by mouth 2 (Two) Times a Day Before Meals.   Past Week   • gabapentin (NEURONTIN) 100 MG capsule Take 1 capsule by mouth 2 (Two) Times a Day.   Past Week   • midodrine (PROAMATINE) 5 MG tablet Take 5 mg by mouth 3 (Three) Times a Day Before Meals.   Past Week   • polyethylene glycol (MIRALAX) 17 g packet Take 17 g by mouth Daily for 30 days. 30 packet 0 Past Week   • zolpidem (AMBIEN) 10 MG tablet Take 10 mg by mouth At Night As Needed for Sleep.   Past Week       Scheduled Meds:ARIPiprazole, 5 mg, Oral, Daily  aspirin, 162 mg, Oral, Daily  atorvastatin, 80 mg, Oral, Nightly  busPIRone, 5 mg, Oral, TID  gabapentin, 100 mg, Oral, BID  midodrine, 5 mg, Oral, TID AC  pantoprazole, 40 mg, Intravenous, Q AM  polyethylene glycol, 17 g, Oral, Daily  sodium chloride, 10 mL, Intravenous, Q12H      Continuous Infusions:sodium chloride, 125 mL/hr, Last Rate: 125 mL/hr (12/29/22 6133)      PRN Meds:.•  acetaminophen  •  albuterol  •  docusate sodium  •  melatonin  •  ondansetron  •  sodium chloride  •  sodium chloride  •  sodium chloride  •  sodium  chloride  •  zolpidem    ALLERGIES:  Patient has no known allergies.    ROS:  The following systems were reviewed and negative;   Constitution:  No fevers, chills, no unintentional weight loss  Skin: no rash, no jaundice  Eyes:  No blurry vision, no eye pain  HENT:  No change in hearing or smell  Resp:  No dyspnea or cough  CV:  No chest pain or palpitations  :  No dysuria, hematuria  Musculoskeletal:  No leg cramps or arthralgias  Neuro:  No tremor, no numbness  Psych:  No depression or confusion    Objective     Vital Signs:   Vitals:    12/29/22 2101 12/29/22 2147 12/30/22 0500 12/30/22 0854   BP: 106/60 110/66 91/50 108/63   BP Location:  Left arm Right arm Right arm   Patient Position:  Lying Lying Lying   Pulse: 60 61 60    Resp:  17 18    Temp:  98 °F (36.7 °C) 98.1 °F (36.7 °C)    TempSrc:  Oral Oral    SpO2: 100% 100%     Weight:  132 kg (291 lb 9.6 oz)     Height:  172.7 cm (68\")         Physical Exam:       General Appearance:    Awake and alert, in no acute distress   Head:    Normocephalic, without obvious abnormality, atraumatic   Throat:   No oral lesions, no thrush, oral mucosa moist   Lungs:     Respirations regular, even and unlabored   Chest Wall:    No abnormalities observed   Abdomen:     Soft, epigastric tenderness, no rebound or guarding, non-distended   Rectal:     Deferred   Extremities:   Moves all extremities, no edema, no cyanosis   Pulses:   Pulses palpable and equal bilaterally   Skin:   No rash, no jaundice, normal palpation   Lymph nodes:   No cervical, supraclavicular or submandibular palpable adenopathy   Neurologic:   Cranial nerves 2 - 12 grossly intact, no asterixis       Results Review:   I reviewed the patient's labs and imaging.  CBC    Results from last 7 days   Lab Units 12/30/22  0212 12/29/22  1623 12/27/22  0749 12/26/22  0351 12/25/22  1412   WBC 10*3/mm3 6.40 7.80 11.50* 10.40 12.00*   HEMOGLOBIN g/dL 11.4* 12.3 10.7* 11.7* 13.0   PLATELETS 10*3/mm3 221 265 226 237  289     CMP   Results from last 7 days   Lab Units 12/30/22  0212 12/29/22  1623 12/27/22  0749 12/26/22  0351 12/25/22  1412   SODIUM mmol/L 142 141 138 140 137   POTASSIUM mmol/L 3.4* 3.4* 3.6 4.1 4.2   CHLORIDE mmol/L 107 105 105 104 101   CO2 mmol/L 25.0 24.0 24.0 27.0 23.0   BUN mg/dL 8 8 6 8 10   CREATININE mg/dL 0.63 0.80 0.69 0.65 0.78   GLUCOSE mg/dL 91 92 109* 127* 167*   ALBUMIN g/dL  --  3.8  --   --  4.00   BILIRUBIN mg/dL  --  0.7  --   --  0.6   ALK PHOS U/L  --  146*  --   --  155*   AST (SGOT) U/L  --  36*  --   --  27   ALT (SGPT) U/L  --  22  --   --  20   LIPASE U/L  --  14  --   --  15     Cr Clearance Estimated Creatinine Clearance: 138.3 mL/min (by C-G formula based on SCr of 0.63 mg/dL).  Coag     HbA1C   Lab Results   Component Value Date    HGBA1C 5.7 (H) 12/07/2022    HGBA1C 5.6 10/04/2022    HGBA1C 5.3 02/17/2021     Blood Glucose   Glucose   Date/Time Value Ref Range Status   12/27/2022 1635 117 (H) 70 - 105 mg/dL Final     Comment:     Serial Number: [id number]:  [id number]   12/27/2022 1121 133 (H) 70 - 105 mg/dL Final     Comment:     Serial Number: [id number]:  [id number]     Infection   Results from last 7 days   Lab Units 12/25/22  1633 12/25/22  1412   BLOODCX  No growth at 4 days No growth at 4 days     UA    Results from last 7 days   Lab Units 12/29/22  1558   NITRITE UA  Negative   WBC UA /HPF 0-2*   BACTERIA UA /HPF None Seen   SQUAM EPITHEL UA /HPF 0-2     Radiology(recent) CT Abdomen Pelvis Without Contrast    Result Date: 12/29/2022  1. Proctitis involving the mid and low rectum. No residual rectal stool burden. No abscess or fluid collection. 2. Hepatic steatosis. 3. Mild splenomegaly.  Electronically Signed By-Jack Alvarez MD On:12/29/2022 6:10 PM This report was finalized on [id number] by  Jack Alvarez MD.    XR Chest 1 View    Result Date: 12/29/2022  No acute process.  Electronically Signed By-Jack Alvarez MD On:12/29/2022 6:59 PM This report was  finalized on [id number] by  Jack Alvarez MD.         ASSESSMENT:  -Dysphagia  -Vomiting  -Epigastric pain  -Constipation  -Abnormal CT showing proctitis  -Hypokalemia  -Mild normocytic anemia  -Anxiety/depression  -Hyperlipidemia  -History of cholecystectomy    PLAN:  Patient is a 57-year-old female with history of constipation, anxiety/depression, hyperlipidemia, and cholecystectomy who presents with complaints of persistent vomiting.  She reports persistent dysphagia for the past 2 days and states that she has been unable to take her medications.    We will proceed with EGD today for further evaluation.  Maintain NPO.  CT abdomen/pelvis on admission shows changes of proctitis, but no residual stool burden.  Suspect that proctitis is due to recent constipation.  Patient will need outpatient colonoscopy for further evaluation.  Continue PPI.  Start lactulose for bowel regimen.  Antiemetics/analgesics as needed.  Supportive care.        I discussed the patients findings and my recommendations with the patient.  I will discuss the case with Dr. Arana and change the plan accordingly.    We appreciate the referral.    Electronically signed by SCOOBY Perez, 12/30/22, 9:00 AM EST.

## 2022-12-30 NOTE — DISCHARGE SUMMARY
[facility]    Quincy Lemos APRN    CHIEF COMPLAINT:     Dysphagia with nausea and vomiting    HISTORY OF PRESENT ILLNESS:    Obtained from ED provider HPI on 12/29/2022:  History of present illness: Eladia Connor is a 57 y.o. female with history of anxiety/depression, hyperlipidemia, and cholecystectomy who presents with complaints of constipation and persistent vomiting.  The patient was recently hospitalized for work-up of migraines versus possible pseudoseizures.  She had had significant constipation since that hospitalization.  Our service has been consulted on 12/26 due to her severe constipation.  At that time, she was given enema and sorbitol for bowel purge.  Outpatient colonoscopy had been recommended.  She now presents with complaints of persistent vomiting.  The patient reports that she has been having persistent vomiting for the past 2 days.  States that she is also been having significant dysphagia since this time.  States that her esophagus \"feels full.\"  She states that she will regurgitate food back up.  Denies any odynophagia.  No heartburn.  Does have epigastric pain.  She is unable to identify any exacerbating or alleviating factors.  States that she continues to struggle with constipation and last bowel movement was 2 days ago.  She denies any bright red blood per rectum or melena.    12/30/2022:  Patient confirms the HPI noted above including dysphagia with immediate regurgitation/vomiting of any attempted p.o. intake of both solid and liquid.  She does continue to report some discomfort associated with her recent constipation and notes that since her discharge several days prior she has had no further bowel movements (but also minimal p.o. intake).  She denies any fever, other pain including chest pain or dyspnea.        Past Medical History:   Diagnosis Date   • Anxiety    • Cancer (HCC)     SKIN   • Depression    • Psoriasis    • Seizures (HCC)  12/06/2022     Past Surgical History:   Procedure Laterality Date   • CHOLECYSTECTOMY  1987   • EYE SURGERY     • KNEE SURGERY       Family History   Problem Relation Age of Onset   • Heart disease Mother    • Breast cancer Mother    • Heart disease Father    • Pancreatic cancer Father    • Heart disease Sister    • Lung cancer Brother      Social History     Tobacco Use   • Smoking status: Never   • Smokeless tobacco: Never   Vaping Use   • Vaping Use: Never used   Substance Use Topics   • Alcohol use: Never   • Drug use: Never     Medications Prior to Admission   Medication Sig Dispense Refill Last Dose   • albuterol sulfate  (90 Base) MCG/ACT inhaler Inhale 2 puffs Every 6 (Six) Hours As Needed for Wheezing.   12/29/2022   • ARIPiprazole (ABILIFY) 5 MG tablet Take 5 mg by mouth Daily.   Past Week   • aspirin 81 MG chewable tablet Chew 2 tablets Daily.   Past Week   • atorvastatin (LIPITOR) 80 MG tablet Take 1 tablet by mouth Every Night. 90 tablet  Past Week   • busPIRone (BUSPAR) 5 MG tablet Take 1 tablet by mouth 3 (Three) Times a Day. 30 tablet 5 Past Week   • docusate sodium (COLACE) 50 MG capsule Take 1 capsule by mouth 2 (Two) Times a Day As Needed for Constipation for up to 30 days. 30 capsule 0 Past Week   • famotidine (PEPCID) 20 MG tablet Take 1 tablet by mouth 2 (Two) Times a Day Before Meals.   Past Week   • gabapentin (NEURONTIN) 100 MG capsule Take 1 capsule by mouth 2 (Two) Times a Day.   Past Week   • midodrine (PROAMATINE) 5 MG tablet Take 5 mg by mouth 3 (Three) Times a Day Before Meals.   Past Week   • polyethylene glycol (MIRALAX) 17 g packet Take 17 g by mouth Daily for 30 days. 30 packet 0 Past Week   • zolpidem (AMBIEN) 10 MG tablet Take 10 mg by mouth At Night As Needed for Sleep.   Past Week     Allergies:  Patient has no known allergies.    Immunization History   Administered Date(s) Administered   • Hepatitis B 11/24/2015   • Tdap 10/26/2015           REVIEW OF SYSTEMS:    Review  of Systems   Constitutional: Negative.   HENT: Negative.    Eyes: Negative.    Cardiovascular: Negative.    Respiratory: Negative.    Skin: Negative.    Musculoskeletal: Negative.    Gastrointestinal: Positive for abdominal pain, constipation, dysphagia, nausea and vomiting.   Genitourinary: Negative.    Neurological: Negative.    Psychiatric/Behavioral: Negative.        Vital Signs  Temp:  [97.7 °F (36.5 °C)-98.1 °F (36.7 °C)] 97.7 °F (36.5 °C)  Heart Rate:  [60-72] 63  Resp:  [17-25] 19  BP: ()/(50-68) 103/68          Physical Exam:  Physical Exam  Vitals reviewed.   Constitutional:       General: She is not in acute distress.     Appearance: Normal appearance. She is obese. She is not ill-appearing, toxic-appearing or diaphoretic.   HENT:      Head: Normocephalic.      Right Ear: External ear normal.      Left Ear: External ear normal.      Nose: Nose normal.      Mouth/Throat:      Mouth: Mucous membranes are moist.   Eyes:      Extraocular Movements: Extraocular movements intact.   Cardiovascular:      Rate and Rhythm: Normal rate and regular rhythm.      Pulses: Normal pulses.   Pulmonary:      Effort: Pulmonary effort is normal.      Breath sounds: Normal breath sounds.   Abdominal:      General: Bowel sounds are normal.      Palpations: Abdomen is soft.   Musculoskeletal:         General: Normal range of motion.      Cervical back: Normal range of motion.      Right lower leg: No edema.      Left lower leg: No edema.   Skin:     General: Skin is warm and dry.      Capillary Refill: Capillary refill takes less than 2 seconds.   Neurological:      General: No focal deficit present.      Mental Status: She is alert.   Psychiatric:         Mood and Affect: Mood normal.         Behavior: Behavior normal.         Thought Content: Thought content normal.         Judgment: Judgment normal.         Emotional Behavior:   Normal   Debilities:  None  Results Review:    I reviewed the patient's new clinical  results.  Lab Results (most recent)     Procedure Component Value Units Date/Time    CBC Auto Differential [[id number]]  (Abnormal) Collected: 12/30/22 0212    Specimen: Blood Updated: 12/30/22 0257     WBC 6.40 10*3/mm3      RBC 3.89 10*6/mm3      Hemoglobin 11.4 g/dL      Hematocrit 33.4 %      MCV 85.8 fL      MCH 29.2 pg      MCHC 34.0 g/dL      RDW 13.7 %      RDW-SD 43.3 fl      MPV 7.1 fL      Platelets 221 10*3/mm3      Neutrophil % 61.4 %      Lymphocyte % 23.5 %      Monocyte % 8.4 %      Eosinophil % 5.5 %      Basophil % 1.2 %      Neutrophils, Absolute 3.90 10*3/mm3      Lymphocytes, Absolute 1.50 10*3/mm3      Monocytes, Absolute 0.50 10*3/mm3      Eosinophils, Absolute 0.30 10*3/mm3      Basophils, Absolute 0.10 10*3/mm3      nRBC 0.3 /100 WBC     Basic Metabolic Panel [[id number]]  (Abnormal) Collected: 12/30/22 0212    Specimen: Blood Updated: 12/30/22 0242     Glucose 91 mg/dL      BUN 8 mg/dL      Creatinine 0.63 mg/dL      Sodium 142 mmol/L      Potassium 3.4 mmol/L      Chloride 107 mmol/L      CO2 25.0 mmol/L      Calcium 8.5 mg/dL      BUN/Creatinine Ratio 12.7     Anion Gap 10.0 mmol/L      eGFR 102.3 mL/min/1.73      Comment: National Kidney Foundation and American Society of Nephrology (ASN) Task Force recommended calculation based on the Chronic Kidney Disease Epidemiology Collaboration (CKD-EPI) equation refit without adjustment for race.       Narrative:      GFR Normal >60  Chronic Kidney Disease <60  Kidney Failure <15      Troponin [[id number]]  (Normal) Collected: 12/29/22 1623    Specimen: Blood Updated: 12/29/22 1732     Troponin T <0.010 ng/mL     Narrative:      Troponin T Reference Range:  <= 0.03 ng/mL-   Negative for AMI  >0.03 ng/mL-     Abnormal for myocardial necrosis.  Clinicians would have to utilize clinical acumen, EKG, Troponin and serial changes to determine if it is an Acute Myocardial Infarction or myocardial injury due to an underlying chronic condition.       Results  may be falsely decreased if patient taking Biotin.      Comprehensive Metabolic Panel [[id number]]  (Abnormal) Collected: 12/29/22 1623    Specimen: Blood Updated: 12/29/22 1705     Glucose 92 mg/dL      BUN 8 mg/dL      Creatinine 0.80 mg/dL      Sodium 141 mmol/L      Potassium 3.4 mmol/L      Chloride 105 mmol/L      CO2 24.0 mmol/L      Calcium 9.1 mg/dL      Total Protein 7.2 g/dL      Albumin 3.8 g/dL      ALT (SGPT) 22 U/L      AST (SGOT) 36 U/L      Alkaline Phosphatase 146 U/L      Total Bilirubin 0.7 mg/dL      Globulin 3.4 gm/dL      A/G Ratio 1.1 g/dL      BUN/Creatinine Ratio 10.0     Anion Gap 12.0 mmol/L      eGFR 85.0 mL/min/1.73      Comment: National Kidney Foundation and American Society of Nephrology (ASN) Task Force recommended calculation based on the Chronic Kidney Disease Epidemiology Collaboration (CKD-EPI) equation refit without adjustment for race.       Narrative:      GFR Normal >60  Chronic Kidney Disease <60  Kidney Failure <15      Lipase [[id number]]  (Normal) Collected: 12/29/22 1623    Specimen: Blood Updated: 12/29/22 1705     Lipase 14 U/L     Urinalysis, Microscopic Only - Urine, Clean Catch [[id number]]  (Abnormal) Collected: 12/29/22 1558    Specimen: Urine, Clean Catch Updated: 12/29/22 1644     RBC, UA 0-2 /HPF      WBC, UA 0-2 /HPF      Bacteria, UA None Seen /HPF      Squamous Epithelial Cells, UA 0-2 /HPF      Hyaline Casts, UA 0-2 /LPF      Methodology Manual Light Microscopy    CBC & Differential [[id number]]  (Abnormal) Collected: 12/29/22 1623    Specimen: Blood Updated: 12/29/22 1637    Narrative:      The following orders were created for panel order CBC & Differential.  Procedure                               Abnormality         Status                     ---------                               -----------         ------                     CBC Auto Differential[[id number]]        Abnormal            Final result                 Please view results for these tests on  the individual orders.    CBC Auto Differential [[id number]]  (Abnormal) Collected: 12/29/22 1623    Specimen: Blood Updated: 12/29/22 1637     WBC 7.80 10*3/mm3      RBC 4.21 10*6/mm3      Hemoglobin 12.3 g/dL      Hematocrit 35.9 %      MCV 85.2 fL      MCH 29.2 pg      MCHC 34.3 g/dL      RDW 13.9 %      RDW-SD 43.8 fl      MPV 7.1 fL      Platelets 265 10*3/mm3      Neutrophil % 69.7 %      Lymphocyte % 17.7 %      Monocyte % 7.9 %      Eosinophil % 4.0 %      Basophil % 0.7 %      Neutrophils, Absolute 5.40 10*3/mm3      Lymphocytes, Absolute 1.40 10*3/mm3      Monocytes, Absolute 0.60 10*3/mm3      Eosinophils, Absolute 0.30 10*3/mm3      Basophils, Absolute 0.10 10*3/mm3      nRBC 0.0 /100 WBC     Urinalysis With Microscopic If Indicated (No Culture) - Urine, Clean Catch [[id number]]  (Abnormal) Collected: 12/29/22 1558    Specimen: Urine, Clean Catch Updated: 12/29/22 1606     Color, UA Yellow     Appearance, UA Clear     pH, UA 8.0     Specific Gravity, UA 1.014     Glucose, UA Negative     Ketones, UA 40 mg/dL (2+)     Bilirubin, UA Negative     Blood, UA Trace     Protein, UA Negative     Leuk Esterase, UA Trace     Nitrite, UA Negative     Urobilinogen, UA 1.0 E.U./dL          Imaging Results (Most Recent)     Procedure Component Value Units Date/Time    XR Chest 1 View [[id number]] Collected: 12/29/22 1859     Updated: 12/29/22 1901    Narrative:         DATE OF EXAM:   12/29/2022 6:25 PM     PROCEDURE:   XR CHEST 1 VW-     INDICATIONS:   Chest pain; R11.15-Cyclical vomiting syndrome unrelated to migraine     COMPARISON:  12/11/2022     TECHNIQUE:   Portable chest radiograph.     FINDINGS:    The cardiomediastinal silhouette is within normal limits. The lungs are  clear. There is no pneumothorax, focal consolidation, or large pleural  effusion. Osseous structures grossly intact.        Impression:      No acute process.      Electronically Signed By-Jack Alvarez MD On:12/29/2022 6:59 PM  This report was  finalized on [id number] by  Jack Alvarez MD.    CT Abdomen Pelvis Without Contrast [[id number]] Collected: 12/29/22 1800     Updated: 12/29/22 1812    Narrative:         DATE OF EXAM:  12/29/2022 5:27 PM     PROCEDURE:  CT ABDOMEN PELVIS WO CONTRAST-     INDICATIONS:  Abdominal pain now vomiting     COMPARISON:  No Comparisons Available     TECHNIQUE:  Routine transaxial slices were obtained through the abdomen and pelvis  without the administration of intravenous contrast. Reconstructed  coronal and sagittal images were also obtained. Automated exposure  control and iterative construction methods were used.     FINDINGS:  Lower chest demonstrates clear lung bases. Heart is enlarged. Mitral  annular calcifications noted. No pericardial effusion or pleural  effusion. Tiny hiatal hernia.     Heterogeneous areas of low attenuation liver consistent with hepatic  steatosis. Lack of contrast limits assessment of abdominal organs and  vasculature. Spleen mildly enlarged measuring 13.8 cm in craniocaudal  length. The gallbladder is absent. No biliary dilatation. Normal right  adrenal gland. Stable left adrenal gland myelolipoma measuring 1.9 cm.  Fatty replacement of pancreas. Negative for pancreatitis. Kidneys  without hydronephrosis. No ureteral calculus. Urinary bladder thin  walled. Uterus and adnexa without acute abnormality.     Negative for pneumoperitoneum. No bowel obstruction. Scattered colonic  diverticulosis without diverticulitis. The appendix is normal. Mild  atherosclerotic calcifications of the abdominal aorta without aneurysm.  No fluid collection the abdomen or pelvis. There is a diffuse wall  thickening and inflammatory stranding surrounding mid and low rectum  consistent with proctitis. No abscess. No residual rectal stool burden.  Disc disease in the lower lumbar spine at L5-S1. Transitional S1  vertebral body with rudimentary disc at S1-S2. No aggressive osseous  lesion or fracture.        Impression:      1. Proctitis involving the mid and low rectum. No residual rectal stool  burden. No abscess or fluid collection.  2. Hepatic steatosis.  3. Mild splenomegaly.     Electronically Signed By-Jack Alvarez MD On:12/29/2022 6:10 PM  This report was finalized on [id number] by  Jack Alvarez MD.        reviewed    ECG/EMG Results (most recent)     Procedure Component Value Units Date/Time    ECG 12 Lead Chest Pain [[id number]] Collected: 12/29/22 1722     Updated: 12/30/22 1038     QT Interval 438 ms     Narrative:      HEART RATE= 62  bpm  RR Interval= 968  ms  TN Interval= 188  ms  P Horizontal Axis= 15  deg  P Front Axis= 34  deg  QRSD Interval= 77  ms  QT Interval= 438  ms  QRS Axis= 21  deg  T Wave Axis= 2  deg  - NORMAL ECG -  Sinus rhythm  When compared with ECG of 26-Dec-2022 13:46:11,  Nonspecific significant change  Electronically Signed By: Jeff Alicea (DA[facility]ID) 30-Dec-2022 10:38:03  Date and Time of Study: 2022-12-29 17:22:07    SCANNED - TELEMETRY   [[id number]] Resulted: 12/29/22     Updated: 12/30/22 1107        reviewed        Results for orders placed during the hospital encounter of 12/06/22    Adult Transthoracic Echo Complete W/ Cont if Necessary Per Protocol    Interpretation Summary  •  Left ventricular systolic function is normal. Left ventricular ejection fraction appears to be 61 - 65%.  •  Left ventricular diastolic function was normal.  •  Saline test results are negative.  Study was suboptimal.      Microbiology Results (last 10 days)     Procedure Component Value - Date/Time    Blood Culture - Blood, Arm, Right [[id number]]  (Normal) Collected: 12/25/22 1633    Lab Status: Preliminary result Specimen: Blood from Arm, Right Updated: 12/29/22 1645     Blood Culture No growth at 4 days    Narrative:      Less than seven (7) mL's of blood was collected.  Insufficient quantity may yield false negative results.    Blood Culture - Blood, Arm, Right [[id number]]  (Normal) Collected:  12/25/22 1412    Lab Status: Preliminary result Specimen: Blood from Arm, Right Updated: 12/29/22 1645     Blood Culture No growth at 4 days    Narrative:      Less than seven (7) mL's of blood was collected.  Insufficient quantity may yield false negative results.          Assessment & Plan     Persistent vomiting     Dysphagia with nausea and vomiting  -Mildly elevated alk phos at 146 noted along with an AST of 36 and an ALT of 22  -Lipase: 14  -UA showed no bacteria with 0-2 WBCs, 0-2 RBCs, trace leukocyte Estrace, negative nitrites, trace blood and 2+ ketones  -Chest x-ray showed no acute process  -CT of abdomen and pelvis showed proctitis involving the mid and lower rectum with no residual rectal stool burden, abscess or fluid collection along with hepatic steatosis and mild splenomegaly  -EKG shows sinus rhythm at 62 without obvious acute changes and a QTC of 445 ms  -NPO  -GI consulted who performed EGD on 12/30/2022 which showed no obvious esophagitis or esophageal ulcer with esophagus progressively dilated and erosive gastritis noticed with biopsy taken.  Recommendations to continue PPI and follow-up in 8 to 12 weeks for further evaluation of possible fatty liver  -GI also added lactulose      I discussed the patients findings and my recommendations with patient and nursing staff.     Discharge Diagnosis:      Persistent vomiting      Hospital Course  Patient is a 57 y.o. female presented with dysphagia with nausea and vomiting in HPI noted above.  CBC did show mildly elevated alk phos of 146 with an AST of 36 but ALT within normal limits at 22.  Lipase was within normal limits at 14 and UA was generally unremarkable.  Chest x-ray showed no acute process and CT of abdomen and pelvis was obtained in the ED which did show some proctitis involving the mid and lower rectum with no residual stool burden, abscess or fluid collection present.  Hepatic steatosis along with mild splenomegaly was also noted.  EKG was  obtained which showed sinus rhythm without obvious acute changes and she was continued n.p.o. throughout her admission.  GI was consulted who evaluated patient recommending EGD which was performed on 12/30/2022 which showed no obvious esophagitis or esophageal ulcers with esophagus progressively dilated and erosive gastritis seen with biopsy taken.  Gastroenterologist recommended continuing PPI as well as new bowel regimen and following up in 8 to 10 weeks for repeat lab evaluation and possible evaluation for fatty liver disease they also recommended initiation of lactulose therapy for her continued constipation.  At this time patient felt to be in good condition for discharge with close follow-up with her PCP as well as GI on an outpatient basis.  She will need outpatient colonoscopy and this was explained at the time of discharge.  Her full testing/results and plan were discussed with patient and with concerning/alarm symptoms for which to call 911/return to the ED. lactulose will be prescribed at discharge.  All questions were answered and she verbalizes her understanding and agreement.    Past Medical History:     Past Medical History:   Diagnosis Date   • Anxiety    • Cancer (HCC)     SKIN   • Depression    • Psoriasis    • Seizures (HCC) 12/06/2022       Past Surgical History:     Past Surgical History:   Procedure Laterality Date   • CHOLECYSTECTOMY  1987   • EYE SURGERY     • KNEE SURGERY         Social History:   Social History     Socioeconomic History   • Marital status:    Tobacco Use   • Smoking status: Never   • Smokeless tobacco: Never   Vaping Use   • Vaping Use: Never used   Substance and Sexual Activity   • Alcohol use: Never   • Drug use: Never   • Sexual activity: Defer       Procedures Performed    Procedure(s):  ESOPHAGOGASTRODUODENOSCOPY  -------------------  12/30 1223 UPPER GI ENDOSCOPY    Consults:   Consults     Date and Time Order Name Status Description    12/29/2022  7:35 PM  Inpatient Gastroenterology Consult Completed     12/26/2022  9:22 AM Inpatient Gastroenterology Consult Completed     12/10/2022  9:13 AM Inpatient Pulmonology Consult Completed     12/7/2022 11:57 AM Inpatient Psychiatrist Consult Completed     12/7/2022  7:01 AM Inpatient Neurology Consult General Completed           Condition on Discharge:     Stable    Discharge Disposition  Home or Self Care    Discharge Medications     Discharge Medications      New Medications      Instructions Start Date   lactulose 10 GM/15ML solution  Commonly known as: CHRONULAC   20 g, Oral, Daily   Start Date: December 31, 2022        Continue These Medications      Instructions Start Date   albuterol sulfate  (90 Base) MCG/ACT inhaler  Commonly known as: PROVENTIL HFA;VENTOLIN HFA;PROAIR HFA   2 puffs, Inhalation, Every 6 Hours PRN      ARIPiprazole 5 MG tablet  Commonly known as: ABILIFY   5 mg, Oral, Daily      aspirin 81 MG chewable tablet   162 mg, Oral, Daily      atorvastatin 80 MG tablet  Commonly known as: LIPITOR   80 mg, Oral, Nightly      busPIRone 5 MG tablet  Commonly known as: BUSPAR   5 mg, Oral, 3 Times Daily      docusate sodium 50 MG capsule  Commonly known as: COLACE   50 mg, Oral, 2 Times Daily PRN      famotidine 20 MG tablet  Commonly known as: PEPCID   20 mg, Oral, 2 Times Daily Before Meals      gabapentin 100 MG capsule  Commonly known as: NEURONTIN   100 mg, Oral, 2 Times Daily      midodrine 5 MG tablet  Commonly known as: PROAMATINE   5 mg, Oral, 3 Times Daily Before Meals      polyethylene glycol 17 g packet  Commonly known as: MIRALAX   17 g, Oral, Daily      zolpidem 10 MG tablet  Commonly known as: AMBIEN   10 mg, Oral, Nightly PRN             Discharge Diet:     Activity at Discharge:     Follow-up Appointments  Future Appointments   Date Time Provider Department Center   1/5/2023  1:00 PM Brian Jaramillo APRN [facility]     Additional Instructions for the Follow-ups that You Need to  Schedule     Discharge Follow-up with PCP   As directed       Currently Documented PCP:    Quincy Lemos APRN    PCP Phone Number:    [phone]     Follow Up Details: 5 to 7 days         Discharge Follow-up with Specified Provider: GI; 2 Months   As directed      To: GI    Follow Up: 2 Months               Test Results Pending at Discharge  Pending Labs     Order Current Status    Tissue Pathology Exam Collected (12/30/22 1227)           Risk for Readmission (LACE) Score: 3 (12/30/2022  6:00 AM)          Quincy Moore PA-C  12/30/22  15:15 EST

## 2022-12-30 NOTE — OP NOTE
ESOPHAGOGASTRODUODENOSCOPY Procedure Report    Patient Name:  Eladia Connor  YOB: 1965    Date of Surgery:  12/30/2022     Pre-Op Diagnosis:  Persistent vomiting [R11.15]         Procedure/CPT® Codes:      Procedure(s):  ESOPHAGOGASTRODUODENOSCOPY    Staff:  Surgeon(s):  Patience Arana MD      Anesthesia: Monitored Anesthesia Care    Description of Procedure:  A description of the procedure as well as risks, benefits and alternative methods were explained to the patient who voiced understanding and signed the corresponding consent form. A physical exam was performed and vital signs were monitored throughout the procedure.    An upper GI endoscope was placed into the mouth and proceeded through the esophagus, stomach and second portion of the duodenum without difficulty. The scope was then retroflexed and the fundus was visualized. The procedure was not difficult and there were no immediate complications.    Esophageal mucosa looks normal upper middle lobe of the esophagus no evidence of any esophagitis ulceration or inflammation was noticed.  Gastric mucosa does show some nodular gastritis in the antrum area biopsy was performed.  Duodenal mucosa looks normal first and the second part of the duodenum.  Esophagus was progressively dilated 50, 54, 56 and 58 [place] of [place] without any mucosal rent.  Patient Toller procedure very well immediate complication were noticed.      Impression:  1.  No obvious esophagitis or esophageal ulcer noticed.  2.  Esophagus progressively dilated to 58 [place].  3.  Erosive gastritis was seen biopsy was taken.  4.  Normal duodenal mucosa.    Recommendations:  Patient will be started regular diet.  Continue the PPI.  She will be placed on a bowel regimen.  She does have a mildly elevated alkaline phosphatase which is improving, it may be related to a fatty liver.  She will be followed in the GI clinic 8 to 10 weeks.  Call if needed      Patience Arana MD     Date:  12/30/2022    Time: 12:31 EST

## 2022-12-30 NOTE — ANESTHESIA POSTPROCEDURE EVALUATION
Patient: Eladia Connor    Procedure Summary     Date: 12/30/22 Room / Location: [facility] ENDOSCOPY 4 / [facility] ENDOSCOPY    Anesthesia Start: 1223 Anesthesia Stop: 1236    Procedure: ESOPHAGOGASTRODUODENOSCOPY with gastric biopsy and esophageal dilation #50,#54,#56,#58 bougie Diagnosis:       Persistent vomiting      (Persistent vomiting [R11.15])    Surgeons: Patience Arana MD Provider: Vi Murphy MD    Anesthesia Type: Not recorded ASA Status: 3          Anesthesia Type: No value filed.    Vitals  Vitals Value Taken Time   /68 12/30/22 1434   Temp 97.7 °F (36.5 °C) 12/30/22 1434   Pulse 72 12/30/22 1645   Resp 19 12/30/22 1434   SpO2 99 % 12/30/22 1434   Vitals shown include unvalidated device data.        Post Anesthesia Care and Evaluation    Patient location during evaluation: PACU  Patient participation: complete - patient participated  Level of consciousness: awake  Pain management: adequate    Airway patency: patent  Anesthetic complications: No anesthetic complications  PONV Status: controlled  Cardiovascular status: acceptable  Respiratory status: acceptable  Hydration status: acceptable

## 2022-12-30 NOTE — PLAN OF CARE
Problem: Adult Inpatient Plan of Care  Goal: Plan of Care Review  Outcome: Ongoing, Progressing  Flowsheets (Taken 12/29/2022 2227)  Progress: no change  Plan of Care Reviewed With: patient  Outcome Evaluation: new admit for persistent vomittng  slight epigastric pain at this time  will have a GI consult in the morning  Goal: Patient-Specific Goal (Individualized)  Outcome: Ongoing, Progressing  Goal: Absence of Hospital-Acquired Illness or Injury  Outcome: Ongoing, Progressing  Intervention: Identify and Manage Fall Risk  Recent Flowsheet Documentation  Taken 12/29/2022 2158 by Marce Crabtree, RN  Safety Promotion/Fall Prevention:   safety round/check completed   room organization consistent   nonskid shoes/slippers when out of bed   lighting adjusted   assistive device/personal items within reach   clutter free environment maintained   activity supervised  Intervention: Prevent Skin Injury  Recent Flowsheet Documentation  Taken 12/29/2022 2158 by Marce Crabtree, RN  Body Position: position changed independently  Intervention: Prevent and Manage VTE (Venous Thromboembolism) Risk  Recent Flowsheet Documentation  Taken 12/29/2022 2158 by Marce Crabtree, RN  Activity Management: activity adjusted per tolerance  Intervention: Prevent Infection  Recent Flowsheet Documentation  Taken 12/29/2022 2158 by Marce Crabtree, RN  Infection Prevention:   single patient room provided   rest/sleep promoted   personal protective equipment utilized   hand hygiene promoted  Goal: Optimal Comfort and Wellbeing  Outcome: Ongoing, Progressing  Intervention: Provide Person-Centered Care  Recent Flowsheet Documentation  Taken 12/29/2022 2158 by Marce Crabtree, RN  Trust Relationship/Rapport:   care explained   choices provided   questions answered   questions encouraged   reassurance provided   thoughts/feelings acknowledged  Goal: Readiness for Transition of Care  Outcome: Ongoing, Progressing  Intervention:  Mutually Develop Transition Plan  Recent Flowsheet Documentation  Taken 12/29/2022 2201 by Marce Crabtree, RN  Transportation Anticipated: family or friend will provide  Patient/Family Anticipated Services at Transition: none  Patient/Family Anticipates Transition to: home with family  Taken 12/29/2022 2155 by Marce Crabtree, RN  Equipment Currently Used at Home:   walker, rolling   shower chair   oxygen     Problem: Fall Injury Risk  Goal: Absence of Fall and Fall-Related Injury  Outcome: Ongoing, Progressing  Intervention: Identify and Manage Contributors  Recent Flowsheet Documentation  Taken 12/29/2022 2158 by Marce Crabtree RN  Medication Review/Management: medications reviewed  Self-Care Promotion: independence encouraged  Intervention: Promote Injury-Free Environment  Recent Flowsheet Documentation  Taken 12/29/2022 2158 by Marce Crabtree, RN  Safety Promotion/Fall Prevention:   safety round/check completed   room organization consistent   nonskid shoes/slippers when out of bed   lighting adjusted   assistive device/personal items within reach   clutter free environment maintained   activity supervised     Problem: Behavioral Health Comorbidity  Goal: Maintenance of Behavioral Health Symptom Control  Outcome: Ongoing, Progressing  Intervention: Maintain Behavioral Health Symptom Control  Recent Flowsheet Documentation  Taken 12/29/2022 2158 by Marce Crabtree RN  Medication Review/Management: medications reviewed   Goal Outcome Evaluation:  Plan of Care Reviewed With: patient        Progress: no change  Outcome Evaluation: new admit for persistent vomittng  slight epigastric pain at this time  will have a GI consult in the morning

## 2022-12-30 NOTE — PLAN OF CARE
Problem: Adult Inpatient Plan of Care  Goal: Plan of Care Review  Outcome: Met  Goal: Patient-Specific Goal (Individualized)  Outcome: Met  Goal: Absence of Hospital-Acquired Illness or Injury  Outcome: Met  Intervention: Identify and Manage Fall Risk  Recent Flowsheet Documentation  Taken 12/30/2022 1230 by Marce Crabtree RN  Safety Promotion/Fall Prevention: patient off unit  Goal: Optimal Comfort and Wellbeing  Outcome: Met  Goal: Readiness for Transition of Care  Outcome: Met     Problem: Fall Injury Risk  Goal: Absence of Fall and Fall-Related Injury  Outcome: Met  Intervention: Promote Injury-Free Environment  Recent Flowsheet Documentation  Taken 12/30/2022 1230 by Marce Crabtree RN  Safety Promotion/Fall Prevention: patient off unit     Problem: Behavioral Health Comorbidity  Goal: Maintenance of Behavioral Health Symptom Control  Outcome: Met     Problem: Seizure, Active Management  Goal: Absence of Seizure/Seizure-Related Injury  Outcome: Met   Goal Outcome Evaluation:  Plan of Care Reviewed With: patient        Progress: no change  Outcome Evaluation: new admit for persistent vomittng  slight epigastric pain at this time  will have a GI consult in the morning

## 2022-12-31 NOTE — OUTREACH NOTE
Prep Survey    Flowsheet Row Responses   [facility] patient discharged from? [facility]   Is LACE score < 7 ? Yes   Eligibility [facility]   Date of Admission 12/29/22   Date of Discharge 12/30/22   Discharge Disposition Home or Self Care   Discharge diagnosis Persistent vomiting   Does the patient have one of the following disease processes/diagnoses(primary or secondary)? Other   Does the patient have Home health ordered? No   Is there a DME ordered? Yes   What DME was ordered? Currently has O2 through [place]   Prep survey completed? Yes          MORGAN MENENDEZ - Registered Nurse

## 2023-01-02 ENCOUNTER — TRANSITIONAL CARE MANAGEMENT TELEPHONE ENCOUNTER (OUTPATIENT)
Dept: CALL CENTER | Facility: HOSPITAL | Age: 58
End: 2023-01-02
Payer: COMMERCIAL

## 2023-01-02 NOTE — CASE MANAGEMENT/SOCIAL WORK
Case Management Discharge Note      Final Note: home         Selected Continued Care - Discharged on 12/30/2022 Admission date: 12/29/2022 - Discharge disposition: Home or Self Care            Transportation Services  Private: Car    Final Discharge Disposition Code: 01 - home or self-care

## 2023-01-02 NOTE — OUTREACH NOTE
Call Center TCM Note    Flowsheet Row Responses   [facility] patient discharged from? [facility]   Does the patient have one of the following disease processes/diagnoses(primary or secondary)? Other   TCM attempt successful? Yes   Call start time 0922   Call end time 0924   Discharge diagnosis Persistent vomiting   Person spoke with today (if not patient) and relationship Patient   Meds reviewed with patient/caregiver? Yes   Prescription comments Patient was able to  lactulose no concerns   Is the patient taking all medications as directed (includes completed medication regime)? Yes   Comments Patient is aware of hospital fu on 01/05 @ 1p with Brian Jaramillo   Does the patient have an appointment with their PCP within 7 days of discharge? Yes   Has home health visited the patient within 72 hours of discharge? N/A   What DME was ordered? Currently has O2 through [place]   Psychosocial issues? No   Did the patient receive a copy of their discharge instructions? Yes   Nursing interventions Reviewed instructions with patient   What is the patient's perception of their health status since discharge? Improving   Is the patient/caregiver able to teach back signs and symptoms related to disease process for when to call PCP? Yes   Is the patient/caregiver able to teach back signs and symptoms related to disease process for when to call 911? Yes   Is the patient/caregiver able to teach back the hierarchy of who to call/visit for symptoms/problems? PCP, Specialist, Home health nurse, Urgent Care, ED, 911 Yes   TCM call completed? Yes   Wrap up additional comments Patient states she is still a little sore today, no N/V since DC patient has not had a bm since DC- Discussed if stools dont pass with lactulose by tomorrow patient is to call GI office in the morning. Patient is aware.   Call end time 0924   Would this patient benefit from a Referral to [facility] Social Work? No   Is the patient interested in additional calls from an  ambulatory ?  NOTE:  applies to high risk patients requiring additional follow-up. No          Marce Guallpa RN    1/2/2023, 09:25 EST

## 2023-01-03 LAB
LAB AP CASE REPORT: NORMAL
PATH REPORT.FINAL DX SPEC: NORMAL
PATH REPORT.GROSS SPEC: NORMAL

## 2023-01-05 ENCOUNTER — OFFICE VISIT (OUTPATIENT)
Dept: FAMILY MEDICINE CLINIC | Age: 58
End: 2023-01-05
Payer: COMMERCIAL

## 2023-01-05 VITALS
TEMPERATURE: 98 F | HEIGHT: 68 IN | OXYGEN SATURATION: 100 % | RESPIRATION RATE: 18 BRPM | BODY MASS INDEX: 44.41 KG/M2 | WEIGHT: 293 LBS | HEART RATE: 55 BPM | SYSTOLIC BLOOD PRESSURE: 124 MMHG | DIASTOLIC BLOOD PRESSURE: 76 MMHG

## 2023-01-05 DIAGNOSIS — Z09 ENCOUNTER FOR EXAMINATION FOLLOWING TREATMENT AT HOSPITAL: Primary | ICD-10-CM

## 2023-01-05 DIAGNOSIS — I95.9 HYPOTENSION, UNSPECIFIED HYPOTENSION TYPE: ICD-10-CM

## 2023-01-05 DIAGNOSIS — Z76.0 ENCOUNTER FOR MEDICATION REFILL: ICD-10-CM

## 2023-01-05 DIAGNOSIS — R09.02 HYPOXEMIA REQUIRING SUPPLEMENTAL OXYGEN: ICD-10-CM

## 2023-01-05 DIAGNOSIS — Z99.81 HYPOXEMIA REQUIRING SUPPLEMENTAL OXYGEN: ICD-10-CM

## 2023-01-05 DIAGNOSIS — L40.9 PSORIASIS: ICD-10-CM

## 2023-01-05 PROCEDURE — 99214 OFFICE O/P EST MOD 30 MIN: CPT | Performed by: NURSE PRACTITIONER

## 2023-01-05 RX ORDER — MIDODRINE HYDROCHLORIDE 5 MG/1
5 TABLET ORAL
Qty: 90 TABLET | Refills: 0 | Status: SHIPPED | OUTPATIENT
Start: 2023-01-05 | End: 2023-01-19

## 2023-01-06 ENCOUNTER — TELEPHONE (OUTPATIENT)
Dept: FAMILY MEDICINE CLINIC | Age: 58
End: 2023-01-06
Payer: COMMERCIAL

## 2023-01-06 ENCOUNTER — TELEPHONE (OUTPATIENT)
Dept: FAMILY MEDICINE CLINIC | Age: 58
End: 2023-01-06
Payer: MEDICAID

## 2023-01-06 NOTE — TELEPHONE ENCOUNTER
Pt called stating Brian scheduled for labs this susi wanted her to call [facility] to seek a neurologist,& Rheumatologist. This is not a true statement. Pt was scheduled for labs today,& didn't show. Pt was instructed to follow up with Adán. Pt understood instuctions.

## 2023-01-06 NOTE — TELEPHONE ENCOUNTER
Seen patient yesterday, patient called regarding a question about rheumatology referral.  Called patient back regarding her question about rheumatology referral.  Discussed with patient after reviewing her chart I did not see any autoimmune diseases/diagnoses in her chart.  Patient claims at previous visit with her PCP Adán Lemos that they discussed her having rheumatoid arthritis.  I instructed patient to follow-up with her PCP Adán Lemos on this at her next appointment with him.  Patient verbalized understanding.    I notified PCP Adán Lemos of this.  He stated patient's previous [facility]T lab work was negative.

## 2023-01-09 ENCOUNTER — TELEPHONE (OUTPATIENT)
Dept: FAMILY MEDICINE CLINIC | Facility: CLINIC | Age: 58
End: 2023-01-09

## 2023-01-09 ENCOUNTER — NURSE TRIAGE (OUTPATIENT)
Dept: CALL CENTER | Facility: HOSPITAL | Age: 58
End: 2023-01-09
Payer: MEDICAID

## 2023-01-09 NOTE — TELEPHONE ENCOUNTER
Spoke with patient and appt has been moved from 2/8/23 to 1/10/23 to go over referral for pulmonologist.

## 2023-01-09 NOTE — TELEPHONE ENCOUNTER
HUB call - Patient with elevated BP and \"not feeling well\"    Spoke with caller and immediately noted caller was short of breath. States feels very weak, lightheaded. /96 currently with HR of 117. States \" just don't feel right\". Caller is home by herself. Due to notably SOA over phone, instructed to immediately call 911, Verified capability.    Reason for Disposition  • Sounds like a life-threatening emergency to the triager    Answer Assessment - Initial Assessment Questions  1. BLOOD PRESSURE: \"What is the blood pressure?\" \"Did you take at least two measurements 5 minutes apart?\"      168/85, 184/96, Heart rate 117  2. ONSET: \"When did you take your blood pressure?\"      Just now  3. HOW: \"How did you obtain the blood pressure?\" (e.g., visiting nurse, automatic home BP monitor)      Automated arm cuff  4. HISTORY: \"Do you have a history of high blood pressure?\"      Yes  5. MEDICATIONS: \"Are you taking any medications for blood pressure?\" \"Have you missed any doses recently?\"      No  6. OTHER SYMPTOMS: \"Do you have any symptoms?\" (e.g., headache, chest pain, blurred vision, difficulty breathing, weakness)      State is very weak and audibly short of breath  7. PREGNANCY: \"Is there any chance you are pregnant?\" \"When was your last menstrual period?\"      No    Protocols used: BLOOD PRESSURE - HIGH-ADULT-OH

## 2023-01-09 NOTE — TELEPHONE ENCOUNTER
Caller: Eladia Connor    Relationship: Self    Best call back number: [phone]       What was the call regarding: PATIENT ADMITTED TO [facility] ON 12/06/22.  ON DISCHARGE, HOSPITALITIST STATED THAT PATIENT WILL BE SCHEDULED FOR A REFERRAL TO A PULMONOLOGIST.  SHE HAS NOT RECEIVED ANY NOTIFICATION ON THIS REFERRAL.  PLEASE ADVISE    Do you require a callback: YES

## 2023-01-10 ENCOUNTER — OFFICE VISIT (OUTPATIENT)
Dept: FAMILY MEDICINE CLINIC | Facility: CLINIC | Age: 58
End: 2023-01-10
Payer: MEDICAID

## 2023-01-10 VITALS
HEART RATE: 64 BPM | RESPIRATION RATE: 18 BRPM | DIASTOLIC BLOOD PRESSURE: 60 MMHG | SYSTOLIC BLOOD PRESSURE: 120 MMHG | HEIGHT: 68 IN | TEMPERATURE: 97.8 F | OXYGEN SATURATION: 99 % | WEIGHT: 290 LBS | BODY MASS INDEX: 43.95 KG/M2

## 2023-01-10 DIAGNOSIS — Z99.81 OXYGEN DEPENDENT: Primary | ICD-10-CM

## 2023-01-10 DIAGNOSIS — R06.83 SNORING: ICD-10-CM

## 2023-01-10 PROBLEM — I95.9 HYPOTENSION: Status: ACTIVE | Noted: 2023-01-10

## 2023-01-10 PROCEDURE — 99214 OFFICE O/P EST MOD 30 MIN: CPT | Performed by: REGISTERED NURSE

## 2023-01-10 PROCEDURE — T1015 CLINIC SERVICE: HCPCS | Performed by: REGISTERED NURSE

## 2023-01-10 NOTE — ASSESSMENT & PLAN NOTE
Patient was recently placed on Midodrine at hospital. Refilled today. [facility]iology referral sent for follow up visit.

## 2023-01-10 NOTE — PROGRESS NOTES
Eladia Connor  [id number]  1965  female     01/05/2023      Chief Complaint  Hospital Follow Up Visit (Dx of Seizures/Started 12/6/22  episodes present as a stroke (drooping face, slurred speech,) last episode was 12/26/22) and Anxiety    History of Present Illness  57 year old female patient presents today for hospital follow up visit.     December 06, 2022 patient was seen in the ER for a stroke workup, seizures were ruled out with a negative EEG, and patient was transferred to [facility] for further workup per patient/patient family request. While at [facility] patient had a positive quantiferon TB gold test, negative chest CT, negative head CT, and a MRI of the brain that showed no acute intracranial pathology but did showed chronic changes suggestive of microvascular ischemic disease.     December 25, 2022 patient was seen in the ER and treated for constipation with enema and sorbitol, GI consult was obtained for outpatient follow up. Patient had an abdominal CT scan that showed a mild stool burden within the distal colon, hepatic steatosis, myelolipoma of left adrenal gland, and degenerative change of lower lumbar spine.     December 29, 2022 patient was seen in the ER for dysphagia. Patient had a negative chest xray and an abdominal CT scan that showed proctitis, hepatic steatosis, and mild splenomegaly.     December 30, 2022 patient had an EGD performed.     Patient states she seen a neurologist through [facility] on December 14th, 2022 that changed up her seizure medications. Patient was sent home from hospital on Midodrine due to her hypotension while in the hospital. Patient states she was sent home on 1L oxygen nasal cannula. Patient states since her last PCP visit with Adán Lemos she seen dermatology who is treating her for psoriasis. Patient states she feels much better today. Patient states she gets a little short of breath when walking but that her oxygen helps. Patient denies fever,  "headache, lightheadedness, dizziness, chills, body aches, NVD, abdominal pain, chest pain, cough, dyspnea at rest, palpitations, leg swelling, numbness or tingling, or any other symptoms.         Review of Systems   Constitutional: Negative.    HENT: Negative.    Eyes: Negative.    Respiratory: Negative.    Cardiovascular: Negative.    Gastrointestinal: Negative.    Endocrine: Negative.    Genitourinary: Negative.    Musculoskeletal: Negative.    Skin: Negative.    Neurological: Positive for seizures. Negative for dizziness, tremors, syncope, facial asymmetry, speech difficulty, weakness, light-headedness, numbness and headaches.   Hematological: Negative.    Psychiatric/Behavioral: Negative.        Past Medical History:   Diagnosis Date   • Anxiety    • Cancer (HCC)     SKIN   • Depression    • Psoriasis    • Seizures (HCC) 12/06/2022       Past Surgical History:   Procedure Laterality Date   • CHOLECYSTECTOMY  1987   • ENDOSCOPY N/A 12/30/2022    Procedure: ESOPHAGOGASTRODUODENOSCOPY with gastric biopsy and esophageal dilation #50,#54,#56,#58 bougie;  Surgeon: Patience Arana MD;  Location: [facility] ENDOSCOPY;  Service: Gastroenterology;  Laterality: N/A;  gastritis   • EYE SURGERY     • KNEE SURGERY         Family History   Problem Relation Age of Onset   • Heart disease Mother    • Breast cancer Mother    • Heart disease Father    • Pancreatic cancer Father    • Heart disease Sister    • Lung cancer Brother        Social History     Socioeconomic History   • Marital status:    Tobacco Use   • Smoking status: Never   • Smokeless tobacco: Never   Vaping Use   • Vaping Use: Never used   Substance and Sexual Activity   • Alcohol use: Never   • Drug use: Never   • Sexual activity: Defer        No Known Allergies      Objective   Vital Signs:   /76 (BP Location: Right arm, Patient Position: Sitting, Cuff Size: Adult)   Pulse 55   Temp 98 °F (36.7 °C) (Temporal)   Resp 18   Ht 172.7 cm (68\")   Wt 133 kg " (294 lb)   SpO2 100%   BMI 44.70 kg/m²       Physical Exam  Vitals and nursing note reviewed.   Constitutional:       General: She is not in acute distress.     Appearance: Normal appearance. She is not ill-appearing, toxic-appearing or diaphoretic.   HENT:      Head: Normocephalic and atraumatic.      Jaw: There is normal jaw occlusion.      Right Ear: Hearing and external ear normal.      Left Ear: Hearing and external ear normal.      Nose: Nose normal.      Mouth/Throat:      Lips: Pink.   Eyes:      General: Lids are normal. Vision grossly intact. Gaze aligned appropriately.      Extraocular Movements: Extraocular movements intact.      Conjunctiva/sclera: Conjunctivae normal.      Pupils: Pupils are equal, round, and reactive to light.   Cardiovascular:      Rate and Rhythm: Normal rate and regular rhythm.      Pulses: Normal pulses.           Carotid pulses are 2+ on the right side and 2+ on the left side.       Radial pulses are 2+ on the right side and 2+ on the left side.        Dorsalis pedis pulses are 2+ on the right side and 2+ on the left side.        Posterior tibial pulses are 2+ on the right side and 2+ on the left side.      Heart sounds: Normal heart sounds, S1 normal and S2 normal. No murmur heard.  Pulmonary:      Effort: Pulmonary effort is normal.      Breath sounds: Normal breath sounds and air entry.   Abdominal:      General: Abdomen is flat. Bowel sounds are normal. There is no distension or abdominal bruit.      Palpations: Abdomen is soft.      Tenderness: There is no abdominal tenderness.   Musculoskeletal:         General: Normal range of motion.      Cervical back: Full passive range of motion without pain, normal range of motion and neck supple.      Right lower leg: No edema.      Left lower leg: No edema.   Skin:     General: Skin is warm and dry.      Capillary Refill: Capillary refill takes less than 2 seconds.      Coloration: Skin is not cyanotic or pale.      Findings:  Erythema and rash present. No bruising. Rash is scaling.      Comments: Generalized psoriasis.   Neurological:      General: No focal deficit present.      Mental Status: She is alert and oriented to person, place, and time. Mental status is at baseline.      GCS: GCS eye subscore is 4. GCS verbal subscore is 5. GCS motor subscore is 6.      Cranial Nerves: Cranial nerves 2-12 are intact. No cranial nerve deficit.      Sensory: Sensation is intact. No sensory deficit.      Motor: Motor function is intact. No weakness.      Coordination: Coordination is intact. Coordination normal.      Gait: Gait is intact. Gait normal.      Deep Tendon Reflexes: Reflexes normal.      Comments: Alert and oriented x3, no acute distress.  Answers questions appropriately and in a timely manner.   Psychiatric:         Attention and Perception: Attention and perception normal.         Mood and Affect: Mood and affect normal.         Speech: Speech normal.         Behavior: Behavior normal. Behavior is cooperative.         Thought Content: Thought content normal.         Cognition and Memory: Cognition and memory normal.         Judgment: Judgment normal.                 Assessment and Plan   Diagnoses and all orders for this visit:    1. Encounter for examination following treatment at hospital (Primary)  -     midodrine (PROAMATINE) 5 MG tablet; Take 1 tablet by mouth 3 (Three) Times a Day Before Meals.  Dispense: 90 tablet; Refill: 0  -     Ambulatory Referral to Cardiology  -     CBC w AUTO Differential; Future  -     Comprehensive metabolic panel; Future    2. Hypotension, unspecified hypotension type  Assessment & Plan:  Patient was recently placed on Midodrine at hospital. Refilled today. [facility] referral sent for follow up visit.       3. Encounter for medication refill  Comments:  Refilled Midodrine today.     4. Hypoxemia requiring supplemental oxygen  Comments:  Patient states she has been on oxygen since [facility]  discharge.    5. Psoriasis  Comments:  Patient being managed by Dermatology. Patient on Otezla.      Follow Up   Return in about 1 week (around 1/12/2023) for Recheck.    There are no Patient Instructions on file for this visit.

## 2023-01-12 ENCOUNTER — TELEPHONE (OUTPATIENT)
Dept: FAMILY MEDICINE CLINIC | Facility: CLINIC | Age: 58
End: 2023-01-12
Payer: MEDICAID

## 2023-01-12 NOTE — TELEPHONE ENCOUNTER
Patient called regarding blood pressure and pulse being high and reported she was placed on blood pressure medication midodrine to raise her blood pressure because of it being to low. She reported BP was 162/92 with pulse of 92. She stated she had not taken her BP medication last night or this morning. I advised her to hold medication until I talked to Brian or Adán and called her back , she voiced understanding,     After speaking with Brian Jaramillo, he advised me to have patient hold BP medication this afternoon and monitor her BP today and if it reaches 120/60 or lower to take BP medication but if it stays above this to hold medication.     I called patient back and gave her Brian's instructions and she voiced understanding and will call me back tomorrow around 1 pm and give me update on her BP readings and if she has taken any of the BP medication.     Patient also stated that while she was at [facility] for her neurology appt she had a seizure and the  Told her to just let it play out. She also stated she was setting in the ER waiting room waiting for her daughter to pick her up and she had another seizure and her pulse went down to 32, they checked her out in the ER but found nothing wrong and sent her home per patient. She stated she was concerned that all of this is affecting her heart, she has an appt with cardiology next Wednesday 1/18/23. She asked if she should be on seizure medication and I told her she needed to do whatever her neurologist has advised her to do and that he would be the one that would take care of anything like that, she voiced understanding.     Her call back number is : [phone]

## 2023-01-13 ENCOUNTER — TELEPHONE (OUTPATIENT)
Dept: FAMILY MEDICINE CLINIC | Facility: CLINIC | Age: 58
End: 2023-01-13

## 2023-01-13 NOTE — TELEPHONE ENCOUNTER
Caller: Eladia Connor    Relationship: Self    Best call back number: [phone]    What is the best time to reach you: AS SOON AS POSSIBLE    Who are you requesting to speak with (clinical staff, provider,  specific staff member): CLINICAL    Do you know the name of the person who called: MARITZA    What was the call regarding: UNKNOWN    Do you require a callback: YES

## 2023-01-15 ENCOUNTER — APPOINTMENT (OUTPATIENT)
Dept: GENERAL RADIOLOGY | Facility: HOSPITAL | Age: 58
End: 2023-01-15
Payer: MEDICAID

## 2023-01-15 ENCOUNTER — APPOINTMENT (OUTPATIENT)
Dept: CARDIOLOGY | Facility: HOSPITAL | Age: 58
End: 2023-01-15
Payer: MEDICAID

## 2023-01-15 ENCOUNTER — HOSPITAL ENCOUNTER (EMERGENCY)
Facility: HOSPITAL | Age: 58
Discharge: HOME | End: 2023-01-15
Attending: EMERGENCY MEDICINE | Admitting: EMERGENCY MEDICINE
Payer: MEDICAID

## 2023-01-15 VITALS
HEART RATE: 84 BPM | BODY MASS INDEX: 45.52 KG/M2 | OXYGEN SATURATION: 100 % | RESPIRATION RATE: 18 BRPM | HEIGHT: 67 IN | SYSTOLIC BLOOD PRESSURE: 152 MMHG | WEIGHT: 290 LBS | DIASTOLIC BLOOD PRESSURE: 78 MMHG | TEMPERATURE: 98 F

## 2023-01-15 DIAGNOSIS — R07.89 FEELING OF CHEST TIGHTNESS: ICD-10-CM

## 2023-01-15 DIAGNOSIS — R25.2 LEG CRAMPING: ICD-10-CM

## 2023-01-15 DIAGNOSIS — M79.671 RIGHT FOOT PAIN: Primary | ICD-10-CM

## 2023-01-15 LAB
ANION GAP SERPL CALCULATED.3IONS-SCNC: 12 MMOL/L (ref 5–15)
BASOPHILS # BLD AUTO: 0.1 10*3/MM3 (ref 0–0.2)
BASOPHILS NFR BLD AUTO: 1.1 % (ref 0–1.5)
BUN SERPL-MCNC: 9 MG/DL (ref 6–20)
BUN/CREAT SERPL: 12.9 (ref 7–25)
CALCIUM SPEC-SCNC: 9.5 MG/DL (ref 8.6–10.5)
CHLORIDE SERPL-SCNC: 102 MMOL/L (ref 98–107)
CO2 SERPL-SCNC: 25 MMOL/L (ref 22–29)
CREAT SERPL-MCNC: 0.7 MG/DL (ref 0.57–1)
DEPRECATED RDW RBC AUTO: 46.4 FL (ref 37–54)
EGFRCR SERPLBLD CKD-EPI 2021: 99.8 ML/MIN/1.73
EOSINOPHIL # BLD AUTO: 1 10*3/MM3 (ref 0–0.4)
EOSINOPHIL NFR BLD AUTO: 13.4 % (ref 0.3–6.2)
ERYTHROCYTE [DISTWIDTH] IN BLOOD BY AUTOMATED COUNT: 14.8 % (ref 12.3–15.4)
GLUCOSE SERPL-MCNC: 151 MG/DL (ref 65–99)
HCT VFR BLD AUTO: 37.4 % (ref 34–46.6)
HGB BLD-MCNC: 12.7 G/DL (ref 12–15.9)
LYMPHOCYTES # BLD AUTO: 0.9 10*3/MM3 (ref 0.7–3.1)
LYMPHOCYTES NFR BLD AUTO: 12.2 % (ref 19.6–45.3)
MAGNESIUM SERPL-MCNC: 2.1 MG/DL (ref 1.6–2.6)
MCH RBC QN AUTO: 29.2 PG (ref 26.6–33)
MCHC RBC AUTO-ENTMCNC: 34.1 G/DL (ref 31.5–35.7)
MCV RBC AUTO: 85.8 FL (ref 79–97)
MONOCYTES # BLD AUTO: 0.5 10*3/MM3 (ref 0.1–0.9)
MONOCYTES NFR BLD AUTO: 6.7 % (ref 5–12)
NEUTROPHILS NFR BLD AUTO: 5 10*3/MM3 (ref 1.7–7)
NEUTROPHILS NFR BLD AUTO: 66.6 % (ref 42.7–76)
NRBC BLD AUTO-RTO: 0 /100 WBC (ref 0–0.2)
PLATELET # BLD AUTO: 197 10*3/MM3 (ref 140–450)
PMV BLD AUTO: 7 FL (ref 6–12)
POTASSIUM SERPL-SCNC: 3.7 MMOL/L (ref 3.5–5.2)
RBC # BLD AUTO: 4.36 10*6/MM3 (ref 3.77–5.28)
SODIUM SERPL-SCNC: 139 MMOL/L (ref 136–145)
TROPONIN T SERPL-MCNC: <0.01 NG/ML (ref 0–0.03)
WBC NRBC COR # BLD: 7.5 10*3/MM3 (ref 3.4–10.8)

## 2023-01-15 PROCEDURE — 99283 EMERGENCY DEPT VISIT LOW MDM: CPT

## 2023-01-15 PROCEDURE — 80048 BASIC METABOLIC PNL TOTAL CA: CPT

## 2023-01-15 PROCEDURE — 83735 ASSAY OF MAGNESIUM: CPT

## 2023-01-15 PROCEDURE — 85025 COMPLETE CBC W/AUTO DIFF WBC: CPT

## 2023-01-15 PROCEDURE — 71045 X-RAY EXAM CHEST 1 VIEW: CPT

## 2023-01-15 PROCEDURE — 84484 ASSAY OF TROPONIN QUANT: CPT

## 2023-01-15 PROCEDURE — 93005 ELECTROCARDIOGRAM TRACING: CPT

## 2023-01-15 PROCEDURE — 73630 X-RAY EXAM OF FOOT: CPT

## 2023-01-15 RX ORDER — SODIUM CHLORIDE 0.9 % (FLUSH) 0.9 %
10 SYRINGE (ML) INJECTION AS NEEDED
Status: DISCONTINUED | OUTPATIENT
Start: 2023-01-15 | End: 2023-01-15 | Stop reason: HOSPADM

## 2023-01-15 NOTE — ED PROVIDER NOTES
"Subjective   History of Present Illness  Chief Complaint: Right foot pain      HPI: 57-year-old female presents to the ER today by private vehicle complaining of right foot pain.  She states she noticed last night along with a \"bump\" on the vein.  She denies injury to the extremity, she has no prolonged travel, no major surgeries, and no hormone replacement.  She has never had a DVT or PE.  She has no chest pain or shortness of breath.  She adds that she has also had bilateral calf cramping.     PCP: Canelo         Review of Systems   Musculoskeletal: Positive for arthralgias and myalgias.       Past Medical History:   Diagnosis Date   • Anxiety    • Cancer (HCC)     SKIN   • Depression    • Psoriasis    • Seizures (HCC) 12/06/2022       No Known Allergies    Past Surgical History:   Procedure Laterality Date   • CHOLECYSTECTOMY  1987   • ENDOSCOPY N/A 12/30/2022    Procedure: ESOPHAGOGASTRODUODENOSCOPY with gastric biopsy and esophageal dilation #50,#54,#56,#58 bougie;  Surgeon: Patience Arana MD;  Location: [facility] ENDOSCOPY;  Service: Gastroenterology;  Laterality: N/A;  gastritis   • EYE SURGERY     • KNEE SURGERY         Family History   Problem Relation Age of Onset   • Heart disease Mother    • Breast cancer Mother    • Heart disease Father    • Pancreatic cancer Father    • Heart disease Sister    • Lung cancer Brother        Social History     Socioeconomic History   • Marital status:    Tobacco Use   • Smoking status: Never   • Smokeless tobacco: Never   Vaping Use   • Vaping Use: Never used   Substance and Sexual Activity   • Alcohol use: Never   • Drug use: Never   • Sexual activity: Defer           Objective   Physical Exam  Constitutional:       General: She is not in acute distress.     Appearance: She is obese. She is ill-appearing (chronically ).   HENT:      Head: Normocephalic.   Cardiovascular:      Pulses: Normal pulses.           Dorsalis pedis pulses are 2+ on the right side and 2+ on " "the left side.   Pulmonary:      Effort: Pulmonary effort is normal.   Musculoskeletal:         General: Normal range of motion.      Cervical back: Normal range of motion.   Feet:      Right foot:      Skin integrity: Callus present.      Left foot:      Skin integrity: Callus present.      Comments: Pain reproduced with flexion and extension.    No overlying ecchymosis or erythema.    No bumps and lumps noted.   Skin:     General: Skin is warm.      Capillary Refill: Capillary refill takes less than 2 seconds.      Coloration: Skin is pale.   Neurological:      General: No focal deficit present.      Mental Status: She is alert and oriented to person, place, and time. Mental status is at baseline.      Motor: No weakness.         Procedures    EKG obtained reviewed by myself read by Dr. Alicea sinus rhythm with a rate of 73 as compared to previous that was obtained 12/29/2022 no acute changes.             ED Course  ED Course as of 01/15/23 2040   Sun Osei 15, 2023   1630 Patient added after my evaluation that she is occasionally having chest tightness.  No acute pain or shortness of breath.  It has been ongoing for sometime []   1706 Spoke with Edie vascular tech who states that they are unable to complete a Doppler of the patient's right foot as they are unable to visualize the vascularity and confirm DVT also she waited for over 30 minutes for the patient to be transported to her unit and was not completed she can no longer stay as she has to leave and she is going off shift. []   1731 As ED tech was completing the EKG patient began complaining of jaw pain arm pain and neck pain. []      ED Course User Index  [] Chloe Grande, SCOOBY      /78 (BP Location: Left arm, Patient Position: Lying)   Pulse 84   Temp 98 °F (36.7 °C) (Oral)   Resp 18   Ht 170.2 cm (67\")   Wt 132 kg (290 lb)   LMP  (LMP Unknown)   SpO2 100%   BMI 45.42 kg/m²   Labs Reviewed   BASIC METABOLIC PANEL - Abnormal; " Notable for the following components:       Result Value    Glucose 151 (*)     All other components within normal limits    Narrative:     GFR Normal >60  Chronic Kidney Disease <60  Kidney Failure <15     CBC WITH AUTO DIFFERENTIAL - Abnormal; Notable for the following components:    Lymphocyte % 12.2 (*)     Eosinophil % 13.4 (*)     Eosinophils, Absolute 1.00 (*)     All other components within normal limits   MAGNESIUM - Normal   TROPONIN (IN-HOUSE) - Normal    Narrative:     Troponin T Reference Range:  <= 0.03 ng/mL-   Negative for AMI  >0.03 ng/mL-     Abnormal for myocardial necrosis.  Clinicians would have to utilize clinical acumen, EKG, Troponin and serial changes to determine if it is an Acute Myocardial Infarction or myocardial injury due to an underlying chronic condition.       Results may be falsely decreased if patient taking Biotin.     CBC AND DIFFERENTIAL    Narrative:     The following orders were created for panel order CBC & Differential.  Procedure                               Abnormality         Status                     ---------                               -----------         ------                     CBC Auto Differential[[id number]]        Abnormal            Final result                 Please view results for these tests on the individual orders.     Medications   sodium chloride 0.9 % flush 10 mL (has no administration in time range)     XR Foot 3+ View Right    Result Date: 1/15/2023  Impression: No acute findings. Electronically Signed: Ronen Martinez  1/15/2023 6:04 PM EST  Workstation ID: [id number]    XR Chest 1 View    Result Date: 1/15/2023  Impression: 1. Cardiomegaly. 2. No active pulmonary disease. Electronically Signed: Ronen Martinez  1/15/2023 6:03 PM EST  Workstation ID: [id number]                                         Medical Decision Making  As patient initial complaint while she arrived to the emergency room was related to her right foot pain progression of the ER stay she  then added chest tightness that has been intermittent for some time as well as arm and jaw pain.  Initial orders were placed including a chest x-ray EKG and a troponin all of which were negative or without acute changes.  X-rays completed of the right foot were negative as well.  She did also complain of some leg cramping, for this I added electrolyte panel which was essentially normal as well as a CBC which was also within normal limits.  She was monitored in the emergency room for approaching 4 hours without acute changes to vital signs, or cardiac monitoring.  Patient has establish care with cardiologist with appointment in 2 days.  Patient has significant chronic medical problems has a primary care established as well as multiple specialist.  Advised to follow-up with her primary care continue medications as prescribed and return to the ER for new or worsening symptoms.    I spoke with the patient at the bedside regarding their plan of care, discharge instruction, home care, prescriptions, indications to return to the emergency department, and importance follow-up.  We discussed test results at the bedside, including incidental abnormal labs, radiological findings, understands need for follow-up with primary care or specialist if indicated.     Pt is aware that discharge does not mean that nothing is wrong but it indicates no emergency is present and they must continue care with follow-up as given below or physician of their choice    Comorbidities: see above, reviewed     Differentials: Electrolyte imbalance, hypomagnesia, pneumonia, fracture, contusion not all inclusive of differentials considered    This document is intended for medical expert use only.  Reading of this document by patient and/or patient's family without participating medical staff guidance may result misinterpretation and unintended morbidity.  Any interpretation of such data is the responsibility of the patient and or family member  responsible for the patient in concert with their primary specialist providers, not to be left for resources of online searches such as Web MD, [employer] or similar core use.  Relying on these approaches to her knowledge may result in misinterpretation, misguided goals of care and even death should patient's or family history of recommendations outside of drama professional medical care in a supervised inpatient environment.    Appropriate PPE worn throughout the care of this patient.    Part of this note may be an electronic transcription/translation of spoken language to printed text using the Dragon Dictation System.         Feeling of chest tightness: acute illness or injury  Leg cramping: acute illness or injury  Right foot pain: acute illness or injury  Amount and/or Complexity of Data Reviewed  Independent Historian:      Details: patient  External Data Reviewed: labs and notes.     Details: 1/5/2023- PCP visit, hospital follow up  Negative EEG, negative stroke work up   negative chest CT, negative head CT, and a MRI of the brain that showed no acute intracranial pathology but did showed chronic changes suggestive of microvascular ischemic disease.     12/29/2022 ED visit, abdominal pain  1. Proctitis involving the mid and low rectum. No residual rectal stool  burden. No abscess or fluid collection.  2. Hepatic steatosis.  3. Mild splenomegaly.    12/25/2022 ED visit, constipation  1.     Moderate stool burden within the distal colon with some stranding  in the perirectal fat and thickening of the rectal wall which could  relate to a stercoral colitis.  2.     Hepatic steatosis.  3.     Myelolipoma left adrenal gland  4.     Degenerative change lower lumbar spine.    Labs: ordered.  Radiology: ordered. Decision-making details documented in ED Course.  ECG/medicine tests: ordered.      Risk  Prescription drug management.          Final diagnoses:   Right foot pain   Leg cramping   Feeling of chest tightness        ED Disposition  ED Disposition     ED Disposition   Discharge    Condition   Stable    Comment   --             Quincy Lemos, APRN  [address] [facility] [place] [id number] [phone]               Medication List      Changed    busPIRone 5 MG tablet  Commonly known as: BUSPAR  Take 1 tablet by mouth 3 (Three) Times a Day.  What changed: how much to take             Chloe Grande, APRN  01/15/23 2040

## 2023-01-15 NOTE — DISCHARGE INSTRUCTIONS
Follow-up with primary care    Keep appointment that is scheduled for cardiology that is coming up    Return to the ED for new or worsening symptoms

## 2023-01-16 ENCOUNTER — TELEPHONE (OUTPATIENT)
Dept: FAMILY MEDICINE CLINIC | Facility: CLINIC | Age: 58
End: 2023-01-16

## 2023-01-16 NOTE — TELEPHONE ENCOUNTER
Caller: Eladia Connor    Relationship to patient: Self    Patient is needing: PATIENT IS NEEDING AN ER FU FOR POSSIBLE BLOOD CLOT FROM 1/15 AND ALSO STATES THAT SHE HAS A YELLOW TINT TO HER SKIN. PATIENT IS CONCERNED. PLEASE CALL AND ADVISE. [phone]

## 2023-01-17 ENCOUNTER — TELEPHONE (OUTPATIENT)
Dept: FAMILY MEDICINE CLINIC | Facility: CLINIC | Age: 58
End: 2023-01-17

## 2023-01-17 DIAGNOSIS — E66.09 CLASS 1 OBESITY DUE TO EXCESS CALORIES WITH SERIOUS COMORBIDITY AND BODY MASS INDEX (BMI) OF 33.0 TO 33.9 IN ADULT: Primary | ICD-10-CM

## 2023-01-17 DIAGNOSIS — Z13.1 SCREENING FOR DIABETES MELLITUS: ICD-10-CM

## 2023-01-17 LAB — QT INTERVAL: 391 MS

## 2023-01-17 NOTE — TELEPHONE ENCOUNTER
Hub is instructed to read the documentation below to patient     Please feel free to switch her lab appointment to any other day this week or whenever patient would like. Called her and left her a voicemail.      Thanks

## 2023-01-17 NOTE — TELEPHONE ENCOUNTER
Hub staff attempted to follow warm transfer process and was unsuccessful     Caller: Eladia Connor    Relationship to patient: Self    Best call back number: [phone] (Mobile)    Patient is needing: NEEDS TO RESCHEDULE LAB APPOINTMENT.        THANKS

## 2023-01-18 ENCOUNTER — CONSULT (OUTPATIENT)
Dept: CARDIOLOGY | Facility: CLINIC | Age: 58
End: 2023-01-18
Payer: MEDICAID

## 2023-01-18 VITALS
OXYGEN SATURATION: 99 % | WEIGHT: 285 LBS | SYSTOLIC BLOOD PRESSURE: 106 MMHG | BODY MASS INDEX: 44.64 KG/M2 | HEART RATE: 75 BPM | DIASTOLIC BLOOD PRESSURE: 74 MMHG

## 2023-01-18 DIAGNOSIS — E66.01 MORBID OBESITY WITH BMI OF 40.0-44.9, ADULT: ICD-10-CM

## 2023-01-18 DIAGNOSIS — R00.0 TACHYCARDIA: ICD-10-CM

## 2023-01-18 DIAGNOSIS — R07.9 CHEST PAIN IN ADULT: Primary | ICD-10-CM

## 2023-01-18 DIAGNOSIS — R00.2 PALPITATIONS: ICD-10-CM

## 2023-01-18 PROCEDURE — 99215 OFFICE O/P EST HI 40 MIN: CPT | Performed by: NURSE PRACTITIONER

## 2023-01-18 PROCEDURE — 99417 PROLNG OP E/M EACH 15 MIN: CPT | Performed by: NURSE PRACTITIONER

## 2023-01-18 NOTE — PROGRESS NOTES
"Cardiology Office Consultation      Encounter Date:  01/18/2023    Patient ID:   Eladia Connor is a 57 y.o. female.    Reason For Consultation:  Hypotension    History of Present Illness:  Dear  Quincy Lemos APRN  Eladia Connor was seen in new consultation.  She is a 57-year-old female with no known history of ischemic heart disease.  Past medical history includes chronic constipation, depression, dyslipidemia, chronic hypoxic respiratory failure on supplemental oxygen panic attacks.  The patient presents with her daughter today with multiple concerns including: Dysphagia, right foot pain with calf muscle cramping, left-sided chest pain, dizziness/lightheadedness, elevated heart rate, hypotension, swollen neck veins, palpitations.    The patient reports several month history of left-sided chest discomfort described as an ache and pulling sensation.  It is worsened with lying down.  She has associated palpitations and sensation of her heart beating very fast.  She has been able to check her heart rate and states she has had elevations up into the 120s.  She has had hospitalization for possible seizure with difficulty speaking and right leg/left arm weakness and numbness at [facility] on 12/6/2022.  Obstructive sleep apnea was suspected according to the provider note.  She was ultimately transferred to [facility] for EEG.  She reports while in the emergency department she had \"swollen veins on both sides of the neck\" for short period of time that was not witnessed by hospital staff.  CTA neck performed: No hemodynamically significant stenosis, aneurysmal dilatation or embolic cutoff identified within the cervical carotid or vertebral arteries.  CT head showed no acute intracranial abnormality.  She has significant problems with constipation and was seen in the emergency department 12/25/2022 with moderate stool burden noted within the distal colon, hepatic steatosis " "reported on CT.  On 12/29/2022 she was seen at [facility] for persistent nausea vomiting.  GI was consulted and EGD performed on 12/30/2022 with no obvious esophagitis or esophageal ulcers with esophagus progressively dilated and erosive gastritis seen with biopsy taken.  On 1/15/2023 she was seen in the emergency department with complaint of a \"bump\" noted on the vein of her right foot.  She also reported muscle cramping in both legs.  In review of ED note, Doppler of the lower extremity was unable to be performed.  The patient then began complaining of jaw, arm and neck pain.  She reported this had been intermittent for some time.  Chest x-ray, EKG and troponin were negative.  Right foot x-ray was negative as well.  Electrolyte panel and CBC were within normal limits.  She was discharged home to follow-up with consultants including cardiology today.    TTE 12/7/2022: EF 61-65%, negative saline test, trace AI, MR, TR    The patient daughter had several questions about her previous hospitalizations.  I reviewed all of the diagnostics again with them including imaging, labs, echo and treatment plan.  I spent 60 minutes with the patient and daughter to include review of all prior hospital records and diagnostics, explanation of treatment and diagnostics, physical assessment and plan of care.    Of note, during her admission on 12/6/2022 and ultimate transfer to [facility], the patient was noted to be hypotensive and was started on midodrine 5 mg twice daily.  During subsequent hospital visit her blood pressure was noted to be elevated and midodrine was discontinued.  Blood pressure in the office today is 106/74.  Most recent EKG personally reviewed by me from 12/29/2022 shows normal sinus rhythm with nonspecific ST and T wave changes.    Assessment & Plan    Impressions:  Chest pain  Palpitations  Tachycardia  Chronic hypoxic respiratory failure on supplemental oxygen  Chronic " constipation  Anxiety/depression  Seizures  Dysphagia requiring esophageal dilatation  Dizziness/lightheadedness    Recommendations:  I will proceed with further ischemic work-up to include nuclear stress testing to evaluate for any evidence of provokable ischemia.  Ambulatory monitor x4 weeks to evaluate for any evidence of arrhythmia  Recommend follow-up with consultants as instructed  Recommend risk factor modification including weight reduction  Follow-up our office with cardiologist after testing    Diagnoses and all orders for this visit:    1. Chest pain in adult (Primary)  -     Mobile Cardiac Outpatient Telemetry; Future  -     Stress Test With Myocardial Perfusion (1 Day); Future    2. Palpitations  -     Mobile Cardiac Outpatient Telemetry; Future  -     Stress Test With Myocardial Perfusion (1 Day); Future    3. Tachycardia  -     Mobile Cardiac Outpatient Telemetry; Future  -     Stress Test With Myocardial Perfusion (1 Day); Future      Objective:    Vitals:  Vitals:    01/18/23 1322   BP: 106/74   BP Location: Left arm   Patient Position: Sitting   Cuff Size: Large Adult   Pulse: 75   SpO2: 99%   Weight: 129 kg (285 lb)       Review of Systems   Cardiovascular: Positive for chest pain, dyspnea on exertion and palpitations.   Gastrointestinal: Positive for dysphagia.   Neurological: Positive for dizziness.   Psychiatric/Behavioral: The patient is nervous/anxious.        Review of Systems:  The following systems were reviewed as they relate to the cardiovascular system: Constitutional, Eyes, ENT, Cardiovascular, Respiratory, Gastrointestinal, Integumentary, Neurological, Psychiatric, Hematologic, Endocrine, Musculoskeletal, and Genitourinary. The pertinent cardiovascular findings are reported above with all other cardiovascular points within those systems being negative.    Physical Exam:     General: Well-developed, obese female who is alert, cooperative, no distress, appears stated  age  Head:  Normocephalic, atraumatic, mucous membranes moist  Eyes:  Conjunctiva/corneas clear, EOM's intact     Neck:  Supple,  no adenopathy;      Lungs: Very diminished but clear to auscultation bilaterally, no wheezes rhonchi rales are noted  Chest wall: No tenderness  Heart::  Regular rate and rhythm, S1 and S2 normal, no murmur, rub or gallop  Musculoskeletal:   Ambulates slowly without assistance  Abdomen: Soft, non-tender, nondistended bowel sounds active  Extremities: No cyanosis, clubbing, or edema  Pulses: 2+ and symmetric all extremities  Skin:  No rashes or lesions  Neuro/psych: A&O x3. CN II through XII are grossly intact with appropriate affect    History of Present Illness    Procedures    Allergies:  No Known Allergies    Medication Review:     Current Outpatient Medications:   •  albuterol sulfate  (90 Base) MCG/ACT inhaler, Inhale 2 puffs Every 6 (Six) Hours As Needed for Wheezing., Disp: , Rfl:   •  ARIPiprazole (ABILIFY) 5 MG tablet, Take 5 mg by mouth Daily., Disp: , Rfl:   •  aspirin 81 MG chewable tablet, Chew 2 tablets Daily., Disp: , Rfl:   •  atorvastatin (LIPITOR) 80 MG tablet, Take 1 tablet by mouth Every Night., Disp: 90 tablet, Rfl:   •  busPIRone (BUSPAR) 5 MG tablet, Take 1 tablet by mouth 3 (Three) Times a Day. (Patient taking differently: Take 7.5 mg by mouth 3 (Three) Times a Day.), Disp: 30 tablet, Rfl: 5  •  docusate sodium (COLACE) 50 MG capsule, Take 1 capsule by mouth 2 (Two) Times a Day As Needed for Constipation for up to 30 days., Disp: 30 capsule, Rfl: 0  •  famotidine (PEPCID) 20 MG tablet, Take 1 tablet by mouth 2 (Two) Times a Day Before Meals., Disp: , Rfl:   •  gabapentin (NEURONTIN) 100 MG capsule, Take 1 capsule by mouth 2 (Two) Times a Day., Disp: , Rfl:   •  lactulose (CHRONULAC) 10 GM/15ML solution, Take 30 mL by mouth Daily for 30 days., Disp: 946 mL, Rfl: 0  •  midodrine (PROAMATINE) 5 MG tablet, Take 1 tablet by mouth 3 (Three) Times a Day Before  "Meals., Disp: 90 tablet, Rfl: 0  •  polyethylene glycol (MIRALAX) 17 g packet, Take 17 g by mouth Daily for 30 days., Disp: 30 packet, Rfl: 0    Family History:  Family History   Problem Relation Age of Onset   • Heart disease Mother    • Breast cancer Mother    • Heart disease Father    • Pancreatic cancer Father    • Heart disease Sister    • Lung cancer Brother        Past Medical History:  Past Medical History:   Diagnosis Date   • Anxiety    • Asthma    • Cancer (HCC)     SKIN   • Depression    • Psoriasis    • Seizures (HCC) 12/06/2022       Past surgical History:  Past Surgical History:   Procedure Laterality Date   • CHOLECYSTECTOMY  1987   • ENDOSCOPY N/A 12/30/2022    Procedure: ESOPHAGOGASTRODUODENOSCOPY with gastric biopsy and esophageal dilation #50,#54,#56,#58 bougie;  Surgeon: Patience Arana MD;  Location: [facility] ENDOSCOPY;  Service: Gastroenterology;  Laterality: N/A;  gastritis   • EYE SURGERY     • KNEE SURGERY         Social History:  Social History     Socioeconomic History   • Marital status:    Tobacco Use   • Smoking status: Never   • Smokeless tobacco: Never   Vaping Use   • Vaping Use: Never used   Substance and Sexual Activity   • Alcohol use: Never   • Drug use: Never   • Sexual activity: Defer           NOTE: The following portions of the patient's history were reviewed and updated this visit as appropriate: allergies, current medications, past family history, past medical history, past social history, past surgical history and problem list.    EMR Dragon/Transcription:   \"Dictated utilizing Dragon dictation\".     "

## 2023-01-18 NOTE — LETTER
January 19, 2023     SCOOBY Bonilla  [address] [facility] [place] [address]    Patient: Eladia Connor   YOB: 1965   Date of Visit: 1/18/2023       Dear SCOOBY Canales:    Thank you for referring Eladia Connor to me for evaluation. Below are the relevant portions of my assessment and plan of care.    If you have questions, please do not hesitate to call me. I look forward to following Eladia along with you.         Sincerely,        SCOOBY Jones        CC: No Recipients  Dania Samuel APRN  01/19/23 0849  Sign when Signing Visit  Cardiology Office Consultation      Encounter Date:  01/18/2023    Patient ID:   Eladia Connor is a 57 y.o. female.    Reason For Consultation:  Hypotension    History of Present Illness:  Dear  Quincy Lemos APRN  Eladia Connor was seen in new consultation.  She is a 57-year-old female with no known history of ischemic heart disease.  Past medical history includes chronic constipation, depression, dyslipidemia, chronic hypoxic respiratory failure on supplemental oxygen panic attacks.  The patient presents with her daughter today with multiple concerns including: Dysphagia, right foot pain with calf muscle cramping, left-sided chest pain, dizziness/lightheadedness, elevated heart rate, hypotension, swollen neck veins, palpitations.    The patient reports several month history of left-sided chest discomfort described as an ache and pulling sensation.  It is worsened with lying down.  She has associated palpitations and sensation of her heart beating very fast.  She has been able to check her heart rate and states she has had elevations up into the 120s.  She has had hospitalization for possible seizure with difficulty speaking and right leg/left arm weakness and numbness at [facility] on 12/6/2022.  Obstructive sleep apnea was suspected according to the provider note.  She was ultimately transferred to  "[facility] for EEG.  She reports while in the emergency department she had \"swollen veins on both sides of the neck\" for short period of time that was not witnessed by hospital staff.  CTA neck performed: No hemodynamically significant stenosis, aneurysmal dilatation or embolic cutoff identified within the cervical carotid or vertebral arteries.  CT head showed no acute intracranial abnormality.  She has significant problems with constipation and was seen in the emergency department 12/25/2022 with moderate stool burden noted within the distal colon, hepatic steatosis reported on CT.  On 12/29/2022 she was seen at [facility] for persistent nausea vomiting.  GI was consulted and EGD performed on 12/30/2022 with no obvious esophagitis or esophageal ulcers with esophagus progressively dilated and erosive gastritis seen with biopsy taken.  On 1/15/2023 she was seen in the emergency department with complaint of a \"bump\" noted on the vein of her right foot.  She also reported muscle cramping in both legs.  In review of ED note, Doppler of the lower extremity was unable to be performed.  The patient then began complaining of jaw, arm and neck pain.  She reported this had been intermittent for some time.  Chest x-ray, EKG and troponin were negative.  Right foot x-ray was negative as well.  Electrolyte panel and CBC were within normal limits.  She was discharged home to follow-up with consultants including cardiology today.    TTE 12/7/2022: EF 61-65%, negative saline test, trace AI, MR, TR    The patient daughter had several questions about her previous hospitalizations.  I reviewed all of the diagnostics again with them including imaging, labs, echo and treatment plan.  I spent 60 minutes with the patient and daughter to include review of all prior hospital records and diagnostics, explanation of treatment and diagnostics, physical assessment and plan of care.    Of note, during her " admission on 12/6/2022 and ultimate transfer to [facility], the patient was noted to be hypotensive and was started on midodrine 5 mg twice daily.  During subsequent hospital visit her blood pressure was noted to be elevated and midodrine was discontinued.  Blood pressure in the office today is 106/74.  Most recent EKG personally reviewed by me from 12/29/2022 shows normal sinus rhythm with nonspecific ST and T wave changes.    Assessment & Plan    Impressions:  Chest pain  Palpitations  Tachycardia  Chronic hypoxic respiratory failure on supplemental oxygen  Chronic constipation  Anxiety/depression  Seizures  Dysphagia requiring esophageal dilatation  Dizziness/lightheadedness    Recommendations:  I will proceed with further ischemic work-up to include nuclear stress testing to evaluate for any evidence of provokable ischemia.  Ambulatory monitor x4 weeks to evaluate for any evidence of arrhythmia  Recommend follow-up with consultants as instructed  Recommend risk factor modification including weight reduction  Follow-up our office with cardiologist after testing    Diagnoses and all orders for this visit:    1. Chest pain in adult (Primary)  -     Mobile Cardiac Outpatient Telemetry; Future  -     Stress Test With Myocardial Perfusion (1 Day); Future    2. Palpitations  -     Mobile Cardiac Outpatient Telemetry; Future  -     Stress Test With Myocardial Perfusion (1 Day); Future    3. Tachycardia  -     Mobile Cardiac Outpatient Telemetry; Future  -     Stress Test With Myocardial Perfusion (1 Day); Future      Objective:    Vitals:  Vitals:    01/18/23 1322   BP: 106/74   BP Location: Left arm   Patient Position: Sitting   Cuff Size: Large Adult   Pulse: 75   SpO2: 99%   Weight: 129 kg (285 lb)       Review of Systems   Cardiovascular: Positive for chest pain, dyspnea on exertion and palpitations.   Gastrointestinal: Positive for dysphagia.   Neurological: Positive for dizziness.   Psychiatric/Behavioral:  The patient is nervous/anxious.        Review of Systems:  The following systems were reviewed as they relate to the cardiovascular system: Constitutional, Eyes, ENT, Cardiovascular, Respiratory, Gastrointestinal, Integumentary, Neurological, Psychiatric, Hematologic, Endocrine, Musculoskeletal, and Genitourinary. The pertinent cardiovascular findings are reported above with all other cardiovascular points within those systems being negative.    Physical Exam:     General: Well-developed, obese female who is alert, cooperative, no distress, appears stated age  Head:  Normocephalic, atraumatic, mucous membranes moist  Eyes:  Conjunctiva/corneas clear, EOM's intact     Neck:  Supple,  no adenopathy;      Lungs: Very diminished but clear to auscultation bilaterally, no wheezes rhonchi rales are noted  Chest wall: No tenderness  Heart::  Regular rate and rhythm, S1 and S2 normal, no murmur, rub or gallop  Musculoskeletal:   Ambulates slowly without assistance  Abdomen: Soft, non-tender, nondistended bowel sounds active  Extremities: No cyanosis, clubbing, or edema  Pulses: 2+ and symmetric all extremities  Skin:  No rashes or lesions  Neuro/psych: A&O x3. CN II through XII are grossly intact with appropriate affect    History of Present Illness    Procedures    Allergies:  No Known Allergies    Medication Review:     Current Outpatient Medications:   •  albuterol sulfate  (90 Base) MCG/ACT inhaler, Inhale 2 puffs Every 6 (Six) Hours As Needed for Wheezing., Disp: , Rfl:   •  ARIPiprazole (ABILIFY) 5 MG tablet, Take 5 mg by mouth Daily., Disp: , Rfl:   •  aspirin 81 MG chewable tablet, Chew 2 tablets Daily., Disp: , Rfl:   •  atorvastatin (LIPITOR) 80 MG tablet, Take 1 tablet by mouth Every Night., Disp: 90 tablet, Rfl:   •  busPIRone (BUSPAR) 5 MG tablet, Take 1 tablet by mouth 3 (Three) Times a Day. (Patient taking differently: Take 7.5 mg by mouth 3 (Three) Times a Day.), Disp: 30 tablet, Rfl: 5  •  docusate  sodium (COLACE) 50 MG capsule, Take 1 capsule by mouth 2 (Two) Times a Day As Needed for Constipation for up to 30 days., Disp: 30 capsule, Rfl: 0  •  famotidine (PEPCID) 20 MG tablet, Take 1 tablet by mouth 2 (Two) Times a Day Before Meals., Disp: , Rfl:   •  gabapentin (NEURONTIN) 100 MG capsule, Take 1 capsule by mouth 2 (Two) Times a Day., Disp: , Rfl:   •  lactulose (CHRONULAC) 10 GM/15ML solution, Take 30 mL by mouth Daily for 30 days., Disp: 946 mL, Rfl: 0  •  midodrine (PROAMATINE) 5 MG tablet, Take 1 tablet by mouth 3 (Three) Times a Day Before Meals., Disp: 90 tablet, Rfl: 0  •  polyethylene glycol (MIRALAX) 17 g packet, Take 17 g by mouth Daily for 30 days., Disp: 30 packet, Rfl: 0    Family History:  Family History   Problem Relation Age of Onset   • Heart disease Mother    • Breast cancer Mother    • Heart disease Father    • Pancreatic cancer Father    • Heart disease Sister    • Lung cancer Brother        Past Medical History:  Past Medical History:   Diagnosis Date   • Anxiety    • Asthma    • Cancer (HCC)     SKIN   • Depression    • Psoriasis    • Seizures (HCC) 12/06/2022       Past surgical History:  Past Surgical History:   Procedure Laterality Date   • CHOLECYSTECTOMY  1987   • ENDOSCOPY N/A 12/30/2022    Procedure: ESOPHAGOGASTRODUODENOSCOPY with gastric biopsy and esophageal dilation #50,#54,#56,#58 bougie;  Surgeon: Patience Arana MD;  Location: [facility] ENDOSCOPY;  Service: Gastroenterology;  Laterality: N/A;  gastritis   • EYE SURGERY     • KNEE SURGERY         Social History:  Social History     Socioeconomic History   • Marital status:    Tobacco Use   • Smoking status: Never   • Smokeless tobacco: Never   Vaping Use   • Vaping Use: Never used   Substance and Sexual Activity   • Alcohol use: Never   • Drug use: Never   • Sexual activity: Defer           NOTE: The following portions of the patient's history were reviewed and updated this visit as appropriate: allergies, current  "medications, past family history, past medical history, past social history, past surgical history and problem list.    EMR Dragon/Transcription:   \"Dictated utilizing Dragon dictation\".       "

## 2023-01-19 ENCOUNTER — TELEPHONE (OUTPATIENT)
Dept: FAMILY MEDICINE CLINIC | Facility: CLINIC | Age: 58
End: 2023-01-19
Payer: MEDICAID

## 2023-01-19 ENCOUNTER — TELEPHONE (OUTPATIENT)
Dept: CARDIOLOGY | Facility: CLINIC | Age: 58
End: 2023-01-19
Payer: MEDICAID

## 2023-01-19 ENCOUNTER — CLINICAL SUPPORT (OUTPATIENT)
Dept: FAMILY MEDICINE CLINIC | Facility: CLINIC | Age: 58
End: 2023-01-19
Payer: COMMERCIAL

## 2023-01-19 DIAGNOSIS — R50.9 FEVER, UNSPECIFIED FEVER CAUSE: ICD-10-CM

## 2023-01-19 DIAGNOSIS — Z13.1 SCREENING FOR DIABETES MELLITUS: ICD-10-CM

## 2023-01-19 DIAGNOSIS — F41.9 ANXIETY: ICD-10-CM

## 2023-01-19 DIAGNOSIS — Z09 ENCOUNTER FOR EXAMINATION FOLLOWING TREATMENT AT HOSPITAL: ICD-10-CM

## 2023-01-19 DIAGNOSIS — M25.561 ACUTE PAIN OF RIGHT KNEE: Primary | ICD-10-CM

## 2023-01-19 DIAGNOSIS — E66.09 CLASS 1 OBESITY DUE TO EXCESS CALORIES WITH SERIOUS COMORBIDITY AND BODY MASS INDEX (BMI) OF 33.0 TO 33.9 IN ADULT: ICD-10-CM

## 2023-01-19 DIAGNOSIS — G47.09 OTHER INSOMNIA: ICD-10-CM

## 2023-01-19 DIAGNOSIS — E78.5 HYPERLIPIDEMIA, UNSPECIFIED HYPERLIPIDEMIA TYPE: ICD-10-CM

## 2023-01-19 DIAGNOSIS — K21.9 GASTROESOPHAGEAL REFLUX DISEASE, UNSPECIFIED WHETHER ESOPHAGITIS PRESENT: ICD-10-CM

## 2023-01-19 PROBLEM — R00.2 PALPITATIONS: Status: ACTIVE | Noted: 2023-01-19

## 2023-01-19 PROBLEM — R00.0 TACHYCARDIA: Status: ACTIVE | Noted: 2023-01-19

## 2023-01-19 PROBLEM — F41.0 PANIC ATTACK: Status: ACTIVE | Noted: 2023-01-19

## 2023-01-19 PROBLEM — E66.01 MORBID OBESITY WITH BMI OF 40.0-44.9, ADULT: Status: ACTIVE | Noted: 2023-01-19

## 2023-01-19 LAB — HBA1C MFR BLD: 5.3 % (ref 3.5–5.6)

## 2023-01-19 PROCEDURE — 36415 COLL VENOUS BLD VENIPUNCTURE: CPT | Performed by: REGISTERED NURSE

## 2023-01-19 PROCEDURE — 83036 HEMOGLOBIN GLYCOSYLATED A1C: CPT | Performed by: REGISTERED NURSE

## 2023-01-19 PROCEDURE — 85025 COMPLETE CBC W/AUTO DIFF WBC: CPT | Performed by: NURSE PRACTITIONER

## 2023-01-19 PROCEDURE — 80053 COMPREHEN METABOLIC PANEL: CPT | Performed by: NURSE PRACTITIONER

## 2023-01-19 RX ORDER — ARIPIPRAZOLE 5 MG/1
5 TABLET ORAL DAILY
Qty: 30 TABLET | Refills: 1 | Status: SHIPPED | OUTPATIENT
Start: 2023-01-19 | End: 2023-02-22 | Stop reason: SDUPTHER

## 2023-01-19 RX ORDER — BUSPIRONE HYDROCHLORIDE 5 MG/1
5 TABLET ORAL 3 TIMES DAILY
Qty: 30 TABLET | Refills: 5 | Status: SHIPPED | OUTPATIENT
Start: 2023-01-19 | End: 2023-04-03 | Stop reason: SDUPTHER

## 2023-01-19 RX ORDER — ASPIRIN 81 MG/1
162 TABLET, CHEWABLE ORAL DAILY
Start: 2023-01-19 | End: 2023-04-03 | Stop reason: SDUPTHER

## 2023-01-19 RX ORDER — FAMOTIDINE 20 MG/1
20 TABLET, FILM COATED ORAL
Start: 2023-01-19 | End: 2023-03-31

## 2023-01-19 RX ORDER — ALBUTEROL SULFATE 90 UG/1
2 AEROSOL, METERED RESPIRATORY (INHALATION) EVERY 6 HOURS PRN
Qty: 18 G | Refills: 2 | Status: SHIPPED | OUTPATIENT
Start: 2023-01-19 | End: 2023-04-03 | Stop reason: SDUPTHER

## 2023-01-19 RX ORDER — ATORVASTATIN CALCIUM 80 MG/1
80 TABLET, FILM COATED ORAL NIGHTLY
Qty: 90 TABLET
Start: 2023-01-19 | End: 2023-01-19 | Stop reason: SDUPTHER

## 2023-01-19 RX ORDER — ATORVASTATIN CALCIUM 80 MG/1
80 TABLET, FILM COATED ORAL NIGHTLY
Qty: 90 TABLET
Start: 2023-01-19 | End: 2023-02-22 | Stop reason: SDUPTHER

## 2023-01-19 RX ORDER — GABAPENTIN 100 MG/1
100 CAPSULE ORAL 2 TIMES DAILY
Start: 2023-01-19 | End: 2023-01-19 | Stop reason: SDUPTHER

## 2023-01-19 RX ORDER — FAMOTIDINE 20 MG/1
20 TABLET, FILM COATED ORAL
Start: 2023-01-19 | End: 2023-01-19 | Stop reason: SDUPTHER

## 2023-01-19 RX ORDER — GABAPENTIN 100 MG/1
100 CAPSULE ORAL 2 TIMES DAILY
Start: 2023-01-19 | End: 2023-01-25 | Stop reason: SDUPTHER

## 2023-01-19 NOTE — TELEPHONE ENCOUNTER
Rx Refill Note  Requested Prescriptions     Pending Prescriptions Disp Refills   • ARIPiprazole (ABILIFY) 5 MG tablet       Sig: Take 1 tablet by mouth Daily.   • aspirin 81 MG chewable tablet       Sig: Chew 2 tablets Daily.   • busPIRone (BUSPAR) 5 MG tablet 30 tablet 5     Sig: Take 1 tablet by mouth 3 (Three) Times a Day.   • atorvastatin (LIPITOR) 80 MG tablet 90 tablet      Sig: Take 1 tablet by mouth Every Night.   • albuterol sulfate  (90 Base) MCG/ACT inhaler       Sig: Inhale 2 puffs Every 6 (Six) Hours As Needed for Wheezing.   • gabapentin (NEURONTIN) 100 MG capsule       Sig: Take 1 capsule by mouth 2 (Two) Times a Day.   • famotidine (PEPCID) 20 MG tablet       Sig: Take 1 tablet by mouth 2 (Two) Times a Day Before Meals.      Last office visit with prescribing clinician: 1/10/2023   Last telemedicine visit with prescribing clinician: 1/19/2023   Next office visit with prescribing clinician: 2/22/2023                         Would you like a call back once the refill request has been completed: [] Yes [] No    If the office needs to give you a call back, can they leave a voicemail: [] Yes [] No    Laura Lawler, PCT  01/19/23, 08:46 EST

## 2023-01-19 NOTE — TELEPHONE ENCOUNTER
Caller: Eladia Connor    Relationship: Self    Best call back number: [phone]     What orders are you requesting (i.e. lab or imaging): US    In what timeframe would the patient need to come in: ASAP    Where will you receive your lab/imaging services: [facility]    Additional notes: PT STATES SHE MAY HAVE BLOOD CLOTS-CALLED OFFICE ADVISED ME TO PUT IN AN ENC

## 2023-01-19 NOTE — TELEPHONE ENCOUNTER
Attempted to reach pt, unsuccessful, left a v/m for her to return my call ASAP.       Okay for  HUB to release this information.         ----- Message from SCOOBY Bush sent at 1/19/2023  3:40 PM EST -----  Patient should go to the ER for further evaluation  ----- Message -----  From: Rea Olivares MA  Sent: 1/19/2023   1:55 PM EST  To: SCOOBY Bush    Caller: Eladia Connor     Relationship: Self     Best call back number: [phone]      What orders are you requesting (i.e. lab or imaging): US     In what timeframe would the patient need to come in: ASAP     Where will you receive your lab/imaging services: [facility]     Additional notes: PT STATES SHE MAY HAVE BLOOD CLOTS-CALLED OFFICE ADVISED ME TO PUT IN AN ENC

## 2023-01-19 NOTE — TELEPHONE ENCOUNTER
Pt was in office today and three of six medications were sent to pharmacy. I have sent the other 3 in.

## 2023-01-20 ENCOUNTER — APPOINTMENT (OUTPATIENT)
Dept: CT IMAGING | Facility: HOSPITAL | Age: 58
End: 2023-01-20
Payer: MEDICAID

## 2023-01-20 ENCOUNTER — APPOINTMENT (OUTPATIENT)
Dept: ULTRASOUND IMAGING | Facility: HOSPITAL | Age: 58
End: 2023-01-20
Payer: MEDICAID

## 2023-01-20 ENCOUNTER — HOSPITAL ENCOUNTER (EMERGENCY)
Facility: HOSPITAL | Age: 58
Discharge: HOME | End: 2023-01-20
Attending: EMERGENCY MEDICINE | Admitting: EMERGENCY MEDICINE
Payer: MEDICAID

## 2023-01-20 ENCOUNTER — APPOINTMENT (OUTPATIENT)
Dept: GENERAL RADIOLOGY | Facility: HOSPITAL | Age: 58
End: 2023-01-20
Payer: MEDICAID

## 2023-01-20 ENCOUNTER — PATIENT ROUNDING (BHMG ONLY) (OUTPATIENT)
Dept: CARDIOLOGY | Facility: CLINIC | Age: 58
End: 2023-01-20
Payer: MEDICAID

## 2023-01-20 VITALS
WEIGHT: 286.6 LBS | BODY MASS INDEX: 43.44 KG/M2 | HEART RATE: 67 BPM | HEIGHT: 68 IN | RESPIRATION RATE: 15 BRPM | TEMPERATURE: 97.5 F | SYSTOLIC BLOOD PRESSURE: 142 MMHG | DIASTOLIC BLOOD PRESSURE: 70 MMHG | OXYGEN SATURATION: 100 %

## 2023-01-20 DIAGNOSIS — R74.8 ELEVATED LIVER ENZYMES: ICD-10-CM

## 2023-01-20 DIAGNOSIS — R10.10 UPPER ABDOMINAL PAIN: Primary | ICD-10-CM

## 2023-01-20 LAB
ALBUMIN SERPL-MCNC: 4.1 G/DL (ref 3.5–5.2)
ALBUMIN SERPL-MCNC: 4.2 G/DL (ref 3.5–5.2)
ALBUMIN/GLOB SERPL: 1.1 G/DL
ALBUMIN/GLOB SERPL: 1.2 G/DL
ALP SERPL-CCNC: 350 U/L (ref 39–117)
ALP SERPL-CCNC: 364 U/L (ref 39–117)
ALT SERPL W P-5'-P-CCNC: 110 U/L (ref 1–33)
ALT SERPL W P-5'-P-CCNC: 96 U/L (ref 1–33)
ANION GAP SERPL CALCULATED.3IONS-SCNC: 11 MMOL/L (ref 5–15)
ANION GAP SERPL CALCULATED.3IONS-SCNC: 12 MMOL/L (ref 5–15)
AST SERPL-CCNC: 72 U/L (ref 1–32)
AST SERPL-CCNC: 91 U/L (ref 1–32)
BACTERIA UR QL AUTO: ABNORMAL /HPF
BASOPHILS # BLD AUTO: 0.1 10*3/MM3 (ref 0–0.2)
BASOPHILS # BLD AUTO: 0.12 10*3/MM3 (ref 0–0.2)
BASOPHILS NFR BLD AUTO: 1.1 % (ref 0–1.5)
BASOPHILS NFR BLD AUTO: 1.5 % (ref 0–1.5)
BILIRUB SERPL-MCNC: 0.6 MG/DL (ref 0–1.2)
BILIRUB SERPL-MCNC: 0.6 MG/DL (ref 0–1.2)
BILIRUB UR QL STRIP: NEGATIVE
BUN SERPL-MCNC: 12 MG/DL (ref 6–20)
BUN SERPL-MCNC: 13 MG/DL (ref 6–20)
BUN/CREAT SERPL: 16.4 (ref 7–25)
BUN/CREAT SERPL: 18.8 (ref 7–25)
CALCIUM SPEC-SCNC: 9.7 MG/DL (ref 8.6–10.5)
CALCIUM SPEC-SCNC: 9.9 MG/DL (ref 8.6–10.5)
CHLORIDE SERPL-SCNC: 103 MMOL/L (ref 98–107)
CHLORIDE SERPL-SCNC: 104 MMOL/L (ref 98–107)
CLARITY UR: CLEAR
CO2 SERPL-SCNC: 23 MMOL/L (ref 22–29)
CO2 SERPL-SCNC: 25 MMOL/L (ref 22–29)
COLOR UR: YELLOW
CREAT SERPL-MCNC: 0.69 MG/DL (ref 0.57–1)
CREAT SERPL-MCNC: 0.73 MG/DL (ref 0.57–1)
DEPRECATED RDW RBC AUTO: 43.8 FL (ref 37–54)
DEPRECATED RDW RBC AUTO: 45.2 FL (ref 37–54)
EGFRCR SERPLBLD CKD-EPI 2021: 100.1 ML/MIN/1.73
EGFRCR SERPLBLD CKD-EPI 2021: 94.9 ML/MIN/1.73
EOSINOPHIL # BLD AUTO: 0.8 10*3/MM3 (ref 0–0.4)
EOSINOPHIL # BLD AUTO: 0.97 10*3/MM3 (ref 0–0.4)
EOSINOPHIL NFR BLD AUTO: 12.4 % (ref 0.3–6.2)
EOSINOPHIL NFR BLD AUTO: 9.9 % (ref 0.3–6.2)
ERYTHROCYTE [DISTWIDTH] IN BLOOD BY AUTOMATED COUNT: 14.2 % (ref 12.3–15.4)
ERYTHROCYTE [DISTWIDTH] IN BLOOD BY AUTOMATED COUNT: 14.5 % (ref 12.3–15.4)
GLOBULIN UR ELPH-MCNC: 3.6 GM/DL
GLOBULIN UR ELPH-MCNC: 3.9 GM/DL
GLUCOSE SERPL-MCNC: 105 MG/DL (ref 65–99)
GLUCOSE SERPL-MCNC: 128 MG/DL (ref 65–99)
GLUCOSE UR STRIP-MCNC: NEGATIVE MG/DL
HAV IGM SERPL QL IA: NORMAL
HBV CORE IGM SERPL QL IA: NORMAL
HBV SURFACE AG SERPL QL IA: NORMAL
HCT VFR BLD AUTO: 39 % (ref 34–46.6)
HCT VFR BLD AUTO: 39.2 % (ref 34–46.6)
HCV AB SER DONR QL: NORMAL
HGB BLD-MCNC: 12.9 G/DL (ref 12–15.9)
HGB BLD-MCNC: 13.3 G/DL (ref 12–15.9)
HGB UR QL STRIP.AUTO: NEGATIVE
HYALINE CASTS UR QL AUTO: ABNORMAL /LPF
IMM GRANULOCYTES # BLD AUTO: 0.04 10*3/MM3 (ref 0–0.05)
IMM GRANULOCYTES NFR BLD AUTO: 0.5 % (ref 0–0.5)
KETONES UR QL STRIP: NEGATIVE
LEUKOCYTE ESTERASE UR QL STRIP.AUTO: ABNORMAL
LIPASE SERPL-CCNC: 16 U/L (ref 13–60)
LYMPHOCYTES # BLD AUTO: 1.17 10*3/MM3 (ref 0.7–3.1)
LYMPHOCYTES # BLD AUTO: 1.2 10*3/MM3 (ref 0.7–3.1)
LYMPHOCYTES NFR BLD AUTO: 14.5 % (ref 19.6–45.3)
LYMPHOCYTES NFR BLD AUTO: 14.9 % (ref 19.6–45.3)
MCH RBC QN AUTO: 28.6 PG (ref 26.6–33)
MCH RBC QN AUTO: 29.9 PG (ref 26.6–33)
MCHC RBC AUTO-ENTMCNC: 33 G/DL (ref 31.5–35.7)
MCHC RBC AUTO-ENTMCNC: 33.9 G/DL (ref 31.5–35.7)
MCV RBC AUTO: 86.7 FL (ref 79–97)
MCV RBC AUTO: 88.1 FL (ref 79–97)
MONOCYTES # BLD AUTO: 0.46 10*3/MM3 (ref 0.1–0.9)
MONOCYTES # BLD AUTO: 0.5 10*3/MM3 (ref 0.1–0.9)
MONOCYTES NFR BLD AUTO: 5.9 % (ref 5–12)
MONOCYTES NFR BLD AUTO: 6.7 % (ref 5–12)
NEUTROPHILS NFR BLD AUTO: 5.09 10*3/MM3 (ref 1.7–7)
NEUTROPHILS NFR BLD AUTO: 5.6 10*3/MM3 (ref 1.7–7)
NEUTROPHILS NFR BLD AUTO: 64.8 % (ref 42.7–76)
NEUTROPHILS NFR BLD AUTO: 67.8 % (ref 42.7–76)
NITRITE UR QL STRIP: NEGATIVE
NRBC BLD AUTO-RTO: 0 /100 WBC (ref 0–0.2)
NRBC BLD AUTO-RTO: 0 /100 WBC (ref 0–0.2)
NT-PROBNP SERPL-MCNC: 66.8 PG/ML (ref 0–900)
PH UR STRIP.AUTO: 8.5 [PH] (ref 5–8)
PLATELET # BLD AUTO: 225 10*3/MM3 (ref 140–450)
PLATELET # BLD AUTO: 234 10*3/MM3 (ref 140–450)
PMV BLD AUTO: 6.9 FL (ref 6–12)
PMV BLD AUTO: 9.5 FL (ref 6–12)
POTASSIUM SERPL-SCNC: 3.9 MMOL/L (ref 3.5–5.2)
POTASSIUM SERPL-SCNC: 4.1 MMOL/L (ref 3.5–5.2)
PROT SERPL-MCNC: 7.8 G/DL (ref 6–8.5)
PROT SERPL-MCNC: 8 G/DL (ref 6–8.5)
PROT UR QL STRIP: NEGATIVE
QT INTERVAL: 369 MS
RBC # BLD AUTO: 4.45 10*6/MM3 (ref 3.77–5.28)
RBC # BLD AUTO: 4.5 10*6/MM3 (ref 3.77–5.28)
RBC # UR STRIP: ABNORMAL /HPF
REF LAB TEST METHOD: ABNORMAL
SODIUM SERPL-SCNC: 139 MMOL/L (ref 136–145)
SODIUM SERPL-SCNC: 139 MMOL/L (ref 136–145)
SP GR UR STRIP: 1.01 (ref 1–1.03)
SQUAMOUS #/AREA URNS HPF: ABNORMAL /HPF
TROPONIN T SERPL-MCNC: <0.01 NG/ML (ref 0–0.03)
UROBILINOGEN UR QL STRIP: ABNORMAL
WBC # UR STRIP: ABNORMAL /HPF
WBC NRBC COR # BLD: 7.85 10*3/MM3 (ref 3.4–10.8)
WBC NRBC COR # BLD: 8.2 10*3/MM3 (ref 3.4–10.8)

## 2023-01-20 PROCEDURE — 84484 ASSAY OF TROPONIN QUANT: CPT | Performed by: PHYSICIAN ASSISTANT

## 2023-01-20 PROCEDURE — 93005 ELECTROCARDIOGRAM TRACING: CPT | Performed by: PHYSICIAN ASSISTANT

## 2023-01-20 PROCEDURE — 74177 CT ABD & PELVIS W/CONTRAST: CPT

## 2023-01-20 PROCEDURE — 80074 ACUTE HEPATITIS PANEL: CPT | Performed by: PHYSICIAN ASSISTANT

## 2023-01-20 PROCEDURE — 99283 EMERGENCY DEPT VISIT LOW MDM: CPT

## 2023-01-20 PROCEDURE — 0 IOPAMIDOL PER 1 ML: Performed by: EMERGENCY MEDICINE

## 2023-01-20 PROCEDURE — 71045 X-RAY EXAM CHEST 1 VIEW: CPT

## 2023-01-20 PROCEDURE — 83690 ASSAY OF LIPASE: CPT | Performed by: PHYSICIAN ASSISTANT

## 2023-01-20 PROCEDURE — 83880 ASSAY OF NATRIURETIC PEPTIDE: CPT | Performed by: PHYSICIAN ASSISTANT

## 2023-01-20 PROCEDURE — 76705 ECHO EXAM OF ABDOMEN: CPT

## 2023-01-20 PROCEDURE — 81001 URINALYSIS AUTO W/SCOPE: CPT | Performed by: PHYSICIAN ASSISTANT

## 2023-01-20 PROCEDURE — 85025 COMPLETE CBC W/AUTO DIFF WBC: CPT | Performed by: PHYSICIAN ASSISTANT

## 2023-01-20 PROCEDURE — 80053 COMPREHEN METABOLIC PANEL: CPT | Performed by: PHYSICIAN ASSISTANT

## 2023-01-20 RX ORDER — SODIUM CHLORIDE 0.9 % (FLUSH) 0.9 %
10 SYRINGE (ML) INJECTION AS NEEDED
Status: DISCONTINUED | OUTPATIENT
Start: 2023-01-20 | End: 2023-01-20 | Stop reason: HOSPADM

## 2023-01-20 RX ADMIN — IOPAMIDOL 100 ML: 755 INJECTION, SOLUTION INTRAVENOUS at 14:33

## 2023-01-20 NOTE — DISCHARGE INSTRUCTIONS
Continue current home medications.  Drink plenty of fluids.  Close follow-up with primary care provider and gastroenterology.  Return for new or worsening symptoms.

## 2023-01-20 NOTE — PROGRESS NOTES
January 20, 2023    Hello, may I speak with Ealdia Connor?    My name is Bianca    I am  with [facility] [address] [place] [address].    Before we get started may I verify your date of birth? 1965    I am calling to officially welcome you to our practice and ask about your recent visit. Is this a good time to talk?  Yes    Tell me about your visit with us. What things went well?  Visit was great.  Staff was very friendly and nice.  Really liked the NP Kajal.  She was very nice and patient.  Took time for me.     We're always looking for ways to make our patients' experiences even better. Do you have recommendations on ways we may improve?   No    Overall were you satisfied with your first visit to our practice?  Yes     I appreciate you taking the time to speak with me today. Is there anything else I can do for you?  No    Thank you, and have a great day.

## 2023-01-20 NOTE — ED NOTES
Patient evaluated by provider and will return to waiting room with Intravenous line in place.  Patient has been instructed not to inject anything into IV, or leave premises with line in place. Patient pending further evaluation, treatment, testing / monitoring.

## 2023-01-20 NOTE — ED PROVIDER NOTES
Subjective      Provider in Triage Note  Patient is a 57-year-old  female with history of asthma, bronchitis, chronically on 2 L per nasal cannula, seizures presents to the ER with complaints of abnormal lab work.  Patient states that she has been dealing with some epigastric abdominal pain for the last 3 weeks.  Patient states she was started on some new medications after being evaluated for new onset seizures, including atorvastatin, Abilify.  Patient states that she was seen by PCP earlier this week and was told that her liver function tests were elevated and that she needed to be evaluated.  She complains of sharp crampy pain in her upper abdomen that radiates into her chest that she rates a 9/10.  She reports nausea but no vomiting today.  No diarrhea.  No dysuria or hematuria.  No discoloration of her urine.  She denies any cough sore throat headache or lightheadedness.  Abdominal surgical history significant for cholecystectomy.      History of Present Illness    Review of Systems   Constitutional: Negative for chills and fever.   HENT: Negative for sore throat and trouble swallowing.    Respiratory: Negative for wheezing.    Cardiovascular: Negative for palpitations.   Gastrointestinal: Positive for abdominal pain and nausea. Negative for diarrhea and vomiting.   Genitourinary: Negative for dysuria.   Musculoskeletal: Negative for myalgias.   Skin: Negative for rash.   Neurological: Negative for headaches.   Psychiatric/Behavioral: Negative for behavioral problems.   All other systems reviewed and are negative.      Past Medical History:   Diagnosis Date   • Anxiety    • Asthma    • Cancer (HCC)     SKIN   • Depression    • Psoriasis    • Seizures (HCC) 12/06/2022       No Known Allergies    Past Surgical History:   Procedure Laterality Date   • CHOLECYSTECTOMY  1987   • ENDOSCOPY N/A 12/30/2022    Procedure: ESOPHAGOGASTRODUODENOSCOPY with gastric biopsy and esophageal dilation #50,#54,#56,#58  cristino;  Surgeon: Patience Arnaa MD;  Location: [facility] ENDOSCOPY;  Service: Gastroenterology;  Laterality: N/A;  gastritis   • EYE SURGERY     • KNEE SURGERY         Family History   Problem Relation Age of Onset   • Heart disease Mother    • Breast cancer Mother    • Heart disease Father    • Pancreatic cancer Father    • Heart disease Sister    • Lung cancer Brother        Social History     Socioeconomic History   • Marital status:    Tobacco Use   • Smoking status: Never   • Smokeless tobacco: Never   Vaping Use   • Vaping Use: Never used   Substance and Sexual Activity   • Alcohol use: Never   • Drug use: Never   • Sexual activity: Defer           Objective   Physical Exam  Vital signs and triage nurse note reviewed.  Constitutional: Awake, alert; well-developed and well-nourished. No acute distress is noted.  HEENT: Normocephalic, atraumatic; pupils are PERRL with intact EOM; oropharynx is pink and moist without exudate or erythema.  No drooling or pooling of oral secretions.  Neck: Supple, full range of motion without pain; no cervical lymphadenopathy. Normal phonation.  Cardiovascular: Regular rate and rhythm, normal S1-S2.  No murmur noted.  Pulmonary: Respiratory effort regular nonlabored, breath sounds clear to auscultation all fields.  Abdomen: Soft, mildly tender over the epigastrium, nondistended with normoactive bowel sounds; no rebound or guarding.  Musculoskeletal: Independent range of motion of all extremities with no palpable tenderness or edema.  Neuro: Alert oriented x3, speech is clear and appropriate, GCS 15.    Skin: Flesh tone, warm, dry, intact; no erythematous or petechial rash or lesion.  Procedures           ED Course      Labs Reviewed   COMPREHENSIVE METABOLIC PANEL - Abnormal; Notable for the following components:       Result Value    Glucose 128 (*)     ALT (SGPT) 96 (*)     AST (SGOT) 72 (*)     Alkaline Phosphatase 364 (*)     All other components within normal limits     Narrative:     GFR Normal >60  Chronic Kidney Disease <60  Kidney Failure <15     URINALYSIS W/ CULTURE IF INDICATED - Abnormal; Notable for the following components:    pH, UA 8.5 (*)     Leuk Esterase, UA Trace (*)     All other components within normal limits    Narrative:     In absence of clinical symptoms, the presence of pyuria, bacteria, and/or nitrites on the urinalysis result does not correlate with infection.   CBC WITH AUTO DIFFERENTIAL - Abnormal; Notable for the following components:    Lymphocyte % 14.5 (*)     Eosinophil % 9.9 (*)     Eosinophils, Absolute 0.80 (*)     All other components within normal limits   URINALYSIS, MICROSCOPIC ONLY - Abnormal; Notable for the following components:    RBC, UA 3-5 (*)     WBC, UA 0-2 (*)     Bacteria, UA Trace (*)     All other components within normal limits   LIPASE - Normal   TROPONIN (IN-HOUSE) - Normal    Narrative:     Troponin T Reference Range:  <= 0.03 ng/mL-   Negative for AMI  >0.03 ng/mL-     Abnormal for myocardial necrosis.  Clinicians would have to utilize clinical acumen, EKG, Troponin and serial changes to determine if it is an Acute Myocardial Infarction or myocardial injury due to an underlying chronic condition.       Results may be falsely decreased if patient taking Biotin.     BNP (IN-HOUSE) - Normal    Narrative:     Among patients with dyspnea, NT-proBNP is highly sensitive for the detection of acute congestive heart failure. In addition NT-proBNP of <300 pg/ml effectively rules out acute congestive heart failure with 99% negative predictive value.    Results may be falsely decreased if patient taking Biotin.     HEPATITIS PANEL, ACUTE - Normal    Narrative:     Results may be falsely decreased if patient taking Biotin.    CBC AND DIFFERENTIAL    Narrative:     The following orders were created for panel order CBC & Differential.  Procedure                               Abnormality         Status                     ---------                                -----------         ------                     CBC Auto Differential[[id number]]        Abnormal            Final result                 Please view results for these tests on the individual orders.     CT Abdomen Pelvis With Contrast    Result Date: 1/20/2023  Impression: 1. No acute findings in the abdomen or pelvis. The previously described proctitis features have resolved. 2. Stable mild splenic enlargement. 3. Cholecystectomy. Electronically Signed: Ting Mendoza  1/20/2023 2:44 PM EST  Workstation ID: [id number]    US Liver    Result Date: 1/20/2023  Impression: 1. Normal sonographic appearance of the liver. 2. No biliary dilation.: Cholecystectomy. Electronically Signed: Ting Mendoza  1/20/2023 4:10 PM EST  Workstation ID: [id number]    XR Chest 1 View    Result Date: 1/20/2023  Impression: Negative portable chest Electronically Signed: Jeff Stinson  1/20/2023 12:11 PM EST  Workstation ID: [id number]    Medications   sodium chloride 0.9 % flush 10 mL (has no administration in time range)   iopamidol (ISOVUE-370) 76 % injection 100 mL (100 mL Intravenous Given 1/20/23 1433)                                            Medical Decision Making  Comorbidities: Asthma, seizures, depression anxiety  Differentials: Hepatitis, viral infection, cirrhosis, medication reaction;this list is not all inclusive and does not constitute the entirety of considered causes  Discussion with provider:  Radiology interpretation: X-rays reviewed by me and interpreted by radiologist: As above  Lab interpretation: Labs viewed by me significant for: As above    Patient had labs, CT and liver ultrasound obtained.    Work-up: CBC is unremarkable.  Metabolic panel significant for ALT 96, AST 72, alk phos 364.  Normal lipase.  Negative troponin.  Hepatitis panel is negative.  CT abdomen pelvis shows no acute findings.  Liver ultrasound reveals normal appearance of the liver with no biliary dilation.  Cholecystectomy.    On  reexamination patient resting quietly and in no distress.  She has no new complaints.  She remains well-appearing, afebrile and hemodynamically stable.  Her abdomen is soft only mildly tender over the upper abdomen, no signs of peritonitis.    Prior labs were reviewed and noted below.  Mild transaminitis noted on work-up today.  Normal ultrasound of the liver.  Patient is otherwise well-appearing.  She has an appointment to follow-up with her primary care provider in a few days for repeat labs.  She will be instructed to follow-up as scheduled.  She also be given GI follow-up.  She was given warning signs for prompt return and voiced understanding.      Elevated liver enzymes: acute illness or injury  Upper abdominal pain: complicated acute illness or injury  Amount and/or Complexity of Data Reviewed  External Data Reviewed: labs.     Details: Metabolic panel from 1/11/2023 was reviewed and showed an ALT of 83, AST 84 alk phos 168.  Metabolic panel from doctor's office yesterday reveals an ALT of 110, AST 91, alk phos 350.  Labs: ordered. Decision-making details documented in ED Course.  Radiology: ordered. Decision-making details documented in ED Course.      Risk  Prescription drug management.          Final diagnoses:   Upper abdominal pain   Elevated liver enzymes       ED Disposition  ED Disposition     ED Disposition   Discharge    Condition   Stable    Comment   --             Quincy Lemos, APRN  [address] [facility] [place] [facility]  [phone]          [facility] [place] [id number] [facility] [address] [phone]             Medication List      No changes were made to your prescriptions during this visit.          Crista Brooks, SCOOBY  01/20/23 4842

## 2023-01-23 ENCOUNTER — TELEPHONE (OUTPATIENT)
Dept: FAMILY MEDICINE CLINIC | Facility: CLINIC | Age: 58
End: 2023-01-23

## 2023-01-23 ENCOUNTER — CLINICAL SUPPORT (OUTPATIENT)
Dept: FAMILY MEDICINE CLINIC | Facility: CLINIC | Age: 58
End: 2023-01-23
Payer: MEDICAID

## 2023-01-23 DIAGNOSIS — R50.9 FEVER, UNSPECIFIED FEVER CAUSE: ICD-10-CM

## 2023-01-23 DIAGNOSIS — E66.09 CLASS 1 OBESITY DUE TO EXCESS CALORIES WITH SERIOUS COMORBIDITY AND BODY MASS INDEX (BMI) OF 33.0 TO 33.9 IN ADULT: ICD-10-CM

## 2023-01-23 DIAGNOSIS — R00.2 PALPITATIONS: ICD-10-CM

## 2023-01-23 DIAGNOSIS — R05.1 ACUTE COUGH: ICD-10-CM

## 2023-01-23 DIAGNOSIS — R10.84 GENERALIZED ABDOMINAL PAIN: ICD-10-CM

## 2023-01-23 DIAGNOSIS — R11.15 PERSISTENT VOMITING: ICD-10-CM

## 2023-01-23 DIAGNOSIS — R21 RASH OF FACE: ICD-10-CM

## 2023-01-23 DIAGNOSIS — R07.9 CHEST PAIN IN ADULT: ICD-10-CM

## 2023-01-23 DIAGNOSIS — L98.9 SKIN LESION: ICD-10-CM

## 2023-01-23 DIAGNOSIS — I95.0 IDIOPATHIC HYPOTENSION: ICD-10-CM

## 2023-01-23 DIAGNOSIS — R00.0 TACHYCARDIA: ICD-10-CM

## 2023-01-23 DIAGNOSIS — R74.8 ELEVATED LIVER ENZYMES: Primary | ICD-10-CM

## 2023-01-23 DIAGNOSIS — R21 RASH: ICD-10-CM

## 2023-01-23 PROCEDURE — 36415 COLL VENOUS BLD VENIPUNCTURE: CPT | Performed by: REGISTERED NURSE

## 2023-01-23 PROCEDURE — 80053 COMPREHEN METABOLIC PANEL: CPT | Performed by: FAMILY MEDICINE

## 2023-01-23 PROCEDURE — 82977 ASSAY OF GGT: CPT | Performed by: FAMILY MEDICINE

## 2023-01-23 PROCEDURE — 85025 COMPLETE CBC W/AUTO DIFF WBC: CPT | Performed by: FAMILY MEDICINE

## 2023-01-23 NOTE — TELEPHONE ENCOUNTER
UNABLE TO WARM TRANSFER    Caller: Eladia Connor    Relationship to patient: Self    Best call back number: [phone]     Chief complaint:CANCEL TODAY'S LAB APPOINTMENT    Type of visit: LAB    Requested date: CHANGE TODAY'S APPOINTMENT TO TOMORROW.  SHE HAS A 1:00 PM APPOINTMENT TOMORROW

## 2023-01-24 ENCOUNTER — OFFICE VISIT (OUTPATIENT)
Dept: FAMILY MEDICINE CLINIC | Facility: CLINIC | Age: 58
End: 2023-01-24
Payer: MEDICAID

## 2023-01-24 VITALS
HEART RATE: 76 BPM | HEIGHT: 68 IN | TEMPERATURE: 97.7 F | BODY MASS INDEX: 43.19 KG/M2 | WEIGHT: 285 LBS | OXYGEN SATURATION: 98 % | DIASTOLIC BLOOD PRESSURE: 70 MMHG | SYSTOLIC BLOOD PRESSURE: 130 MMHG | RESPIRATION RATE: 18 BRPM

## 2023-01-24 DIAGNOSIS — Z12.11 SCREEN FOR COLON CANCER: ICD-10-CM

## 2023-01-24 DIAGNOSIS — E66.01 MORBID OBESITY WITH BMI OF 40.0-44.9, ADULT: ICD-10-CM

## 2023-01-24 DIAGNOSIS — R10.84 GENERALIZED ABDOMINAL PAIN: ICD-10-CM

## 2023-01-24 DIAGNOSIS — R74.8 ELEVATED LIVER ENZYMES: Primary | ICD-10-CM

## 2023-01-24 DIAGNOSIS — Z20.818 EXPOSURE TO STREP THROAT: ICD-10-CM

## 2023-01-24 LAB
ALBUMIN SERPL-MCNC: 4.1 G/DL (ref 3.5–5.2)
ALBUMIN/GLOB SERPL: 1.1 G/DL
ALP SERPL-CCNC: 298 U/L (ref 39–117)
ALT SERPL W P-5'-P-CCNC: 64 U/L (ref 1–33)
ANION GAP SERPL CALCULATED.3IONS-SCNC: 10.5 MMOL/L (ref 5–15)
AST SERPL-CCNC: 57 U/L (ref 1–32)
BASOPHILS # BLD AUTO: 0.09 10*3/MM3 (ref 0–0.2)
BASOPHILS NFR BLD AUTO: 1.2 % (ref 0–1.5)
BILIRUB SERPL-MCNC: 0.6 MG/DL (ref 0–1.2)
BUN SERPL-MCNC: 11 MG/DL (ref 6–20)
BUN/CREAT SERPL: 15.7 (ref 7–25)
CALCIUM SPEC-SCNC: 9.7 MG/DL (ref 8.6–10.5)
CHLORIDE SERPL-SCNC: 103 MMOL/L (ref 98–107)
CO2 SERPL-SCNC: 24.5 MMOL/L (ref 22–29)
CREAT SERPL-MCNC: 0.7 MG/DL (ref 0.57–1)
DEPRECATED RDW RBC AUTO: 44.7 FL (ref 37–54)
EGFRCR SERPLBLD CKD-EPI 2021: 99.8 ML/MIN/1.73
EOSINOPHIL # BLD AUTO: 0.84 10*3/MM3 (ref 0–0.4)
EOSINOPHIL NFR BLD AUTO: 11.2 % (ref 0.3–6.2)
ERYTHROCYTE [DISTWIDTH] IN BLOOD BY AUTOMATED COUNT: 13.9 % (ref 12.3–15.4)
GGT SERPL-CCNC: 210 U/L (ref 5–36)
GLOBULIN UR ELPH-MCNC: 3.7 GM/DL
GLUCOSE SERPL-MCNC: 224 MG/DL (ref 65–99)
HCT VFR BLD AUTO: 38.9 % (ref 34–46.6)
HGB BLD-MCNC: 12.8 G/DL (ref 12–15.9)
IMM GRANULOCYTES # BLD AUTO: 0.02 10*3/MM3 (ref 0–0.05)
IMM GRANULOCYTES NFR BLD AUTO: 0.3 % (ref 0–0.5)
LYMPHOCYTES # BLD AUTO: 0.98 10*3/MM3 (ref 0.7–3.1)
LYMPHOCYTES NFR BLD AUTO: 13 % (ref 19.6–45.3)
MCH RBC QN AUTO: 28.9 PG (ref 26.6–33)
MCHC RBC AUTO-ENTMCNC: 32.9 G/DL (ref 31.5–35.7)
MCV RBC AUTO: 87.8 FL (ref 79–97)
MONOCYTES # BLD AUTO: 0.41 10*3/MM3 (ref 0.1–0.9)
MONOCYTES NFR BLD AUTO: 5.5 % (ref 5–12)
NEUTROPHILS NFR BLD AUTO: 5.18 10*3/MM3 (ref 1.7–7)
NEUTROPHILS NFR BLD AUTO: 68.8 % (ref 42.7–76)
NRBC BLD AUTO-RTO: 0 /100 WBC (ref 0–0.2)
PLATELET # BLD AUTO: 221 10*3/MM3 (ref 140–450)
PMV BLD AUTO: 9.7 FL (ref 6–12)
POTASSIUM SERPL-SCNC: 3.7 MMOL/L (ref 3.5–5.2)
PROT SERPL-MCNC: 7.8 G/DL (ref 6–8.5)
RBC # BLD AUTO: 4.43 10*6/MM3 (ref 3.77–5.28)
SODIUM SERPL-SCNC: 138 MMOL/L (ref 136–145)
WBC NRBC COR # BLD: 7.52 10*3/MM3 (ref 3.4–10.8)

## 2023-01-24 PROCEDURE — T1015 CLINIC SERVICE: HCPCS | Performed by: FAMILY MEDICINE

## 2023-01-24 PROCEDURE — 99214 OFFICE O/P EST MOD 30 MIN: CPT | Performed by: FAMILY MEDICINE

## 2023-01-24 RX ORDER — AMOXICILLIN 500 MG/1
1000 CAPSULE ORAL 2 TIMES DAILY
Qty: 40 CAPSULE | Refills: 0 | Status: SHIPPED | OUTPATIENT
Start: 2023-01-24 | End: 2023-02-03

## 2023-01-25 DIAGNOSIS — E78.5 HYPERLIPIDEMIA, UNSPECIFIED HYPERLIPIDEMIA TYPE: ICD-10-CM

## 2023-01-25 DIAGNOSIS — K21.9 GASTROESOPHAGEAL REFLUX DISEASE, UNSPECIFIED WHETHER ESOPHAGITIS PRESENT: ICD-10-CM

## 2023-01-25 DIAGNOSIS — M25.561 ACUTE PAIN OF RIGHT KNEE: ICD-10-CM

## 2023-01-25 RX ORDER — FAMOTIDINE 20 MG/1
20 TABLET, FILM COATED ORAL
Start: 2023-01-25

## 2023-01-25 RX ORDER — ATORVASTATIN CALCIUM 80 MG/1
80 TABLET, FILM COATED ORAL NIGHTLY
Qty: 90 TABLET
Start: 2023-01-25

## 2023-01-25 RX ORDER — GABAPENTIN 100 MG/1
100 CAPSULE ORAL 2 TIMES DAILY
Qty: 60 CAPSULE | Refills: 0 | Status: SHIPPED | OUTPATIENT
Start: 2023-01-25 | End: 2023-02-22 | Stop reason: SDUPTHER

## 2023-01-25 NOTE — TELEPHONE ENCOUNTER
Caller: Eladia Connor    Relationship: Self    Best call back number: [phone]      What was the call regarding: PATIENT REQUESTS A CALL BACK BEFORE THE END OF DAY TO CHECK ON STATUS OF HER PRESCRIPTION FOR gabapentin (NEURONTIN) 100 MG capsule    Do you require a callback: YES

## 2023-01-25 NOTE — TELEPHONE ENCOUNTER
Caller: PATIENT     Best call back number: [phone]    Requested Prescriptions:   Requested Prescriptions     Pending Prescriptions Disp Refills   • gabapentin (NEURONTIN) 100 MG capsule       Sig: Take 1 capsule by mouth 2 (Two) Times a Day.   • atorvastatin (LIPITOR) 80 MG tablet 90 tablet      Sig: Take 1 tablet by mouth Every Night.   • famotidine (PEPCID) 20 MG tablet       Sig: Take 1 tablet by mouth 2 (Two) Times a Day Before Meals.        Pharmacy where request should be sent: [address] [phone] [facility] [phone]      Additional details provided by patient: PATIENT IS OUT OF THESE MEDICATIONS    Does the patient have less than a 3 day supply:  [x] Yes  [] No    Would you like a call back once the refill request has been completed: [x] Yes [] No    If the office needs to give you a call back, can they leave a voicemail: [x] Yes [] No    Blayne Keenan Rep   01/25/23 10:09 EST

## 2023-01-25 NOTE — TELEPHONE ENCOUNTER
Hub is instructed to read the documentation below to patient    Called [facility] pharmacy to verify is Rx's were received from 1/19/23 and the tech confirmed that they had not received them. I gave them a verbal script for the famotidine (PEPCID) and atorvastatin (LIPITOR). They are currently working on those refills. Gabapentin is controlled so I will have to route this Rx request to provider to approve.        Rx Refill Note  Requested Prescriptions     Pending Prescriptions Disp Refills   • gabapentin (NEURONTIN) 100 MG capsule       Sig: Take 1 capsule by mouth 2 (Two) Times a Day.     Refused Prescriptions Disp Refills   • atorvastatin (LIPITOR) 80 MG tablet 90 tablet      Sig: Take 1 tablet by mouth Every Night.     Refused By: EDIE JOHNS     Reason for Refusal: Request already responded to by other means (e.g. phone or fax)   • famotidine (PEPCID) 20 MG tablet       Sig: Take 1 tablet by mouth 2 (Two) Times a Day Before Meals.     Refused By: EDIE JOHNS     Reason for Refusal: Request already responded to by other means (e.g. phone or fax)      Last office visit with prescribing clinician: 1/10/2023   Last telemedicine visit with prescribing clinician: 1/30/2023   Next office visit with prescribing clinician: 2/22/2023     [facility] - [facility], [place]/ 1-25-23 (01/25/2023)                      Would you like a call back once the refill request has been completed: [] Yes [] No    If the office needs to give you a call back, can they leave a voicemail: [] Yes [] No    Edie Johns MA  01/25/23, 10:43 EST

## 2023-01-25 NOTE — TELEPHONE ENCOUNTER
I called and spoke with patient to let her know that I personally called the prescriptions into the pharmacy as a verbal order for the famotidine and the atorvastatin and that those should be pending with the pharmacy. I informed that I had to route over the Gabapentin to the provider to approve since it is a controlled substance, and I did so at 10:43am. I explained that the provider has not gotten to the request yet, due to seeing patients in the clinic. Patient stated this medication is for her seizures and that she has nothing left. I advised that patient should call in Rx request at least 5 business days before running out. Patient stated that she however, I do not see any request encounters. Pt did not remember who she spoke with. She asked if I could send in just one pill. I again explained that I could not. I told her that I would route another message to the provider.

## 2023-02-02 ENCOUNTER — HOSPITAL ENCOUNTER (OUTPATIENT)
Dept: CARDIOLOGY | Facility: HOSPITAL | Age: 58
Discharge: HOME | End: 2023-02-02
Payer: MEDICAID

## 2023-02-02 ENCOUNTER — HOSPITAL ENCOUNTER (OUTPATIENT)
Dept: CARDIOLOGY | Facility: HOSPITAL | Age: 58
Discharge: HOME | End: 2023-02-02
Admitting: NURSE PRACTITIONER
Payer: MEDICAID

## 2023-02-02 DIAGNOSIS — R07.9 CHEST PAIN IN ADULT: ICD-10-CM

## 2023-02-02 DIAGNOSIS — R00.0 TACHYCARDIA: ICD-10-CM

## 2023-02-02 DIAGNOSIS — R00.2 PALPITATIONS: ICD-10-CM

## 2023-02-02 LAB
BH CV REST NUCLEAR ISOTOPE DOSE: 11.5 MCI
BH CV STRESS COMMENTS STAGE 1: NORMAL
BH CV STRESS DOSE REGADENOSON STAGE 1: 0.4
BH CV STRESS DURATION MIN STAGE 1: 0
BH CV STRESS DURATION SEC STAGE 1: 10
BH CV STRESS NUCLEAR ISOTOPE DOSE: 28.5 MCI
BH CV STRESS PROTOCOL 1: NORMAL
BH CV STRESS RECOVERY BP: NORMAL MMHG
BH CV STRESS RECOVERY HR: 83 BPM
BH CV STRESS STAGE 1: 1
LV EF NUC BP: 65 %
MAXIMAL PREDICTED HEART RATE: 161 BPM
PERCENT MAX PREDICTED HR: 56.52 %
STRESS BASELINE BP: NORMAL MMHG
STRESS BASELINE HR: 71 BPM
STRESS PERCENT HR: 66 %
STRESS POST PEAK BP: NORMAL MMHG
STRESS POST PEAK HR: 91 BPM
STRESS TARGET HR: 137 BPM

## 2023-02-02 PROCEDURE — A9500 TC99M SESTAMIBI: HCPCS | Performed by: NURSE PRACTITIONER

## 2023-02-02 PROCEDURE — 93017 CV STRESS TEST TRACING ONLY: CPT

## 2023-02-02 PROCEDURE — 93018 CV STRESS TEST I&R ONLY: CPT | Performed by: INTERNAL MEDICINE

## 2023-02-02 PROCEDURE — 93270 REMOTE 30 DAY ECG REV/REPORT: CPT

## 2023-02-02 PROCEDURE — 0 TECHNETIUM SESTAMIBI: Performed by: NURSE PRACTITIONER

## 2023-02-02 PROCEDURE — 78452 HT MUSCLE IMAGE SPECT MULT: CPT

## 2023-02-02 PROCEDURE — 78452 HT MUSCLE IMAGE SPECT MULT: CPT | Performed by: INTERNAL MEDICINE

## 2023-02-02 PROCEDURE — 93016 CV STRESS TEST SUPVJ ONLY: CPT | Performed by: INTERNAL MEDICINE

## 2023-02-02 PROCEDURE — 25010000002 REGADENOSON 0.4 MG/5ML SOLUTION: Performed by: NURSE PRACTITIONER

## 2023-02-02 RX ADMIN — TECHNETIUM TC 99M SESTAMIBI 1 DOSE: 1 INJECTION INTRAVENOUS at 10:06

## 2023-02-02 RX ADMIN — TECHNETIUM TC 99M SESTAMIBI 1 DOSE: 1 INJECTION INTRAVENOUS at 11:13

## 2023-02-02 RX ADMIN — REGADENOSON 0.4 MG: 0.08 INJECTION, SOLUTION INTRAVENOUS at 11:13

## 2023-02-17 ENCOUNTER — HOSPITAL ENCOUNTER (OUTPATIENT)
Facility: HOSPITAL | Age: 58
Setting detail: OBSERVATION
Discharge: HOME | End: 2023-02-20
Attending: EMERGENCY MEDICINE | Admitting: EMERGENCY MEDICINE
Payer: MEDICAID

## 2023-02-17 ENCOUNTER — APPOINTMENT (OUTPATIENT)
Dept: CT IMAGING | Facility: HOSPITAL | Age: 58
End: 2023-02-17
Payer: MEDICAID

## 2023-02-17 ENCOUNTER — TELEPHONE (OUTPATIENT)
Dept: FAMILY MEDICINE CLINIC | Facility: CLINIC | Age: 58
End: 2023-02-17

## 2023-02-17 ENCOUNTER — APPOINTMENT (OUTPATIENT)
Dept: MRI IMAGING | Facility: HOSPITAL | Age: 58
End: 2023-02-17
Payer: MEDICAID

## 2023-02-17 DIAGNOSIS — E66.01 MORBID OBESITY WITH BMI OF 40.0-44.9, ADULT: ICD-10-CM

## 2023-02-17 DIAGNOSIS — R51.9 INTRACTABLE HEADACHE, UNSPECIFIED CHRONICITY PATTERN, UNSPECIFIED HEADACHE TYPE: Primary | ICD-10-CM

## 2023-02-17 DIAGNOSIS — R53.1 WEAKNESS: ICD-10-CM

## 2023-02-17 DIAGNOSIS — F41.9 ANXIETY: ICD-10-CM

## 2023-02-17 LAB
ALBUMIN SERPL-MCNC: 3.9 G/DL (ref 3.5–5.2)
ALBUMIN/GLOB SERPL: 1 G/DL
ALP SERPL-CCNC: 171 U/L (ref 39–117)
ALT SERPL W P-5'-P-CCNC: 22 U/L (ref 1–33)
AMPHET+METHAMPHET UR QL: NEGATIVE
ANION GAP SERPL CALCULATED.3IONS-SCNC: 14 MMOL/L (ref 5–15)
AST SERPL-CCNC: 31 U/L (ref 1–32)
BARBITURATES UR QL SCN: NEGATIVE
BASOPHILS # BLD AUTO: 0.1 10*3/MM3 (ref 0–0.2)
BASOPHILS NFR BLD AUTO: 0.6 % (ref 0–1.5)
BENZODIAZ UR QL SCN: NEGATIVE
BILIRUB SERPL-MCNC: 0.6 MG/DL (ref 0–1.2)
BUN SERPL-MCNC: 14 MG/DL (ref 6–20)
BUN/CREAT SERPL: 17.5 (ref 7–25)
CALCIUM SPEC-SCNC: 10 MG/DL (ref 8.6–10.5)
CANNABINOIDS SERPL QL: NEGATIVE
CHLORIDE SERPL-SCNC: 102 MMOL/L (ref 98–107)
CO2 SERPL-SCNC: 22 MMOL/L (ref 22–29)
COCAINE UR QL: NEGATIVE
COHGB MFR BLD: 1.7 % (ref 0–3)
CREAT SERPL-MCNC: 0.8 MG/DL (ref 0.57–1)
DEPRECATED RDW RBC AUTO: 45.5 FL (ref 37–54)
EGFRCR SERPLBLD CKD-EPI 2021: 85 ML/MIN/1.73
EOSINOPHIL # BLD AUTO: 0.3 10*3/MM3 (ref 0–0.4)
EOSINOPHIL NFR BLD AUTO: 3.7 % (ref 0.3–6.2)
ERYTHROCYTE [DISTWIDTH] IN BLOOD BY AUTOMATED COUNT: 14.6 % (ref 12.3–15.4)
ERYTHROCYTE [SEDIMENTATION RATE] IN BLOOD: 31 MM/HR (ref 0–30)
GLOBULIN UR ELPH-MCNC: 3.9 GM/DL
GLUCOSE BLDC GLUCOMTR-MCNC: 135 MG/DL (ref 70–105)
GLUCOSE SERPL-MCNC: 149 MG/DL (ref 65–99)
HCT VFR BLD AUTO: 40.6 % (ref 34–46.6)
HGB BLD-MCNC: 13.1 G/DL (ref 12–15.9)
HOLD SPECIMEN: NORMAL
HOLD SPECIMEN: NORMAL
LYMPHOCYTES # BLD AUTO: 1.3 10*3/MM3 (ref 0.7–3.1)
LYMPHOCYTES NFR BLD AUTO: 14.6 % (ref 19.6–45.3)
MAGNESIUM SERPL-MCNC: 2.1 MG/DL (ref 1.6–2.6)
MCH RBC QN AUTO: 28.8 PG (ref 26.6–33)
MCHC RBC AUTO-ENTMCNC: 32.2 G/DL (ref 31.5–35.7)
MCV RBC AUTO: 89.4 FL (ref 79–97)
METHADONE UR QL SCN: NEGATIVE
MONOCYTES # BLD AUTO: 0.5 10*3/MM3 (ref 0.1–0.9)
MONOCYTES NFR BLD AUTO: 5.6 % (ref 5–12)
NEUTROPHILS NFR BLD AUTO: 6.9 10*3/MM3 (ref 1.7–7)
NEUTROPHILS NFR BLD AUTO: 75.5 % (ref 42.7–76)
NRBC BLD AUTO-RTO: 0 /100 WBC (ref 0–0.2)
OPIATES UR QL: NEGATIVE
OXYCODONE UR QL SCN: NEGATIVE
PLATELET # BLD AUTO: 252 10*3/MM3 (ref 140–450)
PMV BLD AUTO: 6.8 FL (ref 6–12)
POTASSIUM SERPL-SCNC: 4.1 MMOL/L (ref 3.5–5.2)
PROT SERPL-MCNC: 7.8 G/DL (ref 6–8.5)
RBC # BLD AUTO: 4.54 10*6/MM3 (ref 3.77–5.28)
SODIUM SERPL-SCNC: 138 MMOL/L (ref 136–145)
TSH SERPL DL<=0.05 MIU/L-ACNC: 2.07 UIU/ML (ref 0.27–4.2)
WBC NRBC COR # BLD: 9.1 10*3/MM3 (ref 3.4–10.8)
WHOLE BLOOD HOLD COAG: NORMAL
WHOLE BLOOD HOLD SPECIMEN: NORMAL

## 2023-02-17 PROCEDURE — 82375 ASSAY CARBOXYHB QUANT: CPT | Performed by: EMERGENCY MEDICINE

## 2023-02-17 PROCEDURE — 83735 ASSAY OF MAGNESIUM: CPT | Performed by: EMERGENCY MEDICINE

## 2023-02-17 PROCEDURE — G0378 HOSPITAL OBSERVATION PER HR: HCPCS

## 2023-02-17 PROCEDURE — 85652 RBC SED RATE AUTOMATED: CPT | Performed by: EMERGENCY MEDICINE

## 2023-02-17 PROCEDURE — 96375 TX/PRO/DX INJ NEW DRUG ADDON: CPT

## 2023-02-17 PROCEDURE — 36415 COLL VENOUS BLD VENIPUNCTURE: CPT

## 2023-02-17 PROCEDURE — 70450 CT HEAD/BRAIN W/O DYE: CPT

## 2023-02-17 PROCEDURE — 80050 GENERAL HEALTH PANEL: CPT | Performed by: EMERGENCY MEDICINE

## 2023-02-17 PROCEDURE — 99284 EMERGENCY DEPT VISIT MOD MDM: CPT

## 2023-02-17 PROCEDURE — 70551 MRI BRAIN STEM W/O DYE: CPT

## 2023-02-17 PROCEDURE — 25010000002 METOCLOPRAMIDE PER 10 MG: Performed by: EMERGENCY MEDICINE

## 2023-02-17 PROCEDURE — 93005 ELECTROCARDIOGRAM TRACING: CPT | Performed by: EMERGENCY MEDICINE

## 2023-02-17 PROCEDURE — 80307 DRUG TEST PRSMV CHEM ANLYZR: CPT | Performed by: NURSE PRACTITIONER

## 2023-02-17 PROCEDURE — 82962 GLUCOSE BLOOD TEST: CPT

## 2023-02-17 PROCEDURE — 0 IOPAMIDOL PER 1 ML: Performed by: EMERGENCY MEDICINE

## 2023-02-17 PROCEDURE — 70496 CT ANGIOGRAPHY HEAD: CPT

## 2023-02-17 PROCEDURE — 99285 EMERGENCY DEPT VISIT HI MDM: CPT

## 2023-02-17 PROCEDURE — 70498 CT ANGIOGRAPHY NECK: CPT

## 2023-02-17 RX ORDER — ACETAMINOPHEN 325 MG/1
650 TABLET ORAL EVERY 4 HOURS PRN
Status: DISCONTINUED | OUTPATIENT
Start: 2023-02-17 | End: 2023-02-20 | Stop reason: HOSPADM

## 2023-02-17 RX ORDER — POLYETHYLENE GLYCOL 3350 17 G/17G
17 POWDER, FOR SOLUTION ORAL DAILY PRN
Status: DISCONTINUED | OUTPATIENT
Start: 2023-02-17 | End: 2023-02-20 | Stop reason: HOSPADM

## 2023-02-17 RX ORDER — ASPIRIN 81 MG/1
162 TABLET, CHEWABLE ORAL DAILY
Status: DISCONTINUED | OUTPATIENT
Start: 2023-02-18 | End: 2023-02-20 | Stop reason: HOSPADM

## 2023-02-17 RX ORDER — ATORVASTATIN CALCIUM 40 MG/1
80 TABLET, FILM COATED ORAL NIGHTLY
Status: DISCONTINUED | OUTPATIENT
Start: 2023-02-17 | End: 2023-02-20 | Stop reason: HOSPADM

## 2023-02-17 RX ORDER — SODIUM CHLORIDE 0.9 % (FLUSH) 0.9 %
10 SYRINGE (ML) INJECTION EVERY 12 HOURS SCHEDULED
Status: DISCONTINUED | OUTPATIENT
Start: 2023-02-17 | End: 2023-02-20 | Stop reason: HOSPADM

## 2023-02-17 RX ORDER — SODIUM CHLORIDE 9 MG/ML
40 INJECTION, SOLUTION INTRAVENOUS AS NEEDED
Status: DISCONTINUED | OUTPATIENT
Start: 2023-02-17 | End: 2023-02-20 | Stop reason: HOSPADM

## 2023-02-17 RX ORDER — SODIUM CHLORIDE 0.9 % (FLUSH) 0.9 %
10 SYRINGE (ML) INJECTION AS NEEDED
Status: DISCONTINUED | OUTPATIENT
Start: 2023-02-17 | End: 2023-02-20 | Stop reason: HOSPADM

## 2023-02-17 RX ORDER — BUSPIRONE HYDROCHLORIDE 5 MG/1
5 TABLET ORAL 3 TIMES DAILY
Status: DISCONTINUED | OUTPATIENT
Start: 2023-02-17 | End: 2023-02-20 | Stop reason: HOSPADM

## 2023-02-17 RX ORDER — FAMOTIDINE 20 MG/1
20 TABLET, FILM COATED ORAL
Status: DISCONTINUED | OUTPATIENT
Start: 2023-02-17 | End: 2023-02-20 | Stop reason: HOSPADM

## 2023-02-17 RX ORDER — ONDANSETRON 4 MG/1
4 TABLET, FILM COATED ORAL EVERY 6 HOURS PRN
Status: DISCONTINUED | OUTPATIENT
Start: 2023-02-17 | End: 2023-02-20 | Stop reason: HOSPADM

## 2023-02-17 RX ORDER — ONDANSETRON 2 MG/ML
4 INJECTION INTRAMUSCULAR; INTRAVENOUS EVERY 6 HOURS PRN
Status: DISCONTINUED | OUTPATIENT
Start: 2023-02-17 | End: 2023-02-20 | Stop reason: HOSPADM

## 2023-02-17 RX ORDER — BISACODYL 10 MG
10 SUPPOSITORY, RECTAL RECTAL DAILY PRN
Status: DISCONTINUED | OUTPATIENT
Start: 2023-02-17 | End: 2023-02-20 | Stop reason: HOSPADM

## 2023-02-17 RX ORDER — BISACODYL 5 MG/1
5 TABLET, DELAYED RELEASE ORAL DAILY PRN
Status: DISCONTINUED | OUTPATIENT
Start: 2023-02-17 | End: 2023-02-20 | Stop reason: HOSPADM

## 2023-02-17 RX ORDER — ARIPIPRAZOLE 5 MG/1
5 TABLET ORAL DAILY
Status: DISCONTINUED | OUTPATIENT
Start: 2023-02-18 | End: 2023-02-20 | Stop reason: HOSPADM

## 2023-02-17 RX ORDER — GABAPENTIN 100 MG/1
100 CAPSULE ORAL 2 TIMES DAILY
Status: DISCONTINUED | OUTPATIENT
Start: 2023-02-17 | End: 2023-02-20 | Stop reason: HOSPADM

## 2023-02-17 RX ORDER — METOCLOPRAMIDE HYDROCHLORIDE 5 MG/ML
10 INJECTION INTRAMUSCULAR; INTRAVENOUS ONCE
Status: COMPLETED | OUTPATIENT
Start: 2023-02-17 | End: 2023-02-17

## 2023-02-17 RX ORDER — ALBUTEROL SULFATE 90 UG/1
2 AEROSOL, METERED RESPIRATORY (INHALATION) EVERY 6 HOURS PRN
Status: DISCONTINUED | OUTPATIENT
Start: 2023-02-17 | End: 2023-02-20 | Stop reason: HOSPADM

## 2023-02-17 RX ORDER — ZOLPIDEM TARTRATE 5 MG/1
5 TABLET ORAL NIGHTLY PRN
Status: DISCONTINUED | OUTPATIENT
Start: 2023-02-17 | End: 2023-02-20 | Stop reason: HOSPADM

## 2023-02-17 RX ADMIN — IOPAMIDOL 100 ML: 755 INJECTION, SOLUTION INTRAVENOUS at 14:09

## 2023-02-17 RX ADMIN — ACETAMINOPHEN 650 MG: 325 TABLET, FILM COATED ORAL at 21:19

## 2023-02-17 RX ADMIN — ATORVASTATIN CALCIUM 80 MG: 40 TABLET, FILM COATED ORAL at 21:19

## 2023-02-17 RX ADMIN — FAMOTIDINE 20 MG: 20 TABLET, FILM COATED ORAL at 21:19

## 2023-02-17 RX ADMIN — GABAPENTIN 100 MG: 100 CAPSULE ORAL at 21:19

## 2023-02-17 RX ADMIN — Medication 10 ML: at 21:20

## 2023-02-17 RX ADMIN — BUSPIRONE HYDROCHLORIDE 5 MG: 5 TABLET ORAL at 21:19

## 2023-02-17 RX ADMIN — METOCLOPRAMIDE 10 MG: 5 INJECTION, SOLUTION INTRAMUSCULAR; INTRAVENOUS at 14:10

## 2023-02-17 RX ADMIN — ZOLPIDEM TARTRATE 5 MG: 5 TABLET ORAL at 21:19

## 2023-02-17 NOTE — TELEPHONE ENCOUNTER
Hub is instructed to read the documentation below to patient     Please advise patient to go to ER

## 2023-02-17 NOTE — ED NOTES
Nursing report ED to floor  Eladia Connor  57 y.o.  female    HPI:   Chief Complaint   Patient presents with   • Numbness       Admitting doctor:   Jeff Alicea MD    Admitting diagnosis:   The encounter diagnosis was Intractable headache, unspecified chronicity pattern, unspecified headache type.    Code status:   Current Code Status     Date Active Code Status Order ID Comments User Context       2/17/2023 1551 CPR (Attempt to Resuscitate) [id number]  Sarah Thomas APRN ED      Question Answer    Code Status (Patient has no pulse and is not breathing) CPR (Attempt to Resuscitate)    Medical Interventions (Patient has pulse or is breathing) Full Support    Level Of Support Discussed With Patient                Allergies:   Patient has no known allergies.    Isolation:  No active isolations     Fall Risk:  Fall Risk Assessment was completed, and patient is at low risk for falls.   Predictive Model Details         4 (Low) Factor Value    Calculated 2/17/2023 14:08 Age 57    Risk of Fall Model Musculoskeletal Assessment WDL     Active Peripheral IV Present     Imaging order in this encounter Present     Respiratory Rate 20     Skin Assessment WDL     Magnesium 2.1 mg/dL     Drug Use No     Carlos Scale not on file     Peripheral Vascular Assessment WDL     Diastolic BP 80     Financial Class Medicaid     Albumin 3.9 g/dL     Gastrointestinal Assessment WDL     Total Bilirubin 0.6 mg/dL     Chloride 102 mmol/L     Cardiac Assessment WDL     ALT 22 U/L     Days after Admission 0.065     Potassium 4.1 mmol/L     Creatinine 0.8 mg/dL     Calcium 10 mg/dL         Weight:       02/17/23  1228   Weight: 129 kg (283 lb 8.2 oz)       Intake and Output  No intake or output data in the 24 hours ending 02/17/23 1628    Diet:        Most recent vitals:   Vitals:    02/17/23 1228 02/17/23 1515 02/17/23 1545   BP: 147/80 125/65 119/64   Pulse: 91 68 73   Resp: 20     Temp: 97.4 °F (36.3 °C)     TempSrc: Oral     SpO2: 100%  "100% 100%   Weight: 129 kg (283 lb 8.2 oz)     Height: 170.2 cm (67\")         Active LDAs/IV Access:   Lines, Drains & Airways     Active LDAs     Name Placement date Placement time Site Days    Peripheral IV 02/17/23 1242 Right Antecubital 02/17/23  1242  Antecubital  less than 1                Skin Condition:   Skin Assessments (last day)     None           Labs (abnormal labs have a star):   Labs Reviewed   COMPREHENSIVE METABOLIC PANEL - Abnormal; Notable for the following components:       Result Value    Glucose 149 (*)     Alkaline Phosphatase 171 (*)     All other components within normal limits    Narrative:     GFR Normal >60  Chronic Kidney Disease <60  Kidney Failure <15     SEDIMENTATION RATE - Abnormal; Notable for the following components:    Sed Rate 31 (*)     All other components within normal limits   CBC WITH AUTO DIFFERENTIAL - Abnormal; Notable for the following components:    Lymphocyte % 14.6 (*)     All other components within normal limits   POCT GLUCOSE FINGERSTICK - Abnormal; Notable for the following components:    Glucose 135 (*)     All other components within normal limits   MAGNESIUM - Normal   TSH - Normal   CARBON MONOXIDE, BLOOD - Normal   RAINBOW DRAW    Narrative:     The following orders were created for panel order [place] Draw.  Procedure                               Abnormality         Status                     ---------                               -----------         ------                     Green Top (Gel)[[id number]]                                  Final result               Lavender Top[[id number]]                                     Final result               Gold Top - SST[[id number]]                                   Final result               Light Blue Top[[id number]]                                   Final result                 Please view results for these tests on the individual orders.   GREEN TOP   LAVENDER TOP   GOLD TOP - SST   LIGHT BLUE TOP   CBC AND " DIFFERENTIAL    Narrative:     The following orders were created for panel order CBC & Differential.  Procedure                               Abnormality         Status                     ---------                               -----------         ------                     CBC Auto Differential[[id number]]        Abnormal            Final result                 Please view results for these tests on the individual orders.       LOC: Person, Place, Time and Situation    Telemetry:  Observation Unit    Cardiac Monitoring Ordered: no    EKG:   ECG 12 Lead Other; Headache numbness dizziness   Preliminary Result   HEART RATE= 65  bpm   RR Interval= 920  ms   ND Interval= 182  ms   P Horizontal Axis= 39  deg   P Front Axis= -8  deg   QRSD Interval= 89  ms   QT Interval= 418  ms   QRS Axis= 17  deg   T Wave Axis= 52  deg   - NORMAL ECG -   Sinus rhythm   Electronically Signed By:    Date and Time of Study: 2023-02-17 15:19:22          Medications Given in the ED:   Medications   sodium chloride 0.9 % flush 10 mL (has no administration in time range)   metoclopramide (REGLAN) injection 10 mg (10 mg Intravenous Given 2/17/23 1410)   iopamidol (ISOVUE-370) 76 % injection 100 mL (100 mL Intravenous Given 2/17/23 1409)       Imaging results:  CT Head Without Contrast Stroke Protocol    Result Date: 2/17/2023  Impression: 1. No acute intracranial finding. 2. Mild chronic microvascular disease. 3. Left frontal parietal scalp soft tissue nodule, unchanged from prior. Correlate with direct physical examination findings. Electronically Signed: Ting Mendoza  2/17/2023 2:09 PM EST  Workstation ID: [id number]      Social issues:   Social History     Socioeconomic History   • Marital status:    Tobacco Use   • Smoking status: Never   • Smokeless tobacco: Never   Vaping Use   • Vaping Use: Never used   Substance and Sexual Activity   • Alcohol use: Never   • Drug use: Never   • Sexual activity: Defer       NIH Stroke  Scale:  Interval: (not recorded)  1a. Level of Consciousness: (not recorded)  1b. LOC Questions: (not recorded)  1c. LOC Commands: (not recorded)  2. Best Gaze: (not recorded)  3. Visual: (not recorded)  4. Facial Palsy: (not recorded)  5a. Motor Arm, Left: (not recorded)  5b. Motor Arm, Right: (not recorded)  6a. Motor Leg, Left: (not recorded)  6b. Motor Leg, Right: (not recorded)  7. Limb Ataxia: (not recorded)  8. Sensory: (not recorded)  9. Best Language: (not recorded)  10. Dysarthria: (not recorded)  11. Extinction and Inattention (formerly Neglect): (not recorded)    Total (NIH Stroke Scale): (not recorded)     Additional notable assessment information:     Nursing report ED to floor:  ALONDRA Allison RN   02/17/23 16:28 EST

## 2023-02-17 NOTE — ED PROVIDER NOTES
Subjective   History of Present Illness  Chief complaint headache neck pain numbness and weakness.    History of present illness this is a 57-year-old female who complains of gradually worsening diffuse headache for the last 2 days associated with some pain in the neck and some numbness and nausea.  Numbness to the face.  She denies any speech difficulty or paralysis she has been on the drink talk and walk and function normally otherwise.  Patient states she had no fever chills sweats cough congestion vomiting or diarrhea no visual disturbance.  No injury no one in the home with similar illness or foreign travels or antibiotic use.  Patient has been in and out of the hospital multiple times recently with unremarkable work-up.  Nothing really seems to make this better or worse is moderate to severe and continuous for the last couple days.  The patient had called her primary care doctor and they told her to come to the ER.        Review of Systems   Constitutional: Negative for chills and fever.   Eyes: Negative for photophobia and visual disturbance.   Respiratory: Negative for chest tightness and shortness of breath.    Cardiovascular: Negative for chest pain and palpitations.   Gastrointestinal: Positive for nausea. Negative for abdominal pain and vomiting.   Endocrine: Negative for cold intolerance and heat intolerance.   Genitourinary: Negative for difficulty urinating and dysuria.   Musculoskeletal: Negative for back pain and neck pain.   Skin: Negative for rash and wound.   Neurological: Positive for weakness and numbness. Negative for dizziness, facial asymmetry and speech difficulty.   Psychiatric/Behavioral: Negative for agitation and confusion.       Past Medical History:   Diagnosis Date   • Anxiety    • Asthma    • Cancer (HCC)     SKIN   • Depression    • Psoriasis    • Seizures (HCC) 12/06/2022       No Known Allergies    Past Surgical History:   Procedure Laterality Date   • CHOLECYSTECTOMY  1987   •  ENDOSCOPY N/A 12/30/2022    Procedure: ESOPHAGOGASTRODUODENOSCOPY with gastric biopsy and esophageal dilation #50,#54,#56,#58 bougie;  Surgeon: Patience Arana MD;  Location: [facility] ENDOSCOPY;  Service: Gastroenterology;  Laterality: N/A;  gastritis   • EYE SURGERY     • KNEE SURGERY         Family History   Problem Relation Age of Onset   • Heart disease Mother    • Breast cancer Mother    • Heart disease Father    • Pancreatic cancer Father    • Heart disease Sister    • Lung cancer Brother        Social History     Socioeconomic History   • Marital status:    Tobacco Use   • Smoking status: Never   • Smokeless tobacco: Never   Vaping Use   • Vaping Use: Never used   Substance and Sexual Activity   • Alcohol use: Never   • Drug use: Never   • Sexual activity: Defer     Prior to Admission medications    Medication Sig Start Date End Date Taking? Authorizing Provider   albuterol sulfate  (90 Base) MCG/ACT inhaler Inhale 2 puffs Every 6 (Six) Hours As Needed for Wheezing. 1/19/23  Yes Quincy Lemos APRN   ARIPiprazole (ABILIFY) 5 MG tablet Take 1 tablet by mouth Daily. 1/19/23  Yes Quincy Lemos APRN   aspirin 81 MG chewable tablet Chew 2 tablets Daily. 1/19/23  Yes Quincy Lemos APRN   atorvastatin (LIPITOR) 80 MG tablet Take 1 tablet by mouth Every Night. 1/19/23  Yes Quincy Lemos APRN   busPIRone (BUSPAR) 5 MG tablet Take 1 tablet by mouth 3 (Three) Times a Day. 1/19/23  Yes Quincy Lemos APRN   famotidine (PEPCID) 20 MG tablet Take 1 tablet by mouth 2 (Two) Times a Day Before Meals. 1/19/23  Yes Quincy Lemos APRN   gabapentin (NEURONTIN) 100 MG capsule Take 1 capsule by mouth 2 (Two) Times a Day. 1/25/23  Yes Quincy Lemos APRN   zolpidem (AMBIEN) 10 MG tablet Take 10 mg by mouth At Night As Needed. 11/30/22  Yes Provider, MD Kamala   busPIRone (BUSPAR) 7.5 MG tablet Take 7.5 mg by mouth 3 (Three) Times a Day. 12/22/22 2/17/23   Kamala Banks MD   busPIRone (BUSPAR) 7.5 MG tablet Take 1 tablet by mouth 3 (Three) Times a Day. 12/22/22 2/17/23  Kamala Banks MD   EQ Aspirin Adult Low Dose 81 MG EC tablet Take 1 tablet by mouth Daily. 12/22/22 2/17/23  Kamala Banks MD   famotidine (PEPCID) 20 MG tablet Take 20 mg by mouth. 12/12/22 2/17/23  Kamala Banks MD   gabapentin (NEURONTIN) 100 MG capsule Take 100 mg by mouth. 12/22/22 2/17/23  Kamala Banks MD   ivermectin (STROMECTOL) 3 MG tablet tablet TAKE 9 TABLETS BY MOUTH ONCE 11/4/22 2/17/23  Kamala Banks MD   levETIRAcetam (KEPPRA) 500 MG tablet Take 1 tablet by mouth 2 (Two) Times a Day. 12/22/22 2/17/23  Kamala Banks MD   nystatin (MYCOSTATIN) 137900 UNIT/GM cream APPLY TOPICALLY TO THE APPROPRIATE AREA TWICE A DAY AS DIRECTED 11/4/22 2/17/23  Kamala Banks MD   permethrin (ELIMITE) 5 % cream APPLY 1 APPLICATION TOPICALLY TO THE APPROPRIATE AREA AS DIRECTED 1 (ONE) TIME FOR 1 DOSE. 11/6/22 2/17/23  Kamala Banks MD   sulfamethoxazole-trimethoprim (BACTRIM DS,SEPTRA DS) 800-160 MG per tablet 1 tablet. 1/9/23 2/17/23  Kamala Banks MD   triamcinolone (KENALOG) 0.1 % cream APPLY TOPICALLY AS NEEDED TO AFFECTED AREAS TWICE DAILY. 11/17/22 2/17/23  Kamala Banks MD           Objective   Physical Exam  Constitutional this is a 57-year-old female awake alert and she is in no distress temperature is 97.4 blood pressure 147/88 heart rate is 91 respirations 20 sats 100% on room air.  HEENT extraocular muscles are intact pupils equal and reactive no photophobia sclera clear is no papilledema mouth clear.  Neck supple no adenopathy no JV no bruits there is no meningeal signs.  No spinal tenderness throughout the cervical thoracic lumbar spine.  Lungs clear no retractions heart regular without murmur.  Abdomen soft without tenderness good bowel sounds no peritoneal findings or pulsatile masses.  Extremities pulses  are equal throughout upper and lower extremities no edema no cords no Homans' sign no evidence of DVT.  Skin is warm and dry without rashes or cellulitic changes.  Neurologic she is awake alert orientated x4 I do not appreciate any facial asymmetry on my examination.  The speech is normal the tongue and soft palate are normal tongue is midline.  Neck no JVD there is no bruits.  Shoulder shrug normal.  No drift in the arms or legs with normal finger-to-nose.  Toes up-and-down including big toe dorsiflex plantarflex at difficulty and there is no clonus.  I have currently NIH of 0  Procedures           ED Course      Results for orders placed or performed during the hospital encounter of 02/17/23   Comprehensive Metabolic Panel    Specimen: Blood   Result Value Ref Range    Glucose 149 (H) 65 - 99 mg/dL    BUN 14 6 - 20 mg/dL    Creatinine 0.80 0.57 - 1.00 mg/dL    Sodium 138 136 - 145 mmol/L    Potassium 4.1 3.5 - 5.2 mmol/L    Chloride 102 98 - 107 mmol/L    CO2 22.0 22.0 - 29.0 mmol/L    Calcium 10.0 8.6 - 10.5 mg/dL    Total Protein 7.8 6.0 - 8.5 g/dL    Albumin 3.9 3.5 - 5.2 g/dL    ALT (SGPT) 22 1 - 33 U/L    AST (SGOT) 31 1 - 32 U/L    Alkaline Phosphatase 171 (H) 39 - 117 U/L    Total Bilirubin 0.6 0.0 - 1.2 mg/dL    Globulin 3.9 gm/dL    A/G Ratio 1.0 g/dL    BUN/Creatinine Ratio 17.5 7.0 - 25.0    Anion Gap 14.0 5.0 - 15.0 mmol/L    eGFR 85.0 >60.0 mL/min/1.73   Sedimentation Rate    Specimen: Blood   Result Value Ref Range    Sed Rate 31 (H) 0 - 30 mm/hr   Magnesium    Specimen: Blood   Result Value Ref Range    Magnesium 2.1 1.6 - 2.6 mg/dL   TSH    Specimen: Blood   Result Value Ref Range    TSH 2.070 0.270 - 4.200 uIU/mL   CBC Auto Differential    Specimen: Blood   Result Value Ref Range    WBC 9.10 3.40 - 10.80 10*3/mm3    RBC 4.54 3.77 - 5.28 10*6/mm3    Hemoglobin 13.1 12.0 - 15.9 g/dL    Hematocrit 40.6 34.0 - 46.6 %    MCV 89.4 79.0 - 97.0 fL    MCH 28.8 26.6 - 33.0 pg    MCHC 32.2 31.5 - 35.7 g/dL     RDW 14.6 12.3 - 15.4 %    RDW-SD 45.5 37.0 - 54.0 fl    MPV 6.8 6.0 - 12.0 fL    Platelets 252 140 - 450 10*3/mm3    Neutrophil % 75.5 42.7 - 76.0 %    Lymphocyte % 14.6 (L) 19.6 - 45.3 %    Monocyte % 5.6 5.0 - 12.0 %    Eosinophil % 3.7 0.3 - 6.2 %    Basophil % 0.6 0.0 - 1.5 %    Neutrophils, Absolute 6.90 1.70 - 7.00 10*3/mm3    Lymphocytes, Absolute 1.30 0.70 - 3.10 10*3/mm3    Monocytes, Absolute 0.50 0.10 - 0.90 10*3/mm3    Eosinophils, Absolute 0.30 0.00 - 0.40 10*3/mm3    Basophils, Absolute 0.10 0.00 - 0.20 10*3/mm3    nRBC 0.0 0.0 - 0.2 /100 WBC   Carbon Monoxide, Blood    Specimen: Blood   Result Value Ref Range    Carbon Monoxide, Blood 1.7 0 - 3 %   POC Glucose Once    Specimen: Blood   Result Value Ref Range    Glucose 135 (H) 70 - 105 mg/dL   ECG 12 Lead Other; Headache numbness dizziness   Result Value Ref Range    QT Interval 418 ms   Green Top (Gel)   Result Value Ref Range    Extra Tube Hold for add-ons.    Lavender Top   Result Value Ref Range    Extra Tube hold for add-on    Gold Top - SST   Result Value Ref Range    Extra Tube Hold for add-ons.    Light Blue Top   Result Value Ref Range    Extra Tube Hold for add-ons.      CT Head Without Contrast Stroke Protocol    Result Date: 2/17/2023  Impression: 1. No acute intracranial finding. 2. Mild chronic microvascular disease. 3. Left frontal parietal scalp soft tissue nodule, unchanged from prior. Correlate with direct physical examination findings. Electronically Signed: Ting Mendoza  2/17/2023 2:09 PM EST  Workstation ID: [id number]    Medications   sodium chloride 0.9 % flush 10 mL (has no administration in time range)   metoclopramide (REGLAN) injection 10 mg (10 mg Intravenous Given 2/17/23 1410)   iopamidol (ISOVUE-370) 76 % injection 100 mL (100 mL Intravenous Given 2/17/23 1409)          EKG my interpretation normal sinus rhythm rate 65 normal axis no hypertrophy QTc of 436 otherwise unremarkable EKG no significant change from previous  EKG when compared to 1/22/2023                                  Medical Decision Making  Medical decision making.  IV established placed on a cardiac monitor with a sinus rhythm on my review.  The patient EKG obtained viewed by me showed a normal sinus rhythm without acute ST elevation this is unchanged from an EKG compared to 1/22/2023.  Patient was given Reglan 10 mg IV.  Underwent laboratory evaluation comprehensive metabolic panel file my review was negative other than a blood sugar 149 and sed rate was 31 magnesium 2.1 TSH normal CBC was normal carbon oxide level 1.7.  These are all independently reviewed by me.  CT head without on my review shows no acute stroke no acute hemorrhage tumors or masses otherwise.  Radiology showed no other acute findings.  Chronic microvascular changes.  The patient repeat exam still having somewhat of a headache but it was improved with the Reglan.  I have reviewed her records here she has been in the hospital in the ER several times recently.  She had an admission for chest pain here back in the beginning of February she had a negative work-up including a negative stress test on 2 February as well.  Patient also had been seen at [facility] and we had a code stroke at the beginning of January she underwent CT head and CTA angiograms and MRI which all reports show unremarkable.  She also had an EEG which was reported to be unremarkable.  Patient denies any other recent change in medications on repeat examination she is awake alert orientated x3 on CT facial asymmetry speech is normal there is no drift the arms or legs tongue soft palate normal I do not see any evidence suggest an acute stroke on my exam currently.  I do not have any photophobia there is no fever white count normal I do not see any meningeal signs on exam and do not see evidence of meningitis.  We talked about meningitis we talked about spinal taps and risk and benefits and potential complications of this.   At this point I personally do not see need to pursue that any further as I have a low suspicion for meningitis and did not feel a spinal tap was warranted at this time.  Patient voiced understanding of this.  But in light of her symptoms of this diffuse count of headache for a couple days mildly elevated sed rate some numbness and some nausea at this point.  She has had a constitutional distal neurological symptoms recently and has had these evaluated in the past with unremarkable work-up.  She underwent a CT angiogram head and neck which on my review I do not see anything acute Nottleson acute dissection or any acute cutoff for any acute large vessel occlusion radiology review is currently pending.  Patient still having some discomfort at this point but her lites are all honors no photophobia tenderness no other findings at this point I see no evidence of acute stroke meningitis encephalitis based on my clinical exam and physical findings here and CT findings.  EKG obtained obtained reviewed by me showed a sinus rhythm which was normal and unchanged from the previous 1.  This is from 1/22/2023.  This not a complete list of all possibilities of headache and neck pain the list is extensive.  Patient undergo an MRI we will get neurological consultation placed in observation for further work-up.  She was made aware of findings I talked to provider in the observation unit stable otherwise unremarkable ER course.    Intractable headache, unspecified chronicity pattern, unspecified headache type: acute illness or injury  Amount and/or Complexity of Data Reviewed  External Data Reviewed: ECG and notes.  Labs: ordered. Decision-making details documented in ED Course.  Radiology: ordered and independent interpretation performed. Decision-making details documented in ED Course.  ECG/medicine tests: ordered and independent interpretation performed. Decision-making details documented in ED Course.      Risk  Decision regarding  hospitalization.          Final diagnoses:   Intractable headache, unspecified chronicity pattern, unspecified headache type       ED Disposition  ED Disposition     ED Disposition   Decision to Admit    Condition   --    Comment   Level of Care: Observation Unit [28]   Diagnosis: Intractable headache, unspecified chronicity pattern, unspecified headache type [[id number]]   Admitting Physician: ADDIE ALICEA [[id number]]   Attending Physician: ADDIE ALICEA [[id number]]               No follow-up provider specified.       Medication List      ASK your doctor about these medications    aspirin 81 MG chewable tablet  Chew 2 tablets Daily.  Ask about: Which instructions should I use?     busPIRone 5 MG tablet  Commonly known as: BUSPAR  Take 1 tablet by mouth 3 (Three) Times a Day.  Ask about: Which instructions should I use?     famotidine 20 MG tablet  Commonly known as: PEPCID  Take 1 tablet by mouth 2 (Two) Times a Day Before Meals.  Ask about: Which instructions should I use?     gabapentin 100 MG capsule  Commonly known as: NEURONTIN  Take 1 capsule by mouth 2 (Two) Times a Day.  Ask about: Which instructions should I use?             Addie Alicea MD  02/17/23 0426

## 2023-02-17 NOTE — TELEPHONE ENCOUNTER
UNABLE TO WARM TRANSFER  Caller: Eladia Connor    Relationship to patient: Self    Best call back number: [phone]    Chief complaint: PATIENT STATES THE FOLLOWING:  SEVERE HEADACHE THAT RADIATES TO HER SPINAL CORD LASTING SEVERAL DAYS  STICK NECK   FEVER  NAUSEA  NEGATIVE FOR COVID    SUGGESTED SHE GO GO THE ER.      Patient directed to call 911 or go to their nearest emergency room.     Patient verbalized understanding: [x] Yes  [] No  If no, why?

## 2023-02-18 LAB
ANION GAP SERPL CALCULATED.3IONS-SCNC: 11 MMOL/L (ref 5–15)
BACTERIA UR QL AUTO: ABNORMAL /HPF
BASOPHILS # BLD AUTO: 0 10*3/MM3 (ref 0–0.2)
BASOPHILS NFR BLD AUTO: 0.7 % (ref 0–1.5)
BILIRUB UR QL STRIP: NEGATIVE
BUN SERPL-MCNC: 14 MG/DL (ref 6–20)
BUN/CREAT SERPL: 17.1 (ref 7–25)
CALCIUM SPEC-SCNC: 9.3 MG/DL (ref 8.6–10.5)
CHLORIDE SERPL-SCNC: 101 MMOL/L (ref 98–107)
CLARITY UR: CLEAR
CO2 SERPL-SCNC: 28 MMOL/L (ref 22–29)
COLOR UR: YELLOW
CREAT SERPL-MCNC: 0.82 MG/DL (ref 0.57–1)
DEPRECATED RDW RBC AUTO: 46.8 FL (ref 37–54)
EGFRCR SERPLBLD CKD-EPI 2021: 82.5 ML/MIN/1.73
EOSINOPHIL # BLD AUTO: 0.4 10*3/MM3 (ref 0–0.4)
EOSINOPHIL NFR BLD AUTO: 6 % (ref 0.3–6.2)
ERYTHROCYTE [DISTWIDTH] IN BLOOD BY AUTOMATED COUNT: 14.5 % (ref 12.3–15.4)
GLUCOSE SERPL-MCNC: 139 MG/DL (ref 65–99)
GLUCOSE UR STRIP-MCNC: NEGATIVE MG/DL
HCT VFR BLD AUTO: 36.6 % (ref 34–46.6)
HGB BLD-MCNC: 12.4 G/DL (ref 12–15.9)
HGB UR QL STRIP.AUTO: NEGATIVE
HYALINE CASTS UR QL AUTO: ABNORMAL /LPF
KETONES UR QL STRIP: ABNORMAL
LEUKOCYTE ESTERASE UR QL STRIP.AUTO: ABNORMAL
LYMPHOCYTES # BLD AUTO: 1.1 10*3/MM3 (ref 0.7–3.1)
LYMPHOCYTES NFR BLD AUTO: 16.5 % (ref 19.6–45.3)
MCH RBC QN AUTO: 29.4 PG (ref 26.6–33)
MCHC RBC AUTO-ENTMCNC: 33.8 G/DL (ref 31.5–35.7)
MCV RBC AUTO: 86.9 FL (ref 79–97)
MONOCYTES # BLD AUTO: 0.5 10*3/MM3 (ref 0.1–0.9)
MONOCYTES NFR BLD AUTO: 6.7 % (ref 5–12)
NEUTROPHILS NFR BLD AUTO: 4.8 10*3/MM3 (ref 1.7–7)
NEUTROPHILS NFR BLD AUTO: 70.1 % (ref 42.7–76)
NITRITE UR QL STRIP: NEGATIVE
NRBC BLD AUTO-RTO: 0 /100 WBC (ref 0–0.2)
PH UR STRIP.AUTO: <=5 [PH] (ref 5–8)
PLATELET # BLD AUTO: 206 10*3/MM3 (ref 140–450)
PMV BLD AUTO: 6.8 FL (ref 6–12)
POTASSIUM SERPL-SCNC: 4.3 MMOL/L (ref 3.5–5.2)
PROT UR QL STRIP: NEGATIVE
RBC # BLD AUTO: 4.21 10*6/MM3 (ref 3.77–5.28)
RBC # UR STRIP: ABNORMAL /HPF
REF LAB TEST METHOD: ABNORMAL
SODIUM SERPL-SCNC: 140 MMOL/L (ref 136–145)
SP GR UR STRIP: 1.04 (ref 1–1.03)
SQUAMOUS #/AREA URNS HPF: ABNORMAL /HPF
UROBILINOGEN UR QL STRIP: ABNORMAL
WBC # UR STRIP: ABNORMAL /HPF
WBC NRBC COR # BLD: 6.9 10*3/MM3 (ref 3.4–10.8)

## 2023-02-18 PROCEDURE — 87086 URINE CULTURE/COLONY COUNT: CPT | Performed by: NURSE PRACTITIONER

## 2023-02-18 PROCEDURE — 25010000002 ONDANSETRON PER 1 MG: Performed by: NURSE PRACTITIONER

## 2023-02-18 PROCEDURE — 97162 PT EVAL MOD COMPLEX 30 MIN: CPT

## 2023-02-18 PROCEDURE — 85025 COMPLETE CBC W/AUTO DIFF WBC: CPT | Performed by: NURSE PRACTITIONER

## 2023-02-18 PROCEDURE — 25010000002 KETOROLAC TROMETHAMINE PER 15 MG: Performed by: NURSE PRACTITIONER

## 2023-02-18 PROCEDURE — G0378 HOSPITAL OBSERVATION PER HR: HCPCS

## 2023-02-18 PROCEDURE — 96375 TX/PRO/DX INJ NEW DRUG ADDON: CPT

## 2023-02-18 PROCEDURE — 96365 THER/PROPH/DIAG IV INF INIT: CPT

## 2023-02-18 PROCEDURE — 25010000002 CEFTRIAXONE PER 250 MG: Performed by: NURSE PRACTITIONER

## 2023-02-18 PROCEDURE — 80048 BASIC METABOLIC PNL TOTAL CA: CPT | Performed by: NURSE PRACTITIONER

## 2023-02-18 PROCEDURE — 63710000001 ONDANSETRON PER 8 MG: Performed by: NURSE PRACTITIONER

## 2023-02-18 PROCEDURE — 96366 THER/PROPH/DIAG IV INF ADDON: CPT

## 2023-02-18 PROCEDURE — 99214 OFFICE O/P EST MOD 30 MIN: CPT | Performed by: PSYCHIATRY & NEUROLOGY

## 2023-02-18 PROCEDURE — 96376 TX/PRO/DX INJ SAME DRUG ADON: CPT

## 2023-02-18 PROCEDURE — 81001 URINALYSIS AUTO W/SCOPE: CPT | Performed by: NURSE PRACTITIONER

## 2023-02-18 RX ORDER — KETOROLAC TROMETHAMINE 15 MG/ML
15 INJECTION, SOLUTION INTRAMUSCULAR; INTRAVENOUS EVERY 6 HOURS PRN
Status: DISCONTINUED | OUTPATIENT
Start: 2023-02-18 | End: 2023-02-20 | Stop reason: HOSPADM

## 2023-02-18 RX ORDER — MIDODRINE HYDROCHLORIDE 5 MG/1
5 TABLET ORAL
Status: DISCONTINUED | OUTPATIENT
Start: 2023-02-18 | End: 2023-02-20 | Stop reason: HOSPADM

## 2023-02-18 RX ORDER — SUMATRIPTAN 50 MG/1
50 TABLET, FILM COATED ORAL
Status: DISCONTINUED | OUTPATIENT
Start: 2023-02-18 | End: 2023-02-20 | Stop reason: HOSPADM

## 2023-02-18 RX ORDER — SODIUM CHLORIDE 9 MG/ML
75 INJECTION, SOLUTION INTRAVENOUS CONTINUOUS
Status: DISCONTINUED | OUTPATIENT
Start: 2023-02-18 | End: 2023-02-20 | Stop reason: HOSPADM

## 2023-02-18 RX ORDER — LEVETIRACETAM 500 MG/1
500 TABLET ORAL EVERY 12 HOURS SCHEDULED
Status: DISCONTINUED | OUTPATIENT
Start: 2023-02-18 | End: 2023-02-20 | Stop reason: HOSPADM

## 2023-02-18 RX ORDER — BUTALBITAL, ACETAMINOPHEN AND CAFFEINE 50; 325; 40 MG/1; MG/1; MG/1
1 TABLET ORAL EVERY 4 HOURS PRN
Status: DISCONTINUED | OUTPATIENT
Start: 2023-02-18 | End: 2023-02-20 | Stop reason: HOSPADM

## 2023-02-18 RX ORDER — ALPRAZOLAM 0.25 MG/1
0.25 TABLET ORAL 2 TIMES DAILY PRN
Status: DISCONTINUED | OUTPATIENT
Start: 2023-02-18 | End: 2023-02-18

## 2023-02-18 RX ORDER — LORAZEPAM 0.5 MG/1
0.5 TABLET ORAL DAILY PRN
Status: DISCONTINUED | OUTPATIENT
Start: 2023-02-18 | End: 2023-02-20 | Stop reason: HOSPADM

## 2023-02-18 RX ADMIN — BUTALBITAL, ACETAMINOPHEN, AND CAFFEINE 1 TABLET: 50; 325; 40 TABLET ORAL at 10:02

## 2023-02-18 RX ADMIN — CEFTRIAXONE 2 G: 2 INJECTION, POWDER, FOR SOLUTION INTRAMUSCULAR; INTRAVENOUS at 10:01

## 2023-02-18 RX ADMIN — ACETAMINOPHEN 650 MG: 325 TABLET, FILM COATED ORAL at 20:38

## 2023-02-18 RX ADMIN — BUSPIRONE HYDROCHLORIDE 5 MG: 5 TABLET ORAL at 10:04

## 2023-02-18 RX ADMIN — ONDANSETRON 4 MG: 2 INJECTION INTRAMUSCULAR; INTRAVENOUS at 10:01

## 2023-02-18 RX ADMIN — GABAPENTIN 100 MG: 100 CAPSULE ORAL at 20:38

## 2023-02-18 RX ADMIN — FAMOTIDINE 20 MG: 20 TABLET, FILM COATED ORAL at 17:06

## 2023-02-18 RX ADMIN — FAMOTIDINE 20 MG: 20 TABLET, FILM COATED ORAL at 07:33

## 2023-02-18 RX ADMIN — SODIUM CHLORIDE 75 ML/HR: 9 INJECTION, SOLUTION INTRAVENOUS at 10:05

## 2023-02-18 RX ADMIN — BUTALBITAL, ACETAMINOPHEN, AND CAFFEINE 1 TABLET: 50; 325; 40 TABLET ORAL at 13:55

## 2023-02-18 RX ADMIN — KETOROLAC TROMETHAMINE 15 MG: 15 INJECTION, SOLUTION INTRAMUSCULAR; INTRAVENOUS at 10:01

## 2023-02-18 RX ADMIN — ARIPIPRAZOLE 5 MG: 5 TABLET ORAL at 10:02

## 2023-02-18 RX ADMIN — ATORVASTATIN CALCIUM 80 MG: 40 TABLET, FILM COATED ORAL at 20:38

## 2023-02-18 RX ADMIN — GABAPENTIN 100 MG: 100 CAPSULE ORAL at 10:03

## 2023-02-18 RX ADMIN — ONDANSETRON HYDROCHLORIDE 4 MG: 4 TABLET, FILM COATED ORAL at 20:38

## 2023-02-18 RX ADMIN — ZOLPIDEM TARTRATE 5 MG: 5 TABLET ORAL at 20:38

## 2023-02-18 RX ADMIN — ACETAMINOPHEN 650 MG: 325 TABLET, FILM COATED ORAL at 07:52

## 2023-02-18 RX ADMIN — KETOROLAC TROMETHAMINE 15 MG: 15 INJECTION, SOLUTION INTRAMUSCULAR; INTRAVENOUS at 17:07

## 2023-02-18 RX ADMIN — SODIUM CHLORIDE 75 ML/HR: 9 INJECTION, SOLUTION INTRAVENOUS at 23:38

## 2023-02-18 RX ADMIN — ASPIRIN 81 MG CHEWABLE TABLET 162 MG: 81 TABLET CHEWABLE at 10:03

## 2023-02-18 RX ADMIN — Medication 10 ML: at 10:05

## 2023-02-18 RX ADMIN — BUSPIRONE HYDROCHLORIDE 5 MG: 5 TABLET ORAL at 20:38

## 2023-02-18 RX ADMIN — BUSPIRONE HYDROCHLORIDE 5 MG: 5 TABLET ORAL at 17:06

## 2023-02-18 NOTE — PLAN OF CARE
Goal Outcome Evaluation:  Plan of Care Reviewed With: patient           Outcome Evaluation: Patient is stable. Patient continues to complain of a constant headache despite PRN medication. Patient did report some relief after Fioricet and Toradol, however, patient reports that it did not make the pain stop completely. Psych to see patient for eval for anxiety and depression.

## 2023-02-18 NOTE — CONSULTS
Referring Provider: Peter ALMODOVAR  Reason for Consultation: anxiety, depression      Chief complaint increased anxiety     Subjective .     History of present illness:  The patient is a 57 y.o. female who was admitted secondary to neck pain, numbness and weakness. PMHxl depression, SZ, asthma, psoriasis   Psych consult was requested by Sarah ALMODOVAR 2ry to anxiety, depression. The pt was seen by our service during her previous admission     The pt  reported hx of depression , she was    on Paxil, Ambien and lorazepam in the past ,  depression improved at the time. the patient became more depressed after her  passed away few months ago   The patient was prescribed clonazepam by her previous primary care provider.  Also was prescribed Ambien and lorazepam for anxiety.    Depression is rated as a 7 out of 10, she denied suicidal and homicidal ideations, the patient denied any perceptual disturbances  Anxiety is persistent and intense, denied AVH/SI/HI  The pt developed stroke like sxs with numbness and muscle weakness   The pt was started on abilify, reported some improvement but PCP was concerned about elevated LFTs   Past psych hx: depression anxiety, hx of SI no SA       Review of Systems   All systems were reviewed and negative except for:  Constitution:  positive for fatigue  Behavioral/Psych: positive for  anxiety and depression    History    Past Medical History:   Diagnosis Date   • Anxiety    • Asthma    • Cancer (HCC)     SKIN   • Depression    • Psoriasis    • Seizures (HCC) 12/06/2022          Family History   Problem Relation Age of Onset   • Heart disease Mother    • Breast cancer Mother    • Heart disease Father    • Pancreatic cancer Father    • Heart disease Sister    • Lung cancer Brother         Social History     Tobacco Use   • Smoking status: Never   • Smokeless tobacco: Never   Vaping Use   • Vaping Use: Never used   Substance Use Topics   • Alcohol use: Never   • Drug use: Never  "         Medications Prior to Admission   Medication Sig Dispense Refill Last Dose   • albuterol sulfate  (90 Base) MCG/ACT inhaler Inhale 2 puffs Every 6 (Six) Hours As Needed for Wheezing. 18 g 2    • ARIPiprazole (ABILIFY) 5 MG tablet Take 1 tablet by mouth Daily. 30 tablet 1 2/17/2023   • aspirin 81 MG chewable tablet Chew 2 tablets Daily.   2/17/2023   • atorvastatin (LIPITOR) 80 MG tablet Take 1 tablet by mouth Every Night. 90 tablet  2/16/2023   • busPIRone (BUSPAR) 5 MG tablet Take 1 tablet by mouth 3 (Three) Times a Day. 30 tablet 5 2/17/2023   • famotidine (PEPCID) 20 MG tablet Take 1 tablet by mouth 2 (Two) Times a Day Before Meals.   2/17/2023   • gabapentin (NEURONTIN) 100 MG capsule Take 1 capsule by mouth 2 (Two) Times a Day. 60 capsule 0 2/17/2023   • zolpidem (AMBIEN) 10 MG tablet Take 10 mg by mouth At Night As Needed.           Scheduled Meds:  ARIPiprazole, 5 mg, Oral, Daily  aspirin, 162 mg, Oral, Daily  atorvastatin, 80 mg, Oral, Nightly  busPIRone, 5 mg, Oral, TID  cefTRIAXone, 2 g, Intravenous, Q24H  famotidine, 20 mg, Oral, BID AC  gabapentin, 100 mg, Oral, BID  levETIRAcetam, 500 mg, Oral, Q12H  sodium chloride, 10 mL, Intravenous, Q12H         Continuous Infusions:  sodium chloride, 75 mL/hr, Last Rate: 75 mL/hr (02/18/23 1005)        PRN Meds:  •  acetaminophen  •  albuterol sulfate HFA  •  ALPRAZolam  •  polyethylene glycol **AND** bisacodyl **AND** bisacodyl  •  butalbital-acetaminophen-caffeine  •  ketorolac  •  ondansetron **OR** ondansetron  •  [COMPLETED] Insert Peripheral IV **AND** sodium chloride  •  sodium chloride  •  sodium chloride  •  zolpidem      Allergies:  Patient has no known allergies.      Objective     Vital Signs   /69 (BP Location: Left arm, Patient Position: Lying)   Pulse 68   Temp 98 °F (36.7 °C) (Oral)   Resp 15   Ht 170.2 cm (67\")   Wt 127 kg (280 lb 6.4 oz)   LMP  (LMP Unknown)   SpO2 99%   BMI 43.92 kg/m²     Physical " Exam:    Musculoskeletal:   Muscle strength and tone: WNL  Abnormal Movements: none   Gait: unable to assess, the pt was in bed      General Appearance:    In NAD       Mental Status Exam:   Hygiene:   good  Cooperation:  Cooperative  Eye Contact:  Good  Behavior and Psychomotor Activity: Appropriate  Speech:  Normal  Mood: anxious   Affect:  Congruent  Thought Process:  Goal directed, Linear and Organized  Associations: Intact   Thought Content:  somatic   Language: appropriate   Suicidal Ideations:  None  Homicidal:  None  Hallucinations:  None  Delusion:  None  Orientation:  To person, Place, Time and Situation  Memory:  Intact  Concentration and computation:good   Attention span: good   Fund of knowledge: appropriate   Reliability:  good  Insight:  Good  Judgement:  Good  Impulse Control:  Fair      Medications and allergies reviewed     Lab Results   Component Value Date    GLUCOSE 139 (H) 02/18/2023    CALCIUM 9.3 02/18/2023     02/18/2023    K 4.3 02/18/2023    CO2 28.0 02/18/2023     02/18/2023    BUN 14 02/18/2023    CREATININE 0.82 02/18/2023    EGFRIFAFRI 102 02/17/2021    EGFRIFNONA 89 02/17/2021    BCR 17.1 02/18/2023    ANIONGAP 11.0 02/18/2023       Last Urine Toxicity     LAST URINE TOXICITY RESULTS Latest Ref Rng & Units 2/17/2023 1/11/2023    BARBITURATES SCREEN Negative Negative NEGATIVE    BENZODIAZEPINE SCREEN, URINE Negative Negative NEGATIVE    COCAINE SCREEN, URINE Negative Negative NEGATIVE    METHADONE SCREEN, URINE Negative Negative NEGATIVE          No results found for: PHENYTOIN, PHENOBARB, VALPROATE, CBMZ    Lab Results   Component Value Date     02/18/2023    BUN 14 02/18/2023    CREATININE 0.82 02/18/2023    TSH 2.070 02/17/2023    WBC 6.90 02/18/2023       Brief Urine Lab Results  (Last result in the past 365 days)      Color   Clarity   Blood   Leuk Est   Nitrite   Protein   CREAT   Urine HCG        02/18/23 0617 Yellow   Clear   Negative   Trace   Negative    Negative                 Assessment & Plan       Intractable headache, unspecified chronicity pattern, unspecified headache type          Assessment: Major depressive disorder moderate recurrent  Generalized anxiety disorder  Bereavement  Treatment Plan: The patient presented with symptoms of depression, anxiety, and bereavement   Continue BuSpar at current dose,    Cont abilify 5 mg po QAM for  anxiety and depression, LFTs - back to normal      psychotherapy was recommended and the pt is on the waiting list now     Start lorazepam 0.5 mg po QD PRN for anxiety, the pt had # 10 tabs and it lasted for 2-3 months     Safety plan was discussed with the patient     She verbalized understanding   The pt an be d/c when medically stable     Treatment Plan discussed with: Patient and nursing     I discussed the patients findings and my recommendations with patient and nursing staff    I have reviewed and approved the behavioral health treatment plans and problem list. Yes  Thank you for the consult   Referring MD has access to consult report and progress notes in EMR     Kavya Barillas MD  02/18/23  13:13 EST

## 2023-02-18 NOTE — PLAN OF CARE
Goal Outcome Evaluation:  Plan of Care Reviewed With: patient           Outcome Evaluation: Patient 58 yo female admitted to the hospital with the complaint of headache, neck pain, numbness and weakness. CT Head shows No acute intracranial finding. Recent Dx includes Intractable headache. PMH includes Anxiety, Asthma, Cancer SKIN, Depression, Psoriasis, Seizures. At baseline, patient resides with daughter in a house and is independent in mobility using RW. Her daughter provides transporttaion when needed and assist in ADLs. As per today's evaluation, Patient complains of pain in the neck that radiates down to the spine and over the shoulders. She has pins, tingling sensation and numbness over the L arm. Patient is Mod Ind in Bed Mob and transfers and ambulation of around 2x20ft. Once medically stable,PT recommend Out patient PT at d/c.

## 2023-02-18 NOTE — PLAN OF CARE
Problem: Adult Inpatient Plan of Care  Goal: Plan of Care Review  Outcome: Ongoing, Not Progressing  Flowsheets (Taken 2/18/2023 0526)  Progress: no change  Plan of Care Reviewed With: patient  Outcome Evaluation: pt stable, will have neurology to see in the AM, VSS, SR on the monitor, PRN meds for headache, plan of care continued  Goal: Patient-Specific Goal (Individualized)  Outcome: Ongoing, Not Progressing  Goal: Absence of Hospital-Acquired Illness or Injury  Outcome: Ongoing, Not Progressing  Intervention: Identify and Manage Fall Risk  Recent Flowsheet Documentation  Taken 2/18/2023 0400 by Marilee Beck, RN  Safety Promotion/Fall Prevention: safety round/check completed  Taken 2/18/2023 0000 by Marilee Beck RN  Safety Promotion/Fall Prevention: safety round/check completed  Taken 2/17/2023 2217 by Marilee Beck, RN  Safety Promotion/Fall Prevention: safety round/check completed  Taken 2/17/2023 2010 by Marilee Beck, RN  Safety Promotion/Fall Prevention:   safety round/check completed   assistive device/personal items within reach   clutter free environment maintained   lighting adjusted   nonskid shoes/slippers when out of bed   room organization consistent  Intervention: Prevent Skin Injury  Recent Flowsheet Documentation  Taken 2/17/2023 2010 by Marilee Beck, RN  Body Position:   supine   sitting up in bed   position changed independently  Intervention: Prevent and Manage VTE (Venous Thromboembolism) Risk  Recent Flowsheet Documentation  Taken 2/17/2023 2010 by Marilee Beck, RN  Activity Management:   up ad malissa   up to bedside commode   activity adjusted per tolerance  Goal: Optimal Comfort and Wellbeing  Outcome: Ongoing, Not Progressing  Intervention: Monitor Pain and Promote Comfort  Recent Flowsheet Documentation  Taken 2/17/2023 2119 by Marilee Beck, RN  Pain Management Interventions: see MAR  Taken 2/17/2023 2010 by Marilee Beck RN  Pain Management Interventions:   care  clustered   pillow support provided   position adjusted   quiet environment facilitated   see MAR   relaxation techniques promoted  Intervention: Provide Person-Centered Care  Recent Flowsheet Documentation  Taken 2/17/2023 2010 by Marilee Beck RN  Trust Relationship/Rapport:   care explained   choices provided   emotional support provided   empathic listening provided   questions answered   questions encouraged   reassurance provided   thoughts/feelings acknowledged  Goal: Readiness for Transition of Care  Outcome: Ongoing, Not Progressing     Problem: Asthma Comorbidity  Goal: Maintenance of Asthma Control  Outcome: Ongoing, Not Progressing  Intervention: Maintain Asthma Symptom Control  Recent Flowsheet Documentation  Taken 2/17/2023 2010 by Marilee Beck RN  Medication Review/Management: medications reviewed     Problem: Behavioral Health Comorbidity  Goal: Maintenance of Behavioral Health Symptom Control  Outcome: Ongoing, Not Progressing  Intervention: Maintain Behavioral Health Symptom Control  Recent Flowsheet Documentation  Taken 2/17/2023 2010 by Marilee Beck RN  Medication Review/Management: medications reviewed     Problem: COPD (Chronic Obstructive Pulmonary Disease) Comorbidity  Goal: Maintenance of COPD Symptom Control  Outcome: Ongoing, Not Progressing  Intervention: Maintain COPD-Symptom Control  Recent Flowsheet Documentation  Taken 2/17/2023 2010 by Marilee Beck RN  Supportive Measures: active listening utilized  Medication Review/Management: medications reviewed     Problem: Diabetes Comorbidity  Goal: Blood Glucose Level Within Targeted Range  Outcome: Ongoing, Not Progressing     Problem: Heart Failure Comorbidity  Goal: Maintenance of Heart Failure Symptom Control  Outcome: Ongoing, Not Progressing  Intervention: Maintain Heart Failure-Management  Recent Flowsheet Documentation  Taken 2/17/2023 2010 by Marilee Beck RN  Medication Review/Management: medications reviewed      Problem: Hypertension Comorbidity  Goal: Blood Pressure in Desired Range  Outcome: Ongoing, Not Progressing  Intervention: Maintain Blood Pressure Management  Recent Flowsheet Documentation  Taken 2/17/2023 2010 by Marilee Beck RN  Medication Review/Management: medications reviewed     Problem: Obstructive Sleep Apnea Risk or Actual Comorbidity Management  Goal: Unobstructed Breathing During Sleep  Outcome: Ongoing, Not Progressing     Problem: Osteoarthritis Comorbidity  Goal: Maintenance of Osteoarthritis Symptom Control  Outcome: Ongoing, Not Progressing  Intervention: Maintain Osteoarthritis Symptom Control  Recent Flowsheet Documentation  Taken 2/17/2023 2010 by Marilee Beck, RN  Activity Management:   up ad malissa   up to bedside commode   activity adjusted per tolerance  Medication Review/Management: medications reviewed     Problem: Pain Chronic (Persistent) (Comorbidity Management)  Goal: Acceptable Pain Control and Functional Ability  Outcome: Ongoing, Not Progressing  Intervention: Manage Persistent Pain  Recent Flowsheet Documentation  Taken 2/17/2023 2010 by Marilee Beck RN  Sleep/Rest Enhancement: awakenings minimized  Medication Review/Management: medications reviewed  Intervention: Develop Pain Management Plan  Recent Flowsheet Documentation  Taken 2/17/2023 2119 by Marilee Beck, RN  Pain Management Interventions: see MAR  Taken 2/17/2023 2010 by Marilee Beck, RN  Pain Management Interventions:   care clustered   pillow support provided   position adjusted   quiet environment facilitated   see MAR   relaxation techniques promoted  Intervention: Optimize Psychosocial Wellbeing  Recent Flowsheet Documentation  Taken 2/17/2023 2010 by Marilee Beck, RN  Supportive Measures: active listening utilized  Diversional Activities:   smartphone   television     Problem: Seizure Disorder Comorbidity  Goal: Maintenance of Seizure Control  Outcome: Ongoing, Not Progressing   Goal Outcome  Evaluation:  Plan of Care Reviewed With: patient        Progress: no change  Outcome Evaluation: pt stable, will have neurology to see in the AM, VSS, SR on the monitor, PRN meds for headache, plan of care continued

## 2023-02-18 NOTE — THERAPY EVALUATION
Patient Name: Eladia Connor  : 1965    MRN: [id number]                              Today's Date: 2023       Admit Date: 2023    Visit Dx:     ICD-10-CM ICD-9-CM   1. Intractable headache, unspecified chronicity pattern, unspecified headache type  R51.9 784.0     Patient Active Problem List   Diagnosis   • Moderate episode of recurrent major depressive disorder (HCC)   • Anxiety   • Other insomnia   • Skin lesion   • History of abnormal mammogram   • Mood swings   • Class 1 obesity due to excess calories with serious comorbidity and body mass index (BMI) of 33.0 to 33.9 in adult   • Rash of face   • Fever   • Cough   • Rash   • Intertrigo   • Chest pain in adult   • Constipation   • Persistent vomiting   • Hypotension   • Palpitations   • Tachycardia   • Morbid obesity with BMI of 40.0-44.9, adult (HCC)   • Panic attack   • Intractable headache, unspecified chronicity pattern, unspecified headache type     Past Medical History:   Diagnosis Date   • Anxiety    • Asthma    • Cancer (HCC)     SKIN   • Depression    • Psoriasis    • Seizures ([facility]) 2022     Past Surgical History:   Procedure Laterality Date   • CHOLECYSTECTOMY     • ENDOSCOPY N/A 2022    Procedure: ESOPHAGOGASTRODUODENOSCOPY with gastric biopsy and esophageal dilation #50,#54,#56,#58 bougie;  Surgeon: Patience Arana MD;  Location: [facility];  Service: Gastroenterology;  Laterality: N/A;  gastritis   • EYE SURGERY     • KNEE SURGERY        General Information     Row Name 23          Physical Therapy Time and Intention    Document Type evaluation  -PG     Mode of Treatment physical therapy  -PG     Row Name 23          General Information    Patient Profile Reviewed yes  -PG     Prior Level of Function --  Ind in mobility using RW  -PG     Existing Precautions/Restrictions oxygen therapy device and L/min  2LO2  -PG     Barriers to Rehab --  SEIZURES  -PG     Row Name 23 1235           Living Environment    People in Home child(michelle), adult  daughter  -PG     Row Name 02/18/23 1835          Home Main Entrance    Number of Stairs, Main Entrance none  -PG     Row Name 02/18/23 1835          Stairs Within Home, Primary    Number of Stairs, Within Home, Primary other (see comments)  14  -PG     Stair Railings, Within Home, Primary railings safe and in good condition  -PG     Row Name 02/18/23 1835          Cognition    Orientation Status (Cognition) oriented x 4  -PG     Row Name 02/18/23 1835          Safety Issues, Functional Mobility    Safety Issues Affecting Function (Mobility) safety precaution awareness  -PG     Impairments Affecting Function (Mobility) postural/trunk control;endurance/activity tolerance;shortness of breath;balance;pain  -PG           User Key  (r) = Recorded By, (t) = Taken By, (c) = Cosigned By    Initials Name Provider Type    Nilsa Xavier PT Physical Therapist               Mobility     Row Name 02/18/23 1838          Bed Mobility    Bed Mobility bed mobility (all) activities  -PG     All Activities, King (Bed Mobility) modified independence  -PG     Assistive Device (Bed Mobility) head of bed elevated  -PG     Row Name 02/18/23 1838          Sit-Stand Transfer    Sit-Stand King (Transfers) modified independence  -PG     Assistive Device (Sit-Stand Transfers) walker, front-wheeled  -PG     Row Name 02/18/23 1838          Gait/Stairs (Locomotion)    King Level (Gait) modified independence  -PG     Assistive Device (Gait) walker, front-wheeled  -PG     Distance in Feet (Gait) 2x20ft  -PG           User Key  (r) = Recorded By, (t) = Taken By, (c) = Cosigned By    Initials Name Provider Type    Nilsa Xavier PT Physical Therapist               Obj/Interventions     Row Name 02/18/23 1843          Range of Motion Comprehensive    General Range of Motion bilateral upper extremity ROM WFL;bilateral lower extremity ROM WFL  -PG     Row Name  02/18/23 1843          Strength Comprehensive (MMT)    General Manual Muscle Testing (MMT) Assessment no strength deficits identified  -PG     Row Name 02/18/23 1843          Balance    Balance Assessment sitting static balance;sitting dynamic balance;sit to stand dynamic balance;standing dynamic balance  -PG     Static Sitting Balance modified independence  -PG     Dynamic Sitting Balance modified independence  -PG     Position, Sitting Balance sitting edge of bed  -PG     Sit to Stand Dynamic Balance modified independence  -PG     Dynamic Standing Balance modified independence  -PG     Position/Device Used, Standing Balance walker, rolling  -PG           User Key  (r) = Recorded By, (t) = Taken By, (c) = Cosigned By    Initials Name Provider Type    PG Nilsa Kohler, PT Physical Therapist               Goals/Plan    No documentation.                Clinical Impression     Row Name 02/18/23 1843          Pain    Pretreatment Pain Rating 7/10  -PG     Posttreatment Pain Rating 7/10  -PG     Pain Location - Side/Orientation Left  -PG     Pain Location - shoulder;neck;back  -PG     Row Name 02/18/23 1843          Plan of Care Review    Plan of Care Reviewed With patient  -PG     Outcome Evaluation Patient 58 yo female admitted to the hospital with the complaint of headache, neck pain, numbness and weakness. CT Head shows No acute intracranial finding. Recent Dx includes Intractable headache. PMH includes Anxiety, Asthma, Cancer SKIN, Depression, Psoriasis, Seizures. At baseline, patient resides with daughter in a house and is independent in mobility using RW. Her daughter provides transporttaion when needed and assist in ADLs. As per today's evaluation, Patient complains of pain in the neck that radiates down to the spine and over the shoulders. She has pins, tingling sensation and numbness over the L arm. Patient is Mod Ind in Bed Mob and transfers and ambulation of around 2x20ft. Once medically stable,PT recommend  Out patient PT at d/c.  -PG     Row Name 02/18/23 1843          Therapy Assessment/Plan (PT)    Criteria for Skilled Interventions Met (PT) no;no problems identified which require skilled intervention  -PG     Therapy Frequency (PT) evaluation only  -PG     Row Name 02/18/23 1843          Vital Signs    Pre Systolic BP Rehab 101  -PG     Pre Treatment Diastolic BP 69  -PG     Post Systolic BP Rehab 105  -PG     Post Treatment Diastolic BP 70  -PG     Pretreatment Heart Rate (beats/min) 67  -PG     Posttreatment Heart Rate (beats/min) 76  -PG     Pre SpO2 (%) 100  -PG     O2 Delivery Pre Treatment nasal cannula  2LO2  -PG     Post SpO2 (%) 100  -PG     O2 Delivery Post Treatment room air  -PG     Row Name 02/18/23 1843          Positioning and Restraints    Pre-Treatment Position in bed  -PG     Post Treatment Position bed  -PG     In Bed notified nsg;call light within reach;encouraged to call for assist  -PG           User Key  (r) = Recorded By, (t) = Taken By, (c) = Cosigned By    Initials Name Provider Type    PG Nilsa Kohler, PT Physical Therapist               Outcome Measures     Row Name 02/18/23 3794 02/18/23 0741       How much help from another person do you currently need...    Turning from your back to your side while in flat bed without using bedrails? 4  -PG 4  -SL    Moving from lying on back to sitting on the side of a flat bed without bedrails? 3  -PG 4  -SL    Moving to and from a bed to a chair (including a wheelchair)? 4  -PG 4  -SL    Standing up from a chair using your arms (e.g., wheelchair, bedside chair)? 4  -PG 3  -SL    Climbing 3-5 steps with a railing? 3  -PG 3  -SL    To walk in hospital room? 4  -PG 3  -SL    AM-PAC 6 Clicks Score (PT) 22  -PG 21  -SL    Highest level of mobility 7 --> Walked 25 feet or more  -PG 6 --> Walked 10 steps or more  -SL    Row Name 02/18/23 5149          Modified [place] Scale    Modified [place] Scale 4 - Moderately severe disability.  Unable to walk  without assistance, and unable to attend to own bodily needs without assistance.  -PG     Row Name 02/18/23 1854          Functional Assessment    Outcome Measure Options AM-PAC 6 Clicks Basic Mobility (PT);Modified Servando  -PG           User Key  (r) = Recorded By, (t) = Taken By, (c) = Cosigned By    Initials Name Provider Type    Ally Porter, RN Registered Nurse    Nilsa Xavier, DARIANA Physical Therapist                             Physical Therapy Education     Title: PT OT SLP Therapies (Done)     Topic: Physical Therapy (Done)     Point: Mobility training (Done)     Learning Progress Summary           Patient Acceptance, E, VU by PG at 2/18/2023 1856                   Point: Home exercise program (Done)     Learning Progress Summary           Patient Acceptance, E, VU by PG at 2/18/2023 1856    Acceptance, E, VU by PG at 2/18/2023 1855                   Point: Body mechanics (Done)     Learning Progress Summary           Patient Acceptance, E, VU by PG at 2/18/2023 1856                   Point: Precautions (Done)     Learning Progress Summary           Patient Acceptance, E, VU by PG at 2/18/2023 1856    Acceptance, E, VU by PG at 2/18/2023 1855                               User Key     Initials Effective Dates Name Provider Type Discipline    PG 09/01/22 -  Nilsa Kohler PT Physical Therapist PT              PT Recommendation and Plan     Plan of Care Reviewed With: patient  Outcome Evaluation: Patient 58 yo female admitted to the hospital with the complaint of headache, neck pain, numbness and weakness. CT Head shows No acute intracranial finding. Recent Dx includes Intractable headache. PMH includes Anxiety, Asthma, Cancer SKIN, Depression, Psoriasis, Seizures. At baseline, patient resides with daughter in a house and is independent in mobility using RW. Her daughter provides transporttaion when needed and assist in ADLs. As per today's evaluation, Patient complains of pain in the neck that  radiates down to the spine and over the shoulders. She has pins, tingling sensation and numbness over the L arm. Patient is Mod Ind in Bed Mob and transfers and ambulation of around 2x20ft. Once medically stable,PT recommend Out patient PT at d/c.     Time Calculation:    PT Charges     Row Name 02/18/23 1857             Time Calculation    Start Time 1029  -PG      Stop Time 1055  -PG      Time Calculation (min) 26 min  -PG      PT Received On 02/18/23  -PG         Time Calculation- PT    Total Timed Code Minutes- PT 0 minute(s)  -PG            User Key  (r) = Recorded By, (t) = Taken By, (c) = Cosigned By    Initials Name Provider Type    PG Nilsa Kohler, PT Physical Therapist              Therapy Charges for Today     Code Description Service Date Service Provider Modifiers Qty    [id number]  PT EVAL MOD COMPLEXITY 4 2/18/2023 Nilsa Kohler PT GP 1          PT G-Codes  Outcome Measure Options: AM-PAC 6 Clicks Basic Mobility (PT), Modified Servando  AM-PAC 6 Clicks Score (PT): 22  Modified [place] Scale: 4 - Moderately severe disability.  Unable to walk without assistance, and unable to attend to own bodily needs without assistance.  PT Discharge Summary  Anticipated Discharge Disposition (PT): home with outpatient therapy services    Nilsa Kohler PT  2/18/2023

## 2023-02-18 NOTE — CASE MANAGEMENT/SOCIAL WORK
Discharge Planning Assessment   [facility]     Patient Name: Eladia Connor  MRN: [id number]  Today's Date: 2/18/2023    Admit Date: 2/17/2023    Plan: Return home with daughter. Current home O2 2L through [employer].   Discharge Needs Assessment     Row Name 02/18/23 1243       Living Environment    People in Home child(michelle), adult    Current Living Arrangements home    Primary Care Provided by self    Provides Primary Care For no one    Family Caregiver if Needed child(michelle), adult    Quality of Family Relationships supportive    Able to Return to Prior Arrangements yes       Resource/Environmental Concerns    Resource/Environmental Concerns none    Transportation Concerns none       Transition Planning    Patient/Family Anticipates Transition to home with family    Patient/Family Anticipated Services at Transition none    Transportation Anticipated family or friend will provide       Discharge Needs Assessment    Readmission Within the Last 30 Days no previous admission in last 30 days    Concerns to be Addressed discharge planning    Anticipated Changes Related to Illness none    Equipment Needed After Discharge none               Discharge Plan     Row Name 02/18/23 1243       Plan    Plan Return home with daughter. Current home O2 2L through [employer].    Patient/Family in Agreement with Plan yes    Plan Comments Met with pt in room, she lives at home with her daughter and is IADLs. Pt daughter will provide transportation. PCP and pharmacy verified. Denies trouble affording home medications. Current home O2 2L through [employer]. Pending Psych consult, IVF, IV abx.              Continued Care and Services - Admitted Since 2/17/2023           Expected Discharge Date and Time     Expected Discharge Date Expected Discharge Time    Feb 19, 2023          Demographic Summary     Row Name 02/18/23 1243       General Information    Admission Type observation    Arrived From emergency department    Referral Source admission list     Reason for Consult discharge planning    Preferred Language English               Functional Status     Row Name 02/18/23 1243       Functional Status    Usual Activity Tolerance good    Current Activity Tolerance good       Functional Status, IADL    Medications independent    Meal Preparation independent    Housekeeping independent    Laundry independent    Shopping independent       Mental Status    General Appearance WDL WDL       Mental Status Summary    Recent Changes in Mental Status/Cognitive Functioning no changes                      Nyasia Estrada RN

## 2023-02-18 NOTE — H&P
Critical access hospital Observation Unit H&P    Patient Name: Eladia Connor  : 1965  MRN: [id number]  Primary Care Physician: Quincy Lemos APRN  Date of admission: 2023     Patient Care Team:  Quincy Lemos APRN as PCP - General (Family Medicine)          Subjective   History Present Illness     Chief Complaint:   Chief Complaint   Patient presents with   • Numbness     Numbness    Ms. Connor is a 57 y.o.  presents to [facility] complaining of numbness      History of Present Illness    ED23: 57-year-old female who complains of gradually worsening diffuse headache for the last 2 days associated with some pain in the neck and some numbness and nausea.  Numbness to the face.  She denies any speech difficulty or paralysis she has been on the drink talk and walk and function normally otherwise.  Patient states she had no fever chills sweats cough congestion vomiting or diarrhea no visual disturbance.  No injury no one in the home with similar illness or foreign travels or antibiotic use.  Patient has been in and out of the hospital multiple times recently with unremarkable work-up.  Nothing really seems to make this better or worse is moderate to severe and continuous for the last couple days.  The patient had called her primary care doctor and they told her to come to the ER.    OBS 23: Patient is a 57-year-old female presenting to the hospital with 2-day headache and bilateral numbness and pain to neck.  Patient also reports some nausea without vomiting, fever, dyspnea or chest pain.  Patient reports numbness to face without speech difficulty, paralysis or lateral weakness.  Patient describes headache as bandlike covering bilateral sides of the front of her head.  Patient denies aura or change in vision.  Patient reports taking Tylenol at home but has not had work-up for migraines or headaches in the past.  Nuys recent falls or injury.  Patient reports increased stress and  panic attacks at home.      Review of Systems   Constitutional: Positive for malaise/fatigue.   Eyes: Negative.    Cardiovascular: Negative.    Respiratory: Negative.    Endocrine: Negative.    Hematologic/Lymphatic: Negative.    Skin: Negative.    Musculoskeletal: Negative.    Gastrointestinal: Negative.    Genitourinary: Positive for frequency.   Neurological: Positive for headaches.   Psychiatric/Behavioral: The patient is nervous/anxious.    Allergic/Immunologic: Negative.            Personal History     Past Medical History:   Past Medical History:   Diagnosis Date   • Anxiety    • Asthma    • Cancer (HCC)     SKIN   • Depression    • Psoriasis    • Seizures (HCC) 12/06/2022       Surgical History:      Past Surgical History:   Procedure Laterality Date   • CHOLECYSTECTOMY  1987   • ENDOSCOPY N/A 12/30/2022    Procedure: ESOPHAGOGASTRODUODENOSCOPY with gastric biopsy and esophageal dilation #50,#54,#56,#58 bougie;  Surgeon: Patience Arana MD;  Location: [facility] ENDOSCOPY;  Service: Gastroenterology;  Laterality: N/A;  gastritis   • EYE SURGERY     • KNEE SURGERY             Family History: family history includes Breast cancer in her mother; Heart disease in her father, mother, and sister; Lung cancer in her brother; Pancreatic cancer in her father. Otherwise pertinent FHx was reviewed and unremarkable.     Social History:  reports that she has never smoked. She has never used smokeless tobacco. She reports that she does not drink alcohol and does not use drugs.      Medications:  Prior to Admission medications    Medication Sig Start Date End Date Taking? Authorizing Provider   albuterol sulfate  (90 Base) MCG/ACT inhaler Inhale 2 puffs Every 6 (Six) Hours As Needed for Wheezing. 1/19/23  Yes Quincy Lemos APRN   ARIPiprazole (ABILIFY) 5 MG tablet Take 1 tablet by mouth Daily. 1/19/23  Yes Quincy Lemos APRN   aspirin 81 MG chewable tablet Chew 2 tablets Daily. 1/19/23  Yes Canelo  SCOOBY Shelton   atorvastatin (LIPITOR) 80 MG tablet Take 1 tablet by mouth Every Night. 1/19/23  Yes Quincy Lemos APRN   busPIRone (BUSPAR) 5 MG tablet Take 1 tablet by mouth 3 (Three) Times a Day. 1/19/23  Yes Quincy Lemos APRN   famotidine (PEPCID) 20 MG tablet Take 1 tablet by mouth 2 (Two) Times a Day Before Meals. 1/19/23  Yes Quincy Lemos APRN   gabapentin (NEURONTIN) 100 MG capsule Take 1 capsule by mouth 2 (Two) Times a Day. 1/25/23  Yes Quincy Lemos APRN   zolpidem (AMBIEN) 10 MG tablet Take 10 mg by mouth At Night As Needed. 11/30/22  Yes Provider, MD Kamala       Allergies:  No Known Allergies    Objective   Objective     Vital Signs  Temp:  [97.4 °F (36.3 °C)-98.9 °F (37.2 °C)] 98 °F (36.7 °C)  Heart Rate:  [68-91] 68  Resp:  [15-20] 15  BP: ()/(64-80) 105/69  SpO2:  [96 %-100 %] 99 %  on  Flow (L/min):  [2] 2;   Device (Oxygen Therapy): nasal cannula  Body mass index is 43.92 kg/m².    Physical Exam  Vitals and nursing note reviewed.   Constitutional:       Appearance: Normal appearance.   HENT:      Head: Normocephalic and atraumatic.      Right Ear: External ear normal.      Left Ear: External ear normal.      Nose: Nose normal.      Mouth/Throat:      Mouth: Mucous membranes are moist.      Pharynx: Oropharynx is clear.   Eyes:      Extraocular Movements: Extraocular movements intact.      Conjunctiva/sclera: Conjunctivae normal.      Pupils: Pupils are equal, round, and reactive to light.   Cardiovascular:      Rate and Rhythm: Normal rate and regular rhythm.      Pulses: Normal pulses.      Heart sounds: Normal heart sounds.   Pulmonary:      Effort: Pulmonary effort is normal.      Breath sounds: Normal breath sounds.   Abdominal:      General: Bowel sounds are normal.      Palpations: Abdomen is soft.   Musculoskeletal:         General: Normal range of motion.      Cervical back: Normal range of motion.   Skin:     General: Skin is warm.       Capillary Refill: Capillary refill takes less than 2 seconds.   Neurological:      General: No focal deficit present.      Mental Status: She is alert and oriented to person, place, and time.   Psychiatric:         Mood and Affect: Mood is anxious.         Behavior: Behavior is slowed.         Thought Content: Thought content normal.         Judgment: Judgment normal.         Results Review:  I have personally reviewed most recent cardiac tracings, lab results, microbiology results and radiology images and interpretations and agree with findings, most notably: CBC, CMP, CT head, CT angiogram, MRI brain, urinalysis, urine culture.    Results from last 7 days   Lab Units 02/18/23  0727   WBC 10*3/mm3 6.90   HEMOGLOBIN g/dL 12.4   HEMATOCRIT % 36.6   PLATELETS 10*3/mm3 206     Results from last 7 days   Lab Units 02/18/23  0727 02/17/23  1241   SODIUM mmol/L 140 138   POTASSIUM mmol/L 4.3 4.1   CHLORIDE mmol/L 101 102   CO2 mmol/L 28.0 22.0   BUN mg/dL 14 14   CREATININE mg/dL 0.82 0.80   GLUCOSE mg/dL 139* 149*   CALCIUM mg/dL 9.3 10.0   ALT (SGPT) U/L  --  22   AST (SGOT) U/L  --  31     Estimated Creatinine Clearance: 102.4 mL/min (by C-G formula based on SCr of 0.82 mg/dL).  Brief Urine Lab Results  (Last result in the past 365 days)      Color   Clarity   Blood   Leuk Est   Nitrite   Protein   CREAT   Urine HCG        02/18/23 0617 Yellow   Clear   Negative   Trace   Negative   Negative                 Microbiology Results (last 10 days)     ** No results found for the last 240 hours. **          ECG/EMG Results (most recent)     Procedure Component Value Units Date/Time    ECG 12 Lead Other; Headache numbness dizziness [[id number]] Collected: 02/17/23 1519     Updated: 02/17/23 1520     QT Interval 418 ms     Narrative:      HEART RATE= 65  bpm  RR Interval= 920  ms  IA Interval= 182  ms  P Horizontal Axis= 39  deg  P Front Axis= -8  deg  QRSD Interval= 89  ms  QT Interval= 418  ms  QRS Axis= 17  deg  T Wave  Axis= 52  deg  - NORMAL ECG -  Sinus rhythm  Electronically Signed By:   Date and Time of Study: 2023-02-17 15:19:22              Results for orders placed during the hospital encounter of 12/06/22    Adult Transthoracic Echo Complete W/ Cont if Necessary Per Protocol    Interpretation Summary  •  Left ventricular systolic function is normal. Left ventricular ejection fraction appears to be 61 - 65%.  •  Left ventricular diastolic function was normal.  •  Saline test results are negative.  Study was suboptimal.      CT Angiogram Neck    Result Date: 2/17/2023  Impression: 1. No large intracranial vessel occlusion. 2. No carotid stenosis. 3. Patent bilateral vertebral arteries. 4. Incidental CT findings above. Electronically Signed: Jack Franciscodesire  2/17/2023 2:59 PM EST  Workstation ID: [id number]    MRI Brain Without Contrast    Result Date: 2/17/2023  Impression: 1. No acute intracranial abnormality. 2. Mild chronic small vessel ischemic change. Electronically Signed: Kike Romo  2/17/2023 5:31 PM EST  Workstation ID: [id number]    CT Head Without Contrast Stroke Protocol    Result Date: 2/17/2023  Impression: 1. No acute intracranial finding. 2. Mild chronic microvascular disease. 3. Left frontal parietal scalp soft tissue nodule, unchanged from prior. Correlate with direct physical examination findings. Electronically Signed: Ting Mendoza  2/17/2023 2:09 PM EST  Workstation ID: [id number]    CT Angiogram Head w AI Analysis of LVO    Result Date: 2/17/2023  Impression: 1. No large intracranial vessel occlusion. 2. No carotid stenosis. 3. Patent bilateral vertebral arteries. 4. Incidental CT findings above. Electronically Signed: Jack Franciscodesire  2/17/2023 2:59 PM EST  Workstation ID: [id number]        Estimated Creatinine Clearance: 102.4 mL/min (by C-G formula based on SCr of 0.82 mg/dL).    Assessment & Plan   Assessment/Plan       Active Hospital Problems    Diagnosis  POA   • **Intractable headache, unspecified  chronicity pattern, unspecified headache type [R51.9]  Yes      Resolved Hospital Problems   No resolved problems to display.     Intractable headache   -Carbon monoxide 1.7  -CBC and CMP unremarkable  -CT head showed no acute intercranial findings with mild chronic microvascular disease  -CT angiogram neck showed no vessel occlusion or carotid stenosis  -Angiogram of head showed no occlusion or stenosis with patent arteries  -Continue IV fluids  -Fioricet initiated  -IV/oral antiemetics as needed  -Physical therapy evaluation    Acute urinary tract infection without hematuria  -Urinalysis shows trace ketones, trace leukocytes, 0-2 RBC, 6-12 WBC, 2+ bacteria, squamous epithelial cell  -Urine culture pending  -Continue IV fluid  -IV Rocephin given empirically    History of seizures  -Continue Keppra    Hyperlipidemia  -Continue atorvastatin and aspirin     Bipolar depression  -Continue buspar and Abilify   -No signs of HI/SI  -Increase in panic attacks  -Psychiatric consult     GERD  -Continue PPI    Obesity  -BMI of 43.92  -Lifestyle modifications recommended    VTE Prophylaxis -   Mechanical Order History:      Ordered        02/17/23 1825  Place Sequential Compression Device  Once            02/17/23 1825  Maintain Sequential Compression Device  Continuous                    Pharmalogical Order History:     None          CODE STATUS:    Code Status and Medical Interventions:   Ordered at: 02/17/23 1551     Level Of Support Discussed With:    Patient     Code Status (Patient has no pulse and is not breathing):    CPR (Attempt to Resuscitate)     Medical Interventions (Patient has pulse or is breathing):    Full Support       This patient has been examined wearing personal protective equipment.     I discussed the patient's findings and my recommendations with patient, family, nursing staff, primary care team and consulting provider.      Signature: Electronically signed by SCOOBY Su, 02/18/23, 12:16 PM  EST.

## 2023-02-19 LAB
ANION GAP SERPL CALCULATED.3IONS-SCNC: 8 MMOL/L (ref 5–15)
BACTERIA SPEC AEROBE CULT: NORMAL
BASOPHILS # BLD AUTO: 0 10*3/MM3 (ref 0–0.2)
BASOPHILS NFR BLD AUTO: 0.8 % (ref 0–1.5)
BUN SERPL-MCNC: 13 MG/DL (ref 6–20)
BUN/CREAT SERPL: 14.1 (ref 7–25)
CALCIUM SPEC-SCNC: 8.8 MG/DL (ref 8.6–10.5)
CHLORIDE SERPL-SCNC: 106 MMOL/L (ref 98–107)
CO2 SERPL-SCNC: 27 MMOL/L (ref 22–29)
CREAT SERPL-MCNC: 0.92 MG/DL (ref 0.57–1)
DEPRECATED RDW RBC AUTO: 45.5 FL (ref 37–54)
EGFRCR SERPLBLD CKD-EPI 2021: 71.9 ML/MIN/1.73
EOSINOPHIL # BLD AUTO: 0.4 10*3/MM3 (ref 0–0.4)
EOSINOPHIL NFR BLD AUTO: 6.1 % (ref 0.3–6.2)
ERYTHROCYTE [DISTWIDTH] IN BLOOD BY AUTOMATED COUNT: 14.8 % (ref 12.3–15.4)
GLUCOSE SERPL-MCNC: 112 MG/DL (ref 65–99)
HCT VFR BLD AUTO: 36.6 % (ref 34–46.6)
HGB BLD-MCNC: 11.9 G/DL (ref 12–15.9)
LYMPHOCYTES # BLD AUTO: 1.4 10*3/MM3 (ref 0.7–3.1)
LYMPHOCYTES NFR BLD AUTO: 22.1 % (ref 19.6–45.3)
MCH RBC QN AUTO: 28.5 PG (ref 26.6–33)
MCHC RBC AUTO-ENTMCNC: 32.4 G/DL (ref 31.5–35.7)
MCV RBC AUTO: 87.9 FL (ref 79–97)
MONOCYTES # BLD AUTO: 0.5 10*3/MM3 (ref 0.1–0.9)
MONOCYTES NFR BLD AUTO: 8.4 % (ref 5–12)
NEUTROPHILS NFR BLD AUTO: 3.9 10*3/MM3 (ref 1.7–7)
NEUTROPHILS NFR BLD AUTO: 62.6 % (ref 42.7–76)
NRBC BLD AUTO-RTO: 0 /100 WBC (ref 0–0.2)
PLATELET # BLD AUTO: 200 10*3/MM3 (ref 140–450)
PMV BLD AUTO: 6.9 FL (ref 6–12)
POTASSIUM SERPL-SCNC: 4.2 MMOL/L (ref 3.5–5.2)
QT INTERVAL: 418 MS
RBC # BLD AUTO: 4.17 10*6/MM3 (ref 3.77–5.28)
SODIUM SERPL-SCNC: 141 MMOL/L (ref 136–145)
WBC NRBC COR # BLD: 6.2 10*3/MM3 (ref 3.4–10.8)

## 2023-02-19 PROCEDURE — G0378 HOSPITAL OBSERVATION PER HR: HCPCS

## 2023-02-19 PROCEDURE — 25010000002 CEFTRIAXONE PER 250 MG: Performed by: NURSE PRACTITIONER

## 2023-02-19 PROCEDURE — 94760 N-INVAS EAR/PLS OXIMETRY 1: CPT

## 2023-02-19 PROCEDURE — 85025 COMPLETE CBC W/AUTO DIFF WBC: CPT | Performed by: NURSE PRACTITIONER

## 2023-02-19 PROCEDURE — 94640 AIRWAY INHALATION TREATMENT: CPT

## 2023-02-19 PROCEDURE — 25010000002 KETOROLAC TROMETHAMINE PER 15 MG: Performed by: NURSE PRACTITIONER

## 2023-02-19 PROCEDURE — 94799 UNLISTED PULMONARY SVC/PX: CPT

## 2023-02-19 PROCEDURE — 96376 TX/PRO/DX INJ SAME DRUG ADON: CPT

## 2023-02-19 PROCEDURE — 80048 BASIC METABOLIC PNL TOTAL CA: CPT | Performed by: NURSE PRACTITIONER

## 2023-02-19 RX ADMIN — CEFTRIAXONE 2 G: 2 INJECTION, POWDER, FOR SOLUTION INTRAMUSCULAR; INTRAVENOUS at 10:52

## 2023-02-19 RX ADMIN — Medication 10 ML: at 13:41

## 2023-02-19 RX ADMIN — ARIPIPRAZOLE 5 MG: 5 TABLET ORAL at 10:53

## 2023-02-19 RX ADMIN — BUSPIRONE HYDROCHLORIDE 5 MG: 5 TABLET ORAL at 15:49

## 2023-02-19 RX ADMIN — FAMOTIDINE 20 MG: 20 TABLET, FILM COATED ORAL at 17:46

## 2023-02-19 RX ADMIN — BUTALBITAL, ACETAMINOPHEN, AND CAFFEINE 1 TABLET: 50; 325; 40 TABLET ORAL at 15:49

## 2023-02-19 RX ADMIN — BUSPIRONE HYDROCHLORIDE 5 MG: 5 TABLET ORAL at 10:53

## 2023-02-19 RX ADMIN — KETOROLAC TROMETHAMINE 15 MG: 15 INJECTION, SOLUTION INTRAMUSCULAR; INTRAVENOUS at 17:46

## 2023-02-19 RX ADMIN — FAMOTIDINE 20 MG: 20 TABLET, FILM COATED ORAL at 07:48

## 2023-02-19 RX ADMIN — SUMATRIPTAN SUCCINATE 50 MG: 50 TABLET ORAL at 11:49

## 2023-02-19 RX ADMIN — GABAPENTIN 100 MG: 100 CAPSULE ORAL at 10:53

## 2023-02-19 RX ADMIN — ASPIRIN 81 MG CHEWABLE TABLET 162 MG: 81 TABLET CHEWABLE at 10:53

## 2023-02-19 RX ADMIN — BUSPIRONE HYDROCHLORIDE 5 MG: 5 TABLET ORAL at 20:50

## 2023-02-19 RX ADMIN — ALBUTEROL SULFATE 2 PUFF: 108 INHALANT RESPIRATORY (INHALATION) at 15:19

## 2023-02-19 RX ADMIN — SODIUM CHLORIDE 75 ML/HR: 9 INJECTION, SOLUTION INTRAVENOUS at 13:41

## 2023-02-19 RX ADMIN — KETOROLAC TROMETHAMINE 15 MG: 15 INJECTION, SOLUTION INTRAMUSCULAR; INTRAVENOUS at 10:53

## 2023-02-19 RX ADMIN — Medication 10 ML: at 10:54

## 2023-02-19 RX ADMIN — Medication 10 ML: at 20:51

## 2023-02-19 RX ADMIN — GABAPENTIN 100 MG: 100 CAPSULE ORAL at 20:50

## 2023-02-19 RX ADMIN — ATORVASTATIN CALCIUM 80 MG: 40 TABLET, FILM COATED ORAL at 20:50
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
## 2023-02-19 NOTE — CASE MANAGEMENT/SOCIAL WORK
Continued Stay Note   [facility]     Patient Name: Eladia Connor  MRN: [id number]  Today's Date: 2/19/2023    Admit Date: 2/17/2023    Plan: Return home with daughter. Current home O2 2L through [place]. Pt agreeable to outpatient therapy in [place]. CM to follow up Monday when [facility] opens. [phone].   Discharge Plan     Row Name 02/19/23 1612       Plan    Plan Return home with daughter. Current home O2 2L through [employer]. Pt agreeable to outpatient therapy in [place]. CM to follow up Monday when [facility] opens. [phone].    Plan Comments Patient agreeable to recommendation of outpatien therapy at [facility] in [place]. Currently closet on Sunday. CM to follow up tomorrow.                    Expected Discharge Date and Time     Expected Discharge Date Expected Discharge Time    Feb 19, 2023             Nyasia Estrada RN

## 2023-02-19 NOTE — PLAN OF CARE
Problem: Adult Inpatient Plan of Care  Goal: Plan of Care Review  Outcome: Ongoing, Not Progressing  Flowsheets (Taken 2/19/2023 0139)  Progress: no change  Plan of Care Reviewed With: patient  Outcome Evaluation: pt stable on 2L NC (baseline), getting IV fluids and IV antibiotics, VSS, SR on the monitor, PRN pain and nausea meds, plan of care continued  Goal: Patient-Specific Goal (Individualized)  Outcome: Ongoing, Not Progressing  Goal: Absence of Hospital-Acquired Illness or Injury  Outcome: Ongoing, Not Progressing  Intervention: Identify and Manage Fall Risk  Recent Flowsheet Documentation  Taken 2/19/2023 0000 by Marilee Beck RN  Safety Promotion/Fall Prevention: safety round/check completed  Taken 2/18/2023 2201 by Marilee Beck RN  Safety Promotion/Fall Prevention: safety round/check completed  Taken 2/18/2023 2038 by Marilee Beck RN  Safety Promotion/Fall Prevention:   safety round/check completed   assistive device/personal items within reach   clutter free environment maintained   lighting adjusted   nonskid shoes/slippers when out of bed   room organization consistent  Intervention: Prevent Skin Injury  Recent Flowsheet Documentation  Taken 2/18/2023 2038 by Marilee Beck, RN  Body Position:   supine   sitting up in bed   position changed independently  Intervention: Prevent and Manage VTE (Venous Thromboembolism) Risk  Recent Flowsheet Documentation  Taken 2/18/2023 2038 by Marilee Beck, RN  Activity Management:   up ad malissa   activity adjusted per tolerance   up to bedside commode  Goal: Optimal Comfort and Wellbeing  Outcome: Ongoing, Not Progressing  Intervention: Monitor Pain and Promote Comfort  Recent Flowsheet Documentation  Taken 2/18/2023 2038 by Marilee Beck RN  Pain Management Interventions: see MAR  Intervention: Provide Person-Centered Care  Recent Flowsheet Documentation  Taken 2/18/2023 2038 by Marilee Beck, RN  Trust Relationship/Rapport:   care explained    choices provided   emotional support provided   empathic listening provided   questions answered   questions encouraged   reassurance provided   thoughts/feelings acknowledged  Goal: Readiness for Transition of Care  Outcome: Ongoing, Not Progressing     Problem: Asthma Comorbidity  Goal: Maintenance of Asthma Control  Outcome: Ongoing, Not Progressing  Intervention: Maintain Asthma Symptom Control  Recent Flowsheet Documentation  Taken 2/18/2023 2038 by Marilee Beck RN  Medication Review/Management: medications reviewed     Problem: Behavioral Health Comorbidity  Goal: Maintenance of Behavioral Health Symptom Control  Outcome: Ongoing, Not Progressing  Intervention: Maintain Behavioral Health Symptom Control  Recent Flowsheet Documentation  Taken 2/18/2023 2038 by Marilee Beck RN  Medication Review/Management: medications reviewed     Problem: COPD (Chronic Obstructive Pulmonary Disease) Comorbidity  Goal: Maintenance of COPD Symptom Control  Outcome: Ongoing, Not Progressing  Intervention: Maintain COPD-Symptom Control  Recent Flowsheet Documentation  Taken 2/18/2023 2038 by Marilee Beck RN  Supportive Measures: active listening utilized  Medication Review/Management: medications reviewed     Problem: Diabetes Comorbidity  Goal: Blood Glucose Level Within Targeted Range  Outcome: Ongoing, Not Progressing  Intervention: Monitor and Manage Glycemia  Recent Flowsheet Documentation  Taken 2/18/2023 2038 by Marilee Beck RN  Glycemic Management: oral hydration promoted     Problem: Heart Failure Comorbidity  Goal: Maintenance of Heart Failure Symptom Control  Outcome: Ongoing, Not Progressing  Intervention: Maintain Heart Failure-Management  Recent Flowsheet Documentation  Taken 2/18/2023 2038 by Marilee Beck RN  Medication Review/Management: medications reviewed     Problem: Hypertension Comorbidity  Goal: Blood Pressure in Desired Range  Outcome: Ongoing, Not Progressing  Intervention: Maintain  Blood Pressure Management  Recent Flowsheet Documentation  Taken 2/18/2023 2038 by Marilee Beck RN  Medication Review/Management: medications reviewed     Problem: Obstructive Sleep Apnea Risk or Actual Comorbidity Management  Goal: Unobstructed Breathing During Sleep  Outcome: Ongoing, Not Progressing     Problem: Osteoarthritis Comorbidity  Goal: Maintenance of Osteoarthritis Symptom Control  Outcome: Ongoing, Not Progressing  Intervention: Maintain Osteoarthritis Symptom Control  Recent Flowsheet Documentation  Taken 2/18/2023 2038 by Marilee Beck, RN  Activity Management:   up ad malissa   activity adjusted per tolerance   up to bedside commode  Medication Review/Management: medications reviewed     Problem: Pain Chronic (Persistent) (Comorbidity Management)  Goal: Acceptable Pain Control and Functional Ability  Outcome: Ongoing, Not Progressing  Intervention: Manage Persistent Pain  Recent Flowsheet Documentation  Taken 2/18/2023 2038 by Marilee Beck RN  Sleep/Rest Enhancement: awakenings minimized  Medication Review/Management: medications reviewed  Intervention: Develop Pain Management Plan  Recent Flowsheet Documentation  Taken 2/18/2023 2038 by Marilee Beck RN  Pain Management Interventions: see MAR  Intervention: Optimize Psychosocial Wellbeing  Recent Flowsheet Documentation  Taken 2/18/2023 2038 by Marilee Beck, RN  Supportive Measures: active listening utilized  Diversional Activities:   smartphone   television     Problem: Seizure Disorder Comorbidity  Goal: Maintenance of Seizure Control  Outcome: Ongoing, Not Progressing  Intervention: Maintain Seizure-Symptom Control  Recent Flowsheet Documentation  Taken 2/18/2023 2038 by Marilee Beck, RN  Seizure Precautions: side rails padded   Goal Outcome Evaluation:  Plan of Care Reviewed With: patient        Progress: no change  Outcome Evaluation: pt stable on 2L NC (baseline), getting IV fluids and IV antibiotics, VSS, SR on the monitor,  PRN pain and nausea meds, plan of care continued

## 2023-02-20 ENCOUNTER — READMISSION MANAGEMENT (OUTPATIENT)
Dept: CALL CENTER | Facility: HOSPITAL | Age: 58
End: 2023-02-20
Payer: MEDICAID

## 2023-02-20 VITALS
RESPIRATION RATE: 19 BRPM | WEIGHT: 280.4 LBS | DIASTOLIC BLOOD PRESSURE: 64 MMHG | SYSTOLIC BLOOD PRESSURE: 103 MMHG | OXYGEN SATURATION: 100 % | BODY MASS INDEX: 44.01 KG/M2 | TEMPERATURE: 98.3 F | HEART RATE: 71 BPM | HEIGHT: 67 IN

## 2023-02-20 LAB
ANION GAP SERPL CALCULATED.3IONS-SCNC: 8 MMOL/L (ref 5–15)
BASOPHILS # BLD AUTO: 0 10*3/MM3 (ref 0–0.2)
BASOPHILS NFR BLD AUTO: 0.6 % (ref 0–1.5)
BUN SERPL-MCNC: 12 MG/DL (ref 6–20)
BUN/CREAT SERPL: 15.2 (ref 7–25)
CALCIUM SPEC-SCNC: 8.8 MG/DL (ref 8.6–10.5)
CHLORIDE SERPL-SCNC: 108 MMOL/L (ref 98–107)
CO2 SERPL-SCNC: 26 MMOL/L (ref 22–29)
CREAT SERPL-MCNC: 0.79 MG/DL (ref 0.57–1)
DEPRECATED RDW RBC AUTO: 47.3 FL (ref 37–54)
EGFRCR SERPLBLD CKD-EPI 2021: 86.3 ML/MIN/1.73
EOSINOPHIL # BLD AUTO: 0.4 10*3/MM3 (ref 0–0.4)
EOSINOPHIL NFR BLD AUTO: 5.6 % (ref 0.3–6.2)
ERYTHROCYTE [DISTWIDTH] IN BLOOD BY AUTOMATED COUNT: 14.7 % (ref 12.3–15.4)
GLUCOSE SERPL-MCNC: 105 MG/DL (ref 65–99)
HCT VFR BLD AUTO: 35.1 % (ref 34–46.6)
HGB BLD-MCNC: 12.1 G/DL (ref 12–15.9)
LYMPHOCYTES # BLD AUTO: 1.1 10*3/MM3 (ref 0.7–3.1)
LYMPHOCYTES NFR BLD AUTO: 16.5 % (ref 19.6–45.3)
MCH RBC QN AUTO: 30.2 PG (ref 26.6–33)
MCHC RBC AUTO-ENTMCNC: 34.5 G/DL (ref 31.5–35.7)
MCV RBC AUTO: 87.6 FL (ref 79–97)
MONOCYTES # BLD AUTO: 0.5 10*3/MM3 (ref 0.1–0.9)
MONOCYTES NFR BLD AUTO: 6.7 % (ref 5–12)
NEUTROPHILS NFR BLD AUTO: 4.9 10*3/MM3 (ref 1.7–7)
NEUTROPHILS NFR BLD AUTO: 70.6 % (ref 42.7–76)
NRBC BLD AUTO-RTO: 0 /100 WBC (ref 0–0.2)
PLATELET # BLD AUTO: 188 10*3/MM3 (ref 140–450)
PMV BLD AUTO: 7.2 FL (ref 6–12)
POTASSIUM SERPL-SCNC: 4.4 MMOL/L (ref 3.5–5.2)
RBC # BLD AUTO: 4.01 10*6/MM3 (ref 3.77–5.28)
SODIUM SERPL-SCNC: 142 MMOL/L (ref 136–145)
WBC NRBC COR # BLD: 7 10*3/MM3 (ref 3.4–10.8)

## 2023-02-20 PROCEDURE — 80048 BASIC METABOLIC PNL TOTAL CA: CPT | Performed by: NURSE PRACTITIONER

## 2023-02-20 PROCEDURE — 25010000002 CEFTRIAXONE PER 250 MG: Performed by: NURSE PRACTITIONER

## 2023-02-20 PROCEDURE — 85025 COMPLETE CBC W/AUTO DIFF WBC: CPT | Performed by: NURSE PRACTITIONER

## 2023-02-20 PROCEDURE — G0378 HOSPITAL OBSERVATION PER HR: HCPCS

## 2023-02-20 RX ORDER — LORAZEPAM 0.5 MG/1
0.5 TABLET ORAL DAILY PRN
Qty: 6 TABLET | Refills: 0 | Status: SHIPPED | OUTPATIENT
Start: 2023-02-20 | End: 2023-02-25

## 2023-02-20 RX ORDER — SUMATRIPTAN 50 MG/1
TABLET, FILM COATED ORAL
Qty: 30 TABLET | Refills: 0 | Status: SHIPPED | OUTPATIENT
Start: 2023-02-20 | End: 2023-03-31

## 2023-02-20 RX ADMIN — ARIPIPRAZOLE 5 MG: 5 TABLET ORAL at 10:18

## 2023-02-20 RX ADMIN — BUTALBITAL, ACETAMINOPHEN, AND CAFFEINE 1 TABLET: 50; 325; 40 TABLET ORAL at 10:19

## 2023-02-20 RX ADMIN — Medication 10 ML: at 10:24

## 2023-02-20 RX ADMIN — BUSPIRONE HYDROCHLORIDE 5 MG: 5 TABLET ORAL at 10:19

## 2023-02-20 RX ADMIN — ASPIRIN 81 MG CHEWABLE TABLET 162 MG: 81 TABLET CHEWABLE at 10:18

## 2023-02-20 RX ADMIN — CEFTRIAXONE 2 G: 2 INJECTION, POWDER, FOR SOLUTION INTRAMUSCULAR; INTRAVENOUS at 10:21

## 2023-02-20 RX ADMIN — FAMOTIDINE 20 MG: 20 TABLET, FILM COATED ORAL at 07:30

## 2023-02-20 RX ADMIN — GABAPENTIN 100 MG: 100 CAPSULE ORAL at 10:19

## 2023-02-20 NOTE — DISCHARGE PLACEMENT REQUEST
"Eladia Jimenez (57 y.o. Female)     Date of Birth   1965    Social Security Number       Address   [address] [place] [address]    Home Phone   [phone]    MRN   [id number]       [facility]    Marital Status                               Admission Date   23    Admission Type   Emergency    Admitting Provider   Jeff Alicea MD    Attending Provider   Jeff Alicea MD    Department, Room/Bed   [facility], 111       Discharge Date       Discharge Disposition       Discharge Destination                               Attending Provider: Jeff Alicea MD    Allergies: No Known Allergies    Isolation: None   Infection: None   Code Status: CPR    Ht: 170.2 cm (67\")   Wt: 127 kg (280 lb 6.4 oz)    Admission Cmt: None   Principal Problem: Intractable headache, unspecified chronicity pattern, unspecified headache type [R51.9]                 Active Insurance as of 2023     Primary Coverage     Payor Plan Insurance Group Employer/Plan Group    [id number]     Payor Plan Address Payor Plan Phone Number Payor Plan Fax Number Effective Dates    MAIL STOP:   2022 - None Entered    [address] [address]       [facility] [address]       Subscriber Name Subscriber Birth Date Member ID       ELADIA JIMENEZ 1965 [id number]                 Emergency Contacts      (Rel.) Home Phone Work Phone Mobile Phone    BLADEDIMITRI STOUTYLA (Daughter) -- -- [phone]    BLADEFARA STOUT (Daughter) -- -- [phone]          [facility]  [address] [place] [address] [place] [address]  Phone:  [phone]  Fax:  [phone] Date: 2023      Ambulatory Referral to Physical Therapy Evaluate and treat     Patient:  Eladia Jimenez MRN:  [id number]   [address] [place] [address] :  1965  SSN:    Phone: [phone] Sex:  F      INSURANCE " PAYOR PLAN GROUP # SUBSCRIBER ID   Primary:    [place] [id number]      Referring Provider Information:  RANJITH THOMAS Phone: [phone] Fax: [phone]       Referral Information:   # Visits:  1 Referral Type: Physical Therapy [AE1]   Urgency:  Routine Referral Reason: Specialty Services Required   Start Date: Feb 20, 2023 End Date:  To be determined by Insurer   Diagnosis: Weakness (R53.1 [ICD-10-CM] 780.79 [ICD-9-CM])      Refer to Dept:   Refer to Provider:   Refer to Provider Phone:   Refer to Facility:       Specialty needed: Evaluate and treat  Follow-up needed: Yes     This document serves as a request of services and does not constitute Insurance authorization or approval of services.  To determine eligibility, please contact the members Insurance carrier to verify and review coverage.     If you have medical questions regarding this request for services. Please contact [facility] at [phone] during normal business hours.        Authorizing Provider:Ranjith Thomas APRN  Authorizing Provider's NPI: [id number]  Order Entered By: Judy Palmer RN 2/20/2023  9:57 AM     Electronically signed by: Ranjith Thomas APRN 2/20/2023  9:57 AM

## 2023-02-20 NOTE — PLAN OF CARE
Goal Outcome Evaluation:  Plan of Care Reviewed With: patient           Outcome Evaluation: Patient continues to report intractable headache, however patient does report relief with Fioricet. Patient was to discharge home this evening however she did not have transportation. She will possibly discharge home tomorrow.

## 2023-02-20 NOTE — CASE MANAGEMENT/SOCIAL WORK
Social Work Assessment  [facility]     Patient Name: Eladia Connor  MRN: [id number]  Today's Date: 2/20/2023    Admit Date: 2/17/2023       Discharge Plan     Row Name 02/20/23 1622       Plan    Plan Comments LSW consulted due to patient needing food assistance, utility assistance, transportation services and a [employer] referral for CG services. LSW met with patient at bedside and provided patient with a county resource guide with transportation services listed, utility assistance, and food pantries listed. LSW discussed [employer] services and patient agreeable to wanting a [employer] referral placed. LSW completed and submittted [employer] referral for caregiver assistance and assistance with helping patient sign up for the waiver where her daughter could be her caregiver.                 Met with patient in room wearing PPE: mask.    Maintained distance greater than six feet and spent less than 15 minutes in the room.      GRACIELA Borja, YOLYW    Phone: [phone]  Cell: [phone]  Fax: [phone]  [email or web address]

## 2023-02-20 NOTE — CASE MANAGEMENT/SOCIAL WORK
Continued Stay Note  REJI Lombardo     Patient Name: Eladia Connor  MRN: [id number]  Today's Date: 2/20/2023    Admit Date: 2/17/2023    Plan: Home with daughter. Referral sent to [facility] for OP PT, order faxed. [id number].   Discharge Plan     Row Name 02/20/23 0959       Plan    Plan Home with daughter. Referral sent to [facility] for OP PT, order faxed. [id number].    Plan Comments Patient will discharge home with daughter. Patient wants [facility] in [place], [place] for OP PT. CM called office, and spoke to DEBRA Olivier faxed order and face sheet to [phone]. Per Charline, will call patient to schedule. D/C barriers: pending psych.              Phone communication or documentation only - no physical contact with patient or family.        Judy Palmer RN
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
## 2023-02-20 NOTE — CASE MANAGEMENT/SOCIAL WORK
Continued Stay Note   [facility]     Patient Name: Eladia Connor  MRN: [id number]  Today's Date: 2/20/2023    Admit Date: 2/17/2023    Plan: Home with daughter. [facility] referral sent. SW consulted.   Discharge Plan     Row Name 02/20/23 1447       Plan    Plan Home with daughter. [facility] referral sent. SW consulted.    Plan Comments CM confirmed with Joe with [facility] that referral was recieved. Per Joe, he has referral and will call patient to schedule OP PT. CM spoke to patient regarding SDOH. Patient stated she has applied for disability and it is pending. Patient's had issues getting to appointments because of transportation, CM informed patient that [facility] has transportation services. Patient is now aware to call to schedule rides for appointments. Patient stated her daughter wants to be her CG, patient agreeable to [facility] referral, CM informed SW. Patient stated she needs more portable tanks from [facility]. Patient discharged before rep could bring tanks to room, per rep patient can call [facility] to have tanks delivered to home. CM called patient's cell to inform, CM left VM.                        Met with patient in room wearing PPE: mask.      Maintained distance greater than six feet and spent less than 15 minutes in the room.          Judy Palmer RN     ==============================================================    Case Management Discharge Note      Final Note: Home              Transportation Services  Private: Car    Final Discharge Disposition Code: 01 - home or self-care

## 2023-02-20 NOTE — PLAN OF CARE
Goal Outcome Evaluation:               No c/o headache this evening. No new concerns. Resting with eyes closed at this time.

## 2023-02-20 NOTE — PLAN OF CARE
Goal Outcome Evaluation:  Plan of Care Reviewed With: patient        Progress: improving  Outcome Evaluation: Patient discharging home and will follow up outpatient.

## 2023-02-21 ENCOUNTER — TRANSITIONAL CARE MANAGEMENT TELEPHONE ENCOUNTER (OUTPATIENT)
Dept: CALL CENTER | Facility: HOSPITAL | Age: 58
End: 2023-02-21
Payer: MEDICAID

## 2023-02-21 NOTE — OUTREACH NOTE
Prep Survey    Flowsheet Row Responses   [facility] facility patient discharged from? [facility]   Is LACE score < 7 ? No   Eligibility [facility]   Hospital [facility]   Date of Admission 02/17/23   Date of Discharge 02/20/23   Discharge Disposition Home or Self Care   Discharge diagnosis Intractable headache    Does the patient have one of the following disease processes/diagnoses(primary or secondary)? Other   Does the patient have Home health ordered? No   Is there a DME ordered? No   Comments regarding appointments has difficulty with transportation to appointments   Prep survey completed? Yes          Ina BERKOWITZ - Registered Nurse

## 2023-02-22 ENCOUNTER — OFFICE VISIT (OUTPATIENT)
Dept: FAMILY MEDICINE CLINIC | Facility: CLINIC | Age: 58
End: 2023-02-22
Payer: MEDICAID

## 2023-02-22 VITALS
HEART RATE: 74 BPM | HEIGHT: 67 IN | OXYGEN SATURATION: 99 % | BODY MASS INDEX: 44.67 KG/M2 | SYSTOLIC BLOOD PRESSURE: 98 MMHG | TEMPERATURE: 97.4 F | DIASTOLIC BLOOD PRESSURE: 66 MMHG | WEIGHT: 284.6 LBS

## 2023-02-22 DIAGNOSIS — M25.561 ACUTE PAIN OF RIGHT KNEE: ICD-10-CM

## 2023-02-22 DIAGNOSIS — E78.5 HYPERLIPIDEMIA, UNSPECIFIED HYPERLIPIDEMIA TYPE: ICD-10-CM

## 2023-02-22 DIAGNOSIS — Z13.228 SCREENING FOR ENDOCRINE, METABOLIC AND IMMUNITY DISORDER: ICD-10-CM

## 2023-02-22 DIAGNOSIS — G43.111 INTRACTABLE MIGRAINE WITH AURA WITH STATUS MIGRAINOSUS: ICD-10-CM

## 2023-02-22 DIAGNOSIS — F51.01 PRIMARY INSOMNIA: ICD-10-CM

## 2023-02-22 DIAGNOSIS — Z13.29 SCREENING FOR ENDOCRINE, METABOLIC AND IMMUNITY DISORDER: ICD-10-CM

## 2023-02-22 DIAGNOSIS — Z09 HOSPITAL DISCHARGE FOLLOW-UP: Primary | ICD-10-CM

## 2023-02-22 DIAGNOSIS — F33.1 MODERATE EPISODE OF RECURRENT MAJOR DEPRESSIVE DISORDER: ICD-10-CM

## 2023-02-22 DIAGNOSIS — R45.86 MOOD SWINGS: ICD-10-CM

## 2023-02-22 DIAGNOSIS — Z13.0 SCREENING FOR ENDOCRINE, METABOLIC AND IMMUNITY DISORDER: ICD-10-CM

## 2023-02-22 DIAGNOSIS — R74.8 ELEVATED LIVER ENZYMES: ICD-10-CM

## 2023-02-22 PROCEDURE — 99215 OFFICE O/P EST HI 40 MIN: CPT | Performed by: REGISTERED NURSE

## 2023-02-22 PROCEDURE — T1015 CLINIC SERVICE: HCPCS | Performed by: REGISTERED NURSE

## 2023-02-22 PROCEDURE — 82977 ASSAY OF GGT: CPT | Performed by: REGISTERED NURSE

## 2023-02-22 PROCEDURE — 85025 COMPLETE CBC W/AUTO DIFF WBC: CPT | Performed by: REGISTERED NURSE

## 2023-02-22 PROCEDURE — 80053 COMPREHEN METABOLIC PANEL: CPT | Performed by: REGISTERED NURSE

## 2023-02-22 RX ORDER — ARIPIPRAZOLE 5 MG/1
5 TABLET ORAL DAILY
Qty: 90 TABLET | Refills: 1 | Status: SHIPPED | OUTPATIENT
Start: 2023-02-22 | End: 2023-04-03 | Stop reason: SDUPTHER

## 2023-02-22 RX ORDER — RIMEGEPANT SULFATE 75 MG/75MG
75 TABLET, ORALLY DISINTEGRATING ORAL DAILY PRN
Qty: 12 TABLET | Refills: 3 | Status: SHIPPED | OUTPATIENT
Start: 2023-02-22 | End: 2023-03-14 | Stop reason: SDUPTHER

## 2023-02-22 RX ORDER — ZOLPIDEM TARTRATE 10 MG/1
10 TABLET ORAL NIGHTLY PRN
Qty: 90 TABLET | Refills: 0 | Status: SHIPPED | OUTPATIENT
Start: 2023-02-22 | End: 2023-04-03 | Stop reason: SDUPTHER

## 2023-02-22 RX ORDER — ATORVASTATIN CALCIUM 80 MG/1
80 TABLET, FILM COATED ORAL NIGHTLY
Qty: 90 TABLET | Refills: 1 | Status: SHIPPED | OUTPATIENT
Start: 2023-02-22 | End: 2023-04-03 | Stop reason: SDUPTHER

## 2023-02-22 RX ORDER — RIMEGEPANT SULFATE 75 MG/75MG
75 TABLET, ORALLY DISINTEGRATING ORAL DAILY
Qty: 2 TABLET | Refills: 0 | COMMUNITY
Start: 2023-02-22 | End: 2023-03-31

## 2023-02-22 RX ORDER — GABAPENTIN 100 MG/1
100 CAPSULE ORAL 2 TIMES DAILY
Qty: 60 CAPSULE | Refills: 0 | Status: SHIPPED | OUTPATIENT
Start: 2023-02-22 | End: 2023-04-03 | Stop reason: SDUPTHER

## 2023-02-22 NOTE — PROGRESS NOTES
Chief Complaint  Med Refill (All Medications)    Subjective    History of Present Illness {CC  Problem List  Visit  Diagnosis   Encounters  Notes  Medications  Labs  Result Review Imaging  Media :23}     Eladia Connor presents to [facility] for Med Refill (All Medications).    History of Present Illness  Patient is a 57-year-old female who presents to the clinic today for 1 week follow-up for recent hospital visit on February 17 for migraine.  Patient denies any chest pain, shortness of breath, or any fevers patient denies any known exposure to COVID, flu, or any other contagious illnesses.    Patient appears well at today's visit.  She is still on supplemental oxygen at 2 L.  Patient is alert and oriented with no concerns at this time she does share that her migraine was so bad it caused her to go to the ER.  She is discussing options with her neurologist and in the meantime I offered her some samples of Nurtec.       Review of Systems   Constitutional: Negative.  Negative for activity change, chills, fatigue and fever.   HENT: Negative for congestion, dental problem, ear pain, hearing loss, rhinorrhea, sinus pain, sore throat, tinnitus and trouble swallowing.    Eyes: Negative.  Negative for pain and visual disturbance.   Respiratory: Negative.  Negative for cough, chest tightness, shortness of breath and wheezing.    Cardiovascular: Negative.  Negative for chest pain, palpitations and leg swelling.   Gastrointestinal: Negative.  Negative for abdominal pain, diarrhea, nausea and vomiting.   Endocrine: Negative.  Negative for polydipsia, polyphagia and polyuria.   Genitourinary: Negative.  Negative for difficulty urinating, dysuria, frequency and urgency.   Musculoskeletal: Negative.  Negative for arthralgias, back pain and myalgias.   Skin: Negative.  Negative for color change, pallor, rash and wound.   Allergic/Immunologic: Negative.  Negative for environmental  "allergies.   Neurological: Positive for headaches. Negative for dizziness, speech difficulty, weakness, light-headedness and numbness.   Hematological: Negative.    Psychiatric/Behavioral: Negative.  Negative for confusion, decreased concentration, self-injury and suicidal ideas. The patient is not nervous/anxious.    All other systems reviewed and are negative.       Objective     Vital Signs:   BP 98/66 (BP Location: Right arm, Patient Position: Sitting, Cuff Size: Large Adult)   Pulse 74   Temp 97.4 °F (36.3 °C) (Oral)   Ht 170.2 cm (67\")   Wt 129 kg (284 lb 9.6 oz)   SpO2 99%   BMI 44.57 kg/m²   Current Outpatient Medications on File Prior to Visit   Medication Sig Dispense Refill   • albuterol sulfate  (90 Base) MCG/ACT inhaler Inhale 2 puffs Every 6 (Six) Hours As Needed for Wheezing. 18 g 2   • aspirin 81 MG chewable tablet Chew 2 tablets Daily.     • busPIRone (BUSPAR) 5 MG tablet Take 1 tablet by mouth 3 (Three) Times a Day. 30 tablet 5   • famotidine (PEPCID) 20 MG tablet Take 1 tablet by mouth 2 (Two) Times a Day Before Meals.     • LORazepam (ATIVAN) 0.5 MG tablet Take 1 tablet by mouth Daily As Needed for Anxiety for up to 5 days. 6 tablet 0   • SUMAtriptan (IMITREX) 50 MG tablet Take one tablet at onset of headache. May repeat dose one time in 2 hours if headache not relieved. 30 tablet 0   • [DISCONTINUED] ARIPiprazole (ABILIFY) 5 MG tablet Take 1 tablet by mouth Daily. 30 tablet 1   • [DISCONTINUED] atorvastatin (LIPITOR) 80 MG tablet Take 1 tablet by mouth Every Night. 90 tablet    • [DISCONTINUED] gabapentin (NEURONTIN) 100 MG capsule Take 1 capsule by mouth 2 (Two) Times a Day. 60 capsule 0   • [DISCONTINUED] zolpidem (AMBIEN) 10 MG tablet Take 10 mg by mouth At Night As Needed.       No current facility-administered medications on file prior to visit.        Past Medical History:   Diagnosis Date   • Anxiety    • Asthma    • Cancer (HCC)     SKIN   • Depression    • Psoriasis    • " Seizures (HCC) 12/06/2022      Past Surgical History:   Procedure Laterality Date   • CHOLECYSTECTOMY  1987   • ENDOSCOPY N/A 12/30/2022    Procedure: ESOPHAGOGASTRODUODENOSCOPY with gastric biopsy and esophageal dilation #50,#54,#56,#58 bougie;  Surgeon: Patience Arana MD;  Location: [facility] ENDOSCOPY;  Service: Gastroenterology;  Laterality: N/A;  gastritis   • EYE SURGERY     • KNEE SURGERY        Family History   Problem Relation Age of Onset   • Heart disease Mother    • Breast cancer Mother    • Heart disease Father    • Pancreatic cancer Father    • Heart disease Sister    • Lung cancer Brother       Social History     Socioeconomic History   • Marital status:    Tobacco Use   • Smoking status: Never   • Smokeless tobacco: Never   Vaping Use   • Vaping Use: Never used   Substance and Sexual Activity   • Alcohol use: Never   • Drug use: Never   • Sexual activity: Defer         Admission on 02/17/2023, Discharged on 02/20/2023   Component Date Value Ref Range Status   • Extra Tube 02/17/2023 Hold for add-ons.   Final    Auto resulted.   • Extra Tube 02/17/2023 hold for add-on   Final    Auto resulted   • Extra Tube 02/17/2023 Hold for add-ons.   Final    Auto resulted.   • Extra Tube 02/17/2023 Hold for add-ons.   Final    Auto resulted   • Glucose 02/17/2023 149 (H)  65 - 99 mg/dL Final   • BUN 02/17/2023 14  6 - 20 mg/dL Final   • Creatinine 02/17/2023 0.80  0.57 - 1.00 mg/dL Final   • Sodium 02/17/2023 138  136 - 145 mmol/L Final   • Potassium 02/17/2023 4.1  3.5 - 5.2 mmol/L Final    Slight hemolysis detected by analyzer. Results may be affected.   • Chloride 02/17/2023 102  98 - 107 mmol/L Final   • CO2 02/17/2023 22.0  22.0 - 29.0 mmol/L Final   • Calcium 02/17/2023 10.0  8.6 - 10.5 mg/dL Final   • Total Protein 02/17/2023 7.8  6.0 - 8.5 g/dL Final   • Albumin 02/17/2023 3.9  3.5 - 5.2 g/dL Final   • ALT (SGPT) 02/17/2023 22  1 - 33 U/L Final   • AST (SGOT) 02/17/2023 31  1 - 32 U/L Final   •  Alkaline Phosphatase 02/17/2023 171 (H)  39 - 117 U/L Final   • Total Bilirubin 02/17/2023 0.6  0.0 - 1.2 mg/dL Final   • Globulin 02/17/2023 3.9  gm/dL Final   • A/G Ratio 02/17/2023 1.0  g/dL Final   • BUN/Creatinine Ratio 02/17/2023 17.5  7.0 - 25.0 Final   • Anion Gap 02/17/2023 14.0  5.0 - 15.0 mmol/L Final   • eGFR 02/17/2023 85.0  >60.0 mL/min/1.73 Final   • Sed Rate 02/17/2023 31 (H)  0 - 30 mm/hr Final   • Magnesium 02/17/2023 2.1  1.6 - 2.6 mg/dL Final   • TSH 02/17/2023 2.070  0.270 - 4.200 uIU/mL Final   • WBC 02/17/2023 9.10  3.40 - 10.80 10*3/mm3 Final   • RBC 02/17/2023 4.54  3.77 - 5.28 10*6/mm3 Final   • Hemoglobin 02/17/2023 13.1  12.0 - 15.9 g/dL Final   • Hematocrit 02/17/2023 40.6  34.0 - 46.6 % Final   • MCV 02/17/2023 89.4  79.0 - 97.0 fL Final   • MCH 02/17/2023 28.8  26.6 - 33.0 pg Final   • MCHC 02/17/2023 32.2  31.5 - 35.7 g/dL Final   • RDW 02/17/2023 14.6  12.3 - 15.4 % Final   • RDW-SD 02/17/2023 45.5  37.0 - 54.0 fl Final   • MPV 02/17/2023 6.8  6.0 - 12.0 fL Final   • Platelets 02/17/2023 252  140 - 450 10*3/mm3 Final   • Neutrophil % 02/17/2023 75.5  42.7 - 76.0 % Final   • Lymphocyte % 02/17/2023 14.6 (L)  19.6 - 45.3 % Final   • Monocyte % 02/17/2023 5.6  5.0 - 12.0 % Final   • Eosinophil % 02/17/2023 3.7  0.3 - 6.2 % Final   • Basophil % 02/17/2023 0.6  0.0 - 1.5 % Final   • Neutrophils, Absolute 02/17/2023 6.90  1.70 - 7.00 10*3/mm3 Final   • Lymphocytes, Absolute 02/17/2023 1.30  0.70 - 3.10 10*3/mm3 Final   • Monocytes, Absolute 02/17/2023 0.50  0.10 - 0.90 10*3/mm3 Final   • Eosinophils, Absolute 02/17/2023 0.30  0.00 - 0.40 10*3/mm3 Final   • Basophils, Absolute 02/17/2023 0.10  0.00 - 0.20 10*3/mm3 Final   • nRBC 02/17/2023 0.0  0.0 - 0.2 /100 WBC Final   • Glucose 02/17/2023 135 (H)  70 - 105 mg/dL Final    Serial Number: [id number]:  [id number]   • Carbon Monoxide, Blood 02/17/2023 1.7  0 - 3 % Final   • QT Interval 02/17/2023 418  ms Final   • Amphet/Methamphet,  Screen 02/17/2023 Negative  Negative Final   • Barbiturates Screen, Urine 02/17/2023 Negative  Negative Final   • Benzodiazepine Screen, Urine 02/17/2023 Negative  Negative Final   • Cocaine Screen, Urine 02/17/2023 Negative  Negative Final   • Opiate Screen 02/17/2023 Negative  Negative Final   • THC, Screen, Urine 02/17/2023 Negative  Negative Final   • Methadone Screen, Urine 02/17/2023 Negative  Negative Final   • Oxycodone Screen, Urine 02/17/2023 Negative  Negative Final   • Color, UA 02/18/2023 Yellow  Yellow, Straw Final   • Appearance, UA 02/18/2023 Clear  Clear Final   • pH, UA 02/18/2023 <=5.0  5.0 - 8.0 Final   • Specific Gravity, UA 02/18/2023 1.038 (H)  1.005 - 1.030 Final   • Glucose, UA 02/18/2023 Negative  Negative Final   • Ketones, UA 02/18/2023 Trace (A)  Negative Final   • Bilirubin, UA 02/18/2023 Negative  Negative Final   • Blood, UA 02/18/2023 Negative  Negative Final   • Protein, UA 02/18/2023 Negative  Negative Final   • Leuk Esterase, UA 02/18/2023 Trace (A)  Negative Final   • Nitrite, UA 02/18/2023 Negative  Negative Final   • Urobilinogen, UA 02/18/2023 1.0 E.U./dL  0.2 - 1.0 E.U./dL Final   • Glucose 02/18/2023 139 (H)  65 - 99 mg/dL Final   • BUN 02/18/2023 14  6 - 20 mg/dL Final   • Creatinine 02/18/2023 0.82  0.57 - 1.00 mg/dL Final   • Sodium 02/18/2023 140  136 - 145 mmol/L Final   • Potassium 02/18/2023 4.3  3.5 - 5.2 mmol/L Final   • Chloride 02/18/2023 101  98 - 107 mmol/L Final   • CO2 02/18/2023 28.0  22.0 - 29.0 mmol/L Final   • Calcium 02/18/2023 9.3  8.6 - 10.5 mg/dL Final   • BUN/Creatinine Ratio 02/18/2023 17.1  7.0 - 25.0 Final   • Anion Gap 02/18/2023 11.0  5.0 - 15.0 mmol/L Final   • eGFR 02/18/2023 82.5  >60.0 mL/min/1.73 Final   • WBC 02/18/2023 6.90  3.40 - 10.80 10*3/mm3 Final   • RBC 02/18/2023 4.21  3.77 - 5.28 10*6/mm3 Final   • Hemoglobin 02/18/2023 12.4  12.0 - 15.9 g/dL Final   • Hematocrit 02/18/2023 36.6  34.0 - 46.6 % Final   • MCV 02/18/2023 86.9  79.0 -  97.0 fL Final   • MCH 02/18/2023 29.4  26.6 - 33.0 pg Final   • MCHC 02/18/2023 33.8  31.5 - 35.7 g/dL Final   • RDW 02/18/2023 14.5  12.3 - 15.4 % Final   • RDW-SD 02/18/2023 46.8  37.0 - 54.0 fl Final   • MPV 02/18/2023 6.8  6.0 - 12.0 fL Final   • Platelets 02/18/2023 206  140 - 450 10*3/mm3 Final   • Neutrophil % 02/18/2023 70.1  42.7 - 76.0 % Final   • Lymphocyte % 02/18/2023 16.5 (L)  19.6 - 45.3 % Final   • Monocyte % 02/18/2023 6.7  5.0 - 12.0 % Final   • Eosinophil % 02/18/2023 6.0  0.3 - 6.2 % Final   • Basophil % 02/18/2023 0.7  0.0 - 1.5 % Final   • Neutrophils, Absolute 02/18/2023 4.80  1.70 - 7.00 10*3/mm3 Final   • Lymphocytes, Absolute 02/18/2023 1.10  0.70 - 3.10 10*3/mm3 Final   • Monocytes, Absolute 02/18/2023 0.50  0.10 - 0.90 10*3/mm3 Final   • Eosinophils, Absolute 02/18/2023 0.40  0.00 - 0.40 10*3/mm3 Final   • Basophils, Absolute 02/18/2023 0.00  0.00 - 0.20 10*3/mm3 Final   • nRBC 02/18/2023 0.0  0.0 - 0.2 /100 WBC Final   • RBC, UA 02/18/2023 0-2 (A)  None Seen /HPF Final   • WBC, UA 02/18/2023 6-12 (A)  None Seen /HPF Final   • Bacteria, UA 02/18/2023 2+ (A)  None Seen /HPF Final   • Squamous Epithelial Cells, UA 02/18/2023 3-6 (A)  None Seen, 0-2 /HPF Final   • Hyaline Casts, UA 02/18/2023 3-6  None Seen /LPF Final   • Methodology 02/18/2023 Automated Microscopy   Final   • Urine Culture 02/18/2023 50,000 CFU/mL Mixed Selam Isolated   Final   • Glucose 02/19/2023 112 (H)  65 - 99 mg/dL Final   • BUN 02/19/2023 13  6 - 20 mg/dL Final   • Creatinine 02/19/2023 0.92  0.57 - 1.00 mg/dL Final   • Sodium 02/19/2023 141  136 - 145 mmol/L Final   • Potassium 02/19/2023 4.2  3.5 - 5.2 mmol/L Final   • Chloride 02/19/2023 106  98 - 107 mmol/L Final   • CO2 02/19/2023 27.0  22.0 - 29.0 mmol/L Final   • Calcium 02/19/2023 8.8  8.6 - 10.5 mg/dL Final   • BUN/Creatinine Ratio 02/19/2023 14.1  7.0 - 25.0 Final   • Anion Gap 02/19/2023 8.0  5.0 - 15.0 mmol/L Final   • eGFR 02/19/2023 71.9  >60.0  mL/min/1.73 Final   • WBC 02/19/2023 6.20  3.40 - 10.80 10*3/mm3 Final   • RBC 02/19/2023 4.17  3.77 - 5.28 10*6/mm3 Final   • Hemoglobin 02/19/2023 11.9 (L)  12.0 - 15.9 g/dL Final   • Hematocrit 02/19/2023 36.6  34.0 - 46.6 % Final   • MCV 02/19/2023 87.9  79.0 - 97.0 fL Final   • MCH 02/19/2023 28.5  26.6 - 33.0 pg Final   • MCHC 02/19/2023 32.4  31.5 - 35.7 g/dL Final   • RDW 02/19/2023 14.8  12.3 - 15.4 % Final   • RDW-SD 02/19/2023 45.5  37.0 - 54.0 fl Final   • MPV 02/19/2023 6.9  6.0 - 12.0 fL Final   • Platelets 02/19/2023 200  140 - 450 10*3/mm3 Final   • Neutrophil % 02/19/2023 62.6  42.7 - 76.0 % Final   • Lymphocyte % 02/19/2023 22.1  19.6 - 45.3 % Final   • Monocyte % 02/19/2023 8.4  5.0 - 12.0 % Final   • Eosinophil % 02/19/2023 6.1  0.3 - 6.2 % Final   • Basophil % 02/19/2023 0.8  0.0 - 1.5 % Final   • Neutrophils, Absolute 02/19/2023 3.90  1.70 - 7.00 10*3/mm3 Final   • Lymphocytes, Absolute 02/19/2023 1.40  0.70 - 3.10 10*3/mm3 Final   • Monocytes, Absolute 02/19/2023 0.50  0.10 - 0.90 10*3/mm3 Final   • Eosinophils, Absolute 02/19/2023 0.40  0.00 - 0.40 10*3/mm3 Final   • Basophils, Absolute 02/19/2023 0.00  0.00 - 0.20 10*3/mm3 Final   • nRBC 02/19/2023 0.0  0.0 - 0.2 /100 WBC Final   • Glucose 02/20/2023 105 (H)  65 - 99 mg/dL Final   • BUN 02/20/2023 12  6 - 20 mg/dL Final   • Creatinine 02/20/2023 0.79  0.57 - 1.00 mg/dL Final   • Sodium 02/20/2023 142  136 - 145 mmol/L Final   • Potassium 02/20/2023 4.4  3.5 - 5.2 mmol/L Final    Slight hemolysis detected by analyzer. Results may be affected.   • Chloride 02/20/2023 108 (H)  98 - 107 mmol/L Final   • CO2 02/20/2023 26.0  22.0 - 29.0 mmol/L Final   • Calcium 02/20/2023 8.8  8.6 - 10.5 mg/dL Final   • BUN/Creatinine Ratio 02/20/2023 15.2  7.0 - 25.0 Final   • Anion Gap 02/20/2023 8.0  5.0 - 15.0 mmol/L Final   • eGFR 02/20/2023 86.3  >60.0 mL/min/1.73 Final   • WBC 02/20/2023 7.00  3.40 - 10.80 10*3/mm3 Final   • RBC 02/20/2023 4.01  3.77 -  5.28 10*6/mm3 Final   • Hemoglobin 02/20/2023 12.1  12.0 - 15.9 g/dL Final   • Hematocrit 02/20/2023 35.1  34.0 - 46.6 % Final   • MCV 02/20/2023 87.6  79.0 - 97.0 fL Final   • MCH 02/20/2023 30.2  26.6 - 33.0 pg Final   • MCHC 02/20/2023 34.5  31.5 - 35.7 g/dL Final   • RDW 02/20/2023 14.7  12.3 - 15.4 % Final   • RDW-SD 02/20/2023 47.3  37.0 - 54.0 fl Final   • MPV 02/20/2023 7.2  6.0 - 12.0 fL Final   • Platelets 02/20/2023 188  140 - 450 10*3/mm3 Final   • Neutrophil % 02/20/2023 70.6  42.7 - 76.0 % Final   • Lymphocyte % 02/20/2023 16.5 (L)  19.6 - 45.3 % Final   • Monocyte % 02/20/2023 6.7  5.0 - 12.0 % Final   • Eosinophil % 02/20/2023 5.6  0.3 - 6.2 % Final   • Basophil % 02/20/2023 0.6  0.0 - 1.5 % Final   • Neutrophils, Absolute 02/20/2023 4.90  1.70 - 7.00 10*3/mm3 Final   • Lymphocytes, Absolute 02/20/2023 1.10  0.70 - 3.10 10*3/mm3 Final   • Monocytes, Absolute 02/20/2023 0.50  0.10 - 0.90 10*3/mm3 Final   • Eosinophils, Absolute 02/20/2023 0.40  0.00 - 0.40 10*3/mm3 Final   • Basophils, Absolute 02/20/2023 0.00  0.00 - 0.20 10*3/mm3 Final   • nRBC 02/20/2023 0.0  0.0 - 0.2 /100 WBC Final   Hospital Outpatient Visit on 02/02/2023   Component Date Value Ref Range Status   • Target HR (85%) 02/02/2023 137  bpm In process   • Max. Pred. HR (100%) 02/02/2023 161  bpm In process   Hospital Outpatient Visit on 02/02/2023   Component Date Value Ref Range Status   • Target HR (85%) 02/02/2023 137  bpm Final   • Max. Pred. HR (100%) 02/02/2023 161  bpm Final   • BH CV STRESS PROTOCOL 1 02/02/2023 Pharmacologic   Final   • Stage 1 02/02/2023 1   Final   • Duration Min Stage 1 02/02/2023 0   Final   • Duration Sec Stage 1 02/02/2023 10   Final   • Stress Dose Regadenoson Stage 1 02/02/2023 0.4   Final   • Stress Comments Stage 1 02/02/2023 10 sec bolus injection   Final   • Baseline HR 02/02/2023 71  bpm Final   • Baseline BP 02/02/2023 120/78  mmHg Final   • Peak HR 02/02/2023 91  bpm Final   • Percent Max Pred  HR 02/02/2023 56.52  % Final   • Percent Target HR 02/02/2023 66  % Final   • Peak BP 02/02/2023 126/78  mmHg Final   • Recovery HR 02/02/2023 83  bpm Final   • Recovery BP 02/02/2023 110/60  mmHg Final   • BH CV REST NUCLEAR ISOTOPE DOSE 02/02/2023 11.5  mCi Final   • BH CV STRESS NUCLEAR ISOTOPE DOSE 02/02/2023 28.5  mCi Final   • Nuc Stress EF 02/02/2023 65  % Final   Orders Only on 01/23/2023   Component Date Value Ref Range Status   • Glucose 01/23/2023 224 (H)  65 - 99 mg/dL Final   • BUN 01/23/2023 11  6 - 20 mg/dL Final   • Creatinine 01/23/2023 0.70  0.57 - 1.00 mg/dL Final   • Sodium 01/23/2023 138  136 - 145 mmol/L Final   • Potassium 01/23/2023 3.7  3.5 - 5.2 mmol/L Final   • Chloride 01/23/2023 103  98 - 107 mmol/L Final   • CO2 01/23/2023 24.5  22.0 - 29.0 mmol/L Final   • Calcium 01/23/2023 9.7  8.6 - 10.5 mg/dL Final   • Total Protein 01/23/2023 7.8  6.0 - 8.5 g/dL Final   • Albumin 01/23/2023 4.1  3.5 - 5.2 g/dL Final   • ALT (SGPT) 01/23/2023 64 (H)  1 - 33 U/L Final   • AST (SGOT) 01/23/2023 57 (H)  1 - 32 U/L Final   • Alkaline Phosphatase 01/23/2023 298 (H)  39 - 117 U/L Final   • Total Bilirubin 01/23/2023 0.6  0.0 - 1.2 mg/dL Final   • Globulin 01/23/2023 3.7  gm/dL Final   • A/G Ratio 01/23/2023 1.1  g/dL Final   • BUN/Creatinine Ratio 01/23/2023 15.7  7.0 - 25.0 Final   • Anion Gap 01/23/2023 10.5  5.0 - 15.0 mmol/L Final   • eGFR 01/23/2023 99.8  >60.0 mL/min/1.73 Final   • GGT 01/23/2023 210 (H)  5 - 36 U/L Final   • WBC 01/23/2023 7.52  3.40 - 10.80 10*3/mm3 Final   • RBC 01/23/2023 4.43  3.77 - 5.28 10*6/mm3 Final   • Hemoglobin 01/23/2023 12.8  12.0 - 15.9 g/dL Final   • Hematocrit 01/23/2023 38.9  34.0 - 46.6 % Final   • MCV 01/23/2023 87.8  79.0 - 97.0 fL Final   • MCH 01/23/2023 28.9  26.6 - 33.0 pg Final   • MCHC 01/23/2023 32.9  31.5 - 35.7 g/dL Final   • RDW 01/23/2023 13.9  12.3 - 15.4 % Final   • RDW-SD 01/23/2023 44.7  37.0 - 54.0 fl Final   • MPV 01/23/2023 9.7  6.0 - 12.0 fL  Final   • Platelets 01/23/2023 221  140 - 450 10*3/mm3 Final   • Neutrophil % 01/23/2023 68.8  42.7 - 76.0 % Final   • Lymphocyte % 01/23/2023 13.0 (L)  19.6 - 45.3 % Final   • Monocyte % 01/23/2023 5.5  5.0 - 12.0 % Final   • Eosinophil % 01/23/2023 11.2 (H)  0.3 - 6.2 % Final   • Basophil % 01/23/2023 1.2  0.0 - 1.5 % Final   • Immature Grans % 01/23/2023 0.3  0.0 - 0.5 % Final   • Neutrophils, Absolute 01/23/2023 5.18  1.70 - 7.00 10*3/mm3 Final   • Lymphocytes, Absolute 01/23/2023 0.98  0.70 - 3.10 10*3/mm3 Final   • Monocytes, Absolute 01/23/2023 0.41  0.10 - 0.90 10*3/mm3 Final   • Eosinophils, Absolute 01/23/2023 0.84 (H)  0.00 - 0.40 10*3/mm3 Final   • Basophils, Absolute 01/23/2023 0.09  0.00 - 0.20 10*3/mm3 Final   • Immature Grans, Absolute 01/23/2023 0.02  0.00 - 0.05 10*3/mm3 Final   • nRBC 01/23/2023 0.0  0.0 - 0.2 /100 WBC Final   Admission on 01/20/2023, Discharged on 01/20/2023   Component Date Value Ref Range Status   • Glucose 01/20/2023 128 (H)  65 - 99 mg/dL Final   • BUN 01/20/2023 12  6 - 20 mg/dL Final   • Creatinine 01/20/2023 0.73  0.57 - 1.00 mg/dL Final   • Sodium 01/20/2023 139  136 - 145 mmol/L Final   • Potassium 01/20/2023 3.9  3.5 - 5.2 mmol/L Final   • Chloride 01/20/2023 104  98 - 107 mmol/L Final   • CO2 01/20/2023 23.0  22.0 - 29.0 mmol/L Final   • Calcium 01/20/2023 9.9  8.6 - 10.5 mg/dL Final   • Total Protein 01/20/2023 8.0  6.0 - 8.5 g/dL Final   • Albumin 01/20/2023 4.1  3.5 - 5.2 g/dL Final   • ALT (SGPT) 01/20/2023 96 (H)  1 - 33 U/L Final   • AST (SGOT) 01/20/2023 72 (H)  1 - 32 U/L Final   • Alkaline Phosphatase 01/20/2023 364 (H)  39 - 117 U/L Final   • Total Bilirubin 01/20/2023 0.6  0.0 - 1.2 mg/dL Final   • Globulin 01/20/2023 3.9  gm/dL Final   • A/G Ratio 01/20/2023 1.1  g/dL Final   • BUN/Creatinine Ratio 01/20/2023 16.4  7.0 - 25.0 Final   • Anion Gap 01/20/2023 12.0  5.0 - 15.0 mmol/L Final   • eGFR 01/20/2023 94.9  >60.0 mL/min/1.73 Final   • Lipase 01/20/2023  16  13 - 60 U/L Final   • Color, UA 01/20/2023 Yellow  Yellow, Straw Final   • Appearance, UA 01/20/2023 Clear  Clear Final   • pH, UA 01/20/2023 8.5 (H)  5.0 - 8.0 Final   • Specific Gravity, UA 01/20/2023 1.012  1.005 - 1.030 Final   • Glucose, UA 01/20/2023 Negative  Negative Final   • Ketones, UA 01/20/2023 Negative  Negative Final   • Bilirubin, UA 01/20/2023 Negative  Negative Final   • Blood, UA 01/20/2023 Negative  Negative Final   • Protein, UA 01/20/2023 Negative  Negative Final   • Leuk Esterase, UA 01/20/2023 Trace (A)  Negative Final   • Nitrite, UA 01/20/2023 Negative  Negative Final   • Urobilinogen, UA 01/20/2023 1.0 E.U./dL  0.2 - 1.0 E.U./dL Final   • QT Interval 01/20/2023 369  ms Preliminary   • Troponin T 01/20/2023 <0.010  0.000 - 0.030 ng/mL Final   • proBNP 01/20/2023 66.8  0.0 - 900.0 pg/mL Final   • WBC 01/20/2023 8.20  3.40 - 10.80 10*3/mm3 Final   • RBC 01/20/2023 4.50  3.77 - 5.28 10*6/mm3 Final   • Hemoglobin 01/20/2023 12.9  12.0 - 15.9 g/dL Final   • Hematocrit 01/20/2023 39.0  34.0 - 46.6 % Final   • MCV 01/20/2023 86.7  79.0 - 97.0 fL Final   • MCH 01/20/2023 28.6  26.6 - 33.0 pg Final   • MCHC 01/20/2023 33.0  31.5 - 35.7 g/dL Final   • RDW 01/20/2023 14.5  12.3 - 15.4 % Final   • RDW-SD 01/20/2023 43.8  37.0 - 54.0 fl Final   • MPV 01/20/2023 6.9  6.0 - 12.0 fL Final   • Platelets 01/20/2023 234  140 - 450 10*3/mm3 Final   • Neutrophil % 01/20/2023 67.8  42.7 - 76.0 % Final   • Lymphocyte % 01/20/2023 14.5 (L)  19.6 - 45.3 % Final   • Monocyte % 01/20/2023 6.7  5.0 - 12.0 % Final   • Eosinophil % 01/20/2023 9.9 (H)  0.3 - 6.2 % Final   • Basophil % 01/20/2023 1.1  0.0 - 1.5 % Final   • Neutrophils, Absolute 01/20/2023 5.60  1.70 - 7.00 10*3/mm3 Final   • Lymphocytes, Absolute 01/20/2023 1.20  0.70 - 3.10 10*3/mm3 Final   • Monocytes, Absolute 01/20/2023 0.50  0.10 - 0.90 10*3/mm3 Final   • Eosinophils, Absolute 01/20/2023 0.80 (H)  0.00 - 0.40 10*3/mm3 Final   • Basophils,  Absolute 01/20/2023 0.10  0.00 - 0.20 10*3/mm3 Final   • nRBC 01/20/2023 0.0  0.0 - 0.2 /100 WBC Final   • Hepatitis B Surface Ag 01/20/2023 Non-Reactive  Non-Reactive Final   • Hep A IgM 01/20/2023 Non-Reactive  Non-Reactive Final   • Hep B C IgM 01/20/2023 Non-Reactive  Non-Reactive Final   • Hepatitis C Ab 01/20/2023 Non-Reactive  Non-Reactive Final   • RBC, UA 01/20/2023 3-5 (A)  None Seen /HPF Final   • WBC, UA 01/20/2023 0-2 (A)  None Seen /HPF Final    Urine culture not indicated.   • Bacteria, UA 01/20/2023 Trace (A)  None Seen /HPF Final   • Squamous Epithelial Cells, UA 01/20/2023 0-2  None Seen, 0-2 /HPF Final   • Hyaline Casts, UA 01/20/2023 0-2  None Seen /LPF Final   • Methodology 01/20/2023 Automated Microscopy   Final   Clinical Support on 01/19/2023   Component Date Value Ref Range Status   • Glucose 01/19/2023 105 (H)  65 - 99 mg/dL Final   • BUN 01/19/2023 13  6 - 20 mg/dL Final   • Creatinine 01/19/2023 0.69  0.57 - 1.00 mg/dL Final   • Sodium 01/19/2023 139  136 - 145 mmol/L Final   • Potassium 01/19/2023 4.1  3.5 - 5.2 mmol/L Final   • Chloride 01/19/2023 103  98 - 107 mmol/L Final   • CO2 01/19/2023 25.0  22.0 - 29.0 mmol/L Final   • Calcium 01/19/2023 9.7  8.6 - 10.5 mg/dL Final   • Total Protein 01/19/2023 7.8  6.0 - 8.5 g/dL Final   • Albumin 01/19/2023 4.2  3.5 - 5.2 g/dL Final   • ALT (SGPT) 01/19/2023 110 (H)  1 - 33 U/L Final   • AST (SGOT) 01/19/2023 91 (H)  1 - 32 U/L Final   • Alkaline Phosphatase 01/19/2023 350 (H)  39 - 117 U/L Final   • Total Bilirubin 01/19/2023 0.6  0.0 - 1.2 mg/dL Final   • Globulin 01/19/2023 3.6  gm/dL Final   • A/G Ratio 01/19/2023 1.2  g/dL Final   • BUN/Creatinine Ratio 01/19/2023 18.8  7.0 - 25.0 Final   • Anion Gap 01/19/2023 11.0  5.0 - 15.0 mmol/L Final   • eGFR 01/19/2023 100.1  >60.0 mL/min/1.73 Final   • Hemoglobin A1C 01/19/2023 5.3  3.5 - 5.6 % Final   • WBC 01/19/2023 7.85  3.40 - 10.80 10*3/mm3 Final   • RBC 01/19/2023 4.45  3.77 - 5.28 10*6/mm3  Final   • Hemoglobin 01/19/2023 13.3  12.0 - 15.9 g/dL Final   • Hematocrit 01/19/2023 39.2  34.0 - 46.6 % Final   • MCV 01/19/2023 88.1  79.0 - 97.0 fL Final   • MCH 01/19/2023 29.9  26.6 - 33.0 pg Final   • MCHC 01/19/2023 33.9  31.5 - 35.7 g/dL Final   • RDW 01/19/2023 14.2  12.3 - 15.4 % Final   • RDW-SD 01/19/2023 45.2  37.0 - 54.0 fl Final   • MPV 01/19/2023 9.5  6.0 - 12.0 fL Final   • Platelets 01/19/2023 225  140 - 450 10*3/mm3 Final   • Neutrophil % 01/19/2023 64.8  42.7 - 76.0 % Final   • Lymphocyte % 01/19/2023 14.9 (L)  19.6 - 45.3 % Final   • Monocyte % 01/19/2023 5.9  5.0 - 12.0 % Final   • Eosinophil % 01/19/2023 12.4 (H)  0.3 - 6.2 % Final   • Basophil % 01/19/2023 1.5  0.0 - 1.5 % Final   • Immature Grans % 01/19/2023 0.5  0.0 - 0.5 % Final   • Neutrophils, Absolute 01/19/2023 5.09  1.70 - 7.00 10*3/mm3 Final   • Lymphocytes, Absolute 01/19/2023 1.17  0.70 - 3.10 10*3/mm3 Final   • Monocytes, Absolute 01/19/2023 0.46  0.10 - 0.90 10*3/mm3 Final   • Eosinophils, Absolute 01/19/2023 0.97 (H)  0.00 - 0.40 10*3/mm3 Final   • Basophils, Absolute 01/19/2023 0.12  0.00 - 0.20 10*3/mm3 Final   • Immature Grans, Absolute 01/19/2023 0.04  0.00 - 0.05 10*3/mm3 Final   • nRBC 01/19/2023 0.0  0.0 - 0.2 /100 WBC Final   Admission on 01/15/2023, Discharged on 01/15/2023   Component Date Value Ref Range Status   • Glucose 01/15/2023 151 (H)  65 - 99 mg/dL Final   • BUN 01/15/2023 9  6 - 20 mg/dL Final   • Creatinine 01/15/2023 0.70  0.57 - 1.00 mg/dL Final   • Sodium 01/15/2023 139  136 - 145 mmol/L Final   • Potassium 01/15/2023 3.7  3.5 - 5.2 mmol/L Final   • Chloride 01/15/2023 102  98 - 107 mmol/L Final   • CO2 01/15/2023 25.0  22.0 - 29.0 mmol/L Final   • Calcium 01/15/2023 9.5  8.6 - 10.5 mg/dL Final   • BUN/Creatinine Ratio 01/15/2023 12.9  7.0 - 25.0 Final   • Anion Gap 01/15/2023 12.0  5.0 - 15.0 mmol/L Final   • eGFR 01/15/2023 99.8  >60.0 mL/min/1.73 Final    National Kidney Foundation and American  Society of Nephrology (ASN) Task Force recommended calculation based on the Chronic Kidney Disease Epidemiology Collaboration (CKD-EPI) equation refit without adjustment for race.   • Magnesium 01/15/2023 2.1  1.6 - 2.6 mg/dL Final   • WBC 01/15/2023 7.50  3.40 - 10.80 10*3/mm3 Final   • RBC 01/15/2023 4.36  3.77 - 5.28 10*6/mm3 Final   • Hemoglobin 01/15/2023 12.7  12.0 - 15.9 g/dL Final   • Hematocrit 01/15/2023 37.4  34.0 - 46.6 % Final   • MCV 01/15/2023 85.8  79.0 - 97.0 fL Final   • MCH 01/15/2023 29.2  26.6 - 33.0 pg Final   • MCHC 01/15/2023 34.1  31.5 - 35.7 g/dL Final   • RDW 01/15/2023 14.8  12.3 - 15.4 % Final   • RDW-SD 01/15/2023 46.4  37.0 - 54.0 fl Final   • MPV 01/15/2023 7.0  6.0 - 12.0 fL Final   • Platelets 01/15/2023 197  140 - 450 10*3/mm3 Final   • Neutrophil % 01/15/2023 66.6  42.7 - 76.0 % Final   • Lymphocyte % 01/15/2023 12.2 (L)  19.6 - 45.3 % Final   • Monocyte % 01/15/2023 6.7  5.0 - 12.0 % Final   • Eosinophil % 01/15/2023 13.4 (H)  0.3 - 6.2 % Final   • Basophil % 01/15/2023 1.1  0.0 - 1.5 % Final   • Neutrophils, Absolute 01/15/2023 5.00  1.70 - 7.00 10*3/mm3 Final   • Lymphocytes, Absolute 01/15/2023 0.90  0.70 - 3.10 10*3/mm3 Final   • Monocytes, Absolute 01/15/2023 0.50  0.10 - 0.90 10*3/mm3 Final   • Eosinophils, Absolute 01/15/2023 1.00 (H)  0.00 - 0.40 10*3/mm3 Final   • Basophils, Absolute 01/15/2023 0.10  0.00 - 0.20 10*3/mm3 Final   • nRBC 01/15/2023 0.0  0.0 - 0.2 /100 WBC Final   • Troponin T 01/15/2023 <0.010  0.000 - 0.030 ng/mL Final   • QT Interval 01/15/2023 391  ms Final   Admission on 12/29/2022, Discharged on 12/30/2022   Component Date Value Ref Range Status   • Glucose 12/29/2022 92  65 - 99 mg/dL Final   • BUN 12/29/2022 8  6 - 20 mg/dL Final   • Creatinine 12/29/2022 0.80  0.57 - 1.00 mg/dL Final   • Sodium 12/29/2022 141  136 - 145 mmol/L Final   • Potassium 12/29/2022 3.4 (L)  3.5 - 5.2 mmol/L Final   • Chloride 12/29/2022 105  98 - 107 mmol/L Final   • CO2  12/29/2022 24.0  22.0 - 29.0 mmol/L Final   • Calcium 12/29/2022 9.1  8.6 - 10.5 mg/dL Final   • Total Protein 12/29/2022 7.2  6.0 - 8.5 g/dL Final   • Albumin 12/29/2022 3.8  3.5 - 5.2 g/dL Final   • ALT (SGPT) 12/29/2022 22  1 - 33 U/L Final   • AST (SGOT) 12/29/2022 36 (H)  1 - 32 U/L Final   • Alkaline Phosphatase 12/29/2022 146 (H)  39 - 117 U/L Final   • Total Bilirubin 12/29/2022 0.7  0.0 - 1.2 mg/dL Final   • Globulin 12/29/2022 3.4  gm/dL Final   • A/G Ratio 12/29/2022 1.1  g/dL Final   • BUN/Creatinine Ratio 12/29/2022 10.0  7.0 - 25.0 Final   • Anion Gap 12/29/2022 12.0  5.0 - 15.0 mmol/L Final   • eGFR 12/29/2022 85.0  >60.0 mL/min/1.73 Final    National Kidney Foundation and American Society of Nephrology (ASN) Task Force recommended calculation based on the Chronic Kidney Disease Epidemiology Collaboration (CKD-EPI) equation refit without adjustment for race.   • Lipase 12/29/2022 14  13 - 60 U/L Final   • Color, UA 12/29/2022 Yellow  Yellow, Straw Final   • Appearance, UA 12/29/2022 Clear  Clear Final   • pH, UA 12/29/2022 8.0  5.0 - 8.0 Final   • Specific Gravity, UA 12/29/2022 1.014  1.005 - 1.030 Final   • Glucose, UA 12/29/2022 Negative  Negative Final   • Ketones, UA 12/29/2022 40 mg/dL (2+) (A)  Negative Final   • Bilirubin, UA 12/29/2022 Negative  Negative Final   • Blood, UA 12/29/2022 Trace (A)  Negative Final   • Protein, UA 12/29/2022 Negative  Negative Final   • Leuk Esterase, UA 12/29/2022 Trace (A)  Negative Final   • Nitrite, UA 12/29/2022 Negative  Negative Final   • Urobilinogen, UA 12/29/2022 1.0 E.U./dL  0.2 - 1.0 E.U./dL Final   • WBC 12/29/2022 7.80  3.40 - 10.80 10*3/mm3 Final   • RBC 12/29/2022 4.21  3.77 - 5.28 10*6/mm3 Final   • Hemoglobin 12/29/2022 12.3  12.0 - 15.9 g/dL Final   • Hematocrit 12/29/2022 35.9  34.0 - 46.6 % Final   • MCV 12/29/2022 85.2  79.0 - 97.0 fL Final   • MCH 12/29/2022 29.2  26.6 - 33.0 pg Final   • MCHC 12/29/2022 34.3  31.5 - 35.7 g/dL Final   • RDW  12/29/2022 13.9  12.3 - 15.4 % Final   • RDW-SD 12/29/2022 43.8  37.0 - 54.0 fl Final   • MPV 12/29/2022 7.1  6.0 - 12.0 fL Final   • Platelets 12/29/2022 265  140 - 450 10*3/mm3 Final   • Neutrophil % 12/29/2022 69.7  42.7 - 76.0 % Final   • Lymphocyte % 12/29/2022 17.7 (L)  19.6 - 45.3 % Final   • Monocyte % 12/29/2022 7.9  5.0 - 12.0 % Final   • Eosinophil % 12/29/2022 4.0  0.3 - 6.2 % Final   • Basophil % 12/29/2022 0.7  0.0 - 1.5 % Final   • Neutrophils, Absolute 12/29/2022 5.40  1.70 - 7.00 10*3/mm3 Final   • Lymphocytes, Absolute 12/29/2022 1.40  0.70 - 3.10 10*3/mm3 Final   • Monocytes, Absolute 12/29/2022 0.60  0.10 - 0.90 10*3/mm3 Final   • Eosinophils, Absolute 12/29/2022 0.30  0.00 - 0.40 10*3/mm3 Final   • Basophils, Absolute 12/29/2022 0.10  0.00 - 0.20 10*3/mm3 Final   • nRBC 12/29/2022 0.0  0.0 - 0.2 /100 WBC Final   • RBC, UA 12/29/2022 0-2 (A)  None Seen /HPF Final   • WBC, UA 12/29/2022 0-2 (A)  None Seen /HPF Final   • Bacteria, UA 12/29/2022 None Seen  None Seen /HPF Final   • Squamous Epithelial Cells, UA 12/29/2022 0-2  None Seen, 0-2 /HPF Final   • Hyaline Casts, UA 12/29/2022 0-2  None Seen /LPF Final   • Methodology 12/29/2022 Manual Light Microscopy   Final   • QT Interval 12/29/2022 438  ms Corrected   • Troponin T 12/29/2022 <0.010  0.000 - 0.030 ng/mL Final   • Glucose 12/30/2022 91  65 - 99 mg/dL Final   • BUN 12/30/2022 8  6 - 20 mg/dL Final   • Creatinine 12/30/2022 0.63  0.57 - 1.00 mg/dL Final   • Sodium 12/30/2022 142  136 - 145 mmol/L Final   • Potassium 12/30/2022 3.4 (L)  3.5 - 5.2 mmol/L Final   • Chloride 12/30/2022 107  98 - 107 mmol/L Final   • CO2 12/30/2022 25.0  22.0 - 29.0 mmol/L Final   • Calcium 12/30/2022 8.5 (L)  8.6 - 10.5 mg/dL Final   • BUN/Creatinine Ratio 12/30/2022 12.7  7.0 - 25.0 Final   • Anion Gap 12/30/2022 10.0  5.0 - 15.0 mmol/L Final   • eGFR 12/30/2022 102.3  >60.0 mL/min/1.73 Final    National Kidney Foundation and American Society of Nephrology  (ASN) Task Force recommended calculation based on the Chronic Kidney Disease Epidemiology Collaboration (CKD-EPI) equation refit without adjustment for race.   • WBC 12/30/2022 6.40  3.40 - 10.80 10*3/mm3 Final   • RBC 12/30/2022 3.89  3.77 - 5.28 10*6/mm3 Final   • Hemoglobin 12/30/2022 11.4 (L)  12.0 - 15.9 g/dL Final   • Hematocrit 12/30/2022 33.4 (L)  34.0 - 46.6 % Final   • MCV 12/30/2022 85.8  79.0 - 97.0 fL Final   • MCH 12/30/2022 29.2  26.6 - 33.0 pg Final   • MCHC 12/30/2022 34.0  31.5 - 35.7 g/dL Final   • RDW 12/30/2022 13.7  12.3 - 15.4 % Final   • RDW-SD 12/30/2022 43.3  37.0 - 54.0 fl Final   • MPV 12/30/2022 7.1  6.0 - 12.0 fL Final   • Platelets 12/30/2022 221  140 - 450 10*3/mm3 Final   • Neutrophil % 12/30/2022 61.4  42.7 - 76.0 % Final   • Lymphocyte % 12/30/2022 23.5  19.6 - 45.3 % Final   • Monocyte % 12/30/2022 8.4  5.0 - 12.0 % Final   • Eosinophil % 12/30/2022 5.5  0.3 - 6.2 % Final   • Basophil % 12/30/2022 1.2  0.0 - 1.5 % Final   • Neutrophils, Absolute 12/30/2022 3.90  1.70 - 7.00 10*3/mm3 Final   • Lymphocytes, Absolute 12/30/2022 1.50  0.70 - 3.10 10*3/mm3 Final   • Monocytes, Absolute 12/30/2022 0.50  0.10 - 0.90 10*3/mm3 Final   • Eosinophils, Absolute 12/30/2022 0.30  0.00 - 0.40 10*3/mm3 Final   • Basophils, Absolute 12/30/2022 0.10  0.00 - 0.20 10*3/mm3 Final   • nRBC 12/30/2022 0.3 (H)  0.0 - 0.2 /100 WBC Final   • Case Report 12/30/2022    Final                    Value:Surgical Pathology Report                         Case: [id number]                                  Authorizing Provider:  Patience Arana MD          Collected:           12/30/2022 12:27 PM          Ordering Location:     [facility]  Received:            12/30/2022 03:46 PM                                 SUITES                                                                       Pathologist:           Burak Al MD                                                            Specimen:     Gastric, Body, gastric biopsy                                                             • Final Diagnosis 12/30/2022    Final                    Value:This result contains rich text formatting which cannot be displayed here.   • Gross Description 12/30/2022    Final                    Value:This result contains rich text formatting which cannot be displayed here.   Admission on 12/25/2022, Discharged on 12/27/2022   Component Date Value Ref Range Status   • Glucose 12/25/2022 167 (H)  65 - 99 mg/dL Final   • BUN 12/25/2022 10  6 - 20 mg/dL Final   • Creatinine 12/25/2022 0.78  0.57 - 1.00 mg/dL Final   • Sodium 12/25/2022 137  136 - 145 mmol/L Final   • Potassium 12/25/2022 4.2  3.5 - 5.2 mmol/L Final   • Chloride 12/25/2022 101  98 - 107 mmol/L Final   • CO2 12/25/2022 23.0  22.0 - 29.0 mmol/L Final   • Calcium 12/25/2022 9.4  8.6 - 10.5 mg/dL Final   • Total Protein 12/25/2022 8.0  6.0 - 8.5 g/dL Final   • Albumin 12/25/2022 4.00  3.50 - 5.20 g/dL Final   • ALT (SGPT) 12/25/2022 20  1 - 33 U/L Final   • AST (SGOT) 12/25/2022 27  1 - 32 U/L Final   • Alkaline Phosphatase 12/25/2022 155 (H)  39 - 117 U/L Final   • Total Bilirubin 12/25/2022 0.6  0.0 - 1.2 mg/dL Final   • Globulin 12/25/2022 4.0  gm/dL Final   • A/G Ratio 12/25/2022 1.0  g/dL Final   • BUN/Creatinine Ratio 12/25/2022 12.8  7.0 - 25.0 Final   • Anion Gap 12/25/2022 13.0  5.0 - 15.0 mmol/L Final   • eGFR 12/25/2022 87.6  >60.0 mL/min/1.73 Final    National Kidney Foundation and American Society of Nephrology (ASN) Task Force recommended calculation based on the Chronic Kidney Disease Epidemiology Collaboration (CKD-EPI) equation refit without adjustment for race.   • Lipase 12/25/2022 15  13 - 60 U/L Final   • Color, UA 12/25/2022 Yellow  Yellow, Straw Final   • Appearance, UA 12/25/2022 Clear  Clear Final   • pH, UA 12/25/2022 8.5 (H)  5.0 - 8.0 Final   • Specific Gravity, UA 12/25/2022 1.044 (H)  1.005 - 1.030 Final   • Glucose, UA 12/25/2022  Negative  Negative Final   • Ketones, UA 12/25/2022 Negative  Negative Final   • Bilirubin, UA 12/25/2022 Negative  Negative Final   • Blood, UA 12/25/2022 Negative  Negative Final   • Protein, UA 12/25/2022 Negative  Negative Final   • Leuk Esterase, UA 12/25/2022 Negative  Negative Final   • Nitrite, UA 12/25/2022 Negative  Negative Final   • Urobilinogen, UA 12/25/2022 0.2 E.U./dL  0.2 - 1.0 E.U./dL Final   • WBC 12/25/2022 12.00 (H)  3.40 - 10.80 10*3/mm3 Final   • RBC 12/25/2022 4.64  3.77 - 5.28 10*6/mm3 Final   • Hemoglobin 12/25/2022 13.0  12.0 - 15.9 g/dL Final   • Hematocrit 12/25/2022 40.4  34.0 - 46.6 % Final   • MCV 12/25/2022 87.1  79.0 - 97.0 fL Final   • MCH 12/25/2022 28.0  26.6 - 33.0 pg Final   • MCHC 12/25/2022 32.1  31.5 - 35.7 g/dL Final   • RDW 12/25/2022 13.8  12.3 - 15.4 % Final   • RDW-SD 12/25/2022 42.0  37.0 - 54.0 fl Final   • MPV 12/25/2022 7.3  6.0 - 12.0 fL Final   • Platelets 12/25/2022 289  140 - 450 10*3/mm3 Final   • Neutrophil % 12/25/2022 79.4 (H)  42.7 - 76.0 % Final   • Lymphocyte % 12/25/2022 11.3 (L)  19.6 - 45.3 % Final   • Monocyte % 12/25/2022 5.9  5.0 - 12.0 % Final   • Eosinophil % 12/25/2022 2.6  0.3 - 6.2 % Final   • Basophil % 12/25/2022 0.8  0.0 - 1.5 % Final   • Neutrophils, Absolute 12/25/2022 9.50 (H)  1.70 - 7.00 10*3/mm3 Final   • Lymphocytes, Absolute 12/25/2022 1.40  0.70 - 3.10 10*3/mm3 Final   • Monocytes, Absolute 12/25/2022 0.70  0.10 - 0.90 10*3/mm3 Final   • Eosinophils, Absolute 12/25/2022 0.30  0.00 - 0.40 10*3/mm3 Final   • Basophils, Absolute 12/25/2022 0.10  0.00 - 0.20 10*3/mm3 Final   • nRBC 12/25/2022 0.0  0.0 - 0.2 /100 WBC Final   • Extra Tube 12/25/2022 Hold for add-ons.   Final    Auto resulted.   • Extra Tube 12/25/2022 Hold for add-ons.   Final    Auto resulted   • Blood Culture 12/25/2022 No growth at 5 days   Final   • Blood Culture 12/25/2022 No growth at 5 days   Final   • Glucose 12/26/2022 127 (H)  65 - 99 mg/dL Final   • BUN  12/26/2022 8  6 - 20 mg/dL Final   • Creatinine 12/26/2022 0.65  0.57 - 1.00 mg/dL Final   • Sodium 12/26/2022 140  136 - 145 mmol/L Final   • Potassium 12/26/2022 4.1  3.5 - 5.2 mmol/L Final   • Chloride 12/26/2022 104  98 - 107 mmol/L Final   • CO2 12/26/2022 27.0  22.0 - 29.0 mmol/L Final   • Calcium 12/26/2022 8.6  8.6 - 10.5 mg/dL Final   • BUN/Creatinine Ratio 12/26/2022 12.3  7.0 - 25.0 Final   • Anion Gap 12/26/2022 9.0  5.0 - 15.0 mmol/L Final   • eGFR 12/26/2022 101.6  >60.0 mL/min/1.73 Final    National Kidney Foundation and American Society of Nephrology (ASN) Task Force recommended calculation based on the Chronic Kidney Disease Epidemiology Collaboration (CKD-EPI) equation refit without adjustment for race.   • WBC 12/26/2022 10.40  3.40 - 10.80 10*3/mm3 Final   • RBC 12/26/2022 4.03  3.77 - 5.28 10*6/mm3 Final   • Hemoglobin 12/26/2022 11.7 (L)  12.0 - 15.9 g/dL Final   • Hematocrit 12/26/2022 34.9  34.0 - 46.6 % Final   • MCV 12/26/2022 86.7  79.0 - 97.0 fL Final   • MCH 12/26/2022 28.9  26.6 - 33.0 pg Final   • MCHC 12/26/2022 33.3  31.5 - 35.7 g/dL Final   • RDW 12/26/2022 14.0  12.3 - 15.4 % Final   • RDW-SD 12/26/2022 45.1  37.0 - 54.0 fl Final   • MPV 12/26/2022 7.6  6.0 - 12.0 fL Final   • Platelets 12/26/2022 237  140 - 450 10*3/mm3 Final   • Neutrophil % 12/26/2022 73.9  42.7 - 76.0 % Final   • Lymphocyte % 12/26/2022 12.8 (L)  19.6 - 45.3 % Final   • Monocyte % 12/26/2022 7.4  5.0 - 12.0 % Final   • Eosinophil % 12/26/2022 5.0  0.3 - 6.2 % Final   • Basophil % 12/26/2022 0.9  0.0 - 1.5 % Final   • Neutrophils, Absolute 12/26/2022 7.70 (H)  1.70 - 7.00 10*3/mm3 Final   • Lymphocytes, Absolute 12/26/2022 1.30  0.70 - 3.10 10*3/mm3 Final   • Monocytes, Absolute 12/26/2022 0.80  0.10 - 0.90 10*3/mm3 Final   • Eosinophils, Absolute 12/26/2022 0.50 (H)  0.00 - 0.40 10*3/mm3 Final   • Basophils, Absolute 12/26/2022 0.10  0.00 - 0.20 10*3/mm3 Final   • nRBC 12/26/2022 0.1  0.0 - 0.2 /100 WBC Final    • Glucose 12/26/2022 125 (H)  70 - 105 mg/dL Final    Serial Number: [id number]:  [id number]   • Glucose 12/26/2022 143 (H)  70 - 105 mg/dL Final    Serial Number: [id number]:  [id number]   • QT Interval 12/26/2022 395  ms Final   • Troponin T 12/26/2022 <0.010  0.000 - 0.030 ng/mL Final   • Glucose 12/26/2022 105  70 - 105 mg/dL Final    Serial Number: [id number]:  [id number]   • Glucose 12/26/2022 137 (H)  70 - 105 mg/dL Final    Serial Number: [id number]:  [id number]   • Glucose 12/27/2022 109 (H)  65 - 99 mg/dL Final   • BUN 12/27/2022 6  6 - 20 mg/dL Final   • Creatinine 12/27/2022 0.69  0.57 - 1.00 mg/dL Final   • Sodium 12/27/2022 138  136 - 145 mmol/L Final   • Potassium 12/27/2022 3.6  3.5 - 5.2 mmol/L Final   • Chloride 12/27/2022 105  98 - 107 mmol/L Final   • CO2 12/27/2022 24.0  22.0 - 29.0 mmol/L Final   • Calcium 12/27/2022 8.3 (L)  8.6 - 10.5 mg/dL Final   • BUN/Creatinine Ratio 12/27/2022 8.7  7.0 - 25.0 Final   • Anion Gap 12/27/2022 9.0  5.0 - 15.0 mmol/L Final   • eGFR 12/27/2022 100.1  >60.0 mL/min/1.73 Final    National Kidney Foundation and American Society of Nephrology (ASN) Task Force recommended calculation based on the Chronic Kidney Disease Epidemiology Collaboration (CKD-EPI) equation refit without adjustment for race.   • WBC 12/27/2022 11.50 (H)  3.40 - 10.80 10*3/mm3 Final   • RBC 12/27/2022 3.74 (L)  3.77 - 5.28 10*6/mm3 Final   • Hemoglobin 12/27/2022 10.7 (L)  12.0 - 15.9 g/dL Final   • Hematocrit 12/27/2022 32.3 (L)  34.0 - 46.6 % Final   • MCV 12/27/2022 86.3  79.0 - 97.0 fL Final   • MCH 12/27/2022 28.7  26.6 - 33.0 pg Final   • MCHC 12/27/2022 33.2  31.5 - 35.7 g/dL Final   • RDW 12/27/2022 13.9  12.3 - 15.4 % Final   • RDW-SD 12/27/2022 44.6  37.0 - 54.0 fl Final   • MPV 12/27/2022 7.8  6.0 - 12.0 fL Final   • Platelets 12/27/2022 226  140 - 450 10*3/mm3 Final   • Neutrophil % 12/27/2022 74.9  42.7 - 76.0 % Final   • Lymphocyte % 12/27/2022 13.1 (L)   19.6 - 45.3 % Final   • Monocyte % 12/27/2022 7.1  5.0 - 12.0 % Final   • Eosinophil % 12/27/2022 4.2  0.3 - 6.2 % Final   • Basophil % 12/27/2022 0.7  0.0 - 1.5 % Final   • Neutrophils, Absolute 12/27/2022 8.60 (H)  1.70 - 7.00 10*3/mm3 Final   • Lymphocytes, Absolute 12/27/2022 1.50  0.70 - 3.10 10*3/mm3 Final   • Monocytes, Absolute 12/27/2022 0.80  0.10 - 0.90 10*3/mm3 Final   • Eosinophils, Absolute 12/27/2022 0.50 (H)  0.00 - 0.40 10*3/mm3 Final   • Basophils, Absolute 12/27/2022 0.10  0.00 - 0.20 10*3/mm3 Final   • nRBC 12/27/2022 0.0  0.0 - 0.2 /100 WBC Final   • Glucose 12/27/2022 112 (H)  70 - 105 mg/dL Final    Serial Number: [id number]:  [id number]   • Glucose 12/27/2022 133 (H)  70 - 105 mg/dL Final    Serial Number: [id number]:  [id number]   • Glucose 12/27/2022 117 (H)  70 - 105 mg/dL Final    Serial Number: [id number]:  [id number]   No results displayed because visit has over 200 results.      There may be more visits with results that are not included.         Physical Exam  Vitals and nursing note reviewed.   Constitutional:       Appearance: Normal appearance. She is normal weight.   HENT:      Head: Normocephalic and atraumatic.   Cardiovascular:      Rate and Rhythm: Normal rate and regular rhythm.      Pulses: Normal pulses.      Heart sounds: Normal heart sounds. No murmur heard.    No friction rub. No gallop.   Pulmonary:      Effort: Pulmonary effort is normal. No respiratory distress.      Breath sounds: Normal breath sounds. No stridor. No wheezing, rhonchi or rales.      Comments: Oxygen dependent, 2 L  Chest:      Chest wall: No tenderness.   Abdominal:      General: Abdomen is flat. Bowel sounds are normal. There is no distension.      Palpations: Abdomen is soft. There is no mass.      Tenderness: There is no abdominal tenderness. There is no right CVA tenderness, left CVA tenderness, guarding or rebound.      Hernia: No hernia is present.   Skin:     General: Skin is  warm and dry.      Capillary Refill: Capillary refill takes less than 2 seconds.      Coloration: Skin is not jaundiced or pale.   Neurological:      General: No focal deficit present.      Mental Status: She is alert and oriented to person, place, and time. Mental status is at baseline.      Motor: No weakness.      Coordination: Coordination normal.      Gait: Gait normal.   Psychiatric:         Mood and Affect: Mood normal.         Behavior: Behavior normal.         Thought Content: Thought content normal.         Judgment: Judgment normal.          Result Review  Data Reviewed:{ Labs  Result Review  Imaging  Med Tab  Media :23}   I have reviewed this patient's chart.  I have reviewed previous labs, previous imaging, previous medications, and previous encounters with notes that were available in this patient's chart.             Assessment and Plan {CC Problem List  Visit Diagnosis  ROS  Review (Popup)  [facility]  BestPractice  Medications  SmartSets  SnapShot Encounters  Media :23}   Diagnoses and all orders for this visit:    1. Hospital discharge follow-up (Primary)  -     CBC & Differential  -     Comprehensive Metabolic Panel    2. Intractable migraine with aura with status migrainosus  -     Rimegepant Sulfate (Nurtec) 75 MG tablet dispersible tablet; Take 1 tablet by mouth Daily.  Dispense: 2 tablet; Refill: 0  -     Rimegepant Sulfate (Nurtec) 75 MG tablet dispersible tablet; Take 1 tablet by mouth Daily As Needed (migraines).  Dispense: 12 tablet; Refill: 3    3. Screening for endocrine, metabolic and immunity disorder  -     CBC & Differential  -     Comprehensive Metabolic Panel    4. Hyperlipidemia, unspecified hyperlipidemia type  -     atorvastatin (LIPITOR) 80 MG tablet; Take 1 tablet by mouth Every Night.  Dispense: 90 tablet; Refill: 1    5. Acute pain of right knee  -     gabapentin (NEURONTIN) 100 MG capsule; Take 1 capsule by mouth 2 (Two) Times a Day.   Dispense: 60 capsule; Refill: 0    6. Primary insomnia  -     zolpidem (AMBIEN) 10 MG tablet; Take 1 tablet by mouth At Night As Needed for Sleep.  Dispense: 90 tablet; Refill: 0    7. Moderate episode of recurrent major depressive disorder (HCC)  -     ARIPiprazole (ABILIFY) 5 MG tablet; Take 1 tablet by mouth Daily.  Dispense: 90 tablet; Refill: 1    8. Mood swings  -     ARIPiprazole (ABILIFY) 5 MG tablet; Take 1 tablet by mouth Daily.  Dispense: 90 tablet; Refill: 1    9. Elevated liver enzymes  -     Gamma GT  -     Comprehensive metabolic panel      -Nurtec sample sent with patient to migraines.  -Prescription for Nurtec sent to pharmacy if patient tolerates it well.  -Labs today to follow-up after hospital visit recently.  -Refill of medications at patient's request.  -Follow-up in 4 weeks or sooner if needed.    I spent 40 minutes caring for Eladia on this date of service. This time includes time spent by me in the following activities:preparing for the visit, reviewing tests, obtaining and/or reviewing a separately obtained history, performing a medically appropriate examination and/or evaluation , counseling and educating the patient/family/caregiver, ordering medications, tests, or procedures, referring and communicating with other health care professionals , documenting information in the medical record, independently interpreting results and communicating that information with the patient/family/caregiver and care coordination.    Follow Up {Instructions Charge Capture  Follow-up Communications :23}     Patient was given instructions and counseling regarding her condition or for health maintenance advice. Please see specific information pulled into the AVS (placed there by myself) if appropriate.    Return in about 4 weeks (around 3/22/2023) for Recheck migraines.    MDM  Number of Diagnoses or Management Options  Acute pain of right knee: established, improving  Elevated liver enzymes: established,  improving  Hospital discharge follow-up: new, needed workup  Hyperlipidemia, unspecified hyperlipidemia type: established, improving  Intractable migraine with aura with status migrainosus: established, worsening  Moderate episode of recurrent major depressive disorder (HCC): established, improving  Mood swings: established, improving  Primary insomnia: established, worsening  Screening for endocrine, metabolic and immunity disorder: new, needed workup     Amount and/or Complexity of Data Reviewed  Clinical lab tests: ordered and reviewed  Tests in the radiology section of CPT®: reviewed  Tests in the medicine section of CPT®: reviewed  Discussion of test results with the performing providers: no  Decide to obtain previous medical records or to obtain history from someone other than the patient: no  Obtain history from someone other than the patient: no  Review and summarize past medical records: yes  Discuss the patient with other providers: no    Risk of Complications, Morbidity, and/or Mortality  Presenting problems: moderate  Diagnostic procedures: low  Management options: moderate    Patient Progress  Patient progress: stable       Class 3 Severe Obesity (BMI >=40). Obesity-related health conditions include the following: hypertension. Obesity is unchanged. BMI is is above average; BMI management plan is completed. We discussed portion control and increasing exercise.       SCOOBY Cisneros, FNP-BC

## 2023-02-22 NOTE — PROGRESS NOTES
Venipuncture Blood Specimen Collection  Venipuncture performed in R ARM by Reanna Waller MA with good hemostasis. Patient tolerated the procedure well without complications.   02/22/23   Reanna Waller MA

## 2023-02-23 ENCOUNTER — TELEPHONE (OUTPATIENT)
Dept: FAMILY MEDICINE CLINIC | Facility: CLINIC | Age: 58
End: 2023-02-23

## 2023-02-23 LAB
ALBUMIN SERPL-MCNC: 4 G/DL (ref 3.5–5.2)
ALBUMIN/GLOB SERPL: 1.1 G/DL
ALP SERPL-CCNC: 152 U/L (ref 39–117)
ALT SERPL W P-5'-P-CCNC: 18 U/L (ref 1–33)
ANION GAP SERPL CALCULATED.3IONS-SCNC: 7.6 MMOL/L (ref 5–15)
AST SERPL-CCNC: 29 U/L (ref 1–32)
BASOPHILS # BLD AUTO: 0.05 10*3/MM3 (ref 0–0.2)
BASOPHILS NFR BLD AUTO: 0.6 % (ref 0–1.5)
BILIRUB SERPL-MCNC: 0.4 MG/DL (ref 0–1.2)
BUN SERPL-MCNC: 10 MG/DL (ref 6–20)
BUN/CREAT SERPL: 13.5 (ref 7–25)
CALCIUM SPEC-SCNC: 10 MG/DL (ref 8.6–10.5)
CHLORIDE SERPL-SCNC: 104 MMOL/L (ref 98–107)
CO2 SERPL-SCNC: 28.4 MMOL/L (ref 22–29)
CREAT SERPL-MCNC: 0.74 MG/DL (ref 0.57–1)
DEPRECATED RDW RBC AUTO: 44 FL (ref 37–54)
EGFRCR SERPLBLD CKD-EPI 2021: 93.3 ML/MIN/1.73
EOSINOPHIL # BLD AUTO: 0.38 10*3/MM3 (ref 0–0.4)
EOSINOPHIL NFR BLD AUTO: 4.7 % (ref 0.3–6.2)
ERYTHROCYTE [DISTWIDTH] IN BLOOD BY AUTOMATED COUNT: 13.8 % (ref 12.3–15.4)
GGT SERPL-CCNC: 54 U/L (ref 5–36)
GLOBULIN UR ELPH-MCNC: 3.5 GM/DL
GLUCOSE SERPL-MCNC: 127 MG/DL (ref 65–99)
HCT VFR BLD AUTO: 36.7 % (ref 34–46.6)
HGB BLD-MCNC: 12.2 G/DL (ref 12–15.9)
IMM GRANULOCYTES # BLD AUTO: 0.03 10*3/MM3 (ref 0–0.05)
IMM GRANULOCYTES NFR BLD AUTO: 0.4 % (ref 0–0.5)
LYMPHOCYTES # BLD AUTO: 1.22 10*3/MM3 (ref 0.7–3.1)
LYMPHOCYTES NFR BLD AUTO: 15.1 % (ref 19.6–45.3)
MCH RBC QN AUTO: 29.2 PG (ref 26.6–33)
MCHC RBC AUTO-ENTMCNC: 33.2 G/DL (ref 31.5–35.7)
MCV RBC AUTO: 87.8 FL (ref 79–97)
MONOCYTES # BLD AUTO: 0.46 10*3/MM3 (ref 0.1–0.9)
MONOCYTES NFR BLD AUTO: 5.7 % (ref 5–12)
NEUTROPHILS NFR BLD AUTO: 5.93 10*3/MM3 (ref 1.7–7)
NEUTROPHILS NFR BLD AUTO: 73.5 % (ref 42.7–76)
NRBC BLD AUTO-RTO: 0 /100 WBC (ref 0–0.2)
PLATELET # BLD AUTO: 201 10*3/MM3 (ref 140–450)
PMV BLD AUTO: 10.1 FL (ref 6–12)
POTASSIUM SERPL-SCNC: 4.3 MMOL/L (ref 3.5–5.2)
PROT SERPL-MCNC: 7.5 G/DL (ref 6–8.5)
RBC # BLD AUTO: 4.18 10*6/MM3 (ref 3.77–5.28)
SODIUM SERPL-SCNC: 140 MMOL/L (ref 136–145)
WBC NRBC COR # BLD: 8.07 10*3/MM3 (ref 3.4–10.8)

## 2023-02-23 NOTE — TELEPHONE ENCOUNTER
I spoke with pt in regards to her most recent lab results and pt verbally agreed to call back if she had any further questions or concerns.

## 2023-02-23 NOTE — TELEPHONE ENCOUNTER
Hub staff attempted to follow warm transfer process and was unsuccessful     Caller: Eladia Connor    Relationship to patient: Self    Best call back number: [phone]    Patient is needing: LAB RESULTS

## 2023-03-01 ENCOUNTER — TELEPHONE (OUTPATIENT)
Dept: FAMILY MEDICINE CLINIC | Facility: CLINIC | Age: 58
End: 2023-03-01
Payer: MEDICAID

## 2023-03-01 ENCOUNTER — READMISSION MANAGEMENT (OUTPATIENT)
Dept: CALL CENTER | Facility: HOSPITAL | Age: 58
End: 2023-03-01
Payer: MEDICAID

## 2023-03-01 NOTE — TELEPHONE ENCOUNTER
Caller: Eladia Connor    Relationship: Self    Best call back number: [phone]    What is the best time to reach you: ANY     Who are you requesting to speak with (clinical staff, provider,  specific staff member): CLINICAL STAFF OR DAKSHA MONGE    Do you know the name of the person who called:     What was the call regarding:PAIN IN LEGS FOR LAST THREE DAY, EFFECTING HER BALANCE, FEELING DIZZY     Do you require a callback: YES

## 2023-03-01 NOTE — OUTREACH NOTE
Medical Week 2 Survey    Flowsheet Row Responses   [facility] facility patient discharged from? [facility]   Does the patient have one of the following disease processes/diagnoses(primary or secondary)? Other   Week 2 attempt successful? No   Unsuccessful attempts Attempt 1          Lisbeth JEFFREY - Registered Nurse

## 2023-03-02 NOTE — TELEPHONE ENCOUNTER
She canceled her appt.  She is not on my schedule.  Also, it may take a couple of days to determine if she has a blood clot in her legs due to the testing involved, so the ER would be quicker if she is truly worried about one.

## 2023-03-02 NOTE — TELEPHONE ENCOUNTER
Called and spoke to patient. Asked if she went to the ER and she stated no. Informed that I was just following up and was going to request those records, if needed. Told patient that we will see her at her appointment today at 2:30pm. Pt voiced understanding.

## 2023-03-02 NOTE — TELEPHONE ENCOUNTER
Recommendation appropriate (ER) if patient concerned about blood clot.  Please f/u with patient to see if she went.

## 2023-03-06 LAB
MAXIMAL PREDICTED HEART RATE: 161 BPM
STRESS TARGET HR: 137 BPM

## 2023-03-06 PROCEDURE — 93272 ECG/REVIEW INTERPRET ONLY: CPT | Performed by: INTERNAL MEDICINE

## 2023-03-08 ENCOUNTER — TELEPHONE (OUTPATIENT)
Dept: FAMILY MEDICINE CLINIC | Facility: CLINIC | Age: 58
End: 2023-03-08
Payer: MEDICAID

## 2023-03-08 NOTE — TELEPHONE ENCOUNTER
Caller: Eladia Connor    Relationship to patient: Self    Best call back number: [phone]    PATIENT CALLED AND SAID SHE THINKS SHE MAY HAVE A BOWEL IMPACTION    SHE SAID THAT SHE GOES EVERYDAY BUT NOTHING HAS PASSED EXCEPT LEAKAGE AND THIS HAS BEEN GOING ON A WEEK, SHE SAID SHE IS ALSO EXPERIENCING CONSTANT STOMACH, SHE ALSO HAS HAD NAUSEA AND VOMITING     SHE SAID SHE'S HAD THIS BEFORE AND THINKS IT IS A BOWEL IMPACTION     SHE IS WANTING TO SEE IF HUSSEIN MONGE CAN OFFER ANYTHING OTHER THEN HER GOING TO THE EMERGENCY ROOM     HUB HAD ADVISED THE PATIENT TO GO TO THE EMERGENCY ROOM, SHE WAS WANTING TO COME INTO THE OFFICE TO SEE HUSSEIN MONGE AND HAD INITIALLY SAID IT WAS FOR STOMACH PAIN AND AN INFECTED TOE    REQUESTED CALLBACK

## 2023-03-08 NOTE — TELEPHONE ENCOUNTER
Called and spoke to patient. Informed that Adán is book out for at least a month but offered an appointment with Brian on 3/13/23. I advised patient that is she feels she has a bowel impaction, she should go to Urgent care or the emergency room. Patient voiced understanding and scheduled appointment for 3/13.

## 2023-03-08 NOTE — TELEPHONE ENCOUNTER
I called to follow up with patient. I asked if her symptoms subsided since she canceled her appointment or if she went to the ER. Patient stated no, symptoms did not subside, but that she could not get a ride to her scheduled appointment. Patient now has complaints of constipation and scheduled an appointment with Brian Jaramillo on 3/13/23.

## 2023-03-13 ENCOUNTER — OFFICE VISIT (OUTPATIENT)
Dept: FAMILY MEDICINE CLINIC | Age: 58
End: 2023-03-13
Payer: MEDICAID

## 2023-03-13 VITALS
OXYGEN SATURATION: 98 % | HEIGHT: 67 IN | DIASTOLIC BLOOD PRESSURE: 73 MMHG | RESPIRATION RATE: 18 BRPM | TEMPERATURE: 98.2 F | BODY MASS INDEX: 43.22 KG/M2 | SYSTOLIC BLOOD PRESSURE: 107 MMHG | WEIGHT: 275.4 LBS | HEART RATE: 79 BPM

## 2023-03-13 DIAGNOSIS — Z53.21 PATIENT LEFT WITHOUT BEING SEEN: Primary | ICD-10-CM

## 2023-03-13 RX ORDER — WHEAT DEXTRIN 1 G
2 TABLET,CHEWABLE ORAL DAILY
COMMUNITY
Start: 2023-02-10 | End: 2023-03-31

## 2023-03-16 ENCOUNTER — HOSPITAL ENCOUNTER (EMERGENCY)
Facility: HOSPITAL | Age: 58
Discharge: HOME | End: 2023-03-16
Attending: EMERGENCY MEDICINE | Admitting: EMERGENCY MEDICINE
Payer: MEDICAID

## 2023-03-16 VITALS
OXYGEN SATURATION: 99 % | RESPIRATION RATE: 18 BRPM | BODY MASS INDEX: 43.46 KG/M2 | TEMPERATURE: 98.2 F | HEART RATE: 67 BPM | SYSTOLIC BLOOD PRESSURE: 111 MMHG | HEIGHT: 67 IN | DIASTOLIC BLOOD PRESSURE: 65 MMHG | WEIGHT: 276.9 LBS

## 2023-03-16 DIAGNOSIS — K59.00 CONSTIPATION, UNSPECIFIED CONSTIPATION TYPE: Primary | ICD-10-CM

## 2023-03-16 PROCEDURE — 99283 EMERGENCY DEPT VISIT LOW MDM: CPT

## 2023-03-17 NOTE — DISCHARGE INSTRUCTIONS
Fleets enemas once or twice per day.  Magnesium citrate each day until relief.  Follow with your MD as needed.

## 2023-03-17 NOTE — ED NOTES
"Pt refused 3rd soap suds. Pt stated \"no not 3 in one night\" Pt states \"He needs to keep me for observation after 2 edemas, he needs to come in here now\" Pt ensure Provider will be made aware. Provider currently with critical pt.    "

## 2023-03-17 NOTE — ED PROVIDER NOTES
Subjective   History of Present Illness  Patient is a 57-year-old female complaint of constipation for the past 2 weeks.  She denies other associated complaints.        Review of Systems  Negative abdominal pain vomit diarrhea dysuria.  She does complain of rectal pressure.  Past Medical History:   Diagnosis Date   • Anxiety    • Asthma    • Cancer (HCC)     SKIN   • Depression    • Psoriasis    • Seizures (HCC) 12/06/2022       No Known Allergies    Past Surgical History:   Procedure Laterality Date   • CHOLECYSTECTOMY  1987   • ENDOSCOPY N/A 12/30/2022    Procedure: ESOPHAGOGASTRODUODENOSCOPY with gastric biopsy and esophageal dilation #50,#54,#56,#58 bougie;  Surgeon: Patience Arana MD;  Location: [facility] ENDOSCOPY;  Service: Gastroenterology;  Laterality: N/A;  gastritis   • EYE SURGERY     • KNEE SURGERY         Family History   Problem Relation Age of Onset   • Heart disease Mother    • Breast cancer Mother    • Heart disease Father    • Pancreatic cancer Father    • Heart disease Sister    • Lung cancer Brother        Social History     Socioeconomic History   • Marital status:    Tobacco Use   • Smoking status: Never   • Smokeless tobacco: Never   Vaping Use   • Vaping Use: Never used   Substance and Sexual Activity   • Alcohol use: Never   • Drug use: Never   • Sexual activity: Defer           Objective   Physical Exam  Lungs are clear.  Heart has regular rhythm.  Abdomen soft nontender rectal exam shows stool in the vault.  Procedures           ED Course                                           Medical Decision Making  Patient received several soapsuds enemas with partial relief.  She will be discharged.  She will continue with fleets enemas at home as well as magnesium citrate.  She will follow with her MD as needed.        Final diagnoses:   Constipation, unspecified constipation type       ED Disposition  ED Disposition     ED Disposition   Discharge    Condition   Stable    Comment   --              No follow-up provider specified.       Medication List      No changes were made to your prescriptions during this visit.          Baudilio Bryson MD  03/16/23 2393

## 2023-03-31 ENCOUNTER — HOSPITAL ENCOUNTER (OUTPATIENT)
Dept: GENERAL RADIOLOGY | Facility: HOSPITAL | Age: 58
Discharge: HOME | End: 2023-03-31
Admitting: NURSE PRACTITIONER
Payer: MEDICAID

## 2023-03-31 ENCOUNTER — TELEPHONE (OUTPATIENT)
Dept: FAMILY MEDICINE CLINIC | Facility: CLINIC | Age: 58
End: 2023-03-31

## 2023-03-31 ENCOUNTER — OFFICE VISIT (OUTPATIENT)
Dept: FAMILY MEDICINE CLINIC | Age: 58
End: 2023-03-31
Payer: MEDICAID

## 2023-03-31 VITALS
HEIGHT: 67 IN | HEART RATE: 78 BPM | SYSTOLIC BLOOD PRESSURE: 106 MMHG | WEIGHT: 271.6 LBS | DIASTOLIC BLOOD PRESSURE: 78 MMHG | OXYGEN SATURATION: 98 % | RESPIRATION RATE: 16 BRPM | TEMPERATURE: 98.4 F | BODY MASS INDEX: 42.63 KG/M2

## 2023-03-31 DIAGNOSIS — Z22.7 LATENT TUBERCULOSIS: ICD-10-CM

## 2023-03-31 DIAGNOSIS — J20.9 ACUTE BRONCHITIS, UNSPECIFIED ORGANISM: ICD-10-CM

## 2023-03-31 DIAGNOSIS — R06.02 SHORTNESS OF BREATH: ICD-10-CM

## 2023-03-31 DIAGNOSIS — Z86.14 HISTORY OF MRSA INFECTION: ICD-10-CM

## 2023-03-31 DIAGNOSIS — R07.81 RIB PAIN: ICD-10-CM

## 2023-03-31 DIAGNOSIS — L02.212 ABSCESS OF BACK: Primary | ICD-10-CM

## 2023-03-31 PROCEDURE — 1160F RVW MEDS BY RX/DR IN RCRD: CPT | Performed by: NURSE PRACTITIONER

## 2023-03-31 PROCEDURE — 71046 X-RAY EXAM CHEST 2 VIEWS: CPT

## 2023-03-31 PROCEDURE — 1159F MED LIST DOCD IN RCRD: CPT | Performed by: NURSE PRACTITIONER

## 2023-03-31 PROCEDURE — 99214 OFFICE O/P EST MOD 30 MIN: CPT | Performed by: NURSE PRACTITIONER

## 2023-03-31 RX ORDER — DOXYCYCLINE HYCLATE 100 MG/1
100 CAPSULE ORAL 2 TIMES DAILY
Qty: 28 CAPSULE | Refills: 0 | Status: SHIPPED | OUTPATIENT
Start: 2023-03-31 | End: 2023-04-14

## 2023-03-31 NOTE — ASSESSMENT & PLAN NOTE
Treating with doxycycline antibiotic with history of MRSA.  Patient advised on getting I&D procedure done today.  Patient advised on benefits of I&D and need of getting infection out.  Patient verbalized understanding but declined I&D today and agreed on general surgeon referral to follow-up with.

## 2023-03-31 NOTE — ASSESSMENT & PLAN NOTE
Prior chest x-ray showed no active pulmonary disease.  Pending chest x-ray.  Patient denies fever, chills or body aches, dyspnea, cough today.  Referred to pulmonologist Dr. Calix with [facility] pulmonology per patient request.

## 2023-03-31 NOTE — PROGRESS NOTES
Eladia Huertaenship  [id number]  1965  female     03/31/2023      Chief Complaint  Mass (Bump on back- appeared about 2 weeks) and Shortness of Breath (Feels like her lung are hurting)    History of Present Illness  57-year-old female patient presents today complaining of a bump on her back that appeared roughly 2 weeks ago.  Patient states she has a history of MRSA.  Patient also complains of shortness of breath and feels like the back of her lungs are hurting, complains of posterior rib pain.  Patient states this occurred 1 to 2 days ago.  Patient denies fever, chills or body aches, headache, lightheadedness, dizziness, chest pain, palpitations, leg swelling, chest tightness or wheezing, dyspnea, cough, abdominal pain, NVD.  Patient has a history of latent TB.  Patient is requesting to be referred to pulmonologist Dr. Calix with [facility] pulmonology.  Patient denies being around anyone sick that she knows of.      Review of Systems   Constitutional: Negative.    HENT: Negative.    Eyes: Negative.    Respiratory: Negative for cough and chest tightness.    Cardiovascular: Negative.    Gastrointestinal: Negative.    Endocrine: Negative.    Genitourinary: Negative.    Musculoskeletal: Positive for arthralgias. Negative for back pain, gait problem, joint swelling, myalgias and neck stiffness.        Posterior rib pain     Skin: Negative.    Neurological: Negative.    Hematological: Negative.    Psychiatric/Behavioral: Negative.        Past Medical History:   Diagnosis Date   • Anxiety    • Asthma    • Cancer (HCC)     SKIN   • Depression    • Psoriasis    • Seizures (HCC) 12/06/2022       Past Surgical History:   Procedure Laterality Date   • CHOLECYSTECTOMY  1987   • ENDOSCOPY N/A 12/30/2022    Procedure: ESOPHAGOGASTRODUODENOSCOPY with gastric biopsy and esophageal dilation #50,#54,#56,#58 cristino;  Surgeon: Patience Arana MD;  Location: [facility] ENDOSCOPY;  Service: Gastroenterology;  Laterality: N/A;  gastritis  "  • EYE SURGERY     • KNEE SURGERY         Family History   Problem Relation Age of Onset   • Heart disease Mother    • Breast cancer Mother    • Heart disease Father    • Pancreatic cancer Father    • Heart disease Sister    • Lung cancer Brother        Social History     Socioeconomic History   • Marital status:    Tobacco Use   • Smoking status: Never   • Smokeless tobacco: Never   Vaping Use   • Vaping Use: Never used   Substance and Sexual Activity   • Alcohol use: Never   • Drug use: Never   • Sexual activity: Defer        No Known Allergies      Objective   Vital Signs:   /78 (BP Location: Right arm, Patient Position: Sitting, Cuff Size: Adult)   Pulse 78   Temp 98.4 °F (36.9 °C) (Temporal)   Resp 16   Ht 170.2 cm (67\")   Wt 123 kg (271 lb 9.6 oz)   SpO2 98%   BMI 42.54 kg/m²       Physical Exam  Vitals and nursing note reviewed.   Constitutional:       General: She is not in acute distress.     Appearance: Normal appearance. She is not ill-appearing, toxic-appearing or diaphoretic.   HENT:      Head: Normocephalic and atraumatic.      Jaw: There is normal jaw occlusion.      Right Ear: Hearing and external ear normal.      Left Ear: Hearing and external ear normal.      Nose: Nose normal.      Mouth/Throat:      Lips: Pink.   Eyes:      General: Lids are normal. Vision grossly intact. Gaze aligned appropriately.      Extraocular Movements: Extraocular movements intact.      Conjunctiva/sclera: Conjunctivae normal.      Pupils: Pupils are equal, round, and reactive to light.   Cardiovascular:      Rate and Rhythm: Normal rate and regular rhythm.      Pulses: Normal pulses.           Carotid pulses are 2+ on the right side and 2+ on the left side.       Radial pulses are 2+ on the right side and 2+ on the left side.        Dorsalis pedis pulses are 2+ on the right side and 2+ on the left side.        Posterior tibial pulses are 2+ on the right side and 2+ on the left side.      Heart " sounds: Normal heart sounds, S1 normal and S2 normal. No murmur heard.  Pulmonary:      Effort: Pulmonary effort is normal.      Breath sounds: Normal breath sounds and air entry.   Abdominal:      General: Abdomen is flat. Bowel sounds are normal. There is no distension or abdominal bruit.      Palpations: Abdomen is soft.      Tenderness: There is no abdominal tenderness.   Musculoskeletal:         General: Normal range of motion.      Cervical back: Full passive range of motion without pain, normal range of motion and neck supple.      Right lower leg: No edema.      Left lower leg: No edema.   Skin:     General: Skin is warm and dry.      Capillary Refill: Capillary refill takes less than 2 seconds.      Coloration: Skin is not cyanotic or pale.      Findings: Abscess and erythema present. No bruising, ecchymosis, petechiae or rash.             Comments: Mild erythematous abscess to left upper back, no drainage or bleeding noted, no warmth noted, approximately 1.5 cm x 1.5 cm   Neurological:      General: No focal deficit present.      Mental Status: She is alert and oriented to person, place, and time. Mental status is at baseline.      GCS: GCS eye subscore is 4. GCS verbal subscore is 5. GCS motor subscore is 6.      Cranial Nerves: Cranial nerves 2-12 are intact. No cranial nerve deficit.      Sensory: Sensation is intact. No sensory deficit.      Motor: Motor function is intact. No weakness.      Coordination: Coordination is intact. Coordination normal.      Gait: Gait is intact. Gait normal.      Deep Tendon Reflexes: Reflexes normal.      Comments: Alert and oriented x3, no acute distress.  Answers questions appropriately and in a timely manner.   Psychiatric:         Attention and Perception: Attention and perception normal.         Mood and Affect: Mood and affect normal.         Speech: Speech normal.         Behavior: Behavior normal. Behavior is cooperative.         Thought Content: Thought content  normal.         Cognition and Memory: Cognition and memory normal.         Judgment: Judgment normal.                 Assessment and Plan   Diagnoses and all orders for this visit:    1. Abscess of back (Primary)  Assessment & Plan:  Treating with doxycycline antibiotic with history of MRSA.  Patient advised on getting I&D procedure done today.  Patient advised on benefits of I&D and need of getting infection out.  Patient verbalized understanding but declined I&D today and agreed on general surgeon referral to follow-up with.    Orders:  -     doxycycline (VIBRAMYCIN) 100 MG capsule; Take 1 capsule by mouth 2 (Two) Times a Day for 14 days.  Dispense: 28 capsule; Refill: 0  -     Ambulatory Referral to General Surgery    2. Latent tuberculosis  Assessment & Plan:  Prior chest x-ray showed no active pulmonary disease.  Pending chest x-ray.  Patient denies fever, chills or body aches, dyspnea, cough today.  Referred to pulmonologist Dr. Calix with [facility] pulmonology per patient request.    Orders:  -     XR Chest PA & Lateral; Future  -     Ambulatory Referral to Pulmonology    3. Rib pain  -     XR Chest PA & Lateral; Future  -     Ambulatory Referral to Pulmonology    4. Acute bronchitis, unspecified organism  Comments:  Treating with doxycycline antibiotic.  Follow-up with her PCP Adán Lemos in 2 to 3 weeks.  Orders:  -     XR Chest PA & Lateral; Future  -     doxycycline (VIBRAMYCIN) 100 MG capsule; Take 1 capsule by mouth 2 (Two) Times a Day for 14 days.  Dispense: 28 capsule; Refill: 0  -     Ambulatory Referral to Pulmonology    5. History of MRSA infection  -     Ambulatory Referral to General Surgery    6. Shortness of breath  -     Ambulatory Referral to Pulmonology      Follow Up   Return in about 2 weeks (around 4/14/2023) for Recheck.    There are no Patient Instructions on file for this visit.

## 2023-04-03 DIAGNOSIS — F41.9 ANXIETY: ICD-10-CM

## 2023-04-03 DIAGNOSIS — F51.01 PRIMARY INSOMNIA: ICD-10-CM

## 2023-04-03 DIAGNOSIS — F33.1 MODERATE EPISODE OF RECURRENT MAJOR DEPRESSIVE DISORDER: ICD-10-CM

## 2023-04-03 DIAGNOSIS — M25.561 ACUTE PAIN OF RIGHT KNEE: ICD-10-CM

## 2023-04-03 DIAGNOSIS — R45.86 MOOD SWINGS: ICD-10-CM

## 2023-04-03 DIAGNOSIS — E78.5 HYPERLIPIDEMIA, UNSPECIFIED HYPERLIPIDEMIA TYPE: ICD-10-CM

## 2023-04-04 RX ORDER — ARIPIPRAZOLE 5 MG/1
5 TABLET ORAL DAILY
Qty: 90 TABLET | Refills: 1 | Status: SHIPPED | OUTPATIENT
Start: 2023-04-04

## 2023-04-04 RX ORDER — ATORVASTATIN CALCIUM 80 MG/1
80 TABLET, FILM COATED ORAL NIGHTLY
Qty: 90 TABLET | Refills: 1 | Status: SHIPPED | OUTPATIENT
Start: 2023-04-04

## 2023-04-04 RX ORDER — ZOLPIDEM TARTRATE 10 MG/1
10 TABLET ORAL NIGHTLY PRN
Qty: 30 TABLET | Refills: 0 | Status: SHIPPED | OUTPATIENT
Start: 2023-04-04

## 2023-04-04 RX ORDER — GABAPENTIN 100 MG/1
100 CAPSULE ORAL 2 TIMES DAILY
Qty: 60 CAPSULE | Refills: 0 | Status: SHIPPED | OUTPATIENT
Start: 2023-04-04

## 2023-04-04 RX ORDER — ASPIRIN 81 MG/1
162 TABLET, CHEWABLE ORAL DAILY
Qty: 90 TABLET | Refills: 1 | Status: SHIPPED | OUTPATIENT
Start: 2023-04-04

## 2023-04-04 RX ORDER — BUSPIRONE HYDROCHLORIDE 5 MG/1
5 TABLET ORAL 3 TIMES DAILY
Qty: 30 TABLET | Refills: 5 | Status: SHIPPED | OUTPATIENT
Start: 2023-04-04

## 2023-04-04 RX ORDER — ALBUTEROL SULFATE 90 UG/1
2 AEROSOL, METERED RESPIRATORY (INHALATION) EVERY 6 HOURS PRN
Qty: 18 G | Refills: 2 | Status: SHIPPED | OUTPATIENT
Start: 2023-04-04

## 2023-04-10 ENCOUNTER — TELEPHONE (OUTPATIENT)
Dept: FAMILY MEDICINE CLINIC | Facility: CLINIC | Age: 58
End: 2023-04-10

## 2023-04-10 NOTE — TELEPHONE ENCOUNTER
Hub staff attempted to follow warm transfer process and was unsuccessful     Caller: Eladia Connor    Relationship to patient: Self    Best call back number: [phone]    Patient is needing: CALL BACK FROM MY CHART MESSAGE ON 4/3/23. TO DISCUSS MED CHANGE OR REQUEST FOR SOMETHING STRONGER FOR PANIC ATTACKS           
Please advise at your earliest convenience.    Thank you   
3

## 2023-07-14 ENCOUNTER — OFFICE (OUTPATIENT)
Dept: URBAN - METROPOLITAN AREA CLINIC 64 | Facility: CLINIC | Age: 58
End: 2023-07-14

## 2023-07-14 DIAGNOSIS — K31.A11 GASTRIC INTESTINAL METAPLASIA WITHOUT DYSPLASIA, INVOLVING T: ICD-10-CM

## 2023-08-14 RX ORDER — BUSPIRONE HYDROCHLORIDE 7.5 MG/1
7.5 TABLET ORAL
Qty: 30 TABLET | Refills: 1 | Status: SHIPPED | OUTPATIENT
Start: 2023-08-14

## 2023-08-14 NOTE — TELEPHONE ENCOUNTER
Caller: Eladia Connor    Relationship: Self    Best call back number: [phone]    Requested Prescriptions:   Requested Prescriptions     Pending Prescriptions Disp Refills    busPIRone (BUSPAR) 7.5 MG tablet       Sig: Take 1 tablet by mouth 3 (Three) Times a Day With Meals. [facility]        Pharmacy where request should be sent: [address] [phone] [facility] [phone] [facility]     Last office visit with prescribing clinician: 2/22/2023   Last telemedicine visit with prescribing clinician: Visit date not found   Next office visit with prescribing clinician: 9/6/2023     Additional details provided by patient:     Does the patient have less than a 3 day supply:  [] Yes  [] No    Would you like a call back once the refill request has been completed: [x] Yes [] No    If the office needs to give you a call back, can they leave a voicemail: [x] Yes [] No    Blayne Cantu Rep   08/14/23 12:30 EDT

## 2023-09-06 ENCOUNTER — OFFICE VISIT (OUTPATIENT)
Dept: FAMILY MEDICINE CLINIC | Facility: CLINIC | Age: 58
End: 2023-09-06
Payer: MEDICAID

## 2023-09-06 VITALS
SYSTOLIC BLOOD PRESSURE: 108 MMHG | TEMPERATURE: 97.9 F | WEIGHT: 293 LBS | DIASTOLIC BLOOD PRESSURE: 68 MMHG | OXYGEN SATURATION: 97 % | HEIGHT: 67 IN | BODY MASS INDEX: 45.99 KG/M2 | HEART RATE: 69 BPM | RESPIRATION RATE: 18 BRPM

## 2023-09-06 DIAGNOSIS — Z13.1 SCREENING FOR DIABETES MELLITUS: ICD-10-CM

## 2023-09-06 DIAGNOSIS — Z13.220 SCREENING FOR LIPID DISORDERS: ICD-10-CM

## 2023-09-06 DIAGNOSIS — Z13.29 SCREENING FOR ENDOCRINE, METABOLIC AND IMMUNITY DISORDER: ICD-10-CM

## 2023-09-06 DIAGNOSIS — Z13.29 SCREENING FOR THYROID DISORDER: ICD-10-CM

## 2023-09-06 DIAGNOSIS — Z13.0 SCREENING FOR ENDOCRINE, METABOLIC AND IMMUNITY DISORDER: ICD-10-CM

## 2023-09-06 DIAGNOSIS — G43.919 INTRACTABLE MIGRAINE WITHOUT STATUS MIGRAINOSUS, UNSPECIFIED MIGRAINE TYPE: Primary | ICD-10-CM

## 2023-09-06 DIAGNOSIS — Z13.228 SCREENING FOR ENDOCRINE, METABOLIC AND IMMUNITY DISORDER: ICD-10-CM

## 2023-09-06 DIAGNOSIS — R07.9 CHEST PAIN IN ADULT: ICD-10-CM

## 2023-09-06 DIAGNOSIS — L98.9 SKIN LESION: ICD-10-CM

## 2023-09-06 RX ORDER — ARIPIPRAZOLE 2 MG/1
2 TABLET ORAL DAILY
COMMUNITY

## 2023-09-06 RX ORDER — ATOGEPANT 60 MG/1
60 TABLET ORAL DAILY
Qty: 30 TABLET | Refills: 2 | Status: SHIPPED | OUTPATIENT
Start: 2023-09-06

## 2023-09-06 NOTE — PROGRESS NOTES
"Chief Complaint  Headache (Patient states she is still having her headaches and nothing is working. )    Subjective    History of Present Illness {CC  Problem List  Visit  Diagnosis   Encounters  Notes  Medications  Labs  Result Review Imaging  Media :23}     Eladia Connor presents to [facility] for Headache (Patient states she is still having her headaches and nothing is working. ).      History of Present Illness  Patient is a 58 y.o. female  presents to the clinic today for a 3-month follow-up of migraines, chest discomfort, and painful skin lesions greater than 3 months.  Patient denies any chest pain, shortness of breath, or any fevers.  Patient denies any known exposure to COVID, flu, or any other contagious illnesses.    In regards to intractable migraines, patient has tried Nurtec Vesely but says she has received no relief. She followed up with a neurologist Dr. Awad at [facility] and he did several tests which included EEG and did not find any abnormalities. She is following up with a new neurologist/sleep medicine, Dr. Seipel on 9/11/23. She describes her migraines occurring mostly on her left temporal area that radiates to her left posterior neck. They occur every day and nothing seems to relieve them and laying down makes them worse. She does not experience light sensitivity but does have accompanied nausea. She rates her pain a constant 5 out of 10.  We discussed options and patient would like to try daily Qulipta with Ubrelvy as an abortive option.  I had recommended that patient do the monthly injection to help prevent her migraines but she does not want to do this at this time.  I also shared with patient that I will defer to what ever neurology recommends for her migraines.    In regards to chest discomfort, patient shares that she was \"doing something\" and then noticed the muscles above her heart started hurting.  She shares that this muscle " discomfort is exacerbated when she moves her arm, up and down.  If she is not using the muscles in her arm significantly she does not notice the pain. She rates her pain a 1 on a scale of 10 and denies feeling like an elephant is sitting on her chest. She denies chest tightness, shortness of breath, or new leg swelling.  Patient denies any EKG at this time she feels it is more related to muscle discomfort from movement.  I am happy to further investigate with imaging at any point patient would like.    In regards to new painful skin lesions, she follows up with dermatology tomorrow, 9/7/23. She states the spots appeared approximately 2 weeks ago and are on her bilateral arms and legs. She has a history of skin cancer and is worried they are cancerous.  I recommend she follow-up with dermatology as scheduled and let me know if there is anything further she needs in regards to this.           Review of Systems   Constitutional: Negative.  Negative for activity change, chills, fatigue and fever.   HENT:  Negative for congestion, dental problem, ear pain, hearing loss, rhinorrhea, sinus pain, sore throat, tinnitus and trouble swallowing.    Eyes: Negative.  Negative for pain and visual disturbance.   Respiratory: Negative.  Negative for cough, chest tightness, shortness of breath and wheezing.    Cardiovascular:  Negative for chest pain, palpitations and leg swelling.   Gastrointestinal:  Positive for nausea. Negative for abdominal pain, diarrhea and vomiting.   Endocrine: Negative.  Negative for polydipsia, polyphagia and polyuria.   Genitourinary: Negative.  Negative for difficulty urinating, dysuria, frequency and urgency.   Musculoskeletal: Negative.  Negative for arthralgias, back pain and myalgias.        Left should tenderness   Skin:  Positive for rash. Negative for color change, pallor and wound.   Allergic/Immunologic: Negative.  Negative for environmental allergies.   Neurological:  Positive for headaches.  "Negative for dizziness, speech difficulty, weakness, light-headedness and numbness.   Hematological: Negative.    Psychiatric/Behavioral: Negative.  Negative for agitation, confusion, decreased concentration, self-injury, sleep disturbance and suicidal ideas. The patient is not nervous/anxious.    All other systems reviewed and are negative.     Objective     Vital Signs:   /68 (BP Location: Left arm, Patient Position: Sitting, Cuff Size: Adult)   Pulse 69   Temp 97.9 °F (36.6 °C) (Oral)   Resp 18   Ht 170.2 cm (67\")   Wt (!) 139 kg (306 lb)   SpO2 97%   BMI 47.93 kg/m²   Current Outpatient Medications on File Prior to Visit   Medication Sig Dispense Refill    albuterol sulfate  (90 Base) MCG/ACT inhaler Inhale 2 puffs Every 6 (Six) Hours As Needed for Wheezing. 18 g 2    ARIPiprazole (ABILIFY) 2 MG tablet Take 1 tablet by mouth Daily.      aspirin 81 MG chewable tablet Chew 2 tablets Daily. 90 tablet 1    atorvastatin (LIPITOR) 40 MG tablet Take 1 tablet by mouth every night at bedtime.      busPIRone (BUSPAR) 7.5 MG tablet Take 1 tablet by mouth 3 (Three) Times a Day With Meals. NULL 30 tablet 1    gabapentin (NEURONTIN) 100 MG capsule Take 1 capsule by mouth 2 (Two) Times a Day. 60 capsule 0    hydrOXYzine (ATARAX) 10 MG tablet       ibuprofen (ADVIL,MOTRIN) 800 MG tablet       Incontinence Supplies misc Take As Directed.      isosorbide mononitrate (IMDUR) 30 MG 24 hr tablet Take 1 tablet by mouth Daily.      LORazepam (ATIVAN) 0.5 MG tablet       sertraline (ZOLOFT) 50 MG tablet       zolpidem (AMBIEN) 10 MG tablet Take 1 tablet by mouth At Night As Needed for Sleep. 30 tablet 0    ARIPiprazole (ABILIFY) 5 MG tablet Take 1 tablet by mouth Daily. (Patient not taking: Reported on 9/6/2023) 90 tablet 1    prazosin (MINIPRESS) 1 MG capsule  (Patient not taking: Reported on 9/6/2023)       No current facility-administered medications on file prior to visit.        Past Medical History: "   Diagnosis Date    Anxiety     Asthma     Cancer     SKIN    Depression     Psoriasis     Seizures 12/06/2022      Past Surgical History:   Procedure Laterality Date    CHOLECYSTECTOMY  1987    ENDOSCOPY N/A 12/30/2022    Procedure: ESOPHAGOGASTRODUODENOSCOPY with gastric biopsy and esophageal dilation #50,#54,#56,#58 bougie;  Surgeon: Patience Arana MD;  Location: [facility] ENDOSCOPY;  Service: Gastroenterology;  Laterality: N/A;  gastritis    EYE SURGERY      KNEE SURGERY        Family History   Problem Relation Age of Onset    Heart disease Mother     Breast cancer Mother     Heart disease Father     Pancreatic cancer Father     Heart disease Sister     Lung cancer Brother       Social History     Socioeconomic History    Marital status:    Tobacco Use    Smoking status: Never    Smokeless tobacco: Never   Vaping Use    Vaping Use: Never used   Substance and Sexual Activity    Alcohol use: Never    Drug use: Never    Sexual activity: Defer         Clinical Support on 06/27/2023   Component Date Value Ref Range Status    Glucose 06/27/2023 106 (H)  65 - 99 mg/dL Final    BUN 06/27/2023 14  6 - 20 mg/dL Final    Creatinine 06/27/2023 0.82  0.57 - 1.00 mg/dL Final    Sodium 06/27/2023 140  136 - 145 mmol/L Final    Potassium 06/27/2023 4.5  3.5 - 5.2 mmol/L Final    Chloride 06/27/2023 105  98 - 107 mmol/L Final    CO2 06/27/2023 27.0  22.0 - 29.0 mmol/L Final    Calcium 06/27/2023 9.3  8.6 - 10.5 mg/dL Final    Total Protein 06/27/2023 7.3  6.0 - 8.5 g/dL Final    Albumin 06/27/2023 4.1  3.5 - 5.2 g/dL Final    ALT (SGPT) 06/27/2023 17  1 - 33 U/L Final    AST (SGOT) 06/27/2023 24  1 - 32 U/L Final    Alkaline Phosphatase 06/27/2023 126 (H)  39 - 117 U/L Final    Total Bilirubin 06/27/2023 0.6  0.0 - 1.2 mg/dL Final    Globulin 06/27/2023 3.2  gm/dL Final    A/G Ratio 06/27/2023 1.3  g/dL Final    BUN/Creatinine Ratio 06/27/2023 17.1  7.0 - 25.0 Final    Anion Gap 06/27/2023 8.0  5.0 - 15.0 mmol/L Final     eGFR 06/27/2023 82.5  >60.0 mL/min/1.73 Final         Physical Exam  Vitals and nursing note reviewed.   Constitutional:       Appearance: Normal appearance. She is obese.   HENT:      Head: Normocephalic and atraumatic.   Eyes:      Extraocular Movements: Extraocular movements intact.   Cardiovascular:      Rate and Rhythm: Normal rate and regular rhythm.      Pulses: Normal pulses.      Heart sounds: Normal heart sounds. No murmur heard.    No friction rub. No gallop.   Pulmonary:      Effort: Pulmonary effort is normal. No respiratory distress.      Breath sounds: Normal breath sounds. No stridor. No wheezing, rhonchi or rales.   Chest:      Chest wall: No tenderness.   Abdominal:      General: Abdomen is flat. Bowel sounds are normal. There is no distension.      Palpations: Abdomen is soft. There is no mass.      Tenderness: There is no abdominal tenderness. There is no right CVA tenderness, left CVA tenderness, guarding or rebound.      Hernia: No hernia is present.   Musculoskeletal:         General: Normal range of motion.      Cervical back: Normal range of motion.   Skin:     General: Skin is warm and dry.      Capillary Refill: Capillary refill takes less than 2 seconds.      Coloration: Skin is not jaundiced or pale.      Findings: Lesion present.             Comments: Scattered tender skin lesions.    Neurological:      General: No focal deficit present.      Mental Status: She is alert and oriented to person, place, and time. Mental status is at baseline.      Motor: No weakness.      Coordination: Coordination normal.      Gait: Gait normal.   Psychiatric:         Mood and Affect: Mood normal.         Behavior: Behavior normal.         Thought Content: Thought content normal.         Judgment: Judgment normal.        Result Review  Data Reviewed:{ Labs  Result Review  Imaging  Med Tab  Media :23}   I have reviewed this patient's chart.  I have reviewed previous labs, previous imaging, previous  medications, and previous encounters with notes that were available in this patient's chart.               Assessment and Plan {CC Problem List  Visit Diagnosis  ROS  Review (Popup)  [facility]  BestPractice  Medications  SmartSets  SnapShot Encounters  Media :23}   Diagnoses and all orders for this visit:    1. Intractable migraine without status migrainosus, unspecified migraine type (Primary)  -     ubrogepant (Ubrelvy) 100 MG tablet; Take 1 tablet by mouth Daily As Needed (migraine) for up to 1 dose. May repeat dose if not relieved after 2 hours for a total of 200mg per 24 hours.  Dispense: 9 tablet; Refill: 1  -     Atogepant (Qulipta) 60 MG tablet; Take 1 tablet by mouth Daily.  Dispense: 30 tablet; Refill: 2    2. Screening for diabetes mellitus  -     Hemoglobin A1c; Future    3. Screening for endocrine, metabolic and immunity disorder  -     CBC & Differential; Future  -     Comprehensive Metabolic Panel; Future    4. Screening for thyroid disorder  -     TSH; Future    5. Screening for lipid disorders  -     Lipid Panel; Future    6. Chest pain in adult    7. Skin lesion        -Prescribe Ubrelvy for PRN migraine. Discussed usage and side effects with patient. One week of samples given.   -Prescribe Qulipta for daily migraine coverage. Discussed usage and side effects with patient. One week sample given to patient.   -Defer final decisions to neurology for migraine management. Discussed this with patient.   -Patient did not fast, labs are ordered for future and patient is aware.  -Skin lesions: defer to dermatology.  -chest pain: discussed s/s of cardiac chest pain with patient. After assessment, patient feels she may of pulled a muscle in her left shoulder.   -ER red flags discussed with patient including risk versus benefit and education provided.  -Follow-up with me 4 weeks or sooner if needed    I spent 40 minutes caring for Eladia on this date of service. This time  includes time spent by me in the following activities:preparing for the visit, reviewing tests, obtaining and/or reviewing a separately obtained history, performing a medically appropriate examination and/or evaluation , counseling and educating the patient/family/caregiver, ordering medications, tests, or procedures, referring and communicating with other health care professionals , documenting information in the medical record, independently interpreting results and communicating that information with the patient/family/caregiver, and care coordination.    Follow Up {Instructions Charge Capture  Follow-up Communications :23}     Patient was given instructions and counseling regarding her condition or for health maintenance advice. Please see specific information pulled into the AVS (placed there by myself) if appropriate.    Return in about 4 weeks (around 10/4/2023).            SCOOBY Cisneros, FNP-BC

## 2023-09-08 NOTE — PROGRESS NOTES
Chief Complaint  Hospital Follow Up Visit (POSS.CVA)    Subjective            Eladia Connor presents to [facility] for HOSPITAL F/U POSS.CVA  History of Present Illness    Patient is here for a hospital f/u after poss CVA 12/7/22,     patient states hospital was not sure if she had a stroke or seizure, but she states she has trouble with balance and right side weakness since incident in 12/7/22 patient states she has not gone through physical therapy      AGE 20'S  Had mild generalized ha, never missed school with ha  Age 30-40 occasional headache  In 50's started having worse ha, throbbing and pressure.     First spell was December last year, on set of throbbing headache, drool slurred speech and weakness on right side.  With numbness on right side, has always been the right side.  Duration up to several hours, ha last for days  Last spell several weeks ago. Frequency one every several weeks    Ha treated with tylenol   Started on ubrelvy and qulipta on sept 6    For past several months has headache most days     Treated for marilee since march of this year 2023            ===[facility] VISIT 12/7/22======  History of present illness: Background per H&P: Eladia Connor is a 57 y.o. female who presented to [facility] on 12/6/2022 complaining of above.   She reportedly has a history of a CVA in 2012 without any residual deficits. She notes over the last week or so she has noted intermittent though right leg weakness and numbness with associated tingling.  Around 11 AM this morning she developed slurred speech and left arm weakness.  Symptoms lasted for about 30 minutes and she presented to [facility] for further evaluation.  She was subsequently discharged indicating that testing/evaluation was largely unremarkable with questionable history of a pneumonia on chest x-ray.   She describes a nonproductive cough without hemoptysis.  + Sick contacts with grandson with the  flu.  She denies any fevers. She describes having possible positive TB test few weeks ago. No known TB contacts however.  She indicates her son just returned from the [place] though is not sick.  No significant personal travel history noted.  When inquiring about new medications she identifies that she started Otezla this last week for her psoriasis and her symptoms seem to began after this.  She also recently began Ambien as needed for sleep approximately a month ago but she does not want to take this currently because of her symptoms. She endorses using an old prescription of Klonopin for the last month though was out of pills approximately a week ago  She indicates that her psoriasis was diagnosed by her dermatologist  .  She notes some chest tightness and substernal pressure that was nonradiating earlier today that is nonexertional  Unfortunately she has been suffering from increased stress and anxiety as her  passed away approximately a year ago. She is on BuSpar her PCP and was recently advised to titrate though she has not done so at this time.    She has not taken Lexapro for years  She is no longer taking ivermectin/Diflucan states that this was given to her by her PCP while evaluation for her psoriasis.     - Portions of the above HPI were copied from previous encounters and edited as appropriate. PMH as detailed below.      Patient evaluated after MRI.  Initially patient had significant slurred speech on exam but it did improve the longer I spoke to the patient.  Patient states that she had some left arm weakness and slurred speech.  No issues like this in the past.  When asked about stress and anxiety she is becomes very tearful and states that she is very stressed.  Her  passed away a year ago, her daughter and 3 children moved into their 1 bedroom home.  Her job as well as stressful.  Patient thinks a lot of her chest tightness and pressure  is due to anxiety.  Today patient states she still  does not feel great.  There is no focal weakness.  Nursing staff states that patient had an episode early this morning around 7 AM that she was altered.  There was an episode where her tongue was protruding and the patient was unable to speak but she was still interacting and withdrawing to pain.  This episode does not sound like a seizure and it is very atypical.  Patient awake and alert now.  No family at bedside      CT Head Without Contrast     Result Date: 12/9/2022  Impression: No acute intracranial abnormality or significant change since 12/7/2022. Electronically signed by:  Artur Eubanks M.D.  12/9/2022 9:21 PM [place] Time     CT Head Without Contrast     Result Date: 12/7/2022  Impression:  1. No acute findings or interval change from the outside CT of the head performed yesterday. 2. I would recommend an MRI of the brain without contrast for follow-up as the patient had a CT angiogram of the head and neck and CT cerebral perfusion study overnight.  Electronically Signed By-Ronen Martinez MD On:12/7/2022 7:52 AM This report was finalized on [id number] by  Ronen Martinez MD.     CT Angiogram Neck     Result Date: 12/7/2022  Impression: 1.  No acute intracranial abnormality is identified on noncontrast head CT. If there is high clinical suspicion for acute ischemic infarct, MRI may be more sensitive. 2.  Within the limitations of motion, no occlusion or high-grade stenosis of the major arteries in the head and neck. Artur Eubanks MD Neuroradiologist [facility] [email or web address] Thank you for this referral. This exam was interpreted by a fellowship trained neuroradiologist. If the patient's healthcare provider has any questions, a [facility] neuroradiologist can be reached directly at [phone] at any time. SLOT  21 Electronically signed by:  Artur Eubanks M.D.  12/6/2022 11:20 PM [place] Time     MRI Brain Without Contrast     Result Date: 12/7/2022  Impression:   1. No acute intracranial pathology. 2. Chronic changes suggestive of microvascular ischemic disease.   Electronically Signed By-Derek Branham MD On:12/7/2022 11:34 AM This report was finalized on [id number] by  Derek Branham MD.    CT Angiogram Head     Result Date: 12/7/2022  Impression: 1.  No acute intracranial abnormality is identified on noncontrast head CT. If there is high clinical suspicion for acute ischemic infarct, MRI may be more sensitive. 2.  Within the limitations of motion, no occlusion or high-grade stenosis of the major arteries in the head and neck. Artur Eubanks MD Neuroradiologist [facility] [email or web address] Thank you for this referral. This exam was interpreted by a fellowship trained neuroradiologist. If the patient's healthcare provider has any questions, a [facility] neuroradiologist can be reached directly at [phone] at any time. SLOT  21 Electronically signed by:  Artur Eubanks M.D.  12/6/2022 11:20 PM [place] Time      Family History   Problem Relation Age of Onset    Heart disease Mother     Breast cancer Mother     Heart disease Father     Pancreatic cancer Father     Heart disease Sister     Lung cancer Brother        Past Medical History:   Diagnosis Date    Anxiety     Asthma     Cancer     SKIN    Depression     Psoriasis     Seizures 12/06/2022       Social History     Socioeconomic History    Marital status:    Tobacco Use    Smoking status: Never    Smokeless tobacco: Never   Vaping Use    Vaping Use: Never used   Substance and Sexual Activity    Alcohol use: Never    Drug use: Never    Sexual activity: Defer         Current Outpatient Medications:     albuterol sulfate  (90 Base) MCG/ACT inhaler, Inhale 2 puffs Every 6 (Six) Hours As Needed for Wheezing., Disp: 18 g, Rfl: 2    ARIPiprazole (ABILIFY) 2 MG tablet, Take 1 tablet by mouth Daily., Disp: , Rfl:     aspirin 81 MG chewable tablet, Chew 2 tablets Daily.,  "Disp: 90 tablet, Rfl: 1    Atogepant (Qulipta) 60 MG tablet, Take 1 tablet by mouth Daily., Disp: 30 tablet, Rfl: 2    atorvastatin (LIPITOR) 40 MG tablet, Take 1 tablet by mouth every night at bedtime., Disp: , Rfl:     busPIRone (BUSPAR) 7.5 MG tablet, Take 1 tablet by mouth 3 (Three) Times a Day With Meals. NULL, Disp: 30 tablet, Rfl: 1    gabapentin (NEURONTIN) 100 MG capsule, Take 1 capsule by mouth 2 (Two) Times a Day., Disp: 60 capsule, Rfl: 0    hydrOXYzine (ATARAX) 10 MG tablet, , Disp: , Rfl:     ibuprofen (ADVIL,MOTRIN) 800 MG tablet, , Disp: , Rfl:     Incontinence Supplies misc, Take As Directed., Disp: , Rfl:     isosorbide mononitrate (IMDUR) 30 MG 24 hr tablet, Take 1 tablet by mouth Daily., Disp: , Rfl:     LORazepam (ATIVAN) 0.5 MG tablet, Take  by mouth Every 8 (Eight) Hours., Disp: , Rfl:     prazosin (MINIPRESS) 1 MG capsule, , Disp: , Rfl:     sertraline (ZOLOFT) 50 MG tablet, , Disp: , Rfl:     ubrogepant (Ubrelvy) 100 MG tablet, Take 1 tablet by mouth Daily As Needed (migraine) for up to 1 dose. May repeat dose if not relieved after 2 hours for a total of 200mg per 24 hours., Disp: 9 tablet, Rfl: 1    zolpidem (AMBIEN) 10 MG tablet, Take 1 tablet by mouth At Night As Needed for Sleep., Disp: 30 tablet, Rfl: 0    topiramate (Topamax) 50 MG tablet, Take 1 tablet by mouth 2 (Two) Times a Day., Disp: 60 tablet, Rfl: 2    Review of Systems   Constitutional:  Negative for fatigue.   Neurological:  Positive for tremors and headaches.   Psychiatric/Behavioral:  Negative for sleep disturbance.           Objective   Vital Signs:   /71   Pulse 79   Ht 170.2 cm (67\")   Wt (!) 142 kg (312 lb)   BMI 48.87 kg/m²     Physical Exam  Vitals reviewed.   HENT:      Nose: Nose normal.   Cardiovascular:      Rate and Rhythm: Normal rate.   Pulmonary:      Effort: Pulmonary effort is normal.   Neurological:      General: No focal deficit present.      Mental Status: She is alert.   Psychiatric:         " Mood and Affect: Mood normal.      Result Review :                       Assessment and Plan    Diagnoses and all orders for this visit:    1. Intractable migraine with aura without status migrainosus (Primary)  -     EEG (Hospital Performed); Future  -     topiramate (Topamax) 50 MG tablet; Take 1 tablet by mouth 2 (Two) Times a Day.  Dispense: 60 tablet; Refill: 2    Probable migraine with associated neurologic symptoms  Plan will obtain an EEG  Continue current medications  Add Topamax 50mg bid  Continue using CPAP     Follow Up   Return in about 6 months (around 3/11/2024).  Patient was given instructions and counseling regarding her condition or for health maintenance advice. Please see specific information pulled into the AVS if appropriate.         This document has been electronically signed by Joseph Seipel, MD on September 11, 2023 11:23 EDT

## 2023-09-11 ENCOUNTER — OFFICE VISIT (OUTPATIENT)
Dept: NEUROLOGY | Facility: CLINIC | Age: 58
End: 2023-09-11
Payer: MEDICAID

## 2023-09-11 VITALS
HEART RATE: 79 BPM | BODY MASS INDEX: 45.99 KG/M2 | WEIGHT: 293 LBS | SYSTOLIC BLOOD PRESSURE: 117 MMHG | HEIGHT: 67 IN | DIASTOLIC BLOOD PRESSURE: 71 MMHG

## 2023-09-11 DIAGNOSIS — G43.119 INTRACTABLE MIGRAINE WITH AURA WITHOUT STATUS MIGRAINOSUS: Primary | ICD-10-CM

## 2023-09-11 PROBLEM — E03.9 HYPOTHYROIDISM: Status: ACTIVE | Noted: 2023-09-11

## 2023-09-11 RX ORDER — TOPIRAMATE 50 MG/1
50 TABLET, FILM COATED ORAL 2 TIMES DAILY
Qty: 60 TABLET | Refills: 2 | Status: SHIPPED | OUTPATIENT
Start: 2023-09-11

## 2023-09-11 RX ORDER — LORAZEPAM 0.5 MG/1
TABLET ORAL EVERY 8 HOURS
COMMUNITY

## 2023-09-13 DIAGNOSIS — F41.9 ANXIETY: Primary | ICD-10-CM

## 2023-09-13 DIAGNOSIS — M25.561 ACUTE PAIN OF RIGHT KNEE: ICD-10-CM

## 2023-09-13 RX ORDER — GABAPENTIN 100 MG/1
100 CAPSULE ORAL 2 TIMES DAILY
Qty: 60 CAPSULE | Refills: 0 | OUTPATIENT
Start: 2023-09-13

## 2023-09-13 RX ORDER — BUSPIRONE HYDROCHLORIDE 7.5 MG/1
7.5 TABLET ORAL
Qty: 90 TABLET | Refills: 1 | Status: SHIPPED | OUTPATIENT
Start: 2023-09-13

## 2023-09-13 RX ORDER — GABAPENTIN 100 MG/1
100 CAPSULE ORAL 2 TIMES DAILY
COMMUNITY

## 2023-09-13 NOTE — TELEPHONE ENCOUNTER
Caller: Eladia Connor    Relationship: Self    Best call back number: [phone]     Requested Prescriptions:   Requested Prescriptions     Pending Prescriptions Disp Refills    gabapentin (NEURONTIN) 100 MG capsule 60 capsule 0     Sig: Take 1 capsule by mouth 2 (Two) Times a Day.    busPIRone (BUSPAR) 7.5 MG tablet 30 tablet 1     Sig: Take 1 tablet by mouth 3 (Three) Times a Day With Meals. [facility]        Pharmacy where request should be sent: [address] [phone] [facility] [phone] FX     Last office visit with prescribing clinician: 9/6/2023   Last telemedicine visit with prescribing clinician: Visit date not found   Next office visit with prescribing clinician: 10/4/2023     Additional details provided by patient: PATIENT STATED THAT THE busPIRone (BUSPAR) 7.5 MG tablet WAS ORIGINALLY SENT IN TO THE PHARMACY FOR ONCE A DAY BUT PATIENT STATED SHE TAKES THIS MEDICATION THREE TIMES A DAY    PATIENT STATED SHE IS COMPLETELY OUT OF THE busPIRone (BUSPAR) 7.5 MG tablet  AND HAS A ONE DAY SUPPLY LEFT OF THE gabapentin (NEURONTIN) 100 MG capsule     Does the patient have less than a 3 day supply:  [x] Yes  [] No    Would you like a call back once the refill request has been completed: [x] Yes [] No    If the office needs to give you a call back, can they leave a voicemail: [x] Yes [] No    Blayne Aguilar   09/13/23 12:49 EDT

## 2023-09-14 NOTE — TELEPHONE ENCOUNTER
Caller: Eladia Connor    Relationship: Self    Best call back number: [phone] (Mobile)     What medication are you requesting: gabapentin (NEURONTIN) 100 MG capsule       Have you had these symptoms before:    [x] Yes  [] No    Have you been treated for these symptoms before:   [x] Yes  [] No    If a prescription is needed, what is your preferred pharmacy and phone number: [place] [phone]  [place]  [phone] [facility] [phone] FX     Additional notes:      HAS BEEN PRESCRIBED BY SCOOBY MONGE IN THE PAST

## 2023-10-24 RX ORDER — GABAPENTIN 100 MG/1
100 CAPSULE ORAL 2 TIMES DAILY
OUTPATIENT
Start: 2023-10-24

## 2023-10-24 NOTE — TELEPHONE ENCOUNTER
Caller: Eladia Connor    Relationship: Self    Best call back number: [phone]     Requested Prescriptions:   Requested Prescriptions     Pending Prescriptions Disp Refills    gabapentin (NEURONTIN) 100 MG capsule       Sig: Take 1 capsule by mouth 2 (Two) Times a Day.        Pharmacy where request should be sent: [place] [phone] [facility] - [phone]  - [phone] FX     Last office visit with prescribing clinician: 9/6/2023   Last telemedicine visit with prescribing clinician: Visit date not found   Next office visit with prescribing clinician: Visit date not found     Additional details provided by patient: PATIENT IS COMPLETELY OUT     Does the patient have less than a 3 day supply:  [x] Yes  [] No    Would you like a call back once the refill request has been completed: [x] Yes [] No    If the office needs to give you a call back, can they leave a voicemail: [x] Yes [] No    Zaina Shen, PCT   10/24/23 14:51 EDT

## 2023-10-26 ENCOUNTER — TELEPHONE (OUTPATIENT)
Dept: FAMILY MEDICINE CLINIC | Facility: CLINIC | Age: 58
End: 2023-10-26

## 2023-10-26 DIAGNOSIS — G43.119 INTRACTABLE MIGRAINE WITH AURA WITHOUT STATUS MIGRAINOSUS: ICD-10-CM

## 2023-10-26 RX ORDER — TOPIRAMATE 50 MG/1
50 TABLET, FILM COATED ORAL 2 TIMES DAILY
Qty: 60 TABLET | Refills: 2 | Status: SHIPPED | OUTPATIENT
Start: 2023-10-26

## 2023-10-26 NOTE — TELEPHONE ENCOUNTER
Caller: Eladia Connor    Relationship: Self    Best call back number: [phone]     What is the best time to reach you: ANY    Who are you requesting to speak with (clinical staff, provider,  specific staff member): CLINICAL    Do you know the name of the person who called:     What was the call regarding: PATIENT CALLED IN REGARDS TO HER GABAPENTIN MEDICATION REFILL. TRIED TO TRANSFER TO OFFICE BECAUSE OF REFILL DENIAL. I WAS UNABLE TO WARM TRANSFER TO THE OFFICE.    Is it okay if the provider responds through MyChart:

## 2023-11-13 ENCOUNTER — OFFICE VISIT (OUTPATIENT)
Dept: FAMILY MEDICINE CLINIC | Facility: CLINIC | Age: 58
End: 2023-11-13
Payer: MEDICAID

## 2023-11-13 VITALS
HEIGHT: 67 IN | DIASTOLIC BLOOD PRESSURE: 68 MMHG | BODY MASS INDEX: 45.99 KG/M2 | SYSTOLIC BLOOD PRESSURE: 118 MMHG | TEMPERATURE: 98 F | OXYGEN SATURATION: 96 % | WEIGHT: 293 LBS | HEART RATE: 79 BPM | RESPIRATION RATE: 18 BRPM

## 2023-11-13 DIAGNOSIS — R00.2 PALPITATIONS: ICD-10-CM

## 2023-11-13 DIAGNOSIS — G43.919 INTRACTABLE MIGRAINE WITHOUT STATUS MIGRAINOSUS, UNSPECIFIED MIGRAINE TYPE: ICD-10-CM

## 2023-11-13 DIAGNOSIS — Z13.228 SCREENING FOR ENDOCRINE, METABOLIC AND IMMUNITY DISORDER: ICD-10-CM

## 2023-11-13 DIAGNOSIS — Z13.29 SCREENING FOR THYROID DISORDER: ICD-10-CM

## 2023-11-13 DIAGNOSIS — J45.20 MILD INTERMITTENT ASTHMA WITHOUT COMPLICATION: ICD-10-CM

## 2023-11-13 DIAGNOSIS — F51.01 PRIMARY INSOMNIA: ICD-10-CM

## 2023-11-13 DIAGNOSIS — Z13.1 SCREENING FOR DIABETES MELLITUS: ICD-10-CM

## 2023-11-13 DIAGNOSIS — Z13.0 SCREENING FOR ENDOCRINE, METABOLIC AND IMMUNITY DISORDER: ICD-10-CM

## 2023-11-13 DIAGNOSIS — E78.2 MIXED HYPERLIPIDEMIA: ICD-10-CM

## 2023-11-13 DIAGNOSIS — Z12.31 ENCOUNTER FOR SCREENING MAMMOGRAM FOR MALIGNANT NEOPLASM OF BREAST: Primary | ICD-10-CM

## 2023-11-13 DIAGNOSIS — R00.0 TACHYCARDIA: ICD-10-CM

## 2023-11-13 DIAGNOSIS — Z13.220 SCREENING FOR LIPID DISORDERS: ICD-10-CM

## 2023-11-13 DIAGNOSIS — Z13.29 SCREENING FOR ENDOCRINE, METABOLIC AND IMMUNITY DISORDER: ICD-10-CM

## 2023-11-13 DIAGNOSIS — G62.9 NEUROPATHY: ICD-10-CM

## 2023-11-13 PROCEDURE — 83036 HEMOGLOBIN GLYCOSYLATED A1C: CPT | Performed by: REGISTERED NURSE

## 2023-11-13 PROCEDURE — 80050 GENERAL HEALTH PANEL: CPT | Performed by: REGISTERED NURSE

## 2023-11-13 PROCEDURE — 80061 LIPID PANEL: CPT | Performed by: REGISTERED NURSE

## 2023-11-13 RX ORDER — ASPIRIN 81 MG/1
162 TABLET, CHEWABLE ORAL DAILY
Qty: 90 TABLET | Refills: 1 | Status: SHIPPED | OUTPATIENT
Start: 2023-11-13

## 2023-11-13 RX ORDER — ZOLPIDEM TARTRATE 10 MG/1
10 TABLET ORAL NIGHTLY PRN
Qty: 30 TABLET | Refills: 0 | Status: SHIPPED | OUTPATIENT
Start: 2023-11-13

## 2023-11-13 RX ORDER — ISOSORBIDE MONONITRATE 30 MG/1
30 TABLET, EXTENDED RELEASE ORAL DAILY
Qty: 90 TABLET | Refills: 1 | Status: SHIPPED | OUTPATIENT
Start: 2023-11-13

## 2023-11-13 RX ORDER — GABAPENTIN 100 MG/1
100 CAPSULE ORAL 2 TIMES DAILY
Qty: 60 CAPSULE | Refills: 1 | Status: SHIPPED | OUTPATIENT
Start: 2023-11-13

## 2023-11-13 RX ORDER — ATOGEPANT 60 MG/1
60 TABLET ORAL DAILY
Qty: 30 TABLET | Refills: 2 | Status: SHIPPED | OUTPATIENT
Start: 2023-11-13

## 2023-11-13 RX ORDER — ATORVASTATIN CALCIUM 40 MG/1
40 TABLET, FILM COATED ORAL
Qty: 90 TABLET | Refills: 1 | Status: SHIPPED | OUTPATIENT
Start: 2023-11-13

## 2023-11-13 RX ORDER — ALBUTEROL SULFATE 90 UG/1
2 AEROSOL, METERED RESPIRATORY (INHALATION) EVERY 6 HOURS PRN
Qty: 18 G | Refills: 2 | Status: SHIPPED | OUTPATIENT
Start: 2023-11-13

## 2023-11-13 NOTE — PROGRESS NOTES
Chief Complaint  Follow-up (Patient following up on Gabapentin being canceled. //Also patient is fasting for labs today. )    Subjective    History of Present Illness {CC  Problem List  Visit  Diagnosis   Encounters  Notes  Medications  Labs  Result Review Imaging  Media :23}     Eladia Connor presents to [facility] for Follow-up (Patient following up on Gabapentin being canceled. //Also patient is fasting for labs today. ).      History of Present Illness  Patient is a 58 y.o. female  presents to the clinic today for 3-month follow-up for migraines, hyperlipidemia, and insomnia.  Patient denies any chest pain, shortness of breath, or any fevers.  Patient denies any known exposure to COVID, flu, or any other contagious illnesses.    In regards to migraines, patient currently takes Qulipta daily for management of migraines.  She takes Ubrelvy as needed for severe migraines that do not go away.  Patient has tried multiple migraine medications in the past she shares.  Patient has no concerns in regards to her Qulipta or Ubrelvy at this time.    In regards to hyperlipidemia, patient is currently taking atorvastatin and tries to follow a low-fat diet to manage her hyperlipidemia.  In regards to atorvastatin and hyperlipidemia, patient has no concerns or questions at this time.    In regards to insomnia, patient is currently taking zolpidem 10 mg to manage this.  Patient shares that the Ambien does help manage her insomnia.  Patient denies any daytime somnolence from the Ambien and shares that it has been helpful.  She does share that she does not always get excellent sleep but feels like her sleep is much improved with Ambien as compared to without Ambien.       Review of Systems   Constitutional: Negative.  Negative for activity change, chills, fatigue and fever.   HENT: Negative.  Negative for congestion, dental problem, ear pain, hearing loss, rhinorrhea, sinus pain, sore  "throat, tinnitus and trouble swallowing.    Eyes: Negative.  Negative for pain and visual disturbance.   Respiratory: Negative.  Negative for cough, chest tightness, shortness of breath and wheezing.    Cardiovascular: Negative.  Negative for chest pain, palpitations and leg swelling.   Gastrointestinal: Negative.  Negative for abdominal pain, diarrhea, nausea and vomiting.   Endocrine: Negative.  Negative for polydipsia, polyphagia and polyuria.   Genitourinary: Negative.  Negative for difficulty urinating, dysuria, frequency and urgency.   Musculoskeletal: Negative.  Negative for arthralgias, back pain and myalgias.   Skin: Negative.  Negative for color change, pallor, rash and wound.   Allergic/Immunologic: Negative.  Negative for environmental allergies.   Neurological: Negative.  Negative for dizziness, speech difficulty, weakness, light-headedness, numbness and headaches.   Hematological: Negative.    Psychiatric/Behavioral: Negative.  Negative for confusion, decreased concentration, self-injury and suicidal ideas. The patient is not nervous/anxious.    All other systems reviewed and are negative.       Objective     Vital Signs:   /68 (BP Location: Left arm, Patient Position: Sitting, Cuff Size: Adult)   Pulse 79   Temp 98 °F (36.7 °C) (Oral)   Resp 18   Ht 170.2 cm (67\")   Wt (!) 142 kg (313 lb)   SpO2 96%   BMI 49.02 kg/m²   Current Outpatient Medications on File Prior to Visit   Medication Sig Dispense Refill    ARIPiprazole (ABILIFY) 2 MG tablet Take 2.5 tablets by mouth Daily.      busPIRone (BUSPAR) 7.5 MG tablet Take 1 tablet by mouth 3 (Three) Times a Day With Meals. NULL (Patient taking differently: Take 10 mg by mouth 3 (Three) Times a Day With Meals. NULL) 90 tablet 1    hydrOXYzine (ATARAX) 10 MG tablet       ibuprofen (ADVIL,MOTRIN) 800 MG tablet       Incontinence Supplies misc Take As Directed.      LORazepam (ATIVAN) 0.5 MG tablet Take  by mouth Every 8 (Eight) Hours.      " prazosin (MINIPRESS) 1 MG capsule       sertraline (ZOLOFT) 50 MG tablet Take 2 tablets by mouth Daily.      topiramate (Topamax) 50 MG tablet Take 1 tablet by mouth 2 (Two) Times a Day. 60 tablet 2     No current facility-administered medications on file prior to visit.        Past Medical History:   Diagnosis Date    Anxiety     Asthma     Cancer     SKIN    Depression     Psoriasis     Seizures 12/06/2022      Past Surgical History:   Procedure Laterality Date    CHOLECYSTECTOMY  1987    ENDOSCOPY N/A 12/30/2022    Procedure: ESOPHAGOGASTRODUODENOSCOPY with gastric biopsy and esophageal dilation #50,#54,#56,#58 bougie;  Surgeon: Patience Arana MD;  Location: [facility] ENDOSCOPY;  Service: Gastroenterology;  Laterality: N/A;  gastritis    EYE SURGERY      KNEE SURGERY        Family History   Problem Relation Age of Onset    Heart disease Mother     Breast cancer Mother     Heart disease Father     Pancreatic cancer Father     Heart disease Sister     Lung cancer Brother       Social History     Socioeconomic History    Marital status:    Tobacco Use    Smoking status: Never    Smokeless tobacco: Never   Vaping Use    Vaping Use: Never used   Substance and Sexual Activity    Alcohol use: Never    Drug use: Never    Sexual activity: Defer         Office Visit on 11/13/2023   Component Date Value Ref Range Status    Glucose 11/13/2023 127 (H)  65 - 99 mg/dL Final    BUN 11/13/2023 12  8 - 23 mg/dL Final    Creatinine 11/13/2023 0.78  0.57 - 1.00 mg/dL Final    Sodium 11/13/2023 138  136 - 145 mmol/L Final    Potassium 11/13/2023 4.2  3.5 - 5.2 mmol/L Final    Chloride 11/13/2023 104  98 - 107 mmol/L Final    CO2 11/13/2023 20.7 (L)  22.0 - 29.0 mmol/L Final    Calcium 11/13/2023 9.2  8.6 - 10.5 mg/dL Final    Total Protein 11/13/2023 7.8  6.0 - 8.5 g/dL Final    Albumin 11/13/2023 3.9  3.5 - 5.2 g/dL Final    ALT (SGPT) 11/13/2023 22  1 - 33 U/L Final    AST (SGOT) 11/13/2023 31  1 - 32 U/L Final    Alkaline  Phosphatase 11/13/2023 171 (H)  39 - 117 U/L Final    Total Bilirubin 11/13/2023 0.6  0.0 - 1.2 mg/dL Final    Globulin 11/13/2023 3.9  gm/dL Final    A/G Ratio 11/13/2023 1.0  g/dL Final    BUN/Creatinine Ratio 11/13/2023 15.4  7.0 - 25.0 Final    Anion Gap 11/13/2023 13.3  5.0 - 15.0 mmol/L Final    eGFR 11/13/2023 87.1  >60.0 mL/min/1.73 Final    Total Cholesterol 11/13/2023 171  0 - 200 mg/dL Final    Triglycerides 11/13/2023 128  0 - 150 mg/dL Final    HDL Cholesterol 11/13/2023 41  40 - 60 mg/dL Final    LDL Cholesterol  11/13/2023 107 (H)  0 - 100 mg/dL Final    VLDL Cholesterol 11/13/2023 23  5 - 40 mg/dL Final    LDL/HDL Ratio 11/13/2023 2.55   Final    Hemoglobin A1C 11/13/2023 6.30 (H)  4.80 - 5.60 % Final    TSH 11/13/2023 2.740  0.270 - 4.200 uIU/mL Final    WBC 11/13/2023 8.09  3.40 - 10.80 10*3/mm3 Final    RBC 11/13/2023 4.70  3.77 - 5.28 10*6/mm3 Final    Hemoglobin 11/13/2023 13.5  12.0 - 15.9 g/dL Final    Hematocrit 11/13/2023 38.4  34.0 - 46.6 % Final    MCV 11/13/2023 81.7  79.0 - 97.0 fL Final    MCH 11/13/2023 28.7  26.6 - 33.0 pg Final    MCHC 11/13/2023 35.2  31.5 - 35.7 g/dL Final    RDW 11/13/2023 13.5  12.3 - 15.4 % Final    RDW-SD 11/13/2023 39.3  37.0 - 54.0 fl Final    MPV 11/13/2023 9.3  6.0 - 12.0 fL Final    Platelets 11/13/2023 216  140 - 450 10*3/mm3 Final    Neutrophil % 11/13/2023 73.7  42.7 - 76.0 % Final    Lymphocyte % 11/13/2023 13.8 (L)  19.6 - 45.3 % Final    Monocyte % 11/13/2023 6.4  5.0 - 12.0 % Final    Eosinophil % 11/13/2023 4.8  0.3 - 6.2 % Final    Basophil % 11/13/2023 0.7  0.0 - 1.5 % Final    Immature Grans % 11/13/2023 0.6 (H)  0.0 - 0.5 % Final    Neutrophils, Absolute 11/13/2023 5.95  1.70 - 7.00 10*3/mm3 Final    Lymphocytes, Absolute 11/13/2023 1.12  0.70 - 3.10 10*3/mm3 Final    Monocytes, Absolute 11/13/2023 0.52  0.10 - 0.90 10*3/mm3 Final    Eosinophils, Absolute 11/13/2023 0.39  0.00 - 0.40 10*3/mm3 Final    Basophils, Absolute 11/13/2023 0.06  0.00 -  0.20 10*3/mm3 Final    Immature Grans, Absolute 11/13/2023 0.05  0.00 - 0.05 10*3/mm3 Final    nRBC 11/13/2023 0.0  0.0 - 0.2 /100 WBC Final         Physical Exam  Vitals and nursing note reviewed.   Constitutional:       Appearance: Normal appearance. She is normal weight.   HENT:      Head: Normocephalic and atraumatic.   Cardiovascular:      Rate and Rhythm: Normal rate and regular rhythm.      Pulses: Normal pulses.      Heart sounds: Normal heart sounds. No murmur heard.     No friction rub. No gallop.   Pulmonary:      Effort: Pulmonary effort is normal. No respiratory distress.      Breath sounds: Normal breath sounds. No stridor. No wheezing, rhonchi or rales.   Chest:      Chest wall: No tenderness.   Abdominal:      General: Abdomen is flat. Bowel sounds are normal. There is no distension.      Palpations: Abdomen is soft. There is no mass.      Tenderness: There is no abdominal tenderness. There is no right CVA tenderness, left CVA tenderness, guarding or rebound.      Hernia: No hernia is present.   Skin:     General: Skin is warm and dry.      Capillary Refill: Capillary refill takes less than 2 seconds.      Coloration: Skin is not jaundiced or pale.   Neurological:      General: No focal deficit present.      Mental Status: She is alert and oriented to person, place, and time. Mental status is at baseline.      Motor: No weakness.      Coordination: Coordination normal.      Gait: Gait normal.   Psychiatric:         Mood and Affect: Mood normal.         Behavior: Behavior normal.         Thought Content: Thought content normal.         Judgment: Judgment normal.          Result Review  Data Reviewed:{ Labs  Result Review  Imaging  Med Tab  Media :23}   I have reviewed this patient's chart.  I have reviewed previous labs, previous imaging, previous medications, and previous encounters with notes that were available in this patient's chart.               Assessment and Plan {CC Problem List  Visit  Diagnosis  ROS  Review (Popup)  [facility]  BestPractice  Medications  SmartSets  SnapShot Encounters  Media :23}   Diagnoses and all orders for this visit:    1. Encounter for screening mammogram for malignant neoplasm of breast (Primary)    2. Neuropathy  -     gabapentin (NEURONTIN) 100 MG capsule; Take 1 capsule by mouth 2 (Two) Times a Day.  Dispense: 60 capsule; Refill: 1    3. Intractable migraine without status migrainosus, unspecified migraine type  -     Atogepant (Qulipta) 60 MG tablet; Take 1 tablet by mouth Daily.  Dispense: 30 tablet; Refill: 2  -     ubrogepant (Ubrelvy) 100 MG tablet; Take 1 tablet by mouth Daily As Needed (migraine) for up to 1 dose. May repeat dose if not relieved after 2 hours for a total of 200mg per 24 hours.  Dispense: 9 tablet; Refill: 1    4. Primary insomnia  -     zolpidem (AMBIEN) 10 MG tablet; Take 1 tablet by mouth At Night As Needed for Sleep.  Dispense: 30 tablet; Refill: 0    5. Palpitations  -     aspirin 81 MG chewable tablet; Chew 2 tablets Daily.  Dispense: 90 tablet; Refill: 1  -     isosorbide mononitrate (IMDUR) 30 MG 24 hr tablet; Take 1 tablet by mouth Daily.  Dispense: 90 tablet; Refill: 1    6. Tachycardia  -     aspirin 81 MG chewable tablet; Chew 2 tablets Daily.  Dispense: 90 tablet; Refill: 1  -     isosorbide mononitrate (IMDUR) 30 MG 24 hr tablet; Take 1 tablet by mouth Daily.  Dispense: 90 tablet; Refill: 1    7. Mixed hyperlipidemia  -     atorvastatin (LIPITOR) 40 MG tablet; Take 1 tablet by mouth every night at bedtime.  Dispense: 90 tablet; Refill: 1    8. Mild intermittent asthma without complication  -     albuterol sulfate  (90 Base) MCG/ACT inhaler; Inhale 2 puffs Every 6 (Six) Hours As Needed for Wheezing.  Dispense: 18 g; Refill: 2    9. Screening for endocrine, metabolic and immunity disorder  -     CBC & Differential  -     Comprehensive Metabolic Panel    10. Screening for lipid disorders  -     Lipid  Panel    11. Screening for diabetes mellitus  -     Hemoglobin A1c    12. Screening for thyroid disorder  -     TSH        -Continue current medications.  -Fasting labs today.  -ER red flags discussed with patient including risk versus benefit and education provided.  -Follow-up with me in 3 months or sooner if needed.    I spent 30 minutes caring for Eladia on this date of service. This time includes time spent by me in the following activities:preparing for the visit, reviewing tests, obtaining and/or reviewing a separately obtained history, performing a medically appropriate examination and/or evaluation , counseling and educating the patient/family/caregiver, ordering medications, tests, or procedures, referring and communicating with other health care professionals , documenting information in the medical record, independently interpreting results and communicating that information with the patient/family/caregiver, and care coordination.    Follow Up {Instructions Charge Capture  Follow-up Communications :23}     Patient was given instructions and counseling regarding her condition or for health maintenance advice. Please see specific information pulled into the AVS (placed there by myself) if appropriate.    Return in about 3 months (around 2/13/2024) for routine follow up.            SCOOBY Cisneros, FNP-BC

## 2023-11-14 LAB
ALBUMIN SERPL-MCNC: 3.9 G/DL (ref 3.5–5.2)
ALBUMIN/GLOB SERPL: 1 G/DL
ALP SERPL-CCNC: 171 U/L (ref 39–117)
ALT SERPL W P-5'-P-CCNC: 22 U/L (ref 1–33)
ANION GAP SERPL CALCULATED.3IONS-SCNC: 13.3 MMOL/L (ref 5–15)
AST SERPL-CCNC: 31 U/L (ref 1–32)
BASOPHILS # BLD AUTO: 0.06 10*3/MM3 (ref 0–0.2)
BASOPHILS NFR BLD AUTO: 0.7 % (ref 0–1.5)
BILIRUB SERPL-MCNC: 0.6 MG/DL (ref 0–1.2)
BUN SERPL-MCNC: 12 MG/DL (ref 8–23)
BUN/CREAT SERPL: 15.4 (ref 7–25)
CALCIUM SPEC-SCNC: 9.2 MG/DL (ref 8.6–10.5)
CHLORIDE SERPL-SCNC: 104 MMOL/L (ref 98–107)
CHOLEST SERPL-MCNC: 171 MG/DL (ref 0–200)
CO2 SERPL-SCNC: 20.7 MMOL/L (ref 22–29)
CREAT SERPL-MCNC: 0.78 MG/DL (ref 0.57–1)
DEPRECATED RDW RBC AUTO: 39.3 FL (ref 37–54)
EGFRCR SERPLBLD CKD-EPI 2021: 87.1 ML/MIN/1.73
EOSINOPHIL # BLD AUTO: 0.39 10*3/MM3 (ref 0–0.4)
EOSINOPHIL NFR BLD AUTO: 4.8 % (ref 0.3–6.2)
ERYTHROCYTE [DISTWIDTH] IN BLOOD BY AUTOMATED COUNT: 13.5 % (ref 12.3–15.4)
GLOBULIN UR ELPH-MCNC: 3.9 GM/DL
GLUCOSE SERPL-MCNC: 127 MG/DL (ref 65–99)
HBA1C MFR BLD: 6.3 % (ref 4.8–5.6)
HCT VFR BLD AUTO: 38.4 % (ref 34–46.6)
HDLC SERPL-MCNC: 41 MG/DL (ref 40–60)
HGB BLD-MCNC: 13.5 G/DL (ref 12–15.9)
IMM GRANULOCYTES # BLD AUTO: 0.05 10*3/MM3 (ref 0–0.05)
IMM GRANULOCYTES NFR BLD AUTO: 0.6 % (ref 0–0.5)
LDLC SERPL CALC-MCNC: 107 MG/DL (ref 0–100)
LDLC/HDLC SERPL: 2.55 {RATIO}
LYMPHOCYTES # BLD AUTO: 1.12 10*3/MM3 (ref 0.7–3.1)
LYMPHOCYTES NFR BLD AUTO: 13.8 % (ref 19.6–45.3)
MCH RBC QN AUTO: 28.7 PG (ref 26.6–33)
MCHC RBC AUTO-ENTMCNC: 35.2 G/DL (ref 31.5–35.7)
MCV RBC AUTO: 81.7 FL (ref 79–97)
MONOCYTES # BLD AUTO: 0.52 10*3/MM3 (ref 0.1–0.9)
MONOCYTES NFR BLD AUTO: 6.4 % (ref 5–12)
NEUTROPHILS NFR BLD AUTO: 5.95 10*3/MM3 (ref 1.7–7)
NEUTROPHILS NFR BLD AUTO: 73.7 % (ref 42.7–76)
NRBC BLD AUTO-RTO: 0 /100 WBC (ref 0–0.2)
PLATELET # BLD AUTO: 216 10*3/MM3 (ref 140–450)
PMV BLD AUTO: 9.3 FL (ref 6–12)
POTASSIUM SERPL-SCNC: 4.2 MMOL/L (ref 3.5–5.2)
PROT SERPL-MCNC: 7.8 G/DL (ref 6–8.5)
RBC # BLD AUTO: 4.7 10*6/MM3 (ref 3.77–5.28)
SODIUM SERPL-SCNC: 138 MMOL/L (ref 136–145)
TRIGL SERPL-MCNC: 128 MG/DL (ref 0–150)
TSH SERPL DL<=0.05 MIU/L-ACNC: 2.74 UIU/ML (ref 0.27–4.2)
VLDLC SERPL-MCNC: 23 MG/DL (ref 5–40)
WBC NRBC COR # BLD: 8.09 10*3/MM3 (ref 3.4–10.8)

## 2023-11-22 ENCOUNTER — TELEPHONE (OUTPATIENT)
Dept: FAMILY MEDICINE CLINIC | Facility: CLINIC | Age: 58
End: 2023-11-22

## 2023-11-22 NOTE — TELEPHONE ENCOUNTER
Caller: Eladia Connor    Relationship: Self    Best call back number: [phone]    Caller requesting test results:     What test was performed: LABS     When was the test performed: 11/13/23    Where was the test performed:     Additional notes: IS ANXIOUSLY AWAITING A CALL BACK TO DISCUSS. STATED SHE SAW THEM ON HER MYCHART AND WAS SURPRISED BY THEM AND THE FACT THAT NO ONE HAS REACHED OUT TO HER,

## 2023-11-28 NOTE — TELEPHONE ENCOUNTER
HUB IS INSTRUCTED TO RELAY BELOW MESSAGE     LVM FOR PT TO CALL OFFICE. LET HER KNOW THAT [facility] HASN'T REVIEW LABS YET. WHEN HE DOES WE WILL CALL HER WITH THE RESULTS. ALSO DAKSHA HAD A PASSING IN THE FAMILY

## 2023-12-08 NOTE — TELEPHONE ENCOUNTER
Caller: Eladia Connor    Relationship: Self    Best call back number: [phone]    PATIENT WOULD LIKE A CALL BACK ON HER RESULTS.

## 2024-01-08 PROBLEM — N39.3 STRESS INCONTINENCE: Status: ACTIVE | Noted: 2024-01-08

## 2024-02-02 ENCOUNTER — TELEPHONE (OUTPATIENT)
Dept: FAMILY MEDICINE CLINIC | Facility: CLINIC | Age: 59
End: 2024-02-02

## 2024-02-02 NOTE — TELEPHONE ENCOUNTER
Hub is instructed to read the documentation below to patient Please RS pt from today @ 2:15 , due to provider out sick. The pt was unavailable for the call. A VM was left for the pt to RS. According to the pt's verbal, it's okay to M.

## 2024-03-08 ENCOUNTER — TELEPHONE (OUTPATIENT)
Dept: FAMILY MEDICINE CLINIC | Facility: CLINIC | Age: 59
End: 2024-03-08

## 2024-03-08 NOTE — TELEPHONE ENCOUNTER
Caller: Eladia Connor    Relationship: Self    Best call back number: [phone]      What medication are you requesting: NOT SPECIFIED    What are your current symptoms: ITCHY SPOTS    How long have you been experiencing symptoms: 3 DAYS    Have you had these symptoms before:    [x] Yes  [] No    Have you been treated for these symptoms before:   [x] Yes  [] No    If a prescription is needed, what is your preferred pharmacy and phone number: [place] [address] [facility] [phone] [facility] [phone]      Additional notes:  PATIENT STATES THAT HER GRANDDAUGHTER HAS SCABIES.  PATIENT SAYS THAT SHE NOW HAS ITCHY SPOT SYMPTOMS.  SHE REQUESTS THAT HUSSEIN MONGE SEND TWO TUBES OF PRESCRIPTION CREME TO HER PHARMACY TO HELP ALLEVIATE SYMPTOMS.    PLEASE ADVISE.

## 2024-03-08 NOTE — TELEPHONE ENCOUNTER
SPOKE WITH PT ADVISE HER GO TO AN UC AND SHE STATED THAT SHE HAS APPOINTMENT ON 03-13-24 WILL TALK TO DAKSHA BREEN ABOUT IT

## 2024-03-13 ENCOUNTER — HOSPITAL ENCOUNTER (OUTPATIENT)
Dept: GENERAL RADIOLOGY | Facility: HOSPITAL | Age: 59
Discharge: HOME | End: 2024-03-13
Payer: MEDICARE

## 2024-03-13 ENCOUNTER — OFFICE VISIT (OUTPATIENT)
Dept: FAMILY MEDICINE CLINIC | Facility: CLINIC | Age: 59
End: 2024-03-13
Payer: MEDICARE

## 2024-03-13 ENCOUNTER — LAB (OUTPATIENT)
Dept: LAB | Facility: HOSPITAL | Age: 59
End: 2024-03-13
Payer: MEDICARE

## 2024-03-13 VITALS
TEMPERATURE: 98.1 F | RESPIRATION RATE: 18 BRPM | SYSTOLIC BLOOD PRESSURE: 118 MMHG | BODY MASS INDEX: 45.99 KG/M2 | DIASTOLIC BLOOD PRESSURE: 78 MMHG | HEART RATE: 104 BPM | HEIGHT: 67 IN | WEIGHT: 293 LBS | OXYGEN SATURATION: 97 %

## 2024-03-13 DIAGNOSIS — L81.9 PIGMENTED SKIN LESION OF SUSPECTED MALIGNANT NATURE: ICD-10-CM

## 2024-03-13 DIAGNOSIS — R06.02 SHORTNESS OF BREATH: Primary | ICD-10-CM

## 2024-03-13 DIAGNOSIS — Z13.29 SCREENING FOR ENDOCRINE, METABOLIC AND IMMUNITY DISORDER: ICD-10-CM

## 2024-03-13 DIAGNOSIS — R79.89 ELEVATED D-DIMER: ICD-10-CM

## 2024-03-13 DIAGNOSIS — Z13.0 SCREENING FOR ENDOCRINE, METABOLIC AND IMMUNITY DISORDER: ICD-10-CM

## 2024-03-13 DIAGNOSIS — R73.03 PREDIABETES: ICD-10-CM

## 2024-03-13 DIAGNOSIS — Z13.228 SCREENING FOR ENDOCRINE, METABOLIC AND IMMUNITY DISORDER: ICD-10-CM

## 2024-03-13 DIAGNOSIS — M25.511 ACUTE PAIN OF RIGHT SHOULDER: ICD-10-CM

## 2024-03-13 DIAGNOSIS — R06.02 SHORTNESS OF BREATH: ICD-10-CM

## 2024-03-13 DIAGNOSIS — Z12.31 ENCOUNTER FOR SCREENING MAMMOGRAM FOR MALIGNANT NEOPLASM OF BREAST: ICD-10-CM

## 2024-03-13 DIAGNOSIS — Z12.11 SCREEN FOR COLON CANCER: ICD-10-CM

## 2024-03-13 DIAGNOSIS — M54.6 ACUTE RIGHT-SIDED THORACIC BACK PAIN: ICD-10-CM

## 2024-03-13 DIAGNOSIS — Z13.1 SCREENING FOR DIABETES MELLITUS: ICD-10-CM

## 2024-03-13 LAB
ALBUMIN SERPL-MCNC: 4.2 G/DL (ref 3.5–5.2)
ALBUMIN/GLOB SERPL: 1.1 G/DL
ALP SERPL-CCNC: 198 U/L (ref 39–117)
ALT SERPL W P-5'-P-CCNC: 45 U/L (ref 1–33)
ANION GAP SERPL CALCULATED.3IONS-SCNC: 14 MMOL/L (ref 5–15)
AST SERPL-CCNC: 72 U/L (ref 1–32)
BASOPHILS # BLD AUTO: 0.06 10*3/MM3 (ref 0–0.2)
BASOPHILS NFR BLD AUTO: 0.6 % (ref 0–1.5)
BILIRUB SERPL-MCNC: 0.6 MG/DL (ref 0–1.2)
BUN SERPL-MCNC: 12 MG/DL (ref 8–23)
BUN/CREAT SERPL: 13.2 (ref 7–25)
CALCIUM SPEC-SCNC: 9.8 MG/DL (ref 8.6–10.5)
CHLORIDE SERPL-SCNC: 100 MMOL/L (ref 98–107)
CK MB SERPL-CCNC: 1.55 NG/ML
CO2 SERPL-SCNC: 23 MMOL/L (ref 22–29)
CREAT SERPL-MCNC: 0.91 MG/DL (ref 0.57–1)
D DIMER PPP FEU-MCNC: 1.33 MG/L (FEU) (ref 0–0.6)
DEPRECATED RDW RBC AUTO: 41.8 FL (ref 37–54)
EGFRCR SERPLBLD CKD-EPI 2021: 72.4 ML/MIN/1.73
EOSINOPHIL # BLD AUTO: 0.38 10*3/MM3 (ref 0–0.4)
EOSINOPHIL NFR BLD AUTO: 3.9 % (ref 0.3–6.2)
ERYTHROCYTE [DISTWIDTH] IN BLOOD BY AUTOMATED COUNT: 13.8 % (ref 12.3–15.4)
GLOBULIN UR ELPH-MCNC: 3.9 GM/DL
GLUCOSE SERPL-MCNC: 148 MG/DL (ref 65–99)
HBA1C MFR BLD: 7.2 % (ref 4.8–5.6)
HCT VFR BLD AUTO: 42.9 % (ref 34–46.6)
HGB BLD-MCNC: 14.1 G/DL (ref 12–15.9)
IMM GRANULOCYTES # BLD AUTO: 0.05 10*3/MM3 (ref 0–0.05)
IMM GRANULOCYTES NFR BLD AUTO: 0.5 % (ref 0–0.5)
LYMPHOCYTES # BLD AUTO: 1.49 10*3/MM3 (ref 0.7–3.1)
LYMPHOCYTES NFR BLD AUTO: 15.3 % (ref 19.6–45.3)
MCH RBC QN AUTO: 27.5 PG (ref 26.6–33)
MCHC RBC AUTO-ENTMCNC: 32.9 G/DL (ref 31.5–35.7)
MCV RBC AUTO: 83.8 FL (ref 79–97)
MONOCYTES # BLD AUTO: 0.65 10*3/MM3 (ref 0.1–0.9)
MONOCYTES NFR BLD AUTO: 6.7 % (ref 5–12)
NEUTROPHILS NFR BLD AUTO: 7.1 10*3/MM3 (ref 1.7–7)
NEUTROPHILS NFR BLD AUTO: 73 % (ref 42.7–76)
NRBC BLD AUTO-RTO: 0 /100 WBC (ref 0–0.2)
PLATELET # BLD AUTO: 262 10*3/MM3 (ref 140–450)
PMV BLD AUTO: 8.9 FL (ref 6–12)
POTASSIUM SERPL-SCNC: 4.2 MMOL/L (ref 3.5–5.2)
PROT SERPL-MCNC: 8.1 G/DL (ref 6–8.5)
RBC # BLD AUTO: 5.12 10*6/MM3 (ref 3.77–5.28)
SODIUM SERPL-SCNC: 137 MMOL/L (ref 136–145)
TROPONIN T SERPL HS-MCNC: <6 NG/L
WBC NRBC COR # BLD AUTO: 9.73 10*3/MM3 (ref 3.4–10.8)

## 2024-03-13 PROCEDURE — 85379 FIBRIN DEGRADATION QUANT: CPT | Performed by: REGISTERED NURSE

## 2024-03-13 PROCEDURE — 72072 X-RAY EXAM THORAC SPINE 3VWS: CPT

## 2024-03-13 PROCEDURE — 71046 X-RAY EXAM CHEST 2 VIEWS: CPT

## 2024-03-13 PROCEDURE — 82553 CREATINE MB FRACTION: CPT | Performed by: REGISTERED NURSE

## 2024-03-13 PROCEDURE — 83036 HEMOGLOBIN GLYCOSYLATED A1C: CPT | Performed by: REGISTERED NURSE

## 2024-03-13 PROCEDURE — 85025 COMPLETE CBC W/AUTO DIFF WBC: CPT | Performed by: REGISTERED NURSE

## 2024-03-13 PROCEDURE — 36415 COLL VENOUS BLD VENIPUNCTURE: CPT | Performed by: REGISTERED NURSE

## 2024-03-13 PROCEDURE — 80053 COMPREHEN METABOLIC PANEL: CPT | Performed by: REGISTERED NURSE

## 2024-03-13 PROCEDURE — 84484 ASSAY OF TROPONIN QUANT: CPT | Performed by: REGISTERED NURSE

## 2024-03-13 NOTE — PROGRESS NOTES
Chief Complaint  Follow-up (Patient is here for  follow up and to have  labs done if needed she is  fasting . ), Hyperlipidemia, Back Pain (Patient is having right sided mid back pain , she states that it is a deep pain and her right lung hurts. She has no cough. Pain starts in mid back and  radiates around ribs and  under her right breast, duration of 3 days. She also is  having some shortness of  breath with this  pain. Also states  her right shoulder started hurting today. ), Skin Cancer (Patient has cyst on left side temple that has doubled  in size and her dermatologist is wanting to remove  this but patient  is wanting to have a CT done first to make sure this has not spread, but dermatologist  feels  it  is not  necessary. 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 ), and Fibromyalgia (Patients neurologist advised patient to discuss her pain from her fibromyalgia with her PCP. )    Subjective    History of Present Illness {CC  Problem List  Visit  Diagnosis   Encounters  Notes  Medications  Labs  Result Review Imaging  Media :23}     Eladia Connor presents to [facility] for Follow-up (Patient is here for  follow up and to have  labs done if needed she is  fasting . ), Hyperlipidemia, Back Pain (Patient is having right sided mid back pain , she states that it is a deep pain and her right lung hurts. She has no cough. Pain starts in mid back and  radiates around ribs and  under her right breast, duration of 3 days. She also is  having some shortness of  breath with this  pain. Also states  her right shoulder started hurting today. ), Skin Cancer (Patient has cyst on left side temple that has doubled  in size and her dermatologist is wanting to remove  this but patient  is wanting to have a CT done first to make sure  this has not spread, but dermatologist  feels  it  is not  necessary. ), Meghan's Disease (Patient is concerned about her  cognitive changes along with her fingers are starting to bend at first joint from this and would like to discuss what to do about it. ), and Fibromyalgia (Patients neurologist advised patient to discuss her pain from her fibromyalgia with her PCP. ).      History of Present Illness  Patient is a 58 y.o. female  presents to the clinic today for concerns of intermittent shortness of breath, intermittent right shoulder pain, and intermittent thoracic pain x 2 weeks.  Patient denies any chest pain, shortness of breath, or any fevers.  Patient denies any known exposure to COVID, flu, or any other contagious illnesses.    In regards to discomfort, patient shares that she has been having intermittent shortness of breath with right shoulder pain and intermittent thoracic pain for about 2 weeks now.  Patient was previously admitted to the hospital and required oxygen and so shortness of breath is concerning to her.  At today's visit patient's oxygen level was stable.  We did run an EKG and there was no significant abnormalities with the EKG after evaluation.  I did request patient do a D-dimer and some cardiac enzymes and request that patient go to ER if she is having chest pains.  She denied having chest pains at the moment and did not want to go to the ER.  She would prefer to do testing outpatient.  After D-dimer had resulted I informed her that it was elevated and that if she has any further shortness of breath or chest pain that she needs to report immediately to the ER.  I changed her CT of the chest to a stat to further investigate why she is having this shortness of breath.    Patient also shares that she has a lesion on her left temporal area.  She shares that this was previously diagnosed by her dermatologist as basal cell carcinoma.  She shares that she refused to have it removed due to  "fear.  I strongly advised her and her daughter who were present to pursue this to have it removed due to the increased risk of cancer spreading to other areas of her body.  Patient shares that she will rethink this and she will reach out to her dermatologist if she decides to move forward with that.       Review of Systems   Constitutional: Negative.  Negative for activity change, chills, fatigue and fever.   HENT: Negative.  Negative for congestion, dental problem, ear pain, hearing loss, rhinorrhea, sinus pain, sore throat, tinnitus and trouble swallowing.    Eyes: Negative.  Negative for pain and visual disturbance.   Respiratory:  Positive for shortness of breath. Negative for cough, chest tightness and wheezing.    Cardiovascular: Negative.  Negative for chest pain, palpitations and leg swelling.   Gastrointestinal: Negative.  Negative for abdominal pain, diarrhea, nausea and vomiting.   Endocrine: Negative.  Negative for polydipsia, polyphagia and polyuria.   Genitourinary: Negative.  Negative for difficulty urinating, dysuria, frequency and urgency.   Musculoskeletal:  Positive for arthralgias and back pain. Negative for myalgias.   Skin: Negative.  Negative for color change, pallor, rash and wound.   Allergic/Immunologic: Negative.  Negative for environmental allergies.   Neurological: Negative.  Negative for dizziness, speech difficulty, weakness, light-headedness, numbness and headaches.   Hematological: Negative.    Psychiatric/Behavioral: Negative.  Negative for confusion, decreased concentration, self-injury and suicidal ideas. The patient is not nervous/anxious.    All other systems reviewed and are negative.       Objective     Vital Signs:   /78 (BP Location: Left arm, Patient Position: Sitting, Cuff Size: Large Adult)   Pulse 104   Temp 98.1 °F (36.7 °C) (Oral)   Resp 18   Ht 170.2 cm (67\")   Wt (!) 147 kg (324 lb)   SpO2 97%   BMI 50.75 kg/m²   Current Outpatient Medications on File " Prior to Visit   Medication Sig Dispense Refill    albuterol sulfate  (90 Base) MCG/ACT inhaler Inhale 2 puffs Every 6 (Six) Hours As Needed for Wheezing. 18 g 2    ARIPiprazole (ABILIFY) 2 MG tablet Take 2.5 tablets by mouth Daily.      aspirin 81 MG chewable tablet Chew 2 tablets Daily. 90 tablet 1    Atogepant (Qulipta) 60 MG tablet Take 1 tablet by mouth Daily. 30 tablet 2    atorvastatin (LIPITOR) 40 MG tablet Take 1 tablet by mouth every night at bedtime. 90 tablet 1    busPIRone (BUSPAR) 7.5 MG tablet Take 1 tablet by mouth 3 (Three) Times a Day With Meals. NULL (Patient taking differently: Take 10 mg by mouth 3 (Three) Times a Day With Meals. NULL) 90 tablet 1    gabapentin (NEURONTIN) 100 MG capsule Take 1 capsule by mouth 2 (Two) Times a Day. 60 capsule 1    hydrOXYzine (ATARAX) 10 MG tablet       ibuprofen (ADVIL,MOTRIN) 800 MG tablet       Incontinence Supplies misc Take As Directed.      isosorbide mononitrate (IMDUR) 30 MG 24 hr tablet Take 1 tablet by mouth Daily. 90 tablet 1    LORazepam (ATIVAN) 0.5 MG tablet Take  by mouth Every 8 (Eight) Hours.      prazosin (MINIPRESS) 1 MG capsule       sertraline (ZOLOFT) 50 MG tablet Take 2 tablets by mouth Daily.      topiramate (Topamax) 50 MG tablet Take 1 tablet by mouth 2 (Two) Times a Day. 60 tablet 2    ubrogepant (Ubrelvy) 100 MG tablet Take 1 tablet by mouth Daily As Needed (migraine) for up to 1 dose. May repeat dose if not relieved after 2 hours for a total of 200mg per 24 hours. 9 tablet 1    zolpidem (AMBIEN) 10 MG tablet Take 1 tablet by mouth At Night As Needed for Sleep. 30 tablet 0     No current facility-administered medications on file prior to visit.        Past Medical History:   Diagnosis Date    Anxiety     Asthma     Cancer     SKIN    Depression     Psoriasis     Seizures 12/06/2022      Past Surgical History:   Procedure Laterality Date    CHOLECYSTECTOMY  1987    ENDOSCOPY N/A 12/30/2022    Procedure:  ESOPHAGOGASTRODUODENOSCOPY with gastric biopsy and esophageal dilation #50,#54,#56,#58 bougie;  Surgeon: Patience Arana MD;  Location: [facility] ENDOSCOPY;  Service: Gastroenterology;  Laterality: N/A;  gastritis    EYE SURGERY      KNEE SURGERY        Family History   Problem Relation Age of Onset    Heart disease Mother     Breast cancer Mother     Heart disease Father     Pancreatic cancer Father     Heart disease Sister     Lung cancer Brother       Social History     Socioeconomic History    Marital status:    Tobacco Use    Smoking status: Never    Smokeless tobacco: Never   Vaping Use    Vaping status: Never Used   Substance and Sexual Activity    Alcohol use: Never    Drug use: Never    Sexual activity: Defer         Office Visit on 03/13/2024   Component Date Value Ref Range Status    HS Troponin T 03/13/2024 <6  <14 ng/L Final    CKMB 03/13/2024 1.55  <=5.30 ng/mL Final    D-Dimer, Quantitative 03/13/2024 1.33 (H)  0.00 - 0.60 mg/L (FEU) Final    Glucose 03/13/2024 148 (H)  65 - 99 mg/dL Final    BUN 03/13/2024 12  8 - 23 mg/dL Final    Creatinine 03/13/2024 0.91  0.57 - 1.00 mg/dL Final    Sodium 03/13/2024 137  136 - 145 mmol/L Final    Potassium 03/13/2024 4.2  3.5 - 5.2 mmol/L Final    Chloride 03/13/2024 100  98 - 107 mmol/L Final    CO2 03/13/2024 23.0  22.0 - 29.0 mmol/L Final    Calcium 03/13/2024 9.8  8.6 - 10.5 mg/dL Final    Total Protein 03/13/2024 8.1  6.0 - 8.5 g/dL Final    Albumin 03/13/2024 4.2  3.5 - 5.2 g/dL Final    ALT (SGPT) 03/13/2024 45 (H)  1 - 33 U/L Final    AST (SGOT) 03/13/2024 72 (H)  1 - 32 U/L Final    Alkaline Phosphatase 03/13/2024 198 (H)  39 - 117 U/L Final    Total Bilirubin 03/13/2024 0.6  0.0 - 1.2 mg/dL Final    Globulin 03/13/2024 3.9  gm/dL Final    A/G Ratio 03/13/2024 1.1  g/dL Final    BUN/Creatinine Ratio 03/13/2024 13.2  7.0 - 25.0 Final    Anion Gap 03/13/2024 14.0  5.0 - 15.0 mmol/L Final    eGFR 03/13/2024 72.4  >60.0 mL/min/1.73 Final    Hemoglobin A1C  03/13/2024 7.20 (H)  4.80 - 5.60 % Final    WBC 03/13/2024 9.73  3.40 - 10.80 10*3/mm3 Final    RBC 03/13/2024 5.12  3.77 - 5.28 10*6/mm3 Final    Hemoglobin 03/13/2024 14.1  12.0 - 15.9 g/dL Final    Hematocrit 03/13/2024 42.9  34.0 - 46.6 % Final    MCV 03/13/2024 83.8  79.0 - 97.0 fL Final    MCH 03/13/2024 27.5  26.6 - 33.0 pg Final    MCHC 03/13/2024 32.9  31.5 - 35.7 g/dL Final    RDW 03/13/2024 13.8  12.3 - 15.4 % Final    RDW-SD 03/13/2024 41.8  37.0 - 54.0 fl Final    MPV 03/13/2024 8.9  6.0 - 12.0 fL Final    Platelets 03/13/2024 262  140 - 450 10*3/mm3 Final    Neutrophil % 03/13/2024 73.0  42.7 - 76.0 % Final    Lymphocyte % 03/13/2024 15.3 (L)  19.6 - 45.3 % Final    Monocyte % 03/13/2024 6.7  5.0 - 12.0 % Final    Eosinophil % 03/13/2024 3.9  0.3 - 6.2 % Final    Basophil % 03/13/2024 0.6  0.0 - 1.5 % Final    Immature Grans % 03/13/2024 0.5  0.0 - 0.5 % Final    Neutrophils, Absolute 03/13/2024 7.10 (H)  1.70 - 7.00 10*3/mm3 Final    Lymphocytes, Absolute 03/13/2024 1.49  0.70 - 3.10 10*3/mm3 Final    Monocytes, Absolute 03/13/2024 0.65  0.10 - 0.90 10*3/mm3 Final    Eosinophils, Absolute 03/13/2024 0.38  0.00 - 0.40 10*3/mm3 Final    Basophils, Absolute 03/13/2024 0.06  0.00 - 0.20 10*3/mm3 Final    Immature Grans, Absolute 03/13/2024 0.05  0.00 - 0.05 10*3/mm3 Final    nRBC 03/13/2024 0.0  0.0 - 0.2 /100 WBC Final         Physical Exam  Vitals and nursing note reviewed.   Constitutional:       Appearance: Normal appearance. She is normal weight.   HENT:      Head: Normocephalic and atraumatic.   Cardiovascular:      Rate and Rhythm: Normal rate and regular rhythm.      Pulses: Normal pulses.      Heart sounds: Normal heart sounds. No murmur heard.     No friction rub. No gallop.   Pulmonary:      Effort: Pulmonary effort is normal. No respiratory distress.      Breath sounds: Normal breath sounds. No stridor. No wheezing, rhonchi or rales.   Chest:      Chest wall: No tenderness.   Abdominal:       General: Abdomen is flat. Bowel sounds are normal. There is no distension.      Palpations: Abdomen is soft. There is no mass.      Tenderness: There is no abdominal tenderness. There is no right CVA tenderness, left CVA tenderness, guarding or rebound.      Hernia: No hernia is present.   Skin:     General: Skin is warm and dry.      Capillary Refill: Capillary refill takes less than 2 seconds.      Coloration: Skin is not jaundiced or pale.          Neurological:      General: No focal deficit present.      Mental Status: She is alert and oriented to person, place, and time. Mental status is at baseline.      Motor: No weakness.      Coordination: Coordination normal.      Gait: Gait normal.   Psychiatric:         Mood and Affect: Mood normal.         Behavior: Behavior normal.         Thought Content: Thought content normal.         Judgment: Judgment normal.          Result Review  Data Reviewed:{ Labs  Result Review  Imaging  Med Tab  Media :23}   I have reviewed this patient's chart.  I have reviewed previous labs, previous imaging, previous medications, and previous encounters with notes that were available in this patient's chart.               Assessment and Plan {CC Problem List  Visit Diagnosis  ROS  Review (Popup)  [facility]  Quality  BestPractice  Medications  SmartSets  SnapShot Encounters  Media :23}   Diagnoses and all orders for this visit:    1. Shortness of breath (Primary)  -     XR Chest 2 View; Future  -     CT Chest Without Contrast; Future  -     ECG 12 Lead  -     High Sensitivity Troponin T  -     CK MB  -     D-dimer, Quantitative  -     CT Chest With Contrast; Future  -     Ambulatory Referral to Pulmonology    2. Screen for colon cancer    3. Encounter for screening mammogram for malignant neoplasm of breast    4. Acute pain of right shoulder  -     MRI Thoracic Spine Without Contrast; Future    5. Screening for diabetes mellitus  -     Hemoglobin A1c    6. Acute  right-sided thoracic back pain  -     Cancel: XR Spine Thoracic 2 View; Future  -     MRI Thoracic Spine Without Contrast; Future  -     ECG 12 Lead    7. Pigmented skin lesion of suspected malignant nature  -     CT Head Without Contrast; Future    8. Screening for endocrine, metabolic and immunity disorder  -     CBC & Differential  -     Comprehensive Metabolic Panel    9. Prediabetes  -     Hemoglobin A1c    10. Elevated d-dimer  -     CT Chest With Contrast; Future        -X-ray of chest to rule out shortness of breath.  -Chest CT changed to stat to rule out pulmonary embolism or other clotting concerns.  As well as shortness of breath concerns.  -Referral to pulmonology to further investigate continued shortness of breath that is intermittently occurring.  -Recommend patient go to ER if she is having any chest pain or shortness of breath that worsens.  -EKG in clinic today did not show any significant abnormalities.  -ER red flags discussed with patient including risk versus benefit and education provided.  -Follow-up with me in 1 week or sooner if needed.    I spent 40 minutes caring for Eladia on this date of service. This time includes time spent by me in the following activities:preparing for the visit, reviewing tests, obtaining and/or reviewing a separately obtained history, performing a medically appropriate examination and/or evaluation , counseling and educating the patient/family/caregiver, ordering medications, tests, or procedures, referring and communicating with other health care professionals , documenting information in the medical record, independently interpreting results and communicating that information with the patient/family/caregiver, and care coordination    Follow Up {Instructions Charge Capture  Follow-up Communications :23}     Patient was given instructions and counseling regarding her condition or for health maintenance advice. Please see specific information pulled into the  AVS (placed there by myself) if appropriate.    Return in about 1 week (around 3/20/2024) for Recheck shortness of breath.    Class 3 Severe Obesity (BMI >=40). Obesity-related health conditions include the following: dyslipidemias. Obesity is unchanged. BMI is is above average; BMI management plan is completed. We discussed portion control and increasing exercise.       SCOOBY Cisneros, FNP-BC

## 2024-03-14 ENCOUNTER — APPOINTMENT (OUTPATIENT)
Dept: CT IMAGING | Facility: HOSPITAL | Age: 59
End: 2024-03-14
Payer: MEDICARE

## 2024-03-14 ENCOUNTER — HOSPITAL ENCOUNTER (OUTPATIENT)
Facility: HOSPITAL | Age: 59
Setting detail: OBSERVATION
Discharge: HOME | End: 2024-03-16
Attending: EMERGENCY MEDICINE | Admitting: EMERGENCY MEDICINE
Payer: MEDICARE

## 2024-03-14 ENCOUNTER — TELEPHONE (OUTPATIENT)
Dept: FAMILY MEDICINE CLINIC | Facility: CLINIC | Age: 59
End: 2024-03-14

## 2024-03-14 ENCOUNTER — APPOINTMENT (OUTPATIENT)
Dept: CARDIOLOGY | Facility: HOSPITAL | Age: 59
End: 2024-03-14
Payer: MEDICARE

## 2024-03-14 DIAGNOSIS — R07.9 CHEST PAIN, UNSPECIFIED TYPE: Primary | ICD-10-CM

## 2024-03-14 DIAGNOSIS — R10.9 ABDOMINAL PAIN, UNSPECIFIED ABDOMINAL LOCATION: ICD-10-CM

## 2024-03-14 LAB
ALBUMIN SERPL-MCNC: 4.2 G/DL (ref 3.5–5.2)
ALBUMIN/GLOB SERPL: 1.1 G/DL
ALP SERPL-CCNC: 194 U/L (ref 39–117)
ALT SERPL W P-5'-P-CCNC: 50 U/L (ref 1–33)
ANION GAP SERPL CALCULATED.3IONS-SCNC: 11 MMOL/L (ref 5–15)
AST SERPL-CCNC: 78 U/L (ref 1–32)
BASOPHILS # BLD AUTO: 0.06 10*3/MM3 (ref 0–0.2)
BASOPHILS NFR BLD AUTO: 0.8 % (ref 0–1.5)
BH CV LOWER VASCULAR LEFT COMMON FEMORAL AUGMENT: NORMAL
BH CV LOWER VASCULAR LEFT COMMON FEMORAL COMPETENT: NORMAL
BH CV LOWER VASCULAR LEFT COMMON FEMORAL COMPRESS: NORMAL
BH CV LOWER VASCULAR LEFT COMMON FEMORAL PHASIC: NORMAL
BH CV LOWER VASCULAR LEFT COMMON FEMORAL SPONT: NORMAL
BH CV LOWER VASCULAR LEFT DISTAL FEMORAL COMPRESS: NORMAL
BH CV LOWER VASCULAR LEFT GASTRONEMIUS COMPRESS: NORMAL
BH CV LOWER VASCULAR LEFT GREATER SAPH AK COMPRESS: NORMAL
BH CV LOWER VASCULAR LEFT GREATER SAPH BK COMPRESS: NORMAL
BH CV LOWER VASCULAR LEFT LESSER SAPH COMPRESS: NORMAL
BH CV LOWER VASCULAR LEFT MID FEMORAL AUGMENT: NORMAL
BH CV LOWER VASCULAR LEFT MID FEMORAL COMPETENT: NORMAL
BH CV LOWER VASCULAR LEFT MID FEMORAL COMPRESS: NORMAL
BH CV LOWER VASCULAR LEFT MID FEMORAL PHASIC: NORMAL
BH CV LOWER VASCULAR LEFT MID FEMORAL SPONT: NORMAL
BH CV LOWER VASCULAR LEFT POPLITEAL AUGMENT: NORMAL
BH CV LOWER VASCULAR LEFT POPLITEAL COMPETENT: NORMAL
BH CV LOWER VASCULAR LEFT POPLITEAL COMPRESS: NORMAL
BH CV LOWER VASCULAR LEFT POPLITEAL PHASIC: NORMAL
BH CV LOWER VASCULAR LEFT POPLITEAL SPONT: NORMAL
BH CV LOWER VASCULAR LEFT POSTERIOR TIBIAL COMPRESS: NORMAL
BH CV LOWER VASCULAR LEFT PROXIMAL FEMORAL COMPRESS: NORMAL
BH CV LOWER VASCULAR LEFT SAPHENOFEMORAL JUNCTION COMPRESS: NORMAL
BH CV LOWER VASCULAR RIGHT COMMON FEMORAL AUGMENT: NORMAL
BH CV LOWER VASCULAR RIGHT COMMON FEMORAL COMPETENT: NORMAL
BH CV LOWER VASCULAR RIGHT COMMON FEMORAL COMPRESS: NORMAL
BH CV LOWER VASCULAR RIGHT COMMON FEMORAL PHASIC: NORMAL
BH CV LOWER VASCULAR RIGHT COMMON FEMORAL SPONT: NORMAL
BH CV LOWER VASCULAR RIGHT DISTAL FEMORAL COMPRESS: NORMAL
BH CV LOWER VASCULAR RIGHT GASTRONEMIUS COMPRESS: NORMAL
BH CV LOWER VASCULAR RIGHT GREATER SAPH AK COMPRESS: NORMAL
BH CV LOWER VASCULAR RIGHT GREATER SAPH BK COMPRESS: NORMAL
BH CV LOWER VASCULAR RIGHT LESSER SAPH COMPRESS: NORMAL
BH CV LOWER VASCULAR RIGHT MID FEMORAL AUGMENT: NORMAL
BH CV LOWER VASCULAR RIGHT MID FEMORAL COMPETENT: NORMAL
BH CV LOWER VASCULAR RIGHT MID FEMORAL COMPRESS: NORMAL
BH CV LOWER VASCULAR RIGHT MID FEMORAL PHASIC: NORMAL
BH CV LOWER VASCULAR RIGHT MID FEMORAL SPONT: NORMAL
BH CV LOWER VASCULAR RIGHT PERONEAL COMPRESS: NORMAL
BH CV LOWER VASCULAR RIGHT POPLITEAL AUGMENT: NORMAL
BH CV LOWER VASCULAR RIGHT POPLITEAL COMPETENT: NORMAL
BH CV LOWER VASCULAR RIGHT POPLITEAL COMPRESS: NORMAL
BH CV LOWER VASCULAR RIGHT POPLITEAL PHASIC: NORMAL
BH CV LOWER VASCULAR RIGHT POPLITEAL SPONT: NORMAL
BH CV LOWER VASCULAR RIGHT POSTERIOR TIBIAL COMPRESS: NORMAL
BH CV LOWER VASCULAR RIGHT PROXIMAL FEMORAL COMPRESS: NORMAL
BH CV LOWER VASCULAR RIGHT SAPHENOFEMORAL JUNCTION COMPRESS: NORMAL
BH CV VAS PRELIMINARY FINDINGS SCRIPTING: 1
BILIRUB SERPL-MCNC: 0.5 MG/DL (ref 0–1.2)
BUN SERPL-MCNC: 9 MG/DL (ref 8–23)
BUN/CREAT SERPL: 12.3 (ref 7–25)
CALCIUM SPEC-SCNC: 9.2 MG/DL (ref 8.6–10.5)
CHLORIDE SERPL-SCNC: 100 MMOL/L (ref 98–107)
CO2 SERPL-SCNC: 27 MMOL/L (ref 22–29)
CREAT SERPL-MCNC: 0.73 MG/DL (ref 0.57–1)
DEPRECATED RDW RBC AUTO: 43.8 FL (ref 37–54)
EGFRCR SERPLBLD CKD-EPI 2021: 94.3 ML/MIN/1.73
EOSINOPHIL # BLD AUTO: 0.38 10*3/MM3 (ref 0–0.4)
EOSINOPHIL NFR BLD AUTO: 4.9 % (ref 0.3–6.2)
ERYTHROCYTE [DISTWIDTH] IN BLOOD BY AUTOMATED COUNT: 13.7 % (ref 12.3–15.4)
GEN 5 2HR TROPONIN T REFLEX: <6 NG/L
GLOBULIN UR ELPH-MCNC: 4 GM/DL
GLUCOSE SERPL-MCNC: 147 MG/DL (ref 65–99)
HCT VFR BLD AUTO: 42.8 % (ref 34–46.6)
HGB BLD-MCNC: 13.4 G/DL (ref 12–15.9)
HOLD SPECIMEN: NORMAL
IMM GRANULOCYTES # BLD AUTO: 0.06 10*3/MM3 (ref 0–0.05)
IMM GRANULOCYTES NFR BLD AUTO: 0.8 % (ref 0–0.5)
LYMPHOCYTES # BLD AUTO: 1.47 10*3/MM3 (ref 0.7–3.1)
LYMPHOCYTES NFR BLD AUTO: 19 % (ref 19.6–45.3)
MAGNESIUM SERPL-MCNC: 2.2 MG/DL (ref 1.6–2.4)
MCH RBC QN AUTO: 27.4 PG (ref 26.6–33)
MCHC RBC AUTO-ENTMCNC: 31.3 G/DL (ref 31.5–35.7)
MCV RBC AUTO: 87.5 FL (ref 79–97)
MONOCYTES # BLD AUTO: 0.55 10*3/MM3 (ref 0.1–0.9)
MONOCYTES NFR BLD AUTO: 7.1 % (ref 5–12)
NEUTROPHILS NFR BLD AUTO: 5.21 10*3/MM3 (ref 1.7–7)
NEUTROPHILS NFR BLD AUTO: 67.4 % (ref 42.7–76)
NRBC BLD AUTO-RTO: 0 /100 WBC (ref 0–0.2)
NT-PROBNP SERPL-MCNC: <36 PG/ML (ref 0–900)
PLATELET # BLD AUTO: 234 10*3/MM3 (ref 140–450)
PMV BLD AUTO: 8.8 FL (ref 6–12)
POTASSIUM SERPL-SCNC: 4.1 MMOL/L (ref 3.5–5.2)
PROT SERPL-MCNC: 8.2 G/DL (ref 6–8.5)
RBC # BLD AUTO: 4.89 10*6/MM3 (ref 3.77–5.28)
SODIUM SERPL-SCNC: 138 MMOL/L (ref 136–145)
TROPONIN T DELTA: NORMAL
TROPONIN T SERPL HS-MCNC: <6 NG/L
WBC NRBC COR # BLD AUTO: 7.73 10*3/MM3 (ref 3.4–10.8)

## 2024-03-14 PROCEDURE — 74177 CT ABD & PELVIS W/CONTRAST: CPT

## 2024-03-14 PROCEDURE — 71275 CT ANGIOGRAPHY CHEST: CPT

## 2024-03-14 PROCEDURE — G0378 HOSPITAL OBSERVATION PER HR: HCPCS

## 2024-03-14 PROCEDURE — 25510000001 IOPAMIDOL PER 1 ML: Performed by: EMERGENCY MEDICINE

## 2024-03-14 PROCEDURE — 84484 ASSAY OF TROPONIN QUANT: CPT | Performed by: EMERGENCY MEDICINE

## 2024-03-14 PROCEDURE — 80053 COMPREHEN METABOLIC PANEL: CPT | Performed by: NURSE PRACTITIONER

## 2024-03-14 PROCEDURE — 93970 EXTREMITY STUDY: CPT

## 2024-03-14 PROCEDURE — 93005 ELECTROCARDIOGRAM TRACING: CPT | Performed by: NURSE PRACTITIONER

## 2024-03-14 PROCEDURE — 83735 ASSAY OF MAGNESIUM: CPT | Performed by: NURSE PRACTITIONER

## 2024-03-14 PROCEDURE — 85025 COMPLETE CBC W/AUTO DIFF WBC: CPT | Performed by: NURSE PRACTITIONER

## 2024-03-14 PROCEDURE — 83880 ASSAY OF NATRIURETIC PEPTIDE: CPT | Performed by: NURSE PRACTITIONER

## 2024-03-14 PROCEDURE — 36415 COLL VENOUS BLD VENIPUNCTURE: CPT

## 2024-03-14 PROCEDURE — 99285 EMERGENCY DEPT VISIT HI MDM: CPT

## 2024-03-14 RX ORDER — CHOLECALCIFEROL (VITAMIN D3) 125 MCG
5 CAPSULE ORAL NIGHTLY PRN
Status: DISCONTINUED | OUTPATIENT
Start: 2024-03-14 | End: 2024-03-16 | Stop reason: HOSPADM

## 2024-03-14 RX ORDER — AMOXICILLIN 250 MG
2 CAPSULE ORAL 2 TIMES DAILY PRN
Status: DISCONTINUED | OUTPATIENT
Start: 2024-03-14 | End: 2024-03-16 | Stop reason: HOSPADM

## 2024-03-14 RX ORDER — ASPIRIN 81 MG/1
324 TABLET, CHEWABLE ORAL ONCE
Status: COMPLETED | OUTPATIENT
Start: 2024-03-14 | End: 2024-03-14

## 2024-03-14 RX ORDER — SODIUM CHLORIDE 0.9 % (FLUSH) 0.9 %
10 SYRINGE (ML) INJECTION AS NEEDED
Status: DISCONTINUED | OUTPATIENT
Start: 2024-03-14 | End: 2024-03-16 | Stop reason: HOSPADM

## 2024-03-14 RX ORDER — BUSPIRONE HYDROCHLORIDE 10 MG/1
10 TABLET ORAL 3 TIMES DAILY
COMMUNITY

## 2024-03-14 RX ORDER — SODIUM CHLORIDE 0.9 % (FLUSH) 0.9 %
10 SYRINGE (ML) INJECTION EVERY 12 HOURS SCHEDULED
Status: DISCONTINUED | OUTPATIENT
Start: 2024-03-14 | End: 2024-03-16 | Stop reason: HOSPADM

## 2024-03-14 RX ORDER — BISACODYL 5 MG/1
5 TABLET, DELAYED RELEASE ORAL DAILY PRN
Status: DISCONTINUED | OUTPATIENT
Start: 2024-03-14 | End: 2024-03-16 | Stop reason: HOSPADM

## 2024-03-14 RX ORDER — ACETAMINOPHEN 325 MG/1
650 TABLET ORAL EVERY 4 HOURS PRN
Status: DISCONTINUED | OUTPATIENT
Start: 2024-03-14 | End: 2024-03-16 | Stop reason: HOSPADM

## 2024-03-14 RX ORDER — BISACODYL 10 MG
10 SUPPOSITORY, RECTAL RECTAL DAILY PRN
Status: DISCONTINUED | OUTPATIENT
Start: 2024-03-14 | End: 2024-03-16 | Stop reason: HOSPADM

## 2024-03-14 RX ORDER — SODIUM CHLORIDE 9 MG/ML
40 INJECTION, SOLUTION INTRAVENOUS AS NEEDED
Status: DISCONTINUED | OUTPATIENT
Start: 2024-03-14 | End: 2024-03-16 | Stop reason: HOSPADM

## 2024-03-14 RX ORDER — ONDANSETRON 2 MG/ML
4 INJECTION INTRAMUSCULAR; INTRAVENOUS EVERY 6 HOURS PRN
Status: DISCONTINUED | OUTPATIENT
Start: 2024-03-14 | End: 2024-03-16 | Stop reason: HOSPADM

## 2024-03-14 RX ORDER — POLYETHYLENE GLYCOL 3350 17 G/17G
17 POWDER, FOR SOLUTION ORAL DAILY PRN
Status: DISCONTINUED | OUTPATIENT
Start: 2024-03-14 | End: 2024-03-16 | Stop reason: HOSPADM

## 2024-03-14 RX ORDER — ENOXAPARIN SODIUM 100 MG/ML
60 INJECTION SUBCUTANEOUS EVERY 12 HOURS
Status: DISCONTINUED | OUTPATIENT
Start: 2024-03-14 | End: 2024-03-16 | Stop reason: HOSPADM

## 2024-03-14 RX ADMIN — Medication 10 ML: at 22:57

## 2024-03-14 RX ADMIN — ASPIRIN 81 MG CHEWABLE TABLET 324 MG: 81 TABLET CHEWABLE at 22:56

## 2024-03-14 RX ADMIN — IOPAMIDOL 100 ML: 755 INJECTION, SOLUTION INTRAVENOUS at 18:26

## 2024-03-14 NOTE — LETTER
March 15, 2024     Patient: Eladia Huertaenship   YOB: 1965   Date of Visit: 3/14/2024       To Whom It May Concern:    It is my medical opinion that Eladia Connor {Work release (duty restriction):19247}.           Sincerely,        No name on file    CC:   No Recipients

## 2024-03-14 NOTE — TELEPHONE ENCOUNTER
Caller: Eladia Connor    Relationship: Self    Best call back number: [phone]     Caller requesting test results:      What test was performed:      When was the test performed: 3/13/24    Where was the test performed:      Additional notes: PATIENT CALLED AND HAD MULTIPLE TEST DONE YESTERDAY 3/13/24 AND WOULD LIKE A CALL BACK TO GO OVER THE RESULTS.  PATIENT STATED SHE IS CONCERNED ABOUT THE D-DIMER TEST WAS HIGH.

## 2024-03-14 NOTE — TELEPHONE ENCOUNTER
MARK IS INSTRUCTED TO RELAY BELOW MESSAGE     IF PT CALLS SHE WILL HAVE TO CALL MEDICAL RECORDS OR SPEAK TO SOME ONE IN THE ER AT [facility]. DAKSHA DIDN'T DO ANY TEST YESTERDAY IN OFFICE.

## 2024-03-14 NOTE — TELEPHONE ENCOUNTER
Caller: Eladia Connor    Relationship: Self    Best call back number: [phone]     Who are you requesting to speak with (clinical staff, provider,  specific staff member): DAKSHA MONGE    What was the call regarding: SPOKE WITH DAKSHA THIS MORNING BUT FORGOT TO ASK HIM ABOUT THE HEMOGLOBIN A1C RESULTS AND WHAT THE PLAN FOR THAT WILL BE ASKING FOR A CALL BACK

## 2024-03-14 NOTE — ED PROVIDER NOTES
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  chart review shows that was 1.3 and troponin was negative.  Noted to have elevated liver enzymes.  She denies any swelling to her legs or feet.  She denies any congestion or fever but does note a intermittent nonproductive cough.  Not currently on any OCPs and denies any history of VTE.    Patient notably had an outpatient order for stat CT but patient states she prefer to be seen in the ER due to her elevated D-dimer to evaluate for blood clot, I discussed that is what her outpatient stat order was for but she preferred to be seen in the ER.    Due to significant overcrowding in the emergency department patient was initially seen and evaluated in triage.  Provider in triage recommended patient placement in the treatment area to initiate therapy and movement to an ER bed as soon as possible.   Orders placed; medications will be deferred to main provider per protocol.        History of Present Illness  Chief complaint: I reviewed the provider in triage note.  Patient is a 58-year-old female who presents with chest pain.  She states that she hurts in her right chest as well as her left.  It feels like it is \"in my lungs\".  She has had palpitations associated with it.  She states these pains come and go over the last 7 days.  She feels charley horses in her legs and her abdomen is bothering her as well.  She saw her primary physician yesterday and had a troponin done that was negative as well as a D-dimer that was positive.  Outpatient CTs were ordered but she wanted to come here for further evaluation.  She feels \"charley horses in my legs\".  She states that she was diagnosed with diabetes yesterday " as well.  She did have elevated liver test as well.    Context:    Duration:    Timing:    Severity:    Associated Symptoms:        PCP:  LMP:      Review of Systems   Constitutional:  Negative for fever.   Respiratory:  Positive for shortness of breath.    Cardiovascular:  Positive for chest pain and palpitations.   Gastrointestinal:  Positive for abdominal pain.   Genitourinary: Negative.    Musculoskeletal:  Positive for myalgias.   Neurological: Negative.    Psychiatric/Behavioral: Negative.         Past Medical History:   Diagnosis Date    Anxiety     Asthma     Cancer     SKIN    Depression     Psoriasis     Seizures 12/06/2022       No Known Allergies    Past Surgical History:   Procedure Laterality Date    CHOLECYSTECTOMY  1987    ENDOSCOPY N/A 12/30/2022    Procedure: ESOPHAGOGASTRODUODENOSCOPY with gastric biopsy and esophageal dilation #50,#54,#56,#58 bougie;  Surgeon: Patience Arana MD;  Location: [facility] ENDOSCOPY;  Service: Gastroenterology;  Laterality: N/A;  gastritis    EYE SURGERY      KNEE SURGERY         Family History   Problem Relation Age of Onset    Heart disease Mother     Breast cancer Mother     Heart disease Father     Pancreatic cancer Father     Heart disease Sister     Lung cancer Brother        Social History     Socioeconomic History    Marital status:    Tobacco Use    Smoking status: Never    Smokeless tobacco: Never   Vaping Use    Vaping status: Never Used   Substance and Sexual Activity    Alcohol use: Never    Drug use: Never    Sexual activity: Defer           Objective   Physical Exam  Vitals and nursing note reviewed.   Constitutional:       Appearance: Normal appearance.   HENT:      Head: Normocephalic and atraumatic.   Eyes:      Extraocular Movements: Extraocular movements intact.      Pupils: Pupils are equal, round, and reactive to light.   Cardiovascular:      Rate and Rhythm: Normal rate and regular rhythm.      Heart sounds: Normal heart sounds.   Pulmonary:       Effort: Pulmonary effort is normal.      Breath sounds: Normal breath sounds.   Abdominal:      Tenderness: There is no abdominal tenderness.   Musculoskeletal:      Cervical back: Normal range of motion and neck supple.        Legs:       Comments: Legs are neurovascular intact distally.   Skin:     General: Skin is warm and dry.   Neurological:      General: No focal deficit present.      Mental Status: She is alert and oriented to person, place, and time.   Psychiatric:         Mood and Affect: Mood normal.         Judgment: Judgment normal.         Procedures           ED Course                                 Results for orders placed or performed during the hospital encounter of 03/14/24   BNP    Specimen: Blood   Result Value Ref Range    proBNP <36.0 0.0 - 900.0 pg/mL   Comprehensive Metabolic Panel    Specimen: Blood   Result Value Ref Range    Glucose 147 (H) 65 - 99 mg/dL    BUN 9 8 - 23 mg/dL    Creatinine 0.73 0.57 - 1.00 mg/dL    Sodium 138 136 - 145 mmol/L    Potassium 4.1 3.5 - 5.2 mmol/L    Chloride 100 98 - 107 mmol/L    CO2 27.0 22.0 - 29.0 mmol/L    Calcium 9.2 8.6 - 10.5 mg/dL    Total Protein 8.2 6.0 - 8.5 g/dL    Albumin 4.2 3.5 - 5.2 g/dL    ALT (SGPT) 50 (H) 1 - 33 U/L    AST (SGOT) 78 (H) 1 - 32 U/L    Alkaline Phosphatase 194 (H) 39 - 117 U/L    Total Bilirubin 0.5 0.0 - 1.2 mg/dL    Globulin 4.0 gm/dL    A/G Ratio 1.1 g/dL    BUN/Creatinine Ratio 12.3 7.0 - 25.0    Anion Gap 11.0 5.0 - 15.0 mmol/L    eGFR 94.3 >60.0 mL/min/1.73   Magnesium    Specimen: Blood   Result Value Ref Range    Magnesium 2.2 1.6 - 2.4 mg/dL   CBC Auto Differential    Specimen: Blood   Result Value Ref Range    WBC 7.73 3.40 - 10.80 10*3/mm3    RBC 4.89 3.77 - 5.28 10*6/mm3    Hemoglobin 13.4 12.0 - 15.9 g/dL    Hematocrit 42.8 34.0 - 46.6 %    MCV 87.5 79.0 - 97.0 fL    MCH 27.4 26.6 - 33.0 pg    MCHC 31.3 (L) 31.5 - 35.7 g/dL    RDW 13.7 12.3 - 15.4 %    RDW-SD 43.8 37.0 - 54.0 fl    MPV 8.8 6.0 - 12.0 fL     Platelets 234 140 - 450 10*3/mm3    Neutrophil % 67.4 42.7 - 76.0 %    Lymphocyte % 19.0 (L) 19.6 - 45.3 %    Monocyte % 7.1 5.0 - 12.0 %    Eosinophil % 4.9 0.3 - 6.2 %    Basophil % 0.8 0.0 - 1.5 %    Immature Grans % 0.8 (H) 0.0 - 0.5 %    Neutrophils, Absolute 5.21 1.70 - 7.00 10*3/mm3    Lymphocytes, Absolute 1.47 0.70 - 3.10 10*3/mm3    Monocytes, Absolute 0.55 0.10 - 0.90 10*3/mm3    Eosinophils, Absolute 0.38 0.00 - 0.40 10*3/mm3    Basophils, Absolute 0.06 0.00 - 0.20 10*3/mm3    Immature Grans, Absolute 0.06 (H) 0.00 - 0.05 10*3/mm3    nRBC 0.0 0.0 - 0.2 /100 WBC   High Sensitivity Troponin T    Specimen: Blood   Result Value Ref Range    HS Troponin T <6 <14 ng/L   High Sensitivity Troponin T 2Hr    Specimen: Blood   Result Value Ref Range    HS Troponin T <6 <14 ng/L    Troponin T Delta     ECG 12 Lead Chest Pain   Result Value Ref Range    QT Interval 350 ms    QTC Interval 449 ms   Gold Top - SST   Result Value Ref Range    Extra Tube Hold for add-ons.    Duplex Venous Lower Extremity - Bilateral   Result Value Ref Range    Right Common Femoral Spont Y     Right Common Femoral Competent Y     Right Common Femoral Phasic Y     Right Common Femoral Compress C     Right Common Femoral Augment Y     Right Saphenofemoral Junction Compress C     Right Proximal Femoral Compress C     Right Mid Femoral Spont Y     Right Mid Femoral Competent Y     Right Mid Femoral Phasic Y     Right Mid Femoral Compress C     Right Mid Femoral Augment Y     Right Distal Femoral Compress C     Right Popliteal Spont Y     Right Popliteal Competent Y     Right Popliteal Phasic Y     Right Popliteal Compress C     Right Popliteal Augment Y     Right Posterior Tibial Compress C     Right Peroneal Compress C     Right Gastronemius Compress C     Right Greater Saph AK Compress C     Right Greater Saph BK Compress C     Right Lesser Saph Compress C     Left Common Femoral Spont Y     Left Common Femoral Competent Y     Left Common  Femoral Phasic Y     Left Common Femoral Compress C     Left Common Femoral Augment Y     Left Saphenofemoral Junction Compress C     Left Proximal Femoral Compress C     Left Mid Femoral Spont Y     Left Mid Femoral Competent Y     Left Mid Femoral Phasic Y     Left Mid Femoral Compress C     Left Mid Femoral Augment Y     Left Distal Femoral Compress C     Left Popliteal Spont Y     Left Popliteal Competent Y     Left Popliteal Phasic Y     Left Popliteal Compress C     Left Popliteal Augment Y     Left Posterior Tibial Compress C     Left Gastronemius Compress C     Left Greater Saph AK Compress C     Left Greater Saph BK Compress C     Left Lesser Saph Compress C     BH CV VAS PRELIMINARY FINDINGS SCRIPTING 1.0                CT Abdomen Pelvis With Contrast    Result Date: 3/14/2024  Impression: No acute findings in the abdomen or pelvis. Similar mild splenomegaly. Electronically Signed: Anand Lisa MD  3/14/2024 6:53 PM EDT  Workstation ID: [id number]    CT Angiogram Chest Pulmonary Embolism    Result Date: 3/14/2024  Impression: 1. No evidence of pulmonary embolism. 2. Mosaic attenuation throughout both lungs which could relate to small airway disease such as asthma or COPD or less likely small vessel disease. Electronically Signed: Dru Rosas  3/14/2024 6:52 PM EDT  Workstation ID: [id number]    XR Chest 2 View    Result Date: 3/14/2024  Impression: No acute chest finding. Electronically Signed: Ting Mendoza MD  3/14/2024 2:46 PM EDT  Workstation ID: [id number]     Medical Decision Making  Patient was seen and evaluated for the above problem    Differential diagnosis includes but not limited to ACS, PE, aortic aneurysm, biliary colic, bowel obstruction    Patient with recurrent pain in her chest.  Her initial EKG interpreted myself sinus rhythm rate of 99 low voltage there is no ST elevation or depression noted.  CT scan of her chest shows no pulmonary embolism.  No signs of aneurysm.  Abdomen CT shows no  signs of biliary dilatation.  She does have mildly elevated LFTs including alk phos.  Patient was placed in the ED observation unit for cardiac and GI consultation.  She verbalized understanding is okay with this.        Problems Addressed:  Abdominal pain, unspecified abdominal location: complicated acute illness or injury  Chest pain, unspecified type: complicated acute illness or injury    Amount and/or Complexity of Data Reviewed  Labs: ordered. Decision-making details documented in ED Course.     Details: Labs reviewed by myself  Radiology: ordered and independent interpretation performed. Decision-making details documented in ED Course.     Details: CT scans reviewed by myself as above  ECG/medicine tests: ordered and independent interpretation performed. Decision-making details documented in ED Course.     Details: EKG    Risk  OTC drugs.  Prescription drug management.  Decision regarding hospitalization.        Final diagnoses:   None   Chest pain  Abdominal pain    ED Disposition  ED Disposition       None            No follow-up provider specified.       Medication List      No changes were made to your prescriptions during this visit.            Dimitry Harrison DO  03/14/24 [id number]

## 2024-03-15 ENCOUNTER — INPATIENT HOSPITAL (OUTPATIENT)
Dept: URBAN - METROPOLITAN AREA HOSPITAL 84 | Facility: HOSPITAL | Age: 59
End: 2024-03-15
Payer: MEDICAID

## 2024-03-15 DIAGNOSIS — R10.9 UNSPECIFIED ABDOMINAL PAIN: ICD-10-CM

## 2024-03-15 LAB
ANION GAP SERPL CALCULATED.3IONS-SCNC: 10 MMOL/L (ref 5–15)
BASOPHILS # BLD AUTO: 0.06 10*3/MM3 (ref 0–0.2)
BASOPHILS NFR BLD AUTO: 0.8 % (ref 0–1.5)
BUN SERPL-MCNC: 9 MG/DL (ref 8–23)
BUN/CREAT SERPL: 11.7 (ref 7–25)
CALCIUM SPEC-SCNC: 8.9 MG/DL (ref 8.6–10.5)
CHLORIDE SERPL-SCNC: 102 MMOL/L (ref 98–107)
CHOLEST SERPL-MCNC: 170 MG/DL (ref 0–200)
CO2 SERPL-SCNC: 26 MMOL/L (ref 22–29)
CREAT SERPL-MCNC: 0.77 MG/DL (ref 0.57–1)
DEPRECATED RDW RBC AUTO: 43.6 FL (ref 37–54)
EGFRCR SERPLBLD CKD-EPI 2021: 88.4 ML/MIN/1.73
EOSINOPHIL # BLD AUTO: 0.42 10*3/MM3 (ref 0–0.4)
EOSINOPHIL NFR BLD AUTO: 5.3 % (ref 0.3–6.2)
ERYTHROCYTE [DISTWIDTH] IN BLOOD BY AUTOMATED COUNT: 13.8 % (ref 12.3–15.4)
GLUCOSE BLDC GLUCOMTR-MCNC: 150 MG/DL (ref 70–105)
GLUCOSE BLDC GLUCOMTR-MCNC: 285 MG/DL (ref 70–105)
GLUCOSE SERPL-MCNC: 155 MG/DL (ref 65–99)
HCT VFR BLD AUTO: 39.8 % (ref 34–46.6)
HDLC SERPL-MCNC: 30 MG/DL (ref 40–60)
HGB BLD-MCNC: 12.5 G/DL (ref 12–15.9)
IMM GRANULOCYTES # BLD AUTO: 0.06 10*3/MM3 (ref 0–0.05)
IMM GRANULOCYTES NFR BLD AUTO: 0.8 % (ref 0–0.5)
LDLC SERPL CALC-MCNC: 115 MG/DL (ref 0–100)
LDLC/HDLC SERPL: 3.76 {RATIO}
LYMPHOCYTES # BLD AUTO: 1.57 10*3/MM3 (ref 0.7–3.1)
LYMPHOCYTES NFR BLD AUTO: 19.9 % (ref 19.6–45.3)
MCH RBC QN AUTO: 27.5 PG (ref 26.6–33)
MCHC RBC AUTO-ENTMCNC: 31.4 G/DL (ref 31.5–35.7)
MCV RBC AUTO: 87.5 FL (ref 79–97)
MONOCYTES # BLD AUTO: 0.68 10*3/MM3 (ref 0.1–0.9)
MONOCYTES NFR BLD AUTO: 8.6 % (ref 5–12)
NEUTROPHILS NFR BLD AUTO: 5.1 10*3/MM3 (ref 1.7–7)
NEUTROPHILS NFR BLD AUTO: 64.6 % (ref 42.7–76)
NRBC BLD AUTO-RTO: 0 /100 WBC (ref 0–0.2)
PLATELET # BLD AUTO: 205 10*3/MM3 (ref 140–450)
PMV BLD AUTO: 8.9 FL (ref 6–12)
POTASSIUM SERPL-SCNC: 4.1 MMOL/L (ref 3.5–5.2)
QT INTERVAL: 350 MS
QTC INTERVAL: 449 MS
RBC # BLD AUTO: 4.55 10*6/MM3 (ref 3.77–5.28)
SODIUM SERPL-SCNC: 138 MMOL/L (ref 136–145)
TRIGL SERPL-MCNC: 136 MG/DL (ref 0–150)
TROPONIN T SERPL HS-MCNC: 6 NG/L
TSH SERPL DL<=0.05 MIU/L-ACNC: 3.35 UIU/ML (ref 0.27–4.2)
VLDLC SERPL-MCNC: 25 MG/DL (ref 5–40)
WBC NRBC COR # BLD AUTO: 7.89 10*3/MM3 (ref 3.4–10.8)

## 2024-03-15 PROCEDURE — 96372 THER/PROPH/DIAG INJ SC/IM: CPT

## 2024-03-15 PROCEDURE — 63710000001 INSULIN LISPRO (HUMAN) PER 5 UNITS: Performed by: NURSE PRACTITIONER

## 2024-03-15 PROCEDURE — 99214 OFFICE O/P EST MOD 30 MIN: CPT | Performed by: INTERNAL MEDICINE

## 2024-03-15 PROCEDURE — 25010000002 METHYLPREDNISOLONE PER 125 MG: Performed by: NURSE PRACTITIONER

## 2024-03-15 PROCEDURE — 25010000002 ENOXAPARIN PER 10 MG: Performed by: EMERGENCY MEDICINE

## 2024-03-15 PROCEDURE — 80061 LIPID PANEL: CPT | Performed by: NURSE PRACTITIONER

## 2024-03-15 PROCEDURE — 84443 ASSAY THYROID STIM HORMONE: CPT | Performed by: NURSE PRACTITIONER

## 2024-03-15 PROCEDURE — 85025 COMPLETE CBC W/AUTO DIFF WBC: CPT | Performed by: EMERGENCY MEDICINE

## 2024-03-15 PROCEDURE — 84484 ASSAY OF TROPONIN QUANT: CPT | Performed by: EMERGENCY MEDICINE

## 2024-03-15 PROCEDURE — 99222 1ST HOSP IP/OBS MODERATE 55: CPT | Performed by: NURSE PRACTITIONER

## 2024-03-15 PROCEDURE — 96374 THER/PROPH/DIAG INJ IV PUSH: CPT

## 2024-03-15 PROCEDURE — 82948 REAGENT STRIP/BLOOD GLUCOSE: CPT

## 2024-03-15 PROCEDURE — G0378 HOSPITAL OBSERVATION PER HR: HCPCS

## 2024-03-15 PROCEDURE — 80048 BASIC METABOLIC PNL TOTAL CA: CPT | Performed by: EMERGENCY MEDICINE

## 2024-03-15 RX ORDER — DEXTROSE MONOHYDRATE 25 G/50ML
25 INJECTION, SOLUTION INTRAVENOUS
Status: DISCONTINUED | OUTPATIENT
Start: 2024-03-15 | End: 2024-03-16 | Stop reason: HOSPADM

## 2024-03-15 RX ORDER — PANTOPRAZOLE SODIUM 40 MG/1
40 TABLET, DELAYED RELEASE ORAL
Status: DISCONTINUED | OUTPATIENT
Start: 2024-03-15 | End: 2024-03-16 | Stop reason: HOSPADM

## 2024-03-15 RX ORDER — METHYLPREDNISOLONE SODIUM SUCCINATE 125 MG/2ML
60 INJECTION, POWDER, LYOPHILIZED, FOR SOLUTION INTRAMUSCULAR; INTRAVENOUS ONCE
Status: COMPLETED | OUTPATIENT
Start: 2024-03-15 | End: 2024-03-15

## 2024-03-15 RX ORDER — DICYCLOMINE HYDROCHLORIDE 10 MG/1
20 CAPSULE ORAL 4 TIMES DAILY
Status: DISCONTINUED | OUTPATIENT
Start: 2024-03-15 | End: 2024-03-16 | Stop reason: HOSPADM

## 2024-03-15 RX ORDER — ARIPIPRAZOLE 5 MG/1
5 TABLET ORAL DAILY
Status: DISCONTINUED | OUTPATIENT
Start: 2024-03-15 | End: 2024-03-16 | Stop reason: HOSPADM

## 2024-03-15 RX ORDER — IBUPROFEN 600 MG/1
1 TABLET ORAL
Status: DISCONTINUED | OUTPATIENT
Start: 2024-03-15 | End: 2024-03-16 | Stop reason: HOSPADM

## 2024-03-15 RX ORDER — ISOSORBIDE MONONITRATE 30 MG/1
30 TABLET, EXTENDED RELEASE ORAL DAILY
Status: DISCONTINUED | OUTPATIENT
Start: 2024-03-15 | End: 2024-03-16 | Stop reason: HOSPADM

## 2024-03-15 RX ORDER — NICOTINE POLACRILEX 4 MG
15 LOZENGE BUCCAL
Status: DISCONTINUED | OUTPATIENT
Start: 2024-03-15 | End: 2024-03-16 | Stop reason: HOSPADM

## 2024-03-15 RX ORDER — GABAPENTIN 100 MG/1
100 CAPSULE ORAL 2 TIMES DAILY
Status: DISCONTINUED | OUTPATIENT
Start: 2024-03-15 | End: 2024-03-16 | Stop reason: HOSPADM

## 2024-03-15 RX ORDER — SERTRALINE HYDROCHLORIDE 100 MG/1
100 TABLET, FILM COATED ORAL DAILY
Status: DISCONTINUED | OUTPATIENT
Start: 2024-03-15 | End: 2024-03-16 | Stop reason: HOSPADM

## 2024-03-15 RX ORDER — BUSPIRONE HYDROCHLORIDE 5 MG/1
10 TABLET ORAL 3 TIMES DAILY
Status: DISCONTINUED | OUTPATIENT
Start: 2024-03-15 | End: 2024-03-16 | Stop reason: HOSPADM

## 2024-03-15 RX ORDER — GUAIFENESIN 600 MG/1
1200 TABLET, EXTENDED RELEASE ORAL EVERY 12 HOURS SCHEDULED
Status: DISCONTINUED | OUTPATIENT
Start: 2024-03-15 | End: 2024-03-16 | Stop reason: HOSPADM

## 2024-03-15 RX ORDER — INSULIN LISPRO 100 [IU]/ML
2-9 INJECTION, SOLUTION INTRAVENOUS; SUBCUTANEOUS
Status: DISCONTINUED | OUTPATIENT
Start: 2024-03-15 | End: 2024-03-16 | Stop reason: HOSPADM

## 2024-03-15 RX ORDER — ZOLPIDEM TARTRATE 5 MG/1
10 TABLET ORAL NIGHTLY PRN
Status: DISCONTINUED | OUTPATIENT
Start: 2024-03-15 | End: 2024-03-16 | Stop reason: HOSPADM

## 2024-03-15 RX ORDER — PRAZOSIN HYDROCHLORIDE 1 MG/1
1 CAPSULE ORAL NIGHTLY
Status: DISCONTINUED | OUTPATIENT
Start: 2024-03-15 | End: 2024-03-16 | Stop reason: HOSPADM

## 2024-03-15 RX ORDER — ATORVASTATIN CALCIUM 40 MG/1
40 TABLET, FILM COATED ORAL DAILY
Status: DISCONTINUED | OUTPATIENT
Start: 2024-03-15 | End: 2024-03-16 | Stop reason: HOSPADM

## 2024-03-15 RX ORDER — TOPIRAMATE 25 MG/1
50 TABLET ORAL 2 TIMES DAILY
Status: DISCONTINUED | OUTPATIENT
Start: 2024-03-15 | End: 2024-03-16 | Stop reason: HOSPADM

## 2024-03-15 RX ORDER — IPRATROPIUM BROMIDE AND ALBUTEROL SULFATE 2.5; .5 MG/3ML; MG/3ML
3 SOLUTION RESPIRATORY (INHALATION)
Status: DISCONTINUED | OUTPATIENT
Start: 2024-03-15 | End: 2024-03-16

## 2024-03-15 RX ORDER — ALBUTEROL SULFATE 2.5 MG/3ML
2.5 SOLUTION RESPIRATORY (INHALATION) EVERY 6 HOURS PRN
Status: DISCONTINUED | OUTPATIENT
Start: 2024-03-15 | End: 2024-03-16 | Stop reason: HOSPADM

## 2024-03-15 RX ORDER — ASPIRIN 81 MG/1
162 TABLET, CHEWABLE ORAL DAILY
Status: DISCONTINUED | OUTPATIENT
Start: 2024-03-15 | End: 2024-03-16 | Stop reason: HOSPADM

## 2024-03-15 RX ADMIN — ISOSORBIDE MONONITRATE 30 MG: 30 TABLET, EXTENDED RELEASE ORAL at 09:07

## 2024-03-15 RX ADMIN — Medication 10 ML: at 09:05

## 2024-03-15 RX ADMIN — INSULIN LISPRO 6 UNITS: 100 INJECTION, SOLUTION INTRAVENOUS; SUBCUTANEOUS at 21:30

## 2024-03-15 RX ADMIN — ACETAMINOPHEN 650 MG: 325 TABLET, FILM COATED ORAL at 15:47

## 2024-03-15 RX ADMIN — GUAIFENESIN 1200 MG: 600 TABLET, EXTENDED RELEASE ORAL at 20:02

## 2024-03-15 RX ADMIN — BUSPIRONE HYDROCHLORIDE 10 MG: 5 TABLET ORAL at 09:06

## 2024-03-15 RX ADMIN — ARIPIPRAZOLE 5 MG: 5 TABLET ORAL at 09:06

## 2024-03-15 RX ADMIN — DICYCLOMINE HYDROCHLORIDE 20 MG: 10 CAPSULE ORAL at 20:02

## 2024-03-15 RX ADMIN — BUSPIRONE HYDROCHLORIDE 10 MG: 5 TABLET ORAL at 15:47

## 2024-03-15 RX ADMIN — BUSPIRONE HYDROCHLORIDE 10 MG: 5 TABLET ORAL at 20:01

## 2024-03-15 RX ADMIN — GABAPENTIN 100 MG: 100 CAPSULE ORAL at 20:01

## 2024-03-15 RX ADMIN — Medication 10 ML: at 20:01

## 2024-03-15 RX ADMIN — DICYCLOMINE HYDROCHLORIDE 20 MG: 10 CAPSULE ORAL at 18:11

## 2024-03-15 RX ADMIN — TOPIRAMATE 50 MG: 25 TABLET, FILM COATED ORAL at 20:02

## 2024-03-15 RX ADMIN — TOPIRAMATE 50 MG: 25 TABLET, FILM COATED ORAL at 09:06

## 2024-03-15 RX ADMIN — SERTRALINE 100 MG: 100 TABLET, FILM COATED ORAL at 09:06

## 2024-03-15 RX ADMIN — GABAPENTIN 100 MG: 100 CAPSULE ORAL at 09:06

## 2024-03-15 RX ADMIN — ENOXAPARIN SODIUM 60 MG: 60 INJECTION SUBCUTANEOUS at 20:04

## 2024-03-15 RX ADMIN — METHYLPREDNISOLONE SODIUM SUCCINATE 60 MG: 125 INJECTION, POWDER, FOR SOLUTION INTRAMUSCULAR; INTRAVENOUS at 15:47

## 2024-03-15 RX ADMIN — ATORVASTATIN CALCIUM 40 MG: 40 TABLET, FILM COATED ORAL at 09:06

## 2024-03-15 RX ADMIN — ENOXAPARIN SODIUM 60 MG: 60 INJECTION SUBCUTANEOUS at 09:05

## 2024-03-15 RX ADMIN — ASPIRIN 81 MG CHEWABLE TABLET 162 MG: 81 TABLET CHEWABLE at 09:05

## 2024-03-15 RX ADMIN — PRAZOSIN HYDROCHLORIDE 1 MG: 1 CAPSULE ORAL at 20:04

## 2024-03-15 NOTE — CONSULTS
CARDIOLOGY CONSULT:    Eladia Connor  1965  female  [id number]      Referring Provider: Hospitalist  Reason for Consultation: Chest pain    Patient Care Team:  Quincy Lemos APRN as PCP - General (Family Medicine)    Chief complaint chest pain    Subjective .     History of present illness:  Eladia Connor is a 58 y.o. 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  Patient discussed chest pain as substernal discomfort without any radiation and associate with some shortness of breath and cough.  No complaint of any PND or orthopnea.  No palpitations dizziness syncope or swelling of the feet.  Patient had normal troponin but elevated D-dimer and hence had a CT scan which showed no pulm embolism but is then admitted and ruled out for MI by EKG and enzymes.  Review of Systems   Constitutional: Negative for fever and malaise/fatigue.   HENT:  Negative for ear pain and nosebleeds.    Eyes:  Negative for blurred vision and double vision.   Cardiovascular:  Positive for chest pain. Negative for dyspnea on exertion and palpitations.   Respiratory:  Positive for cough and shortness of breath.    Skin:  Negative for rash.   Musculoskeletal:  Negative for joint pain.   Gastrointestinal:  Negative for abdominal pain, nausea and vomiting.   Neurological:  Negative for focal weakness and headaches.   Psychiatric/Behavioral:  Negative for depression. The patient is not nervous/anxious.    All other systems reviewed and are negative.      History  Past Medical History:   Diagnosis Date    Anxiety     Asthma     Cancer     SKIN    Depression     Psoriasis     Seizures 12/06/2022       Past Surgical History:   Procedure Laterality Date    CHOLECYSTECTOMY  1987    ENDOSCOPY N/A 12/30/2022    Procedure: ESOPHAGOGASTRODUODENOSCOPY with gastric biopsy and esophageal dilation #50,#54,#56,#58 bougie;  Surgeon:  Patience Arana MD;  Location: [facility] ENDOSCOPY;  Service: Gastroenterology;  Laterality: N/A;  gastritis    EYE SURGERY      KNEE SURGERY         Family History   Problem Relation Age of Onset    Heart disease Mother     Breast cancer Mother     Heart disease Father     Pancreatic cancer Father     Heart disease Sister     Lung cancer Brother        Social History     Tobacco Use    Smoking status: Never    Smokeless tobacco: Never   Vaping Use    Vaping status: Never Used   Substance Use Topics    Alcohol use: Never    Drug use: Never        Medications Prior to Admission   Medication Sig Dispense Refill Last Dose    albuterol sulfate  (90 Base) MCG/ACT inhaler Inhale 2 puffs Every 6 (Six) Hours As Needed for Wheezing. 18 g 2 Past Week    ARIPiprazole (ABILIFY) 5 MG tablet Take 1 tablet by mouth Daily.   3/14/2024 at 0800    aspirin 81 MG chewable tablet Chew 2 tablets Daily. 90 tablet 1 3/14/2024    atorvastatin (LIPITOR) 40 MG tablet Take 1 tablet by mouth every night at bedtime. 90 tablet 1 3/14/2024    busPIRone (BUSPAR) 10 MG tablet Take 1 tablet by mouth 3 (Three) Times a Day.   3/14/2024    gabapentin (NEURONTIN) 100 MG capsule Take 1 capsule by mouth 2 (Two) Times a Day. 60 capsule 1 3/14/2024    ibuprofen (ADVIL,MOTRIN) 800 MG tablet Take 1 tablet by mouth Every 8 (Eight) Hours As Needed.   3/14/2024    Incontinence Supplies misc Take As Directed.   3/14/2024    isosorbide mononitrate (IMDUR) 30 MG 24 hr tablet Take 1 tablet by mouth Daily. 90 tablet 1 3/14/2024    prazosin (MINIPRESS) 1 MG capsule Take 1 capsule by mouth Every Night.   3/14/2024    sertraline (ZOLOFT) 100 MG tablet Take 1 tablet by mouth Daily.   3/14/2024    topiramate (Topamax) 50 MG tablet Take 1 tablet by mouth 2 (Two) Times a Day. 60 tablet 2 3/14/2024    zolpidem (AMBIEN) 10 MG tablet Take 1 tablet by mouth At Night As Needed for Sleep. 30 tablet 0 3/14/2024       Allergies: Patient has no known allergies.    Scheduled  "Meds:ARIPiprazole, 5 mg, Oral, Daily  aspirin, 162 mg, Oral, Daily  atorvastatin, 40 mg, Oral, Daily  busPIRone, 10 mg, Oral, TID  dicyclomine, 20 mg, Oral, 4x Daily  enoxaparin, 60 mg, Subcutaneous, Q12H  gabapentin, 100 mg, Oral, BID  guaiFENesin, 1,200 mg, Oral, Q12H  insulin lispro, 2-9 Units, Subcutaneous, 4x Daily AC & at Bedtime  ipratropium-albuterol, 3 mL, Nebulization, 4x Daily - RT  isosorbide mononitrate, 30 mg, Oral, Daily  methylPREDNISolone sodium succinate, 60 mg, Intravenous, Once  pantoprazole, 40 mg, Oral, Q AM  prazosin, 1 mg, Oral, Nightly  sertraline, 100 mg, Oral, Daily  sodium chloride, 10 mL, Intravenous, Q12H  topiramate, 50 mg, Oral, BID      Continuous Infusions:Pharmacy to Dose enoxaparin (LOVENOX),       PRN Meds:.  acetaminophen    albuterol    senna-docusate sodium **AND** polyethylene glycol **AND** bisacodyl **AND** bisacodyl    dextrose    dextrose    glucagon (human recombinant)    melatonin    ondansetron    Pharmacy to Dose enoxaparin (LOVENOX)    [COMPLETED] Insert Peripheral IV **AND** sodium chloride    sodium chloride    sodium chloride    zolpidem    Objective     VITAL SIGNS  Vitals:    03/15/24 0119 03/15/24 0600 03/15/24 1000 03/15/24 1400   BP: 119/73 117/65 118/68 120/68   BP Location: Left arm Left arm Left arm Left arm   Patient Position: Lying Lying Lying Lying   Pulse: 77 72 73    Resp: 20 12 15 17   Temp: 97.8 °F (36.6 °C) 97.5 °F (36.4 °C) 97.6 °F (36.4 °C) 97.3 °F (36.3 °C)   TempSrc: Oral Oral Oral Oral   SpO2: 98% 96% 94%    Weight:       Height:           Flowsheet Rows      Flowsheet Row First Filed Value   Admission Height 170.2 cm (67\") Documented at 03/14/2024 1635   Admission Weight 149 kg (327 lb 13.2 oz) Documented at 03/14/2024 1635             TELEMETRY: Sinus rhythm    Physical Exam:  Constitutional:       Appearance: Well-developed.   Eyes:      General: No scleral icterus.     Conjunctiva/sclera: Conjunctivae normal.      Pupils: Pupils are equal, " round, and reactive to light.   HENT:      Head: Normocephalic and atraumatic.   Neck:      Vascular: No carotid bruit or JVD.   Pulmonary:      Effort: Pulmonary effort is normal.      Breath sounds: Normal breath sounds. No wheezing. No rales.   Cardiovascular:      Normal rate. Regular rhythm.   Pulses:     Intact distal pulses.   Abdominal:      General: Bowel sounds are normal.      Palpations: Abdomen is soft.   Musculoskeletal: Normal range of motion.      Cervical back: Normal range of motion and neck supple. Skin:     General: Skin is warm and dry.      Findings: No rash.   Neurological:      Mental Status: Alert.      Comments: No focal deficits          Results Review:   I reviewed the patient's new clinical results.  Lab Results (last 24 hours)       Procedure Component Value Units Date/Time    POC Glucose Once [[id number]]  (Abnormal) Collected: 03/15/24 1150    Specimen: Blood Updated: 03/15/24 1152     Glucose 150 mg/dL      Comment: Serial Number: [id number]:  [id number]       Lipid Panel [[id number]]  (Abnormal) Collected: 03/15/24 0435    Specimen: Blood Updated: 03/15/24 1057     Total Cholesterol 170 mg/dL      Triglycerides 136 mg/dL      HDL Cholesterol 30 mg/dL      LDL Cholesterol  115 mg/dL      VLDL Cholesterol 25 mg/dL      LDL/HDL Ratio 3.76    Narrative:      Cholesterol Reference Ranges  (U.S. Department of Health and Human Services ATP III Classifications)    Desirable          <200 mg/dL  Borderline High    200-239 mg/dL  High Risk          >240 mg/dL      Triglyceride Reference Ranges  (U.S. Department of Health and Human Services ATP III Classifications)    Normal           <150 mg/dL  Borderline High  150-199 mg/dL  High             200-499 mg/dL  Very High        >500 mg/dL    HDL Reference Ranges  (U.S. Department of Health and Human Services ATP III Classifications)    Low     <40 mg/dl (major risk factor for CHD)  High    >60 mg/dl ('negative' risk factor for  CHD)        LDL Reference Ranges  (U.S. Department of Health and Human Services ATP III Classifications)    Optimal          <100 mg/dL  Near Optimal     100-129 mg/dL  Borderline High  130-159 mg/dL  High             160-189 mg/dL  Very High        >189 mg/dL    TSH [[id number]]  (Normal) Collected: 03/15/24 0435    Specimen: Blood Updated: 03/15/24 0804     TSH 3.350 uIU/mL     Basic Metabolic Panel [[id number]]  (Abnormal) Collected: 03/15/24 0435    Specimen: Blood Updated: 03/15/24 0545     Glucose 155 mg/dL      BUN 9 mg/dL      Creatinine 0.77 mg/dL      Sodium 138 mmol/L      Potassium 4.1 mmol/L      Chloride 102 mmol/L      CO2 26.0 mmol/L      Calcium 8.9 mg/dL      BUN/Creatinine Ratio 11.7     Anion Gap 10.0 mmol/L      eGFR 88.4 mL/min/1.73     Narrative:      GFR Normal >60  Chronic Kidney Disease <60  Kidney Failure <15      High Sensitivity Troponin T [[id number]]  (Normal) Collected: 03/15/24 0435    Specimen: Blood Updated: 03/15/24 0545     HS Troponin T 6 ng/L     Narrative:      High Sensitive Troponin T Reference Range:  <14.0 ng/L- Negative Female for AMI  <22.0 ng/L- Negative Male for AMI  >=14 - Abnormal Female indicating possible myocardial injury.  >=22 - Abnormal Male indicating possible myocardial injury.   Clinicians would have to utilize clinical acumen, EKG, Troponin, and serial changes to determine if it is an Acute Myocardial Infarction or myocardial injury due to an underlying chronic condition.         CBC Auto Differential [[id number]]  (Abnormal) Collected: 03/15/24 0435    Specimen: Blood Updated: 03/15/24 0512     WBC 7.89 10*3/mm3      RBC 4.55 10*6/mm3      Hemoglobin 12.5 g/dL      Hematocrit 39.8 %      MCV 87.5 fL      MCH 27.5 pg      MCHC 31.4 g/dL      RDW 13.8 %      RDW-SD 43.6 fl      MPV 8.9 fL      Platelets 205 10*3/mm3      Neutrophil % 64.6 %      Lymphocyte % 19.9 %      Monocyte % 8.6 %      Eosinophil % 5.3 %      Basophil % 0.8 %      Immature Grans % 0.8 %       Neutrophils, Absolute 5.10 10*3/mm3      Lymphocytes, Absolute 1.57 10*3/mm3      Monocytes, Absolute 0.68 10*3/mm3      Eosinophils, Absolute 0.42 10*3/mm3      Basophils, Absolute 0.06 10*3/mm3      Immature Grans, Absolute 0.06 10*3/mm3      nRBC 0.0 /100 WBC     High Sensitivity Troponin T 2Hr [[id number]] Collected: 03/14/24 2041    Specimen: Blood Updated: 03/14/24 2111     HS Troponin T <6 ng/L      Troponin T Delta --     Comment: Unable to calculate.       Narrative:      High Sensitive Troponin T Reference Range:  <14.0 ng/L- Negative Female for AMI  <22.0 ng/L- Negative Male for AMI  >=14 - Abnormal Female indicating possible myocardial injury.  >=22 - Abnormal Male indicating possible myocardial injury.   Clinicians would have to utilize clinical acumen, EKG, Troponin, and serial changes to determine if it is an Acute Myocardial Infarction or myocardial injury due to an underlying chronic condition.         High Sensitivity Troponin T [[id number]]  (Normal) Collected: 03/14/24 1737    Specimen: Blood Updated: 03/14/24 1849     HS Troponin T <6 ng/L     Narrative:      High Sensitive Troponin T Reference Range:  <14.0 ng/L- Negative Female for AMI  <22.0 ng/L- Negative Male for AMI  >=14 - Abnormal Female indicating possible myocardial injury.  >=22 - Abnormal Male indicating possible myocardial injury.   Clinicians would have to utilize clinical acumen, EKG, Troponin, and serial changes to determine if it is an Acute Myocardial Infarction or myocardial injury due to an underlying chronic condition.         Extra Tubes [[id number]] Collected: 03/14/24 1737    Specimen: Blood, Venous Line Updated: 03/14/24 1845    Narrative:      The following orders were created for panel order Extra Tubes.  Procedure                               Abnormality         Status                     ---------                               -----------         ------                     Gold Top - RUST[[id number]]                                    Final result                 Please view results for these tests on the individual orders.    Gold Top - SST [[id number]] Collected: 03/14/24 1737    Specimen: Blood Updated: 03/14/24 1845     Extra Tube Hold for add-ons.     Comment: Auto resulted.       BNP [[id number]]  (Normal) Collected: 03/14/24 1737    Specimen: Blood Updated: 03/14/24 1817     proBNP <36.0 pg/mL     Narrative:      This assay is used as an aid in the diagnosis of individuals suspected of having heart failure. It can be used as an aid in the diagnosis of acute decompensated heart failure (ADHF) in patients presenting with signs and symptoms of ADHF to the emergency department (ED). In addition, NT-proBNP of <300 pg/mL indicates ADHF is not likely.    Age Range Result Interpretation  NT-proBNP Concentration (pg/mL:      <50             Positive            >450                   Gray                 300-450                    Negative             <300    50-75           Positive            >900                  Gray                300-900                  Negative            <300      >75             Positive            >1800                  Gray                300-1800                  Negative            <300    Comprehensive Metabolic Panel [[id number]]  (Abnormal) Collected: 03/14/24 1737    Specimen: Blood Updated: 03/14/24 1812     Glucose 147 mg/dL      BUN 9 mg/dL      Creatinine 0.73 mg/dL      Sodium 138 mmol/L      Potassium 4.1 mmol/L      Chloride 100 mmol/L      CO2 27.0 mmol/L      Calcium 9.2 mg/dL      Total Protein 8.2 g/dL      Albumin 4.2 g/dL      ALT (SGPT) 50 U/L      AST (SGOT) 78 U/L      Alkaline Phosphatase 194 U/L      Total Bilirubin 0.5 mg/dL      Globulin 4.0 gm/dL      A/G Ratio 1.1 g/dL      BUN/Creatinine Ratio 12.3     Anion Gap 11.0 mmol/L      eGFR 94.3 mL/min/1.73     Narrative:      GFR Normal >60  Chronic Kidney Disease <60  Kidney Failure <15      Magnesium [[id number]]  (Normal) Collected:  03/14/24 1737    Specimen: Blood Updated: 03/14/24 1812     Magnesium 2.2 mg/dL     CBC & Differential [[id number]]  (Abnormal) Collected: 03/14/24 1737    Specimen: Blood Updated: 03/14/24 1759    Narrative:      The following orders were created for panel order CBC & Differential.  Procedure                               Abnormality         Status                     ---------                               -----------         ------                     CBC Auto Differential[[id number]]        Abnormal            Final result                 Please view results for these tests on the individual orders.    CBC Auto Differential [[id number]]  (Abnormal) Collected: 03/14/24 1737    Specimen: Blood Updated: 03/14/24 1759     WBC 7.73 10*3/mm3      RBC 4.89 10*6/mm3      Hemoglobin 13.4 g/dL      Hematocrit 42.8 %      MCV 87.5 fL      MCH 27.4 pg      MCHC 31.3 g/dL      RDW 13.7 %      RDW-SD 43.8 fl      MPV 8.8 fL      Platelets 234 10*3/mm3      Neutrophil % 67.4 %      Lymphocyte % 19.0 %      Monocyte % 7.1 %      Eosinophil % 4.9 %      Basophil % 0.8 %      Immature Grans % 0.8 %      Neutrophils, Absolute 5.21 10*3/mm3      Lymphocytes, Absolute 1.47 10*3/mm3      Monocytes, Absolute 0.55 10*3/mm3      Eosinophils, Absolute 0.38 10*3/mm3      Basophils, Absolute 0.06 10*3/mm3      Immature Grans, Absolute 0.06 10*3/mm3      nRBC 0.0 /100 WBC             Imaging Results (Last 24 Hours)       Procedure Component Value Units Date/Time    CT Abdomen Pelvis With Contrast [[id number]] Collected: 03/14/24 1849     Updated: 03/14/24 1855    Narrative:      CT ABDOMEN PELVIS W CONTRAST    Date of Exam: 3/14/2024 6:22 PM EDT    Indication: skin cancer; elevated liver enzymes, nausea.    Comparison: CT abdomen pelvis 1/20/2023.    Technique: Axial CT images were obtained of the abdomen and pelvis following the uneventful intravenous administration of iodinated contrast. Sagittal and coronal reconstructions were performed.   Automated exposure control and iterative reconstruction   methods were used.    Findings:    Lower thorax: No focal consolidation.     Liver: No focal hepatic lesion. Normal morphology of the liver.    Gallbladder and bile ducts: Cholecystectomy. No biliary ductal dilatation.     Pancreas: No pancreatic duct dilation. No surrounding inflammation.     Spleen: Mildly enlarged measuring 15 cm, similar.     Adrenal glands: Unchanged 2.2 cm benign left adrenal myelolipoma. No right adrenal nodule.     Kidneys: Symmetric in size and enhancement. No hydronephrosis.     Urinary bladder: Unremarkable.    Reproductive organs: Unremarkable.    Stomach and bowel: No evidence of bowel obstruction. Normal appendix.    Lymph nodes: No pathologically enlarged lymph nodes.     Vessels: No abdominal aortic aneurysm. Major vasculature is patent. Scattered atherosclerotic vascular calcifications.    Peritoneum and retroperitoneum: No free air or free fluid.     Soft tissues: Small fat-containing umbilical hernia.    Osseous structures: No acute or suspicious osseous lesions.       Impression:      Impression:    No acute findings in the abdomen or pelvis.    Similar mild splenomegaly.      Electronically Signed: Anand Lisa MD    3/14/2024 6:53 PM EDT    Workstation ID: [id number]    CT Angiogram Chest Pulmonary Embolism [[id number]] Collected: 03/14/24 1847     Updated: 03/14/24 1854    Narrative:      CT ANGIOGRAM CHEST PULMONARY EMBOLISM    Date of Exam: 3/14/2024 6:22 PM EDT    Indication: Chest pain, shortness of air + dimer yesterday.    Comparison: Chest radiograph dated 3/13/2024.    Technique: Axial CT images were obtained of the chest after the uneventful intravenous administration of iodinated contrast utilizing pulmonary embolism protocol.  Sagittal and coronal reconstructions were performed.  Automated exposure control and   iterative reconstruction methods were used.    Findings:  The visualized soft tissue structures at  the base the neck including portions of the thyroid appear within normal limits. There is no lower cervical or axillary adenopathy.    The heart size is normal. There is no pericardial effusion. The aorta is normal in caliber without evidence of aneurysm formation. There is no coronary or aortic atherosclerotic calcification. The main pulmonary artery is normal in caliber. There are no   filling defects within the pulmonary arterial system to suggest presence of underlying pulmonary embolism. There is no mediastinal or hilar lymphadenopathy by size criteria.    The tracheobronchial tree is patent. There is no abnormal bronchial wall thickening or bronchiectasis. There are areas of mosaic attenuation which could relate to small airway or small vessel disease. There is no pleural effusion or pneumothorax. There   are no suspicious pulmonary nodules.    There is a small sliding-type hiatal hernia. Visualized portions of the upper abdomen demonstrate prior cholecystectomy changes. There is a macroscopic fat-containing lesion along the left adrenal gland measuring 2.1 cm likely representing an adrenal   myolipoma. There is multilevel bridging paravertebral ossification compatible with diffuse idiopathic skeletal hyperostosis. There are small posterior disc osteophyte complexes.      Impression:      Impression:  1. No evidence of pulmonary embolism.  2. Mosaic attenuation throughout both lungs which could relate to small airway disease such as asthma or COPD or less likely small vessel disease.    Electronically Signed: Dru Rosas    3/14/2024 6:52 PM EDT    Workstation ID: [id number]            EKG      I personally viewed and interpreted the patient's EKG/Telemetry data:    ECHOCARDIOGRAM:  Results for orders placed during the hospital encounter of 12/06/22    Adult Transthoracic Echo Complete W/ Cont if Necessary Per Protocol    Interpretation Summary    Left ventricular systolic function is normal. Left  ventricular ejection fraction appears to be 61 - 65%.    Left ventricular diastolic function was normal.    Saline test results are negative.  Study was suboptimal.         STRESS MYOVIEW:  Results for orders placed during the hospital encounter of 02/02/23    Stress Test With Myocardial Perfusion (1 Day)    Interpretation Summary    Myocardial perfusion imaging indicates a normal myocardial perfusion study with no evidence of ischemia.    Left ventricular ejection fraction is normal (Calculated EF = 65%).    Impressions are consistent with a low risk study.       CARDIAC CATHETERIZATION:    No results found for this or any previous visit.       OTHER:         MDM      Chest pain  Patient presented with chest pain which is atypical for angina  Patient had an echo and a stress test last year which did not show any ischemia  Patient is ruled out for MI by EKG and enzymes  Patient had elevated D-dimer and she had a CT scan which showed no pulmonary embolism  Patient is currently on DuoNebs Mucinex and Solu-Medrol  Patient also has a gastroenterology consultation.  No further workup necessary.    Hypertension  Patient blood pressure currently stable on isosorbide    COPD  Patient has history of COPD and is on medical therapy for the same    I discussed the patients findings and my recommendations with patient and nurse    Zachary Gloria MD  03/15/24  14:49 EDT

## 2024-03-15 NOTE — PLAN OF CARE
Goal Outcome Evaluation:     Problem: Adult Inpatient Plan of Care  Goal: Plan of Care Review  Outcome: Ongoing, Progressing  Goal: Patient-Specific Goal (Individualized)  Outcome: Ongoing, Progressing  Goal: Absence of Hospital-Acquired Illness or Injury  Outcome: Ongoing, Progressing  Intervention: Identify and Manage Fall Risk  Recent Flowsheet Documentation  Taken 3/15/2024 1542 by Janice Burton RN  Safety Promotion/Fall Prevention:   safety round/check completed   room organization consistent   clutter free environment maintained  Taken 3/15/2024 1400 by Janice Burton RN  Safety Promotion/Fall Prevention:   safety round/check completed   room organization consistent   assistive device/personal items within reach   clutter free environment maintained  Taken 3/15/2024 1200 by Janice Burton RN  Safety Promotion/Fall Prevention:   safety round/check completed   room organization consistent   clutter free environment maintained   assistive device/personal items within reach  Taken 3/15/2024 1000 by Janice Burton RN  Safety Promotion/Fall Prevention:   safety round/check completed   room organization consistent   clutter free environment maintained  Intervention: Prevent Skin Injury  Recent Flowsheet Documentation  Taken 3/15/2024 1542 by Janice Burton RN  Body Position: position changed independently  Taken 3/15/2024 1200 by Janice Burton RN  Body Position: position changed independently  Taken 3/15/2024 0902 by Janice Burton RN  Body Position: position changed independently  Intervention: Prevent Infection  Recent Flowsheet Documentation  Taken 3/15/2024 1542 by Janice Burton RN  Infection Prevention:   hand hygiene promoted   personal protective equipment utilized   rest/sleep promoted  Taken 3/15/2024 1400 by Janice Burton RN  Infection Prevention:   hand hygiene promoted   personal protective equipment utilized   rest/sleep promoted  Taken 3/15/2024 1200 by Janice Burton RN  Infection Prevention:   hand hygiene  promoted   rest/sleep promoted   personal protective equipment utilized  Taken 3/15/2024 1000 by Janice Burton RN  Infection Prevention:   hand hygiene promoted   personal protective equipment utilized   rest/sleep promoted  Taken 3/15/2024 0902 by Janice Burton RN  Infection Prevention:   hand hygiene promoted   rest/sleep promoted   personal protective equipment utilized  Goal: Optimal Comfort and Wellbeing  Outcome: Ongoing, Progressing  Intervention: Monitor Pain and Promote Comfort  Recent Flowsheet Documentation  Taken 3/15/2024 1542 by Janice Burton RN  Pain Management Interventions: see MAR  Intervention: Provide Person-Centered Care  Recent Flowsheet Documentation  Taken 3/15/2024 0902 by Janice Burton RN  Trust Relationship/Rapport:   care explained   questions answered   thoughts/feelings acknowledged  Goal: Readiness for Transition of Care  Outcome: Ongoing, Progressing     Problem: Pain Acute  Goal: Acceptable Pain Control and Functional Ability  Outcome: Ongoing, Progressing  Intervention: Develop Pain Management Plan  Recent Flowsheet Documentation  Taken 3/15/2024 1542 by Janice Burton RN  Pain Management Interventions: see MAR  Intervention: Optimize Psychosocial Wellbeing  Recent Flowsheet Documentation  Taken 3/15/2024 0902 by Janice Burton RN  Supportive Measures: active listening utilized  Diversional Activities: television

## 2024-03-15 NOTE — H&P
Formerly [facility] H&P    Patient Name: Eladia Connor  : 1965  MRN: [id number]  Primary Care Physician: Quincy Lemos APRN  Date of admission: 3/14/2024     Patient Care Team:  Quincy Lemos APRN as PCP - General (Family Medicine)          Subjective   History Present Illness     Chief Complaint:   Chief Complaint   Patient presents with    Chest Pain     Chest pain for 2 weeks.,  elevated D-dimer sent if for evaluation.       Chest Pain     Chest Pain   Associated symptoms include a cough and shortness of breath. Pertinent negatives include no fever, malaise/fatigue, nausea or vomiting.     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  chart review shows that was 1.3 and troponin was negative.  Noted to have elevated liver enzymes.  She denies any swelling to her legs or feet.  She denies any congestion or fever but does note a intermittent nonproductive cough.  Not currently on any OCPs and denies any history of VTE.     Patient notably had an outpatient order for stat CT but patient states she prefer to be seen in the ER due to her elevated D-dimer to evaluate for blood clot, I discussed that is what her outpatient stat order was for but she preferred to be seen in the ER.     Due to significant overcrowding in the emergency department patient was initially seen and evaluated in triage.  Provider in triage recommended patient placement in the treatment area to initiate therapy and movement to an ER bed as soon as possible.   Orders placed; medications will be deferred to main provider per protocol.         History of Present Illness  Chief complaint: I reviewed the provider in triage note.  Patient is a 58-year-old female who  "presents with chest pain.  She states that she hurts in her right chest as well as her left.  It feels like it is \"in my lungs\".  She has had palpitations associated with it.  She states these pains come and go over the last 7 days.  She feels charley horses in her legs and her abdomen is bothering her as well.  She saw her primary physician yesterday and had a troponin done that was negative as well as a D-dimer that was positive.  Outpatient CTs were ordered but she wanted to come here for further evaluation.  She feels \"charley horses in my legs\".  She states that she was diagnosed with diabetes yesterday as well.  She did have elevated liver test as well.    Observation 03/15/2024  Patient agrees with HPI noted above including chest pain with a deep breath associated with dry cough for the past week.  She thinks it is pulmonary.  She denies any history of COPD, smoking or known diagnosis of asthma.  Patient also reports some abdominal pain without nausea or vomiting.    Review of Systems   Constitutional: Negative for fever and malaise/fatigue.   Cardiovascular:  Positive for chest pain.   Respiratory:  Positive for cough and shortness of breath. Negative for wheezing.    Gastrointestinal:  Negative for nausea and vomiting.   All other systems reviewed and are negative.          Personal History     Past Medical History:   Past Medical History:   Diagnosis Date    Anxiety     Asthma     Cancer     SKIN    Depression     Psoriasis     Seizures 12/06/2022       Surgical History:      Past Surgical History:   Procedure Laterality Date    CHOLECYSTECTOMY  1987    ENDOSCOPY N/A 12/30/2022    Procedure: ESOPHAGOGASTRODUODENOSCOPY with gastric biopsy and esophageal dilation #50,#54,#56,#58 bougie;  Surgeon: Patience Arana MD;  Location: [facility] ENDOSCOPY;  Service: Gastroenterology;  Laterality: N/A;  gastritis    EYE SURGERY      KNEE SURGERY             Family History: family history includes Breast cancer in her " mother; Heart disease in her father, mother, and sister; Lung cancer in her brother; Pancreatic cancer in her father. Otherwise pertinent FHx was reviewed and unremarkable.     Social History:  reports that she has never smoked. She has never used smokeless tobacco. She reports that she does not drink alcohol and does not use drugs.      Medications:  Prior to Admission medications    Medication Sig Start Date End Date Taking? Authorizing Provider   albuterol sulfate  (90 Base) MCG/ACT inhaler Inhale 2 puffs Every 6 (Six) Hours As Needed for Wheezing. 11/13/23  Yes Quincy Lemos APRN   ARIPiprazole (ABILIFY) 5 MG tablet Take 1 tablet by mouth Daily.   Yes Kamala Banks MD   aspirin 81 MG chewable tablet Chew 2 tablets Daily. 11/13/23  Yes Quincy Lemos APRN   atorvastatin (LIPITOR) 40 MG tablet Take 1 tablet by mouth every night at bedtime. 11/13/23  Yes Quincy Lemos APRN   busPIRone (BUSPAR) 10 MG tablet Take 1 tablet by mouth 3 (Three) Times a Day.   Yes Kamala Banks MD   gabapentin (NEURONTIN) 100 MG capsule Take 1 capsule by mouth 2 (Two) Times a Day. 11/13/23  Yes Quincy Lemos APRN   ibuprofen (ADVIL,MOTRIN) 800 MG tablet Take 1 tablet by mouth Every 8 (Eight) Hours As Needed. 5/11/23  Yes Kamala Banks MD   Incontinence Supplies misc Take As Directed. 3/15/23  Yes Kamala Banks MD   isosorbide mononitrate (IMDUR) 30 MG 24 hr tablet Take 1 tablet by mouth Daily. 11/13/23  Yes Quincy Lemos APRN   prazosin (MINIPRESS) 1 MG capsule Take 1 capsule by mouth Every Night. 4/27/23  Yes Kamala Banks MD   sertraline (ZOLOFT) 100 MG tablet Take 1 tablet by mouth Daily. 5/10/23  Yes Kamala Banks MD   topiramate (Topamax) 50 MG tablet Take 1 tablet by mouth 2 (Two) Times a Day. 10/26/23  Yes Seipel, Joseph F, MD   zolpidem (AMBIEN) 10 MG tablet Take 1 tablet by mouth At Night As Needed for Sleep. 11/13/23  Yes Canelo  SCOOBY Shelton       Allergies:  No Known Allergies    Objective   Objective     Vital Signs  Temp:  [97.5 °F (36.4 °C)-97.9 °F (36.6 °C)] 97.6 °F (36.4 °C)  Heart Rate:  [] 73  Resp:  [12-20] 15  BP: (117-136)/(65-74) 118/68  SpO2:  [94 %-98 %] 94 %  on   ;   Device (Oxygen Therapy): room air  Body mass index is 51.33 kg/m².    Physical Exam  Vitals and nursing note reviewed.   Constitutional:       Appearance: Normal appearance. She is obese.   HENT:      Head: Normocephalic and atraumatic.      Right Ear: External ear normal.      Left Ear: External ear normal.      Nose: Nose normal.      Mouth/Throat:      Mouth: Mucous membranes are moist.      Pharynx: Oropharynx is clear.   Eyes:      Extraocular Movements: Extraocular movements intact.   Cardiovascular:      Rate and Rhythm: Normal rate and regular rhythm.      Pulses: Normal pulses.      Heart sounds: Normal heart sounds.   Pulmonary:      Effort: Pulmonary effort is normal.      Breath sounds: Normal breath sounds.   Abdominal:      General: Abdomen is flat. Bowel sounds are normal.      Palpations: Abdomen is soft.   Musculoskeletal:         General: Normal range of motion.      Cervical back: Normal range of motion.   Skin:     General: Skin is warm.   Neurological:      General: No focal deficit present.      Mental Status: She is alert and oriented to person, place, and time.   Psychiatric:         Mood and Affect: Mood normal.         Behavior: Behavior normal.           Results Review:  I have personally reviewed most recent lab results and radiology images and interpretations and agree with findings, most notably: D-dimer, troponin, CMP, TSH, lipid panel no, CT abdomen pelvis, CT chest, and EKG, duplex venous ultrasound.    Results from last 7 days   Lab Units 03/15/24  0435   WBC 10*3/mm3 7.89   HEMOGLOBIN g/dL 12.5   HEMATOCRIT % 39.8   PLATELETS 10*3/mm3 205     Results from last 7 days   Lab Units 03/15/24  0435 03/14/24 2041  03/14/24  1737   SODIUM mmol/L 138  --  138   POTASSIUM mmol/L 4.1  --  4.1   CHLORIDE mmol/L 102  --  100   CO2 mmol/L 26.0  --  27.0   BUN mg/dL 9  --  9   CREATININE mg/dL 0.77  --  0.73   GLUCOSE mg/dL 155*  --  147*   CALCIUM mg/dL 8.9  --  9.2   ALK PHOS U/L  --   --  194*   ALT (SGPT) U/L  --   --  50*   AST (SGOT) U/L  --   --  78*   HSTROP T ng/L 6 <6 <6   PROBNP pg/mL  --   --  <36.0     Estimated Creatinine Clearance: 118.5 mL/min (by C-G formula based on SCr of 0.77 mg/dL).  Brief Urine Lab Results       None            Microbiology Results (last 10 days)       ** No results found for the last 240 hours. **            ECG/EMG Results (most recent)       Procedure Component Value Units Date/Time    ECG 12 Lead Chest Pain [[id number]] Collected: 03/14/24 1640     Updated: 03/15/24 0722     QT Interval 350 ms      QTC Interval 449 ms     Narrative:      HEART RATE= 99  bpm  RR Interval= 608  ms  KY Interval= 168  ms  P Horizontal Axis= 9  deg  P Front Axis= 10  deg  QRSD Interval= 79  ms  QT Interval= 350  ms  QTcB= 449  ms  QRS Axis= 4  deg  T Wave Axis= 20  deg  - OTHERWISE NORMAL ECG -  Sinus rhythm  Low voltage, precordial leads  When compared with ECG of 17-Feb-2023 15:19:22,  Significant rate increase  Significant axis, voltage or hypertrophy change  Electronically Signed By: Baudilio Bryson ([facility]) 15-Mar-2024 07:22:24  Date and Time of Study: 2024-03-14 16:40:25            Results for orders placed during the hospital encounter of 03/14/24    Duplex Venous Lower Extremity - Bilateral    Interpretation Summary    Normal bilateral lower extremity venous duplex scan.      Results for orders placed during the hospital encounter of 12/06/22    Adult Transthoracic Echo Complete W/ Cont if Necessary Per Protocol    Interpretation Summary    Left ventricular systolic function is normal. Left ventricular ejection fraction appears to be 61 - 65%.    Left ventricular diastolic function was normal.    Saline test  results are negative.  Study was suboptimal.      XR Spine Thoracic 3 View    Result Date: 3/14/2024  Impression: 1.Multilevel degenerative change. Electronically Signed: Robin Al MD  3/14/2024 9:34 PM EDT  Workstation ID: [id number]    CT Abdomen Pelvis With Contrast    Result Date: 3/14/2024  Impression: No acute findings in the abdomen or pelvis. Similar mild splenomegaly. Electronically Signed: Anand Lisa MD  3/14/2024 6:53 PM EDT  Workstation ID: [id number]    CT Angiogram Chest Pulmonary Embolism    Result Date: 3/14/2024  Impression: 1. No evidence of pulmonary embolism. 2. Mosaic attenuation throughout both lungs which could relate to small airway disease such as asthma or COPD or less likely small vessel disease. Electronically Signed: Dru Rosas  3/14/2024 6:52 PM EDT  Workstation ID: [id number]    XR Chest 2 View    Result Date: 3/14/2024  Impression: No acute chest finding. Electronically Signed: Ting Mendoza MD  3/14/2024 2:46 PM EDT  Workstation ID: [id number]       Estimated Creatinine Clearance: 118.5 mL/min (by C-G formula based on SCr of 0.77 mg/dL).    Assessment & Plan   Assessment/Plan       Active Hospital Problems    Diagnosis  POA    **Chest pain [R07.9]  Yes      Resolved Hospital Problems   No resolved problems to display.       Chest pain, cough and abdominal pain  Lab Results   Component Value Date    TROPONINT 6 03/15/2024    TROPONINT <6 03/14/2024    TROPONINT <6 03/14/2024   -proBNP within normal range  -CMP unremarkable except hyperglycemia  -CBC generally unremarkable  -TSH within normal range  -Lipid panel showed total cholesterol 170, HDL 30, , triglycerides 136  -D-dimer elevated but CT chest negative for PE protocol.  Ron like attenuation through both lungs suggesting asthma or COPD.  -Duplex venous ultrasound normal  -CT abdomen pelvis showed no acute findings in the abdomen and pelvis  -DuoNebs and Mucinex ordered  -Solu-Medrol 60 mg ordered x 1 as patient  reported cough and chest pain with deep breath  -Cardiology and gastroenterology were consulted in the ED  -PPI and Bentyl added by GI  -EKG:Showed sinus rhythm with heart rate of 99, VT interval 168 and   -In the ED pt given aspirin and iopamidol  -Telemetry  -NPO at midnight pending cardiology eval  -Continue Lipitor, Imdur and aspirin    Depression/anxiety  -Continue Abilify, BuSpar, Zoloft and Ambien  -Inspect complete    History of migraines  -Continue Topamax    Chronic pain  -Continue gabapentin    Obesity  -BMI 51.33  -Lifestyle modifications        VTE Prophylaxis -   Mechanical Order History:        Ordered        03/14/24 2250  Place Sequential Compression Device  Once            03/14/24 2250  Maintain Sequential Compression Device  Continuous                          Pharmalogical Order History:        Ordered     Dose Route Frequency Stop    03/14/24 2250  Pharmacy to Dose enoxaparin (LOVENOX)        Question:  Indication of use  Answer:  Prophylaxis    -- XX Continuous PRN --    03/14/24 2301  Enoxaparin Sodium (LOVENOX) syringe 60 mg         60 mg SC Every 12 Hours --                    CODE STATUS:    Code Status and Medical Interventions:   Ordered at: 03/14/24 2129     Level Of Support Discussed With:    Patient     Code Status (Patient has no pulse and is not breathing):    CPR (Attempt to Resuscitate)     Medical Interventions (Patient has pulse or is breathing):    Full Support       This patient has been examined wearing personal protective equipment.     I discussed the patient's findings and my recommendations with patient and nursing staff.      Signature:Electronically signed by SCOOBY Werner, 03/15/24, 1:19 PM EDT.

## 2024-03-15 NOTE — PLAN OF CARE
Problem: Adult Inpatient Plan of Care  Goal: Absence of Hospital-Acquired Illness or Injury  Intervention: Prevent Skin Injury  Description: Perform a screening for skin injury risk, such as pressure or moisture associated skin damage on admission and at regular intervals throughout hospital stay.  Keep all areas of skin (especially folds) clean and dry.  Maintain adequate skin hydration.  Relieve and redistribute pressure and protect bony prominences; implement measures based on patient-specific risk factors.  Match turning and repositioning schedule to clinical condition.  Encourage weight shift frequently; assist with reposition if unable to complete independently.  Float heels off bed; avoid pressure on the Achilles tendon.  Keep skin free from extended contact with medical devices.  Encourage functional activity and mobility, as early as tolerated.  Use aids (e.g., slide boards, mechanical lift) during transfer.  Recent Flowsheet Documentation  Taken 3/14/2024 2048 by Gertrudis Graves, RN  Body Position: position changed independently  Intervention: Prevent and Manage VTE (Venous Thromboembolism) Risk  Description: Assess for VTE (venous thromboembolism) risk.  Encourage and assist with early ambulation.  Initiate and maintain compression or other therapy, as indicated, based on identified risk in accordance with organizational protocol and provider order.  Encourage both active and passive leg exercises while in bed, if unable to ambulate.  Recent Flowsheet Documentation  Taken 3/14/2024 2251 by Gertrudis Graevs, RN  VTE Prevention/Management: sequential compression devices off  Taken 3/14/2024 2048 by Gertrudis Graves, RN  Activity Management:   ambulated in room   ambulated to bathroom   up ad malissa   back to bed  VTE Prevention/Management: sequential compression devices off  Goal: Optimal Comfort and Wellbeing  Intervention: Provide Person-Centered Care  Description: Use a family-focused approach to  care.  Develop trust and rapport by proactively providing information, encouraging questions, addressing concerns and offering reassurance.  Acknowledge emotional response to hospitalization.  Recognize and utilize personal coping strategies.  Honor spiritual and cultural preferences.  Recent Flowsheet Documentation  Taken 3/14/2024 2048 by Gertrudis Graves, RN  Trust Relationship/Rapport:   care explained   choices provided   questions answered   thoughts/feelings acknowledged   reassurance provided   Goal Outcome Evaluation:

## 2024-03-15 NOTE — CONSULTS
GI CONSULT  NOTE:    Referring Provider:       Chief complaint:    Abdominal pain    Subjective   Came in for abnormal outpatient labs    History of present illness:     Patient is a 58-year-old female with a history of anxiety and depression, basal cell carcinoma to the left temple, cholecystectomy who presented to the ER with a complaint of cough and chest pain for 2 weeks.  Supposedly had elevated D-dimer so she was referred to the ER.  Patient underwent CT PE protocol as well as abdomen and pelvis and venous Dopplers all of which were negative.  GI was consulted for abdominal pain.  Patient states she does have intermittent bilateral lower quadrant abdominal pain that is dull but it can occasionally be sharp.  Patient denies pain is changed by bowel movements or eating.  Patient has bowel movement every day without taking any medications for her bowels.  She denies any blood or black in the stool.  Has never had a colonoscopy.  Patient takes an over-the-counter acid reducer and controls reflux symptoms.  Patient states she did have vomiting a couple days ago that was bilious in nature with undigested food.  No coffee-ground emesis or hematemesis.  Patient's main complaint is her cough and chest pain.      Labs: Creatinine 0.77.  TB 0.5, alk phos 194, AST 78, ALT 50.  WBC 7.89, hemoglobin 12.5, platelets 205  CT of the abdomen and pelvis with contrast showed no acute abnormality.    Endo History:  12/30/2022 EGD (Dr. Arana) erosive gastritis.  Stomach biopsy showed moderate chronic gastritis, negative H. pylori.  Empirically dilated to 58 Setswana.  No history of colonoscopies.    Past Medical History:  Past Medical History:   Diagnosis Date    Anxiety     Asthma     Cancer     SKIN    Depression     Psoriasis     Seizures 12/06/2022       Past Surgical History:  Past Surgical History:   Procedure Laterality Date    CHOLECYSTECTOMY  1987    ENDOSCOPY N/A 12/30/2022    Procedure: ESOPHAGOGASTRODUODENOSCOPY with  gastric biopsy and esophageal dilation #50,#54,#56,#58 bougie;  Surgeon: Patience Arana MD;  Location: [facility] ENDOSCOPY;  Service: Gastroenterology;  Laterality: N/A;  gastritis    EYE SURGERY      KNEE SURGERY         Social History:  Social History     Tobacco Use    Smoking status: Never    Smokeless tobacco: Never   Vaping Use    Vaping status: Never Used   Substance Use Topics    Alcohol use: Never    Drug use: Never       Family History:  Family History   Problem Relation Age of Onset    Heart disease Mother     Breast cancer Mother     Heart disease Father     Pancreatic cancer Father     Heart disease Sister     Lung cancer Brother        Medications:  Medications Prior to Admission   Medication Sig Dispense Refill Last Dose    albuterol sulfate  (90 Base) MCG/ACT inhaler Inhale 2 puffs Every 6 (Six) Hours As Needed for Wheezing. 18 g 2 Past Week    ARIPiprazole (ABILIFY) 5 MG tablet Take 1 tablet by mouth Daily.   3/14/2024 at 0800    aspirin 81 MG chewable tablet Chew 2 tablets Daily. 90 tablet 1 3/14/2024    atorvastatin (LIPITOR) 40 MG tablet Take 1 tablet by mouth every night at bedtime. 90 tablet 1 3/14/2024    busPIRone (BUSPAR) 10 MG tablet Take 1 tablet by mouth 3 (Three) Times a Day.   3/14/2024    gabapentin (NEURONTIN) 100 MG capsule Take 1 capsule by mouth 2 (Two) Times a Day. 60 capsule 1 3/14/2024    ibuprofen (ADVIL,MOTRIN) 800 MG tablet Take 1 tablet by mouth Every 8 (Eight) Hours As Needed.   3/14/2024    Incontinence Supplies misc Take As Directed.   3/14/2024    isosorbide mononitrate (IMDUR) 30 MG 24 hr tablet Take 1 tablet by mouth Daily. 90 tablet 1 3/14/2024    prazosin (MINIPRESS) 1 MG capsule Take 1 capsule by mouth Every Night.   3/14/2024    sertraline (ZOLOFT) 100 MG tablet Take 1 tablet by mouth Daily.   3/14/2024    topiramate (Topamax) 50 MG tablet Take 1 tablet by mouth 2 (Two) Times a Day. 60 tablet 2 3/14/2024    zolpidem (AMBIEN) 10 MG tablet Take 1 tablet by mouth  At Night As Needed for Sleep. 30 tablet 0 3/14/2024       Scheduled Meds:ARIPiprazole, 5 mg, Oral, Daily  aspirin, 162 mg, Oral, Daily  atorvastatin, 40 mg, Oral, Daily  busPIRone, 10 mg, Oral, TID  enoxaparin, 60 mg, Subcutaneous, Q12H  gabapentin, 100 mg, Oral, BID  insulin lispro, 2-9 Units, Subcutaneous, 4x Daily AC & at Bedtime  isosorbide mononitrate, 30 mg, Oral, Daily  prazosin, 1 mg, Oral, Nightly  sertraline, 100 mg, Oral, Daily  sodium chloride, 10 mL, Intravenous, Q12H  topiramate, 50 mg, Oral, BID      Continuous Infusions:Pharmacy to Dose enoxaparin (LOVENOX),       PRN Meds:.  acetaminophen    albuterol    senna-docusate sodium **AND** polyethylene glycol **AND** bisacodyl **AND** bisacodyl    dextrose    dextrose    glucagon (human recombinant)    melatonin    ondansetron    Pharmacy to Dose enoxaparin (LOVENOX)    [COMPLETED] Insert Peripheral IV **AND** sodium chloride    sodium chloride    sodium chloride    zolpidem    ALLERGIES:  Patient has no known allergies.    ROS:  Review of Systems   Respiratory:  Positive for cough.    Cardiovascular:  Positive for chest pain.   Gastrointestinal:  Positive for abdominal pain, nausea and vomiting.     The following systems were reviewed and negative;    Constitution:  No fevers, chills, no unintentional weight loss  Skin: no rash, no jaundice  Eyes:  No blurry vision, no eye pain  HENT:  No change in hearing or smell  Resp:  No dyspnea or cough  CV: + chest pain - palpitations  :  No dysuria, hematuria  Musculoskeletal:  No leg cramps or arthralgias  Neuro:  No tremor, no numbness  Psych:  No depression or confusion    Objective resting in the hospital bed.  Morbidly obese.  Room 223.  No family present.    Vital Signs:   Vitals:    03/14/24 1635 03/14/24 2158 03/15/24 0119 03/15/24 0600   BP: 136/67 122/74 119/73 117/65   BP Location: Left arm Left arm Left arm Left arm   Patient Position: Sitting Lying Lying Lying   Pulse: 101 87 77 72   Resp: 20 20 20  "12   Temp: 97.9 °F (36.6 °C) 97.7 °F (36.5 °C) 97.8 °F (36.6 °C) 97.5 °F (36.4 °C)   TempSrc: Oral Oral Oral Oral   SpO2: 98% 97% 98% 96%   Weight: (!) 149 kg (327 lb 13.2 oz) (!) 149 kg (327 lb 13.2 oz)     Height: 170.2 cm (67\") 170.2 cm (67.01\")         Physical Exam:    General Appearance:    Awake and alert, in no acute distress   Head:    Normocephalic, without obvious abnormality, atraumatic   Eyes:            Conjunctivae normal, anicteric sclerae, pupils equal   Ears:    Ears appear intact with no abnormalities noted   Throat:   No oral lesions, no thrush, oral mucosa moist   Neck:   Supple, no JVD   Lungs:       respirations regular, even and unlabored        Chest Wall:    No abnormalities observed   Abdomen:      soft, nontender, no rebound or guarding, nondistended, no hepatosplenomegaly   Rectal:     Deferred   Extremities:   Moves all extremities, no edema, no cyanosis   Pulses:   Pulses palpable and equal bilaterally   Skin:   No rash, no jaundice, normal palpation        Neurologic:   Cranial nerves 2 - 12 grossly intact, no asterixis       Results Review:   I reviewed the patient's labs and imaging.  CBC    Results from last 7 days   Lab Units 03/15/24  0435 03/14/24  1737 03/13/24  1459   WBC 10*3/mm3 7.89 7.73 9.73   HEMOGLOBIN g/dL 12.5 13.4 14.1   PLATELETS 10*3/mm3 205 234 262     CMP   Results from last 7 days   Lab Units 03/15/24  0435 03/14/24  1737 03/13/24  1459   SODIUM mmol/L 138 138 137   POTASSIUM mmol/L 4.1 4.1 4.2   CHLORIDE mmol/L 102 100 100   CO2 mmol/L 26.0 27.0 23.0   BUN mg/dL 9 9 12   CREATININE mg/dL 0.77 0.73 0.91   GLUCOSE mg/dL 155* 147* 148*   ALBUMIN g/dL  --  4.2 4.2   BILIRUBIN mg/dL  --  0.5 0.6   ALK PHOS U/L  --  194* 198*   AST (SGOT) U/L  --  78* 72*   ALT (SGPT) U/L  --  50* 45*   MAGNESIUM mg/dL  --  2.2  --      Cr Clearance Estimated Creatinine Clearance: 118.5 mL/min (by C-G formula based on SCr of 0.77 mg/dL).  Coag     HbA1C   Lab Results   Component Value " "Date    HGBA1C 7.20 (H) 03/13/2024    HGBA1C 6.30 (H) 11/13/2023    HGBA1C 5.3 01/19/2023     Blood Glucose No results found for: \"POCGLU\"  Infection     UA      Radiology(recent) XR Spine Thoracic 3 View    Result Date: 3/14/2024  Impression: 1.Multilevel degenerative change. Electronically Signed: Robin Al MD  3/14/2024 9:34 PM EDT  Workstation ID: [id number]    CT Abdomen Pelvis With Contrast    Result Date: 3/14/2024  Impression: No acute findings in the abdomen or pelvis. Similar mild splenomegaly. Electronically Signed: Anand Lisa MD  3/14/2024 6:53 PM EDT  Workstation ID: [id number]    CT Angiogram Chest Pulmonary Embolism    Result Date: 3/14/2024  Impression: 1. No evidence of pulmonary embolism. 2. Mosaic attenuation throughout both lungs which could relate to small airway disease such as asthma or COPD or less likely small vessel disease. Electronically Signed: Dru Rosas  3/14/2024 6:52 PM EDT  Workstation ID: [id number]    XR Chest 2 View    Result Date: 3/14/2024  Impression: No acute chest finding. Electronically Signed: Ting Mendoza MD  3/14/2024 2:46 PM EDT  Workstation ID: [id number]       ASSESSMENT :  Patient is a 58-year-old female with hospital for cough and chest pain for the previous 2 weeks.  Patient was noted to have an elevated D-dimer as an outpatient.  Patient had negative CT protocol as well as CT abdomen pelvis.  GI was consulted for abdominal pain.      Bilateral lower quadrant abdominal pain intermittent in nature  GERD controlled by over-the-counter acid reducer  Fatty liver disease  Elevated LFTs related to above  Chest pain ? Pleuritic in nature  Elevated D-dimer negative CT PE protocol  Anxiety and depression  History of cholecystectomy  Morbid obesity    PLAN:  Will give Bentyl for intermittent bilateral lower quadrant abdominal pain.  Start PPI for history of reflux.  Patient is having cardiac workup for chest pain.  Patient has never had a colonoscopy so would recommend " colonoscopy as an outpatient.    I discussed the patient's findings and my recommendations with the patient.  Teresa Wilkins, APRN  03/15/24  07:34 EDT    Time:

## 2024-03-15 NOTE — CASE MANAGEMENT/SOCIAL WORK
Discharge Planning Assessment   [facility]     Patient Name: Eladia Connor  MRN: [id number]  Today's Date: 3/15/2024    Admit Date: 3/14/2024    Plan: Return home alone with caregivers 40 hours per week   Discharge Needs Assessment       Row Name 03/15/24 1330       Living Environment    People in Home alone    Current Living Arrangements apartment    Potentially Unsafe Housing Conditions none    In the past 12 months has the electric, gas, oil, or water company threatened to shut off services in your home? No    Primary Care Provided by self    Provides Primary Care For no one, unable/limited ability to care for self    Family Caregiver if Needed other (see comments)  hired caregiver    Quality of Family Relationships helpful;supportive    Able to Return to Prior Arrangements yes       Resource/Environmental Concerns    Transportation Concerns none       Transportation Needs    In the past 12 months, has lack of transportation kept you from medical appointments or from getting medications? no    In the past 12 months, has lack of transportation kept you from meetings, work, or from getting things needed for daily living? No       Food Insecurity    Within the past 12 months, you worried that your food would run out before you got the money to buy more. Never true    Within the past 12 months, the food you bought just didn't last and you didn't have money to get more. Never true       Transition Planning    Patient/Family Anticipates Transition to home    Patient/Family Anticipated Services at Transition none    Transportation Anticipated health plan transportation;other (see comments)  caregiver or Medicaid       Discharge Needs Assessment    Readmission Within the Last 30 Days no previous admission in last 30 days    Equipment Currently Used at Home shower chair;cane, straight;walker, rolling;cpap;oxygen    Concerns to be Addressed discharge planning    Anticipated Changes Related to Illness none    Equipment  Needed After Discharge none                   Discharge Plan       Row Name 03/15/24 1332       Plan    Plan Return home alone with caregivers 40 hours per week    Patient/Family in Agreement with Plan yes    Plan Comments CM met with Ms. Connor who stated she lives alone in an apartment and has a caregiver 40 hours per week through [employer] and Medicaid.  Her  or Medicaid transport provide needed transportation. At home she has a CPAP with oxygen, shower chair, rolling walker and cane. She has portable oxygen tanks if needed at home.  She denies  history of SNF or home health.  She denied difficulty obtaining her meds or food or  utilities.                  Demographic Summary       Row Name 03/15/24 1328       General Information    Admission Type observation    Arrived From emergency department    Required Notices Provided Observation Status Notice    Referral Source admission list    Reason for Consult discharge planning    Preferred Language English       Contact Information    Permission Granted to Share Info With                    Functional Status       Row Name 03/15/24 1328       Functional Status    Usual Activity Tolerance good    Current Activity Tolerance moderate       Functional Status, IADL    Medications assistive person    Meal Preparation assistive person    Housekeeping assistive person    Laundry assistive person    Shopping assistive person    IADL Comments caregiver 40 hours / week through [employer]       Employment/    Employment Status disabled                          Patient Forms       Row Name 03/15/24 1140       Patient Forms    Important Message from Medicare (IMM) Delivered  CASTANO signed by patient with CM 3/15    Patient Observation Letter Delivered    Delivered to Patient    Method of delivery In person                      Yamel CURTIS,RN Case Manager  [facility]  Phone: [employer]- [phone] cell- [phone]

## 2024-03-16 ENCOUNTER — READMISSION MANAGEMENT (OUTPATIENT)
Dept: CALL CENTER | Facility: HOSPITAL | Age: 59
End: 2024-03-16
Payer: MEDICARE

## 2024-03-16 VITALS
SYSTOLIC BLOOD PRESSURE: 107 MMHG | DIASTOLIC BLOOD PRESSURE: 67 MMHG | WEIGHT: 293 LBS | HEART RATE: 74 BPM | TEMPERATURE: 97.5 F | OXYGEN SATURATION: 95 % | HEIGHT: 67 IN | BODY MASS INDEX: 45.99 KG/M2 | RESPIRATION RATE: 16 BRPM

## 2024-03-16 LAB
ANION GAP SERPL CALCULATED.3IONS-SCNC: 11 MMOL/L (ref 5–15)
BASOPHILS # BLD AUTO: 0.01 10*3/MM3 (ref 0–0.2)
BASOPHILS NFR BLD AUTO: 0.1 % (ref 0–1.5)
BUN SERPL-MCNC: 14 MG/DL (ref 8–23)
BUN/CREAT SERPL: 18.2 (ref 7–25)
CALCIUM SPEC-SCNC: 9.3 MG/DL (ref 8.6–10.5)
CHLORIDE SERPL-SCNC: 102 MMOL/L (ref 98–107)
CO2 SERPL-SCNC: 23 MMOL/L (ref 22–29)
CREAT SERPL-MCNC: 0.77 MG/DL (ref 0.57–1)
DEPRECATED RDW RBC AUTO: 40.9 FL (ref 37–54)
EGFRCR SERPLBLD CKD-EPI 2021: 88.4 ML/MIN/1.73
EOSINOPHIL # BLD AUTO: 0 10*3/MM3 (ref 0–0.4)
EOSINOPHIL NFR BLD AUTO: 0 % (ref 0.3–6.2)
ERYTHROCYTE [DISTWIDTH] IN BLOOD BY AUTOMATED COUNT: 13.1 % (ref 12.3–15.4)
GLUCOSE BLDC GLUCOMTR-MCNC: 156 MG/DL (ref 70–105)
GLUCOSE BLDC GLUCOMTR-MCNC: 179 MG/DL (ref 70–105)
GLUCOSE SERPL-MCNC: 194 MG/DL (ref 65–99)
HCT VFR BLD AUTO: 39.2 % (ref 34–46.6)
HGB BLD-MCNC: 12.7 G/DL (ref 12–15.9)
IMM GRANULOCYTES # BLD AUTO: 0.11 10*3/MM3 (ref 0–0.05)
IMM GRANULOCYTES NFR BLD AUTO: 1 % (ref 0–0.5)
LYMPHOCYTES # BLD AUTO: 0.83 10*3/MM3 (ref 0.7–3.1)
LYMPHOCYTES NFR BLD AUTO: 7.7 % (ref 19.6–45.3)
MCH RBC QN AUTO: 27.7 PG (ref 26.6–33)
MCHC RBC AUTO-ENTMCNC: 32.4 G/DL (ref 31.5–35.7)
MCV RBC AUTO: 85.6 FL (ref 79–97)
MONOCYTES # BLD AUTO: 0.35 10*3/MM3 (ref 0.1–0.9)
MONOCYTES NFR BLD AUTO: 3.2 % (ref 5–12)
NEUTROPHILS NFR BLD AUTO: 88 % (ref 42.7–76)
NEUTROPHILS NFR BLD AUTO: 9.48 10*3/MM3 (ref 1.7–7)
NRBC BLD AUTO-RTO: 0 /100 WBC (ref 0–0.2)
PLATELET # BLD AUTO: 230 10*3/MM3 (ref 140–450)
PMV BLD AUTO: 9.1 FL (ref 6–12)
POTASSIUM SERPL-SCNC: 4.5 MMOL/L (ref 3.5–5.2)
RBC # BLD AUTO: 4.58 10*6/MM3 (ref 3.77–5.28)
SODIUM SERPL-SCNC: 136 MMOL/L (ref 136–145)
WBC NRBC COR # BLD AUTO: 10.78 10*3/MM3 (ref 3.4–10.8)

## 2024-03-16 PROCEDURE — G0378 HOSPITAL OBSERVATION PER HR: HCPCS

## 2024-03-16 PROCEDURE — 63710000001 INSULIN LISPRO (HUMAN) PER 5 UNITS: Performed by: NURSE PRACTITIONER

## 2024-03-16 PROCEDURE — 85025 COMPLETE CBC W/AUTO DIFF WBC: CPT | Performed by: NURSE PRACTITIONER

## 2024-03-16 PROCEDURE — 80048 BASIC METABOLIC PNL TOTAL CA: CPT | Performed by: NURSE PRACTITIONER

## 2024-03-16 PROCEDURE — 82948 REAGENT STRIP/BLOOD GLUCOSE: CPT | Performed by: NURSE PRACTITIONER

## 2024-03-16 PROCEDURE — 99214 OFFICE O/P EST MOD 30 MIN: CPT | Performed by: INTERNAL MEDICINE

## 2024-03-16 RX ORDER — BLOOD-GLUCOSE METER
1 EACH MISCELLANEOUS DAILY PRN
Qty: 1 KIT | Refills: 0 | Status: SHIPPED | OUTPATIENT
Start: 2024-03-16

## 2024-03-16 RX ORDER — LANCETS
EACH MISCELLANEOUS
Qty: 102 EACH | Refills: 0 | Status: SHIPPED | OUTPATIENT
Start: 2024-03-16

## 2024-03-16 RX ORDER — PREDNISONE 20 MG/1
20 TABLET ORAL DAILY
Qty: 5 TABLET | Refills: 0 | Status: SHIPPED | OUTPATIENT
Start: 2024-03-16 | End: 2024-03-21

## 2024-03-16 RX ORDER — DICYCLOMINE HYDROCHLORIDE 10 MG/1
20 CAPSULE ORAL 4 TIMES DAILY
Qty: 112 CAPSULE | Refills: 0 | Status: SHIPPED | OUTPATIENT
Start: 2024-03-16 | End: 2024-03-30

## 2024-03-16 RX ORDER — IPRATROPIUM BROMIDE AND ALBUTEROL SULFATE 2.5; .5 MG/3ML; MG/3ML
3 SOLUTION RESPIRATORY (INHALATION) EVERY 4 HOURS PRN
Status: DISCONTINUED | OUTPATIENT
Start: 2024-03-16 | End: 2024-03-16 | Stop reason: HOSPADM

## 2024-03-16 RX ADMIN — DICYCLOMINE HYDROCHLORIDE 20 MG: 10 CAPSULE ORAL at 09:45

## 2024-03-16 RX ADMIN — Medication 10 ML: at 09:47

## 2024-03-16 RX ADMIN — PANTOPRAZOLE SODIUM 40 MG: 40 TABLET, DELAYED RELEASE ORAL at 05:29

## 2024-03-16 RX ADMIN — BUSPIRONE HYDROCHLORIDE 10 MG: 5 TABLET ORAL at 09:45

## 2024-03-16 RX ADMIN — TOPIRAMATE 50 MG: 25 TABLET, FILM COATED ORAL at 09:44

## 2024-03-16 RX ADMIN — ASPIRIN 81 MG CHEWABLE TABLET 162 MG: 81 TABLET CHEWABLE at 09:44

## 2024-03-16 RX ADMIN — INSULIN LISPRO 2 UNITS: 100 INJECTION, SOLUTION INTRAVENOUS; SUBCUTANEOUS at 09:43

## 2024-03-16 RX ADMIN — ISOSORBIDE MONONITRATE 30 MG: 30 TABLET, EXTENDED RELEASE ORAL at 09:45

## 2024-03-16 RX ADMIN — SERTRALINE 100 MG: 100 TABLET, FILM COATED ORAL at 09:45

## 2024-03-16 RX ADMIN — GUAIFENESIN 1200 MG: 600 TABLET, EXTENDED RELEASE ORAL at 09:45

## 2024-03-16 RX ADMIN — ARIPIPRAZOLE 5 MG: 5 TABLET ORAL at 09:45

## 2024-03-16 RX ADMIN — ATORVASTATIN CALCIUM 40 MG: 40 TABLET, FILM COATED ORAL at 09:45

## 2024-03-16 RX ADMIN — DICYCLOMINE HYDROCHLORIDE 20 MG: 10 CAPSULE ORAL at 12:24

## 2024-03-16 RX ADMIN — INSULIN LISPRO 2 UNITS: 100 INJECTION, SOLUTION INTRAVENOUS; SUBCUTANEOUS at 12:23

## 2024-03-16 RX ADMIN — GABAPENTIN 100 MG: 100 CAPSULE ORAL at 09:44

## 2024-03-16 NOTE — PLAN OF CARE
Goal Outcome Evaluation:      Pt alert x 4 no complaints of pain through the night will continue to monitor.

## 2024-03-16 NOTE — PROGRESS NOTES
CARDIOLOGY PROGRESS NOTE:    Eladia Connor  58 y.o.  female  1965  [id number]      Referring Provider: Hospitalist    Reason for follow-up: Chest pain     Patient Care Team:  Quincy Lemos APRN as PCP - General (Family Medicine)    Subjective .  Patient is doing well without any symptoms    Objective lying in bed comfortably     Review of Systems   Constitutional: Negative for malaise/fatigue.   Cardiovascular:  Negative for chest pain, dyspnea on exertion, leg swelling and palpitations.   Respiratory:  Negative for cough and shortness of breath.    Gastrointestinal:  Negative for abdominal pain, nausea and vomiting.   Neurological:  Negative for dizziness, focal weakness, headaches, light-headedness and numbness.   All other systems reviewed and are negative.      Allergies: Patient has no known allergies.    Scheduled Meds:ARIPiprazole, 5 mg, Oral, Daily  aspirin, 162 mg, Oral, Daily  atorvastatin, 40 mg, Oral, Daily  busPIRone, 10 mg, Oral, TID  dicyclomine, 20 mg, Oral, 4x Daily  enoxaparin, 60 mg, Subcutaneous, Q12H  gabapentin, 100 mg, Oral, BID  guaiFENesin, 1,200 mg, Oral, Q12H  insulin lispro, 2-9 Units, Subcutaneous, 4x Daily AC & at Bedtime  isosorbide mononitrate, 30 mg, Oral, Daily  pantoprazole, 40 mg, Oral, Q AM  prazosin, 1 mg, Oral, Nightly  sertraline, 100 mg, Oral, Daily  sodium chloride, 10 mL, Intravenous, Q12H  topiramate, 50 mg, Oral, BID      Continuous Infusions:Pharmacy to Dose enoxaparin (LOVENOX),       PRN Meds:.  acetaminophen    albuterol    senna-docusate sodium **AND** polyethylene glycol **AND** bisacodyl **AND** bisacodyl    dextrose    dextrose    glucagon (human recombinant)    ipratropium-albuterol    melatonin    ondansetron    Pharmacy to Dose enoxaparin (LOVENOX)    [COMPLETED] Insert Peripheral IV **AND** sodium chloride    sodium chloride    sodium chloride    zolpidem        VITAL SIGNS  Vitals:    03/16/24 0259 03/16/24 0620 03/16/24 0933 03/16/24  "1006   BP: 106/58 94/51 107/67    BP Location: Left arm Left arm     Patient Position: Lying Lying     Pulse: 83 70  74   Resp: 18 14  16   Temp: 97.4 °F (36.3 °C) 97.5 °F (36.4 °C)     TempSrc: Oral Oral     SpO2: 95% 94%  95%   Weight:       Height:           Flowsheet Rows      Flowsheet Row First Filed Value   Admission Height 170.2 cm (67\") Documented at 03/14/2024 1635   Admission Weight 149 kg (327 lb 13.2 oz) Documented at 03/14/2024 1635             TELEMETRY: Sinus rhythm    Physical Exam:  Constitutional:       Appearance: Well-developed.   Eyes:      General: No scleral icterus.     Conjunctiva/sclera: Conjunctivae normal.      Pupils: Pupils are equal, round, and reactive to light.   HENT:      Head: Normocephalic and atraumatic.   Neck:      Vascular: No carotid bruit or JVD.   Pulmonary:      Effort: Pulmonary effort is normal.      Breath sounds: Normal breath sounds. No wheezing. No rales.   Cardiovascular:      Normal rate. Regular rhythm.   Pulses:     Intact distal pulses.   Abdominal:      General: Bowel sounds are normal.      Palpations: Abdomen is soft.   Musculoskeletal: Normal range of motion.      Cervical back: Normal range of motion and neck supple. Skin:     General: Skin is warm and dry.      Findings: No rash.   Neurological:      Mental Status: Alert.      Comments: No focal deficits          Results Review:   I reviewed the patient's new clinical results.  Lab Results (last 24 hours)       Procedure Component Value Units Date/Time    POC Glucose 4x Daily Before Meals & at Bedtime [[id number]]  (Abnormal) Collected: 03/16/24 1112    Specimen: Blood Updated: 03/16/24 1114     Glucose 179 mg/dL      Comment: Serial Number: [id number]:  [id number]       POC Glucose 4x Daily Before Meals & at Bedtime [[id number]]  (Abnormal) Collected: 03/16/24 0713    Specimen: Blood Updated: 03/16/24 0715     Glucose 156 mg/dL      Comment: Serial Number: [id number]:  [id number]       Basic " Metabolic Panel [[id number]]  (Abnormal) Collected: 03/16/24 0449    Specimen: Blood from Hand, Left Updated: 03/16/24 0548     Glucose 194 mg/dL      BUN 14 mg/dL      Creatinine 0.77 mg/dL      Sodium 136 mmol/L      Potassium 4.5 mmol/L      Chloride 102 mmol/L      CO2 23.0 mmol/L      Calcium 9.3 mg/dL      BUN/Creatinine Ratio 18.2     Anion Gap 11.0 mmol/L      eGFR 88.4 mL/min/1.73     Narrative:      GFR Normal >60  Chronic Kidney Disease <60  Kidney Failure <15      CBC & Differential [[id number]]  (Abnormal) Collected: 03/16/24 0449    Specimen: Blood from Hand, Left Updated: 03/16/24 0524    Narrative:      The following orders were created for panel order CBC & Differential.  Procedure                               Abnormality         Status                     ---------                               -----------         ------                     CBC Auto Differential[[id number]]        Abnormal            Final result                 Please view results for these tests on the individual orders.    CBC Auto Differential [[id number]]  (Abnormal) Collected: 03/16/24 0449    Specimen: Blood from Hand, Left Updated: 03/16/24 0524     WBC 10.78 10*3/mm3      RBC 4.58 10*6/mm3      Hemoglobin 12.7 g/dL      Hematocrit 39.2 %      MCV 85.6 fL      MCH 27.7 pg      MCHC 32.4 g/dL      RDW 13.1 %      RDW-SD 40.9 fl      MPV 9.1 fL      Platelets 230 10*3/mm3      Neutrophil % 88.0 %      Lymphocyte % 7.7 %      Monocyte % 3.2 %      Eosinophil % 0.0 %      Basophil % 0.1 %      Immature Grans % 1.0 %      Neutrophils, Absolute 9.48 10*3/mm3      Lymphocytes, Absolute 0.83 10*3/mm3      Monocytes, Absolute 0.35 10*3/mm3      Eosinophils, Absolute 0.00 10*3/mm3      Basophils, Absolute 0.01 10*3/mm3      Immature Grans, Absolute 0.11 10*3/mm3      nRBC 0.0 /100 WBC     POC Glucose Once [[id number]]  (Abnormal) Collected: 03/15/24 2124    Specimen: Blood Updated: 03/15/24 2125     Glucose 285 mg/dL      Comment: Serial  Number: [id number]:  [id number]               Imaging Results (Last 24 Hours)       ** No results found for the last 24 hours. **            EKG      I personally viewed and interpreted the patient's EKG/Telemetry data:    ECHOCARDIOGRAM:  Results for orders placed during the hospital encounter of 12/06/22    Adult Transthoracic Echo Complete W/ Cont if Necessary Per Protocol    Interpretation Summary    Left ventricular systolic function is normal. Left ventricular ejection fraction appears to be 61 - 65%.    Left ventricular diastolic function was normal.    Saline test results are negative.  Study was suboptimal.       STRESS MYOVIEW:  Results for orders placed during the hospital encounter of 02/02/23    Stress Test With Myocardial Perfusion (1 Day)    Interpretation Summary    Myocardial perfusion imaging indicates a normal myocardial perfusion study with no evidence of ischemia.    Left ventricular ejection fraction is normal (Calculated EF = 65%).    Impressions are consistent with a low risk study.       CARDIAC CATHETERIZATION:  No results found for this or any previous visit.       OTHER:         Assessment & Plan     Chest pain  Patient presented with chest pain which is atypical for angina  Patient had an echo and a stress test last year which did not show any ischemia  Patient is ruled out for MI by EKG and enzymes  Patient had elevated D-dimer and she had a CT scan which showed no pulmonary embolism  Patient is currently on DuoNebs Mucinex and Solu-Medrol  Patient also has a gastroenterology consultation.  No further workup necessary.  No further cardiac workup at this time     Hypertension  Patient blood pressure currently stable on isosorbide     COPD  Patient has history of COPD and is on medical therapy for the same  Patient is currently stable on home medicines and followed by the primary care doctor    I discussed the patients findings and my recommendations with patient and nurse    Zachary  MD Faizan  03/16/24  16:03 EDT

## 2024-03-16 NOTE — PLAN OF CARE
Goal Outcome Evaluation:  Plan of Care Reviewed With: patient        Progress: improving  Outcome Evaluation: Patient is currently chest pain free. Patient will likely discharge home today and follow up outpatient.

## 2024-03-16 NOTE — DISCHARGE SUMMARY
"[facility]    Gary Lemosjose DUFF, SCOOBY    CHIEF COMPLAINT:     Chest Pain, abdominal pain and cough    HISTORY OF PRESENT ILLNESS:    Chest Pain         ED 03/14/2024  58-year-old female with past medical history significant for [place]'s basal cell carcinoma to the left temple that has not had any intervention yet was seen by her primary care doctor yesterday for a cough and chest pain for the last 2 weeks and reportedly had a elevated D-dimer.  chart review shows that was 1.3 and troponin was negative.  Noted to have elevated liver enzymes.  She denies any swelling to her legs or feet.  She denies any congestion or fever but does note a intermittent nonproductive cough.  Not currently on any OCPs and denies any history of VTE.     Patient notably had an outpatient order for stat CT but patient states she prefer to be seen in the ER due to her elevated D-dimer to evaluate for blood clot, I discussed that is what her outpatient stat order was for but she preferred to be seen in the ER.     Due to significant overcrowding in the emergency department patient was initially seen and evaluated in triage.  Provider in triage recommended patient placement in the treatment area to initiate therapy and movement to an ER bed as soon as possible.   Orders placed; medications will be deferred to main provider per protocol.         History of Present Illness  Chief complaint: I reviewed the provider in triage note.  Patient is a 58-year-old female who presents with chest pain.  She states that she hurts in her right chest as well as her left.  It feels like it is \"in my lungs\".  She has had palpitations associated with it.  She states these pains come and go over the last 7 days.  She feels charley horses in her legs and her abdomen is bothering her as well.  She saw her primary physician yesterday and had a troponin done that was negative as well as a D-dimer that was positive.  Outpatient CTs were " "ordered but she wanted to come here for further evaluation.  She feels \"charley horses in my legs\".  She states that she was diagnosed with diabetes yesterday as well.  She did have elevated liver test as well.     Observation 03/15/2024  Patient agrees with HPI noted above including chest pain with a deep breath associated with dry cough for the past week.  She thinks it is pulmonary.  She denies any history of COPD, smoking or known diagnosis of asthma.  Patient also reports some abdominal pain without nausea or vomiting.    Observation 03/16/2024  Patient states she feels better today and is eager for discharge.  She states she was recently diagnosed with diabetes by her PCP and does not have a glucometer, lancets or strips.  She would like that at discharge.  Due to weekend situation we do not have a diabetes that educator onsite.  Provider discussed with patient briefly consistent carb diet and current recommendations by ADA.  Offered to start patient on low-dose metformin but she wishes to discuss treatment with her PCP.     Review of Systems     Review of Systems   Constitutional: Negative for fever and malaise/fatigue.   Cardiovascular:  Positive for chest pain.   Respiratory:  Positive for cough and shortness of breath. Negative for wheezing.    Gastrointestinal:  Negative for nausea and vomiting.   All other systems reviewed and are negative.    Past Medical History:   Diagnosis Date    Anxiety     Asthma     Cancer     SKIN    Depression     Psoriasis     Seizures 12/06/2022     Past Surgical History:   Procedure Laterality Date    CHOLECYSTECTOMY  1987    ENDOSCOPY N/A 12/30/2022    Procedure: ESOPHAGOGASTRODUODENOSCOPY with gastric biopsy and esophageal dilation #50,#54,#56,#58 bougie;  Surgeon: Patience Arana MD;  Location: [facility] ENDOSCOPY;  Service: Gastroenterology;  Laterality: N/A;  gastritis    EYE SURGERY      KNEE SURGERY       Family History   Problem Relation Age of Onset    Heart disease " Mother     Breast cancer Mother     Heart disease Father     Pancreatic cancer Father     Heart disease Sister     Lung cancer Brother      Social History     Tobacco Use    Smoking status: Never    Smokeless tobacco: Never   Vaping Use    Vaping status: Never Used   Substance Use Topics    Alcohol use: Never    Drug use: Never     Medications Prior to Admission   Medication Sig Dispense Refill Last Dose    albuterol sulfate  (90 Base) MCG/ACT inhaler Inhale 2 puffs Every 6 (Six) Hours As Needed for Wheezing. 18 g 2 Past Week    ARIPiprazole (ABILIFY) 5 MG tablet Take 1 tablet by mouth Daily.   3/14/2024 at 0800    aspirin 81 MG chewable tablet Chew 2 tablets Daily. 90 tablet 1 3/14/2024    atorvastatin (LIPITOR) 40 MG tablet Take 1 tablet by mouth every night at bedtime. 90 tablet 1 3/14/2024    busPIRone (BUSPAR) 10 MG tablet Take 1 tablet by mouth 3 (Three) Times a Day.   3/14/2024    gabapentin (NEURONTIN) 100 MG capsule Take 1 capsule by mouth 2 (Two) Times a Day. 60 capsule 1 3/14/2024    ibuprofen (ADVIL,MOTRIN) 800 MG tablet Take 1 tablet by mouth Every 8 (Eight) Hours As Needed.   3/14/2024    Incontinence Supplies misc Take As Directed.   3/14/2024    isosorbide mononitrate (IMDUR) 30 MG 24 hr tablet Take 1 tablet by mouth Daily. 90 tablet 1 3/14/2024    prazosin (MINIPRESS) 1 MG capsule Take 1 capsule by mouth Every Night.   3/14/2024    sertraline (ZOLOFT) 100 MG tablet Take 1 tablet by mouth Daily.   3/14/2024    topiramate (Topamax) 50 MG tablet Take 1 tablet by mouth 2 (Two) Times a Day. 60 tablet 2 3/14/2024    zolpidem (AMBIEN) 10 MG tablet Take 1 tablet by mouth At Night As Needed for Sleep. 30 tablet 0 3/14/2024     Allergies:  Patient has no known allergies.    Immunization History   Administered Date(s) Administered    Hepatitis B Adult/Adolescent IM 11/24/2015    Tdap 10/26/2015           REVIEW OF SYSTEMS:    Review of Systems   Cardiovascular:  Positive for chest pain.       Review of  Systems   Constitutional: Negative for fever and malaise/fatigue.   Cardiovascular:  Positive for chest pain.   Respiratory:  Positive for cough and shortness of breath. Negative for wheezing.    Gastrointestinal:  Negative for nausea and vomiting.   All other systems reviewed and are negative.       Vital Signs  Temp:  [97.3 °F (36.3 °C)-97.8 °F (36.6 °C)] 97.5 °F (36.4 °C)  Heart Rate:  [70-83] 70  Resp:  [13-18] 14  BP: ()/(51-72) 94/51          Physical Exam:  Physical Exam  Vitals and nursing note reviewed.   Constitutional:       Appearance: Normal appearance. She is obese.   HENT:      Head: Normocephalic and atraumatic.      Right Ear: External ear normal.      Left Ear: External ear normal.      Nose: Nose normal.      Mouth/Throat:      Mouth: Mucous membranes are moist.      Pharynx: Oropharynx is clear.   Eyes:      Extraocular Movements: Extraocular movements intact.   Cardiovascular:      Rate and Rhythm: Normal rate and regular rhythm.      Pulses: Normal pulses.      Heart sounds: Normal heart sounds.   Pulmonary:      Effort: Pulmonary effort is normal.      Breath sounds: Normal breath sounds.   Abdominal:      General: Abdomen is flat. Bowel sounds are normal.      Palpations: Abdomen is soft.   Musculoskeletal:         General: Normal range of motion.      Cervical back: Normal range of motion.   Skin:     General: Skin is warm.   Neurological:      General: No focal deficit present.      Mental Status: She is alert and oriented to person, place, and time.   Psychiatric:         Mood and Affect: Mood normal.         Behavior: Behavior normal.         Emotional Behavior:    Normal    Debilities:   None  Results Review:    I reviewed the patient's new clinical results.  Lab Results (most recent)       Procedure Component Value Units Date/Time    POC Glucose 4x Daily Before Meals & at Bedtime [[id number]]  (Abnormal) Collected: 03/16/24 0713    Specimen: Blood Updated: 03/16/24 0715     Glucose  156 mg/dL      Comment: Serial Number: [id number]:  [id number]       Basic Metabolic Panel [[id number]]  (Abnormal) Collected: 03/16/24 0449    Specimen: Blood from Hand, Left Updated: 03/16/24 0548     Glucose 194 mg/dL      BUN 14 mg/dL      Creatinine 0.77 mg/dL      Sodium 136 mmol/L      Potassium 4.5 mmol/L      Chloride 102 mmol/L      CO2 23.0 mmol/L      Calcium 9.3 mg/dL      BUN/Creatinine Ratio 18.2     Anion Gap 11.0 mmol/L      eGFR 88.4 mL/min/1.73     Narrative:      GFR Normal >60  Chronic Kidney Disease <60  Kidney Failure <15      CBC & Differential [[id number]]  (Abnormal) Collected: 03/16/24 0449    Specimen: Blood from Hand, Left Updated: 03/16/24 0524    Narrative:      The following orders were created for panel order CBC & Differential.  Procedure                               Abnormality         Status                     ---------                               -----------         ------                     CBC Auto Differential[[id number]]        Abnormal            Final result                 Please view results for these tests on the individual orders.    CBC Auto Differential [[id number]]  (Abnormal) Collected: 03/16/24 0449    Specimen: Blood from Hand, Left Updated: 03/16/24 0524     WBC 10.78 10*3/mm3      RBC 4.58 10*6/mm3      Hemoglobin 12.7 g/dL      Hematocrit 39.2 %      MCV 85.6 fL      MCH 27.7 pg      MCHC 32.4 g/dL      RDW 13.1 %      RDW-SD 40.9 fl      MPV 9.1 fL      Platelets 230 10*3/mm3      Neutrophil % 88.0 %      Lymphocyte % 7.7 %      Monocyte % 3.2 %      Eosinophil % 0.0 %      Basophil % 0.1 %      Immature Grans % 1.0 %      Neutrophils, Absolute 9.48 10*3/mm3      Lymphocytes, Absolute 0.83 10*3/mm3      Monocytes, Absolute 0.35 10*3/mm3      Eosinophils, Absolute 0.00 10*3/mm3      Basophils, Absolute 0.01 10*3/mm3      Immature Grans, Absolute 0.11 10*3/mm3      nRBC 0.0 /100 WBC     POC Glucose Once [[id number]]  (Abnormal) Collected: 03/15/24 2124     Specimen: Blood Updated: 03/15/24 2125     Glucose 285 mg/dL      Comment: Serial Number: [id number]:  [id number]       Lipid Panel [[id number]]  (Abnormal) Collected: 03/15/24 0435    Specimen: Blood Updated: 03/15/24 1057     Total Cholesterol 170 mg/dL      Triglycerides 136 mg/dL      HDL Cholesterol 30 mg/dL      LDL Cholesterol  115 mg/dL      VLDL Cholesterol 25 mg/dL      LDL/HDL Ratio 3.76    Narrative:      Cholesterol Reference Ranges  (U.S. Department of Health and Human Services ATP III Classifications)    Desirable          <200 mg/dL  Borderline High    200-239 mg/dL  High Risk          >240 mg/dL      Triglyceride Reference Ranges  (U.S. Department of Health and Human Services ATP III Classifications)    Normal           <150 mg/dL  Borderline High  150-199 mg/dL  High             200-499 mg/dL  Very High        >500 mg/dL    HDL Reference Ranges  (U.S. Department of Health and Human Services ATP III Classifications)    Low     <40 mg/dl (major risk factor for CHD)  High    >60 mg/dl ('negative' risk factor for CHD)        LDL Reference Ranges  (U.S. Department of Health and Human Services ATP III Classifications)    Optimal          <100 mg/dL  Near Optimal     100-129 mg/dL  Borderline High  130-159 mg/dL  High             160-189 mg/dL  Very High        >189 mg/dL    TSH [[id number]]  (Normal) Collected: 03/15/24 0435    Specimen: Blood Updated: 03/15/24 0804     TSH 3.350 uIU/mL     Basic Metabolic Panel [[id number]]  (Abnormal) Collected: 03/15/24 0435    Specimen: Blood Updated: 03/15/24 0545     Glucose 155 mg/dL      BUN 9 mg/dL      Creatinine 0.77 mg/dL      Sodium 138 mmol/L      Potassium 4.1 mmol/L      Chloride 102 mmol/L      CO2 26.0 mmol/L      Calcium 8.9 mg/dL      BUN/Creatinine Ratio 11.7     Anion Gap 10.0 mmol/L      eGFR 88.4 mL/min/1.73     Narrative:      GFR Normal >60  Chronic Kidney Disease <60  Kidney Failure <15      High Sensitivity Troponin T [[id number]]  (Normal)  Collected: 03/15/24 0435    Specimen: Blood Updated: 03/15/24 0545     HS Troponin T 6 ng/L     Narrative:      High Sensitive Troponin T Reference Range:  <14.0 ng/L- Negative Female for AMI  <22.0 ng/L- Negative Male for AMI  >=14 - Abnormal Female indicating possible myocardial injury.  >=22 - Abnormal Male indicating possible myocardial injury.   Clinicians would have to utilize clinical acumen, EKG, Troponin, and serial changes to determine if it is an Acute Myocardial Infarction or myocardial injury due to an underlying chronic condition.         CBC Auto Differential [[id number]]  (Abnormal) Collected: 03/15/24 0435    Specimen: Blood Updated: 03/15/24 0512     WBC 7.89 10*3/mm3      RBC 4.55 10*6/mm3      Hemoglobin 12.5 g/dL      Hematocrit 39.8 %      MCV 87.5 fL      MCH 27.5 pg      MCHC 31.4 g/dL      RDW 13.8 %      RDW-SD 43.6 fl      MPV 8.9 fL      Platelets 205 10*3/mm3      Neutrophil % 64.6 %      Lymphocyte % 19.9 %      Monocyte % 8.6 %      Eosinophil % 5.3 %      Basophil % 0.8 %      Immature Grans % 0.8 %      Neutrophils, Absolute 5.10 10*3/mm3      Lymphocytes, Absolute 1.57 10*3/mm3      Monocytes, Absolute 0.68 10*3/mm3      Eosinophils, Absolute 0.42 10*3/mm3      Basophils, Absolute 0.06 10*3/mm3      Immature Grans, Absolute 0.06 10*3/mm3      nRBC 0.0 /100 WBC     High Sensitivity Troponin T 2Hr [[id number]] Collected: 03/14/24 2041    Specimen: Blood Updated: 03/14/24 2111     HS Troponin T <6 ng/L      Troponin T Delta --     Comment: Unable to calculate.       Narrative:      High Sensitive Troponin T Reference Range:  <14.0 ng/L- Negative Female for AMI  <22.0 ng/L- Negative Male for AMI  >=14 - Abnormal Female indicating possible myocardial injury.  >=22 - Abnormal Male indicating possible myocardial injury.   Clinicians would have to utilize clinical acumen, EKG, Troponin, and serial changes to determine if it is an Acute Myocardial Infarction or myocardial injury due to an  underlying chronic condition.         High Sensitivity Troponin T [[id number]]  (Normal) Collected: 03/14/24 1737    Specimen: Blood Updated: 03/14/24 1849     HS Troponin T <6 ng/L     Narrative:      High Sensitive Troponin T Reference Range:  <14.0 ng/L- Negative Female for AMI  <22.0 ng/L- Negative Male for AMI  >=14 - Abnormal Female indicating possible myocardial injury.  >=22 - Abnormal Male indicating possible myocardial injury.   Clinicians would have to utilize clinical acumen, EKG, Troponin, and serial changes to determine if it is an Acute Myocardial Infarction or myocardial injury due to an underlying chronic condition.         Extra Tubes [[id number]] Collected: 03/14/24 1737    Specimen: Blood, Venous Line Updated: 03/14/24 1845    Narrative:      The following orders were created for panel order Extra Tubes.  Procedure                               Abnormality         Status                     ---------                               -----------         ------                     Gold Top - SST[[id number]]                                   Final result                 Please view results for these tests on the individual orders.    Gold Top - SST [[id number]] Collected: 03/14/24 1737    Specimen: Blood Updated: 03/14/24 1845     Extra Tube Hold for add-ons.     Comment: Auto resulted.       BNP [[id number]]  (Normal) Collected: 03/14/24 1737    Specimen: Blood Updated: 03/14/24 1817     proBNP <36.0 pg/mL     Narrative:      This assay is used as an aid in the diagnosis of individuals suspected of having heart failure. It can be used as an aid in the diagnosis of acute decompensated heart failure (ADHF) in patients presenting with signs and symptoms of ADHF to the emergency department (ED). In addition, NT-proBNP of <300 pg/mL indicates ADHF is not likely.    Age Range Result Interpretation  NT-proBNP Concentration (pg/mL:      <50             Positive            >450                   [place]                  300-450                    Negative             <300    50-75           Positive            >900                  Gray                300-900                  Negative            <300      >75             Positive            >1800                  Gray                300-1800                  Negative            <300    Comprehensive Metabolic Panel [[id number]]  (Abnormal) Collected: 03/14/24 1737    Specimen: Blood Updated: 03/14/24 1812     Glucose 147 mg/dL      BUN 9 mg/dL      Creatinine 0.73 mg/dL      Sodium 138 mmol/L      Potassium 4.1 mmol/L      Chloride 100 mmol/L      CO2 27.0 mmol/L      Calcium 9.2 mg/dL      Total Protein 8.2 g/dL      Albumin 4.2 g/dL      ALT (SGPT) 50 U/L      AST (SGOT) 78 U/L      Alkaline Phosphatase 194 U/L      Total Bilirubin 0.5 mg/dL      Globulin 4.0 gm/dL      A/G Ratio 1.1 g/dL      BUN/Creatinine Ratio 12.3     Anion Gap 11.0 mmol/L      eGFR 94.3 mL/min/1.73     Narrative:      GFR Normal >60  Chronic Kidney Disease <60  Kidney Failure <15      Magnesium [[id number]]  (Normal) Collected: 03/14/24 1737    Specimen: Blood Updated: 03/14/24 1812     Magnesium 2.2 mg/dL     CBC & Differential [[id number]]  (Abnormal) Collected: 03/14/24 1737    Specimen: Blood Updated: 03/14/24 1759    Narrative:      The following orders were created for panel order CBC & Differential.  Procedure                               Abnormality         Status                     ---------                               -----------         ------                     CBC Auto Differential[[id number]]        Abnormal            Final result                 Please view results for these tests on the individual orders.            Imaging Results (Most Recent)       Procedure Component Value Units Date/Time    CT Abdomen Pelvis With Contrast [[id number]] Collected: 03/14/24 1849     Updated: 03/14/24 1855    Narrative:      CT ABDOMEN PELVIS W CONTRAST    Date of Exam: 3/14/2024 6:22 PM  EDT    Indication: skin cancer; elevated liver enzymes, nausea.    Comparison: CT abdomen pelvis 1/20/2023.    Technique: Axial CT images were obtained of the abdomen and pelvis following the uneventful intravenous administration of iodinated contrast. Sagittal and coronal reconstructions were performed.  Automated exposure control and iterative reconstruction   methods were used.    Findings:    Lower thorax: No focal consolidation.     Liver: No focal hepatic lesion. Normal morphology of the liver.    Gallbladder and bile ducts: Cholecystectomy. No biliary ductal dilatation.     Pancreas: No pancreatic duct dilation. No surrounding inflammation.     Spleen: Mildly enlarged measuring 15 cm, similar.     Adrenal glands: Unchanged 2.2 cm benign left adrenal myelolipoma. No right adrenal nodule.     Kidneys: Symmetric in size and enhancement. No hydronephrosis.     Urinary bladder: Unremarkable.    Reproductive organs: Unremarkable.    Stomach and bowel: No evidence of bowel obstruction. Normal appendix.    Lymph nodes: No pathologically enlarged lymph nodes.     Vessels: No abdominal aortic aneurysm. Major vasculature is patent. Scattered atherosclerotic vascular calcifications.    Peritoneum and retroperitoneum: No free air or free fluid.     Soft tissues: Small fat-containing umbilical hernia.    Osseous structures: No acute or suspicious osseous lesions.       Impression:      Impression:    No acute findings in the abdomen or pelvis.    Similar mild splenomegaly.      Electronically Signed: Anand Lisa MD    3/14/2024 6:53 PM EDT    Workstation ID: [id number]    CT Angiogram Chest Pulmonary Embolism [[id number]] Collected: 03/14/24 1847     Updated: 03/14/24 1854    Narrative:      CT ANGIOGRAM CHEST PULMONARY EMBOLISM    Date of Exam: 3/14/2024 6:22 PM EDT    Indication: Chest pain, shortness of air + dimer yesterday.    Comparison: Chest radiograph dated 3/13/2024.    Technique: Axial CT images were obtained of  the chest after the uneventful intravenous administration of iodinated contrast utilizing pulmonary embolism protocol.  Sagittal and coronal reconstructions were performed.  Automated exposure control and   iterative reconstruction methods were used.    Findings:  The visualized soft tissue structures at the base the neck including portions of the thyroid appear within normal limits. There is no lower cervical or axillary adenopathy.    The heart size is normal. There is no pericardial effusion. The aorta is normal in caliber without evidence of aneurysm formation. There is no coronary or aortic atherosclerotic calcification. The main pulmonary artery is normal in caliber. There are no   filling defects within the pulmonary arterial system to suggest presence of underlying pulmonary embolism. There is no mediastinal or hilar lymphadenopathy by size criteria.    The tracheobronchial tree is patent. There is no abnormal bronchial wall thickening or bronchiectasis. There are areas of mosaic attenuation which could relate to small airway or small vessel disease. There is no pleural effusion or pneumothorax. There   are no suspicious pulmonary nodules.    There is a small sliding-type hiatal hernia. Visualized portions of the upper abdomen demonstrate prior cholecystectomy changes. There is a macroscopic fat-containing lesion along the left adrenal gland measuring 2.1 cm likely representing an adrenal   myolipoma. There is multilevel bridging paravertebral ossification compatible with diffuse idiopathic skeletal hyperostosis. There are small posterior disc osteophyte complexes.      Impression:      Impression:  1. No evidence of pulmonary embolism.  2. Mosaic attenuation throughout both lungs which could relate to small airway disease such as asthma or COPD or less likely small vessel disease.    Electronically Signed: Dru Rosas    3/14/2024 6:52 PM EDT    Workstation ID: [id number]          reviewed    ECG/EMG  Results (most recent)       Procedure Component Value Units Date/Time    ECG 12 Lead Chest Pain [[id number]] Collected: 03/14/24 1640     Updated: 03/15/24 0722     QT Interval 350 ms      QTC Interval 449 ms     Narrative:      HEART RATE= 99  bpm  RR Interval= 608  ms  SC Interval= 168  ms  P Horizontal Axis= 9  deg  P Front Axis= 10  deg  QRSD Interval= 79  ms  QT Interval= 350  ms  QTcB= 449  ms  QRS Axis= 4  deg  T Wave Axis= 20  deg  - OTHERWISE NORMAL ECG -  Sinus rhythm  Low voltage, precordial leads  When compared with ECG of 17-Feb-2023 15:19:22,  Significant rate increase  Significant axis, voltage or hypertrophy change  Electronically Signed By: Baudilio Bryson ([facility]) 15-Mar-2024 07:22:24  Date and Time of Study: 2024-03-14 16:40:25    Telemetry Scan [[id number]] Resulted: 03/14/24     Updated: 03/15/24 1348    Telemetry Scan [[id number]] Resulted: 03/14/24     Updated: 03/15/24 1411    Telemetry Scan [[id number]] Resulted: 03/14/24     Updated: 03/15/24 1411    Telemetry Scan [[id number]] Resulted: 03/14/24     Updated: 03/15/24 1411    Telemetry Scan [[id number]] Resulted: 03/14/24     Updated: 03/15/24 1411    Telemetry Scan [[id number]] Resulted: 03/14/24     Updated: 03/15/24 1926          reviewed    Results for orders placed during the hospital encounter of 03/14/24    Duplex Venous Lower Extremity - Bilateral    Interpretation Summary    Normal bilateral lower extremity venous duplex scan.      Results for orders placed during the hospital encounter of 12/06/22    Adult Transthoracic Echo Complete W/ Cont if Necessary Per Protocol    Interpretation Summary    Left ventricular systolic function is normal. Left ventricular ejection fraction appears to be 61 - 65%.    Left ventricular diastolic function was normal.    Saline test results are negative.  Study was suboptimal.      Microbiology Results (last 10 days)       ** No results found for the last 240 hours. **            Assessment & Plan     Chest  pain             Chest pain, cough and abdominal pain        Lab Results   Component Value Date     TROPONINT 6 03/15/2024     TROPONINT <6 03/14/2024     TROPONINT <6 03/14/2024   -proBNP within normal range  -CMP unremarkable except hyperglycemia  -CBC generally unremarkable  -TSH within normal range  -Lipid panel showed total cholesterol 170, HDL 30, , triglycerides 136  -D-dimer elevated but CT chest negative for PE protocol.  Ron like attenuation through both lungs suggesting asthma or COPD.  -Duplex venous ultrasound normal  -CT abdomen pelvis showed no acute findings in the abdomen and pelvis  -DuoNebs and Mucinex ordered  -Solu-Medrol 60 mg ordered x 1 as patient reported cough and chest pain with deep breath  -Cardiology and gastroenterology were consulted in the ED  -EKG:Showed sinus rhythm with heart rate of 99, TN interval 168 and   -In the ED pt given aspirin and iopamidol  -Telemetry  -Continue Lipitor, Imdur and aspirin  -Cardiology cleared patient for discharge, no further work up needed  -GI added dicyclomine and recommended outpatient f/u.  -Prednisone 20 mg daily x 5 days given at discharge    Diabetes mellitus  -Newly diagnosed  Lab Results   Component Value Date    GLUCOSE 194 (H) 03/16/2024    GLUCOSE 155 (H) 03/15/2024    GLUCOSE 147 (H) 03/14/2024    GLUCOSE 148 (H) 03/13/2024   -A1c 7.20  -Not on any medications yet.  Offered to start patient on low-dose of metformin but she declines and wishes to discuss treatment with her PCP  -Glucometer, strips and lancets sent at discharge  -SSI  -Diabetic diet  -Monitor before meals and at bedtime        Depression/anxiety  -Continue Abilify, BuSpar, Zoloft and Ambien  -Inspect complete     History of migraines  -Continue Topamax     Chronic pain  -Continue gabapentin     Obesity  -BMI 51.33  -Lifestyle modifications          I discussed the patients findings and my recommendations with patient and nursing staff.     Discharge  Diagnosis:      Chest pain      Hospital Course  Patient is a 58 y.o. female presented with chest pain, abdominal pain and cough as noted on HPI above.  Labs unremarkable including troponin, proBNP, CMP, CBC, TSH.  Lipid panel showed  but otherwise unremarkable.  D-dimer was elevated but CT chest was negative for PE protocol and venous ultrasound negative for DVT.  CT showed possible asthma or COPD.  Patient denies having a previous diagnosis of COPD or asthma.  EKG showed sinus rhythm.  She was given aspirin and iopamidol in the ED and kept in the observation unit on telemetry.  Cardiology and gastroenterology were consulted in the ED.  GI recommended adding Bentyl and outpatient follow-up.  Cardiology cleared patient for discharge, no further workup needed.  Patient is states that she has been newly diagnosed with diabetes and does not have a glucometer at home.  Glucometer kit, strips and lancets sent at discharge.  Patient was offered metformin but she declines.  Outpatient referral to pulmonology was given but at discharge noted to be a duplicate order.  Pulmonary referral ready ordered by PCP.  Low-dose prednisone given at discharge for 5 days.  At this time, patient felt to be in good condition for discharge with close follow up with PCP, GI, cardiology and pulmonology.. Instructed to take all medications as prescribed and to return to ED if any concerning signs/symptoms. All test/lab results were discussed with patient. All questions were answered and patient verbalizes understanding.       Past Medical History:     Past Medical History:   Diagnosis Date    Anxiety     Asthma     Cancer     SKIN    Depression     Psoriasis     Seizures 12/06/2022       Past Surgical History:     Past Surgical History:   Procedure Laterality Date    CHOLECYSTECTOMY  1987    ENDOSCOPY N/A 12/30/2022    Procedure: ESOPHAGOGASTRODUODENOSCOPY with gastric biopsy and esophageal dilation #50,#54,#56,#58 bougie;  Surgeon:  Patience Arana MD;  Location: [facility];  Service: Gastroenterology;  Laterality: N/A;  gastritis    EYE SURGERY      KNEE SURGERY         Social History:   Social History     Socioeconomic History    Marital status:    Tobacco Use    Smoking status: Never    Smokeless tobacco: Never   Vaping Use    Vaping status: Never Used   Substance and Sexual Activity    Alcohol use: Never    Drug use: Never    Sexual activity: Defer       Procedures Performed         Consults:   Consults       Date and Time Order Name Status Description    3/14/2024 10:51 PM Inpatient Gastroenterology Consult      3/14/2024 10:51 PM Inpatient Cardiology Consult Completed             Condition on Discharge:     Stable    Discharge Disposition      Discharge Medications     Discharge Medications        Continue These Medications        Instructions Start Date   albuterol sulfate  (90 Base) MCG/ACT inhaler  Commonly known as: PROVENTIL HFA;VENTOLIN HFA;PROAIR HFA   2 puffs, Inhalation, Every 6 Hours PRN      ARIPiprazole 5 MG tablet  Commonly known as: ABILIFY   5 mg, Oral, Daily      aspirin 81 MG chewable tablet   162 mg, Oral, Daily      atorvastatin 40 MG tablet  Commonly known as: LIPITOR   40 mg, Oral, Every Night at Bedtime      gabapentin 100 MG capsule  Commonly known as: NEURONTIN   100 mg, Oral, 2 Times Daily      ibuprofen 800 MG tablet  Commonly known as: ADVIL,MOTRIN   800 mg, Oral, Every 8 Hours PRN      Incontinence Supplies misc   Take As Directed      isosorbide mononitrate 30 MG 24 hr tablet  Commonly known as: IMDUR   30 mg, Oral, Daily      prazosin 1 MG capsule  Commonly known as: MINIPRESS   1 mg, Oral, Nightly      sertraline 100 MG tablet  Commonly known as: ZOLOFT   100 mg, Oral, Daily      topiramate 50 MG tablet  Commonly known as: Topamax   50 mg, Oral, 2 Times Daily      zolpidem 10 MG tablet  Commonly known as: AMBIEN   10 mg, Oral, Nightly PRN             ASK your doctor about these medications         Instructions Start Date   busPIRone 10 MG tablet  Commonly known as: BUSPAR  Ask about: Which instructions should I use?   10 mg, Oral, 3 Times Daily               Discharge Diet:     Activity at Discharge:     Follow-up Appointments  Future Appointments   Date Time Provider Department Center   4/1/2024 10:30 AM Seipel, Joseph F, MD [facility]   4/2/2024 11:00 AM Quincy Lemos APRN [facility]   4/17/2024 11:00 AM [facility] CT 2 [facility]   4/17/2024 12:00 PM [facility] MRI 2  [facility] MRI [facility]         Test Results Pending at Discharge       Risk for Readmission (LACE) Score: 2 (3/16/2024  6:00 AM)      Greater than 30 minutes spent in discharge activities for this patient    Signature:Electronically signed by SCOOBY Werner, 03/16/24, 7:20 AM EDT.

## 2024-03-16 NOTE — OUTREACH NOTE
Prep Survey      Flowsheet Row Responses   [facility] patient discharged from? [facility]   Is LACE score < 7 ? Yes   Eligibility [facility]   Date of Admission 03/14/24   Date of Discharge 03/16/24   Discharge Disposition Home-Health Care Svc   Discharge diagnosis Chest pain   Does the patient have one of the following disease processes/diagnoses(primary or secondary)? Other   Does the patient have Home health ordered? Yes   What is the Home health agency?  [facility] 40 hrs/wk   Prep survey completed? Yes            Patricia BERKOWITZ - Registered Nurse

## 2024-03-17 NOTE — CASE MANAGEMENT/SOCIAL WORK
Case Management Discharge Note      Final Note: home with caregivers 40hrs/week     Transportation Services  Private: Car    Final Discharge Disposition Code: 01 - home or self-care

## 2024-03-18 ENCOUNTER — TRANSITIONAL CARE MANAGEMENT TELEPHONE ENCOUNTER (OUTPATIENT)
Dept: CALL CENTER | Facility: HOSPITAL | Age: 59
End: 2024-03-18
Payer: MEDICARE

## 2024-03-18 NOTE — OUTREACH NOTE
Call Center TCM Note      Flowsheet Row Responses   [facility] facility patient discharged from? [facility]   Does the patient have one of the following disease processes/diagnoses(primary or secondary)? Other   TCM attempt successful? No   Unsuccessful attempts Attempt 1  [No VR]            Lisbeth Lemos RN    3/18/2024, 09:33 EDT

## 2024-03-18 NOTE — OUTREACH NOTE
Call Center TCM Note      Flowsheet Row Responses   [facility] patient discharged from? [facility]   Does the patient have one of the following disease processes/diagnoses(primary or secondary)? Other   TCM attempt successful? No   Unsuccessful attempts Attempt 2   Call Status Left message            Lisbeth Lemos RN    3/18/2024, 13:51 EDT

## 2024-03-19 ENCOUNTER — TRANSITIONAL CARE MANAGEMENT TELEPHONE ENCOUNTER (OUTPATIENT)
Dept: CALL CENTER | Facility: HOSPITAL | Age: 59
End: 2024-03-19
Payer: MEDICARE

## 2024-03-19 NOTE — OUTREACH NOTE
Call Center TCM Note      Flowsheet Row Responses   [facility] facility patient discharged from? [facility]   Does the patient have one of the following disease processes/diagnoses(primary or secondary)? Other   TCM attempt successful? No   Unsuccessful attempts Attempt 3   Call Status Left message   Comments Needs 2 week fu appts with Cardiology/GI            Jyothi Hernandez RN    3/19/2024, 13:20 EDT

## 2024-03-28 ENCOUNTER — HOSPITAL ENCOUNTER (EMERGENCY)
Facility: HOSPITAL | Age: 59
Discharge: HOME | End: 2024-03-28
Attending: EMERGENCY MEDICINE
Payer: MEDICARE

## 2024-03-28 VITALS
DIASTOLIC BLOOD PRESSURE: 67 MMHG | BODY MASS INDEX: 45.99 KG/M2 | HEIGHT: 67 IN | HEART RATE: 91 BPM | TEMPERATURE: 98.3 F | SYSTOLIC BLOOD PRESSURE: 125 MMHG | WEIGHT: 293 LBS | RESPIRATION RATE: 15 BRPM | OXYGEN SATURATION: 97 %

## 2024-03-28 DIAGNOSIS — L02.419 LEG ABSCESS: ICD-10-CM

## 2024-03-28 DIAGNOSIS — L03.115 CELLULITIS OF LEG, RIGHT: Primary | ICD-10-CM

## 2024-03-28 LAB
ANION GAP SERPL CALCULATED.3IONS-SCNC: 11 MMOL/L (ref 5–15)
BASOPHILS # BLD AUTO: 0.06 10*3/MM3 (ref 0–0.2)
BASOPHILS NFR BLD AUTO: 0.6 % (ref 0–1.5)
BUN SERPL-MCNC: 11 MG/DL (ref 8–23)
BUN/CREAT SERPL: 13.4 (ref 7–25)
CALCIUM SPEC-SCNC: 8.9 MG/DL (ref 8.6–10.5)
CHLORIDE SERPL-SCNC: 102 MMOL/L (ref 98–107)
CO2 SERPL-SCNC: 25 MMOL/L (ref 22–29)
CREAT SERPL-MCNC: 0.82 MG/DL (ref 0.57–1)
DEPRECATED RDW RBC AUTO: 42.3 FL (ref 37–54)
EGFRCR SERPLBLD CKD-EPI 2021: 82 ML/MIN/1.73
EOSINOPHIL # BLD AUTO: 0.36 10*3/MM3 (ref 0–0.4)
EOSINOPHIL NFR BLD AUTO: 3.4 % (ref 0.3–6.2)
ERYTHROCYTE [DISTWIDTH] IN BLOOD BY AUTOMATED COUNT: 13.5 % (ref 12.3–15.4)
GLUCOSE SERPL-MCNC: 195 MG/DL (ref 65–99)
HCT VFR BLD AUTO: 41.4 % (ref 34–46.6)
HGB BLD-MCNC: 13.3 G/DL (ref 12–15.9)
HOLD SPECIMEN: NORMAL
HOLD SPECIMEN: NORMAL
IMM GRANULOCYTES # BLD AUTO: 0.06 10*3/MM3 (ref 0–0.05)
IMM GRANULOCYTES NFR BLD AUTO: 0.6 % (ref 0–0.5)
LYMPHOCYTES # BLD AUTO: 1.36 10*3/MM3 (ref 0.7–3.1)
LYMPHOCYTES NFR BLD AUTO: 12.9 % (ref 19.6–45.3)
MCH RBC QN AUTO: 27.4 PG (ref 26.6–33)
MCHC RBC AUTO-ENTMCNC: 32.1 G/DL (ref 31.5–35.7)
MCV RBC AUTO: 85.2 FL (ref 79–97)
MONOCYTES # BLD AUTO: 0.72 10*3/MM3 (ref 0.1–0.9)
MONOCYTES NFR BLD AUTO: 6.8 % (ref 5–12)
NEUTROPHILS NFR BLD AUTO: 7.97 10*3/MM3 (ref 1.7–7)
NEUTROPHILS NFR BLD AUTO: 75.7 % (ref 42.7–76)
NRBC BLD AUTO-RTO: 0 /100 WBC (ref 0–0.2)
PLATELET # BLD AUTO: 213 10*3/MM3 (ref 140–450)
PMV BLD AUTO: 8.7 FL (ref 6–12)
POTASSIUM SERPL-SCNC: 4.3 MMOL/L (ref 3.5–5.2)
RBC # BLD AUTO: 4.86 10*6/MM3 (ref 3.77–5.28)
SODIUM SERPL-SCNC: 138 MMOL/L (ref 136–145)
WBC NRBC COR # BLD AUTO: 10.53 10*3/MM3 (ref 3.4–10.8)
WHOLE BLOOD HOLD COAG: NORMAL

## 2024-03-28 PROCEDURE — 87070 CULTURE OTHR SPECIMN AEROBIC: CPT | Performed by: EMERGENCY MEDICINE

## 2024-03-28 PROCEDURE — 87205 SMEAR GRAM STAIN: CPT | Performed by: EMERGENCY MEDICINE

## 2024-03-28 PROCEDURE — 87186 SC STD MICRODIL/AGAR DIL: CPT | Performed by: EMERGENCY MEDICINE

## 2024-03-28 PROCEDURE — 80048 BASIC METABOLIC PNL TOTAL CA: CPT | Performed by: EMERGENCY MEDICINE

## 2024-03-28 PROCEDURE — 25010000002 CEFAZOLIN PER 500 MG: Performed by: EMERGENCY MEDICINE

## 2024-03-28 PROCEDURE — 96374 THER/PROPH/DIAG INJ IV PUSH: CPT

## 2024-03-28 PROCEDURE — 85025 COMPLETE CBC W/AUTO DIFF WBC: CPT | Performed by: EMERGENCY MEDICINE

## 2024-03-28 PROCEDURE — 99283 EMERGENCY DEPT VISIT LOW MDM: CPT

## 2024-03-28 PROCEDURE — 87147 CULTURE TYPE IMMUNOLOGIC: CPT | Performed by: EMERGENCY MEDICINE

## 2024-03-28 RX ORDER — SODIUM CHLORIDE 0.9 % (FLUSH) 0.9 %
10 SYRINGE (ML) INJECTION AS NEEDED
Status: DISCONTINUED | OUTPATIENT
Start: 2024-03-28 | End: 2024-03-28 | Stop reason: HOSPADM

## 2024-03-28 RX ORDER — DOXYCYCLINE HYCLATE 100 MG/1
100 CAPSULE ORAL 2 TIMES DAILY
Qty: 14 CAPSULE | Refills: 0 | Status: SHIPPED | OUTPATIENT
Start: 2024-03-28

## 2024-03-28 RX ADMIN — SODIUM CHLORIDE 2000 MG: 900 INJECTION INTRAVENOUS at 18:32

## 2024-03-28 NOTE — ED PROVIDER NOTES
Subjective   History of Present Illness  Chief complaint infection, right leg    History of present illness this is a 58-year-old female who was recently diagnosed with diabetes has not started any medications who noticed a red spot on her right lower leg for the last few days started on Tuesday.  She states this got a little bit more red and swollen she went to the doctor's office today they told her to come to the emergency department for cellulitis.  She denies any fever chills sweats some moderate pain is localized nonradiating.  Nothing seem to trigger make it better or worse no bites no numbness or tingling to the legs.  No recent long car ride plane ride immobilization.  No recent antibiotic use or hospitalizations or procedures.  No history of MRSA      Review of Systems   Constitutional:  Negative for chills and fever.   Gastrointestinal:  Negative for vomiting.   Skin:  Positive for wound. Negative for rash.   Neurological:  Negative for weakness.   Psychiatric/Behavioral:  Negative for confusion.        Past Medical History:   Diagnosis Date    Anxiety     Asthma     Cancer     SKIN    Depression     Psoriasis     Seizures 12/06/2022       No Known Allergies    Past Surgical History:   Procedure Laterality Date    CHOLECYSTECTOMY  1987    ENDOSCOPY N/A 12/30/2022    Procedure: ESOPHAGOGASTRODUODENOSCOPY with gastric biopsy and esophageal dilation #50,#54,#56,#58 bougie;  Surgeon: Patience Arana MD;  Location: [facility] ENDOSCOPY;  Service: Gastroenterology;  Laterality: N/A;  gastritis    EYE SURGERY      KNEE SURGERY         Family History   Problem Relation Age of Onset    Heart disease Mother     Breast cancer Mother     Heart disease Father     Pancreatic cancer Father     Heart disease Sister     Lung cancer Brother        Social History     Socioeconomic History    Marital status:    Tobacco Use    Smoking status: Never    Smokeless tobacco: Never   Vaping Use    Vaping status: Never Used    Substance and Sexual Activity    Alcohol use: Never    Drug use: Never    Sexual activity: Defer     Prior to Admission medications    Medication Sig Start Date End Date Taking? Authorizing Provider   albuterol sulfate  (90 Base) MCG/ACT inhaler Inhale 2 puffs Every 6 (Six) Hours As Needed for Wheezing. 11/13/23   Quincy Lemos APRN   ARIPiprazole (ABILIFY) 5 MG tablet Take 1 tablet by mouth Daily.    Kamala Banks MD   aspirin 81 MG chewable tablet Chew 2 tablets Daily. 11/13/23   Quincy Lemos APRN   atorvastatin (LIPITOR) 40 MG tablet Take 1 tablet by mouth every night at bedtime. 11/13/23   Quincy Lemos APRN   Blood Glucose Monitoring Suppl (Accu-Chek Guide Me) w/Device kit Use 1 Cartridge Daily As Needed (every morning and prn). 3/16/24   Lakesha Aquino APRN   busPIRone (BUSPAR) 10 MG tablet Take 1 tablet by mouth 3 (Three) Times a Day.    Kamala Banks MD   dicyclomine (BENTYL) 10 MG capsule Take 2 capsules by mouth 4 (Four) Times a Day for 14 days. 3/16/24 3/30/24  Lakesha Aquino APRN   doxycycline (VIBRAMYCIN) 100 MG capsule Take 1 capsule by mouth 2 (Two) Times a Day. 3/28/24   Jeff Alicea MD   gabapentin (NEURONTIN) 100 MG capsule Take 1 capsule by mouth 2 (Two) Times a Day. 11/13/23   Quincy Lemos APRN   glucose blood test strip Use as instructed 3/16/24   Lakesha Aquino APRN   ibuprofen (ADVIL,MOTRIN) 800 MG tablet Take 1 tablet by mouth Every 8 (Eight) Hours As Needed. 5/11/23   Kamala Banks MD   Incontinence Supplies misc Take As Directed. 3/15/23   Kamala Banks MD   isosorbide mononitrate (IMDUR) 30 MG 24 hr tablet Take 1 tablet by mouth Daily. 11/13/23   Quincy Lemos APRN   Lancets (accu-chek multiclix) lancets Check fasting glucose every morning and as needed 3/16/24   Lakesha Aquino APRN   prazosin (MINIPRESS) 1 MG capsule Take 1 capsule by mouth Every Night. 4/27/23   Provider,  MD Kamala   sertraline (ZOLOFT) 100 MG tablet Take 1 tablet by mouth Daily. 5/10/23   Provider, MD Kamala   topiramate (Topamax) 50 MG tablet Take 1 tablet by mouth 2 (Two) Times a Day. 10/26/23   Seipel, Joseph F, MD   zolpidem (AMBIEN) 10 MG tablet Take 1 tablet by mouth At Night As Needed for Sleep. 11/13/23   Quincy Lemos APRN   Patient is not on doxycycline      Objective   Physical Exam  Constitutional is a 58-year-old female awake alert no distress triage vital signs reviewed.  HEENT unremarkable lungs clear no retraction heart regular without murmur abdomen soft without tenderness or masses extremities that right leg has a small cellulitic area on the right distal tib-fib laterally about the size of a nickel.  There are some mild fluctuance but no crepitus or subcutaneous air there is no red streaks.  No palpable cords or Homans' sign the calf is soft nontender compartments are soft without tenderness dorsiflex plantarflex without difficulty.  Toes under including big toe.  Good dorsalis pedis and posterior tibialis pulses good cap refill.  No crepitus subcutaneous air.  No blisters.  Incision & Drainage    Date/Time: 3/28/2024 8:04 PM    Performed by: Jeff Alicea MD  Authorized by: Jeff Alicea MD    Consent:     Consent obtained:  Verbal    Consent given by:  Patient    Risks discussed:  Bleeding and infection  Universal protocol:     Procedure explained and questions answered to patient or proxy's satisfaction: yes      Patient identity confirmed:  Verbally with patient  Location:     Type:  Abscess    Location:  Lower extremity    Lower extremity location:  Leg    Leg location:  R lower leg  Pre-procedure details:     Skin preparation:  Povidone-iodine  Sedation:     Sedation type:  None  Anesthesia:     Anesthesia method:  Local infiltration    Local anesthetic:  Lidocaine 1% w/o epi  Procedure type:     Complexity:  Simple  Procedure details:     Ultrasound guidance: no       Incision types:  Single straight    Incision depth:  Subcutaneous    Wound management:  Probed and deloculated    Drainage:  Bloody and purulent    Drainage amount:  Moderate    Wound treatment:  Wound left open    Packing materials:  1/4 in iodoform gauze  Post-procedure details:     Procedure completion:  Tolerated well, no immediate complications             ED Course      Results for orders placed or performed during the hospital encounter of 03/28/24   Basic Metabolic Panel    Specimen: Arm, Left; Blood   Result Value Ref Range    Glucose 195 (H) 65 - 99 mg/dL    BUN 11 8 - 23 mg/dL    Creatinine 0.82 0.57 - 1.00 mg/dL    Sodium 138 136 - 145 mmol/L    Potassium 4.3 3.5 - 5.2 mmol/L    Chloride 102 98 - 107 mmol/L    CO2 25.0 22.0 - 29.0 mmol/L    Calcium 8.9 8.6 - 10.5 mg/dL    BUN/Creatinine Ratio 13.4 7.0 - 25.0    Anion Gap 11.0 5.0 - 15.0 mmol/L    eGFR 82.0 >60.0 mL/min/1.73   CBC Auto Differential    Specimen: Blood   Result Value Ref Range    WBC 10.53 3.40 - 10.80 10*3/mm3    RBC 4.86 3.77 - 5.28 10*6/mm3    Hemoglobin 13.3 12.0 - 15.9 g/dL    Hematocrit 41.4 34.0 - 46.6 %    MCV 85.2 79.0 - 97.0 fL    MCH 27.4 26.6 - 33.0 pg    MCHC 32.1 31.5 - 35.7 g/dL    RDW 13.5 12.3 - 15.4 %    RDW-SD 42.3 37.0 - 54.0 fl    MPV 8.7 6.0 - 12.0 fL    Platelets 213 140 - 450 10*3/mm3    Neutrophil % 75.7 42.7 - 76.0 %    Lymphocyte % 12.9 (L) 19.6 - 45.3 %    Monocyte % 6.8 5.0 - 12.0 %    Eosinophil % 3.4 0.3 - 6.2 %    Basophil % 0.6 0.0 - 1.5 %    Immature Grans % 0.6 (H) 0.0 - 0.5 %    Neutrophils, Absolute 7.97 (H) 1.70 - 7.00 10*3/mm3    Lymphocytes, Absolute 1.36 0.70 - 3.10 10*3/mm3    Monocytes, Absolute 0.72 0.10 - 0.90 10*3/mm3    Eosinophils, Absolute 0.36 0.00 - 0.40 10*3/mm3    Basophils, Absolute 0.06 0.00 - 0.20 10*3/mm3    Immature Grans, Absolute 0.06 (H) 0.00 - 0.05 10*3/mm3    nRBC 0.0 0.0 - 0.2 /100 WBC   Gold Top - SST   Result Value Ref Range    Extra Tube Hold for add-ons.    Light Blue Top    Result Value Ref Range    Extra Tube Hold for add-ons.    Gold Top - SST   Result Value Ref Range    Extra Tube Hold for add-ons.      No radiology results for the last day  Medications   sodium chloride 0.9 % flush 10 mL (has no administration in time range)   ceFAZolin 2000 mg IVPB in 100 mL NS (MBP) (2,000 mg Intravenous Given 3/28/24 1832)                                              Medical Decision Making  Medical decision making.  Patient had IV established had the above exam and evaluation procedure.  Wound culture is pending labs obtained my independent review basic metabolic profile unremarkable other than a glucose of 195 CBC normal wound culture pending.  Patient was given Ancef 2 g IV.  The patient has some mild cellulitis and a minimal abscess.  I do not see evidence of necrotizing fasciitis I do not see evidence of DVT no evidence to suggest bacteremia or sepsis.  Patient is nontoxic-appearing at this point no evidence of arterial compromise.  Patient wants to go home.  We talked about what to return for and stressed the importance she will have this packing taken out 2 days either she will take out or come back here and take it out.  She will follow the wound culture up in the doctor's office she was placed on doxycycline and she will return for any other problems stable unremarkable ER course.    Problems Addressed:  Cellulitis of leg, right: complicated acute illness or injury  Leg abscess: complicated acute illness or injury    Amount and/or Complexity of Data Reviewed  Labs: ordered. Decision-making details documented in ED Course.    Risk  Prescription drug management.        Final diagnoses:   Cellulitis of leg, right   Leg abscess       ED Disposition  ED Disposition       ED Disposition   Discharge    Condition   Stable    Comment   --               Quincy Lemos, APRN  [address] [facility] [place] [address]  [phone]    In 3 days           Medication List        New  Prescriptions      doxycycline 100 MG capsule  Commonly known as: VIBRAMYCIN  Take 1 capsule by mouth 2 (Two) Times a Day.               Where to Get Your Medications        These medications were sent to [facility] [address] [place] [address], [place] - [address] - [phone]  - [phone]   [address] [place] [address]      Phone: [phone]   doxycycline 100 MG capsule            Jeff Alicea MD  03/28/24 2005

## 2024-03-28 NOTE — DISCHARGE INSTRUCTIONS
Packing out in 2 days.  Either pull it out yourself or come here and we will take it out.  Doxycycline sent to your pharmacy.  Tylenol ibuprofen.  Once packing is out keep wound clean with soap and water.  Return for increasing redness swelling drainage foul odor fever red streaks altered mental status no improvement next couple days or any other new or worsening problems or concerns return immediately to the ER.

## 2024-03-29 ENCOUNTER — PATIENT OUTREACH (OUTPATIENT)
Dept: CASE MANAGEMENT | Facility: OTHER | Age: 59
End: 2024-03-29
Payer: MEDICARE

## 2024-03-31 LAB
BACTERIA SPEC AEROBE CULT: ABNORMAL
GRAM STN SPEC: ABNORMAL
GRAM STN SPEC: ABNORMAL

## 2024-03-31 NOTE — PROGRESS NOTES
Patient wound culture resulted with MSSA. Susceptible to Tetracyclines. Patient was given Rx for doxycycline. Therapy is appropriate coverage. No further follow-up required..    Microbiology Results (last 10 days)       Procedure Component Value - Date/Time    Wound Culture - Swab, Leg, Right [[id number]]  (Abnormal)  (Susceptibility) Collected: 03/28/24 1746    Lab Status: Final result Specimen: Swab from Leg, Right Updated: 03/31/24 7804     Wound Culture Heavy growth (4+) Staphylococcus aureus     Gram Stain Rare (1+) WBCs seen      No organisms seen    Susceptibility        Staphylococcus aureus      LAURO      Clindamycin 0.25 ug/ml Susceptible      Erythromycin 0.5 ug/ml Susceptible      Oxacillin 0.5 ug/ml Susceptible      Rifampin <=0.5 ug/ml Susceptible      Tetracycline <=1 ug/ml Susceptible      Trimethoprim + Sulfamethoxazole <=10 ug/ml Susceptible      Vancomycin 1 ug/ml Susceptible                                 Artur Magaña RPH  3/31/2024 12:34 EDT

## 2024-04-11 ENCOUNTER — TELEPHONE (OUTPATIENT)
Dept: FAMILY MEDICINE CLINIC | Facility: CLINIC | Age: 59
End: 2024-04-11

## 2024-04-12 NOTE — OUTREACH NOTE
Call Center TCM Note    Flowsheet Row Responses   [facility] patient discharged from? [facility]   Does the patient have one of the following disease processes/diagnoses(primary or secondary)? Other   TCM attempt successful? Yes   Call start time 1612   Call end time 1620   Discharge diagnosis Intractable headache    Person spoke with today (if not patient) and relationship patient   Meds reviewed with patient/caregiver? Yes  [New: lorazepam and imitrex]   Does the patient have all medications ordered at discharge? No   What is keeping the patient from filling the prescriptions? Prior authorization Issues  [Patient reports that her insurance does not want to pay for the imitres as ordered. She is going to contact her pharmacy back today. ]   Is the patient taking all medications as directed (includes completed medication regime)? No   What is preventing the patient from taking all medications as directed? Other  [see above. ]   Medication comments patient states that she will discuss meds further at appt in the morning.    Comments PCP Quincy ALMODOVAR. Patient has previously scheduled office visit with PCP in the am Feb 22, 2023 9:00 AM that she would like to keep.     Does the patient have an appointment with their PCP within 7 days of discharge? Yes   Has home health visited the patient within 72 hours of discharge? N/A   Psychosocial issues? No   Did the patient receive a copy of their discharge instructions? Yes   Nursing interventions Reviewed instructions with patient   What is the patient's perception of their health status since discharge? Same   Is the patient/caregiver able to teach back signs and symptoms related to disease process for when to call PCP? Yes   Is the patient/caregiver able to teach back signs and symptoms related to disease process for when to call 911? Yes   Is the patient/caregiver able to teach back the hierarchy of who to call/visit for symptoms/problems? PCP, Specialist, Home  health nurse, Urgent Care, ED, 911 Yes   If the patient is a current smoker, are they able to teach back resources for cessation? Not a smoker   TCM call completed? Yes   Call end time 1620   Would this patient benefit from a Referral to [facility] Social Work? No   Is the patient interested in additional calls from an ambulatory ?  NOTE:  applies to high risk patients requiring additional follow-up. No          Yamel Hernandez RN    2/21/2023, 16:21 EST       No

## 2024-04-16 ENCOUNTER — OFFICE VISIT (OUTPATIENT)
Dept: FAMILY MEDICINE CLINIC | Facility: CLINIC | Age: 59
End: 2024-04-16
Payer: MEDICARE

## 2024-04-16 VITALS
WEIGHT: 293 LBS | BODY MASS INDEX: 45.99 KG/M2 | RESPIRATION RATE: 18 BRPM | OXYGEN SATURATION: 100 % | TEMPERATURE: 98.2 F | HEART RATE: 96 BPM | DIASTOLIC BLOOD PRESSURE: 70 MMHG | HEIGHT: 67 IN | SYSTOLIC BLOOD PRESSURE: 108 MMHG

## 2024-04-16 DIAGNOSIS — F41.9 ANXIETY: ICD-10-CM

## 2024-04-16 DIAGNOSIS — R00.2 PALPITATIONS: ICD-10-CM

## 2024-04-16 DIAGNOSIS — E11.65 TYPE 2 DIABETES MELLITUS WITH HYPERGLYCEMIA, WITHOUT LONG-TERM CURRENT USE OF INSULIN: ICD-10-CM

## 2024-04-16 DIAGNOSIS — R00.0 TACHYCARDIA: ICD-10-CM

## 2024-04-16 DIAGNOSIS — F33.1 MODERATE EPISODE OF RECURRENT MAJOR DEPRESSIVE DISORDER: ICD-10-CM

## 2024-04-16 DIAGNOSIS — F51.01 PRIMARY INSOMNIA: Primary | ICD-10-CM

## 2024-04-16 DIAGNOSIS — E78.2 MIXED HYPERLIPIDEMIA: ICD-10-CM

## 2024-04-16 DIAGNOSIS — G62.9 NEUROPATHY: ICD-10-CM

## 2024-04-16 PROCEDURE — 1159F MED LIST DOCD IN RCRD: CPT | Performed by: REGISTERED NURSE

## 2024-04-16 PROCEDURE — 3051F HG A1C>EQUAL 7.0%<8.0%: CPT | Performed by: REGISTERED NURSE

## 2024-04-16 PROCEDURE — 1160F RVW MEDS BY RX/DR IN RCRD: CPT | Performed by: REGISTERED NURSE

## 2024-04-16 PROCEDURE — 99215 OFFICE O/P EST HI 40 MIN: CPT | Performed by: REGISTERED NURSE

## 2024-04-16 RX ORDER — ATORVASTATIN CALCIUM 40 MG/1
40 TABLET, FILM COATED ORAL
Qty: 90 TABLET | Refills: 1 | Status: SHIPPED | OUTPATIENT
Start: 2024-04-16

## 2024-04-16 RX ORDER — ESZOPICLONE 1 MG/1
1 TABLET, FILM COATED ORAL NIGHTLY
Qty: 30 TABLET | Refills: 2 | Status: SHIPPED | OUTPATIENT
Start: 2024-04-16

## 2024-04-16 RX ORDER — ISOSORBIDE MONONITRATE 30 MG/1
30 TABLET, EXTENDED RELEASE ORAL DAILY
Qty: 90 TABLET | Refills: 1 | Status: SHIPPED | OUTPATIENT
Start: 2024-04-16

## 2024-04-16 RX ORDER — BLOOD-GLUCOSE SENSOR
1 EACH MISCELLANEOUS
Qty: 2 EACH | Refills: 2 | Status: SHIPPED | OUTPATIENT
Start: 2024-04-16

## 2024-04-16 RX ORDER — GABAPENTIN 100 MG/1
100 CAPSULE ORAL 2 TIMES DAILY
Qty: 60 CAPSULE | Refills: 1 | Status: SHIPPED | OUTPATIENT
Start: 2024-04-16

## 2024-04-16 RX ORDER — BUSPIRONE HYDROCHLORIDE 15 MG/1
15 TABLET ORAL 3 TIMES DAILY
Qty: 90 TABLET | Refills: 2 | Status: SHIPPED | OUTPATIENT
Start: 2024-04-16

## 2024-04-16 RX ORDER — SERTRALINE HYDROCHLORIDE 100 MG/1
100 TABLET, FILM COATED ORAL DAILY
Qty: 90 TABLET | Refills: 1 | Status: SHIPPED | OUTPATIENT
Start: 2024-04-16

## 2024-04-16 RX ORDER — ARIPIPRAZOLE 2 MG/1
4 TABLET ORAL DAILY
Qty: 60 TABLET | Refills: 2 | Status: SHIPPED | OUTPATIENT
Start: 2024-04-16

## 2024-04-16 NOTE — PROGRESS NOTES
Chief Complaint  Hospital Follow Up Visit and Med Refill    Subjective    History of Present Illness {CC  Problem List  Visit  Diagnosis   Encounters  Notes  Medications  Labs  Result Review Imaging  Media :23}     Eladia Connor presents to [facility] for Hospital Follow Up Visit and Med Refill.      History of Present Illness  Patient is a 58 y.o. female  presents to the clinic today for hospital follow-up, worsening insomnia than 6 months, with med refill concerns.  Patient denies any chest pain, shortness of breath, or any fevers.  Patient denies any known exposure to COVID, flu, or any other contagious illnesses.    In regards to hospital follow-up, patient was seen at the end of March for cellulitis of leg.  Patient has completed antibiotics and shares that her cellulitis has resolved.  Patient has no further concerns in regards to cellulitis or any issues with her antibiotic at this time.    In regards to insomnia, patient shares that she has been struggling with insomnia for many years now.  She is currently taking Ambien but shares that this is not helping.  Patient reports that she has been experiencing very interruped sleep.  She shares that her current sleep last for about 4 hours.  Patient is interested in trying Lunesta instead.  We did also talk about trazodone again if Lunesta does not help.  Patient does not remember being on high-dose trazodone is willing to try that if Lunesta does not work first.    In regards to diabetes, patient is interested in trying Mounjaro for control.  Patient tried metformin and insulin at the hospital.  Patient denies any issues with her diabetes and shares that she is fairly new to diabetes.  Discussed options and patient will look into her insurance to find out what glucometer is best covered.  She shares that she did diabetic education with the hospital.  I will provide her with more carb reducing information.    "    Review of Systems   Constitutional: Negative.  Negative for activity change, chills, fatigue and fever.   HENT: Negative.  Negative for congestion, dental problem, ear pain, hearing loss, rhinorrhea, sinus pain, sore throat, tinnitus and trouble swallowing.    Eyes: Negative.  Negative for pain and visual disturbance.   Respiratory: Negative.  Negative for cough, chest tightness, shortness of breath and wheezing.    Cardiovascular: Negative.  Negative for chest pain, palpitations and leg swelling.   Gastrointestinal: Negative.  Negative for abdominal pain, diarrhea, nausea and vomiting.   Endocrine: Negative.  Negative for polydipsia, polyphagia and polyuria.   Genitourinary: Negative.  Negative for difficulty urinating, dysuria, frequency and urgency.   Musculoskeletal: Negative.  Negative for arthralgias, back pain and myalgias.   Skin: Negative.  Negative for color change, pallor, rash and wound.   Allergic/Immunologic: Negative.  Negative for environmental allergies.   Neurological: Negative.  Negative for dizziness, speech difficulty, weakness, light-headedness, numbness and headaches.   Hematological: Negative.    Psychiatric/Behavioral: Negative.  Negative for confusion, decreased concentration, self-injury and suicidal ideas. The patient is not nervous/anxious.    All other systems reviewed and are negative.       Objective     Vital Signs:   /70 (BP Location: Left arm, Patient Position: Sitting, Cuff Size: Large Adult)   Pulse 96   Temp 98.2 °F (36.8 °C) (Oral)   Resp 18   Ht 170.2 cm (67\")   Wt (!) 152 kg (334 lb)   SpO2 100%   BMI 52.31 kg/m²   Current Outpatient Medications on File Prior to Visit   Medication Sig Dispense Refill    albuterol sulfate  (90 Base) MCG/ACT inhaler Inhale 2 puffs Every 6 (Six) Hours As Needed for Wheezing. 18 g 2    aspirin 81 MG chewable tablet Chew 2 tablets Daily. 90 tablet 1    Blood Glucose Monitoring Suppl (Accu-Chek Guide Me) w/Device kit Use 1 " Cartridge Daily As Needed (every morning and prn). 1 kit 0    glucose blood test strip Use as instructed 100 each 0    ibuprofen (ADVIL,MOTRIN) 800 MG tablet Take 1 tablet by mouth Every 8 (Eight) Hours As Needed.      Incontinence Supplies misc Take As Directed.      Lancets (accu-chek multiclix) lancets Check fasting glucose every morning and as needed 102 each 0    prazosin (MINIPRESS) 1 MG capsule Take 1 capsule by mouth Every Night.      [DISCONTINUED] ARIPiprazole (ABILIFY) 5 MG tablet Take 1 tablet by mouth Daily.      [DISCONTINUED] atorvastatin (LIPITOR) 40 MG tablet Take 1 tablet by mouth every night at bedtime. 90 tablet 1    [DISCONTINUED] busPIRone (BUSPAR) 10 MG tablet Take 1 tablet by mouth 3 (Three) Times a Day.      [DISCONTINUED] gabapentin (NEURONTIN) 100 MG capsule Take 1 capsule by mouth 2 (Two) Times a Day. 60 capsule 1    [DISCONTINUED] isosorbide mononitrate (IMDUR) 30 MG 24 hr tablet Take 1 tablet by mouth Daily. 90 tablet 1    [DISCONTINUED] sertraline (ZOLOFT) 100 MG tablet Take 1 tablet by mouth Daily.      [DISCONTINUED] zolpidem (AMBIEN) 10 MG tablet Take 1 tablet by mouth At Night As Needed for Sleep. 30 tablet 0    [DISCONTINUED] doxycycline (VIBRAMYCIN) 100 MG capsule Take 1 capsule by mouth 2 (Two) Times a Day. (Patient not taking: Reported on 4/16/2024) 14 capsule 0    [DISCONTINUED] topiramate (Topamax) 50 MG tablet Take 1 tablet by mouth 2 (Two) Times a Day. (Patient not taking: Reported on 4/16/2024) 60 tablet 2     No current facility-administered medications on file prior to visit.        Past Medical History:   Diagnosis Date    Anxiety     Asthma     Cancer     SKIN    Depression     Psoriasis     Seizures 12/06/2022      Past Surgical History:   Procedure Laterality Date    CHOLECYSTECTOMY  1987    ENDOSCOPY N/A 12/30/2022    Procedure: ESOPHAGOGASTRODUODENOSCOPY with gastric biopsy and esophageal dilation #50,#54,#56,#58 cristino;  Surgeon: Patience Arana MD;  Location:   [facility];  Service: Gastroenterology;  Laterality: N/A;  gastritis    EYE SURGERY      KNEE SURGERY        Family History   Problem Relation Age of Onset    Heart disease Mother     Breast cancer Mother     Heart disease Father     Pancreatic cancer Father     Heart disease Sister     Lung cancer Brother       Social History     Socioeconomic History    Marital status:    Tobacco Use    Smoking status: Never    Smokeless tobacco: Never   Vaping Use    Vaping status: Never Used   Substance and Sexual Activity    Alcohol use: Never    Drug use: Never    Sexual activity: Defer         Admission on 03/28/2024, Discharged on 03/28/2024   Component Date Value Ref Range Status    Glucose 03/28/2024 195 (H)  65 - 99 mg/dL Final    BUN 03/28/2024 11  8 - 23 mg/dL Final    Creatinine 03/28/2024 0.82  0.57 - 1.00 mg/dL Final    Sodium 03/28/2024 138  136 - 145 mmol/L Final    Potassium 03/28/2024 4.3  3.5 - 5.2 mmol/L Final    Chloride 03/28/2024 102  98 - 107 mmol/L Final    CO2 03/28/2024 25.0  22.0 - 29.0 mmol/L Final    Calcium 03/28/2024 8.9  8.6 - 10.5 mg/dL Final    BUN/Creatinine Ratio 03/28/2024 13.4  7.0 - 25.0 Final    Anion Gap 03/28/2024 11.0  5.0 - 15.0 mmol/L Final    eGFR 03/28/2024 82.0  >60.0 mL/min/1.73 Final    WBC 03/28/2024 10.53  3.40 - 10.80 10*3/mm3 Final    RBC 03/28/2024 4.86  3.77 - 5.28 10*6/mm3 Final    Hemoglobin 03/28/2024 13.3  12.0 - 15.9 g/dL Final    Hematocrit 03/28/2024 41.4  34.0 - 46.6 % Final    MCV 03/28/2024 85.2  79.0 - 97.0 fL Final    MCH 03/28/2024 27.4  26.6 - 33.0 pg Final    MCHC 03/28/2024 32.1  31.5 - 35.7 g/dL Final    RDW 03/28/2024 13.5  12.3 - 15.4 % Final    RDW-SD 03/28/2024 42.3  37.0 - 54.0 fl Final    MPV 03/28/2024 8.7  6.0 - 12.0 fL Final    Platelets 03/28/2024 213  140 - 450 10*3/mm3 Final    Neutrophil % 03/28/2024 75.7  42.7 - 76.0 % Final    Lymphocyte % 03/28/2024 12.9 (L)  19.6 - 45.3 % Final    Monocyte % 03/28/2024 6.8  5.0 - 12.0 % Final     Eosinophil % 03/28/2024 3.4  0.3 - 6.2 % Final    Basophil % 03/28/2024 0.6  0.0 - 1.5 % Final    Immature Grans % 03/28/2024 0.6 (H)  0.0 - 0.5 % Final    Neutrophils, Absolute 03/28/2024 7.97 (H)  1.70 - 7.00 10*3/mm3 Final    Lymphocytes, Absolute 03/28/2024 1.36  0.70 - 3.10 10*3/mm3 Final    Monocytes, Absolute 03/28/2024 0.72  0.10 - 0.90 10*3/mm3 Final    Eosinophils, Absolute 03/28/2024 0.36  0.00 - 0.40 10*3/mm3 Final    Basophils, Absolute 03/28/2024 0.06  0.00 - 0.20 10*3/mm3 Final    Immature Grans, Absolute 03/28/2024 0.06 (H)  0.00 - 0.05 10*3/mm3 Final    nRBC 03/28/2024 0.0  0.0 - 0.2 /100 WBC Final    Extra Tube 03/28/2024 Hold for add-ons.   Final    Auto resulted.    Extra Tube 03/28/2024 Hold for add-ons.   Final    Auto resulted    Wound Culture 03/28/2024 Heavy growth (4+) Staphylococcus aureus (A)   Final    Gram Stain 03/28/2024 Rare (1+) WBCs seen   Final    Gram Stain 03/28/2024 No organisms seen   Final    Extra Tube 03/28/2024 Hold for add-ons.   Final    Auto resulted.   Admission on 03/14/2024, Discharged on 03/16/2024   Component Date Value Ref Range Status    QT Interval 03/14/2024 350  ms Final    QTC Interval 03/14/2024 449  ms Final    proBNP 03/14/2024 <36.0  0.0 - 900.0 pg/mL Final    Glucose 03/14/2024 147 (H)  65 - 99 mg/dL Final    BUN 03/14/2024 9  8 - 23 mg/dL Final    Creatinine 03/14/2024 0.73  0.57 - 1.00 mg/dL Final    Sodium 03/14/2024 138  136 - 145 mmol/L Final    Potassium 03/14/2024 4.1  3.5 - 5.2 mmol/L Final    Chloride 03/14/2024 100  98 - 107 mmol/L Final    CO2 03/14/2024 27.0  22.0 - 29.0 mmol/L Final    Calcium 03/14/2024 9.2  8.6 - 10.5 mg/dL Final    Total Protein 03/14/2024 8.2  6.0 - 8.5 g/dL Final    Albumin 03/14/2024 4.2  3.5 - 5.2 g/dL Final    ALT (SGPT) 03/14/2024 50 (H)  1 - 33 U/L Final    AST (SGOT) 03/14/2024 78 (H)  1 - 32 U/L Final    Alkaline Phosphatase 03/14/2024 194 (H)  39 - 117 U/L Final    Total Bilirubin 03/14/2024 0.5  0.0 - 1.2 mg/dL  Final    Globulin 03/14/2024 4.0  gm/dL Final    A/G Ratio 03/14/2024 1.1  g/dL Final    BUN/Creatinine Ratio 03/14/2024 12.3  7.0 - 25.0 Final    Anion Gap 03/14/2024 11.0  5.0 - 15.0 mmol/L Final    eGFR 03/14/2024 94.3  >60.0 mL/min/1.73 Final    Magnesium 03/14/2024 2.2  1.6 - 2.4 mg/dL Final    WBC 03/14/2024 7.73  3.40 - 10.80 10*3/mm3 Final    RBC 03/14/2024 4.89  3.77 - 5.28 10*6/mm3 Final    Hemoglobin 03/14/2024 13.4  12.0 - 15.9 g/dL Final    Hematocrit 03/14/2024 42.8  34.0 - 46.6 % Final    MCV 03/14/2024 87.5  79.0 - 97.0 fL Final    MCH 03/14/2024 27.4  26.6 - 33.0 pg Final    MCHC 03/14/2024 31.3 (L)  31.5 - 35.7 g/dL Final    RDW 03/14/2024 13.7  12.3 - 15.4 % Final    RDW-SD 03/14/2024 43.8  37.0 - 54.0 fl Final    MPV 03/14/2024 8.8  6.0 - 12.0 fL Final    Platelets 03/14/2024 234  140 - 450 10*3/mm3 Final    Neutrophil % 03/14/2024 67.4  42.7 - 76.0 % Final    Lymphocyte % 03/14/2024 19.0 (L)  19.6 - 45.3 % Final    Monocyte % 03/14/2024 7.1  5.0 - 12.0 % Final    Eosinophil % 03/14/2024 4.9  0.3 - 6.2 % Final    Basophil % 03/14/2024 0.8  0.0 - 1.5 % Final    Immature Grans % 03/14/2024 0.8 (H)  0.0 - 0.5 % Final    Neutrophils, Absolute 03/14/2024 5.21  1.70 - 7.00 10*3/mm3 Final    Lymphocytes, Absolute 03/14/2024 1.47  0.70 - 3.10 10*3/mm3 Final    Monocytes, Absolute 03/14/2024 0.55  0.10 - 0.90 10*3/mm3 Final    Eosinophils, Absolute 03/14/2024 0.38  0.00 - 0.40 10*3/mm3 Final    Basophils, Absolute 03/14/2024 0.06  0.00 - 0.20 10*3/mm3 Final    Immature Grans, Absolute 03/14/2024 0.06 (H)  0.00 - 0.05 10*3/mm3 Final    nRBC 03/14/2024 0.0  0.0 - 0.2 /100 WBC Final    Extra Tube 03/14/2024 Hold for add-ons.   Final    Auto resulted.    HS Troponin T 03/14/2024 <6  <14 ng/L Final    Right Common Femoral Spont 03/14/2024 Y   Final    Right Common Femoral Competent 03/14/2024 Y   Final    Right Common Femoral Phasic 03/14/2024 Y   Final    Right Common Femoral Compress 03/14/2024 C   Final     Right Common Femoral Augment 03/14/2024 Y   Final    Right Saphenofemoral Junction Comp* 03/14/2024 C   Final    Right Proximal Femoral Compress 03/14/2024 C   Final    Right Mid Femoral Spont 03/14/2024 Y   Final    Right Mid Femoral Competent 03/14/2024 Y   Final    Right Mid Femoral Phasic 03/14/2024 Y   Final    Right Mid Femoral Compress 03/14/2024 C   Final    Right Mid Femoral Augment 03/14/2024 Y   Final    Right Distal Femoral Compress 03/14/2024 C   Final    Right Popliteal Spont 03/14/2024 Y   Final    Right Popliteal Competent 03/14/2024 Y   Final    Right Popliteal Phasic 03/14/2024 Y   Final    Right Popliteal Compress 03/14/2024 C   Final    Right Popliteal Augment 03/14/2024 Y   Final    Right Posterior Tibial Compress 03/14/2024 C   Final    Right Peroneal Compress 03/14/2024 C   Final    Right Gastronemius Compress 03/14/2024 C   Final    Right Greater Saph AK Compress 03/14/2024 C   Final    Right Greater Saph BK Compress 03/14/2024 C   Final    Right Lesser Saph Compress 03/14/2024 C   Final    Left Common Femoral Spont 03/14/2024 Y   Final    Left Common Femoral Competent 03/14/2024 Y   Final    Left Common Femoral Phasic 03/14/2024 Y   Final    Left Common Femoral Compress 03/14/2024 C   Final    Left Common Femoral Augment 03/14/2024 Y   Final    Left Saphenofemoral Junction Compr* 03/14/2024 C   Final    Left Proximal Femoral Compress 03/14/2024 C   Final    Left Mid Femoral Spont 03/14/2024 Y   Final    Left Mid Femoral Competent 03/14/2024 Y   Final    Left Mid Femoral Phasic 03/14/2024 Y   Final    Left Mid Femoral Compress 03/14/2024 C   Final    Left Mid Femoral Augment 03/14/2024 Y   Final    Left Distal Femoral Compress 03/14/2024 C   Final    Left Popliteal Spont 03/14/2024 Y   Final    Left Popliteal Competent 03/14/2024 Y   Final    Left Popliteal Phasic 03/14/2024 Y   Final    Left Popliteal Compress 03/14/2024 C   Final    Left Popliteal Augment 03/14/2024 Y   Final    Left  Posterior Tibial Compress 03/14/2024 C   Final    Left Gastronemius Compress 03/14/2024 C   Final    Left Greater Saph AK Compress 03/14/2024 C   Final    Left Greater Saph BK Compress 03/14/2024 C   Final    Left Lesser Saph Compress 03/14/2024 C   Final    BH CV VAS PRELIMINARY FINDINGS SCR* 03/14/2024 1.0   Final    HS Troponin T 03/14/2024 <6  <14 ng/L Final    Troponin T Delta 03/14/2024    Final    Unable to calculate.    Glucose 03/15/2024 155 (H)  65 - 99 mg/dL Final    BUN 03/15/2024 9  8 - 23 mg/dL Final    Creatinine 03/15/2024 0.77  0.57 - 1.00 mg/dL Final    Sodium 03/15/2024 138  136 - 145 mmol/L Final    Potassium 03/15/2024 4.1  3.5 - 5.2 mmol/L Final    Chloride 03/15/2024 102  98 - 107 mmol/L Final    CO2 03/15/2024 26.0  22.0 - 29.0 mmol/L Final    Calcium 03/15/2024 8.9  8.6 - 10.5 mg/dL Final    BUN/Creatinine Ratio 03/15/2024 11.7  7.0 - 25.0 Final    Anion Gap 03/15/2024 10.0  5.0 - 15.0 mmol/L Final    eGFR 03/15/2024 88.4  >60.0 mL/min/1.73 Final    WBC 03/15/2024 7.89  3.40 - 10.80 10*3/mm3 Final    RBC 03/15/2024 4.55  3.77 - 5.28 10*6/mm3 Final    Hemoglobin 03/15/2024 12.5  12.0 - 15.9 g/dL Final    Hematocrit 03/15/2024 39.8  34.0 - 46.6 % Final    MCV 03/15/2024 87.5  79.0 - 97.0 fL Final    MCH 03/15/2024 27.5  26.6 - 33.0 pg Final    MCHC 03/15/2024 31.4 (L)  31.5 - 35.7 g/dL Final    RDW 03/15/2024 13.8  12.3 - 15.4 % Final    RDW-SD 03/15/2024 43.6  37.0 - 54.0 fl Final    MPV 03/15/2024 8.9  6.0 - 12.0 fL Final    Platelets 03/15/2024 205  140 - 450 10*3/mm3 Final    Neutrophil % 03/15/2024 64.6  42.7 - 76.0 % Final    Lymphocyte % 03/15/2024 19.9  19.6 - 45.3 % Final    Monocyte % 03/15/2024 8.6  5.0 - 12.0 % Final    Eosinophil % 03/15/2024 5.3  0.3 - 6.2 % Final    Basophil % 03/15/2024 0.8  0.0 - 1.5 % Final    Immature Grans % 03/15/2024 0.8 (H)  0.0 - 0.5 % Final    Neutrophils, Absolute 03/15/2024 5.10  1.70 - 7.00 10*3/mm3 Final    Lymphocytes, Absolute 03/15/2024 1.57   0.70 - 3.10 10*3/mm3 Final    Monocytes, Absolute 03/15/2024 0.68  0.10 - 0.90 10*3/mm3 Final    Eosinophils, Absolute 03/15/2024 0.42 (H)  0.00 - 0.40 10*3/mm3 Final    Basophils, Absolute 03/15/2024 0.06  0.00 - 0.20 10*3/mm3 Final    Immature Grans, Absolute 03/15/2024 0.06 (H)  0.00 - 0.05 10*3/mm3 Final    nRBC 03/15/2024 0.0  0.0 - 0.2 /100 WBC Final    HS Troponin T 03/15/2024 6  <14 ng/L Final    Total Cholesterol 03/15/2024 170  0 - 200 mg/dL Final    Triglycerides 03/15/2024 136  0 - 150 mg/dL Final    HDL Cholesterol 03/15/2024 30 (L)  40 - 60 mg/dL Final    LDL Cholesterol  03/15/2024 115 (H)  0 - 100 mg/dL Final    VLDL Cholesterol 03/15/2024 25  5 - 40 mg/dL Final    LDL/HDL Ratio 03/15/2024 3.76   Final    TSH 03/15/2024 3.350  0.270 - 4.200 uIU/mL Final    Glucose 03/16/2024 156 (H)  70 - 105 mg/dL Final    Serial Number: [id number]:  [id number]    Glucose 03/16/2024 179 (H)  70 - 105 mg/dL Final    Serial Number: [id number]:  [id number]    Glucose 03/15/2024 150 (H)  70 - 105 mg/dL Final    Serial Number: [id number]:  [id number]    Glucose 03/15/2024 285 (H)  70 - 105 mg/dL Final    Serial Number: [id number]:  [id number]    Glucose 03/16/2024 194 (H)  65 - 99 mg/dL Final    BUN 03/16/2024 14  8 - 23 mg/dL Final    Creatinine 03/16/2024 0.77  0.57 - 1.00 mg/dL Final    Sodium 03/16/2024 136  136 - 145 mmol/L Final    Potassium 03/16/2024 4.5  3.5 - 5.2 mmol/L Final    Chloride 03/16/2024 102  98 - 107 mmol/L Final    CO2 03/16/2024 23.0  22.0 - 29.0 mmol/L Final    Calcium 03/16/2024 9.3  8.6 - 10.5 mg/dL Final    BUN/Creatinine Ratio 03/16/2024 18.2  7.0 - 25.0 Final    Anion Gap 03/16/2024 11.0  5.0 - 15.0 mmol/L Final    eGFR 03/16/2024 88.4  >60.0 mL/min/1.73 Final    WBC 03/16/2024 10.78  3.40 - 10.80 10*3/mm3 Final    RBC 03/16/2024 4.58  3.77 - 5.28 10*6/mm3 Final    Hemoglobin 03/16/2024 12.7  12.0 - 15.9 g/dL Final    Hematocrit 03/16/2024 39.2  34.0 - 46.6 % Final     MCV 03/16/2024 85.6  79.0 - 97.0 fL Final    MCH 03/16/2024 27.7  26.6 - 33.0 pg Final    MCHC 03/16/2024 32.4  31.5 - 35.7 g/dL Final    RDW 03/16/2024 13.1  12.3 - 15.4 % Final    RDW-SD 03/16/2024 40.9  37.0 - 54.0 fl Final    MPV 03/16/2024 9.1  6.0 - 12.0 fL Final    Platelets 03/16/2024 230  140 - 450 10*3/mm3 Final    Neutrophil % 03/16/2024 88.0 (H)  42.7 - 76.0 % Final    Lymphocyte % 03/16/2024 7.7 (L)  19.6 - 45.3 % Final    Monocyte % 03/16/2024 3.2 (L)  5.0 - 12.0 % Final    Eosinophil % 03/16/2024 0.0 (L)  0.3 - 6.2 % Final    Basophil % 03/16/2024 0.1  0.0 - 1.5 % Final    Immature Grans % 03/16/2024 1.0 (H)  0.0 - 0.5 % Final    Neutrophils, Absolute 03/16/2024 9.48 (H)  1.70 - 7.00 10*3/mm3 Final    Lymphocytes, Absolute 03/16/2024 0.83  0.70 - 3.10 10*3/mm3 Final    Monocytes, Absolute 03/16/2024 0.35  0.10 - 0.90 10*3/mm3 Final    Eosinophils, Absolute 03/16/2024 0.00  0.00 - 0.40 10*3/mm3 Final    Basophils, Absolute 03/16/2024 0.01  0.00 - 0.20 10*3/mm3 Final    Immature Grans, Absolute 03/16/2024 0.11 (H)  0.00 - 0.05 10*3/mm3 Final    nRBC 03/16/2024 0.0  0.0 - 0.2 /100 WBC Final   Office Visit on 03/13/2024   Component Date Value Ref Range Status    HS Troponin T 03/13/2024 <6  <14 ng/L Final    CKMB 03/13/2024 1.55  <=5.30 ng/mL Final    D-Dimer, Quantitative 03/13/2024 1.33 (H)  0.00 - 0.60 mg/L (FEU) Final    Glucose 03/13/2024 148 (H)  65 - 99 mg/dL Final    BUN 03/13/2024 12  8 - 23 mg/dL Final    Creatinine 03/13/2024 0.91  0.57 - 1.00 mg/dL Final    Sodium 03/13/2024 137  136 - 145 mmol/L Final    Potassium 03/13/2024 4.2  3.5 - 5.2 mmol/L Final    Chloride 03/13/2024 100  98 - 107 mmol/L Final    CO2 03/13/2024 23.0  22.0 - 29.0 mmol/L Final    Calcium 03/13/2024 9.8  8.6 - 10.5 mg/dL Final    Total Protein 03/13/2024 8.1  6.0 - 8.5 g/dL Final    Albumin 03/13/2024 4.2  3.5 - 5.2 g/dL Final    ALT (SGPT) 03/13/2024 45 (H)  1 - 33 U/L Final    AST (SGOT) 03/13/2024 72 (H)  1 - 32 U/L  Final    Alkaline Phosphatase 03/13/2024 198 (H)  39 - 117 U/L Final    Total Bilirubin 03/13/2024 0.6  0.0 - 1.2 mg/dL Final    Globulin 03/13/2024 3.9  gm/dL Final    A/G Ratio 03/13/2024 1.1  g/dL Final    BUN/Creatinine Ratio 03/13/2024 13.2  7.0 - 25.0 Final    Anion Gap 03/13/2024 14.0  5.0 - 15.0 mmol/L Final    eGFR 03/13/2024 72.4  >60.0 mL/min/1.73 Final    Hemoglobin A1C 03/13/2024 7.20 (H)  4.80 - 5.60 % Final    WBC 03/13/2024 9.73  3.40 - 10.80 10*3/mm3 Final    RBC 03/13/2024 5.12  3.77 - 5.28 10*6/mm3 Final    Hemoglobin 03/13/2024 14.1  12.0 - 15.9 g/dL Final    Hematocrit 03/13/2024 42.9  34.0 - 46.6 % Final    MCV 03/13/2024 83.8  79.0 - 97.0 fL Final    MCH 03/13/2024 27.5  26.6 - 33.0 pg Final    MCHC 03/13/2024 32.9  31.5 - 35.7 g/dL Final    RDW 03/13/2024 13.8  12.3 - 15.4 % Final    RDW-SD 03/13/2024 41.8  37.0 - 54.0 fl Final    MPV 03/13/2024 8.9  6.0 - 12.0 fL Final    Platelets 03/13/2024 262  140 - 450 10*3/mm3 Final    Neutrophil % 03/13/2024 73.0  42.7 - 76.0 % Final    Lymphocyte % 03/13/2024 15.3 (L)  19.6 - 45.3 % Final    Monocyte % 03/13/2024 6.7  5.0 - 12.0 % Final    Eosinophil % 03/13/2024 3.9  0.3 - 6.2 % Final    Basophil % 03/13/2024 0.6  0.0 - 1.5 % Final    Immature Grans % 03/13/2024 0.5  0.0 - 0.5 % Final    Neutrophils, Absolute 03/13/2024 7.10 (H)  1.70 - 7.00 10*3/mm3 Final    Lymphocytes, Absolute 03/13/2024 1.49  0.70 - 3.10 10*3/mm3 Final    Monocytes, Absolute 03/13/2024 0.65  0.10 - 0.90 10*3/mm3 Final    Eosinophils, Absolute 03/13/2024 0.38  0.00 - 0.40 10*3/mm3 Final    Basophils, Absolute 03/13/2024 0.06  0.00 - 0.20 10*3/mm3 Final    Immature Grans, Absolute 03/13/2024 0.05  0.00 - 0.05 10*3/mm3 Final    nRBC 03/13/2024 0.0  0.0 - 0.2 /100 WBC Final         Physical Exam  Vitals and nursing note reviewed.   Constitutional:       Appearance: Normal appearance. She is normal weight.   HENT:      Head: Normocephalic and atraumatic.   Cardiovascular:      Rate  and Rhythm: Normal rate and regular rhythm.      Pulses: Normal pulses.      Heart sounds: Normal heart sounds. No murmur heard.     No friction rub. No gallop.   Pulmonary:      Effort: Pulmonary effort is normal. No respiratory distress.      Breath sounds: Normal breath sounds. No stridor. No wheezing, rhonchi or rales.   Chest:      Chest wall: No tenderness.   Abdominal:      General: Abdomen is flat. Bowel sounds are normal. There is no distension.      Palpations: Abdomen is soft. There is no mass.      Tenderness: There is no abdominal tenderness. There is no right CVA tenderness, left CVA tenderness, guarding or rebound.      Hernia: No hernia is present.   Skin:     General: Skin is warm and dry.      Capillary Refill: Capillary refill takes less than 2 seconds.      Coloration: Skin is not jaundiced or pale.   Neurological:      General: No focal deficit present.      Mental Status: She is alert and oriented to person, place, and time. Mental status is at baseline.      Motor: No weakness.      Coordination: Coordination normal.      Gait: Gait normal.   Psychiatric:         Mood and Affect: Mood normal.         Behavior: Behavior normal.         Thought Content: Thought content normal.         Judgment: Judgment normal.          Result Review  Data Reviewed:{ Labs  Result Review  Imaging  Med Tab  Media :23}   I have reviewed this patient's chart.  I have reviewed previous labs, previous imaging, previous medications, and previous encounters with notes that were available in this patient's chart.               Assessment and Plan {CC Problem List  Visit Diagnosis  ROS  Review (Popup)  [facility]  Quality  BestPractice  Medications  SmartSets  SnapShot Encounters  Media :23}   Diagnoses and all orders for this visit:    1. Primary insomnia (Primary)  -     eszopiclone (Lunesta) 1 MG tablet; Take 1 tablet by mouth Every Night. Take immediately before bedtime  Dispense: 30 tablet;  Refill: 2    2. Moderate episode of recurrent major depressive disorder  -     ARIPiprazole (Abilify) 2 MG tablet; Take 2 tablets by mouth Daily.  Dispense: 60 tablet; Refill: 2  -     sertraline (ZOLOFT) 100 MG tablet; Take 1 tablet by mouth Daily.  Dispense: 90 tablet; Refill: 1    3. Anxiety  -     busPIRone (BUSPAR) 15 MG tablet; Take 1 tablet by mouth 3 (Three) Times a Day.  Dispense: 90 tablet; Refill: 2    4. Mixed hyperlipidemia  -     atorvastatin (LIPITOR) 40 MG tablet; Take 1 tablet by mouth every night at bedtime.  Dispense: 90 tablet; Refill: 1    5. Neuropathy  -     gabapentin (NEURONTIN) 100 MG capsule; Take 1 capsule by mouth 2 (Two) Times a Day.  Dispense: 60 capsule; Refill: 1    6. Palpitations  -     isosorbide mononitrate (IMDUR) 30 MG 24 hr tablet; Take 1 tablet by mouth Daily.  Dispense: 90 tablet; Refill: 1    7. Tachycardia  -     isosorbide mononitrate (IMDUR) 30 MG 24 hr tablet; Take 1 tablet by mouth Daily.  Dispense: 90 tablet; Refill: 1    8. Type 2 diabetes mellitus with hyperglycemia, without long-term current use of insulin  -     Tirzepatide (Mounjaro) 2.5 MG/0.5ML solution pen-injector pen; Inject 0.5 mL under the skin into the appropriate area as directed 1 (One) Time Per Week.  Dispense: 2 mL; Refill: 0  -     Continuous Blood Gluc Sensor (FreeStyle Germania 3 Sensor) misc; Use 1 each Every 14 (Fourteen) Days.  Dispense: 2 each; Refill: 2        -Start Mounjaro for diabetes control.  -Med refills.  -Stop Ambien and start Lunesta for insomnia.  -ER red flags discussed with patient including risk versus benefit and education provided.  -Follow-up with me in 3 months or sooner if needed.    I spent 40 minutes caring for Eladia on this date of service. This time includes time spent by me in the following activities:preparing for the visit, reviewing tests, obtaining and/or reviewing a separately obtained history, performing a medically appropriate examination and/or evaluation ,  counseling and educating the patient/family/caregiver, ordering medications, tests, or procedures, referring and communicating with other health care professionals , documenting information in the medical record, independently interpreting results and communicating that information with the patient/family/caregiver, and care coordination.    Follow Up {Instructions Charge Capture  Follow-up Communications :23}     Patient was given instructions and counseling regarding her condition or for health maintenance advice. Please see specific information pulled into the AVS (placed there by myself) if appropriate.    Return in about 3 months (around 7/16/2024) for routine follow up.    Class 3 Severe Obesity (BMI >=40). Obesity-related health conditions include the following: diabetes mellitus. Obesity is unchanged. BMI is is above average; BMI management plan is completed. We discussed portion control and increasing exercise.       SCOOBY Cisneros, FNP-BC

## 2024-05-23 ENCOUNTER — TELEPHONE (OUTPATIENT)
Dept: FAMILY MEDICINE CLINIC | Facility: CLINIC | Age: 59
End: 2024-05-23

## 2024-05-23 NOTE — TELEPHONE ENCOUNTER
Caller: Eladia Connor    Relationship: Self    Best call back number: [phone]     Who are you requesting to speak with (clinical staff, provider,  specific staff member): HUSSEIN MONGE OR MA    What was the call regarding: PATIENT STATES THAT SHE HAS HAD DIGESTIVE ISSUES INCLUDING VOMITING AND ABDOMINAL PAIN ONGOING FOR A FEW WEEKS. NO APPOINTMENTS WITH PCP AVAILABLE FOR A FEW WEEKS. PLEASE CALL AND ADVISE

## 2024-05-30 DIAGNOSIS — E11.65 TYPE 2 DIABETES MELLITUS WITH HYPERGLYCEMIA, WITHOUT LONG-TERM CURRENT USE OF INSULIN: ICD-10-CM

## 2024-05-30 RX ORDER — TIRZEPATIDE 2.5 MG/.5ML
INJECTION, SOLUTION SUBCUTANEOUS WEEKLY
Qty: 4 ML | Refills: 0 | Status: SHIPPED | OUTPATIENT
Start: 2024-05-30

## 2024-06-03 ENCOUNTER — HOSPITAL ENCOUNTER (OUTPATIENT)
Dept: CT IMAGING | Facility: HOSPITAL | Age: 59
Discharge: HOME | End: 2024-06-03
Payer: MEDICARE

## 2024-06-03 ENCOUNTER — OFFICE VISIT (OUTPATIENT)
Dept: FAMILY MEDICINE CLINIC | Facility: CLINIC | Age: 59
End: 2024-06-03
Payer: MEDICARE

## 2024-06-03 ENCOUNTER — HOSPITAL ENCOUNTER (EMERGENCY)
Facility: HOSPITAL | Age: 59
Discharge: HOME | End: 2024-06-03
Attending: EMERGENCY MEDICINE | Admitting: EMERGENCY MEDICINE
Payer: MEDICARE

## 2024-06-03 ENCOUNTER — TELEPHONE (OUTPATIENT)
Dept: FAMILY MEDICINE CLINIC | Facility: CLINIC | Age: 59
End: 2024-06-03

## 2024-06-03 ENCOUNTER — APPOINTMENT (OUTPATIENT)
Dept: GENERAL RADIOLOGY | Facility: HOSPITAL | Age: 59
End: 2024-06-03
Payer: MEDICARE

## 2024-06-03 VITALS
WEIGHT: 293 LBS | DIASTOLIC BLOOD PRESSURE: 61 MMHG | RESPIRATION RATE: 20 BRPM | BODY MASS INDEX: 45.99 KG/M2 | OXYGEN SATURATION: 96 % | HEART RATE: 79 BPM | TEMPERATURE: 97.8 F | SYSTOLIC BLOOD PRESSURE: 104 MMHG | HEIGHT: 67 IN

## 2024-06-03 VITALS
WEIGHT: 293 LBS | BODY MASS INDEX: 45.99 KG/M2 | TEMPERATURE: 97.8 F | HEIGHT: 67 IN | OXYGEN SATURATION: 92 % | HEART RATE: 98 BPM | RESPIRATION RATE: 18 BRPM

## 2024-06-03 DIAGNOSIS — K59.00 CONSTIPATION, UNSPECIFIED CONSTIPATION TYPE: ICD-10-CM

## 2024-06-03 DIAGNOSIS — R10.84 GENERALIZED ABDOMINAL PAIN: ICD-10-CM

## 2024-06-03 DIAGNOSIS — E11.65 TYPE 2 DIABETES MELLITUS WITH HYPERGLYCEMIA, WITHOUT LONG-TERM CURRENT USE OF INSULIN: ICD-10-CM

## 2024-06-03 DIAGNOSIS — R10.9 ABDOMINAL PAIN, UNSPECIFIED ABDOMINAL LOCATION: Primary | ICD-10-CM

## 2024-06-03 DIAGNOSIS — R11.15 PERSISTENT VOMITING: ICD-10-CM

## 2024-06-03 DIAGNOSIS — R05.1 ACUTE COUGH: ICD-10-CM

## 2024-06-03 DIAGNOSIS — R11.15 PERSISTENT VOMITING: Primary | ICD-10-CM

## 2024-06-03 DIAGNOSIS — R07.9 CHEST PAIN, UNSPECIFIED TYPE: ICD-10-CM

## 2024-06-03 LAB
ALBUMIN SERPL-MCNC: 4.3 G/DL (ref 3.5–5.2)
ALBUMIN/GLOB SERPL: 1 G/DL
ALP SERPL-CCNC: 180 U/L (ref 39–117)
ALT SERPL W P-5'-P-CCNC: 50 U/L (ref 1–33)
ANION GAP SERPL CALCULATED.3IONS-SCNC: 12.3 MMOL/L (ref 5–15)
APTT PPP: 29.9 SECONDS (ref 61–76.5)
AST SERPL-CCNC: 88 U/L (ref 1–32)
BASOPHILS # BLD AUTO: 0.06 10*3/MM3 (ref 0–0.2)
BASOPHILS NFR BLD AUTO: 0.7 % (ref 0–1.5)
BILIRUB SERPL-MCNC: 0.8 MG/DL (ref 0–1.2)
BUN SERPL-MCNC: 10 MG/DL (ref 8–23)
BUN/CREAT SERPL: 11.6 (ref 7–25)
CALCIUM SPEC-SCNC: 9.9 MG/DL (ref 8.6–10.5)
CHLORIDE SERPL-SCNC: 99 MMOL/L (ref 98–107)
CO2 SERPL-SCNC: 23.7 MMOL/L (ref 22–29)
CREAT SERPL-MCNC: 0.86 MG/DL (ref 0.57–1)
DEPRECATED RDW RBC AUTO: 41.8 FL (ref 37–54)
EGFRCR SERPLBLD CKD-EPI 2021: 77.5 ML/MIN/1.73
EOSINOPHIL # BLD AUTO: 0.36 10*3/MM3 (ref 0–0.4)
EOSINOPHIL NFR BLD AUTO: 4.2 % (ref 0.3–6.2)
ERYTHROCYTE [DISTWIDTH] IN BLOOD BY AUTOMATED COUNT: 13.5 % (ref 12.3–15.4)
EXPIRATION DATE: NORMAL
FLUAV AG UPPER RESP QL IA.RAPID: NOT DETECTED
FLUBV AG UPPER RESP QL IA.RAPID: NOT DETECTED
GLOBULIN UR ELPH-MCNC: 4.1 GM/DL
GLUCOSE SERPL-MCNC: 153 MG/DL (ref 65–99)
HCT VFR BLD AUTO: 43.8 % (ref 34–46.6)
HGB BLD-MCNC: 14.2 G/DL (ref 12–15.9)
HOLD SPECIMEN: NORMAL
IMM GRANULOCYTES # BLD AUTO: 0.05 10*3/MM3 (ref 0–0.05)
IMM GRANULOCYTES NFR BLD AUTO: 0.6 % (ref 0–0.5)
INR PPP: 1.05 (ref 0.93–1.1)
INTERNAL CONTROL: NORMAL
LIPASE SERPL-CCNC: 20 U/L (ref 13–60)
LYMPHOCYTES # BLD AUTO: 1.38 10*3/MM3 (ref 0.7–3.1)
LYMPHOCYTES NFR BLD AUTO: 16 % (ref 19.6–45.3)
Lab: NORMAL
MCH RBC QN AUTO: 27.5 PG (ref 26.6–33)
MCHC RBC AUTO-ENTMCNC: 32.4 G/DL (ref 31.5–35.7)
MCV RBC AUTO: 84.9 FL (ref 79–97)
MONOCYTES # BLD AUTO: 0.62 10*3/MM3 (ref 0.1–0.9)
MONOCYTES NFR BLD AUTO: 7.2 % (ref 5–12)
NEUTROPHILS NFR BLD AUTO: 6.16 10*3/MM3 (ref 1.7–7)
NEUTROPHILS NFR BLD AUTO: 71.3 % (ref 42.7–76)
NRBC BLD AUTO-RTO: 0 /100 WBC (ref 0–0.2)
PLATELET # BLD AUTO: 239 10*3/MM3 (ref 140–450)
PMV BLD AUTO: 8.8 FL (ref 6–12)
POTASSIUM SERPL-SCNC: 4 MMOL/L (ref 3.5–5.2)
PROT SERPL-MCNC: 8.4 G/DL (ref 6–8.5)
PROTHROMBIN TIME: 11.4 SECONDS (ref 9.6–11.7)
RBC # BLD AUTO: 5.16 10*6/MM3 (ref 3.77–5.28)
SARS-COV-2 AG UPPER RESP QL IA.RAPID: NOT DETECTED
SODIUM SERPL-SCNC: 135 MMOL/L (ref 136–145)
TROPONIN T SERPL HS-MCNC: 10 NG/L
WBC NRBC COR # BLD AUTO: 8.63 10*3/MM3 (ref 3.4–10.8)

## 2024-06-03 PROCEDURE — 96375 TX/PRO/DX INJ NEW DRUG ADDON: CPT

## 2024-06-03 PROCEDURE — 83690 ASSAY OF LIPASE: CPT | Performed by: EMERGENCY MEDICINE

## 2024-06-03 PROCEDURE — 87428 SARSCOV & INF VIR A&B AG IA: CPT | Performed by: REGISTERED NURSE

## 2024-06-03 PROCEDURE — 1125F AMNT PAIN NOTED PAIN PRSNT: CPT | Performed by: REGISTERED NURSE

## 2024-06-03 PROCEDURE — 1160F RVW MEDS BY RX/DR IN RCRD: CPT | Performed by: REGISTERED NURSE

## 2024-06-03 PROCEDURE — 71045 X-RAY EXAM CHEST 1 VIEW: CPT

## 2024-06-03 PROCEDURE — 1159F MED LIST DOCD IN RCRD: CPT | Performed by: REGISTERED NURSE

## 2024-06-03 PROCEDURE — 84484 ASSAY OF TROPONIN QUANT: CPT | Performed by: EMERGENCY MEDICINE

## 2024-06-03 PROCEDURE — 25010000002 KETOROLAC TROMETHAMINE PER 15 MG: Performed by: EMERGENCY MEDICINE

## 2024-06-03 PROCEDURE — 96374 THER/PROPH/DIAG INJ IV PUSH: CPT

## 2024-06-03 PROCEDURE — 3051F HG A1C>EQUAL 7.0%<8.0%: CPT | Performed by: REGISTERED NURSE

## 2024-06-03 PROCEDURE — 85730 THROMBOPLASTIN TIME PARTIAL: CPT | Performed by: EMERGENCY MEDICINE

## 2024-06-03 PROCEDURE — 99284 EMERGENCY DEPT VISIT MOD MDM: CPT

## 2024-06-03 PROCEDURE — 74176 CT ABD & PELVIS W/O CONTRAST: CPT

## 2024-06-03 PROCEDURE — 93005 ELECTROCARDIOGRAM TRACING: CPT

## 2024-06-03 PROCEDURE — 85610 PROTHROMBIN TIME: CPT | Performed by: EMERGENCY MEDICINE

## 2024-06-03 PROCEDURE — 93005 ELECTROCARDIOGRAM TRACING: CPT | Performed by: EMERGENCY MEDICINE

## 2024-06-03 PROCEDURE — 25010000002 ONDANSETRON PER 1 MG: Performed by: EMERGENCY MEDICINE

## 2024-06-03 PROCEDURE — 99214 OFFICE O/P EST MOD 30 MIN: CPT | Performed by: REGISTERED NURSE

## 2024-06-03 PROCEDURE — 80053 COMPREHEN METABOLIC PANEL: CPT | Performed by: EMERGENCY MEDICINE

## 2024-06-03 PROCEDURE — 85025 COMPLETE CBC W/AUTO DIFF WBC: CPT | Performed by: EMERGENCY MEDICINE

## 2024-06-03 RX ORDER — SODIUM CHLORIDE 0.9 % (FLUSH) 0.9 %
10 SYRINGE (ML) INJECTION AS NEEDED
Status: DISCONTINUED | OUTPATIENT
Start: 2024-06-03 | End: 2024-06-03 | Stop reason: HOSPADM

## 2024-06-03 RX ORDER — PERMETHRIN 50 MG/G
1 CREAM TOPICAL ONCE
Qty: 60 G | Refills: 1 | Status: CANCELLED | OUTPATIENT
Start: 2024-06-03 | End: 2024-06-03

## 2024-06-03 RX ORDER — GLIPIZIDE 5 MG/1
5 TABLET ORAL
Qty: 30 TABLET | Refills: 1 | Status: SHIPPED | OUTPATIENT
Start: 2024-06-03

## 2024-06-03 RX ORDER — KETOROLAC TROMETHAMINE 30 MG/ML
15 INJECTION, SOLUTION INTRAMUSCULAR; INTRAVENOUS ONCE
Status: COMPLETED | OUTPATIENT
Start: 2024-06-03 | End: 2024-06-03

## 2024-06-03 RX ORDER — ONDANSETRON 2 MG/ML
4 INJECTION INTRAMUSCULAR; INTRAVENOUS ONCE
Status: COMPLETED | OUTPATIENT
Start: 2024-06-03 | End: 2024-06-03

## 2024-06-03 RX ORDER — DOCUSATE SODIUM 100 MG/1
100 CAPSULE, LIQUID FILLED ORAL 2 TIMES DAILY
Qty: 60 CAPSULE | Refills: 1 | Status: SHIPPED | OUTPATIENT
Start: 2024-06-03

## 2024-06-03 RX ORDER — DAPAGLIFLOZIN 10 MG/1
10 TABLET, FILM COATED ORAL DAILY
Qty: 30 TABLET | Refills: 3 | Status: SHIPPED | OUTPATIENT
Start: 2024-06-03

## 2024-06-03 RX ORDER — ONDANSETRON 4 MG/1
4 TABLET, ORALLY DISINTEGRATING ORAL EVERY 8 HOURS PRN
Qty: 30 TABLET | Refills: 1 | Status: SHIPPED | OUTPATIENT
Start: 2024-06-03 | End: 2024-06-10 | Stop reason: SDUPTHER

## 2024-06-03 RX ORDER — POLYETHYLENE GLYCOL 3350 17 G/17G
17 POWDER, FOR SOLUTION ORAL DAILY
Qty: 510 G | Refills: 0 | Status: SHIPPED | OUTPATIENT
Start: 2024-06-03

## 2024-06-03 RX ADMIN — KETOROLAC TROMETHAMINE 15 MG: 30 INJECTION, SOLUTION INTRAMUSCULAR at 16:38

## 2024-06-03 RX ADMIN — ONDANSETRON 4 MG: 2 INJECTION INTRAMUSCULAR; INTRAVENOUS at 16:21

## 2024-06-03 NOTE — TELEPHONE ENCOUNTER
Caller: DIANA MADRIGAL    Relationship: Emergency Contact    Best call back number: [phone]     What orders are you requesting (i.e. lab or imaging): CHEST X-RAY    In what timeframe would the patient need to come in: ASAP    Where will you receive your lab/imaging services: [facility]    Additional notes: PATIENT WAS SEEN IN OFFICE ON 06/03/24 AND IS HAVING ORDERS PUT IN FOR A CT OF ABDOMIN AND WOULD LIKE TO SEE IF HUSSEIN MONGE WOULD ALSO PUT IN ORDERS FOR A CHEST X-RAY AS PATIENT IS WORRIED ABOUT THE PAIN IN HER CHEST AND BACK.     PLEASE CALL TO ADVISE.

## 2024-06-03 NOTE — ED PROVIDER NOTES
Subjective   History of Present Illness  Chief complaint: Chest pain    58-year-old female presents with chest pain.  Patient states symptoms have been present for about 3 days.  She has also had abdominal pain as well as nausea and vomiting.  She states she has not really felt well for the past month after she took a dose of Mounjaro.  She states she only took 1 dose and then stopped taking it because of the side effects.  She doctor her primary doctor about this and actually had an outpatient CT of the abdomen and pelvis today.  She states the chest pain started bothering her more while she was here at the hospital so she decided to check into the emergency room.  She states the pain radiates to her back.  She has also had intermittent constipation and diarrhea.  She states she has not had a bowel movement for 5 days.    History provided by:  Patient      Review of Systems   Constitutional:  Negative for fever.   HENT:  Negative for congestion.    Respiratory:  Negative for cough and shortness of breath.    Cardiovascular:  Positive for chest pain.   Gastrointestinal:  Positive for abdominal pain, constipation, nausea and vomiting.   Genitourinary:  Negative for dysuria.   Musculoskeletal:  Positive for back pain.   Neurological:  Negative for headaches.   Psychiatric/Behavioral:  Negative for confusion.        Past Medical History:   Diagnosis Date    Anxiety     Asthma     Cancer     SKIN    Depression     Psoriasis     Seizures 12/06/2022       No Known Allergies    Past Surgical History:   Procedure Laterality Date    CHOLECYSTECTOMY  1987    ENDOSCOPY N/A 12/30/2022    Procedure: ESOPHAGOGASTRODUODENOSCOPY with gastric biopsy and esophageal dilation #50,#54,#56,#58 bougie;  Surgeon: Patience Arana MD;  Location: [facility] ENDOSCOPY;  Service: Gastroenterology;  Laterality: N/A;  gastritis    EYE SURGERY      KNEE SURGERY         Family History   Problem Relation Age of Onset    Heart disease Mother     Breast  "cancer Mother     Heart disease Father     Pancreatic cancer Father     Heart disease Sister     Lung cancer Brother        Social History     Socioeconomic History    Marital status:    Tobacco Use    Smoking status: Never    Smokeless tobacco: Never   Vaping Use    Vaping status: Never Used   Substance and Sexual Activity    Alcohol use: Never    Drug use: Never    Sexual activity: Defer       /73   Pulse 95   Temp 97.8 °F (36.6 °C) (Oral)   Resp 20   Ht 170.2 cm (67\")   Wt (!) 146 kg (321 lb 14 oz)   LMP  (LMP Unknown)   SpO2 95%   BMI 50.41 kg/m²       Objective   Physical Exam  Vitals and nursing note reviewed.   Constitutional:       Appearance: She is well-developed.   HENT:      Head: Normocephalic and atraumatic.   Cardiovascular:      Rate and Rhythm: Normal rate and regular rhythm.      Heart sounds: Normal heart sounds.   Pulmonary:      Effort: Pulmonary effort is normal. No respiratory distress.      Breath sounds: Normal breath sounds.   Abdominal:      General: Bowel sounds are normal.      Palpations: Abdomen is soft.      Tenderness: There is generalized abdominal tenderness. There is no guarding or rebound.   Musculoskeletal:      Right lower leg: No tenderness. No edema.      Left lower leg: No tenderness. No edema.   Skin:     General: Skin is warm and dry.   Neurological:      Mental Status: She is alert and oriented to person, place, and time.         Procedures           ED Course      My interpretation of EKG shows sinus rhythm, rate of 95, no ST elevation                           Results for orders placed or performed during the hospital encounter of 06/03/24   Comprehensive Metabolic Panel    Specimen: Blood   Result Value Ref Range    Glucose 153 (H) 65 - 99 mg/dL    BUN 10 8 - 23 mg/dL    Creatinine 0.86 0.57 - 1.00 mg/dL    Sodium 135 (L) 136 - 145 mmol/L    Potassium 4.0 3.5 - 5.2 mmol/L    Chloride 99 98 - 107 mmol/L    CO2 23.7 22.0 - 29.0 mmol/L    Calcium 9.9 " 8.6 - 10.5 mg/dL    Total Protein 8.4 6.0 - 8.5 g/dL    Albumin 4.3 3.5 - 5.2 g/dL    ALT (SGPT) 50 (H) 1 - 33 U/L    AST (SGOT) 88 (H) 1 - 32 U/L    Alkaline Phosphatase 180 (H) 39 - 117 U/L    Total Bilirubin 0.8 0.0 - 1.2 mg/dL    Globulin 4.1 gm/dL    A/G Ratio 1.0 g/dL    BUN/Creatinine Ratio 11.6 7.0 - 25.0    Anion Gap 12.3 5.0 - 15.0 mmol/L    eGFR 77.5 >60.0 mL/min/1.73   Lipase    Specimen: Blood   Result Value Ref Range    Lipase 20 13 - 60 U/L   Protime-INR    Specimen: Blood   Result Value Ref Range    Protime 11.4 9.6 - 11.7 Seconds    INR 1.05 0.93 - 1.10   aPTT    Specimen: Blood   Result Value Ref Range    PTT 29.9 (L) 61.0 - 76.5 seconds   Single High Sensitivity Troponin T    Specimen: Blood   Result Value Ref Range    HS Troponin T 10 <14 ng/L   CBC Auto Differential    Specimen: Blood   Result Value Ref Range    WBC 8.63 3.40 - 10.80 10*3/mm3    RBC 5.16 3.77 - 5.28 10*6/mm3    Hemoglobin 14.2 12.0 - 15.9 g/dL    Hematocrit 43.8 34.0 - 46.6 %    MCV 84.9 79.0 - 97.0 fL    MCH 27.5 26.6 - 33.0 pg    MCHC 32.4 31.5 - 35.7 g/dL    RDW 13.5 12.3 - 15.4 %    RDW-SD 41.8 37.0 - 54.0 fl    MPV 8.8 6.0 - 12.0 fL    Platelets 239 140 - 450 10*3/mm3    Neutrophil % 71.3 42.7 - 76.0 %    Lymphocyte % 16.0 (L) 19.6 - 45.3 %    Monocyte % 7.2 5.0 - 12.0 %    Eosinophil % 4.2 0.3 - 6.2 %    Basophil % 0.7 0.0 - 1.5 %    Immature Grans % 0.6 (H) 0.0 - 0.5 %    Neutrophils, Absolute 6.16 1.70 - 7.00 10*3/mm3    Lymphocytes, Absolute 1.38 0.70 - 3.10 10*3/mm3    Monocytes, Absolute 0.62 0.10 - 0.90 10*3/mm3    Eosinophils, Absolute 0.36 0.00 - 0.40 10*3/mm3    Basophils, Absolute 0.06 0.00 - 0.20 10*3/mm3    Immature Grans, Absolute 0.05 0.00 - 0.05 10*3/mm3    nRBC 0.0 0.0 - 0.2 /100 WBC   ECG 12 Lead Chest Pain   Result Value Ref Range    QT Interval 362 ms    QTC Interval 455 ms   Gold Top - SST   Result Value Ref Range    Extra Tube Hold for add-ons.      XR Chest 1 View    Result Date: 6/3/2024  Impression:  No acute chest finding. Electronically Signed: Ting Mendoza MD  6/3/2024 4:31 PM EDT  Workstation ID: [id number]    CT Abdomen Pelvis Without Contrast    Result Date: 6/3/2024  Impression: 1.No acute process identified. No intestinal obstruction. There is a moderate colonic fecal load. Electronically Signed: Burton Esquivel MD  6/3/2024 3:08 PM EDT  Workstation ID: [id number]               Medical Decision Making  Amount and/or Complexity of Data Reviewed  Labs: ordered.  Radiology: ordered.  ECG/medicine tests: ordered.    Risk  Prescription drug management.      Patient had the above evaluation.  Results were discussed with the patient.  My interpretation of chest x-ray shows no infiltrate or effusion.  EKG shows no acute ischemia.  Troponin is negative.  Symptoms have been present for 3 days.  She is ruled out with a single troponin.  White blood cell count is normal.  CMP significant for mild elevation in LFTs.  Review of records shows patient has had mild elevation in LFTs previously and this is unchanged.  CT of the abdomen and pelvis that was done as an outpatient prior to coming to the emergency room shows no acute process but does show moderate colonic fecal load.  Patient has remained well-appearing in the emergency room.  I see no indication for admission at this time.  Patient will be prescribed MiraLAX.      Final diagnoses:   Abdominal pain, unspecified abdominal location   Chest pain, unspecified type   Constipation, unspecified constipation type       ED Disposition  ED Disposition       ED Disposition   Discharge    Condition   Stable    Comment   --               Quincy Lemos, APRN  [address] [place] [address]  [phone]    Call in 2 days           Medication List        New Prescriptions      polyethylene glycol 17 GM/SCOOP powder  Commonly known as: MIRALAX  Take 17 g by mouth Daily.               Where to Get Your Medications        These medications were sent to [facility] [address] [place] [address] [place] - [address] - [phone]  - [phone] FX  [address] [place] [address]      Phone: [phone]   polyethylene glycol 17 GM/SCOOP powder            Rafael Cantrell MD  06/03/24 [id number]

## 2024-06-03 NOTE — PROGRESS NOTES
Chief Complaint  Vomiting (Patient states she has been vomiting for past month , she is not able to keep anything down, she has not had a bowel movement for 5 days. She also states that her back in hurting and feels tightness around her heart duration of a couple weeks. ) and Back Pain    Subjective    History of Present Illness {CC  Problem List  Visit  Diagnosis   Encounters  Notes  Medications  Labs  Result Review Imaging  Media :23}     Eladia Connor presents to [facility] for Vomiting (Patient states she has been vomiting for past month , she is not able to keep anything down, she has not had a bowel movement for 5 days. She also states that her back in hurting and feels tightness around her heart duration of a couple weeks. ) and Back Pain.      History of Present Illness  Patient is a 58 y.o. female  presents to the clinic today for concerns of vomiting greater than 2 weeks and no bowel movement x 1 week.  Patient denies any chest pain, shortness of breath, or any fevers.  Patient denies any known exposure to COVID, flu, or any other contagious illnesses.    In regards to vomiting and constipation, patient shares that she has been vomiting for more than 2 weeks now.  She shares that she is still constipated that is been approximately 1 week since her last bowel movement.  She feels that she has abdominal pain with this and is concerned for potential blockage.  We discussed options and I have put in for stat CT of her abdomen to rule this out.  If CT does not show blockage but only shows constipation then I have asked patient to move forward with magnesium citrate and Colace.  I did advise patient that if she has any concerns in her life else threaten to make sure she goes directly to the ER to be seen.  We also discussed adding Zofran on board to help with nausea in the meantime.      In regards to type 2 diabetes, patient had taken Mounjaro previously but has  "not taken it for nearly 4 weeks.  We discussed stopping this due to patient's constipation and discomfort.  I would recommend patient start an oral med instead.  We discussed options and I will try to send over glipizide and Farxiga for her if insurance covers this.       Review of Systems   Constitutional: Negative.  Negative for activity change, chills, fatigue and fever.   HENT: Negative.  Negative for congestion, dental problem, ear pain, hearing loss, rhinorrhea, sinus pain, sore throat, tinnitus and trouble swallowing.    Eyes: Negative.  Negative for pain and visual disturbance.   Respiratory: Negative.  Negative for cough, chest tightness, shortness of breath and wheezing.    Cardiovascular: Negative.  Negative for chest pain, palpitations and leg swelling.   Gastrointestinal:  Positive for abdominal pain, constipation, nausea and vomiting. Negative for diarrhea.   Endocrine: Negative.  Negative for polydipsia, polyphagia and polyuria.   Genitourinary: Negative.  Negative for difficulty urinating, dysuria, frequency and urgency.   Musculoskeletal: Negative.  Negative for arthralgias, back pain and myalgias.   Skin: Negative.  Negative for color change, pallor, rash and wound.   Allergic/Immunologic: Negative.  Negative for environmental allergies.   Neurological: Negative.  Negative for dizziness, speech difficulty, weakness, light-headedness, numbness and headaches.   Hematological: Negative.    Psychiatric/Behavioral: Negative.  Negative for confusion, decreased concentration, self-injury and suicidal ideas. The patient is not nervous/anxious.    All other systems reviewed and are negative.       Objective     Vital Signs:   Pulse 98   Temp 97.8 °F (36.6 °C) (Oral)   Resp 18   Ht 170.2 cm (67\")   Wt (!) 152 kg (334 lb)   SpO2 92%   BMI 52.31 kg/m²   Current Outpatient Medications on File Prior to Visit   Medication Sig Dispense Refill    albuterol sulfate  (90 Base) MCG/ACT inhaler Inhale 2 " puffs Every 6 (Six) Hours As Needed for Wheezing. 18 g 2    ARIPiprazole (Abilify) 2 MG tablet Take 2 tablets by mouth Daily. 60 tablet 2    aspirin 81 MG chewable tablet Chew 2 tablets Daily. 90 tablet 1    atorvastatin (LIPITOR) 40 MG tablet Take 1 tablet by mouth every night at bedtime. 90 tablet 1    Blood Glucose Monitoring Suppl (Accu-Chek Guide Me) w/Device kit Use 1 Cartridge Daily As Needed (every morning and prn). 1 kit 0    busPIRone (BUSPAR) 15 MG tablet Take 1 tablet by mouth 3 (Three) Times a Day. 90 tablet 2    Continuous Blood Gluc Sensor (FreeStyle Germania 3 Sensor) misc Use 1 each Every 14 (Fourteen) Days. 2 each 2    eszopiclone (Lunesta) 1 MG tablet Take 1 tablet by mouth Every Night. Take immediately before bedtime 30 tablet 2    gabapentin (NEURONTIN) 100 MG capsule Take 1 capsule by mouth 2 (Two) Times a Day. 60 capsule 1    glucose blood test strip Use as instructed 100 each 0    ibuprofen (ADVIL,MOTRIN) 800 MG tablet Take 1 tablet by mouth Every 8 (Eight) Hours As Needed.      Incontinence Supplies misc Take As Directed.      isosorbide mononitrate (IMDUR) 30 MG 24 hr tablet Take 1 tablet by mouth Daily. 90 tablet 1    Lancets (accu-chek multiclix) lancets Check fasting glucose every morning and as needed 102 each 0    Mounjaro 2.5 MG/0.5ML solution pen-injector pen INJECT 1 SYRINGE SUBCUTANEOUSLY ONCE A WEEK 4 mL 0    prazosin (MINIPRESS) 1 MG capsule Take 1 capsule by mouth Every Night.      sertraline (ZOLOFT) 100 MG tablet Take 1 tablet by mouth Daily. 90 tablet 1     No current facility-administered medications on file prior to visit.        Past Medical History:   Diagnosis Date    Anxiety     Asthma     Cancer     SKIN    Depression     Psoriasis     Seizures 12/06/2022      Past Surgical History:   Procedure Laterality Date    CHOLECYSTECTOMY  1987    ENDOSCOPY N/A 12/30/2022    Procedure: ESOPHAGOGASTRODUODENOSCOPY with gastric biopsy and esophageal dilation #50,#54,#56,#58 bougie;   Surgeon: Patience Arana MD;  Location: [facility] END[facility];  Service: Gastroenterology;  Laterality: N/A;  gastritis    EYE SURGERY      KNEE SURGERY        Family History   Problem Relation Age of Onset    Heart disease Mother     Breast cancer Mother     Heart disease Father     Pancreatic cancer Father     Heart disease Sister     Lung cancer Brother       Social History     Socioeconomic History    Marital status:    Tobacco Use    Smoking status: Never    Smokeless tobacco: Never   Vaping Use    Vaping status: Never Used   Substance and Sexual Activity    Alcohol use: Never    Drug use: Never    Sexual activity: Defer         Admission on 03/28/2024, Discharged on 03/28/2024   Component Date Value Ref Range Status    Glucose 03/28/2024 195 (H)  65 - 99 mg/dL Final    BUN 03/28/2024 11  8 - 23 mg/dL Final    Creatinine 03/28/2024 0.82  0.57 - 1.00 mg/dL Final    Sodium 03/28/2024 138  136 - 145 mmol/L Final    Potassium 03/28/2024 4.3  3.5 - 5.2 mmol/L Final    Chloride 03/28/2024 102  98 - 107 mmol/L Final    CO2 03/28/2024 25.0  22.0 - 29.0 mmol/L Final    Calcium 03/28/2024 8.9  8.6 - 10.5 mg/dL Final    BUN/Creatinine Ratio 03/28/2024 13.4  7.0 - 25.0 Final    Anion Gap 03/28/2024 11.0  5.0 - 15.0 mmol/L Final    eGFR 03/28/2024 82.0  >60.0 mL/min/1.73 Final    WBC 03/28/2024 10.53  3.40 - 10.80 10*3/mm3 Final    RBC 03/28/2024 4.86  3.77 - 5.28 10*6/mm3 Final    Hemoglobin 03/28/2024 13.3  12.0 - 15.9 g/dL Final    Hematocrit 03/28/2024 41.4  34.0 - 46.6 % Final    MCV 03/28/2024 85.2  79.0 - 97.0 fL Final    MCH 03/28/2024 27.4  26.6 - 33.0 pg Final    MCHC 03/28/2024 32.1  31.5 - 35.7 g/dL Final    RDW 03/28/2024 13.5  12.3 - 15.4 % Final    RDW-SD 03/28/2024 42.3  37.0 - 54.0 fl Final    MPV 03/28/2024 8.7  6.0 - 12.0 fL Final    Platelets 03/28/2024 213  140 - 450 10*3/mm3 Final    Neutrophil % 03/28/2024 75.7  42.7 - 76.0 % Final    Lymphocyte % 03/28/2024 12.9 (L)  19.6 - 45.3 % Final    Monocyte  % 03/28/2024 6.8  5.0 - 12.0 % Final    Eosinophil % 03/28/2024 3.4  0.3 - 6.2 % Final    Basophil % 03/28/2024 0.6  0.0 - 1.5 % Final    Immature Grans % 03/28/2024 0.6 (H)  0.0 - 0.5 % Final    Neutrophils, Absolute 03/28/2024 7.97 (H)  1.70 - 7.00 10*3/mm3 Final    Lymphocytes, Absolute 03/28/2024 1.36  0.70 - 3.10 10*3/mm3 Final    Monocytes, Absolute 03/28/2024 0.72  0.10 - 0.90 10*3/mm3 Final    Eosinophils, Absolute 03/28/2024 0.36  0.00 - 0.40 10*3/mm3 Final    Basophils, Absolute 03/28/2024 0.06  0.00 - 0.20 10*3/mm3 Final    Immature Grans, Absolute 03/28/2024 0.06 (H)  0.00 - 0.05 10*3/mm3 Final    nRBC 03/28/2024 0.0  0.0 - 0.2 /100 WBC Final    Extra Tube 03/28/2024 Hold for add-ons.   Final    Auto resulted.    Extra Tube 03/28/2024 Hold for add-ons.   Final    Auto resulted    Wound Culture 03/28/2024 Heavy growth (4+) Staphylococcus aureus (A)   Final    Gram Stain 03/28/2024 Rare (1+) WBCs seen   Final    Gram Stain 03/28/2024 No organisms seen   Final    Extra Tube 03/28/2024 Hold for add-ons.   Final    Auto resulted.   Admission on 03/14/2024, Discharged on 03/16/2024   Component Date Value Ref Range Status    QT Interval 03/14/2024 350  ms Final    QTC Interval 03/14/2024 449  ms Final    proBNP 03/14/2024 <36.0  0.0 - 900.0 pg/mL Final    Glucose 03/14/2024 147 (H)  65 - 99 mg/dL Final    BUN 03/14/2024 9  8 - 23 mg/dL Final    Creatinine 03/14/2024 0.73  0.57 - 1.00 mg/dL Final    Sodium 03/14/2024 138  136 - 145 mmol/L Final    Potassium 03/14/2024 4.1  3.5 - 5.2 mmol/L Final    Chloride 03/14/2024 100  98 - 107 mmol/L Final    CO2 03/14/2024 27.0  22.0 - 29.0 mmol/L Final    Calcium 03/14/2024 9.2  8.6 - 10.5 mg/dL Final    Total Protein 03/14/2024 8.2  6.0 - 8.5 g/dL Final    Albumin 03/14/2024 4.2  3.5 - 5.2 g/dL Final    ALT (SGPT) 03/14/2024 50 (H)  1 - 33 U/L Final    AST (SGOT) 03/14/2024 78 (H)  1 - 32 U/L Final    Alkaline Phosphatase 03/14/2024 194 (H)  39 - 117 U/L Final    Total  Bilirubin 03/14/2024 0.5  0.0 - 1.2 mg/dL Final    Globulin 03/14/2024 4.0  gm/dL Final    A/G Ratio 03/14/2024 1.1  g/dL Final    BUN/Creatinine Ratio 03/14/2024 12.3  7.0 - 25.0 Final    Anion Gap 03/14/2024 11.0  5.0 - 15.0 mmol/L Final    eGFR 03/14/2024 94.3  >60.0 mL/min/1.73 Final    Magnesium 03/14/2024 2.2  1.6 - 2.4 mg/dL Final    WBC 03/14/2024 7.73  3.40 - 10.80 10*3/mm3 Final    RBC 03/14/2024 4.89  3.77 - 5.28 10*6/mm3 Final    Hemoglobin 03/14/2024 13.4  12.0 - 15.9 g/dL Final    Hematocrit 03/14/2024 42.8  34.0 - 46.6 % Final    MCV 03/14/2024 87.5  79.0 - 97.0 fL Final    MCH 03/14/2024 27.4  26.6 - 33.0 pg Final    MCHC 03/14/2024 31.3 (L)  31.5 - 35.7 g/dL Final    RDW 03/14/2024 13.7  12.3 - 15.4 % Final    RDW-SD 03/14/2024 43.8  37.0 - 54.0 fl Final    MPV 03/14/2024 8.8  6.0 - 12.0 fL Final    Platelets 03/14/2024 234  140 - 450 10*3/mm3 Final    Neutrophil % 03/14/2024 67.4  42.7 - 76.0 % Final    Lymphocyte % 03/14/2024 19.0 (L)  19.6 - 45.3 % Final    Monocyte % 03/14/2024 7.1  5.0 - 12.0 % Final    Eosinophil % 03/14/2024 4.9  0.3 - 6.2 % Final    Basophil % 03/14/2024 0.8  0.0 - 1.5 % Final    Immature Grans % 03/14/2024 0.8 (H)  0.0 - 0.5 % Final    Neutrophils, Absolute 03/14/2024 5.21  1.70 - 7.00 10*3/mm3 Final    Lymphocytes, Absolute 03/14/2024 1.47  0.70 - 3.10 10*3/mm3 Final    Monocytes, Absolute 03/14/2024 0.55  0.10 - 0.90 10*3/mm3 Final    Eosinophils, Absolute 03/14/2024 0.38  0.00 - 0.40 10*3/mm3 Final    Basophils, Absolute 03/14/2024 0.06  0.00 - 0.20 10*3/mm3 Final    Immature Grans, Absolute 03/14/2024 0.06 (H)  0.00 - 0.05 10*3/mm3 Final    nRBC 03/14/2024 0.0  0.0 - 0.2 /100 WBC Final    Extra Tube 03/14/2024 Hold for add-ons.   Final    Auto resulted.    HS Troponin T 03/14/2024 <6  <14 ng/L Final    Right Common Femoral Spont 03/14/2024 Y   Final    Right Common Femoral Competent 03/14/2024 Y   Final    Right Common Femoral Phasic 03/14/2024 Y   Final    Right Common  Femoral Compress 03/14/2024 C   Final    Right Common Femoral Augment 03/14/2024 Y   Final    Right Saphenofemoral Junction Comp* 03/14/2024 C   Final    Right Proximal Femoral Compress 03/14/2024 C   Final    Right Mid Femoral Spont 03/14/2024 Y   Final    Right Mid Femoral Competent 03/14/2024 Y   Final    Right Mid Femoral Phasic 03/14/2024 Y   Final    Right Mid Femoral Compress 03/14/2024 C   Final    Right Mid Femoral Augment 03/14/2024 Y   Final    Right Distal Femoral Compress 03/14/2024 C   Final    Right Popliteal Spont 03/14/2024 Y   Final    Right Popliteal Competent 03/14/2024 Y   Final    Right Popliteal Phasic 03/14/2024 Y   Final    Right Popliteal Compress 03/14/2024 C   Final    Right Popliteal Augment 03/14/2024 Y   Final    Right Posterior Tibial Compress 03/14/2024 C   Final    Right Peroneal Compress 03/14/2024 C   Final    Right Gastronemius Compress 03/14/2024 C   Final    Right Greater Saph AK Compress 03/14/2024 C   Final    Right Greater Saph BK Compress 03/14/2024 C   Final    Right Lesser Saph Compress 03/14/2024 C   Final    Left Common Femoral Spont 03/14/2024 Y   Final    Left Common Femoral Competent 03/14/2024 Y   Final    Left Common Femoral Phasic 03/14/2024 Y   Final    Left Common Femoral Compress 03/14/2024 C   Final    Left Common Femoral Augment 03/14/2024 Y   Final    Left Saphenofemoral Junction Compr* 03/14/2024 C   Final    Left Proximal Femoral Compress 03/14/2024 C   Final    Left Mid Femoral Spont 03/14/2024 Y   Final    Left Mid Femoral Competent 03/14/2024 Y   Final    Left Mid Femoral Phasic 03/14/2024 Y   Final    Left Mid Femoral Compress 03/14/2024 C   Final    Left Mid Femoral Augment 03/14/2024 Y   Final    Left Distal Femoral Compress 03/14/2024 C   Final    Left Popliteal Spont 03/14/2024 Y   Final    Left Popliteal Competent 03/14/2024 Y   Final    Left Popliteal Phasic 03/14/2024 Y   Final    Left Popliteal Compress 03/14/2024 C   Final    Left Popliteal  Augment 03/14/2024 Y   Final    Left Posterior Tibial Compress 03/14/2024 C   Final    Left Gastronemius Compress 03/14/2024 C   Final    Left Greater Saph AK Compress 03/14/2024 C   Final    Left Greater Saph BK Compress 03/14/2024 C   Final    Left Lesser Saph Compress 03/14/2024 C   Final    BH CV VAS PRELIMINARY FINDINGS SCR* 03/14/2024 1.0   Final    HS Troponin T 03/14/2024 <6  <14 ng/L Final    Troponin T Delta 03/14/2024    Final    Unable to calculate.    Glucose 03/15/2024 155 (H)  65 - 99 mg/dL Final    BUN 03/15/2024 9  8 - 23 mg/dL Final    Creatinine 03/15/2024 0.77  0.57 - 1.00 mg/dL Final    Sodium 03/15/2024 138  136 - 145 mmol/L Final    Potassium 03/15/2024 4.1  3.5 - 5.2 mmol/L Final    Chloride 03/15/2024 102  98 - 107 mmol/L Final    CO2 03/15/2024 26.0  22.0 - 29.0 mmol/L Final    Calcium 03/15/2024 8.9  8.6 - 10.5 mg/dL Final    BUN/Creatinine Ratio 03/15/2024 11.7  7.0 - 25.0 Final    Anion Gap 03/15/2024 10.0  5.0 - 15.0 mmol/L Final    eGFR 03/15/2024 88.4  >60.0 mL/min/1.73 Final    WBC 03/15/2024 7.89  3.40 - 10.80 10*3/mm3 Final    RBC 03/15/2024 4.55  3.77 - 5.28 10*6/mm3 Final    Hemoglobin 03/15/2024 12.5  12.0 - 15.9 g/dL Final    Hematocrit 03/15/2024 39.8  34.0 - 46.6 % Final    MCV 03/15/2024 87.5  79.0 - 97.0 fL Final    MCH 03/15/2024 27.5  26.6 - 33.0 pg Final    MCHC 03/15/2024 31.4 (L)  31.5 - 35.7 g/dL Final    RDW 03/15/2024 13.8  12.3 - 15.4 % Final    RDW-SD 03/15/2024 43.6  37.0 - 54.0 fl Final    MPV 03/15/2024 8.9  6.0 - 12.0 fL Final    Platelets 03/15/2024 205  140 - 450 10*3/mm3 Final    Neutrophil % 03/15/2024 64.6  42.7 - 76.0 % Final    Lymphocyte % 03/15/2024 19.9  19.6 - 45.3 % Final    Monocyte % 03/15/2024 8.6  5.0 - 12.0 % Final    Eosinophil % 03/15/2024 5.3  0.3 - 6.2 % Final    Basophil % 03/15/2024 0.8  0.0 - 1.5 % Final    Immature Grans % 03/15/2024 0.8 (H)  0.0 - 0.5 % Final    Neutrophils, Absolute 03/15/2024 5.10  1.70 - 7.00 10*3/mm3 Final     Lymphocytes, Absolute 03/15/2024 1.57  0.70 - 3.10 10*3/mm3 Final    Monocytes, Absolute 03/15/2024 0.68  0.10 - 0.90 10*3/mm3 Final    Eosinophils, Absolute 03/15/2024 0.42 (H)  0.00 - 0.40 10*3/mm3 Final    Basophils, Absolute 03/15/2024 0.06  0.00 - 0.20 10*3/mm3 Final    Immature Grans, Absolute 03/15/2024 0.06 (H)  0.00 - 0.05 10*3/mm3 Final    nRBC 03/15/2024 0.0  0.0 - 0.2 /100 WBC Final    HS Troponin T 03/15/2024 6  <14 ng/L Final    Total Cholesterol 03/15/2024 170  0 - 200 mg/dL Final    Triglycerides 03/15/2024 136  0 - 150 mg/dL Final    HDL Cholesterol 03/15/2024 30 (L)  40 - 60 mg/dL Final    LDL Cholesterol  03/15/2024 115 (H)  0 - 100 mg/dL Final    VLDL Cholesterol 03/15/2024 25  5 - 40 mg/dL Final    LDL/HDL Ratio 03/15/2024 3.76   Final    TSH 03/15/2024 3.350  0.270 - 4.200 uIU/mL Final    Glucose 03/16/2024 156 (H)  70 - 105 mg/dL Final    Serial Number: [id number]:  [id number]    Glucose 03/16/2024 179 (H)  70 - 105 mg/dL Final    Serial Number: [id number]:  [id number]    Glucose 03/15/2024 150 (H)  70 - 105 mg/dL Final    Serial Number: [id number]:  [id number]    Glucose 03/15/2024 285 (H)  70 - 105 mg/dL Final    Serial Number: [id number]:  [id number]    Glucose 03/16/2024 194 (H)  65 - 99 mg/dL Final    BUN 03/16/2024 14  8 - 23 mg/dL Final    Creatinine 03/16/2024 0.77  0.57 - 1.00 mg/dL Final    Sodium 03/16/2024 136  136 - 145 mmol/L Final    Potassium 03/16/2024 4.5  3.5 - 5.2 mmol/L Final    Chloride 03/16/2024 102  98 - 107 mmol/L Final    CO2 03/16/2024 23.0  22.0 - 29.0 mmol/L Final    Calcium 03/16/2024 9.3  8.6 - 10.5 mg/dL Final    BUN/Creatinine Ratio 03/16/2024 18.2  7.0 - 25.0 Final    Anion Gap 03/16/2024 11.0  5.0 - 15.0 mmol/L Final    eGFR 03/16/2024 88.4  >60.0 mL/min/1.73 Final    WBC 03/16/2024 10.78  3.40 - 10.80 10*3/mm3 Final    RBC 03/16/2024 4.58  3.77 - 5.28 10*6/mm3 Final    Hemoglobin 03/16/2024 12.7  12.0 - 15.9 g/dL Final    Hematocrit  03/16/2024 39.2  34.0 - 46.6 % Final    MCV 03/16/2024 85.6  79.0 - 97.0 fL Final    MCH 03/16/2024 27.7  26.6 - 33.0 pg Final    MCHC 03/16/2024 32.4  31.5 - 35.7 g/dL Final    RDW 03/16/2024 13.1  12.3 - 15.4 % Final    RDW-SD 03/16/2024 40.9  37.0 - 54.0 fl Final    MPV 03/16/2024 9.1  6.0 - 12.0 fL Final    Platelets 03/16/2024 230  140 - 450 10*3/mm3 Final    Neutrophil % 03/16/2024 88.0 (H)  42.7 - 76.0 % Final    Lymphocyte % 03/16/2024 7.7 (L)  19.6 - 45.3 % Final    Monocyte % 03/16/2024 3.2 (L)  5.0 - 12.0 % Final    Eosinophil % 03/16/2024 0.0 (L)  0.3 - 6.2 % Final    Basophil % 03/16/2024 0.1  0.0 - 1.5 % Final    Immature Grans % 03/16/2024 1.0 (H)  0.0 - 0.5 % Final    Neutrophils, Absolute 03/16/2024 9.48 (H)  1.70 - 7.00 10*3/mm3 Final    Lymphocytes, Absolute 03/16/2024 0.83  0.70 - 3.10 10*3/mm3 Final    Monocytes, Absolute 03/16/2024 0.35  0.10 - 0.90 10*3/mm3 Final    Eosinophils, Absolute 03/16/2024 0.00  0.00 - 0.40 10*3/mm3 Final    Basophils, Absolute 03/16/2024 0.01  0.00 - 0.20 10*3/mm3 Final    Immature Grans, Absolute 03/16/2024 0.11 (H)  0.00 - 0.05 10*3/mm3 Final    nRBC 03/16/2024 0.0  0.0 - 0.2 /100 WBC Final   Office Visit on 03/13/2024   Component Date Value Ref Range Status    HS Troponin T 03/13/2024 <6  <14 ng/L Final    CKMB 03/13/2024 1.55  <=5.30 ng/mL Final    D-Dimer, Quantitative 03/13/2024 1.33 (H)  0.00 - 0.60 mg/L (FEU) Final    Glucose 03/13/2024 148 (H)  65 - 99 mg/dL Final    BUN 03/13/2024 12  8 - 23 mg/dL Final    Creatinine 03/13/2024 0.91  0.57 - 1.00 mg/dL Final    Sodium 03/13/2024 137  136 - 145 mmol/L Final    Potassium 03/13/2024 4.2  3.5 - 5.2 mmol/L Final    Chloride 03/13/2024 100  98 - 107 mmol/L Final    CO2 03/13/2024 23.0  22.0 - 29.0 mmol/L Final    Calcium 03/13/2024 9.8  8.6 - 10.5 mg/dL Final    Total Protein 03/13/2024 8.1  6.0 - 8.5 g/dL Final    Albumin 03/13/2024 4.2  3.5 - 5.2 g/dL Final    ALT (SGPT) 03/13/2024 45 (H)  1 - 33 U/L Final     AST (SGOT) 03/13/2024 72 (H)  1 - 32 U/L Final    Alkaline Phosphatase 03/13/2024 198 (H)  39 - 117 U/L Final    Total Bilirubin 03/13/2024 0.6  0.0 - 1.2 mg/dL Final    Globulin 03/13/2024 3.9  gm/dL Final    A/G Ratio 03/13/2024 1.1  g/dL Final    BUN/Creatinine Ratio 03/13/2024 13.2  7.0 - 25.0 Final    Anion Gap 03/13/2024 14.0  5.0 - 15.0 mmol/L Final    eGFR 03/13/2024 72.4  >60.0 mL/min/1.73 Final    Hemoglobin A1C 03/13/2024 7.20 (H)  4.80 - 5.60 % Final    WBC 03/13/2024 9.73  3.40 - 10.80 10*3/mm3 Final    RBC 03/13/2024 5.12  3.77 - 5.28 10*6/mm3 Final    Hemoglobin 03/13/2024 14.1  12.0 - 15.9 g/dL Final    Hematocrit 03/13/2024 42.9  34.0 - 46.6 % Final    MCV 03/13/2024 83.8  79.0 - 97.0 fL Final    MCH 03/13/2024 27.5  26.6 - 33.0 pg Final    MCHC 03/13/2024 32.9  31.5 - 35.7 g/dL Final    RDW 03/13/2024 13.8  12.3 - 15.4 % Final    RDW-SD 03/13/2024 41.8  37.0 - 54.0 fl Final    MPV 03/13/2024 8.9  6.0 - 12.0 fL Final    Platelets 03/13/2024 262  140 - 450 10*3/mm3 Final    Neutrophil % 03/13/2024 73.0  42.7 - 76.0 % Final    Lymphocyte % 03/13/2024 15.3 (L)  19.6 - 45.3 % Final    Monocyte % 03/13/2024 6.7  5.0 - 12.0 % Final    Eosinophil % 03/13/2024 3.9  0.3 - 6.2 % Final    Basophil % 03/13/2024 0.6  0.0 - 1.5 % Final    Immature Grans % 03/13/2024 0.5  0.0 - 0.5 % Final    Neutrophils, Absolute 03/13/2024 7.10 (H)  1.70 - 7.00 10*3/mm3 Final    Lymphocytes, Absolute 03/13/2024 1.49  0.70 - 3.10 10*3/mm3 Final    Monocytes, Absolute 03/13/2024 0.65  0.10 - 0.90 10*3/mm3 Final    Eosinophils, Absolute 03/13/2024 0.38  0.00 - 0.40 10*3/mm3 Final    Basophils, Absolute 03/13/2024 0.06  0.00 - 0.20 10*3/mm3 Final    Immature Grans, Absolute 03/13/2024 0.05  0.00 - 0.05 10*3/mm3 Final    nRBC 03/13/2024 0.0  0.0 - 0.2 /100 WBC Final         Physical Exam  Vitals and nursing note reviewed.   Constitutional:       Appearance: Normal appearance. She is normal weight.   HENT:      Head: Normocephalic and  atraumatic.   Cardiovascular:      Rate and Rhythm: Normal rate and regular rhythm.      Pulses: Normal pulses.      Heart sounds: Normal heart sounds. No murmur heard.     No friction rub. No gallop.   Pulmonary:      Effort: Pulmonary effort is normal. No respiratory distress.      Breath sounds: Normal breath sounds. No stridor. No wheezing, rhonchi or rales.   Chest:      Chest wall: No tenderness.   Abdominal:      General: Abdomen is flat. Bowel sounds are normal. There is no distension.      Palpations: Abdomen is soft. There is no mass.      Tenderness: There is no abdominal tenderness. There is no right CVA tenderness, left CVA tenderness, guarding or rebound.      Hernia: No hernia is present.   Skin:     General: Skin is warm and dry.      Capillary Refill: Capillary refill takes less than 2 seconds.      Coloration: Skin is not jaundiced or pale.   Neurological:      General: No focal deficit present.      Mental Status: She is alert and oriented to person, place, and time. Mental status is at baseline.      Motor: No weakness.      Coordination: Coordination normal.      Gait: Gait normal.   Psychiatric:         Mood and Affect: Mood normal.         Behavior: Behavior normal.         Thought Content: Thought content normal.         Judgment: Judgment normal.          Result Review  Data Reviewed:{ Labs  Result Review  Imaging  Med Tab  Media :23}   I have reviewed this patient's chart.  I have reviewed previous labs, previous imaging, previous medications, and previous encounters with notes that were available in this patient's chart.               Assessment and Plan {CC Problem List  Visit Diagnosis  ROS  Review (Popup)  [facility]  Quality  BestPractice  Medications  SmartSets  SnapShot Encounters  Media :23}   Diagnoses and all orders for this visit:    1. Persistent vomiting (Primary)  -     POCT SARS-CoV-2 Antigen NASRIN + Flu  -     CT Abdomen Pelvis Without Contrast;  Future  -     ondansetron ODT (ZOFRAN-ODT) 4 MG disintegrating tablet; Place 1 tablet on the tongue Every 8 (Eight) Hours As Needed for Nausea or Vomiting.  Dispense: 30 tablet; Refill: 1    2. Acute cough  -     POCT SARS-CoV-2 Antigen NASRIN + Flu    3. Generalized abdominal pain  -     CT Abdomen Pelvis Without Contrast; Future    4. Constipation, unspecified constipation type  -     CT Abdomen Pelvis Without Contrast; Future  -     magnesium citrate solution; Take 296 mL by mouth 1 (One) Time for 1 dose.  Dispense: 296 mL; Refill: 0  -     docusate sodium (Colace) 100 MG capsule; Take 1 capsule by mouth 2 (Two) Times a Day.  Dispense: 60 capsule; Refill: 1    5. Type 2 diabetes mellitus with hyperglycemia, without long-term current use of insulin  -     dapagliflozin Propanediol (Farxiga) 10 MG tablet; Take 10 mg by mouth Daily.  Dispense: 30 tablet; Refill: 3  -     glipizide (GLUCOTROL) 5 MG tablet; Take 1 tablet by mouth 2 (Two) Times a Day Before Meals.  Dispense: 30 tablet; Refill: 1        -Start Farxiga and glipizide for management of type 2 diabetes.  -CT of abdomen to rule out bowel blockage.  -If no bowel blockage, start bowel medications as discussed.  -ER red flags discussed with patient including risk versus benefit and education provided.  -Follow-up with me in 1 week if needed if improving follow-up at next routine visit.    I spent 30 minutes caring for Eladia on this date of service. This time includes time spent by me in the following activities:preparing for the visit, reviewing tests, obtaining and/or reviewing a separately obtained history, performing a medically appropriate examination and/or evaluation , counseling and educating the patient/family/caregiver, ordering medications, tests, or procedures, referring and communicating with other health care professionals , documenting information in the medical record, independently interpreting results and communicating that information with the  patient/family/caregiver, and care coordination.    Follow Up {Instructions Charge Capture  Follow-up Communications :23}     Patient was given instructions and counseling regarding her condition or for health maintenance advice. Please see specific information pulled into the AVS (placed there by myself) if appropriate.    Return in about 1 week (around 6/10/2024), or if symptoms worsen or fail to improve.    Class 3 Severe Obesity (BMI >=40). Obesity-related health conditions include the following: diabetes mellitus. Obesity is unchanged. BMI is is above average; BMI management plan is completed. We discussed portion control and increasing exercise.       SCOOBY Cisneros, FNP-BC

## 2024-06-04 LAB
QT INTERVAL: 362 MS
QTC INTERVAL: 455 MS

## 2024-06-10 ENCOUNTER — OFFICE VISIT (OUTPATIENT)
Dept: FAMILY MEDICINE CLINIC | Facility: CLINIC | Age: 59
End: 2024-06-10
Payer: MEDICARE

## 2024-06-10 VITALS
WEIGHT: 293 LBS | HEIGHT: 67 IN | RESPIRATION RATE: 18 BRPM | DIASTOLIC BLOOD PRESSURE: 70 MMHG | OXYGEN SATURATION: 98 % | HEART RATE: 83 BPM | TEMPERATURE: 98.1 F | SYSTOLIC BLOOD PRESSURE: 102 MMHG | BODY MASS INDEX: 45.99 KG/M2

## 2024-06-10 DIAGNOSIS — R11.15 PERSISTENT VOMITING: ICD-10-CM

## 2024-06-10 DIAGNOSIS — E11.65 TYPE 2 DIABETES MELLITUS WITH HYPERGLYCEMIA, WITHOUT LONG-TERM CURRENT USE OF INSULIN: ICD-10-CM

## 2024-06-10 DIAGNOSIS — F51.01 PRIMARY INSOMNIA: Primary | ICD-10-CM

## 2024-06-10 PROCEDURE — 1160F RVW MEDS BY RX/DR IN RCRD: CPT | Performed by: REGISTERED NURSE

## 2024-06-10 PROCEDURE — 99214 OFFICE O/P EST MOD 30 MIN: CPT | Performed by: REGISTERED NURSE

## 2024-06-10 PROCEDURE — 3051F HG A1C>EQUAL 7.0%<8.0%: CPT | Performed by: REGISTERED NURSE

## 2024-06-10 PROCEDURE — 1125F AMNT PAIN NOTED PAIN PRSNT: CPT | Performed by: REGISTERED NURSE

## 2024-06-10 PROCEDURE — 1159F MED LIST DOCD IN RCRD: CPT | Performed by: REGISTERED NURSE

## 2024-06-10 RX ORDER — ZOLPIDEM TARTRATE 5 MG/1
5 TABLET ORAL NIGHTLY PRN
Qty: 30 TABLET | Refills: 1 | Status: SHIPPED | OUTPATIENT
Start: 2024-06-10

## 2024-06-10 RX ORDER — BLOOD-GLUCOSE SENSOR
1 EACH MISCELLANEOUS
Qty: 2 EACH | Refills: 2 | Status: SHIPPED | OUTPATIENT
Start: 2024-06-10

## 2024-06-10 RX ORDER — ONDANSETRON 4 MG/1
4 TABLET, ORALLY DISINTEGRATING ORAL EVERY 8 HOURS PRN
Qty: 60 TABLET | Refills: 1 | Status: SHIPPED | OUTPATIENT
Start: 2024-06-10

## 2024-06-10 NOTE — PROGRESS NOTES
Chief Complaint  Follow-up (Patient is  here to discuss her test results she has done on 6/3/24. Chest X-Ray and CT Scan. )    Subjective    History of Present Illness {CC  Problem List  Visit  Diagnosis   Encounters  Notes  Medications  Labs  Result Review Imaging  Media :23}     Eladia Connor presents to [facility] for Follow-up (Patient is  here to discuss her test results she has done on 6/3/24. Chest X-Ray and CT Scan. ).      History of Present Illness  Patient is a 58 y.o. female  presents to the clinic today for ER follow-up for abdominal pain with persistent vomiting.  Patient denies any chest pain, shortness of breath, or any fevers.  Patient denies any known exposure to COVID, flu, or any other contagious illnesses.    In regards to abdominal pain with persistent vomiting, patient was seen in ER on Franci 3.  She received a CT of abdomen and chest x-ray that day.  No significant acute abnormalities were noted.  I discussed these results as well as all of her lab work in great detail with her.  Patient is scheduled to see gastroenterology and wants to see them sooner.  I did recommend to patient that she call gastroenterology to ask for a cancellation appointment if they have any.  ER red flags discussed with patient as well today.    In regards to insomnia, patient tried Lunesta and shares that this did not work at all for her.  She would like to go back to Ambien.  Patient denies any other concerns at this time.       Review of Systems   Constitutional: Negative.  Negative for activity change, chills, fatigue and fever.   HENT: Negative.  Negative for congestion, dental problem, ear pain, hearing loss, rhinorrhea, sinus pain, sore throat, tinnitus and trouble swallowing.    Eyes: Negative.  Negative for pain and visual disturbance.   Respiratory: Negative.  Negative for cough, chest tightness, shortness of breath and wheezing.    Cardiovascular: Negative.   "Negative for chest pain, palpitations and leg swelling.   Gastrointestinal: Negative.  Positive for abdominal pain, nausea and vomiting. Negative for diarrhea.   Endocrine: Negative.  Negative for polydipsia, polyphagia and polyuria.   Genitourinary: Negative.  Negative for difficulty urinating, dysuria, frequency and urgency.   Musculoskeletal: Negative.  Negative for arthralgias, back pain and myalgias.   Skin: Negative.  Negative for color change, pallor, rash and wound.   Allergic/Immunologic: Negative.  Negative for environmental allergies.   Neurological: Negative.  Negative for dizziness, speech difficulty, weakness, light-headedness, numbness and headaches.   Hematological: Negative.    Psychiatric/Behavioral: Negative.  Positive for sleep disturbance. Negative for confusion, decreased concentration, self-injury and suicidal ideas. The patient is not nervous/anxious.    All other systems reviewed and are negative.       Objective     Vital Signs:   /70 (BP Location: Left arm, Patient Position: Sitting, Cuff Size: Large Adult)   Pulse 83   Temp 98.1 °F (36.7 °C) (Oral)   Resp 18   Ht 170.2 cm (67\")   Wt (!) 147 kg (323 lb 12.8 oz)   SpO2 98%   BMI 50.71 kg/m²   Current Outpatient Medications on File Prior to Visit   Medication Sig Dispense Refill    albuterol sulfate  (90 Base) MCG/ACT inhaler Inhale 2 puffs Every 6 (Six) Hours As Needed for Wheezing. 18 g 2    ARIPiprazole (Abilify) 2 MG tablet Take 2 tablets by mouth Daily. 60 tablet 2    aspirin 81 MG chewable tablet Chew 2 tablets Daily. 90 tablet 1    atorvastatin (LIPITOR) 40 MG tablet Take 1 tablet by mouth every night at bedtime. 90 tablet 1    Blood Glucose Monitoring Suppl (Accu-Chek Guide Me) w/Device kit Use 1 Cartridge Daily As Needed (every morning and prn). 1 kit 0    busPIRone (BUSPAR) 15 MG tablet Take 1 tablet by mouth 3 (Three) Times a Day. 90 tablet 2    dapagliflozin Propanediol (Farxiga) 10 MG tablet Take 10 mg by " mouth Daily. 30 tablet 3    docusate sodium (Colace) 100 MG capsule Take 1 capsule by mouth 2 (Two) Times a Day. 60 capsule 1    gabapentin (NEURONTIN) 100 MG capsule Take 1 capsule by mouth 2 (Two) Times a Day. 60 capsule 1    glipizide (GLUCOTROL) 5 MG tablet Take 1 tablet by mouth 2 (Two) Times a Day Before Meals. 30 tablet 1    glucose blood test strip Use as instructed 100 each 0    ibuprofen (ADVIL,MOTRIN) 800 MG tablet Take 1 tablet by mouth Every 8 (Eight) Hours As Needed.      Incontinence Supplies misc Take As Directed.      isosorbide mononitrate (IMDUR) 30 MG 24 hr tablet Take 1 tablet by mouth Daily. 90 tablet 1    Lancets (accu-chek multiclix) lancets Check fasting glucose every morning and as needed 102 each 0    polyethylene glycol (MIRALAX) 17 GM/SCOOP powder Take 17 g by mouth Daily. 510 g 0    prazosin (MINIPRESS) 1 MG capsule Take 1 capsule by mouth Every Night.      sertraline (ZOLOFT) 100 MG tablet Take 1 tablet by mouth Daily. 90 tablet 1    [DISCONTINUED] Continuous Blood Gluc Sensor (FreeStyle Germania 3 Sensor) misc Use 1 each Every 14 (Fourteen) Days. 2 each 2    [DISCONTINUED] eszopiclone (Lunesta) 1 MG tablet Take 1 tablet by mouth Every Night. Take immediately before bedtime 30 tablet 2    [DISCONTINUED] ondansetron ODT (ZOFRAN-ODT) 4 MG disintegrating tablet Place 1 tablet on the tongue Every 8 (Eight) Hours As Needed for Nausea or Vomiting. 30 tablet 1     No current facility-administered medications on file prior to visit.        Past Medical History:   Diagnosis Date    Anxiety     Asthma     Cancer     SKIN    Depression     Diabetes mellitus     GERD (gastroesophageal reflux disease)     Hyperlipidemia     Hypertension     Psoriasis     Seizures 12/06/2022    Sleep apnea     cpap    Stroke 2017    mini          seizures      Past Surgical History:   Procedure Laterality Date    CHOLECYSTECTOMY  1987    ENDOSCOPY N/A 12/30/2022    Procedure: ESOPHAGOGASTRODUODENOSCOPY with gastric  biopsy and esophageal dilation #50,#54,#56,#58 bougie;  Surgeon: Patience Arana MD;  Location: [facility] ENDOSCOPY;  Service: Gastroenterology;  Laterality: N/A;  gastritis    EYE SURGERY      KNEE SURGERY        Family History   Problem Relation Age of Onset    Heart disease Mother     Breast cancer Mother     Heart disease Father     Pancreatic cancer Father     Heart disease Sister     Lung cancer Brother       Social History     Socioeconomic History    Marital status:    Tobacco Use    Smoking status: Never    Smokeless tobacco: Never   Vaping Use    Vaping status: Never Used   Substance and Sexual Activity    Alcohol use: Never    Drug use: Never    Sexual activity: Defer         Admission on 06/03/2024, Discharged on 06/03/2024   Component Date Value Ref Range Status    QT Interval 06/03/2024 362  ms Final    QTC Interval 06/03/2024 455  ms Final    Glucose 06/03/2024 153 (H)  65 - 99 mg/dL Final    BUN 06/03/2024 10  8 - 23 mg/dL Final    Creatinine 06/03/2024 0.86  0.57 - 1.00 mg/dL Final    Sodium 06/03/2024 135 (L)  136 - 145 mmol/L Final    Potassium 06/03/2024 4.0  3.5 - 5.2 mmol/L Final    Chloride 06/03/2024 99  98 - 107 mmol/L Final    CO2 06/03/2024 23.7  22.0 - 29.0 mmol/L Final    Calcium 06/03/2024 9.9  8.6 - 10.5 mg/dL Final    Total Protein 06/03/2024 8.4  6.0 - 8.5 g/dL Final    Albumin 06/03/2024 4.3  3.5 - 5.2 g/dL Final    ALT (SGPT) 06/03/2024 50 (H)  1 - 33 U/L Final    AST (SGOT) 06/03/2024 88 (H)  1 - 32 U/L Final    Alkaline Phosphatase 06/03/2024 180 (H)  39 - 117 U/L Final    Total Bilirubin 06/03/2024 0.8  0.0 - 1.2 mg/dL Final    Globulin 06/03/2024 4.1  gm/dL Final    A/G Ratio 06/03/2024 1.0  g/dL Final    BUN/Creatinine Ratio 06/03/2024 11.6  7.0 - 25.0 Final    Anion Gap 06/03/2024 12.3  5.0 - 15.0 mmol/L Final    eGFR 06/03/2024 77.5  >60.0 mL/min/1.73 Final    Lipase 06/03/2024 20  13 - 60 U/L Final    Protime 06/03/2024 11.4  9.6 - 11.7 Seconds Final    INR 06/03/2024  1.05  0.93 - 1.10 Final    PTT 06/03/2024 29.9 (L)  61.0 - 76.5 seconds Final    HS Troponin T 06/03/2024 10  <14 ng/L Final    WBC 06/03/2024 8.63  3.40 - 10.80 10*3/mm3 Final    RBC 06/03/2024 5.16  3.77 - 5.28 10*6/mm3 Final    Hemoglobin 06/03/2024 14.2  12.0 - 15.9 g/dL Final    Hematocrit 06/03/2024 43.8  34.0 - 46.6 % Final    MCV 06/03/2024 84.9  79.0 - 97.0 fL Final    MCH 06/03/2024 27.5  26.6 - 33.0 pg Final    MCHC 06/03/2024 32.4  31.5 - 35.7 g/dL Final    RDW 06/03/2024 13.5  12.3 - 15.4 % Final    RDW-SD 06/03/2024 41.8  37.0 - 54.0 fl Final    MPV 06/03/2024 8.8  6.0 - 12.0 fL Final    Platelets 06/03/2024 239  140 - 450 10*3/mm3 Final    Neutrophil % 06/03/2024 71.3  42.7 - 76.0 % Final    Lymphocyte % 06/03/2024 16.0 (L)  19.6 - 45.3 % Final    Monocyte % 06/03/2024 7.2  5.0 - 12.0 % Final    Eosinophil % 06/03/2024 4.2  0.3 - 6.2 % Final    Basophil % 06/03/2024 0.7  0.0 - 1.5 % Final    Immature Grans % 06/03/2024 0.6 (H)  0.0 - 0.5 % Final    Neutrophils, Absolute 06/03/2024 6.16  1.70 - 7.00 10*3/mm3 Final    Lymphocytes, Absolute 06/03/2024 1.38  0.70 - 3.10 10*3/mm3 Final    Monocytes, Absolute 06/03/2024 0.62  0.10 - 0.90 10*3/mm3 Final    Eosinophils, Absolute 06/03/2024 0.36  0.00 - 0.40 10*3/mm3 Final    Basophils, Absolute 06/03/2024 0.06  0.00 - 0.20 10*3/mm3 Final    Immature Grans, Absolute 06/03/2024 0.05  0.00 - 0.05 10*3/mm3 Final    nRBC 06/03/2024 0.0  0.0 - 0.2 /100 WBC Final    Extra Tube 06/03/2024 Hold for add-ons.   Final    Auto resulted.   Office Visit on 06/03/2024   Component Date Value Ref Range Status    SARS Antigen 06/03/2024 Not Detected  Not Detected, Presumptive Negative Final    Influenza A Antigen NASRIN 06/03/2024 Not Detected  Not Detected Final    Influenza B Antigen NASRIN 06/03/2024 Not Detected  Not Detected Final    Internal Control 06/03/2024 Passed  Passed Final    Lot Number 06/03/2024 [id number]   Final    Expiration Date 06/03/2024 02/15/2025   Final    Admission on 03/28/2024, Discharged on 03/28/2024   Component Date Value Ref Range Status    Glucose 03/28/2024 195 (H)  65 - 99 mg/dL Final    BUN 03/28/2024 11  8 - 23 mg/dL Final    Creatinine 03/28/2024 0.82  0.57 - 1.00 mg/dL Final    Sodium 03/28/2024 138  136 - 145 mmol/L Final    Potassium 03/28/2024 4.3  3.5 - 5.2 mmol/L Final    Chloride 03/28/2024 102  98 - 107 mmol/L Final    CO2 03/28/2024 25.0  22.0 - 29.0 mmol/L Final    Calcium 03/28/2024 8.9  8.6 - 10.5 mg/dL Final    BUN/Creatinine Ratio 03/28/2024 13.4  7.0 - 25.0 Final    Anion Gap 03/28/2024 11.0  5.0 - 15.0 mmol/L Final    eGFR 03/28/2024 82.0  >60.0 mL/min/1.73 Final    WBC 03/28/2024 10.53  3.40 - 10.80 10*3/mm3 Final    RBC 03/28/2024 4.86  3.77 - 5.28 10*6/mm3 Final    Hemoglobin 03/28/2024 13.3  12.0 - 15.9 g/dL Final    Hematocrit 03/28/2024 41.4  34.0 - 46.6 % Final    MCV 03/28/2024 85.2  79.0 - 97.0 fL Final    MCH 03/28/2024 27.4  26.6 - 33.0 pg Final    MCHC 03/28/2024 32.1  31.5 - 35.7 g/dL Final    RDW 03/28/2024 13.5  12.3 - 15.4 % Final    RDW-SD 03/28/2024 42.3  37.0 - 54.0 fl Final    MPV 03/28/2024 8.7  6.0 - 12.0 fL Final    Platelets 03/28/2024 213  140 - 450 10*3/mm3 Final    Neutrophil % 03/28/2024 75.7  42.7 - 76.0 % Final    Lymphocyte % 03/28/2024 12.9 (L)  19.6 - 45.3 % Final    Monocyte % 03/28/2024 6.8  5.0 - 12.0 % Final    Eosinophil % 03/28/2024 3.4  0.3 - 6.2 % Final    Basophil % 03/28/2024 0.6  0.0 - 1.5 % Final    Immature Grans % 03/28/2024 0.6 (H)  0.0 - 0.5 % Final    Neutrophils, Absolute 03/28/2024 7.97 (H)  1.70 - 7.00 10*3/mm3 Final    Lymphocytes, Absolute 03/28/2024 1.36  0.70 - 3.10 10*3/mm3 Final    Monocytes, Absolute 03/28/2024 0.72  0.10 - 0.90 10*3/mm3 Final    Eosinophils, Absolute 03/28/2024 0.36  0.00 - 0.40 10*3/mm3 Final    Basophils, Absolute 03/28/2024 0.06  0.00 - 0.20 10*3/mm3 Final    Immature Grans, Absolute 03/28/2024 0.06 (H)  0.00 - 0.05 10*3/mm3 Final    nRBC 03/28/2024 0.0  0.0  - 0.2 /100 WBC Final    Extra Tube 03/28/2024 Hold for add-ons.   Final    Auto resulted.    Extra Tube 03/28/2024 Hold for add-ons.   Final    Auto resulted    Wound Culture 03/28/2024 Heavy growth (4+) Staphylococcus aureus (A)   Final    Gram Stain 03/28/2024 Rare (1+) WBCs seen   Final    Gram Stain 03/28/2024 No organisms seen   Final    Extra Tube 03/28/2024 Hold for add-ons.   Final    Auto resulted.   Admission on 03/14/2024, Discharged on 03/16/2024   Component Date Value Ref Range Status    QT Interval 03/14/2024 350  ms Final    QTC Interval 03/14/2024 449  ms Final    proBNP 03/14/2024 <36.0  0.0 - 900.0 pg/mL Final    Glucose 03/14/2024 147 (H)  65 - 99 mg/dL Final    BUN 03/14/2024 9  8 - 23 mg/dL Final    Creatinine 03/14/2024 0.73  0.57 - 1.00 mg/dL Final    Sodium 03/14/2024 138  136 - 145 mmol/L Final    Potassium 03/14/2024 4.1  3.5 - 5.2 mmol/L Final    Chloride 03/14/2024 100  98 - 107 mmol/L Final    CO2 03/14/2024 27.0  22.0 - 29.0 mmol/L Final    Calcium 03/14/2024 9.2  8.6 - 10.5 mg/dL Final    Total Protein 03/14/2024 8.2  6.0 - 8.5 g/dL Final    Albumin 03/14/2024 4.2  3.5 - 5.2 g/dL Final    ALT (SGPT) 03/14/2024 50 (H)  1 - 33 U/L Final    AST (SGOT) 03/14/2024 78 (H)  1 - 32 U/L Final    Alkaline Phosphatase 03/14/2024 194 (H)  39 - 117 U/L Final    Total Bilirubin 03/14/2024 0.5  0.0 - 1.2 mg/dL Final    Globulin 03/14/2024 4.0  gm/dL Final    A/G Ratio 03/14/2024 1.1  g/dL Final    BUN/Creatinine Ratio 03/14/2024 12.3  7.0 - 25.0 Final    Anion Gap 03/14/2024 11.0  5.0 - 15.0 mmol/L Final    eGFR 03/14/2024 94.3  >60.0 mL/min/1.73 Final    Magnesium 03/14/2024 2.2  1.6 - 2.4 mg/dL Final    WBC 03/14/2024 7.73  3.40 - 10.80 10*3/mm3 Final    RBC 03/14/2024 4.89  3.77 - 5.28 10*6/mm3 Final    Hemoglobin 03/14/2024 13.4  12.0 - 15.9 g/dL Final    Hematocrit 03/14/2024 42.8  34.0 - 46.6 % Final    MCV 03/14/2024 87.5  79.0 - 97.0 fL Final    MCH 03/14/2024 27.4  26.6 - 33.0 pg Final     MCHC 03/14/2024 31.3 (L)  31.5 - 35.7 g/dL Final    RDW 03/14/2024 13.7  12.3 - 15.4 % Final    RDW-SD 03/14/2024 43.8  37.0 - 54.0 fl Final    MPV 03/14/2024 8.8  6.0 - 12.0 fL Final    Platelets 03/14/2024 234  140 - 450 10*3/mm3 Final    Neutrophil % 03/14/2024 67.4  42.7 - 76.0 % Final    Lymphocyte % 03/14/2024 19.0 (L)  19.6 - 45.3 % Final    Monocyte % 03/14/2024 7.1  5.0 - 12.0 % Final    Eosinophil % 03/14/2024 4.9  0.3 - 6.2 % Final    Basophil % 03/14/2024 0.8  0.0 - 1.5 % Final    Immature Grans % 03/14/2024 0.8 (H)  0.0 - 0.5 % Final    Neutrophils, Absolute 03/14/2024 5.21  1.70 - 7.00 10*3/mm3 Final    Lymphocytes, Absolute 03/14/2024 1.47  0.70 - 3.10 10*3/mm3 Final    Monocytes, Absolute 03/14/2024 0.55  0.10 - 0.90 10*3/mm3 Final    Eosinophils, Absolute 03/14/2024 0.38  0.00 - 0.40 10*3/mm3 Final    Basophils, Absolute 03/14/2024 0.06  0.00 - 0.20 10*3/mm3 Final    Immature Grans, Absolute 03/14/2024 0.06 (H)  0.00 - 0.05 10*3/mm3 Final    nRBC 03/14/2024 0.0  0.0 - 0.2 /100 WBC Final    Extra Tube 03/14/2024 Hold for add-ons.   Final    Auto resulted.    HS Troponin T 03/14/2024 <6  <14 ng/L Final    Right Common Femoral Spont 03/14/2024 Y   Final    Right Common Femoral Competent 03/14/2024 Y   Final    Right Common Femoral Phasic 03/14/2024 Y   Final    Right Common Femoral Compress 03/14/2024 C   Final    Right Common Femoral Augment 03/14/2024 Y   Final    Right Saphenofemoral Junction Comp* 03/14/2024 C   Final    Right Proximal Femoral Compress 03/14/2024 C   Final    Right Mid Femoral Spont 03/14/2024 Y   Final    Right Mid Femoral Competent 03/14/2024 Y   Final    Right Mid Femoral Phasic 03/14/2024 Y   Final    Right Mid Femoral Compress 03/14/2024 C   Final    Right Mid Femoral Augment 03/14/2024 Y   Final    Right Distal Femoral Compress 03/14/2024 C   Final    Right Popliteal Spont 03/14/2024 Y   Final    Right Popliteal Competent 03/14/2024 Y   Final    Right Popliteal Phasic  03/14/2024 Y   Final    Right Popliteal Compress 03/14/2024 C   Final    Right Popliteal Augment 03/14/2024 Y   Final    Right Posterior Tibial Compress 03/14/2024 C   Final    Right Peroneal Compress 03/14/2024 C   Final    Right Gastronemius Compress 03/14/2024 C   Final    Right Greater Saph AK Compress 03/14/2024 C   Final    Right Greater Saph BK Compress 03/14/2024 C   Final    Right Lesser Saph Compress 03/14/2024 C   Final    Left Common Femoral Spont 03/14/2024 Y   Final    Left Common Femoral Competent 03/14/2024 Y   Final    Left Common Femoral Phasic 03/14/2024 Y   Final    Left Common Femoral Compress 03/14/2024 C   Final    Left Common Femoral Augment 03/14/2024 Y   Final    Left Saphenofemoral Junction Compr* 03/14/2024 C   Final    Left Proximal Femoral Compress 03/14/2024 C   Final    Left Mid Femoral Spont 03/14/2024 Y   Final    Left Mid Femoral Competent 03/14/2024 Y   Final    Left Mid Femoral Phasic 03/14/2024 Y   Final    Left Mid Femoral Compress 03/14/2024 C   Final    Left Mid Femoral Augment 03/14/2024 Y   Final    Left Distal Femoral Compress 03/14/2024 C   Final    Left Popliteal Spont 03/14/2024 Y   Final    Left Popliteal Competent 03/14/2024 Y   Final    Left Popliteal Phasic 03/14/2024 Y   Final    Left Popliteal Compress 03/14/2024 C   Final    Left Popliteal Augment 03/14/2024 Y   Final    Left Posterior Tibial Compress 03/14/2024 C   Final    Left Gastronemius Compress 03/14/2024 C   Final    Left Greater Saph AK Compress 03/14/2024 C   Final    Left Greater Saph BK Compress 03/14/2024 C   Final    Left Lesser Saph Compress 03/14/2024 C   Final    BH CV VAS PRELIMINARY FINDINGS SCR* 03/14/2024 1.0   Final    HS Troponin T 03/14/2024 <6  <14 ng/L Final    Troponin T Delta 03/14/2024    Final    Unable to calculate.    Glucose 03/15/2024 155 (H)  65 - 99 mg/dL Final    BUN 03/15/2024 9  8 - 23 mg/dL Final    Creatinine 03/15/2024 0.77  0.57 - 1.00 mg/dL Final    Sodium 03/15/2024  138  136 - 145 mmol/L Final    Potassium 03/15/2024 4.1  3.5 - 5.2 mmol/L Final    Chloride 03/15/2024 102  98 - 107 mmol/L Final    CO2 03/15/2024 26.0  22.0 - 29.0 mmol/L Final    Calcium 03/15/2024 8.9  8.6 - 10.5 mg/dL Final    BUN/Creatinine Ratio 03/15/2024 11.7  7.0 - 25.0 Final    Anion Gap 03/15/2024 10.0  5.0 - 15.0 mmol/L Final    eGFR 03/15/2024 88.4  >60.0 mL/min/1.73 Final    WBC 03/15/2024 7.89  3.40 - 10.80 10*3/mm3 Final    RBC 03/15/2024 4.55  3.77 - 5.28 10*6/mm3 Final    Hemoglobin 03/15/2024 12.5  12.0 - 15.9 g/dL Final    Hematocrit 03/15/2024 39.8  34.0 - 46.6 % Final    MCV 03/15/2024 87.5  79.0 - 97.0 fL Final    MCH 03/15/2024 27.5  26.6 - 33.0 pg Final    MCHC 03/15/2024 31.4 (L)  31.5 - 35.7 g/dL Final    RDW 03/15/2024 13.8  12.3 - 15.4 % Final    RDW-SD 03/15/2024 43.6  37.0 - 54.0 fl Final    MPV 03/15/2024 8.9  6.0 - 12.0 fL Final    Platelets 03/15/2024 205  140 - 450 10*3/mm3 Final    Neutrophil % 03/15/2024 64.6  42.7 - 76.0 % Final    Lymphocyte % 03/15/2024 19.9  19.6 - 45.3 % Final    Monocyte % 03/15/2024 8.6  5.0 - 12.0 % Final    Eosinophil % 03/15/2024 5.3  0.3 - 6.2 % Final    Basophil % 03/15/2024 0.8  0.0 - 1.5 % Final    Immature Grans % 03/15/2024 0.8 (H)  0.0 - 0.5 % Final    Neutrophils, Absolute 03/15/2024 5.10  1.70 - 7.00 10*3/mm3 Final    Lymphocytes, Absolute 03/15/2024 1.57  0.70 - 3.10 10*3/mm3 Final    Monocytes, Absolute 03/15/2024 0.68  0.10 - 0.90 10*3/mm3 Final    Eosinophils, Absolute 03/15/2024 0.42 (H)  0.00 - 0.40 10*3/mm3 Final    Basophils, Absolute 03/15/2024 0.06  0.00 - 0.20 10*3/mm3 Final    Immature Grans, Absolute 03/15/2024 0.06 (H)  0.00 - 0.05 10*3/mm3 Final    nRBC 03/15/2024 0.0  0.0 - 0.2 /100 WBC Final    HS Troponin T 03/15/2024 6  <14 ng/L Final    Total Cholesterol 03/15/2024 170  0 - 200 mg/dL Final    Triglycerides 03/15/2024 136  0 - 150 mg/dL Final    HDL Cholesterol 03/15/2024 30 (L)  40 - 60 mg/dL Final    LDL Cholesterol   03/15/2024 115 (H)  0 - 100 mg/dL Final    VLDL Cholesterol 03/15/2024 25  5 - 40 mg/dL Final    LDL/HDL Ratio 03/15/2024 3.76   Final    TSH 03/15/2024 3.350  0.270 - 4.200 uIU/mL Final    Glucose 03/16/2024 156 (H)  70 - 105 mg/dL Final    Serial Number: [id number]:  [id number]    Glucose 03/16/2024 179 (H)  70 - 105 mg/dL Final    Serial Number: [id number]:  [id number]    Glucose 03/15/2024 150 (H)  70 - 105 mg/dL Final    Serial Number: [id number]:  [id number]    Glucose 03/15/2024 285 (H)  70 - 105 mg/dL Final    Serial Number: [id number]:  [id number]    Glucose 03/16/2024 194 (H)  65 - 99 mg/dL Final    BUN 03/16/2024 14  8 - 23 mg/dL Final    Creatinine 03/16/2024 0.77  0.57 - 1.00 mg/dL Final    Sodium 03/16/2024 136  136 - 145 mmol/L Final    Potassium 03/16/2024 4.5  3.5 - 5.2 mmol/L Final    Chloride 03/16/2024 102  98 - 107 mmol/L Final    CO2 03/16/2024 23.0  22.0 - 29.0 mmol/L Final    Calcium 03/16/2024 9.3  8.6 - 10.5 mg/dL Final    BUN/Creatinine Ratio 03/16/2024 18.2  7.0 - 25.0 Final    Anion Gap 03/16/2024 11.0  5.0 - 15.0 mmol/L Final    eGFR 03/16/2024 88.4  >60.0 mL/min/1.73 Final    WBC 03/16/2024 10.78  3.40 - 10.80 10*3/mm3 Final    RBC 03/16/2024 4.58  3.77 - 5.28 10*6/mm3 Final    Hemoglobin 03/16/2024 12.7  12.0 - 15.9 g/dL Final    Hematocrit 03/16/2024 39.2  34.0 - 46.6 % Final    MCV 03/16/2024 85.6  79.0 - 97.0 fL Final    MCH 03/16/2024 27.7  26.6 - 33.0 pg Final    MCHC 03/16/2024 32.4  31.5 - 35.7 g/dL Final    RDW 03/16/2024 13.1  12.3 - 15.4 % Final    RDW-SD 03/16/2024 40.9  37.0 - 54.0 fl Final    MPV 03/16/2024 9.1  6.0 - 12.0 fL Final    Platelets 03/16/2024 230  140 - 450 10*3/mm3 Final    Neutrophil % 03/16/2024 88.0 (H)  42.7 - 76.0 % Final    Lymphocyte % 03/16/2024 7.7 (L)  19.6 - 45.3 % Final    Monocyte % 03/16/2024 3.2 (L)  5.0 - 12.0 % Final    Eosinophil % 03/16/2024 0.0 (L)  0.3 - 6.2 % Final    Basophil % 03/16/2024 0.1  0.0 - 1.5 % Final     Immature Grans % 03/16/2024 1.0 (H)  0.0 - 0.5 % Final    Neutrophils, Absolute 03/16/2024 9.48 (H)  1.70 - 7.00 10*3/mm3 Final    Lymphocytes, Absolute 03/16/2024 0.83  0.70 - 3.10 10*3/mm3 Final    Monocytes, Absolute 03/16/2024 0.35  0.10 - 0.90 10*3/mm3 Final    Eosinophils, Absolute 03/16/2024 0.00  0.00 - 0.40 10*3/mm3 Final    Basophils, Absolute 03/16/2024 0.01  0.00 - 0.20 10*3/mm3 Final    Immature Grans, Absolute 03/16/2024 0.11 (H)  0.00 - 0.05 10*3/mm3 Final    nRBC 03/16/2024 0.0  0.0 - 0.2 /100 WBC Final   Office Visit on 03/13/2024   Component Date Value Ref Range Status    HS Troponin T 03/13/2024 <6  <14 ng/L Final    CKMB 03/13/2024 1.55  <=5.30 ng/mL Final    D-Dimer, Quantitative 03/13/2024 1.33 (H)  0.00 - 0.60 mg/L (FEU) Final    Glucose 03/13/2024 148 (H)  65 - 99 mg/dL Final    BUN 03/13/2024 12  8 - 23 mg/dL Final    Creatinine 03/13/2024 0.91  0.57 - 1.00 mg/dL Final    Sodium 03/13/2024 137  136 - 145 mmol/L Final    Potassium 03/13/2024 4.2  3.5 - 5.2 mmol/L Final    Chloride 03/13/2024 100  98 - 107 mmol/L Final    CO2 03/13/2024 23.0  22.0 - 29.0 mmol/L Final    Calcium 03/13/2024 9.8  8.6 - 10.5 mg/dL Final    Total Protein 03/13/2024 8.1  6.0 - 8.5 g/dL Final    Albumin 03/13/2024 4.2  3.5 - 5.2 g/dL Final    ALT (SGPT) 03/13/2024 45 (H)  1 - 33 U/L Final    AST (SGOT) 03/13/2024 72 (H)  1 - 32 U/L Final    Alkaline Phosphatase 03/13/2024 198 (H)  39 - 117 U/L Final    Total Bilirubin 03/13/2024 0.6  0.0 - 1.2 mg/dL Final    Globulin 03/13/2024 3.9  gm/dL Final    A/G Ratio 03/13/2024 1.1  g/dL Final    BUN/Creatinine Ratio 03/13/2024 13.2  7.0 - 25.0 Final    Anion Gap 03/13/2024 14.0  5.0 - 15.0 mmol/L Final    eGFR 03/13/2024 72.4  >60.0 mL/min/1.73 Final    Hemoglobin A1C 03/13/2024 7.20 (H)  4.80 - 5.60 % Final    WBC 03/13/2024 9.73  3.40 - 10.80 10*3/mm3 Final    RBC 03/13/2024 5.12  3.77 - 5.28 10*6/mm3 Final    Hemoglobin 03/13/2024 14.1  12.0 - 15.9 g/dL Final     Hematocrit 03/13/2024 42.9  34.0 - 46.6 % Final    MCV 03/13/2024 83.8  79.0 - 97.0 fL Final    MCH 03/13/2024 27.5  26.6 - 33.0 pg Final    MCHC 03/13/2024 32.9  31.5 - 35.7 g/dL Final    RDW 03/13/2024 13.8  12.3 - 15.4 % Final    RDW-SD 03/13/2024 41.8  37.0 - 54.0 fl Final    MPV 03/13/2024 8.9  6.0 - 12.0 fL Final    Platelets 03/13/2024 262  140 - 450 10*3/mm3 Final    Neutrophil % 03/13/2024 73.0  42.7 - 76.0 % Final    Lymphocyte % 03/13/2024 15.3 (L)  19.6 - 45.3 % Final    Monocyte % 03/13/2024 6.7  5.0 - 12.0 % Final    Eosinophil % 03/13/2024 3.9  0.3 - 6.2 % Final    Basophil % 03/13/2024 0.6  0.0 - 1.5 % Final    Immature Grans % 03/13/2024 0.5  0.0 - 0.5 % Final    Neutrophils, Absolute 03/13/2024 7.10 (H)  1.70 - 7.00 10*3/mm3 Final    Lymphocytes, Absolute 03/13/2024 1.49  0.70 - 3.10 10*3/mm3 Final    Monocytes, Absolute 03/13/2024 0.65  0.10 - 0.90 10*3/mm3 Final    Eosinophils, Absolute 03/13/2024 0.38  0.00 - 0.40 10*3/mm3 Final    Basophils, Absolute 03/13/2024 0.06  0.00 - 0.20 10*3/mm3 Final    Immature Grans, Absolute 03/13/2024 0.05  0.00 - 0.05 10*3/mm3 Final    nRBC 03/13/2024 0.0  0.0 - 0.2 /100 WBC Final         Physical Exam  Vitals and nursing note reviewed.   Constitutional:       Appearance: Normal appearance. She is normal weight.   HENT:      Head: Normocephalic and atraumatic.   Cardiovascular:      Rate and Rhythm: Normal rate and regular rhythm.      Pulses: Normal pulses.      Heart sounds: Normal heart sounds. No murmur heard.     No friction rub. No gallop.   Pulmonary:      Effort: Pulmonary effort is normal. No respiratory distress.      Breath sounds: Normal breath sounds. No stridor. No wheezing, rhonchi or rales.   Chest:      Chest wall: No tenderness.   Abdominal:      General: Abdomen is flat. Bowel sounds are normal. There is no distension.      Palpations: Abdomen is soft. There is no mass.      Tenderness: There is no abdominal tenderness. There is no right CVA  tenderness, left CVA tenderness, guarding or rebound.      Hernia: No hernia is present.   Skin:     General: Skin is warm and dry.      Capillary Refill: Capillary refill takes less than 2 seconds.      Coloration: Skin is not jaundiced or pale.   Neurological:      General: No focal deficit present.      Mental Status: She is alert and oriented to person, place, and time. Mental status is at baseline.      Motor: No weakness.      Coordination: Coordination normal.      Gait: Gait normal.   Psychiatric:         Mood and Affect: Mood normal.         Behavior: Behavior normal.         Thought Content: Thought content normal.         Judgment: Judgment normal.          Result Review  Data Reviewed:{ Labs  Result Review  Imaging  Med Tab  Media :23}   I have reviewed this patient's chart.  I have reviewed previous labs, previous imaging, previous medications, and previous encounters with notes that were available in this patient's chart.               Assessment and Plan {CC Problem List  Visit Diagnosis  ROS  Review (Popup)  [facility]  Quality  BestPractice  Medications  SmartSets  SnapShot Encounters  Media :23}   Diagnoses and all orders for this visit:    1. Primary insomnia (Primary)  -     zolpidem (Ambien) 5 MG tablet; Take 1 tablet by mouth At Night As Needed for Sleep.  Dispense: 30 tablet; Refill: 1    2. Persistent vomiting  -     ondansetron ODT (ZOFRAN-ODT) 4 MG disintegrating tablet; Place 1 tablet on the tongue Every 8 (Eight) Hours As Needed for Nausea or Vomiting.  Dispense: 60 tablet; Refill: 1    3. Type 2 diabetes mellitus with hyperglycemia, without long-term current use of insulin  -     Continuous Glucose Sensor (FreeStyle Germania 3 Sensor) misc; Use 1 each Every 14 (Fourteen) Days.  Dispense: 2 each; Refill: 2        -Zofran refill for continued nausea with intermittent vomiting.  -Patient shares that Lunesta did not help and the like to go back to Ambien to help with her  insomnia.  Refill sent.  -Refill of continuous diabetic monitor.  -ER red flags discussed with patient including risk versus benefit and education provided.  -Follow-up with me in 4 weeks or sooner if needed.    I spent 30 minutes caring for Eladia on this date of service. This time includes time spent by me in the following activities:preparing for the visit, reviewing tests, obtaining and/or reviewing a separately obtained history, performing a medically appropriate examination and/or evaluation , counseling and educating the patient/family/caregiver, ordering medications, tests, or procedures, referring and communicating with other health care professionals , documenting information in the medical record, independently interpreting results and communicating that information with the patient/family/caregiver, and care coordination.    Follow Up {Instructions Charge Capture  Follow-up Communications :23}     Patient was given instructions and counseling regarding her condition or for health maintenance advice. Please see specific information pulled into the AVS (placed there by myself) if appropriate.    Return in about 4 weeks (around 7/8/2024) for Recheck.    Class 3 Severe Obesity (BMI >=40). Obesity-related health conditions include the following: dyslipidemias. Obesity is unchanged. BMI is is above average; BMI management plan is completed. We discussed portion control and increasing exercise.       SCOOBY Cisneros, FNP-BC

## 2024-06-24 ENCOUNTER — TELEPHONE (OUTPATIENT)
Dept: FAMILY MEDICINE CLINIC | Facility: CLINIC | Age: 59
End: 2024-06-24

## 2024-06-24 NOTE — TELEPHONE ENCOUNTER
Caller: Eladia Connor    Relationship: Self    Best call back number: [phone]    What medication are you requesting:     zolpidem (Ambien)    10 MG ONE TABLET NIGHTLY    What are your current symptoms: INSOMNIA    Have you had these symptoms before:    [x] Yes  [] No    Have you been treated for these symptoms before:   [x] Yes  [] No    If a prescription is needed, what is your preferred pharmacy and phone number:  [place] [id number] [facility] [phone] [facility] [phone] FX      Additional notes: PATIENT STATED SHE WAS TAKING THE 10 MG TABLET PRIOR.    5 MG TABLET ONLY ALLOWED HER TO GET AN HOUR OF SLEEP LAST NIGHT.    PATIENT STATED THAT SHE HAD BEEN TAKING THE 10 MG PRIOR TO TRYING THE LUNESTA.    PATIENT REQUESTING ADVISE IF ABLE TO TAKE TWO PER NIGHT AND GET REFILL FOR 10 MGS.

## 2024-06-25 ENCOUNTER — TELEPHONE (OUTPATIENT)
Dept: FAMILY MEDICINE CLINIC | Facility: CLINIC | Age: 59
End: 2024-06-25
Payer: MEDICARE

## 2024-06-25 RX ORDER — ZOLPIDEM TARTRATE 10 MG/1
10 TABLET ORAL NIGHTLY PRN
Qty: 30 TABLET | Refills: 1 | OUTPATIENT
Start: 2024-06-25

## 2024-06-25 RX ORDER — ZOLPIDEM TARTRATE 10 MG/1
10 TABLET ORAL NIGHTLY PRN
COMMUNITY

## 2024-06-25 NOTE — TELEPHONE ENCOUNTER
PATIENT IS ASKING TO GO BACK ON THE AMBIEN 10 MG, SHE STATED THAT THE 5 MG IS ONLY GETTING HER A COUPLE HOURS OF SLEEP, SHE WAS ON THE 10 MG PREVIOUSLY AND THIS WAS DISCONTINUED BY PREVIOUS PROVIDER ON 3/15/24.       RX FOR THE 5 MG WAS SENT IN ON 6/10/24.               Rx Refill Note  Requested Prescriptions     Pending Prescriptions Disp Refills    zolpidem (Ambien) 10 MG tablet 30 tablet 1     Sig: Take 1 tablet by mouth At Night As Needed for Sleep.      Last office visit with prescribing clinician: 6/10/2024   Last telemedicine visit with prescribing clinician: Visit date not found   Next office visit with prescribing clinician: 7/16/2024           Vi Kwon CMA  06/25/24, 16:13 EDT

## 2024-06-26 NOTE — TELEPHONE ENCOUNTER
MARK IS INSTRUCTED TO RELAY BELOW MESSAGE FROM DAKSHA    IF PT CALLS BACK    This dose is inappropriate as stated before.  I will only send 5 mg I am sorry.  It is not safe to take 10 mg.

## 2024-07-01 ENCOUNTER — TELEPHONE (OUTPATIENT)
Dept: FAMILY MEDICINE CLINIC | Facility: CLINIC | Age: 59
End: 2024-07-01
Payer: MEDICARE

## 2024-07-01 NOTE — TELEPHONE ENCOUNTER
Name: Eladia Connor    Relationship: Self    Best Callback Number: [phone]    HUB PROVIDED THE RELAY MESSAGE FROM THE OFFICE   PATIENT HAS FURTHER QUESTIONS AND WOULD LIKE A CALL BACK AT THE FOLLOWING PHONE NUMBER [phone]    ADDITIONAL INFORMATION: STATED THAT THEY WOULD LIKE TO DISCUSS AGAIN ABOUT THIS AS THEY HAD BEEN ON THE 10 MG BEFORE BUT THE 5 MG DOES NOT HELP THEM. PLEASE CALL AND ADVISE

## 2024-08-08 ENCOUNTER — TELEPHONE (OUTPATIENT)
Dept: FAMILY MEDICINE CLINIC | Facility: CLINIC | Age: 59
End: 2024-08-08
Payer: MEDICARE

## 2024-08-08 NOTE — TELEPHONE ENCOUNTER
HUB TO RELAY:    LEFT  ASKING IF SHE WOULD LIKE TO SCHEDULE AWV. IF CALL IS RETURNED, PLEASE SCHEDULE IN OCTOBER OR NOVEMBER W/ DAKSHA IN A 45 MINUTE TIME-SLOT.     THANK YOU!

## 2024-08-16 ENCOUNTER — OFFICE VISIT (OUTPATIENT)
Dept: FAMILY MEDICINE CLINIC | Facility: CLINIC | Age: 59
End: 2024-08-16
Payer: MEDICARE

## 2024-08-16 VITALS
RESPIRATION RATE: 18 BRPM | WEIGHT: 293 LBS | HEART RATE: 72 BPM | TEMPERATURE: 98.2 F | HEIGHT: 67 IN | OXYGEN SATURATION: 97 % | SYSTOLIC BLOOD PRESSURE: 100 MMHG | BODY MASS INDEX: 45.99 KG/M2 | DIASTOLIC BLOOD PRESSURE: 60 MMHG

## 2024-08-16 DIAGNOSIS — F51.5 NIGHTMARES ASSOCIATED WITH CHRONIC POST-TRAUMATIC STRESS DISORDER: ICD-10-CM

## 2024-08-16 DIAGNOSIS — E78.2 MIXED HYPERLIPIDEMIA: ICD-10-CM

## 2024-08-16 DIAGNOSIS — G47.09 OTHER INSOMNIA: ICD-10-CM

## 2024-08-16 DIAGNOSIS — R20.0 NUMBNESS AND TINGLING IN RIGHT HAND: ICD-10-CM

## 2024-08-16 DIAGNOSIS — G62.9 NEUROPATHY: ICD-10-CM

## 2024-08-16 DIAGNOSIS — R00.2 PALPITATIONS: ICD-10-CM

## 2024-08-16 DIAGNOSIS — F41.9 ANXIETY: ICD-10-CM

## 2024-08-16 DIAGNOSIS — F43.12 NIGHTMARES ASSOCIATED WITH CHRONIC POST-TRAUMATIC STRESS DISORDER: ICD-10-CM

## 2024-08-16 DIAGNOSIS — J45.20 MILD INTERMITTENT ASTHMA WITHOUT COMPLICATION: ICD-10-CM

## 2024-08-16 DIAGNOSIS — E11.65 TYPE 2 DIABETES MELLITUS WITH HYPERGLYCEMIA, WITHOUT LONG-TERM CURRENT USE OF INSULIN: ICD-10-CM

## 2024-08-16 DIAGNOSIS — L40.9 PSORIASIS: ICD-10-CM

## 2024-08-16 DIAGNOSIS — R20.2 NUMBNESS AND TINGLING IN RIGHT HAND: ICD-10-CM

## 2024-08-16 DIAGNOSIS — M25.541 ARTHRALGIA OF RIGHT HAND: ICD-10-CM

## 2024-08-16 DIAGNOSIS — R00.0 TACHYCARDIA: ICD-10-CM

## 2024-08-16 DIAGNOSIS — F33.1 MODERATE EPISODE OF RECURRENT MAJOR DEPRESSIVE DISORDER: ICD-10-CM

## 2024-08-16 DIAGNOSIS — M79.641 RIGHT HAND PAIN: Primary | ICD-10-CM

## 2024-08-16 PROCEDURE — 1125F AMNT PAIN NOTED PAIN PRSNT: CPT | Performed by: REGISTERED NURSE

## 2024-08-16 PROCEDURE — 3051F HG A1C>EQUAL 7.0%<8.0%: CPT | Performed by: REGISTERED NURSE

## 2024-08-16 PROCEDURE — 1160F RVW MEDS BY RX/DR IN RCRD: CPT | Performed by: REGISTERED NURSE

## 2024-08-16 PROCEDURE — 99215 OFFICE O/P EST HI 40 MIN: CPT | Performed by: REGISTERED NURSE

## 2024-08-16 PROCEDURE — 1159F MED LIST DOCD IN RCRD: CPT | Performed by: REGISTERED NURSE

## 2024-08-16 RX ORDER — ARIPIPRAZOLE 2 MG/1
4 TABLET ORAL DAILY
Qty: 60 TABLET | Refills: 2 | Status: SHIPPED | OUTPATIENT
Start: 2024-08-16

## 2024-08-16 RX ORDER — SERTRALINE HYDROCHLORIDE 100 MG/1
100 TABLET, FILM COATED ORAL DAILY
Qty: 90 TABLET | Refills: 1 | Status: SHIPPED | OUTPATIENT
Start: 2024-08-16

## 2024-08-16 RX ORDER — ESZOPICLONE 2 MG/1
2 TABLET, FILM COATED ORAL NIGHTLY
Qty: 30 TABLET | Refills: 0 | Status: SHIPPED | OUTPATIENT
Start: 2024-08-16

## 2024-08-16 RX ORDER — ALBUTEROL SULFATE 90 UG/1
2 AEROSOL, METERED RESPIRATORY (INHALATION) EVERY 6 HOURS PRN
Qty: 18 G | Refills: 2 | Status: SHIPPED | OUTPATIENT
Start: 2024-08-16

## 2024-08-16 RX ORDER — IBUPROFEN 800 MG/1
800 TABLET, FILM COATED ORAL EVERY 12 HOURS PRN
Qty: 60 TABLET | Refills: 1 | Status: SHIPPED | OUTPATIENT
Start: 2024-08-16

## 2024-08-16 RX ORDER — PRAZOSIN HYDROCHLORIDE 1 MG/1
1 CAPSULE ORAL NIGHTLY
Qty: 90 CAPSULE | Refills: 0 | Status: SHIPPED | OUTPATIENT
Start: 2024-08-16

## 2024-08-16 RX ORDER — ZOLPIDEM TARTRATE 10 MG/1
10 TABLET ORAL NIGHTLY PRN
Status: CANCELLED | OUTPATIENT
Start: 2024-08-16

## 2024-08-16 RX ORDER — ISOSORBIDE MONONITRATE 30 MG/1
30 TABLET, EXTENDED RELEASE ORAL DAILY
Qty: 90 TABLET | Refills: 1 | Status: SHIPPED | OUTPATIENT
Start: 2024-08-16

## 2024-08-16 RX ORDER — BUSPIRONE HYDROCHLORIDE 15 MG/1
15 TABLET ORAL 3 TIMES DAILY
Qty: 90 TABLET | Refills: 2 | Status: SHIPPED | OUTPATIENT
Start: 2024-08-16

## 2024-08-16 RX ORDER — ASPIRIN 81 MG/1
162 TABLET, CHEWABLE ORAL DAILY
Qty: 90 TABLET | Refills: 1 | Status: SHIPPED | OUTPATIENT
Start: 2024-08-16

## 2024-08-16 RX ORDER — ATORVASTATIN CALCIUM 40 MG/1
40 TABLET, FILM COATED ORAL
Qty: 90 TABLET | Refills: 1 | Status: SHIPPED | OUTPATIENT
Start: 2024-08-16

## 2024-08-16 RX ORDER — GABAPENTIN 100 MG/1
100 CAPSULE ORAL 2 TIMES DAILY
Qty: 60 CAPSULE | Refills: 1 | Status: SHIPPED | OUTPATIENT
Start: 2024-08-16

## 2024-08-16 RX ORDER — LANCETS
1 EACH MISCELLANEOUS 3 TIMES DAILY
Qty: 102 EACH | Refills: 2 | Status: SHIPPED | OUTPATIENT
Start: 2024-08-16

## 2024-08-16 NOTE — PROGRESS NOTES
Chief Complaint  Hand Pain (Neuropathy has moved to hands ) and Psoriasis (Requesting a new referral to a new derm. Last derm no longer takes her insurance )    Subjective    History of Present Illness {CC  Problem List  Visit  Diagnosis   Encounters  Notes  Medications  Labs  Result Review Imaging  Media :23}     Eladia Connor presents to [facility] for Hand Pain (Neuropathy has moved to hands ) and Psoriasis (Requesting a new referral to a new derm. Last derm no longer takes her insurance ).      History of Present Illness  Patient is a 58 y.o. female who presents to the clinic today for 3-month follow-up for depression with anxiety, type 2 diabetes with neuropathy, hyperlipidemia, tachycardia with palpitations, and new pain of right hand that is worsened over the last 3 months.  Patient denies any chest pain, shortness of breath, or any fevers.  Patient denies any known exposure to COVID, flu, or any other contagious illnesses.    In regards to depression with anxiety, patient is currently taking Zoloft with Abilify to help manage this.  Patient denies any homicidal or suicidal ideations at this time.  Patient is requesting medication refills today.  Patient denies any concerns with her depression/anxiety or her medication for this at this time.    In regards to type 2 diabetes with neuropathy, patient is currently taking Farxiga and glipizide for management of this.  Patient does try to follow low-carb diet when possible.  Patient is currently taking gabapentin to help control the neuropathy from her diabetes.  Patient denies any concerns with her diabetes or diabetes medications at this time.    In regards to hyperlipidemia, patient is currently taking atorvastatin and trying to follow low-fat diet when possible.  Patient denies any concerns with her hyperlipidemia or atorvastatin at this time.    In regards to tachycardia with palpitation, patient has been  taking isosorbide to manage this chronically.  Patient denies any issues or concerns with her tachycardia/palpitation or her isosorbide at this time.    In regards to hand pain, patient shares that overall she has been experiencing bilateral hand pain and numbness, with weakness for about a month or longer.  She shares that her right hand is worse than left.  She would like to pursue imaging for right hand to investigate whether this is worsening arthritis, neuropathy, or other concerns.  Patient has been taking multiple over-the-counter medications to try and help including NSAIDs, heat, cool, rest, and exercises.  Patient has had very minimal relief with this.       Review of Systems   Constitutional:  Negative for activity change, chills, fatigue and fever.   HENT: Negative.  Negative for congestion, dental problem, ear pain, hearing loss, rhinorrhea, sinus pain, sore throat, tinnitus and trouble swallowing.    Eyes: Negative.  Negative for pain and visual disturbance.   Respiratory: Negative.  Negative for cough, chest tightness, shortness of breath and wheezing.    Cardiovascular: Negative.  Negative for chest pain, palpitations and leg swelling.   Gastrointestinal: Negative.  Negative for abdominal pain, diarrhea, nausea and vomiting.   Endocrine: Negative.  Negative for polydipsia, polyphagia and polyuria.   Genitourinary: Negative.  Negative for difficulty urinating, dysuria, frequency and urgency.   Musculoskeletal:  Positive for arthralgias and myalgias. Negative for back pain.   Skin: Negative.  Negative for color change, pallor, rash and wound.   Allergic/Immunologic: Negative.  Negative for environmental allergies.   Neurological:  Positive for weakness and numbness. Negative for dizziness, speech difficulty, light-headedness and headaches.   Hematological: Negative.    Psychiatric/Behavioral:  Positive for sleep disturbance. Negative for confusion, decreased concentration, self-injury and suicidal  "ideas. The patient is nervous/anxious.    All other systems reviewed and are negative.       Objective     Vital Signs:   /60 (BP Location: Left arm, Patient Position: Sitting, Cuff Size: Large Adult)   Pulse 72   Temp 98.2 °F (36.8 °C) (Oral)   Resp 18   Ht 170.2 cm (67\")   Wt (!) 140 kg (308 lb)   SpO2 97%   BMI 48.24 kg/m²   Current Outpatient Medications on File Prior to Visit   Medication Sig Dispense Refill    Blood Glucose Monitoring Suppl (Accu-Chek Guide Me) w/Device kit Use 1 Cartridge Daily As Needed (every morning and prn). 1 kit 0    Incontinence Supplies misc Take As Directed.      ondansetron ODT (ZOFRAN-ODT) 4 MG disintegrating tablet Place 1 tablet on the tongue Every 8 (Eight) Hours As Needed for Nausea or Vomiting. 60 tablet 1    zolpidem (Ambien) 10 MG tablet Take 1 tablet by mouth At Night As Needed for Sleep.      Continuous Glucose Sensor (FreeStyle Germania 3 Sensor) misc Use 1 each Every 14 (Fourteen) Days. (Patient not taking: Reported on 8/16/2024) 2 each 2    dapagliflozin Propanediol (Farxiga) 10 MG tablet Take 10 mg by mouth Daily. (Patient not taking: Reported on 8/16/2024) 30 tablet 3    docusate sodium (Colace) 100 MG capsule Take 1 capsule by mouth 2 (Two) Times a Day. (Patient not taking: Reported on 8/16/2024) 60 capsule 1    glipizide (GLUCOTROL) 5 MG tablet Take 1 tablet by mouth 2 (Two) Times a Day Before Meals. (Patient not taking: Reported on 8/16/2024) 30 tablet 1    polyethylene glycol (MIRALAX) 17 GM/SCOOP powder Take 17 g by mouth Daily. (Patient not taking: Reported on 8/16/2024) 510 g 0    zolpidem (Ambien) 5 MG tablet Take 1 tablet by mouth At Night As Needed for Sleep. (Patient not taking: Reported on 8/16/2024) 30 tablet 1     No current facility-administered medications on file prior to visit.        Past Medical History:   Diagnosis Date    Anxiety     Asthma     Cancer     SKIN    Depression     Diabetes mellitus     GERD (gastroesophageal reflux " disease)     Hyperlipidemia     Hypertension     Psoriasis     Seizures 12/06/2022    Sleep apnea     cpap    Stroke 2017    mini          seizures      Past Surgical History:   Procedure Laterality Date    CHOLECYSTECTOMY  1987    ENDOSCOPY N/A 12/30/2022    Procedure: ESOPHAGOGASTRODUODENOSCOPY with gastric biopsy and esophageal dilation #50,#54,#56,#58 bougie;  Surgeon: Patience Arana MD;  Location: [facility] ENDOSCOPY;  Service: Gastroenterology;  Laterality: N/A;  gastritis    EYE SURGERY      KNEE SURGERY        Family History   Problem Relation Age of Onset    Heart disease Mother     Breast cancer Mother     Heart disease Father     Pancreatic cancer Father     Heart disease Sister     Lung cancer Brother       Social History     Socioeconomic History    Marital status:    Tobacco Use    Smoking status: Never    Smokeless tobacco: Never   Vaping Use    Vaping status: Never Used   Substance and Sexual Activity    Alcohol use: Never    Drug use: Never    Sexual activity: Defer         Admission on 06/03/2024, Discharged on 06/03/2024   Component Date Value Ref Range Status    QT Interval 06/03/2024 362  ms Final    QTC Interval 06/03/2024 455  ms Final    Glucose 06/03/2024 153 (H)  65 - 99 mg/dL Final    BUN 06/03/2024 10  8 - 23 mg/dL Final    Creatinine 06/03/2024 0.86  0.57 - 1.00 mg/dL Final    Sodium 06/03/2024 135 (L)  136 - 145 mmol/L Final    Potassium 06/03/2024 4.0  3.5 - 5.2 mmol/L Final    Chloride 06/03/2024 99  98 - 107 mmol/L Final    CO2 06/03/2024 23.7  22.0 - 29.0 mmol/L Final    Calcium 06/03/2024 9.9  8.6 - 10.5 mg/dL Final    Total Protein 06/03/2024 8.4  6.0 - 8.5 g/dL Final    Albumin 06/03/2024 4.3  3.5 - 5.2 g/dL Final    ALT (SGPT) 06/03/2024 50 (H)  1 - 33 U/L Final    AST (SGOT) 06/03/2024 88 (H)  1 - 32 U/L Final    Alkaline Phosphatase 06/03/2024 180 (H)  39 - 117 U/L Final    Total Bilirubin 06/03/2024 0.8  0.0 - 1.2 mg/dL Final    Globulin 06/03/2024 4.1  gm/dL Final    A/G  Ratio 06/03/2024 1.0  g/dL Final    BUN/Creatinine Ratio 06/03/2024 11.6  7.0 - 25.0 Final    Anion Gap 06/03/2024 12.3  5.0 - 15.0 mmol/L Final    eGFR 06/03/2024 77.5  >60.0 mL/min/1.73 Final    Lipase 06/03/2024 20  13 - 60 U/L Final    Protime 06/03/2024 11.4  9.6 - 11.7 Seconds Final    INR 06/03/2024 1.05  0.93 - 1.10 Final    PTT 06/03/2024 29.9 (L)  61.0 - 76.5 seconds Final    HS Troponin T 06/03/2024 10  <14 ng/L Final    WBC 06/03/2024 8.63  3.40 - 10.80 10*3/mm3 Final    RBC 06/03/2024 5.16  3.77 - 5.28 10*6/mm3 Final    Hemoglobin 06/03/2024 14.2  12.0 - 15.9 g/dL Final    Hematocrit 06/03/2024 43.8  34.0 - 46.6 % Final    MCV 06/03/2024 84.9  79.0 - 97.0 fL Final    MCH 06/03/2024 27.5  26.6 - 33.0 pg Final    MCHC 06/03/2024 32.4  31.5 - 35.7 g/dL Final    RDW 06/03/2024 13.5  12.3 - 15.4 % Final    RDW-SD 06/03/2024 41.8  37.0 - 54.0 fl Final    MPV 06/03/2024 8.8  6.0 - 12.0 fL Final    Platelets 06/03/2024 239  140 - 450 10*3/mm3 Final    Neutrophil % 06/03/2024 71.3  42.7 - 76.0 % Final    Lymphocyte % 06/03/2024 16.0 (L)  19.6 - 45.3 % Final    Monocyte % 06/03/2024 7.2  5.0 - 12.0 % Final    Eosinophil % 06/03/2024 4.2  0.3 - 6.2 % Final    Basophil % 06/03/2024 0.7  0.0 - 1.5 % Final    Immature Grans % 06/03/2024 0.6 (H)  0.0 - 0.5 % Final    Neutrophils, Absolute 06/03/2024 6.16  1.70 - 7.00 10*3/mm3 Final    Lymphocytes, Absolute 06/03/2024 1.38  0.70 - 3.10 10*3/mm3 Final    Monocytes, Absolute 06/03/2024 0.62  0.10 - 0.90 10*3/mm3 Final    Eosinophils, Absolute 06/03/2024 0.36  0.00 - 0.40 10*3/mm3 Final    Basophils, Absolute 06/03/2024 0.06  0.00 - 0.20 10*3/mm3 Final    Immature Grans, Absolute 06/03/2024 0.05  0.00 - 0.05 10*3/mm3 Final    nRBC 06/03/2024 0.0  0.0 - 0.2 /100 WBC Final    Extra Tube 06/03/2024 Hold for add-ons.   Final    Auto resulted.   Office Visit on 06/03/2024   Component Date Value Ref Range Status    SARS Antigen 06/03/2024 Not Detected  Not Detected, Presumptive  Negative Final    Influenza A Antigen NASRIN 06/03/2024 Not Detected  Not Detected Final    Influenza B Antigen NASRIN 06/03/2024 Not Detected  Not Detected Final    Internal Control 06/03/2024 Passed  Passed Final    Lot Number 06/03/2024 [id number]   Final    Expiration Date 06/03/2024 02/15/2025   Final         Physical Exam  Vitals and nursing note reviewed.   Constitutional:       Appearance: Normal appearance. She is normal weight.   HENT:      Head: Normocephalic and atraumatic.   Cardiovascular:      Rate and Rhythm: Normal rate and regular rhythm.      Pulses: Normal pulses.      Heart sounds: Normal heart sounds. No murmur heard.     No friction rub. No gallop.   Pulmonary:      Effort: Pulmonary effort is normal. No respiratory distress.      Breath sounds: Normal breath sounds. No stridor. No wheezing, rhonchi or rales.   Chest:      Chest wall: No tenderness.   Abdominal:      General: Abdomen is flat. Bowel sounds are normal. There is no distension.      Palpations: Abdomen is soft. There is no mass.      Tenderness: There is no abdominal tenderness. There is no right CVA tenderness, left CVA tenderness, guarding or rebound.      Hernia: No hernia is present.   Skin:     General: Skin is warm and dry.      Capillary Refill: Capillary refill takes less than 2 seconds.      Coloration: Skin is not jaundiced or pale.   Neurological:      General: No focal deficit present.      Mental Status: She is alert and oriented to person, place, and time. Mental status is at baseline.      Motor: No weakness.      Coordination: Coordination normal.      Gait: Gait normal.   Psychiatric:         Mood and Affect: Mood normal.         Behavior: Behavior normal.         Thought Content: Thought content normal.         Judgment: Judgment normal.          Result Review  Data Reviewed:{ Labs  Result Review  Imaging  Med Tab  Media :23}   I have reviewed this patient's chart.  I have reviewed previous labs, previous imaging,  previous medications, and previous encounters with notes that were available in this patient's chart.               Assessment and Plan {CC Problem List  Visit Diagnosis  ROS  Review (Popup)  [facility]  BestPractice  Medications  [facility]  SnapShot Encounters  Media :23}   Diagnoses and all orders for this visit:    1. Right hand pain (Primary)  -     XR Hand 3+ View Right; Future  -     MRI Hand Right Without Contrast; Future    2. Mild intermittent asthma without complication  -     albuterol sulfate  (90 Base) MCG/ACT inhaler; Inhale 2 puffs Every 6 (Six) Hours As Needed for Wheezing.  Dispense: 18 g; Refill: 2    3. Moderate episode of recurrent major depressive disorder  -     ARIPiprazole (Abilify) 2 MG tablet; Take 2 tablets by mouth Daily.  Dispense: 60 tablet; Refill: 2  -     sertraline (ZOLOFT) 100 MG tablet; Take 1 tablet by mouth Daily.  Dispense: 90 tablet; Refill: 1    4. Palpitations  -     aspirin 81 MG chewable tablet; Chew 2 tablets Daily.  Dispense: 90 tablet; Refill: 1  -     isosorbide mononitrate (IMDUR) 30 MG 24 hr tablet; Take 1 tablet by mouth Daily.  Dispense: 90 tablet; Refill: 1    5. Tachycardia  -     aspirin 81 MG chewable tablet; Chew 2 tablets Daily.  Dispense: 90 tablet; Refill: 1  -     isosorbide mononitrate (IMDUR) 30 MG 24 hr tablet; Take 1 tablet by mouth Daily.  Dispense: 90 tablet; Refill: 1    6. Mixed hyperlipidemia  -     atorvastatin (LIPITOR) 40 MG tablet; Take 1 tablet by mouth every night at bedtime.  Dispense: 90 tablet; Refill: 1    7. Anxiety  -     busPIRone (BUSPAR) 15 MG tablet; Take 1 tablet by mouth 3 (Three) Times a Day.  Dispense: 90 tablet; Refill: 2    8. Neuropathy  -     gabapentin (NEURONTIN) 100 MG capsule; Take 1 capsule by mouth 2 (Two) Times a Day.  Dispense: 60 capsule; Refill: 1    9. Numbness and tingling in right hand  -     MRI Hand Right Without Contrast; Future    10. Type 2 diabetes mellitus with  hyperglycemia, without long-term current use of insulin  -     glucose blood test strip; Use as instructed  Dispense: 100 each; Refill: 0  -     Accu-Chek FastClix Lancets misc; Inject 1 each under the skin into the appropriate area as directed 3 (Three) Times a Day. Check fasting glucose every morning and as needed  Dispense: 102 each; Refill: 2    11. Nightmares associated with chronic post-traumatic stress disorder  -     prazosin (MINIPRESS) 1 MG capsule; Take 1 capsule by mouth Every Night.  Dispense: 90 capsule; Refill: 0    12. Other insomnia  -     eszopiclone (Lunesta) 2 MG tablet; Take 1 tablet by mouth Every Night. Take immediately before bedtime  Dispense: 30 tablet; Refill: 0    13. Arthralgia of right hand  -     ibuprofen (ADVIL,MOTRIN) 800 MG tablet; Take 1 tablet by mouth Every 12 (Twelve) Hours As Needed for Moderate Pain.  Dispense: 60 tablet; Refill: 1    14. Psoriasis  -     Ambulatory Referral to Dermatology        -Referral to dermatology at patient's request to discuss alternative psoriasis medications.  -Multiple medication refills today.  -ER red flags discussed with patient including risk versus benefit and education provided.  -Follow-up with me in 6 weeks due to multiple medical illnesses.    I spent 60 minutes caring for Eladia on this date of service. This time includes time spent by me in the following activities:preparing for the visit, reviewing tests, obtaining and/or reviewing a separately obtained history, performing a medically appropriate examination and/or evaluation , counseling and educating the patient/family/caregiver, ordering medications, tests, or procedures, referring and communicating with other health care professionals , documenting information in the medical record, independently interpreting results and communicating that information with the patient/family/caregiver, and care coordination.    Follow Up {Instructions Charge Capture  Follow-up Communications  :23}     Patient was given instructions and counseling regarding her condition or for health maintenance advice. Please see specific information pulled into the AVS (placed there by myself) if appropriate.    Return in about 6 weeks (around 9/27/2024) for Recheck.    Class 3 Severe Obesity (BMI >=40). Obesity-related health conditions include the following: hypertension, diabetes mellitus, and dyslipidemias. Obesity is unchanged. BMI is is above average; BMI management plan is completed. We discussed portion control and increasing exercise.       SCOOBY Cisneros, FNP-BC

## 2024-08-28 ENCOUNTER — TELEPHONE (OUTPATIENT)
Dept: FAMILY MEDICINE CLINIC | Facility: CLINIC | Age: 59
End: 2024-08-28
Payer: MEDICARE

## 2024-08-28 NOTE — TELEPHONE ENCOUNTER
APRIL WITH [facility] CALLED STATING SHE HAD AN ASSESSMENT THIS AM. APRIL STATES THE PT HAS ADVISED HER THAT THE PRAZOSIN 1MG, ISN'T HELPING WITH THE NIGHTMARES. THE PT IS REQUESTING , THAT AN INCREASED DOSAGE BE SENT OVER TO HER PHARMACY ON FILE. CECI

## 2024-09-10 ENCOUNTER — TELEPHONE (OUTPATIENT)
Dept: FAMILY MEDICINE CLINIC | Facility: CLINIC | Age: 59
End: 2024-09-10
Payer: MEDICARE

## 2024-09-10 NOTE — TELEPHONE ENCOUNTER
HUB to relay description below.      IF PT CALLS BACK ASKING ABOUT MRI ON LEFT HAND SHE WILL NEED TO MAKE APPOINTMENT SHE DOES HAVE APPOINTMENT ON 09-27-24

## 2024-11-11 DIAGNOSIS — F41.9 ANXIETY: ICD-10-CM

## 2024-11-11 DIAGNOSIS — F51.5 NIGHTMARES ASSOCIATED WITH CHRONIC POST-TRAUMATIC STRESS DISORDER: ICD-10-CM

## 2024-11-11 DIAGNOSIS — F33.1 MODERATE EPISODE OF RECURRENT MAJOR DEPRESSIVE DISORDER: ICD-10-CM

## 2024-11-11 DIAGNOSIS — E78.2 MIXED HYPERLIPIDEMIA: ICD-10-CM

## 2024-11-11 DIAGNOSIS — G62.9 NEUROPATHY: ICD-10-CM

## 2024-11-11 DIAGNOSIS — G47.09 OTHER INSOMNIA: ICD-10-CM

## 2024-11-11 DIAGNOSIS — R00.0 TACHYCARDIA: ICD-10-CM

## 2024-11-11 DIAGNOSIS — J45.20 MILD INTERMITTENT ASTHMA WITHOUT COMPLICATION: ICD-10-CM

## 2024-11-11 DIAGNOSIS — R00.2 PALPITATIONS: ICD-10-CM

## 2024-11-11 DIAGNOSIS — F43.12 NIGHTMARES ASSOCIATED WITH CHRONIC POST-TRAUMATIC STRESS DISORDER: ICD-10-CM

## 2024-11-11 RX ORDER — PRAZOSIN HYDROCHLORIDE 1 MG/1
1 CAPSULE ORAL NIGHTLY
Qty: 90 CAPSULE | Refills: 0 | Status: SHIPPED | OUTPATIENT
Start: 2024-11-11

## 2024-11-11 RX ORDER — GABAPENTIN 100 MG/1
100 CAPSULE ORAL 2 TIMES DAILY
Qty: 60 CAPSULE | Refills: 1 | Status: SHIPPED | OUTPATIENT
Start: 2024-11-11

## 2024-11-11 RX ORDER — BUSPIRONE HYDROCHLORIDE 15 MG/1
15 TABLET ORAL 3 TIMES DAILY
Qty: 90 TABLET | Refills: 2 | Status: SHIPPED | OUTPATIENT
Start: 2024-11-11

## 2024-11-11 RX ORDER — ATORVASTATIN CALCIUM 40 MG/1
40 TABLET, FILM COATED ORAL
Qty: 90 TABLET | Refills: 1 | Status: SHIPPED | OUTPATIENT
Start: 2024-11-11

## 2024-11-11 RX ORDER — ALBUTEROL SULFATE 90 UG/1
2 INHALANT RESPIRATORY (INHALATION) EVERY 6 HOURS PRN
Qty: 18 G | Refills: 2 | Status: SHIPPED | OUTPATIENT
Start: 2024-11-11

## 2024-11-11 RX ORDER — SERTRALINE HYDROCHLORIDE 100 MG/1
100 TABLET, FILM COATED ORAL DAILY
Qty: 90 TABLET | Refills: 1 | Status: SHIPPED | OUTPATIENT
Start: 2024-11-11

## 2024-11-11 RX ORDER — ESZOPICLONE 2 MG/1
2 TABLET, FILM COATED ORAL NIGHTLY
Qty: 30 TABLET | Refills: 0 | Status: SHIPPED | OUTPATIENT
Start: 2024-11-11

## 2024-11-11 RX ORDER — ISOSORBIDE MONONITRATE 30 MG/1
30 TABLET, EXTENDED RELEASE ORAL DAILY
Qty: 90 TABLET | Refills: 0 | Status: SHIPPED | OUTPATIENT
Start: 2024-11-11

## 2024-11-11 NOTE — TELEPHONE ENCOUNTER
Caller: ZEYAD WITH [facility]    Relationship:     Best call back number: [phone]     Requested Prescriptions:   Requested Prescriptions     Pending Prescriptions Disp Refills    isosorbide mononitrate (IMDUR) 30 MG 24 hr tablet 90 tablet 1     Sig: Take 1 tablet by mouth Daily.    prazosin (MINIPRESS) 1 MG capsule 90 capsule 0     Sig: Take 1 capsule by mouth Every Night.    sertraline (ZOLOFT) 100 MG tablet 90 tablet 1     Sig: Take 1 tablet by mouth Daily.    atorvastatin (LIPITOR) 40 MG tablet 90 tablet 1     Sig: Take 1 tablet by mouth every night at bedtime.    busPIRone (BUSPAR) 15 MG tablet 90 tablet 2     Sig: Take 1 tablet by mouth 3 (Three) Times a Day.    gabapentin (NEURONTIN) 100 MG capsule 60 capsule 1     Sig: Take 1 capsule by mouth 2 (Two) Times a Day.    albuterol sulfate  (90 Base) MCG/ACT inhaler 18 g 2     Sig: Inhale 2 puffs Every 6 (Six) Hours As Needed for Wheezing.    eszopiclone (Lunesta) 2 MG tablet 30 tablet 0     Sig: Take 1 tablet by mouth Every Night. Take immediately before bedtime        Pharmacy where request should be sent: [facility] - [address] [phone] [facility] [phone]      Last office visit with prescribing clinician: 8/16/2024   Last telemedicine visit with prescribing clinician: Visit date not found   Next office visit with prescribing clinician: Visit date not found     Additional details provided by patient: WILL NEED NEW PRESCRIPTION    Does the patient have less than a 3 day supply:  [x] Yes  [] No    Would you like a call back once the refill request has been completed: [] Yes [] No    If the office needs to give you a call back, can they leave a voicemail: [] Yes [] No    Blayne Sifuentes Rep   11/11/24 11:18 EST

## 2024-11-11 NOTE — TELEPHONE ENCOUNTER
Rx Refill Note  Requested Prescriptions     Pending Prescriptions Disp Refills    isosorbide mononitrate (IMDUR) 30 MG 24 hr tablet 90 tablet 1     Sig: Take 1 tablet by mouth Daily.    prazosin (MINIPRESS) 1 MG capsule 90 capsule 0     Sig: Take 1 capsule by mouth Every Night.    sertraline (ZOLOFT) 100 MG tablet 90 tablet 1     Sig: Take 1 tablet by mouth Daily.    atorvastatin (LIPITOR) 40 MG tablet 90 tablet 1     Sig: Take 1 tablet by mouth every night at bedtime.    busPIRone (BUSPAR) 15 MG tablet 90 tablet 2     Sig: Take 1 tablet by mouth 3 (Three) Times a Day.    gabapentin (NEURONTIN) 100 MG capsule 60 capsule 1     Sig: Take 1 capsule by mouth 2 (Two) Times a Day.    albuterol sulfate  (90 Base) MCG/ACT inhaler 18 g 2     Sig: Inhale 2 puffs Every 6 (Six) Hours As Needed for Wheezing.    eszopiclone (Lunesta) 2 MG tablet 30 tablet 0     Sig: Take 1 tablet by mouth Every Night. Take immediately before bedtime      Last office visit with prescribing clinician: 8/16/2024   Last telemedicine visit with prescribing clinician: Visit date not found   Next office visit with prescribing clinician: Visit date not found     Urine Drug Screen - (01/11/2023 18:30)     NO CSA ON FILE    INSPECT - SCAN - INSPECT/[facility]/11.11.2024 (11/11/2024)     Vi Kwon CMA  11/11/24, 15:42 EST

## 2024-12-22 DIAGNOSIS — G62.9 NEUROPATHY: ICD-10-CM

## 2024-12-23 RX ORDER — GABAPENTIN 100 MG/1
100 CAPSULE ORAL 2 TIMES DAILY
Qty: 60 CAPSULE | Refills: 0 | Status: SHIPPED | OUTPATIENT
Start: 2024-12-23

## 2024-12-23 NOTE — TELEPHONE ENCOUNTER
Rx Refill Note  Requested Prescriptions     Pending Prescriptions Disp Refills    gabapentin (NEURONTIN) 100 MG capsule [Pharmacy Med Name: Gabapentin 100 MG Oral Capsule] 120 capsule      Sig: TAKE 1 CAPSULE BY MOUTH TWICE  DAILY      Last office visit with prescribing clinician: 8/16/2024   Last telemedicine visit with prescribing clinician: Visit date not found   Next office visit with prescribing clinician: 12/30/2024       UDS     None on file  CSA     None on file    INSPECT - SCAN - INSPECT/ [facility]/ 12/23/2024 (12/23/2024)     Stefanie Sawant MA  12/23/24, 10:02 EST

## 2025-01-12 DIAGNOSIS — R00.2 PALPITATIONS: ICD-10-CM

## 2025-01-12 DIAGNOSIS — R00.0 TACHYCARDIA: ICD-10-CM

## 2025-01-12 DIAGNOSIS — F51.5 NIGHTMARES ASSOCIATED WITH CHRONIC POST-TRAUMATIC STRESS DISORDER: ICD-10-CM

## 2025-01-12 DIAGNOSIS — F43.12 NIGHTMARES ASSOCIATED WITH CHRONIC POST-TRAUMATIC STRESS DISORDER: ICD-10-CM

## 2025-01-13 RX ORDER — PRAZOSIN HYDROCHLORIDE 1 MG/1
1 CAPSULE ORAL NIGHTLY
Qty: 90 CAPSULE | Refills: 0 | Status: SHIPPED | OUTPATIENT
Start: 2025-01-13

## 2025-01-13 RX ORDER — ISOSORBIDE MONONITRATE 30 MG/1
30 TABLET, EXTENDED RELEASE ORAL DAILY
Qty: 90 TABLET | Refills: 0 | Status: SHIPPED | OUTPATIENT
Start: 2025-01-13

## 2025-01-14 ENCOUNTER — TELEPHONE (OUTPATIENT)
Dept: FAMILY MEDICINE CLINIC | Facility: CLINIC | Age: 60
End: 2025-01-14
Payer: MEDICARE

## 2025-01-16 ENCOUNTER — HOSPITAL ENCOUNTER (INPATIENT)
Facility: HOSPITAL | Age: 60
LOS: 2 days | Discharge: HOME HEALTH | DRG: 101 | End: 2025-01-18
Attending: EMERGENCY MEDICINE | Admitting: STUDENT IN AN ORGANIZED HEALTH CARE EDUCATION/TRAINING PROGRAM
Payer: MEDICARE

## 2025-01-16 ENCOUNTER — APPOINTMENT (OUTPATIENT)
Dept: GENERAL RADIOLOGY | Facility: HOSPITAL | Age: 60
DRG: 101 | End: 2025-01-16
Payer: MEDICARE

## 2025-01-16 ENCOUNTER — OFFICE VISIT (OUTPATIENT)
Dept: FAMILY MEDICINE CLINIC | Facility: CLINIC | Age: 60
End: 2025-01-16
Payer: MEDICARE

## 2025-01-16 ENCOUNTER — APPOINTMENT (OUTPATIENT)
Dept: CT IMAGING | Facility: HOSPITAL | Age: 60
DRG: 101 | End: 2025-01-16
Payer: MEDICARE

## 2025-01-16 VITALS
OXYGEN SATURATION: 98 % | DIASTOLIC BLOOD PRESSURE: 61 MMHG | BODY MASS INDEX: 45.99 KG/M2 | RESPIRATION RATE: 22 BRPM | SYSTOLIC BLOOD PRESSURE: 113 MMHG | HEART RATE: 74 BPM | HEIGHT: 67 IN | WEIGHT: 293 LBS | TEMPERATURE: 97.5 F

## 2025-01-16 DIAGNOSIS — R56.9 SEIZURE: Primary | ICD-10-CM

## 2025-01-16 DIAGNOSIS — F33.1 MODERATE EPISODE OF RECURRENT MAJOR DEPRESSIVE DISORDER: ICD-10-CM

## 2025-01-16 DIAGNOSIS — R47.01 APHASIA: ICD-10-CM

## 2025-01-16 DIAGNOSIS — R06.2 WHEEZING: ICD-10-CM

## 2025-01-16 DIAGNOSIS — R56.9 SEIZURES: ICD-10-CM

## 2025-01-16 DIAGNOSIS — J01.90 ACUTE NON-RECURRENT SINUSITIS, UNSPECIFIED LOCATION: Primary | ICD-10-CM

## 2025-01-16 DIAGNOSIS — R05.1 ACUTE COUGH: ICD-10-CM

## 2025-01-16 LAB
ABO GROUP BLD: NORMAL
ALBUMIN SERPL-MCNC: 4 G/DL (ref 3.5–5.2)
ALBUMIN/GLOB SERPL: 1.1 G/DL
ALP SERPL-CCNC: 176 U/L (ref 39–117)
ALT SERPL W P-5'-P-CCNC: 21 U/L (ref 1–33)
ANION GAP SERPL CALCULATED.3IONS-SCNC: 10.6 MMOL/L (ref 5–15)
APTT PPP: 30 SECONDS (ref 22.7–35.4)
AST SERPL-CCNC: 42 U/L (ref 1–32)
BASOPHILS # BLD AUTO: 0.05 10*3/MM3 (ref 0–0.2)
BASOPHILS NFR BLD AUTO: 0.5 % (ref 0–1.5)
BILIRUB SERPL-MCNC: 0.5 MG/DL (ref 0–1.2)
BLD GP AB SCN SERPL QL: NEGATIVE
BUN SERPL-MCNC: 9 MG/DL (ref 8–23)
BUN/CREAT SERPL: 12.3 (ref 7–25)
CALCIUM SPEC-SCNC: 9.6 MG/DL (ref 8.6–10.5)
CHLORIDE SERPL-SCNC: 104 MMOL/L (ref 98–107)
CO2 SERPL-SCNC: 24.4 MMOL/L (ref 22–29)
CREAT SERPL-MCNC: 0.73 MG/DL (ref 0.57–1)
DEPRECATED RDW RBC AUTO: 44.7 FL (ref 37–54)
EGFRCR SERPLBLD CKD-EPI 2021: 93.7 ML/MIN/1.73
EOSINOPHIL # BLD AUTO: 0.41 10*3/MM3 (ref 0–0.4)
EOSINOPHIL NFR BLD AUTO: 4.5 % (ref 0.3–6.2)
ERYTHROCYTE [DISTWIDTH] IN BLOOD BY AUTOMATED COUNT: 14.6 % (ref 12.3–15.4)
EXPIRATION DATE: NORMAL
FLUAV AG UPPER RESP QL IA.RAPID: NOT DETECTED
FLUBV AG UPPER RESP QL IA.RAPID: NOT DETECTED
GLOBULIN UR ELPH-MCNC: 3.8 GM/DL
GLUCOSE BLDC GLUCOMTR-MCNC: 135 MG/DL (ref 70–105)
GLUCOSE SERPL-MCNC: 145 MG/DL (ref 65–99)
HCT VFR BLD AUTO: 39.2 % (ref 34–46.6)
HGB BLD-MCNC: 12.2 G/DL (ref 12–15.9)
HOLD SPECIMEN: NORMAL
HOLD SPECIMEN: NORMAL
IMM GRANULOCYTES # BLD AUTO: 0.03 10*3/MM3 (ref 0–0.05)
IMM GRANULOCYTES NFR BLD AUTO: 0.3 % (ref 0–0.5)
INR PPP: 1.21 (ref 0.9–1.1)
INTERNAL CONTROL: NORMAL
LYMPHOCYTES # BLD AUTO: 1.28 10*3/MM3 (ref 0.7–3.1)
LYMPHOCYTES NFR BLD AUTO: 13.9 % (ref 19.6–45.3)
Lab: NORMAL
MCH RBC QN AUTO: 26.4 PG (ref 26.6–33)
MCHC RBC AUTO-ENTMCNC: 31.1 G/DL (ref 31.5–35.7)
MCV RBC AUTO: 84.8 FL (ref 79–97)
MONOCYTES # BLD AUTO: 0.65 10*3/MM3 (ref 0.1–0.9)
MONOCYTES NFR BLD AUTO: 7.1 % (ref 5–12)
NEUTROPHILS NFR BLD AUTO: 6.76 10*3/MM3 (ref 1.7–7)
NEUTROPHILS NFR BLD AUTO: 73.7 % (ref 42.7–76)
NRBC BLD AUTO-RTO: 0 /100 WBC (ref 0–0.2)
PLATELET # BLD AUTO: 184 10*3/MM3 (ref 140–450)
PMV BLD AUTO: 8.7 FL (ref 6–12)
POTASSIUM SERPL-SCNC: 4.1 MMOL/L (ref 3.5–5.2)
PROT SERPL-MCNC: 7.8 G/DL (ref 6–8.5)
PROTHROMBIN TIME: 15.5 SECONDS (ref 11.7–14.2)
RBC # BLD AUTO: 4.62 10*6/MM3 (ref 3.77–5.28)
RH BLD: NEGATIVE
SARS-COV-2 AG UPPER RESP QL IA.RAPID: NOT DETECTED
SODIUM SERPL-SCNC: 139 MMOL/L (ref 136–145)
T&S EXPIRATION DATE: NORMAL
WBC NRBC COR # BLD AUTO: 9.18 10*3/MM3 (ref 3.4–10.8)
WHOLE BLOOD HOLD COAG: NORMAL
WHOLE BLOOD HOLD SPECIMEN: NORMAL

## 2025-01-16 PROCEDURE — 99285 EMERGENCY DEPT VISIT HI MDM: CPT

## 2025-01-16 PROCEDURE — 86850 RBC ANTIBODY SCREEN: CPT | Performed by: EMERGENCY MEDICINE

## 2025-01-16 PROCEDURE — 70450 CT HEAD/BRAIN W/O DYE: CPT

## 2025-01-16 PROCEDURE — 85610 PROTHROMBIN TIME: CPT | Performed by: EMERGENCY MEDICINE

## 2025-01-16 PROCEDURE — 1160F RVW MEDS BY RX/DR IN RCRD: CPT

## 2025-01-16 PROCEDURE — 99222 1ST HOSP IP/OBS MODERATE 55: CPT | Performed by: INTERNAL MEDICINE

## 2025-01-16 PROCEDURE — 83036 HEMOGLOBIN GLYCOSYLATED A1C: CPT | Performed by: NURSE PRACTITIONER

## 2025-01-16 PROCEDURE — 25510000001 IOPAMIDOL PER 1 ML: Performed by: EMERGENCY MEDICINE

## 2025-01-16 PROCEDURE — C9254 INJECTION, LACOSAMIDE: HCPCS | Performed by: EMERGENCY MEDICINE

## 2025-01-16 PROCEDURE — 86901 BLOOD TYPING SEROLOGIC RH(D): CPT

## 2025-01-16 PROCEDURE — 86900 BLOOD TYPING SEROLOGIC ABO: CPT

## 2025-01-16 PROCEDURE — 1159F MED LIST DOCD IN RCRD: CPT

## 2025-01-16 PROCEDURE — 70496 CT ANGIOGRAPHY HEAD: CPT

## 2025-01-16 PROCEDURE — 86900 BLOOD TYPING SEROLOGIC ABO: CPT | Performed by: EMERGENCY MEDICINE

## 2025-01-16 PROCEDURE — 25010000002 LACOSAMIDE 200 MG/20ML SOLUTION: Performed by: EMERGENCY MEDICINE

## 2025-01-16 PROCEDURE — 1125F AMNT PAIN NOTED PAIN PRSNT: CPT

## 2025-01-16 PROCEDURE — 82607 VITAMIN B-12: CPT | Performed by: NURSE PRACTITIONER

## 2025-01-16 PROCEDURE — 93005 ELECTROCARDIOGRAM TRACING: CPT | Performed by: EMERGENCY MEDICINE

## 2025-01-16 PROCEDURE — 70498 CT ANGIOGRAPHY NECK: CPT

## 2025-01-16 PROCEDURE — 99213 OFFICE O/P EST LOW 20 MIN: CPT

## 2025-01-16 PROCEDURE — 85025 COMPLETE CBC W/AUTO DIFF WBC: CPT | Performed by: EMERGENCY MEDICINE

## 2025-01-16 PROCEDURE — 85730 THROMBOPLASTIN TIME PARTIAL: CPT | Performed by: EMERGENCY MEDICINE

## 2025-01-16 PROCEDURE — 0042T HC CT CEREBRAL PERFUSION W/WO CONTRAST: CPT

## 2025-01-16 PROCEDURE — 71045 X-RAY EXAM CHEST 1 VIEW: CPT

## 2025-01-16 PROCEDURE — 86901 BLOOD TYPING SEROLOGIC RH(D): CPT | Performed by: EMERGENCY MEDICINE

## 2025-01-16 PROCEDURE — 82948 REAGENT STRIP/BLOOD GLUCOSE: CPT

## 2025-01-16 PROCEDURE — 80053 COMPREHEN METABOLIC PANEL: CPT | Performed by: EMERGENCY MEDICINE

## 2025-01-16 PROCEDURE — 87428 SARSCOV & INF VIR A&B AG IA: CPT

## 2025-01-16 RX ORDER — NITROGLYCERIN 0.4 MG/1
0.4 TABLET SUBLINGUAL
Status: DISCONTINUED | OUTPATIENT
Start: 2025-01-16 | End: 2025-01-18 | Stop reason: HOSPADM

## 2025-01-16 RX ORDER — SODIUM CHLORIDE 0.9 % (FLUSH) 0.9 %
10 SYRINGE (ML) INJECTION AS NEEDED
Status: DISCONTINUED | OUTPATIENT
Start: 2025-01-16 | End: 2025-01-18 | Stop reason: HOSPADM

## 2025-01-16 RX ORDER — ATORVASTATIN CALCIUM 40 MG/1
80 TABLET, FILM COATED ORAL NIGHTLY
Status: DISCONTINUED | OUTPATIENT
Start: 2025-01-16 | End: 2025-01-18 | Stop reason: HOSPADM

## 2025-01-16 RX ORDER — ACETAMINOPHEN 325 MG/1
650 TABLET ORAL EVERY 4 HOURS PRN
Status: DISCONTINUED | OUTPATIENT
Start: 2025-01-16 | End: 2025-01-18 | Stop reason: HOSPADM

## 2025-01-16 RX ORDER — ACETAMINOPHEN 650 MG/1
650 SUPPOSITORY RECTAL EVERY 4 HOURS PRN
Status: DISCONTINUED | OUTPATIENT
Start: 2025-01-16 | End: 2025-01-18 | Stop reason: HOSPADM

## 2025-01-16 RX ORDER — BENZONATATE 100 MG/1
100 CAPSULE ORAL 3 TIMES DAILY PRN
Qty: 30 CAPSULE | Refills: 0 | Status: SHIPPED | OUTPATIENT
Start: 2025-01-16 | End: 2025-01-16

## 2025-01-16 RX ORDER — BISACODYL 5 MG/1
5 TABLET, DELAYED RELEASE ORAL DAILY PRN
Status: DISCONTINUED | OUTPATIENT
Start: 2025-01-16 | End: 2025-01-18 | Stop reason: HOSPADM

## 2025-01-16 RX ORDER — ONDANSETRON 2 MG/ML
4 INJECTION INTRAMUSCULAR; INTRAVENOUS EVERY 6 HOURS PRN
Status: DISCONTINUED | OUTPATIENT
Start: 2025-01-16 | End: 2025-01-18 | Stop reason: HOSPADM

## 2025-01-16 RX ORDER — ASPIRIN 300 MG/1
300 SUPPOSITORY RECTAL DAILY
Status: DISCONTINUED | OUTPATIENT
Start: 2025-01-17 | End: 2025-01-18 | Stop reason: HOSPADM

## 2025-01-16 RX ORDER — BISACODYL 10 MG
10 SUPPOSITORY, RECTAL RECTAL DAILY PRN
Status: DISCONTINUED | OUTPATIENT
Start: 2025-01-16 | End: 2025-01-18 | Stop reason: HOSPADM

## 2025-01-16 RX ORDER — ASPIRIN 81 MG/1
81 TABLET, CHEWABLE ORAL DAILY
Status: DISCONTINUED | OUTPATIENT
Start: 2025-01-17 | End: 2025-01-18 | Stop reason: HOSPADM

## 2025-01-16 RX ORDER — ACETAMINOPHEN 160 MG/5ML
650 SOLUTION ORAL EVERY 4 HOURS PRN
Status: DISCONTINUED | OUTPATIENT
Start: 2025-01-16 | End: 2025-01-18 | Stop reason: HOSPADM

## 2025-01-16 RX ORDER — IOPAMIDOL 755 MG/ML
150 INJECTION, SOLUTION INTRAVASCULAR
Status: COMPLETED | OUTPATIENT
Start: 2025-01-16 | End: 2025-01-16

## 2025-01-16 RX ORDER — POLYETHYLENE GLYCOL 3350 17 G/17G
17 POWDER, FOR SOLUTION ORAL DAILY PRN
Status: DISCONTINUED | OUTPATIENT
Start: 2025-01-16 | End: 2025-01-18 | Stop reason: HOSPADM

## 2025-01-16 RX ORDER — METHYLPREDNISOLONE 4 MG/1
TABLET ORAL
Qty: 21 TABLET | Refills: 0 | Status: SHIPPED | OUTPATIENT
Start: 2025-01-16 | End: 2025-01-16

## 2025-01-16 RX ORDER — AMOXICILLIN 250 MG
2 CAPSULE ORAL 2 TIMES DAILY PRN
Status: DISCONTINUED | OUTPATIENT
Start: 2025-01-16 | End: 2025-01-18 | Stop reason: HOSPADM

## 2025-01-16 RX ORDER — LACOSAMIDE 10 MG/ML
150 INJECTION, SOLUTION INTRAVENOUS ONCE
Status: COMPLETED | OUTPATIENT
Start: 2025-01-16 | End: 2025-01-16

## 2025-01-16 RX ORDER — GABAPENTIN 100 MG/1
100 CAPSULE ORAL DAILY
COMMUNITY

## 2025-01-16 RX ADMIN — IOPAMIDOL 150 ML: 755 INJECTION, SOLUTION INTRAVENOUS at 21:18

## 2025-01-16 RX ADMIN — LACOSAMIDE 150 MG: 10 INJECTION INTRAVENOUS at 22:11

## 2025-01-16 NOTE — ASSESSMENT & PLAN NOTE
Orders:    POCT SARS-CoV-2 Antigen NASRIN + Flu    benzonatate (Tessalon Perles) 100 MG capsule; Take 1 capsule by mouth 3 (Three) Times a Day As Needed for Cough.

## 2025-01-16 NOTE — PROGRESS NOTES
"Chief Complaint  Cough    Subjective    History of Present Illness {  Problem List  Visit  Diagnosis   Encounters  Notes  Medications  Labs  Result Review Imaging  Media :23}     Eladia Connor presents to [facility] for Cough.      History of Present Illness     59 YOF presents for same day sick visit with chief complaint of cough. Patient says the cough initially started over 1 month ago when she was sick last time with a respiratory infection. Patient says she was feeling improved with just lingering cough until around 5-6 days ago now she started with worse cough, headache, wheezing, sinus pressure, mild throat pain. Coughing excessively is causing rib pain and dizzy spells. No fever. She does feel short of breath mostly after long coughing spell. Patient does have asthma and has been using albuterol inhaler more than usual. No sputum production at this time. Patient says she is not allergic to any antibiotics.     Objective     Vital Signs:   /61 (BP Location: Right arm, Patient Position: Sitting, Cuff Size: Large Adult)   Pulse 74   Temp 97.5 °F (36.4 °C) (Infrared)   Resp 22   Ht 170.2 cm (67\")   Wt (!) 140 kg (308 lb)   SpO2 98%   BMI 48.24 kg/m²   Current Outpatient Medications on File Prior to Visit   Medication Sig Dispense Refill    Accu-Chek FastClix Lancets misc Inject 1 each under the skin into the appropriate area as directed 3 (Three) Times a Day. Check fasting glucose every morning and as needed 102 each 2    albuterol sulfate  (90 Base) MCG/ACT inhaler Inhale 2 puffs Every 6 (Six) Hours As Needed for Wheezing. 18 g 2    ARIPiprazole (Abilify) 2 MG tablet Take 2 tablets by mouth Daily. 60 tablet 2    aspirin 81 MG chewable tablet Chew 2 tablets Daily. 90 tablet 1    atorvastatin (LIPITOR) 40 MG tablet Take 1 tablet by mouth every night at bedtime. 90 tablet 1    Blood Glucose Monitoring Suppl (Accu-Chek Guide Me) w/Device kit Use " 1 Cartridge Daily As Needed (every morning and prn). 1 kit 0    busPIRone (BUSPAR) 15 MG tablet Take 1 tablet by mouth 3 (Three) Times a Day. 90 tablet 2    Continuous Glucose Sensor (FreeStyle Germania 3 Sensor) [facility] [place] [place] Use 1 each Every 14 (Fourteen) Days. 2 each 2    dapagliflozin Propanediol (Farxiga) 10 MG tablet Take 10 mg by mouth Daily. 30 tablet 3    docusate sodium (Colace) 100 MG capsule Take 1 capsule by mouth 2 (Two) Times a Day. 60 capsule 1    eszopiclone (Lunesta) 2 MG tablet Take 1 tablet by mouth Every Night. Take immediately before bedtime 30 tablet 0    gabapentin (NEURONTIN) 100 MG capsule TAKE 1 CAPSULE BY MOUTH TWICE  DAILY 60 capsule 0    glipizide (GLUCOTROL) 5 MG tablet Take 1 tablet by mouth 2 (Two) Times a Day Before Meals. 30 tablet 1    glucose blood test strip Use as instructed 100 each 0    ibuprofen (ADVIL,MOTRIN) 800 MG tablet Take 1 tablet by mouth Every 12 (Twelve) Hours As Needed for Moderate Pain. 60 tablet 1    Incontinence Supplies misc Take As Directed.      isosorbide mononitrate (IMDUR) 30 MG 24 hr tablet TAKE 1 TABLET BY MOUTH DAILY 90 tablet 0    ondansetron ODT (ZOFRAN-ODT) 4 MG disintegrating tablet Place 1 tablet on the tongue Every 8 (Eight) Hours As Needed for Nausea or Vomiting. 60 tablet 1    polyethylene glycol (MIRALAX) 17 GM/SCOOP powder Take 17 g by mouth Daily. 510 g 0    prazosin (MINIPRESS) 1 MG capsule TAKE 1 CAPSULE BY MOUTH AT NIGHT 90 capsule 0    sertraline (ZOLOFT) 100 MG tablet Take 1 tablet by mouth Daily. 90 tablet 1    zolpidem (Ambien) 10 MG tablet Take 1 tablet by mouth At Night As Needed for Sleep.      zolpidem (Ambien) 5 MG tablet Take 1 tablet by mouth At Night As Needed for Sleep. 30 tablet 1     No current facility-administered medications on file prior to visit.        Past Medical History:   Diagnosis Date    Anxiety     Asthma     Cancer     SKIN    Depression     Diabetes mellitus     GERD (gastroesophageal reflux disease)     Hyperlipidemia      Hypertension     Psoriasis     Seizures 12/06/2022    Sleep apnea     cpap    Stroke 2017    mini          seizures      Past Surgical History:   Procedure Laterality Date    CHOLECYSTECTOMY  1987    ENDOSCOPY N/A 12/30/2022    Procedure: ESOPHAGOGASTRODUODENOSCOPY with gastric biopsy and esophageal dilation #50,#54,#56,#58 bougie;  Surgeon: Patience Arana MD;  Location: [facility] ENDOSCOPY;  Service: Gastroenterology;  Laterality: N/A;  gastritis    EYE SURGERY      KNEE SURGERY        Family History   Problem Relation Age of Onset    Heart disease Mother     Breast cancer Mother     Heart disease Father     Pancreatic cancer Father     Heart disease Sister     Lung cancer Brother       Social History     Socioeconomic History    Marital status:    Tobacco Use    Smoking status: Never    Smokeless tobacco: Never   Vaping Use    Vaping status: Never Used   Substance and Sexual Activity    Alcohol use: Never    Drug use: Never    Sexual activity: Defer         Office Visit on 01/16/2025   Component Date Value Ref Range Status    SARS Antigen 01/16/2025 Not Detected  Not Detected, Presumptive Negative Final    Influenza A Antigen NASRIN 01/16/2025 Not Detected  Not Detected Final    Influenza B Antigen NASRIN 01/16/2025 Not Detected  Not Detected Final    Internal Control 01/16/2025 Passed  Passed Final    Lot Number 01/16/2025 [id number]   Final    Expiration Date 01/16/2025 [id number]   Final         Physical Exam  Constitutional:       General: She is not in acute distress.     Appearance: Normal appearance. She is ill-appearing. She is not toxic-appearing.   HENT:      Head: Normocephalic and atraumatic.      Right Ear: Tympanic membrane and ear canal normal.      Left Ear: Tympanic membrane and ear canal normal.      Nose: Congestion present.      Right Sinus: Maxillary sinus tenderness present. No frontal sinus tenderness.      Left Sinus: Maxillary sinus tenderness present. No frontal sinus tenderness.       Mouth/Throat:      Mouth: Mucous membranes are dry.      Pharynx: Posterior oropharyngeal erythema present. No oropharyngeal exudate.   Eyes:      General:         Right eye: No discharge.         Left eye: No discharge.      Extraocular Movements: Extraocular movements intact.      Conjunctiva/sclera: Conjunctivae normal.      Pupils: Pupils are equal, round, and reactive to light.   Cardiovascular:      Rate and Rhythm: Normal rate and regular rhythm.      Pulses: Normal pulses.      Heart sounds: Normal heart sounds. No murmur heard.     No friction rub. No gallop.   Pulmonary:      Effort: Pulmonary effort is normal. No respiratory distress.      Breath sounds: Normal breath sounds. No wheezing, rhonchi or rales.   Musculoskeletal:         General: Normal range of motion.      Cervical back: Normal range of motion and neck supple.   Lymphadenopathy:      Cervical: No cervical adenopathy.   Skin:     General: Skin is warm and dry.      Capillary Refill: Capillary refill takes less than 2 seconds.   Neurological:      General: No focal deficit present.      Mental Status: She is alert.   Psychiatric:         Mood and Affect: Mood normal.         Behavior: Behavior normal.         Thought Content: Thought content normal.         Judgment: Judgment normal.          Result Review  Data Reviewed:{ Labs  Result Review  Imaging  Med Tab  Media :23}   I have reviewed this patient's chart.  I have reviewed previous labs, previous imaging, previous medications, and previous encounters with notes that were available in this patient's chart.               Assessment and Plan {CC Problem List  Visit Diagnosis  ROS  Review (Popup)  [facility]  Quality  BestPractice  Medications  SmartSets  SnapShot Encounters  Media :23}      Acute non-recurrent sinusitis, unspecified location    Orders:    methylPREDNISolone (MEDROL) 4 MG dose pack; Take as directed on package instructions.    amoxicillin-clavulanate  (AUGMENTIN) 875-125 MG per tablet; Take 1 tablet by mouth 2 (Two) Times a Day for 7 days.    Acute cough    Orders:    POCT SARS-CoV-2 Antigen NASRIN + Flu    benzonatate (Tessalon Perles) 100 MG capsule; Take 1 capsule by mouth 3 (Three) Times a Day As Needed for Cough.    Wheezing    Orders:    methylPREDNISolone (MEDROL) 4 MG dose pack; Take as directed on package instructions.         -Covid and flu were negative. Would like to do respiratory panel but insurance will not cover, and considering cost and almost 1 week of symptoms already will treat as sinus infection with Augmentin. She does have reported wheezing though clear on exam, using inhaler more often, will also give Medrol for this. Patient is going to try Tessalon OR dextromethorphan for cough. Follow up next week if not improving.     -ER red flags discussed with patient including risk versus benefit and education provided.        Follow Up {Instructions Charge Capture  Follow-up Communications :23}     Patient was given instructions and counseling regarding her condition or for health maintenance advice. Please see specific information pulled into the AVS (placed there by myself) if appropriate.    No follow-ups on file.      Julienne Macias PA-C

## 2025-01-17 ENCOUNTER — APPOINTMENT (OUTPATIENT)
Dept: NEUROLOGY | Facility: HOSPITAL | Age: 60
DRG: 101 | End: 2025-01-17
Payer: MEDICARE

## 2025-01-17 ENCOUNTER — APPOINTMENT (OUTPATIENT)
Dept: MRI IMAGING | Facility: HOSPITAL | Age: 60
DRG: 101 | End: 2025-01-17
Payer: MEDICARE

## 2025-01-17 LAB
ANION GAP SERPL CALCULATED.3IONS-SCNC: 10.7 MMOL/L (ref 5–15)
BUN SERPL-MCNC: 9 MG/DL (ref 8–23)
BUN/CREAT SERPL: 11.8 (ref 7–25)
CALCIUM SPEC-SCNC: 9.2 MG/DL (ref 8.6–10.5)
CHLORIDE SERPL-SCNC: 105 MMOL/L (ref 98–107)
CHOLEST SERPL-MCNC: 115 MG/DL (ref 0–200)
CO2 SERPL-SCNC: 23.3 MMOL/L (ref 22–29)
CREAT SERPL-MCNC: 0.76 MG/DL (ref 0.57–1)
EGFRCR SERPLBLD CKD-EPI 2021: 89.3 ML/MIN/1.73
GLUCOSE BLDC GLUCOMTR-MCNC: 121 MG/DL (ref 70–105)
GLUCOSE BLDC GLUCOMTR-MCNC: 138 MG/DL (ref 70–105)
GLUCOSE BLDC GLUCOMTR-MCNC: 145 MG/DL (ref 70–105)
GLUCOSE SERPL-MCNC: 126 MG/DL (ref 65–99)
HBA1C MFR BLD: 6.54 % (ref 4.8–5.6)
HDLC SERPL-MCNC: 37 MG/DL (ref 40–60)
LDLC SERPL CALC-MCNC: 57 MG/DL (ref 0–100)
LDLC/HDLC SERPL: 1.49 {RATIO}
POTASSIUM SERPL-SCNC: 4 MMOL/L (ref 3.5–5.2)
QT INTERVAL: 380 MS
QT INTERVAL: 400 MS
QTC INTERVAL: 436 MS
QTC INTERVAL: 439 MS
SODIUM SERPL-SCNC: 139 MMOL/L (ref 136–145)
TRIGL SERPL-MCNC: 114 MG/DL (ref 0–150)
TSH SERPL DL<=0.05 MIU/L-ACNC: 1.99 UIU/ML (ref 0.27–4.2)
VIT B12 BLD-MCNC: 542 PG/ML (ref 211–946)
VLDLC SERPL-MCNC: 21 MG/DL (ref 5–40)

## 2025-01-17 PROCEDURE — A9579 GAD-BASE MR CONTRAST NOS,1ML: HCPCS | Performed by: HOSPITALIST

## 2025-01-17 PROCEDURE — 97162 PT EVAL MOD COMPLEX 30 MIN: CPT

## 2025-01-17 PROCEDURE — 92610 EVALUATE SWALLOWING FUNCTION: CPT

## 2025-01-17 PROCEDURE — 82948 REAGENT STRIP/BLOOD GLUCOSE: CPT

## 2025-01-17 PROCEDURE — 99223 1ST HOSP IP/OBS HIGH 75: CPT | Performed by: NURSE PRACTITIONER

## 2025-01-17 PROCEDURE — 25010000002 GADOTERIDOL PER 1 ML: Performed by: HOSPITALIST

## 2025-01-17 PROCEDURE — 84443 ASSAY THYROID STIM HORMONE: CPT | Performed by: NURSE PRACTITIONER

## 2025-01-17 PROCEDURE — 70553 MRI BRAIN STEM W/O & W/DYE: CPT

## 2025-01-17 PROCEDURE — 95819 EEG AWAKE AND ASLEEP: CPT | Performed by: PSYCHIATRY & NEUROLOGY

## 2025-01-17 PROCEDURE — 95819 EEG AWAKE AND ASLEEP: CPT

## 2025-01-17 PROCEDURE — 80061 LIPID PANEL: CPT | Performed by: NURSE PRACTITIONER

## 2025-01-17 PROCEDURE — 80048 BASIC METABOLIC PNL TOTAL CA: CPT

## 2025-01-17 PROCEDURE — 97166 OT EVAL MOD COMPLEX 45 MIN: CPT

## 2025-01-17 RX ORDER — ARIPIPRAZOLE 2 MG/1
4 TABLET ORAL DAILY
Status: DISCONTINUED | OUTPATIENT
Start: 2025-01-17 | End: 2025-01-18 | Stop reason: HOSPADM

## 2025-01-17 RX ORDER — GABAPENTIN 100 MG/1
100 CAPSULE ORAL DAILY
Status: DISCONTINUED | OUTPATIENT
Start: 2025-01-17 | End: 2025-01-18 | Stop reason: HOSPADM

## 2025-01-17 RX ORDER — TOPIRAMATE 25 MG/1
25 TABLET, FILM COATED ORAL EVERY 12 HOURS SCHEDULED
Status: DISCONTINUED | OUTPATIENT
Start: 2025-01-24 | End: 2025-01-18 | Stop reason: HOSPADM

## 2025-01-17 RX ORDER — TOPIRAMATE 25 MG/1
25 TABLET, FILM COATED ORAL NIGHTLY
Status: DISCONTINUED | OUTPATIENT
Start: 2025-01-17 | End: 2025-01-18 | Stop reason: HOSPADM

## 2025-01-17 RX ORDER — NYSTATIN 100000 [USP'U]/G
POWDER TOPICAL EVERY 12 HOURS SCHEDULED
Status: DISCONTINUED | OUTPATIENT
Start: 2025-01-17 | End: 2025-01-18 | Stop reason: HOSPADM

## 2025-01-17 RX ORDER — TOPIRAMATE 25 MG/1
25 TABLET, FILM COATED ORAL DAILY
Status: DISCONTINUED | OUTPATIENT
Start: 2025-01-31 | End: 2025-01-18 | Stop reason: HOSPADM

## 2025-01-17 RX ORDER — GUAIFENESIN/DEXTROMETHORPHAN 100-10MG/5
10 SYRUP ORAL EVERY 4 HOURS PRN
Status: DISCONTINUED | OUTPATIENT
Start: 2025-01-17 | End: 2025-01-18 | Stop reason: HOSPADM

## 2025-01-17 RX ORDER — INSULIN LISPRO 100 [IU]/ML
2-7 INJECTION, SOLUTION INTRAVENOUS; SUBCUTANEOUS
Status: DISCONTINUED | OUTPATIENT
Start: 2025-01-17 | End: 2025-01-18 | Stop reason: HOSPADM

## 2025-01-17 RX ORDER — NICOTINE POLACRILEX 4 MG
15 LOZENGE BUCCAL
Status: DISCONTINUED | OUTPATIENT
Start: 2025-01-17 | End: 2025-01-18 | Stop reason: HOSPADM

## 2025-01-17 RX ORDER — ISOSORBIDE MONONITRATE 30 MG/1
30 TABLET, EXTENDED RELEASE ORAL DAILY
Status: DISCONTINUED | OUTPATIENT
Start: 2025-01-17 | End: 2025-01-18 | Stop reason: HOSPADM

## 2025-01-17 RX ORDER — ALBUTEROL SULFATE 0.83 MG/ML
2.5 SOLUTION RESPIRATORY (INHALATION) EVERY 6 HOURS PRN
Status: DISCONTINUED | OUTPATIENT
Start: 2025-01-17 | End: 2025-01-18 | Stop reason: HOSPADM

## 2025-01-17 RX ORDER — TOPIRAMATE 25 MG/1
50 TABLET, FILM COATED ORAL EVERY 12 HOURS SCHEDULED
Status: DISCONTINUED | OUTPATIENT
Start: 2025-02-06 | End: 2025-01-18 | Stop reason: HOSPADM

## 2025-01-17 RX ORDER — BUSPIRONE HYDROCHLORIDE 15 MG/1
15 TABLET ORAL 3 TIMES DAILY
Status: DISCONTINUED | OUTPATIENT
Start: 2025-01-17 | End: 2025-01-18 | Stop reason: HOSPADM

## 2025-01-17 RX ORDER — SERTRALINE HYDROCHLORIDE 100 MG/1
100 TABLET, FILM COATED ORAL DAILY
Status: DISCONTINUED | OUTPATIENT
Start: 2025-01-17 | End: 2025-01-18 | Stop reason: HOSPADM

## 2025-01-17 RX ORDER — TOPIRAMATE 25 MG/1
50 TABLET, FILM COATED ORAL NIGHTLY
Status: DISCONTINUED | OUTPATIENT
Start: 2025-01-31 | End: 2025-01-18 | Stop reason: HOSPADM

## 2025-01-17 RX ORDER — DOCUSATE SODIUM 100 MG/1
100 CAPSULE, LIQUID FILLED ORAL 2 TIMES DAILY
Status: DISCONTINUED | OUTPATIENT
Start: 2025-01-17 | End: 2025-01-18 | Stop reason: HOSPADM

## 2025-01-17 RX ORDER — DEXTROSE MONOHYDRATE 25 G/50ML
25 INJECTION, SOLUTION INTRAVENOUS
Status: DISCONTINUED | OUTPATIENT
Start: 2025-01-17 | End: 2025-01-18 | Stop reason: HOSPADM

## 2025-01-17 RX ORDER — PRAZOSIN HYDROCHLORIDE 1 MG/1
1 CAPSULE ORAL NIGHTLY
Status: DISCONTINUED | OUTPATIENT
Start: 2025-01-17 | End: 2025-01-18 | Stop reason: HOSPADM

## 2025-01-17 RX ORDER — IBUPROFEN 600 MG/1
1 TABLET ORAL
Status: DISCONTINUED | OUTPATIENT
Start: 2025-01-17 | End: 2025-01-18 | Stop reason: HOSPADM

## 2025-01-17 RX ADMIN — DOCUSATE SODIUM 100 MG: 100 CAPSULE, LIQUID FILLED ORAL at 09:41

## 2025-01-17 RX ADMIN — BUSPIRONE HYDROCHLORIDE 15 MG: 15 TABLET ORAL at 09:40

## 2025-01-17 RX ADMIN — TOPIRAMATE 25 MG: 25 TABLET, FILM COATED ORAL at 21:12

## 2025-01-17 RX ADMIN — GADOTERIDOL 20 ML: 279.3 INJECTION, SOLUTION INTRAVENOUS at 20:17

## 2025-01-17 RX ADMIN — NYSTATIN: 100000 POWDER TOPICAL at 02:12

## 2025-01-17 RX ADMIN — ATORVASTATIN CALCIUM 80 MG: 40 TABLET, FILM COATED ORAL at 21:11

## 2025-01-17 RX ADMIN — GUAIFENESIN SYRUP AND DEXTROMETHORPHAN 10 ML: 100; 10 SYRUP ORAL at 21:47

## 2025-01-17 RX ADMIN — DOCUSATE SODIUM 100 MG: 100 CAPSULE, LIQUID FILLED ORAL at 21:12

## 2025-01-17 RX ADMIN — GABAPENTIN 100 MG: 100 CAPSULE ORAL at 10:09

## 2025-01-17 RX ADMIN — NYSTATIN: 100000 POWDER TOPICAL at 21:27

## 2025-01-17 RX ADMIN — SERTRALINE HYDROCHLORIDE 100 MG: 100 TABLET, FILM COATED ORAL at 09:41

## 2025-01-17 RX ADMIN — BUSPIRONE HYDROCHLORIDE 15 MG: 15 TABLET ORAL at 21:12

## 2025-01-17 RX ADMIN — ARIPIPRAZOLE 4 MG: 2 TABLET ORAL at 09:41

## 2025-01-17 RX ADMIN — ASPIRIN 81 MG CHEWABLE TABLET 81 MG: 81 TABLET CHEWABLE at 09:41

## 2025-01-17 RX ADMIN — Medication 10 ML: at 21:12

## 2025-01-17 RX ADMIN — ACETAMINOPHEN 650 MG: 325 TABLET, FILM COATED ORAL at 10:09

## 2025-01-17 NOTE — PLAN OF CARE
Goal Outcome Evaluation:  Plan of Care Reviewed With: patient           Outcome Evaluation: Eladia Connor is a 59 y.o. female with hx of epilepsy, bipolar disorder and DM who presents with seizure activity followed by L sided weakness and aphasia. At baseline, pt lives with her daugther who is her paid caregiver, in a 2nd floor apt with 12 BRIGHT.  She typically uses SPC in home and RW when out of home.  At time of PT evalaution, pt is A&O x 4 with mild slurring of speech. Pt reports that she has had 2-3 falls at home which she relates to getting up too quickly due to urinary urgency.  Pt does not exhibit signs of Orthostatic hypotension at this time. Pt ambulates 20' x 2 with RW and SBA with uneven gait singh. She toilets with supervision.  She would benefit from  PT at discharge for balance in home setting.    Anticipated Discharge Disposition (PT): home with assist, home with home health

## 2025-01-17 NOTE — ED NOTES
This nurse went to get report from EMS since other nurse was stuck in room. EMS reports trouble speaking and a seizure that happened around 1917 per daughter. This nurse continued to talk with daughter and try to communicate with patient. Nurse noticed patient has a facial droop and some left sided weakness. This nurse went to alert MD and MD came to bedside.

## 2025-01-17 NOTE — PROGRESS NOTES
[facility] MEDICINE SERVICE  DAILY PROGRESS NOTE    NAME: Eladia Connor  : 1965  MRN: [id number]      LOS: 1 day     PROVIDER OF SERVICE: Andrzej Hunter MD    Chief Complaint: Seizures    Subjective:     Interval History:  History taken from: patient chart RN    Left-sided weakness resolved completely aphasia improved    Review of Systems:   Review of Systems  All negative except as above   Objective:     Vital Signs  Temp:  [97.6 °F (36.4 °C)-98 °F (36.7 °C)] 97.7 °F (36.5 °C)  Heart Rate:  [66-90] 67  Resp:  [16-20] 16  BP: ()/(54-96) 122/73   Body mass index is 48.55 kg/m².    Physical Exam  Physical Exam  AOx3 NAD  RRR S1-S2 audible  Lungs with fair air entry  Abdomen soft nontender nondistended     Diagnostic Data    Results from last 7 days   Lab Units 25  0201 25   WBC 10*3/mm3  --  9.18   HEMOGLOBIN g/dL  --  12.2   HEMATOCRIT %  --  39.2   PLATELETS 10*3/mm3  --  184   GLUCOSE mg/dL 126* 145*   CREATININE mg/dL 0.76 0.73   BUN mg/dL 9 9   SODIUM mmol/L 139 139   POTASSIUM mmol/L 4.0 4.1   AST (SGOT) U/L  --  42*   ALT (SGPT) U/L  --  21   ALK PHOS U/L  --  176*   BILIRUBIN mg/dL  --  0.5   ANION GAP mmol/L 10.7 10.6       XR Chest 1 View    Result Date: 2025  Impression: No evidence of acute disease. Electronically Signed: Lincoln Strange MD  2025 11:04 PM EST  Workstation ID: [id number]    CT CEREBRAL PERFUSION WITH & WITHOUT CONTRAST    Result Date: 2025  Impression:  No focal area of decreased cerebral blood flow (CBF) is seen to suggest an acute infarct in a large vessel territory.  No defects are seen to suggest a core infarct or an area of reversible ischemia. Electronically Signed: Lincoln Strange MD  2025 10:41 PM EST  Workstation ID: [id number]    CT Angiogram Head w AI Analysis of LVO    Result Date: 2025  Impression: 1.No acute or cranial process evident. 2.No evidence of intracranial aneurysm, large vessel occlusion, or  significant stenosis. 3.Dominant right vertebral artery. Electronically Signed: Lincoln Strange MD  1/16/2025 9:55 PM EST  Workstation ID: [id number]    CT Angiogram Neck    Result Date: 1/16/2025  Impression: 1.No acute or cranial process evident. 2.No evidence of intracranial aneurysm, large vessel occlusion, or significant stenosis. 3.Dominant right vertebral artery. Electronically Signed: Lincoln Strange MD  1/16/2025 9:55 PM EST  Workstation ID: [id number]    CT Head Without Contrast Stroke Protocol    Result Date: 1/16/2025  Impression: 1.No acute intracranial abnormality identified. 2.Mild changes of chronic microvascular ischemia. Electronically Signed: Lincoln Strange MD  1/16/2025 9:11 PM EST  Workstation ID: [id number]       I reviewed the patient's new clinical results.  I reviewed the patient's new imaging results and agree with the interpretation.    Assessment/Plan:     Active and Resolved Problems  Active Hospital Problems    Diagnosis  POA    **Seizures [R56.9]  Yes      Resolved Hospital Problems   No resolved problems to display.       59-year-old female with history of seizure disorder, bipolar disorder, DM2 admitted to [facility] 1/16 with breakthrough seizures followed by left-sided weakness and aphasia    #History of seizure breakthrough seizure  #Strokelike symptoms  CT head no acute findings  CTA head and neck no significant stenosis  MRI pending  Neurology following  Follow TTE  Started on Vimpat 150 twice daily by neurology  Fall/seizure precautions  Aspirin 81 and Lipitor 80    #Bipolar disorder  Continue home medications    #DM2  Hold OHA  ISS lispro    #Hypertension  Hold antihypertensives until acute stroke ruled out to allow permissive hypertension      VTE Prophylaxis:  Mechanical VTE prophylaxis orders are present.             Disposition Planning:     Barriers to Discharge:Medical workup   Anticipated Date of Discharge: 1/19  Place of Discharge: home       Time: 45 minutes     Code  Status and Medical Interventions: CPR (Attempt to Resuscitate); Full Support   Ordered at: 01/16/25 2225     Code Status (Patient has no pulse and is not breathing):    CPR (Attempt to Resuscitate)     Medical Interventions (Patient has pulse or is breathing):    Full Support       Signature: Electronically signed by Andrzej Hunter MD, 01/17/25, 11:22 EST.  [facility]

## 2025-01-17 NOTE — ED NOTES
Pts daughter states around 1900, pt had a seizure lasting 10 minutes. Pt is now having left sided weakness, decrease sensation on left side of body, expressive aphagia, and a left sided facial droop. Pt is able to answer questions by writing it down but is unable to speak

## 2025-01-17 NOTE — THERAPY EVALUATION
Patient Name: Eladia Connor  : 1965    MRN: [id number]                              Today's Date: 2025       Admit Date: 2025    Visit Dx:     ICD-10-CM ICD-9-CM   1. Seizure  R56.9 780.39   2. Aphasia  R47.01 784.3   3. Seizures  R56.9 780.39     Patient Active Problem List   Diagnosis    Moderate episode of recurrent major depressive disorder    Anxiety    Other insomnia    Skin lesion    History of abnormal mammogram    Mood swings    Class 1 obesity due to excess calories with serious comorbidity and body mass index (BMI) of 33.0 to 33.9 in adult    Rash of face    Fever    Cough    Rash    Intertrigo    Chest pain in adult    Constipation    Persistent vomiting    Hypotension    Palpitations    Tachycardia    Morbid obesity with BMI of 40.0-44.9, adult    Panic attack    Intractable headache, unspecified chronicity pattern, unspecified headache type    Latent tuberculosis    Abscess of back    Intractable migraine without status migrainosus    Screening for diabetes mellitus    Screening for thyroid disorder    Screening for endocrine, metabolic and immunity disorder    Depressive disorder    Hypothyroidism    Stress incontinence    Chest pain    Seizures     Past Medical History:   Diagnosis Date    Anxiety     Asthma     Cancer     SKIN    Depression     Diabetes mellitus     GERD (gastroesophageal reflux disease)     Hyperlipidemia     Hypertension     Psoriasis     Seizures 2022    Sleep apnea     cpap    Stroke 2017    mini          seizures     Past Surgical History:   Procedure Laterality Date    CHOLECYSTECTOMY      ENDOSCOPY N/A 2022    Procedure: ESOPHAGOGASTRODUODENOSCOPY with gastric biopsy and esophageal dilation #50,#54,#56,#58 bougie;  Surgeon: Patience Arana MD;  Location: [facility] ENDOSCOPY;  Service: Gastroenterology;  Laterality: N/A;  gastritis    EYE SURGERY      KNEE SURGERY        General Information       Row Name 25 5811          Physical  Therapy Time and Intention    Document Type evaluation  -CL     Mode of Treatment physical therapy;individual therapy  -CL       Row Name 01/17/25 1335          General Information    Patient Profile Reviewed yes  -CL     Prior Level of Function min assist:;ADL's;transfer  -CL     Existing Precautions/Restrictions fall;seizures  -CL     Barriers to Rehab previous functional deficit  -CL       Row Name 01/17/25 1335          Living Environment    People in Home child(michelle), adult  -CL       Row Name 01/17/25 1335          Home Main Entrance    Number of Stairs, Main Entrance twelve  -CL       Row Name 01/17/25 1335          Stairs Within Home, Primary    Stairs, Within Home, Primary 2nd floor apt  -CL     Number of Stairs, Within Home, Primary none  -CL       Row Name 01/17/25 1335          Cognition    Orientation Status (Cognition) oriented x 4  -CL       Row Name 01/17/25 1335          Safety Issues/Impairments Affecting Functional Mobility    Impairments Affecting Function (Mobility) endurance/activity tolerance  -CL               User Key  (r) = Recorded By, (t) = Taken By, (c) = Cosigned By      Initials Name Provider Type    CL Sara Walker PT Physical Therapist                   Mobility       Row Name 01/17/25 1336          Bed Mobility    Comment, (Bed Mobility) Pt up in chair at start of session  -CL       Row Name 01/17/25 1337          Sit-Stand Transfer    Sit-Stand Tripp (Transfers) supervision  -CL     Assistive Device (Sit-Stand Transfers) walker, front-wheeled  -CL       Row Name 01/17/25 1337          Gait/Stairs (Locomotion)    Tripp Level (Gait) contact guard  -CL     Assistive Device (Gait) walker, front-wheeled  -CL     Patient was able to Ambulate yes  -CL     Distance in Feet (Gait) 20  -CL     Deviations/Abnormal Patterns (Gait) singh decreased  -CL               User Key  (r) = Recorded By, (t) = Taken By, (c) = Cosigned By      Initials Name Provider Type    CL Aaron,  Sara MENJIVAR, PT Physical Therapist                   Obj/Interventions       Row Name 01/17/25 1343          Range of Motion Comprehensive    General Range of Motion bilateral lower extremity ROM WFL  -CL       Row Name 01/17/25 1343          Strength Comprehensive (MMT)    Comment, General Manual Muscle Testing (MMT) Assessment BLEs grossly 4+/5  -CL       Row Name 01/17/25 1343          Balance    Balance Assessment sitting static balance;standing static balance;standing dynamic balance;sitting dynamic balance  -CL     Static Sitting Balance independent  -CL     Dynamic Sitting Balance standby assist  -CL     Position, Sitting Balance sitting in chair  -CL     Dynamic Standing Balance standby assist  -CL     Position/Device Used, Standing Balance walker, front-wheeled  -CL       Row Name 01/17/25 1343          Sensory Assessment (Somatosensory)    Sensory Assessment (Somatosensory) sensation intact  -CL               User Key  (r) = Recorded By, (t) = Taken By, (c) = Cosigned By      Initials Name Provider Type    CL Sara Walker, PT Physical Therapist                   Goals/Plan       Row Name 01/17/25 1401          Bed Mobility Goal 1 (PT)    Activity/Assistive Device (Bed Mobility Goal 1, PT) bed mobility activities, all  -CL     [place] Level/Cues Needed (Bed Mobility Goal 1, PT) independent  -CL     Time Frame (Bed Mobility Goal 1, PT) long term goal (LTG);2 weeks  -CL       Row Name 01/17/25 1409          Transfer Goal 1 (PT)    Activity/Assistive Device (Transfer Goal 1, PT) sit-to-stand/stand-to-sit;bed-to-chair/chair-to-bed  -CL     [place] Level/Cues Needed (Transfer Goal 1, PT) modified independence  -CL     Time Frame (Transfer Goal 1, PT) long term goal (LTG);2 weeks  -CL       Row Name 01/17/25 6681          Gait Training Goal 1 (PT)    Activity/Assistive Device (Gait Training Goal 1, PT) gait (walking locomotion);assistive device use  -CL     [place] Level (Gait Training Goal  1, PT) modified independence  -CL     Distance (Gait Training Goal 1, PT) 50'  -CL     Time Frame (Gait Training Goal 1, PT) long term goal (LTG);2 weeks  -CL       Row Name 01/17/25 1404          Stairs Goal 1 (PT)    Activity/Assistive Device (Stairs Goal 1, PT) ascending stairs;descending stairs  -CL     [place] Level/Cues Needed (Stairs Goal 1, PT) modified independence  -CL     Number of Stairs (Stairs Goal 1, PT) 12  -CL     Time Frame (Stairs Goal 1, PT) long term goal (LTG);2 weeks  -CL       Row Name 01/17/25 1409          Therapy Assessment/Plan (PT)    Planned Therapy Interventions (PT) bed mobility training;balance training;gait training;patient/family education;stair training;strengthening;transfer training  -CL               User Key  (r) = Recorded By, (t) = Taken By, (c) = Cosigned By      Initials Name Provider Type    CL Sara Walker, PT Physical Therapist                   Clinical Impression       Row Name 01/17/25 1351          Pain    Pretreatment Pain Rating 0/10 - no pain  -CL     Posttreatment Pain Rating 0/10 - no pain  -CL       Row Name 01/17/25 1352          Plan of Care Review    Plan of Care Reviewed With patient  -CL     Outcome Evaluation Eladia Connor is a 59 y.o. female with hx of epilepsy, bipolar disorder and DM who presents with seizure activity followed by L sided weakness and aphasia. At baseline, pt lives with her daugther who is her paid caregiver, in a 2nd floor apt with 12 BRIGHT.  She typically uses SPC in home and RW when out of home.  At time of PT evalaution, pt is A&O x 4 with mild slurring of speech. Pt reports that she has had 2-3 falls at home which she relates to getting up too quickly due to urinary urgency.  Pt does not exhibit signs of Orthostatic hypotension at this time. Pt ambulates 20' x 2 with RW and SBA with uneven gait singh. She toilets with supervision.  She would benefit from  PT at discharge for balance in home setting.  -CL        Row Name 01/17/25 1351          Therapy Assessment/Plan (PT)    Rehab Potential (PT) good  -CL     Therapy Frequency (PT) 2 times/wk  -CL     Predicted Duration of Therapy Intervention (PT) Until discharge  -CL       Row Name 01/17/25 1351          Vital Signs    Pre Systolic BP Rehab 107  -CL     Pre Treatment Diastolic BP 71  -CL     Intra Systolic BP Rehab 116  -CL     Intra Treatment Diastolic BP 69  -CL     Pretreatment Heart Rate (beats/min) 75  -CL     Intratreatment Heart Rate (beats/min) 78  -CL     Pretreatment Resp Rate (breaths/min) 22  -CL     Intratreatment Resp Rate (breaths/min) 16  -CL     Pre SpO2 (%) 93  -CL     O2 Delivery Pre Treatment room air  -CL     Intra SpO2 (%) 93  -CL     O2 Delivery Intra Treatment room air  -CL     Pre Patient Position Sitting  -CL     Intra Patient Position Standing  -CL       Row Name 01/17/25 1351          Positioning and Restraints    Pre-Treatment Position sitting in chair/recliner  -CL     Post Treatment Position chair  -CL     In Chair reclined;call light within reach;encouraged to call for assist;exit alarm on  -CL               User Key  (r) = Recorded By, (t) = Taken By, (c) = Cosigned By      Initials Name Provider Type    CL Sara Walker, PT Physical Therapist                   Outcome Measures       Row Name 01/17/25 1404          How much help from another person do you currently need...    Turning from your back to your side while in flat bed without using bedrails? 4  -CL     Moving from lying on back to sitting on the side of a flat bed without bedrails? 4  -CL     Moving to and from a bed to a chair (including a wheelchair)? 3  -CL     Standing up from a chair using your arms (e.g., wheelchair, bedside chair)? 3  -CL     Climbing 3-5 steps with a railing? 3  -CL     To walk in hospital room? 3  -CL     AM-PAC 6 Clicks Score (PT) 20  -CL       Row Name 01/17/25 1311          Functional Assessment    Outcome Measure Options AM-PAC 6 Clicks Daily  Activity (OT)  -SP               User Key  (r) = Recorded By, (t) = Taken By, (c) = Cosigned By      Initials Name Provider Type    CL Sara Walker, PT Physical Therapist    SP Joselito Lincoln, ELIO Occupational Therapist                                 Physical Therapy Education       Title: PT OT SLP Therapies (In Progress)       Topic: Physical Therapy (Done)       Point: Mobility training (Done)       Learning Progress Summary            Patient Acceptance, E,TB, VU by  at 1/17/2025 1405                                      User Key       Initials Effective Dates Name Provider Type Discipline     03/02/22 -  Sara Walker, PT Physical Therapist PT                  PT Recommendation and Plan  Planned Therapy Interventions (PT): bed mobility training, balance training, gait training, patient/family education, stair training, strengthening, transfer training  Outcome Evaluation: Eladia Connor is a 59 y.o. female with hx of epilepsy, bipolar disorder and DM who presents with seizure activity followed by L sided weakness and aphasia. At baseline, pt lives with her daugther who is her paid caregiver, in a 2nd floor apt with 12 BRIGHT.  She typically uses SPC in home and RW when out of home.  At time of PT evalaution, pt is A&O x 4 with mild slurring of speech. Pt reports that she has had 2-3 falls at home which she relates to getting up too quickly due to urinary urgency.  Pt does not exhibit signs of Orthostatic hypotension at this time. Pt ambulates 20' x 2 with RW and SBA with uneven gait singh. She toilets with supervision.  She would benefit from  PT at discharge for balance in home setting.     Time Calculation:   PT Evaluation Complexity  History, PT Evaluation Complexity: 3 or more personal factors and/or comorbidities  Examination of Body Systems (PT Eval Complexity): total of 3 or more elements  Clinical Presentation (PT Evaluation Complexity): evolving  Clinical Decision Making (PT  Evaluation Complexity): moderate complexity  Overall Complexity (PT Evaluation Complexity): moderate complexity     PT Charges       Row Name 01/17/25 1405             Time Calculation    Start Time 1028  -CL      Stop Time 1045  -CL      Time Calculation (min) 17 min  -CL      PT Received On 01/17/25  -CL      PT - Next Appointment 01/20/25  -CL      PT Goal Re-Cert Due Date 01/31/25  -CL                User Key  (r) = Recorded By, (t) = Taken By, (c) = Cosigned By      Initials Name Provider Type    Sara Horton, PT Physical Therapist                  Therapy Charges for Today       Code Description Service Date Service Provider Modifiers Qty    22926466672 HC PT EVAL MOD COMPLEXITY 3 1/17/2025 Sara Walker, PT GP 1            PT G-Codes  Outcome Measure Options: AM-PAC 6 Clicks Daily Activity (OT)  AM-PAC 6 Clicks Score (PT): 20  AM-PAC 6 Clicks Score (OT): 21  Modified [place] Scale: 0 - No Symptoms at all.  PT Discharge Summary  Anticipated Discharge Disposition (PT): home with assist, home with home health    Sara Walker, PT  1/17/2025

## 2025-01-17 NOTE — ED PROVIDER NOTES
"Subjective   History of Present Illness  Last known normal 7:10 PM.  Patient had seizure.  Has history of seizures.  States previously it has happened and that she has had paralysis after the seizure.  Patient with some effort against gravity on the left side.  Mild left facial droop.  Aphasic currently.  Following commands in the right side and able to write down  Review of Systems  See HPI.  Past Medical History:   Diagnosis Date    Anxiety     Asthma     Cancer     SKIN    Depression     Diabetes mellitus     GERD (gastroesophageal reflux disease)     Hyperlipidemia     Hypertension     Psoriasis     Seizures 12/06/2022    Sleep apnea     cpap    Stroke 2017    mini          seizures       No Known Allergies    Past Surgical History:   Procedure Laterality Date    CHOLECYSTECTOMY  1987    ENDOSCOPY N/A 12/30/2022    Procedure: ESOPHAGOGASTRODUODENOSCOPY with gastric biopsy and esophageal dilation #50,#54,#56,#58 bougie;  Surgeon: Patience Arana MD;  Location: [facility] ENDOSCOPY;  Service: Gastroenterology;  Laterality: N/A;  gastritis    EYE SURGERY      KNEE SURGERY         Family History   Problem Relation Age of Onset    Heart disease Mother     Breast cancer Mother     Heart disease Father     Pancreatic cancer Father     Heart disease Sister     Lung cancer Brother        Social History     Socioeconomic History    Marital status:    Tobacco Use    Smoking status: Never    Smokeless tobacco: Never   Vaping Use    Vaping status: Never Used   Substance and Sexual Activity    Alcohol use: Never    Drug use: Never    Sexual activity: Defer           Objective   Physical Exam  No acute distress, regular rate and rhythm, some weakness with left arm and leg on exam but no drift, global aphasia, significant dysarthria, no tachypnea or increased work of breathing, abdomen soft and nontender  Procedures           ED Course      /57   Pulse 68   Temp 97.6 °F (36.4 °C) (Oral)   Resp 20   Ht 170.2 cm (67\") "   Wt (!) 138 kg (305 lb)   LMP  (LMP Unknown)   SpO2 93%   BMI 47.77 kg/m²   Labs Reviewed   COMPREHENSIVE METABOLIC PANEL - Abnormal; Notable for the following components:       Result Value    Glucose 145 (*)     AST (SGOT) 42 (*)     Alkaline Phosphatase 176 (*)     All other components within normal limits    Narrative:     GFR Categories in Chronic Kidney Disease (CKD)      GFR Category          GFR (mL/min/1.73)    Interpretation  G1                     90 or greater         Normal or high (1)  G2                      60-89                Mild decrease (1)  G3a                   45-59                Mild to moderate decrease  G3b                   30-44                Moderate to severe decrease  G4                    15-29                Severe decrease  G5                    14 or less           Kidney failure          (1)In the absence of evidence of kidney disease, neither GFR category G1 or G2 fulfill the criteria for CKD.    eGFR calculation 2021 CKD-EPI creatinine equation, which does not include race as a factor   PROTIME-INR - Abnormal; Notable for the following components:    Protime 15.5 (*)     INR 1.21 (*)     All other components within normal limits   CBC WITH AUTO DIFFERENTIAL - Abnormal; Notable for the following components:    MCH 26.4 (*)     MCHC 31.1 (*)     Lymphocyte % 13.9 (*)     Eosinophils, Absolute 0.41 (*)     All other components within normal limits   POCT GLUCOSE FINGERSTICK - Abnormal; Notable for the following components:    Glucose 135 (*)     All other components within normal limits   APTT - Normal   RAINBOW DRAW    Narrative:     The following orders were created for panel order [place] Draw.  Procedure                               Abnormality         Status                     ---------                               -----------         ------                     Green Top (Gel)[[id number]]                                  Final result               Lavender  Top[[id number]]                                     Final result               Gold Top - [facility][[id number]]                                   Final result               Light Blue Top[[id number]]                                   Final result                 Please view results for these tests on the individual orders.   BASIC METABOLIC PANEL   POCT GLUCOSE FINGERSTICK   POCT GLUCOSE FINGERSTICK   POCT GLUCOSE FINGERSTICK   POCT GLUCOSE FINGERSTICK   POCT GLUCOSE FINGERSTICK   POCT GLUCOSE FINGERSTICK   TYPE AND SCREEN   CBC AND DIFFERENTIAL    Narrative:     The following orders were created for panel order CBC & Differential.  Procedure                               Abnormality         Status                     ---------                               -----------         ------                     CBC Auto Differential[[id number]]        Abnormal            Final result                 Please view results for these tests on the individual orders.   GREEN TOP   LAVENDER TOP   [facility]   LIGHT BLUE TOP     Medications   sodium chloride 0.9 % flush 10 mL (has no administration in time range)   nitroglycerin (NITROSTAT) SL tablet 0.4 mg (has no administration in time range)   acetaminophen (TYLENOL) tablet 650 mg (has no administration in time range)     Or   acetaminophen (TYLENOL) 160 MG/5ML oral solution 650 mg (has no administration in time range)     Or   acetaminophen (TYLENOL) suppository 650 mg (has no administration in time range)   ondansetron (ZOFRAN) injection 4 mg (has no administration in time range)   melatonin tablet 5 mg (has no administration in time range)   atorvastatin (LIPITOR) tablet 80 mg (80 mg Oral Not Given 1/16/25 2233)   aspirin chewable tablet 81 mg (has no administration in time range)     Or   aspirin suppository 300 mg (has no administration in time range)   sennosides-docusate (PERICOLACE) 8.6-50 MG per tablet 2 tablet (has no administration in time range)     And   polyethylene  glycol (MIRALAX) packet 17 g (has no administration in time range)     And   bisacodyl (DULCOLAX) EC tablet 5 mg (has no administration in time range)     And   bisacodyl (DULCOLAX) suppository 10 mg (has no administration in time range)   albuterol (PROVENTIL) nebulizer solution 0.083% 2.5 mg/3mL (has no administration in time range)   ARIPiprazole (ABILIFY) tablet 4 mg (has no administration in time range)   busPIRone (BUSPAR) tablet 15 mg (has no administration in time range)   docusate sodium (COLACE) capsule 100 mg (has no administration in time range)   gabapentin (NEURONTIN) capsule 100 mg (has no administration in time range)   isosorbide mononitrate (IMDUR) 24 hr tablet 30 mg (has no administration in time range)   prazosin (MINIPRESS) capsule 1 mg (has no administration in time range)   sertraline (ZOLOFT) tablet 100 mg (has no administration in time range)   nystatin (MYCOSTATIN) powder (has no administration in time range)   iopamidol (ISOVUE-370) 76 % injection 150 mL (150 mL Intravenous Given 1/16/25 2118)   lacosamide (VIMPAT) injection 150 mg (150 mg Intravenous Given 1/16/25 2211)     XR Chest 1 View   Final Result   Impression:   No evidence of acute disease.            Electronically Signed: Lincoln Strange MD     1/16/2025 11:04 PM EST     Workstation ID: [id number]      CT Angiogram Head w AI Analysis of LVO   Final Result   Impression:   1.No acute or cranial process evident.   2.No evidence of intracranial aneurysm, large vessel occlusion, or significant stenosis.   3.Dominant right vertebral artery.            Electronically Signed: Lincoln Strange MD     1/16/2025 9:55 PM EST     Workstation ID: [id number]      CT Angiogram Neck   Final Result   Impression:   1.No acute or cranial process evident.   2.No evidence of intracranial aneurysm, large vessel occlusion, or significant stenosis.   3.Dominant right vertebral artery.            Electronically Signed: Lincoln Strange MD     1/16/2025 9:55 PM  EST     Workstation ID: [id number]      CT CEREBRAL PERFUSION WITH & WITHOUT CONTRAST   Final Result   Impression:       No focal area of decreased cerebral blood flow (CBF) is seen to suggest an acute infarct in a large vessel territory.  No defects are seen to suggest a core infarct or an area of reversible ischemia.            Electronically Signed: Lincoln Strange MD     1/16/2025 10:41 PM EST     Workstation ID: [id number]      CT Head Without Contrast Stroke Protocol   Final Result   Impression:   1.No acute intracranial abnormality identified.   2.Mild changes of chronic microvascular ischemia.            Electronically Signed: Lincoln Strange MD     1/16/2025 9:11 PM EST     Workstation ID: [id number]                                                         Medical Decision Making    My interpretation of CT head is no subdural epidural hematoma.  See system for radiology interpretation.    EKG interpretation: 2130, rate 80, normal sinus rhythm, normal axis, normals, no acute ischemic changes.    Imaging negative for acute findings.  Neurology discussed TNK with patient and wished to defer.  Most likely Richard's paralysis.  Admitted to hospitalist service.  Final diagnoses:   Seizure   Aphasia       ED Disposition  ED Disposition       ED Disposition   Decision to Admit    Condition   --    Comment   Level of Care: Progressive Care [20]   Diagnosis: Seizures [205091]   Certification: I Certify That Inpatient Hospital Services Are Medically Necessary For Greater Than 2 Midnights                 No follow-up provider specified.       Medication List        ASK your doctor about these medications      gabapentin 100 MG capsule  Commonly known as: NEURONTIN  Ask about: Which instructions should I use?                 Kwabena Bradshaw MD  01/17/25 0209     Patient

## 2025-01-17 NOTE — CONSULTS
"Diabetes Education  Assessment/Teaching    Patient Name:  Eladia Connor  YOB: 1965  MRN: [id number]  Admit Date:  1/16/2025      Assessment Date:  1/17/2025  Flowsheet Row Most Recent Value   General Information     Referral From: MD order, A1c  [MD consult per stroke protocol and on 1/16/2025 A1c was 6.54%.]   Height 170.2 cm (67\")   Height Method Actual   Weight 141 kg (309 lb 15.5 oz)   Weight Method Bed scale   Pregnancy Assessment    Diabetes History    What type of diabetes do you have? Type 2   Length of Diabetes Diagnosis 6 -12 months  [Diagnsoed March 2024]   Current DM knowledge fair   Do you test your blood sugar at home? yes   Frequency of checks 3-4X a day   Meter type Accu-chek < a year old   Who performs the test? patient   Typical readings 120-130   Have you had low blood sugar? (<70mg/dl) no   Have you had high blood sugar? (>140mg/dl) no   Education Preferences    What areas of diabetes would you like to learn about? diabetes complications   Nutrition Information    Assessment Topics    Reducing Risk - Assessment Needs education   DM Goals    Reducing Risk - Goal Today            Flowsheet Row Most Recent Value   DM Education Needs    Meter Has own   Meter Type Accuchek   Frequency of Testing 3 times a day   Reducing Risks A1C testing  [On 1/16/2025 A1c was 6.54%.]   Discharge Plan Home   Motivation Moderate   Teaching Method Discussion, Handouts   Patient Response Verbalized understanding              Other Comments:  A1c info sheet given with discussion on A1c target and healthy blood sugar range. Patient stated she goes to the [facility] and walks on the track 3-4 days a week. At home patient stated she drinks water, unsweetened tea, and most days 1 regular soda. Discussed with patient how sugared beverages affect the blood sugar and encouraged patient to decrease sugared beverage intake. Patient has no further questions or concerns related to diabetes at this " time.        Electronically signed by:  Yamel Harper RN  01/17/25 15:23 EST

## 2025-01-17 NOTE — PLAN OF CARE
Goal Outcome Evaluation:  Plan of Care Reviewed With: patient    Outcome Evaluation: Pt is a 60 y/o female admitted to [facility] on 1/16/25 presenting with seizure followed by stroke like symptoms. CTA of the head and neck showed no acute abnormality. Neurology was consulted ans saw patient per telehealth services. After a discussion of risks versus benefits given this could be post-ictal weakness vs stroke vs conversion disorder, TNK was not ordered. Hospitalist was consulted for further management. PMHx significant for epilepsy, bipolar disorder, and DM. At baseline, pt resides with adult daughter and x3 grandchildren on a 2nd floor apartment, pt reports she has to ambulate up/down flight of stairs. Pt requires assistance for driving (groceries and appointments) and ADLs. Pt uses a cane within home and walker within community. Pt reports daughter is her paid caregiver. She additionally reports she is blind in her right eye and uses glasses for reading. Upon assessment, pt A&O x4 with 5/10 pain in head. Pt demos ability to complete oral hygiene with set-up independently and IND dons socks sitting EOB. Pt comes to standing with CGA and FWW where she ambulated to chair with CGA and FWW. BUE ROM WFLs. MMT RUE 5/5, LUE 4/5 with numbness in L digits. Based on assessment, pt would benefit from continued skilled OT services to maintain her strength, endurance, and activity tolerance while in hospital setting. OT recommending home with HHOT when medically appropriate.    Anticipated Discharge Disposition (OT): home with home health, home with assist

## 2025-01-17 NOTE — PLAN OF CARE
Pt left sided weakness is improving per pt  Problem: Adult Inpatient Plan of Care  Goal: Plan of Care Review  Outcome: Progressing  Flowsheets (Taken 1/17/2025 0408)  Plan of Care Reviewed With: patient  Goal: Patient-Specific Goal (Individualized)  Outcome: Progressing  Goal: Absence of Hospital-Acquired Illness or Injury  Outcome: Progressing  Intervention: Identify and Manage Fall Risk  Recent Flowsheet Documentation  Taken 1/17/2025 0200 by Raisa Clarke RN  Safety Promotion/Fall Prevention:   assistive device/personal items within reach   clutter free environment maintained   fall prevention program maintained   lighting adjusted   mobility aid in reach   nonskid shoes/slippers when out of bed   room organization consistent   safety round/check completed  Intervention: Prevent Skin Injury  Recent Flowsheet Documentation  Taken 1/17/2025 0200 by Raisa Clarke RN  Body Position:   position changed independently   weight shifting  Skin Protection: transparent dressing maintained  Intervention: Prevent and Manage VTE (Venous Thromboembolism) Risk  Recent Flowsheet Documentation  Taken 1/17/2025 0200 by Raisa Clarke RN  VTE Prevention/Management:   bilateral   SCDs (sequential compression devices) off  Intervention: Prevent Infection  Recent Flowsheet Documentation  Taken 1/17/2025 0200 by Raisa Clarke RN  Infection Prevention:   equipment surfaces disinfected   hand hygiene promoted   personal protective equipment utilized   rest/sleep promoted   single patient room provided  Goal: Optimal Comfort and Wellbeing  Outcome: Progressing  Intervention: Provide Person-Centered Care  Recent Flowsheet Documentation  Taken 1/17/2025 0200 by Raisa Clarke RN  Trust Relationship/Rapport:   care explained   thoughts/feelings acknowledged  Goal: Readiness for Transition of Care  Outcome: Progressing  Intervention: Mutually Develop Transition Plan  Recent Flowsheet Documentation  Taken 1/17/2025 0153 by Raisa Clarke  RN  Equipment Currently Used at Home:   walker, rolling   cpap  Taken 1/17/2025 0152 by Raisa Clarke RN  Transportation Anticipated: family or friend will provide  Patient/Family Anticipated Services at Transition: none  Patient/Family Anticipates Transition to: home with family     Problem: Violence Risk or Actual  Goal: Anger and Impulse Control  Outcome: Progressing  Intervention: Minimize Safety Risk  Recent Flowsheet Documentation  Taken 1/17/2025 0200 by Raisa Clarke RN  Sensory Stimulation Regulation: quiet environment promoted  Enhanced Safety Measures: bed alarm set  Intervention: Promote Self-Control  Recent Flowsheet Documentation  Taken 1/17/2025 0200 by Raisa Clarke RN  Supportive Measures: verbalization of feelings encouraged  Environmental Support: calm environment promoted     Problem: Comorbidity Management  Goal: Blood Glucose Level Within Target Range  Outcome: Progressing  Intervention: Monitor and Manage Glycemia  Recent Flowsheet Documentation  Taken 1/17/2025 0200 by Raisa Clarke RN  Medication Review/Management: medications reviewed  Goal: Blood Pressure in Desired Range  Outcome: Progressing  Intervention: Maintain Blood Pressure Management  Recent Flowsheet Documentation  Taken 1/17/2025 0200 by Raisa Clarke RN  Medication Review/Management: medications reviewed     Problem: Stroke, Ischemic (Includes Transient Ischemic Attack)  Goal: Optimal Coping  Outcome: Progressing  Intervention: Support Psychosocial Response to Stroke  Recent Flowsheet Documentation  Taken 1/17/2025 0200 by Raisa Clarke RN  Supportive Measures: verbalization of feelings encouraged  Goal: Effective Bowel Elimination  Outcome: Progressing  Goal: Optimal Cerebral Tissue Perfusion  Outcome: Progressing  Intervention: Protect and Optimize Cerebral Perfusion  Recent Flowsheet Documentation  Taken 1/17/2025 0200 by Raisa Clarke RN  Sensory Stimulation Regulation: quiet environment promoted  Goal: Optimal  Cognitive Function  Outcome: Progressing  Intervention: Optimize Cognitive Function  Recent Flowsheet Documentation  Taken 1/17/2025 0200 by Raisa Clarke RN  Sensory Stimulation Regulation: quiet environment promoted  Goal: Improved Communication Skills  Outcome: Progressing  Intervention: Optimize Communication Skills  Recent Flowsheet Documentation  Taken 1/17/2025 0200 by Raisa Clarke RN  Communication Enhancement Strategies:   call light answered in person   nonverbal strategies used   extra time allowed for response  Goal: Optimal Functional Ability  Outcome: Progressing  Goal: Optimal Nutrition Intake  Outcome: Progressing  Goal: Effective Oxygenation and Ventilation  Outcome: Progressing  Goal: Improved Sensorimotor Function  Outcome: Progressing  Intervention: Optimize Sensory and Perceptual Ability  Recent Flowsheet Documentation  Taken 1/17/2025 0200 by Raisa Clarke RN  Pressure Reduction Techniques: frequent weight shift encouraged  Pressure Reduction Devices: pressure-redistributing mattress utilized  Goal: Safe and Effective Swallow  Outcome: Progressing  Goal: Effective Urinary Elimination  Outcome: Progressing     Problem: Seizure, Active Management  Goal: Absence of Seizure/Seizure-Related Injury  Outcome: Progressing  Intervention: Prevent Seizure-Related Injury  Recent Flowsheet Documentation  Taken 1/17/2025 0200 by Raisa Clarke RN  Sensory Stimulation Regulation: quiet environment promoted   Goal Outcome Evaluation:  Plan of Care Reviewed With: patient

## 2025-01-17 NOTE — THERAPY EVALUATION
Patient Name: Eladia Connor  : 1965    MRN: [id number]                              Today's Date: 2025       Admit Date: 2025    Visit Dx:     ICD-10-CM ICD-9-CM   1. Seizure  R56.9 780.39   2. Aphasia  R47.01 784.3   3. Seizures  R56.9 780.39     Patient Active Problem List   Diagnosis    Moderate episode of recurrent major depressive disorder    Anxiety    Other insomnia    Skin lesion    History of abnormal mammogram    Mood swings    Class 1 obesity due to excess calories with serious comorbidity and body mass index (BMI) of 33.0 to 33.9 in adult    Rash of face    Fever    Cough    Rash    Intertrigo    Chest pain in adult    Constipation    Persistent vomiting    Hypotension    Palpitations    Tachycardia    Morbid obesity with BMI of 40.0-44.9, adult    Panic attack    Intractable headache, unspecified chronicity pattern, unspecified headache type    Latent tuberculosis    Abscess of back    Intractable migraine without status migrainosus    Screening for diabetes mellitus    Screening for thyroid disorder    Screening for endocrine, metabolic and immunity disorder    Depressive disorder    Hypothyroidism    Stress incontinence    Chest pain    Seizures     Past Medical History:   Diagnosis Date    Anxiety     Asthma     Cancer     SKIN    Depression     Diabetes mellitus     GERD (gastroesophageal reflux disease)     Hyperlipidemia     Hypertension     Psoriasis     Seizures 2022    Sleep apnea     cpap    Stroke 2017    mini          seizures     Past Surgical History:   Procedure Laterality Date    CHOLECYSTECTOMY      ENDOSCOPY N/A 2022    Procedure: ESOPHAGOGASTRODUODENOSCOPY with gastric biopsy and esophageal dilation #50,#54,#56,#58 bougie;  Surgeon: Patience Arana MD;  Location: [facility] ENDOSCOPY;  Service: Gastroenterology;  Laterality: N/A;  gastritis    EYE SURGERY      KNEE SURGERY        General Information       Row Name 25 1304          OT Time and  Intention    Subjective Information no complaints  -SP     Document Type evaluation  -SP     Mode of Treatment individual therapy;occupational therapy  -SP     Patient Effort good  -SP     Symptoms Noted During/After Treatment none  -SP       Row Name 01/17/25 1304          General Information    Patient Profile Reviewed yes  -SP     Prior Level of Function min assist:;ADL's;dependent:;driving  -SP     Existing Precautions/Restrictions fall;seizures  -SP     Barriers to Rehab medically complex  -SP       Row Name 01/17/25 1304          Living Environment    People in Home child(michelle), adult;grandchild(michelle)  -SP       Row Name 01/17/25 1304          Home Main Entrance    Number of Stairs, Main Entrance twelve;other (see comments)  flight of steps to 2nd floor apartment  -SP       Row Name 01/17/25 1304          Stairs Within Home, Primary    Number of Stairs, Within Home, Primary none  -SP       Row Name 01/17/25 1304          Cognition    Orientation Status (Cognition) oriented x 4  -SP       Row Name 01/17/25 1304          Safety Issues/Impairments Affecting Functional Mobility    Impairments Affecting Function (Mobility) balance;endurance/activity tolerance;strength;pain  -SP               User Key  (r) = Recorded By, (t) = Taken By, (c) = Cosigned By      Initials Name Provider Type    SP Joselito Lincoln OT Occupational Therapist                     Mobility/ADL's       Row Name 01/17/25 1306          Bed Mobility    Bed Mobility bed mobility (all) activities  -SP     All Activities, [place] (Bed Mobility) modified independence  -SP       Row Name 01/17/25 1306          Transfers    Transfers bed-chair transfer  -SP       Row Name 01/17/25 1306          Bed-Chair Transfer    Bed-Chair [place] (Transfers) contact guard  -SP     Assistive Device (Bed-Chair Transfers) walker, front-wheeled  -SP       Row Name 01/17/25 1306          Functional Mobility    Functional Mobility- Ind. Level contact guard assist   -SP     Functional Mobility- Device walker, front-wheeled  -SP     Patient was able to Ambulate yes  -SP       Row Name 01/17/25 1306          Activities of Daily Living    BADL Assessment/Intervention grooming;lower body dressing  -SP       Row Name 01/17/25 1306          Grooming Assessment/Training    [place] Level (Grooming) oral care regimen;set up;independent  -SP       Row Name 01/17/25 1306          Lower Body Dressing Assessment/Training    [place] Level (Lower Body Dressing) don;socks;independent  -SP     Position (Lower Body Dressing) edge of bed sitting  -SP               User Key  (r) = Recorded By, (t) = Taken By, (c) = Cosigned By      Initials Name Provider Type    SP Joselito Lincoln OT Occupational Therapist                   Obj/Interventions       Row Name 01/17/25 1307          Sensory Assessment (Somatosensory)    Sensory Assessment Reports impaired sensation in LUE  -SP       Row Name 01/17/25 1307          Vision Assessment/Intervention    Visual Impairment/Limitations corrective lenses for reading  -SP     Vision Assessment Comment Reports she is blind in R eye  -SP       Row Name 01/17/25 1307          Range of Motion Comprehensive    General Range of Motion bilateral upper extremity ROM WFL  -SP       Row Name 01/17/25 1307          Strength Comprehensive (MMT)    Comment, General Manual Muscle Testing (MMT) Assessment RUE 5/5, LUE 4/5  -SP       Row Name 01/17/25 1307          Balance    Balance Assessment sitting dynamic balance;sit to stand dynamic balance;standing dynamic balance  -SP     Dynamic Sitting Balance standby assist  -SP     Position, Sitting Balance sitting edge of bed;unsupported  -SP     Sit to Stand Dynamic Balance contact guard  -SP     Dynamic Standing Balance contact guard  -SP     Position/Device Used, Standing Balance walker, front-wheeled  -SP               User Key  (r) = Recorded By, (t) = Taken By, (c) = Cosigned By      Initials Name Provider Type     SP Joselito Lincoln OT Occupational Therapist                   Goals/Plan       Row Name 01/17/25 1310          Bathing Goal 1 (OT)    Activity/Device (Bathing Goal 1, OT) bathing skills, all  -SP     [place] Level/Cues Needed (Bathing Goal 1, OT) modified independence  -SP     Time Frame (Bathing Goal 1, OT) 2 weeks  -SP     Strategies/Barriers (Bathing Goal 1, OT) until d/c  -SP       Row Name 01/17/25 1310          Toileting Goal 1 (OT)    Activity/Device (Toileting Goal 1, OT) toileting skills, all  -SP     [place] Level/Cues Needed (Toileting Goal 1, OT) modified independence  -SP     Time Frame (Toileting Goal 1, OT) 2 weeks  -SP     Strategies/Barriers (Toileting Goal 1, OT) until d/c  -SP       Row Name 01/17/25 1310          Strength Goal 1 (OT)    Strength Goal 1 (OT) Pt will demo good understanding of BUE HEP  -SP     Time Frame (Strength Goal 1, OT) 2 weeks  -SP     Strategies/Barriers (Strength Goal 1, OT) until d/c  -SP       Row Name 01/17/25 1310          Therapy Assessment/Plan (OT)    Planned Therapy Interventions (OT) activity tolerance training;BADL retraining;occupation/activity based interventions;transfer/mobility retraining;strengthening exercise;ROM/therapeutic exercise;IADL retraining;patient/caregiver education/training  -SP               User Key  (r) = Recorded By, (t) = Taken By, (c) = Cosigned By      Initials Name Provider Type    SP Joselito Lincoln OT Occupational Therapist                   Clinical Impression       Row Name 01/17/25 1308          Pain Assessment    Pretreatment Pain Rating 5/10  -SP     Posttreatment Pain Rating 5/10  -SP     Pain Location head  -SP       Row Name 01/17/25 1308          Plan of Care Review    Plan of Care Reviewed With patient  -SP     Outcome Evaluation Pt is a 60 y/o female admitted [facility] on 1/16/25 presenting with seizure followed by stroke like symptoms. CTA of the head and neck showed no acute abnormality. Neurology was consulted  ans saw patient per telehealth services. After a discussion of risks versus benefits given this could be post-ictal weakness vs stroke vs conversion disorder, TNK was not ordered. Hospitalist was consulted for further management. PMHx significant for epilepsy, bipolar disorder, and DM. At baseline, pt resides with adult daughter and x3 grandchildren on a 2nd floor apartment, pt reports she has to ambulate up/down flight of stairs. Pt requires assistance for driving (groceries and appointments) and ADLs. Pt uses a cane within home and walker within community. Pt reports daughter is her paid caregiver. She additionally reports she is blind in her right eye and uses glasses for reading. Upon assessment, pt A&O x4 with 5/10 pain in head. Pt demos ability to complete oral hygiene with set-up independently and IND dons socks sitting EOB. Pt comes to standing with CGA and FWW where she ambulated to chair with CGA and FWW. BUE ROM WFLs. MMT RUE 5/5, LUE 4/5 with numbness in L digits. Based on assessment, pt would benefit from continued skilled OT services to maintain her strength, endurance, and activity tolerance while in hospital setting. OT recommending home with HHOT when medically appropriate.  -SP       Row Name 01/17/25 1308          Therapy Assessment/Plan (OT)    Criteria for Skilled Therapeutic Interventions Met (OT) yes;meets criteria;skilled treatment is necessary  -SP     Therapy Frequency (OT) 3 times/wk  -SP     Predicted Duration of Therapy Intervention (OT) until d/c  -SP       Row Name 01/17/25 1308          Therapy Plan Review/Discharge Plan (OT)    Anticipated Discharge Disposition (OT) home with home health;home with assist  -SP       Row Name 01/17/25 1308          Vital Signs    Pre Systolic BP Rehab 124  -SP     Pre Treatment Diastolic BP 67  -SP     Intra Systolic BP Rehab 122  -SP     Intra Treatment Diastolic BP 72  -SP     Pre SpO2 (%) 96  -SP     O2 Delivery Pre Treatment room air  -SP     O2  Delivery Intra Treatment room air  -SP     O2 Delivery Post Treatment room air  -SP     Pre Patient Position Supine  -SP     Intra Patient Position Standing  -SP     Post Patient Position Sitting  -SP       Row Name 01/17/25 1308          Positioning and Restraints    Pre-Treatment Position in bed  -SP     Post Treatment Position chair  -SP     In Chair notified nsg;reclined;call light within reach;encouraged to call for assist;exit alarm on  -SP               User Key  (r) = Recorded By, (t) = Taken By, (c) = Cosigned By      Initials Name Provider Type    Joselito Madrid, ELIO Occupational Therapist                   Outcome Measures       Row Name 01/17/25 1311          How much help from another is currently needed...    Putting on and taking off regular lower body clothing? 3  -SP     Bathing (including washing, rinsing, and drying) 3  -SP     Toileting (which includes using toilet bed pan or urinal) 3  -SP     Putting on and taking off regular upper body clothing 4  -SP     Taking care of personal grooming (such as brushing teeth) 4  -SP     Eating meals 4  -SP     AM-PAC 6 Clicks Score (OT) 21  -SP       Row Name 01/17/25 0200          How much help from another person do you currently need...    Turning from your back to your side while in flat bed without using bedrails? 4  -AR     Moving from lying on back to sitting on the side of a flat bed without bedrails? 4  -AR     Moving to and from a bed to a chair (including a wheelchair)? 3  -AR     Standing up from a chair using your arms (e.g., wheelchair, bedside chair)? 3  -AR     Climbing 3-5 steps with a railing? 2  -AR     To walk in hospital room? 2  -AR     AM-PAC 6 Clicks Score (PT) 18  -AR       Row Name 01/17/25 1311          Functional Assessment    Outcome Measure Options AM-PAC 6 Clicks Daily Activity (OT)  -SP               User Key  (r) = Recorded By, (t) = Taken By, (c) = Cosigned By      Initials Name Provider Type    Raisa Coronado RN  Registered Nurse    Joselito Madrid OT Occupational Therapist                    Occupational Therapy Education       Title: PT OT SLP Therapies (In Progress)       Topic: Occupational Therapy (In Progress)       Point: ADL training (Done)       Description:   Instruct learner(s) on proper safety adaptation and remediation techniques during self care or transfers.   Instruct in proper use of assistive devices.                  Learning Progress Summary            Patient Acceptance, E,TB, VU by SP at 1/17/2025 1311                      Point: Home exercise program (Not Started)       Description:   Instruct learner(s) on appropriate technique for monitoring, assisting and/or progressing therapeutic exercises/activities.                  Learner Progress:  Not documented in this visit.              Point: Precautions (Done)       Description:   Instruct learner(s) on prescribed precautions during self-care and functional transfers.                  Learning Progress Summary            Patient Acceptance, E,TB, VU by SP at 1/17/2025 1311                      Point: Body mechanics (Done)       Description:   Instruct learner(s) on proper positioning and spine alignment during self-care, functional mobility activities and/or exercises.                  Learning Progress Summary            Patient Acceptance, E,TB, VU by SP at 1/17/2025 1311                                      User Key       Initials Effective Dates Name Provider Type Discipline    MONIQUE 11/15/23 -  Joselito Lincoln OT Occupational Therapist OT                  OT Recommendation and Plan  Planned Therapy Interventions (OT): activity tolerance training, BADL retraining, occupation/activity based interventions, transfer/mobility retraining, strengthening exercise, ROM/therapeutic exercise, IADL retraining, patient/caregiver education/training  Therapy Frequency (OT): 3 times/wk  Plan of Care Review  Plan of Care Reviewed With: patient  Outcome  Evaluation: Pt is a 60 y/o female admitted to [facility] on 1/16/25 presenting with seizure followed by stroke like symptoms. CTA of the head and neck showed no acute abnormality. Neurology was consulted ans saw patient per telehealth services. After a discussion of risks versus benefits given this could be post-ictal weakness vs stroke vs conversion disorder, TNK was not ordered. Hospitalist was consulted for further management. PMHx significant for epilepsy, bipolar disorder, and DM. At baseline, pt resides with adult daughter and x3 grandchildren on a 2nd floor apartment, pt reports she has to ambulate up/down flight of stairs. Pt requires assistance for driving (groceries and appointments) and ADLs. Pt uses a cane within home and walker within community. Pt reports daughter is her paid caregiver. She additionally reports she is blind in her right eye and uses glasses for reading. Upon assessment, pt A&O x4 with 5/10 pain in head. Pt demos ability to complete oral hygiene with set-up independently and IND dons socks sitting EOB. Pt comes to standing with CGA and FWW where she ambulated to chair with CGA and FWW. BUE ROM WFLs. MMT RUE 5/5, LUE 4/5 with numbness in L digits. Based on assessment, pt would benefit from continued skilled OT services to maintain her strength, endurance, and activity tolerance while in hospital setting. OT recommending home with HHOT when medically appropriate.     Time Calculation:         Time Calculation- OT       Row Name 01/17/25 1311             Time Calculation- OT    OT Start Time 0906  -SP      OT Stop Time 0930  -SP      OT Time Calculation (min) 24 min  -SP      OT Received On 01/17/25  -SP      OT - Next Appointment 01/20/25  -SP      OT Goal Re-Cert Due Date 01/31/25  -SP                User Key  (r) = Recorded By, (t) = Taken By, (c) = Cosigned By      Initials Name Provider Type    Joselito Madrid OT Occupational Therapist                  Therapy Charges for Today        Code Description Service Date Service Provider Modifiers Qty    [id number] HC OT EVAL MOD COMPLEXITY 4 1/17/2025 Joselito Lincoln OT GO 1                 Joselito Lincoln OT  1/17/2025

## 2025-01-17 NOTE — NURSING NOTE
Second nurse skin assessment completed per protocol. This nurse agrees with primary nurse findings.

## 2025-01-17 NOTE — CONSULTS
Primary Care Provider: Provider, No Known     Consult requested by:  Dr. Bradshaw     Reason for Consultation: Neurological evaluation, stroke     History taken from: patient chart family RN    Chief complaint: seizure, left side weakness and aphasia        SUBJECTIVE:    History of present illness: Background per H&P: Eladia Connor is a 59 y.o. female with a previous medical history of epilepsy, bipolar disorder, DM who presented to [facility] on 1/16/2025 with seizure activity followed by left sided weakness and aphasia that started at approximatly 1910.       In the ED, CT of the head, CTA of the head and neck all showed no acute abnormality.  Labwork is unremarkable. She is afebrile, all vitals are stable.  EKG showed SR, HR 71. Neurology was consulted ans saw patient per telehealth services. After a discussion of risks versus benefits given this could be post-ictal weakness vs stroke vs conversion disorder, TNK was not ordered. Hospitalist was consulted for further management.     - Portions of the above HPI were copied from previous encounters and edited as appropriate. PMH as detailed below.     Patient was seen by inpatient neurology in December 2022.  At that time patient came in with left arm weakness that lasted approximately 30 minutes.  She also had episodes at that time there there was tongue protruding and unable to speak but still interacting and withdrawing to pain.  There was questionable a lot of anxiety due to family dynamics at that time.  Full workup was completed.  We did MRI brain, CTA head and neck, echo, event monitor for 30 days, MRI lumbar spine.  Keppra 500 mg was started.    Patient was seen by outpatient neurology in September 2023  and they plan to do an outpatient EEG although I do not see those results anywhere.  I also do not see with the patient was followed up again after that evaluation.    Patient states that her daughter saw that she had a staring off spell  that lasted 2 to 3 minutes and then she had 2 convulsive type of seizures lasting up to 15 minutes.  Afterwards she was having left-sided weakness and aphasia?  The daughters called 911 and EMS brought her into [facility].  Teleneurology saw her on arrival and gave her an NIH of 7 (dysarthria 2, aphasia 3, LOC questions 2).      Today patient is back to her baseline.  Chart was reviewed and discussed recent history with the patient.  She states she took herself off the Keppra because she was having nausea and worsening headaches.  She never followed up with either neurology or her primary care for further management.  It looks like she never followed up with EEG that was scheduled outpatient either.  Discussed with her that is very important to never take yourself off of seizure medicine without acknowledgment from a provider, preferably the neurologist.  Also, we only start the medications in the hospital, patient has to be able to follow-up for titration and management.  Discussed the goes to control seizures with the medications that she can tolerate.  Patient is aware and understands.    Review of Systems   Eyes:  Negative for visual disturbance.   Respiratory: Negative.     Cardiovascular: Negative.    Gastrointestinal:  Negative for nausea and vomiting.   Endocrine: Negative.    Genitourinary: Negative.    Musculoskeletal: Negative.    Skin: Negative.    Allergic/Immunologic: Negative.    Neurological:  Positive for seizures, weakness and headaches. Negative for dizziness, tremors, syncope, facial asymmetry, speech difficulty, light-headedness and numbness.   Hematological: Negative.    Psychiatric/Behavioral:  Positive for confusion.            PATIENT HISTORY:  Past Medical History:   Diagnosis Date    Anxiety     Asthma     Cancer     SKIN    Depression     Diabetes mellitus     GERD (gastroesophageal reflux disease)     Hyperlipidemia     Hypertension     Psoriasis     Seizures 12/06/2022     Sleep apnea     cpap    Stroke 2017    mini          seizures   ,   Past Surgical History:   Procedure Laterality Date    CHOLECYSTECTOMY  1987    ENDOSCOPY N/A 12/30/2022    Procedure: ESOPHAGOGASTRODUODENOSCOPY with gastric biopsy and esophageal dilation #50,#54,#56,#58 bougie;  Surgeon: Patience Arana MD;  Location: [facility] ENDOSCOPY;  Service: Gastroenterology;  Laterality: N/A;  gastritis    EYE SURGERY      KNEE SURGERY     ,   Family History   Problem Relation Age of Onset    Heart disease Mother     Breast cancer Mother     Heart disease Father     Pancreatic cancer Father     Heart disease Sister     Lung cancer Brother    ,   Social History     Tobacco Use    Smoking status: Never    Smokeless tobacco: Never   Vaping Use    Vaping status: Never Used   Substance Use Topics    Alcohol use: Never    Drug use: Never   ,   Prior to Admission medications    Medication Sig Start Date End Date Taking? Authorizing Provider   albuterol sulfate  (90 Base) MCG/ACT inhaler Inhale 2 puffs Every 6 (Six) Hours As Needed for Wheezing. 11/11/24  Yes Quincy Lemos APRN   ARIPiprazole (Abilify) 2 MG tablet Take 2 tablets by mouth Daily. 8/16/24  Yes Quincy Lemos APRN   aspirin 81 MG chewable tablet Chew 2 tablets Daily. 8/16/24  Yes Quincy Lemos APRN   atorvastatin (LIPITOR) 40 MG tablet Take 1 tablet by mouth every night at bedtime. 11/11/24  Yes Quincy Lemos APRN   busPIRone (BUSPAR) 15 MG tablet Take 1 tablet by mouth 3 (Three) Times a Day. 11/11/24  Yes Quincy Lemos APRN   docusate sodium (Colace) 100 MG capsule Take 1 capsule by mouth 2 (Two) Times a Day. 6/3/24  Yes Quincy Lemos APRN   eszopiclone (Lunesta) 2 MG tablet Take 1 tablet by mouth Every Night. Take immediately before bedtime 11/11/24  Yes Quincy Lemos APRN   gabapentin (NEURONTIN) 100 MG capsule Take 1 capsule by mouth Daily.   Yes Provider, MD Kamala   ibuprofen (ADVIL,MOTRIN)  800 MG tablet Take 1 tablet by mouth Every 12 (Twelve) Hours As Needed for Moderate Pain. 8/16/24  Yes Quincy Lemos APRN   isosorbide mononitrate (IMDUR) 30 MG 24 hr tablet TAKE 1 TABLET BY MOUTH DAILY 1/13/25  Yes Quincy Lemos APRN   prazosin (MINIPRESS) 1 MG capsule TAKE 1 CAPSULE BY MOUTH AT NIGHT 1/13/25  Yes Quincy Lemso APRN   sertraline (ZOLOFT) 100 MG tablet Take 1 tablet by mouth Daily. 11/11/24  Yes Quincy Lemos APRN   Accu-Chek FastClix Lancets misc Inject 1 each under the skin into the appropriate area as directed 3 (Three) Times a Day. Check fasting glucose every morning and as needed 8/16/24   Quincy Lemos APRN   amoxicillin-clavulanate (AUGMENTIN) 875-125 MG per tablet Take 1 tablet by mouth 2 (Two) Times a Day for 7 days. 1/16/25 1/23/25  Julienne Macias PA-C   Blood Glucose Monitoring Suppl (Accu-Chek Guide Me) w/Device kit Use 1 Cartridge Daily As Needed (every morning and prn). 3/16/24   Lakesha Aquino APRN   Continuous Glucose Sensor (FreeStyle Germania 3 Sensor) misc Use 1 each Every 14 (Fourteen) Days. 6/10/24   Quincy Lemos APRN   glucose blood test strip Use as instructed 8/16/24   Quincy Lemos APRN   Incontinence Supplies misc Take As Directed. 3/15/23   Provider, MD Kamala    Allergies:  Patient has no known allergies.    Current Facility-Administered Medications   Medication Dose Route Frequency Provider Last Rate Last Admin    acetaminophen (TYLENOL) tablet 650 mg  650 mg Oral Q4H PRN Sara Maradiaga APRN        Or    acetaminophen (TYLENOL) 160 MG/5ML oral solution 650 mg  650 mg Oral Q4H PRN Sara Maradiaga APRN        Or    acetaminophen (TYLENOL) suppository 650 mg  650 mg Rectal Q4H PRN Sara Maradiaga APRN        albuterol (PROVENTIL) nebulizer solution 0.083% 2.5 mg/3mL  2.5 mg Nebulization Q6H PRN Sara Maradiaga, APRROSA        ARIPiprazole (ABILIFY) tablet 4 mg  4 mg Oral Daily Sara Maradiaga  SCOOBY GUERRERO        aspirin chewable tablet 81 mg  81 mg Oral Daily Sara Maradiaga APRN        Or    aspirin suppository 300 mg  300 mg Rectal Daily Sara Maradiaga APRN        atorvastatin (LIPITOR) tablet 80 mg  80 mg Oral Nightly Sara Maradiaga APRN        sennosides-docusate (PERICOLACE) 8.6-50 MG per tablet 2 tablet  2 tablet Oral BID PRN Sara Maradiaga APRN        And    polyethylene glycol (MIRALAX) packet 17 g  17 g Oral Daily PRN Sara Maradiaga APRN        And    bisacodyl (DULCOLAX) EC tablet 5 mg  5 mg Oral Daily PRN Sara Maradiaga APRN        And    bisacodyl (DULCOLAX) suppository 10 mg  10 mg Rectal Daily PRN Sara Maradiaga APRN        busPIRone (BUSPAR) tablet 15 mg  15 mg Oral TID Sara Maradiaga APRN        docusate sodium (COLACE) capsule 100 mg  100 mg Oral BID Sara Maradiaga APRN        gabapentin (NEURONTIN) capsule 100 mg  100 mg Oral Daily Sara Maradiaga APRN        [Held by provider] isosorbide mononitrate (IMDUR) 24 hr tablet 30 mg  30 mg Oral Daily Sara Maradiaga APRN        melatonin tablet 5 mg  5 mg Oral Nightly PRN Sara Maradiaga APRN        nitroglycerin (NITROSTAT) SL tablet 0.4 mg  0.4 mg Sublingual Q5 Min PRN Sara Maradiaga APRN        nystatin (MYCOSTATIN) powder   Topical Q12H Sara Maradiaga APRN   Given at 01/17/25 0212    ondansetron (ZOFRAN) injection 4 mg  4 mg Intravenous Q6H PRN Sara Maradiaga APRN        [Held by provider] prazosin (MINIPRESS) capsule 1 mg  1 mg Oral Nightly Sara Maradiaga APRN        sertraline (ZOLOFT) tablet 100 mg  100 mg Oral Daily Sara Maradiaga APRN        sodium chloride 0.9 % flush 10 mL  10 mL Intravenous PRN Kwabena Bradshaw MD            ________________________________________________________        OBJECTIVE:    PHYSICAL EXAM:    Constitutional: The patient is in no apparent distress, bright awake and alert. There is no shortness of breath.   PSYCHIATRIC: Mood/affect normal, judgement normal,  appropriate  HEENT:  Normocephalic, atraumatic.   Chest: Breathing unlabored  Cardiac: Regular rate and rhythm.   Extremities:  No clubbing, cyanosis or edema.    NEUROLOGICAL:    Cognition:   Fully oriented.  Fund of knowledge excellent .  Concentration and attention normal.   Language normal with normal comprehension, fluent speech, intact repetition and naming.     Cranial nerves;    II - pupils bilaterally equal reacting to light,  No new Visual field deficits;  Fundoscopic exam- Not able to be done, non-dilated exam  III,IV,VI: EOMI with no diplopia  V: Normal facial sensations  VII: No facial asymmetry,  VIII: No New hearing abnormality  IX, X, XI: normal gag and shoulder shrug;  XII: tongue is in the midline.    Sensory:  Intact to light touch in all extremities.     Motor: Strength 5/5 bilaterally upper and lower extremities. No involuntary movements present. Normal tone and bulk.  Deep tendon reflexes: 2/4 and symmetrical in biceps, brachioradialis, triceps, bilateral 1/4 knees and ankles. Both plantars are flexor.    Cerebellar: Finger to nose and mirror movements normal bilaterally.    Gait and balance: Deferred.     Physical exam performed by EDWAR Virk.       ________________________________________________________   RESULTS REVIEW:    VITAL SIGNS:   Temp:  [97.6 °F (36.4 °C)-98 °F (36.7 °C)] 98 °F (36.7 °C)  Heart Rate:  [66-90] 90  Resp:  [16-20] 16  BP: ()/(54-96) 98/54     LABS:      Lab 01/16/25 2053   WBC 9.18   HEMOGLOBIN 12.2   HEMATOCRIT 39.2   PLATELETS 184   NEUTROS ABS 6.76   IMMATURE GRANS (ABS) 0.03   LYMPHS ABS 1.28   MONOS ABS 0.65   EOS ABS 0.41*   MCV 84.8   PROTIME 15.5*   APTT 30.0         Lab 01/17/25  0201 01/16/25 2053   SODIUM 139 139   POTASSIUM 4.0 4.1   CHLORIDE 105 104   CO2 23.3 24.4   ANION GAP 10.7 10.6   BUN 9 9   CREATININE 0.76 0.73   EGFR 89.3 93.7   GLUCOSE 126* 145*   CALCIUM 9.2 9.6         Lab 01/16/25  2053   TOTAL PROTEIN 7.8   ALBUMIN 4.0    GLOBULIN 3.8   ALT (SGPT) 21   AST (SGOT) 42*   BILIRUBIN 0.5   ALK PHOS 176*         Lab 01/16/25 2053   PROTIME 15.5*   INR 1.21*             Lab 01/16/25 2053   ABO TYPING O   RH TYPING Negative   ANTIBODY SCREEN Negative             Lab Results   Component Value Date    TSH 3.350 03/15/2024     (H) 03/15/2024    HGBA1C 7.20 (H) 03/13/2024    [id number] 391 12/06/2022       IMAGING STUDIES:  XR Chest 1 View    Result Date: 1/16/2025  Impression: No evidence of acute disease. Electronically Signed: Lincoln Strange MD  1/16/2025 11:04 PM EST  Workstation ID: [id number]    CT CEREBRAL PERFUSION WITH & WITHOUT CONTRAST    Result Date: 1/16/2025  Impression:  No focal area of decreased cerebral blood flow (CBF) is seen to suggest an acute infarct in a large vessel territory.  No defects are seen to suggest a core infarct or an area of reversible ischemia. Electronically Signed: Lincoln Strange MD  1/16/2025 10:41 PM EST  Workstation ID: [id number]    CT Angiogram Head w AI Analysis of LVO    Result Date: 1/16/2025  Impression: 1.No acute or cranial process evident. 2.No evidence of intracranial aneurysm, large vessel occlusion, or significant stenosis. 3.Dominant right vertebral artery. Electronically Signed: Lincoln Strange MD  1/16/2025 9:55 PM EST  Workstation ID: [id number]    CT Angiogram Neck    Result Date: 1/16/2025  Impression: 1.No acute or cranial process evident. 2.No evidence of intracranial aneurysm, large vessel occlusion, or significant stenosis. 3.Dominant right vertebral artery. Electronically Signed: Lincoln Strange MD  1/16/2025 9:55 PM EST  Workstation ID: [id number]    CT Head Without Contrast Stroke Protocol    Result Date: 1/16/2025  Impression: 1.No acute intracranial abnormality identified. 2.Mild changes of chronic microvascular ischemia. Electronically Signed: Lincoln Strange MD  1/16/2025 9:11 PM EST  Workstation ID: [id number]     I reviewed the patient's new clinical  results.    ________________________________________________________     PROBLEM LIST:    Seizures            ASSESSMENT/PLAN:    Seizure- like activity followed by confusion, aphasia and left sided weakness. Possible Todds paralysis.   - CT head: Negative for acute findings.  - CTA head and neck: No evidence of significant stenosis, occlusion, aneurysm.  - EKG: Sinus rhythm, rate 71   - Labs: A1C: 6.54, B12: P, LDL:  57 TSH: 1.990  - Cont. bASA and Lipitor 40  - PT/OT eval and treat    Plan  Check EEG  Check MRI brain w wo  Start Topamax and titrate up to 50 mg twice a day.  Start 25 mg QHS x1 week, then 25 mg BID x1 week, then 25 mg QAM and 50 mg QHS x1 week , then 50 mg BID.  Follow up with outpatient Neurology in 1 month  Seizure precautions       SCOOBY Riggs  01/17/25  09:41 EST

## 2025-01-17 NOTE — THERAPY EVALUATION
Acute Care - Speech Language Pathology   Swallow Initial Evaluation  [facility]     Patient Name: Eladia Connor  : 1965  MRN: [id number]  Today's Date: 2025               Admit Date: 2025    Visit Dx:     ICD-10-CM ICD-9-CM   1. Seizure  R56.9 780.39   2. Aphasia  R47.01 784.3     Patient Active Problem List   Diagnosis    Moderate episode of recurrent major depressive disorder    Anxiety    Other insomnia    Skin lesion    History of abnormal mammogram    Mood swings    Class 1 obesity due to excess calories with serious comorbidity and body mass index (BMI) of 33.0 to 33.9 in adult    Rash of face    Fever    Cough    Rash    Intertrigo    Chest pain in adult    Constipation    Persistent vomiting    Hypotension    Palpitations    Tachycardia    Morbid obesity with BMI of 40.0-44.9, adult    Panic attack    Intractable headache, unspecified chronicity pattern, unspecified headache type    Latent tuberculosis    Abscess of back    Intractable migraine without status migrainosus    Screening for diabetes mellitus    Screening for thyroid disorder    Screening for endocrine, metabolic and immunity disorder    Depressive disorder    Hypothyroidism    Stress incontinence    Chest pain    Seizures     Past Medical History:   Diagnosis Date    Anxiety     Asthma     Cancer     SKIN    Depression     Diabetes mellitus     GERD (gastroesophageal reflux disease)     Hyperlipidemia     Hypertension     Psoriasis     Seizures 2022    Sleep apnea     cpap    Stroke 2017    mini          seizures     Past Surgical History:   Procedure Laterality Date    CHOLECYSTECTOMY      ENDOSCOPY N/A 2022    Procedure: ESOPHAGOGASTRODUODENOSCOPY with gastric biopsy and esophageal dilation #50,#54,#56,#58 bougie;  Surgeon: Patience Arana MD;  Location: [facility];  Service: Gastroenterology;  Laterality: N/A;  gastritis    EYE SURGERY      KNEE SURGERY         SLP Recommendation and Plan  SLP  Swallowing Diagnosis: functional oral phase, functional pharyngeal phase (01/17/25 1000)  SLP Diet Recommendation: regular textures, thin liquids (01/17/25 1000)  Recommended Precautions and Strategies: upright posture during/after eating, small bites of food and sips of liquid, alternate between small bites of food and sips of liquid, general aspiration precautions (01/17/25 1000)  SLP Rec. for Method of Medication Administration: meds whole, meds crushed, as tolerated (01/17/25 1000)     Monitor for Signs of Aspiration: yes, notify SLP if any concerns (01/17/25 1000)     Swallow Criteria for Skilled Therapeutic Interventions Met: demonstrates skilled criteria (01/17/25 1000)     Rehab Potential/Prognosis, Swallowing: good, to achieve stated therapy goals (01/17/25 1000)  Therapy Frequency (Swallow): PRN (01/17/25 1000)  Predicted Duration Therapy Intervention (Days): until discharge (01/17/25 1000)  Oral Care Recommendations: Oral Care BID/PRN, Swab (01/17/25 1000)             SWALLOW EVALUATION (Last 72 Hours)       SLP Adult Swallow Evaluation       Row Name 01/17/25 1000       Rehab Evaluation    Document Type evaluation  -CB    Subjective Information no complaints  -CB    Patient Observations alert;cooperative  -CB    Patient/Family/Caregiver Comments/Observations Patient was able to follow simple directives.  -CB    Patient Effort good  -CB       General Information    Patient Profile Reviewed yes  -CB    Pertinent History Of Current Problem Eladia Connor is a 59 y.o. female with a previous medical history of epilepsy, bipolar disorder, DM who presented to [facility] on 1/16/2025 with seizure activity followed by left sided weakness and aphasia that started at approximatly 1910.       In the ED, CT of the head, CTA of the head and neck all showed no acute abnormality.  Labwork is unremarkable. She is afebrile, all vitals are stable.  EKG showed SR, HR 71. Neurology was consulted ans saw  patient per telehealth services. After a discussion of risks versus benefits given this could be post-ictal weakness vs stroke vs conversion disorder, TNK was not ordered. Hospitalist was consulted for further management.  -CB    Current Method of Nutrition NPO  -CB       Pain    Pretreatment Pain Rating 0/10 - no pain  -CB    Posttreatment Pain Rating 0/10 - no pain  -CB       Oral Motor Structure and Function    Dentition Assessment upper dentures/partial in place;natural, present and adequate  -CB       Oral Musculature and Cranial Nerve Assessment    Oral Motor General Assessment WFL  -CB    Oral Motor, Comment Oral mechanism examination revealed patient demonstrated full ROM and mobility of lingual and labial structures with exception of reduce labial protrusion. Lingual and jaw strength was adequate. Palatal movement was present. Noted no gag reflex.  -CB       General Eating/Swallowing Observations    Respiratory Support Currently in Use room air  -CB    Eating/Swallowing Skills self-fed  -CB    Positioning During Eating upright in bed  -CB    Utensils Used spoon;cup;straw  -CB    Consistencies Trialed regular textures;soft to chew textures;mixed consistency;ice chips;thin liquids  -CB       Clinical Swallow Eval    Clinical Swallow Evaluation Summary Patient was seen for dysphagia evaluation per failed swallow screen. Most recent CT of the head revealed no acute intracranial abnormality identified. Most recent chest x-ray revealed no evidence of acute disease. Patient has history of pneumonia and TIA. Patient reports no history of GERD, esophageal stricture and/or neck surgery. Patient displays a hoarse vocal quality. Patient c/o chronic cough for the last 2 months at rest and c/o sore throat. Patient has upper denture and lower natural teeth with several missing. Oral mechanism examination revealed full ROM and mobility of lingual and labial structures with exception of decrease labial protrusion. Lingual  and jaw strength was adequate. Palatal movement was present. Noted no gag reflex. Properly positioned patient upright in in bed prior to PO trials. Provided trial of ice chip x 1. No overt s/s of aspiration was observed. Provided trials of thins via spoon x 3, cup x 3 and straw x 2. No clearing of throat, cough and/or vocal changes. Given digital palpation, swallow was timely. Provided trials of mixed consistency x 3, STC x 3, regular x 3. Patient displayed adequate rotary chewing. Patient cleared oral cavity effectively without evidence of oral residue and/or pocketing. No anterior spillage was observed as patient maintained adequate labial seal with liquids and solids. It is recommended that patient receive a regular diet at this time. ST will follow to assure safety and adequacy of recommended diet.  -CB       SLP Evaluation Clinical Impression    SLP Swallowing Diagnosis functional oral phase;functional pharyngeal phase  -CB    Functional Impact risk of aspiration/pneumonia  -CB    Rehab Potential/Prognosis, Swallowing good, to achieve stated therapy goals  -CB    Swallow Criteria for Skilled Therapeutic Interventions Met demonstrates skilled criteria  -CB       Recommendations    Therapy Frequency (Swallow) PRN  -CB    Predicted Duration Therapy Intervention (Days) until discharge  -CB    SLP Diet Recommendation regular textures;thin liquids  -CB    Recommended Precautions and Strategies upright posture during/after eating;small bites of food and sips of liquid;alternate between small bites of food and sips of liquid;general aspiration precautions  -CB    Oral Care Recommendations Oral Care BID/PRN;Swab  -CB    SLP Rec. for Method of Medication Administration meds whole;meds crushed;as tolerated  -CB    Monitor for Signs of Aspiration yes;notify SLP if any concerns  -CB       Swallow Goals (SLP)    Swallow LTGs Swallow Long Term Goal (free text)  -CB    Swallow STGs diet tolerance goal selection (SLP)  -CB     Diet Tolerance Goal Selection (SLP) Swallow Short Term Goal 1  -CB       (LTG) Swallow    (LTG) Swallow Patient will tolerate safest and least restrictive diet without complications from aspiration.  -CB    Time Frame (Swallow Long Term Goal) by discharge  -CB    Progress/Outcomes (Swallow Long Term Goal) new goal  -CB       (STG) Swallow 1    (STG) Swallow 1 Patient will participate in full meal assessment to assure safety and adequacy of recommended diet and independent use of safe swallow strategies.  -CB    Time Frame (Swallow Short Term Goal 1) 1 week  -CB    Progress/Outcomes (Swallow Short Term Goal 1) new goal  -CB              User Key  (r) = Recorded By, (t) = Taken By, (c) = Cosigned By      Initials Name Effective Dates    Zoila Chauhan, SLP 09/21/21 -                     EDUCATION  The patient has been educated in the following areas:   Dysphagia (Swallowing Impairment) Oral Care/Hydration.        SLP GOALS       Row Name 01/17/25 1000       (LTG) Swallow    (LTG) Swallow Patient will tolerate safest and least restrictive diet without complications from aspiration.  -CB    Time Frame (Swallow Long Term Goal) by discharge  -CB    Progress/Outcomes (Swallow Long Term Goal) new goal  -CB       (STG) Swallow 1    (STG) Swallow 1 Patient will participate in full meal assessment to assure safety and adequacy of recommended diet and independent use of safe swallow strategies.  -CB    Time Frame (Swallow Short Term Goal 1) 1 week  -CB    Progress/Outcomes (Swallow Short Term Goal 1) new goal  -CB              User Key  (r) = Recorded By, (t) = Taken By, (c) = Cosigned By      Initials Name Provider Type    Zoila Chahuan, SLP Speech and Language Pathologist                         Time Calculation:                CORRINA Yoo  1/17/2025

## 2025-01-17 NOTE — CONSULTS
[facility]   Teleneurology Note    Patient Name: Eladia Connor  : 1965  MRN: [id number]  Primary Care Physician: Quincy Lemos APRN  Referring Site: [place]  Location of Neurologist: [place]    Subjective   Teleneurology Initial Data           Neurologist Evaluation Date: 25 Neurologist Evaluation Time:    Date Last Known Well: 25 Time Last Known Well:      History     59 year old woman with hx of seizures coming with a seizure that occurred at 710 pm and this was followed by left sided weakness and aphasia. Similar post ictal semiology more than an year back. Other hx of migraine with aura,  anxiety, bipolar disorder,  DM, HLD, neuropathy, NERY, asthma.    Stroke Risk Factors/ Pertinent Data     Stroke risk factors: diabetes  Anticoagulants prior to arrival: other (specify)  Antiplatelets prior to arrival: none  Statins prior to arrival: none     Scoring Scales     Modified [place] Scale  Modified Servando Scale: 0 - No Symptoms at all.    NIH Stroke Scale           Interval: baseline  1a. Level of Consciousness: 0-->Alert, keenly responsive  1b. LOC Questions: 2-->Answers neither question correctly  1c. LOC Commands: 0-->Performs both tasks correctly  2. Best Gaze: 0-->Normal  3. Visual: 0-->No visual loss  4. Facial Palsy: 0-->Normal symmetrical movements  5a. Motor Arm, Left: 0-->No drift, limb holds 90 (or 45) degrees for full 10 secs  5b. Motor Arm, Right: 0-->No drift, limb holds 90 (or 45) degrees for full 10 secs  6a. Motor Leg, Left: 0-->No drift, leg holds 30 degree position for full 5 secs  6b. Motor Leg, Right: 0-->No drift, leg holds 30 degree position for full 5 secs  7. Limb Ataxia: 0-->Absent  8. Sensory: 0-->Normal, no sensory loss  9. Best Language: 3-->Mute, global aphasia, no usable speech or auditory comprehension  10. Dysarthria: 2-->Severe dysarthria, patients speech is so slurred as to be unintelligible in the absence of or out of proportion to any  dysphasia, or is mute/anarthric  11. Extinction and Inattention (formerly Neglect): 0-->No abnormality  Total (NIH Stroke Scale): 7     Review of Systems     Review of Systems  Pertinent positive ROS mentioned in HPI and exam  Objective   Exam     Exam performed with the help of support staff from the referring site  Neurological Exam      General: Alert, cooperative, no distress, appears stated age  Head: normocephalic, without obvious abnormalities, atraumatic  Eyes: conjunctivae/corneas clear  Throat: lips, mucosa, and tongue normal  Lungs: non labored breathing  Extremities: No edema, no cyanosis, no deformities  psych: deferred      Neurological Examination:    Mental status: fully alert; non verbal but is able to communicate via writing- aphemia.  Cranial nerves:  visual fields were full to confrontation; pupils were equal and reactive to light; versions were full without nystagmus; facial sensation was full; eye closure symmetric and smile was symmetric; handles own secretions, shoulder shrug was symmetric; tongue protruded midline.  Motor:  no pronator drift; power was grossly full throughout  Sensory:  pinprick and light touch full and symmetric;  Coordination:  no ataxia with finger to nose or heel to shin testing. No involuntary movements were observed.        Result Review    Results     CT head no acute IC changes  CTA head and neck no LVO    Thrombolytic   Thrombolytics: thrombolytic not given  Thrombolytic Relative Exclusions for All Patients: Seizure at onset with postictal residual neurological impairments     Assessment & Plan   Assessment/ Plan     Assessment:    Aphemia -> differential: Seizure and post ictal weakness vs stroke vs conversion disorder.  Discussed about TNK rationale,  time window, the chance that her symptoms could be from a stroke rather than post ictal in etiology and benefits and risks and patient prefers to avoid TNK at this time.     Plan:    Start on vimpat 150 mg bid IV.  Check EKG pre and post vimpat administration ( to look for CT interval changes). Watch for bradycardia.   MRI brain wo contrast  Already on ASA. Continue.   Permissive HTN, treat if SBP> 220 or DBP > 110.  Continuous telemetry   Seizure precautions.    Disposition         I, Shiraz Montes MD, saw the patient on 01/16/25 at 2132 for an initial in-patient or emergency room telememedicine face to face consult using interactive technology for stroke code. The location of the patient was [place]. I was located at [place].      Shiraz Montes MD

## 2025-01-17 NOTE — H&P
[facility] Medicine Services  History & Physical    Patient Name: Eladia Connor  : 1965  MRN: [id number]  Primary Care Physician:  Quincy Lemos APRN  Date of admission: 2025  Date and Time of Service: 2025 at 2230      Assessment & Plan      Chief Complaint: seizure activity    Plan:    Seizure followed by stroke like symptoms  -Last known normal 1930  -CT of the head showed no acute abnormality  -CT Cerebral Perfusion pending  -CTA of the head and neck showed no acute abnormality  -Aspirin ordered  -Lipitor ordered  -Neurochecks ordered  -Lipid panel, A1C, TSH, ESR, Folate and B12 labs ordered  -PT/OT/SLP consults  -MRI of the brain   -2D echo ordered  -Neurology consult  -Vimpat 150 mg IV BID per Neurology recommendation   -Continuous cardiac monitoring-risk for bradycardia and ME interval changes with Vimpat administration  -Permissive hypertension, treat if SBP >220 or DBP >110    Bipolar Disorder  -Chronic, stable  -Continue home Abilify, Buspar, Zoloft    Diabetes Mellitus  -Monitor Glucose  -SSI ordered    Home medications for chronic conditions will be restarted as appropriate when verified by pharmacy      History of Present Illness     History of Present Illness: Eladia Connor is a 59 y.o. female with a previous medical history of epilepsy, bipolar disorder, DM who presented to [facility] on 2025 with seizure activity followed by left sided weakness and aphasia that started at approximatly 1910.      In the ED, CT of the head, CTA of the head and neck all showed no acute abnormality.  Labwork is unremarkable. She is afebrile, all vitals are stable.  EKG showed SR, HR 71. Neurology was consulted ans saw patient per telehealth services. After a discussion of risks versus benefits given this could be post-ictal weakness vs stroke vs conversion disorder, TNK was not ordered. Hospitalist was consulted for further management.    12 point ROS  reviewed and negative except as mentioned above    Objective      Vitals:   Temp:  [97.5 °F (36.4 °C)-97.6 °F (36.4 °C)] 97.6 °F (36.4 °C)  Heart Rate:  [71-80] 71  Resp:  [20-22] 20  BP: (113-151)/(56-96) 122/56  Body mass index is 47.77 kg/m².    Physical Exam  Vitals and nursing note reviewed.   Constitutional:       Appearance: Normal appearance.   HENT:      Mouth/Throat:      Mouth: Mucous membranes are moist.   Cardiovascular:      Rate and Rhythm: Normal rate and regular rhythm.   Pulmonary:      Effort: Pulmonary effort is normal.      Breath sounds: Normal breath sounds.   Abdominal:      General: Bowel sounds are normal.      Palpations: Abdomen is soft.   Musculoskeletal:         General: Normal range of motion.   Skin:     General: Skin is warm and dry.   Neurological:      General: No focal deficit present.      Mental Status: She is alert and oriented to person, place, and time. Mental status is at baseline.      Motor: Weakness (left sided) present.   Psychiatric:         Mood and Affect: Mood normal.         Speech: Speech is slurred.         Behavior: Behavior normal.         Cognition and Memory: Cognition is impaired.        Personal History     This is a 59 y.o. female with:    Past Medical History:   Diagnosis Date    Anxiety     Asthma     Cancer     SKIN    Depression     Diabetes mellitus     GERD (gastroesophageal reflux disease)     Hyperlipidemia     Hypertension     Psoriasis     Seizures 12/06/2022    Sleep apnea     cpap    Stroke 2017    mini          seizures       Past Surgical History:   Procedure Laterality Date    CHOLECYSTECTOMY  1987    ENDOSCOPY N/A 12/30/2022    Procedure: ESOPHAGOGASTRODUODENOSCOPY with gastric biopsy and esophageal dilation #50,#54,#56,#58 bougernestina;  Surgeon: Patience Arana MD;  Location: [facility] ENDOSCOPY;  Service: Gastroenterology;  Laterality: N/A;  gastritis    EYE SURGERY      KNEE SURGERY         Active and Resolved Problems  Active Hospital Problems     Diagnosis  POA    **Seizures [R56.9]  Yes      Resolved Hospital Problems   No resolved problems to display.       Family History: family history includes Breast cancer in her mother; Heart disease in her father, mother, and sister; Lung cancer in her brother; Pancreatic cancer in her father. Otherwise pertinent FHx was reviewed and not pertinent to current issue.    Social History:  reports that she has never smoked. She has never used smokeless tobacco. She reports that she does not drink alcohol and does not use drugs.    Home Medications:  Prior to Admission Medications       Prescriptions Last Dose Informant Patient Reported? Taking?    Accu-Chek FastClix Lancets misc   No No    Inject 1 each under the skin into the appropriate area as directed 3 (Three) Times a Day. Check fasting glucose every morning and as needed    albuterol sulfate  (90 Base) MCG/ACT inhaler   No No    Inhale 2 puffs Every 6 (Six) Hours As Needed for Wheezing.    amoxicillin-clavulanate (AUGMENTIN) 875-125 MG per tablet   No No    Take 1 tablet by mouth 2 (Two) Times a Day for 7 days.    ARIPiprazole (Abilify) 2 MG tablet   No No    Take 2 tablets by mouth Daily.    aspirin 81 MG chewable tablet   No No    Chew 2 tablets Daily.    atorvastatin (LIPITOR) 40 MG tablet   No No    Take 1 tablet by mouth every night at bedtime.    benzonatate (Tessalon Perles) 100 MG capsule   No No    Take 1 capsule by mouth 3 (Three) Times a Day As Needed for Cough.    Blood Glucose Monitoring Suppl (Accu-Chek Guide Me) w/Device kit   No No    Use 1 Cartridge Daily As Needed (every morning and prn).    busPIRone (BUSPAR) 15 MG tablet   No No    Take 1 tablet by mouth 3 (Three) Times a Day.    Continuous Glucose Sensor (FreeStyle [place] 3 Sensor) misc   No No    Use 1 each Every 14 (Fourteen) Days.    dapagliflozin Propanediol (Farxiga) 10 MG tablet   No No    Take 10 mg by mouth Daily.    docusate sodium (Colace) 100 MG capsule   No No    Take 1 capsule  by mouth 2 (Two) Times a Day.    eszopiclone (Lunesta) 2 MG tablet   No No    Take 1 tablet by mouth Every Night. Take immediately before bedtime    gabapentin (NEURONTIN) 100 MG capsule   No No    TAKE 1 CAPSULE BY MOUTH TWICE  DAILY    glipizide (GLUCOTROL) 5 MG tablet   No No    Take 1 tablet by mouth 2 (Two) Times a Day Before Meals.    glucose blood test strip   No No    Use as instructed    ibuprofen (ADVIL,MOTRIN) 800 MG tablet   No No    Take 1 tablet by mouth Every 12 (Twelve) Hours As Needed for Moderate Pain.    Incontinence Supplies misc   Yes No    Take As Directed.    isosorbide mononitrate (IMDUR) 30 MG 24 hr tablet   No No    TAKE 1 TABLET BY MOUTH DAILY    methylPREDNISolone (MEDROL) 4 MG dose pack   No No    Take as directed on package instructions.    ondansetron ODT (ZOFRAN-ODT) 4 MG disintegrating tablet   No No    Place 1 tablet on the tongue Every 8 (Eight) Hours As Needed for Nausea or Vomiting.    polyethylene glycol (MIRALAX) 17 GM/SCOOP powder   No No    Take 17 g by mouth Daily.    prazosin (MINIPRESS) 1 MG capsule   No No    TAKE 1 CAPSULE BY MOUTH AT NIGHT    sertraline (ZOLOFT) 100 MG tablet   No No    Take 1 tablet by mouth Daily.    zolpidem (Ambien) 10 MG tablet   Yes No    Take 1 tablet by mouth At Night As Needed for Sleep.    zolpidem (Ambien) 5 MG tablet   No No    Take 1 tablet by mouth At Night As Needed for Sleep.              Allergies:  No Known Allergies        VTE Prophylaxis:  Mechanical VTE prophylaxis orders are present.        CODE STATUS:    Code Status (Patient has no pulse and is not breathing): CPR (Attempt to Resuscitate)  Medical Interventions (Patient has pulse or is breathing): Full Support        Admission Status:  I believe this patient meets inpatient status.    I discussed the patient's findings and my recommendations with patient.    Signature:     This document has been electronically signed by Sara Maradiaga, ERNESTO, APRN, AGACNP-BC on January 17, 2025  00:21 EST   [facility] Hospitalist Team

## 2025-01-17 NOTE — PLAN OF CARE
Problem: Adult Inpatient Plan of Care  Goal: Plan of Care Review  Outcome: Progressing  Flowsheets (Taken 1/17/2025 1830)  Plan of Care Reviewed With: patient  Goal: Patient-Specific Goal (Individualized)  Outcome: Progressing  Goal: Absence of Hospital-Acquired Illness or Injury  Outcome: Progressing  Intervention: Identify and Manage Fall Risk  Recent Flowsheet Documentation  Taken 1/17/2025 1010 by Christy Singleton RN  Safety Promotion/Fall Prevention:   activity supervised   safety round/check completed   room organization consistent   nonskid shoes/slippers when out of bed   lighting adjusted   fall prevention program maintained   clutter free environment maintained   assistive device/personal items within reach  Taken 1/17/2025 0930 by Christy Singleton RN  Safety Promotion/Fall Prevention: activity supervised  Taken 1/17/2025 0800 by Christy Singleton RN  Safety Promotion/Fall Prevention:   safety round/check completed   room organization consistent   nonskid shoes/slippers when out of bed   lighting adjusted   fall prevention program maintained   clutter free environment maintained   assistive device/personal items within reach   activity supervised  Intervention: Prevent Skin Injury  Recent Flowsheet Documentation  Taken 1/17/2025 0930 by Christy Singleton RN  Body Position: position changed independently  Skin Protection: transparent dressing maintained  Intervention: Prevent and Manage VTE (Venous Thromboembolism) Risk  Recent Flowsheet Documentation  Taken 1/17/2025 0930 by Christy Singleton RN  VTE Prevention/Management:   SCDs (sequential compression devices) off   patient refused intervention  Intervention: Prevent Infection  Recent Flowsheet Documentation  Taken 1/17/2025 1010 by Christy Singleton RN  Infection Prevention:   single patient room provided   rest/sleep promoted   hand hygiene promoted   cohorting utilized  Taken 1/17/2025 0930 by Christy Singleton RN  Infection Prevention:   single  patient room provided   rest/sleep promoted   cohorting utilized  Taken 1/17/2025 0800 by Christy Singleton RN  Infection Prevention:   single patient room provided   rest/sleep promoted   cohorting utilized   hand hygiene promoted  Goal: Optimal Comfort and Wellbeing  Outcome: Progressing  Intervention: Provide Person-Centered Care  Recent Flowsheet Documentation  Taken 1/17/2025 0930 by Christy Singleton RN  Trust Relationship/Rapport:   care explained   thoughts/feelings acknowledged  Goal: Readiness for Transition of Care  Outcome: Progressing     Problem: Violence Risk or Actual  Goal: Anger and Impulse Control  Outcome: Progressing  Intervention: Minimize Safety Risk  Recent Flowsheet Documentation  Taken 1/17/2025 1010 by Christy Singleton RN  De-Escalation Techniques: stimulation decreased  Enhanced Safety Measures: chair alarm set  Taken 1/17/2025 0930 by Christy Singleton RN  Sensory Stimulation Regulation: quiet environment promoted  De-Escalation Techniques: stimulation decreased  Enhanced Safety Measures: chair alarm set  Taken 1/17/2025 0800 by Christy Singleton RN  De-Escalation Techniques: stimulation decreased  Enhanced Safety Measures: bed alarm set     Problem: Stroke, Ischemic (Includes Transient Ischemic Attack)  Goal: Optimal Coping  Outcome: Progressing  Intervention: Support Psychosocial Response to Stroke  Recent Flowsheet Documentation  Taken 1/17/2025 0930 by Christy Singleton RN  Family/Support System Care:   support provided   self-care encouraged   presence promoted   involvement promoted  Goal: Effective Bowel Elimination  Outcome: Progressing  Goal: Optimal Cerebral Tissue Perfusion  Outcome: Progressing  Intervention: Protect and Optimize Cerebral Perfusion  Recent Flowsheet Documentation  Taken 1/17/2025 0930 by Christy Singleton RN  Sensory Stimulation Regulation: quiet environment promoted  Cerebral Perfusion Promotion: blood pressure monitored  Goal: Optimal Cognitive  Function  Outcome: Progressing  Intervention: Optimize Cognitive Function  Recent Flowsheet Documentation  Taken 1/17/2025 0930 by Christy Singleton RN  Sensory Stimulation Regulation: quiet environment promoted  Reorientation Measures:   calendar in view   clock in view  Goal: Improved Communication Skills  Outcome: Progressing  Intervention: Optimize Communication Skills  Recent Flowsheet Documentation  Taken 1/17/2025 0930 by Christy Singleton RN  Communication Enhancement Strategies:   call light answered in person   communication board used   communication device used   device use encouraged  Goal: Optimal Functional Ability  Outcome: Progressing  Goal: Optimal Nutrition Intake  Outcome: Progressing  Goal: Effective Oxygenation and Ventilation  Outcome: Progressing  Intervention: Optimize Oxygenation and Ventilation  Recent Flowsheet Documentation  Taken 1/17/2025 0930 by Christy Singleton RN  Head of Bed (HOB) Positioning: HOB elevated  Goal: Improved Sensorimotor Function  Outcome: Progressing  Intervention: Optimize Sensory and Perceptual Ability  Recent Flowsheet Documentation  Taken 1/17/2025 0930 by Christy Singleton RN  Pressure Reduction Techniques:   weight shift assistance provided   frequent weight shift encouraged  Pressure Reduction Devices: pressure-redistributing mattress utilized  Goal: Safe and Effective Swallow  Outcome: Progressing  Goal: Effective Urinary Elimination  Outcome: Progressing     Problem: Seizure, Active Management  Goal: Absence of Seizure/Seizure-Related Injury  Outcome: Progressing  Intervention: Prevent Seizure-Related Injury  Recent Flowsheet Documentation  Taken 1/17/2025 0930 by Christy Singleton RN  Sensory Stimulation Regulation: quiet environment promoted   Goal Outcome Evaluation:  Plan of Care Reviewed With: patient                                          Benefits, risks, and possible complications of procedure explained to patient/caregiver who verbalized understanding and gave written consent.

## 2025-01-17 NOTE — PLAN OF CARE
Goal Outcome Evaluation:         Patient was seen for dysphagia evaluation per failed swallow screen. Most recent CT of the head revealed no acute intracranial abnormality identified. Most recent chest x-ray revealed no evidence of acute disease. Patient has history of pneumonia and TIA. Patient reports no history of GERD, esophageal stricture and/or neck surgery. Patient displays a hoarse vocal quality. Patient c/o chronic cough for the last 2 months at rest and c/o sore throat. Patient has upper denture and lower natural teeth with several missing. Oral mechanism examination revealed full ROM and mobility of lingual and labial structures with exception of decrease labial protrusion. Lingual and jaw strength was adequate. Palatal movement was present. Noted no gag reflex. Properly positioned patient upright in in bed prior to PO trials. Provided trial of ice chip x 1. No overt s/s of aspiration was observed. Provided trials of thins via spoon x 3, cup x 3 and straw x 2. No clearing of throat, cough and/or vocal changes. Given digital palpation, swallow was timely. Provided trials of mixed consistency x 3, STC x 3, regular x 3. Patient displayed adequate rotary chewing. Patient cleared oral cavity effectively without evidence of oral residue and/or pocketing. No anterior spillage was observed as patient maintained adequate labial seal with liquids and solids. It is recommended that patient receive a regular diet at this time. ST will follow to assure safety and adequacy of recommended diet.                      SLP Swallowing Diagnosis: functional oral phase, functional pharyngeal phase (01/17/25 1000)

## 2025-01-18 ENCOUNTER — READMISSION MANAGEMENT (OUTPATIENT)
Dept: CALL CENTER | Facility: HOSPITAL | Age: 60
End: 2025-01-18
Payer: MEDICARE

## 2025-01-18 ENCOUNTER — APPOINTMENT (OUTPATIENT)
Dept: CARDIOLOGY | Facility: HOSPITAL | Age: 60
DRG: 101 | End: 2025-01-18
Payer: MEDICARE

## 2025-01-18 VITALS
OXYGEN SATURATION: 96 % | WEIGHT: 293 LBS | RESPIRATION RATE: 16 BRPM | SYSTOLIC BLOOD PRESSURE: 112 MMHG | DIASTOLIC BLOOD PRESSURE: 45 MMHG | HEART RATE: 75 BPM | TEMPERATURE: 98.2 F | BODY MASS INDEX: 45.99 KG/M2 | HEIGHT: 67 IN

## 2025-01-18 LAB
ALBUMIN SERPL-MCNC: 3.6 G/DL (ref 3.5–5.2)
ALBUMIN/GLOB SERPL: 1 G/DL
ALP SERPL-CCNC: 158 U/L (ref 39–117)
ALT SERPL W P-5'-P-CCNC: 19 U/L (ref 1–33)
ANION GAP SERPL CALCULATED.3IONS-SCNC: 9.1 MMOL/L (ref 5–15)
AST SERPL-CCNC: 43 U/L (ref 1–32)
BASOPHILS # BLD AUTO: 0.04 10*3/MM3 (ref 0–0.2)
BASOPHILS NFR BLD AUTO: 0.5 % (ref 0–1.5)
BILIRUB SERPL-MCNC: 0.4 MG/DL (ref 0–1.2)
BUN SERPL-MCNC: 11 MG/DL (ref 8–23)
BUN/CREAT SERPL: 14.1 (ref 7–25)
CALCIUM SPEC-SCNC: 8.9 MG/DL (ref 8.6–10.5)
CHLORIDE SERPL-SCNC: 107 MMOL/L (ref 98–107)
CO2 SERPL-SCNC: 24.9 MMOL/L (ref 22–29)
CREAT SERPL-MCNC: 0.78 MG/DL (ref 0.57–1)
DEPRECATED RDW RBC AUTO: 45.4 FL (ref 37–54)
EGFRCR SERPLBLD CKD-EPI 2021: 86.5 ML/MIN/1.73
EOSINOPHIL # BLD AUTO: 0.54 10*3/MM3 (ref 0–0.4)
EOSINOPHIL NFR BLD AUTO: 6.2 % (ref 0.3–6.2)
ERYTHROCYTE [DISTWIDTH] IN BLOOD BY AUTOMATED COUNT: 14.6 % (ref 12.3–15.4)
GLOBULIN UR ELPH-MCNC: 3.6 GM/DL
GLUCOSE BLDC GLUCOMTR-MCNC: 129 MG/DL (ref 70–105)
GLUCOSE BLDC GLUCOMTR-MCNC: 154 MG/DL (ref 70–105)
GLUCOSE SERPL-MCNC: 121 MG/DL (ref 65–99)
HCT VFR BLD AUTO: 38.1 % (ref 34–46.6)
HGB BLD-MCNC: 11.8 G/DL (ref 12–15.9)
IMM GRANULOCYTES # BLD AUTO: 0.03 10*3/MM3 (ref 0–0.05)
IMM GRANULOCYTES NFR BLD AUTO: 0.3 % (ref 0–0.5)
LYMPHOCYTES # BLD AUTO: 1.51 10*3/MM3 (ref 0.7–3.1)
LYMPHOCYTES NFR BLD AUTO: 17.2 % (ref 19.6–45.3)
MAGNESIUM SERPL-MCNC: 2.3 MG/DL (ref 1.6–2.4)
MCH RBC QN AUTO: 26.6 PG (ref 26.6–33)
MCHC RBC AUTO-ENTMCNC: 31 G/DL (ref 31.5–35.7)
MCV RBC AUTO: 85.8 FL (ref 79–97)
MONOCYTES # BLD AUTO: 0.65 10*3/MM3 (ref 0.1–0.9)
MONOCYTES NFR BLD AUTO: 7.4 % (ref 5–12)
NEUTROPHILS NFR BLD AUTO: 5.99 10*3/MM3 (ref 1.7–7)
NEUTROPHILS NFR BLD AUTO: 68.4 % (ref 42.7–76)
NRBC BLD AUTO-RTO: 0 /100 WBC (ref 0–0.2)
PHOSPHATE SERPL-MCNC: 3.6 MG/DL (ref 2.5–4.5)
PLATELET # BLD AUTO: 173 10*3/MM3 (ref 140–450)
PMV BLD AUTO: 8.9 FL (ref 6–12)
POTASSIUM SERPL-SCNC: 4 MMOL/L (ref 3.5–5.2)
PROT SERPL-MCNC: 7.2 G/DL (ref 6–8.5)
RBC # BLD AUTO: 4.44 10*6/MM3 (ref 3.77–5.28)
SODIUM SERPL-SCNC: 141 MMOL/L (ref 136–145)
WBC NRBC COR # BLD AUTO: 8.76 10*3/MM3 (ref 3.4–10.8)

## 2025-01-18 PROCEDURE — 83735 ASSAY OF MAGNESIUM: CPT | Performed by: HOSPITALIST

## 2025-01-18 PROCEDURE — 85025 COMPLETE CBC W/AUTO DIFF WBC: CPT | Performed by: HOSPITALIST

## 2025-01-18 PROCEDURE — 84100 ASSAY OF PHOSPHORUS: CPT | Performed by: HOSPITALIST

## 2025-01-18 PROCEDURE — 80053 COMPREHEN METABOLIC PANEL: CPT | Performed by: HOSPITALIST

## 2025-01-18 PROCEDURE — 82948 REAGENT STRIP/BLOOD GLUCOSE: CPT | Performed by: HOSPITALIST

## 2025-01-18 RX ORDER — ARIPIPRAZOLE 2 MG/1
4 TABLET ORAL DAILY
Qty: 28 TABLET | Refills: 0 | Status: SHIPPED | OUTPATIENT
Start: 2025-01-18 | End: 2025-02-01

## 2025-01-18 RX ORDER — TOPIRAMATE 25 MG/1
TABLET, FILM COATED ORAL
Qty: 68 TABLET | Refills: 0 | Status: SHIPPED | OUTPATIENT
Start: 2025-01-18

## 2025-01-18 RX ADMIN — ARIPIPRAZOLE 4 MG: 2 TABLET ORAL at 08:51

## 2025-01-18 RX ADMIN — BUSPIRONE HYDROCHLORIDE 15 MG: 15 TABLET ORAL at 08:51

## 2025-01-18 RX ADMIN — DOCUSATE SODIUM 100 MG: 100 CAPSULE, LIQUID FILLED ORAL at 08:52

## 2025-01-18 RX ADMIN — SERTRALINE HYDROCHLORIDE 100 MG: 100 TABLET, FILM COATED ORAL at 08:52

## 2025-01-18 RX ADMIN — GUAIFENESIN SYRUP AND DEXTROMETHORPHAN 10 ML: 100; 10 SYRUP ORAL at 04:05

## 2025-01-18 RX ADMIN — ASPIRIN 81 MG CHEWABLE TABLET 81 MG: 81 TABLET CHEWABLE at 08:52

## 2025-01-18 RX ADMIN — GABAPENTIN 100 MG: 100 CAPSULE ORAL at 08:51

## 2025-01-18 NOTE — CASE MANAGEMENT/SOCIAL WORK
Continued Stay Note  [facility]     Patient Name: Eladia Connor  MRN: [id number]  Today's Date: 1/18/2025    Admit Date: 1/16/2025    Plan: Plan to discharge home with  PT. [facility]  referral pending.   Discharge Plan       Row Name 01/18/25 1410       Plan    Plan Plan to discharge home with  PT. [facility]  referral pending.    Plan Comments PT recomming home with  PT. Attending placed orders. CM met with patient at bedside. Patient had no preference on agency. Referral placed to [facility] for PT and liaison notified via secure chat.                  Expected Discharge Date and Time       Expected Discharge Date Expected Discharge Time    Jan 18, 2025           Lucinda Cross RN     Office: [phone]  Fax: [phone]

## 2025-01-18 NOTE — PROCEDURES
This is an inpatient,   Digitally recorded multi-montage EEG with leads placed according to the international 10/20 system  Photic stimulation was done  Hyperventilation was done    With the patient fully aroused her background was 9-9.5 Hz alpha rhythm  Some beta and beta artifact    Mostly asleep with spindles  No asymmetry    Nothing suggesting clear-cut epileptiform activity or asymmetry  No clinical events  Hyperventilation was attempted but I did not see any focal slowing or accentuation of slowing  Photic stimulation was attempted in intermittent stepwise pattern up to the flash frequencies of 30 Hz but I did not see any driving, asymmetry or paroxysmal activity        Impression:  This is essentially normal awake/drowsy and asleep EEG  No seizures but EEG like this does not rule out epilepsy

## 2025-01-18 NOTE — NURSING NOTE
Discharge order received from MD.  Discharge instructions reviewed with patient and patient verbalized understanding.  IV discontinued without complication.  Patient discharged home via private vehicle accompanied by daughter.

## 2025-01-18 NOTE — DISCHARGE SUMMARY
[facility] Medicine Services  Discharge Summary    Date of Service: 2025  Patient Name: Eladia Connor  : 1965  MRN: [id number]    Date of Admission: 2025  Discharge Diagnosis: #History of seizure with breakthrough seizure  #Strokelike symptoms.  Acute stroke ruled out  Date of Discharge: 2025  Primary Care Physician: Quincy Lemos APRN      Presenting Problem:   Seizures [R56.9]    Active and Resolved Hospital Problems:  Active Hospital Problems    Diagnosis POA    **Seizures [R56.9] Yes      Resolved Hospital Problems   No resolved problems to display.         Hospital Course     HPI:  Admitting team HPI       Hospital Course:  59-year-old female with history of seizure disorder, bipolar disorder, DM2 admitted to [facility]  with breakthrough seizures followed by left-sided weakness and aphasia     #History of seizure with breakthrough seizure  #Strokelike symptoms.  Acute stroke ruled out  CT head no acute findings  CTA head and neck no significant stenosis  MRI brain no acute stroke  Seen by neurology appreciate recommendation patient started on Topamax escalating dose.  Outpatient follow-up in 1 month   TTE as outpatient  Fall/seizure precautions  Aspirin 81 and Lipitor 80     #Bipolar disorder  Continue home medications     #DM2  Continue home regimen      #Hypertension  Continue home regimen     DISCHARGE Follow Up Recommendations for labs and diagnostics:   Follow-up with neurology as outpatient in 1 month  Consider outpatient TTE          Day of Discharge     Vital Signs:  Temp:  [97.8 °F (36.6 °C)-98.7 °F (37.1 °C)] 98.2 °F (36.8 °C)  Heart Rate:  [67-79] 75  Resp:  [14-17] 16  BP: (110-131)/(45-70) 112/45    Physical Exam:  Physical Exam   AOx3 NAD  RRR S1-S2 audible  Lungs with fair air entry  Abdomen soft nontender nondistended         Pertinent  and/or Most Recent Results     LAB RESULTS:      Lab 25  0402 25   WBC 8.76  9.18   HEMOGLOBIN 11.8* 12.2   HEMATOCRIT 38.1 39.2   PLATELETS 173 184   NEUTROS ABS 5.99 6.76   IMMATURE GRANS (ABS) 0.03 0.03   LYMPHS ABS 1.51 1.28   MONOS ABS 0.65 0.65   EOS ABS 0.54* 0.41*   MCV 85.8 84.8   PROTIME  --  15.5*   APTT  --  30.0         Lab 01/18/25  0402 01/17/25  0201 01/16/25 2053   SODIUM 141 139 139   POTASSIUM 4.0 4.0 4.1   CHLORIDE 107 105 104   CO2 24.9 23.3 24.4   ANION GAP 9.1 10.7 10.6   BUN 11 9 9   CREATININE 0.78 0.76 0.73   EGFR 86.5 89.3 93.7   GLUCOSE 121* 126* 145*   CALCIUM 8.9 9.2 9.6   MAGNESIUM 2.3  --   --    PHOSPHORUS 3.6  --   --    HEMOGLOBIN A1C  --   --  6.54*   TSH  --  1.990  --          Lab 01/18/25  0402 01/16/25 2053   TOTAL PROTEIN 7.2 7.8   ALBUMIN 3.6 4.0   GLOBULIN 3.6 3.8   ALT (SGPT) 19 21   AST (SGOT) 43* 42*   BILIRUBIN 0.4 0.5   ALK PHOS 158* 176*         Lab 01/16/25 2053   PROTIME 15.5*   INR 1.21*         Lab 01/17/25 0201   CHOLESTEROL 115   LDL CHOL 57   HDL CHOL 37*   TRIGLYCERIDES 114         Lab 01/16/25 2053   VITAMIN B 12 542   ABO TYPING O   RH TYPING Negative   ANTIBODY SCREEN Negative         Brief Urine Lab Results       None          Microbiology Results (last 10 days)       ** No results found for the last 240 hours. **            MRI Brain With & Without Contrast    Result Date: 1/17/2025  Impression: Impression: 1.No acute intracranial process identified. 2.Findings suggestive of moderate chronic small vessel ischemic disease. 3.Mild paranasal sinus mucosal disease. Electronically Signed: Burak Mendosa MD  1/17/2025 9:08 PM EST  Workstation ID: [id number]    XR Chest 1 View    Result Date: 1/16/2025  Impression: Impression: No evidence of acute disease. Electronically Signed: Lincoln Strange MD  1/16/2025 11:04 PM EST  Workstation ID: [id number]    CT CEREBRAL PERFUSION WITH & WITHOUT CONTRAST    Result Date: 1/16/2025  Impression: Impression:  No focal area of decreased cerebral blood flow (CBF) is seen to suggest an acute infarct  in a large vessel territory.  No defects are seen to suggest a core infarct or an area of reversible ischemia. Electronically Signed: Lincoln Strange MD  1/16/2025 10:41 PM EST  Workstation ID: [id number]    CT Angiogram Head w AI Analysis of LVO    Result Date: 1/16/2025  Impression: Impression: 1.No acute or cranial process evident. 2.No evidence of intracranial aneurysm, large vessel occlusion, or significant stenosis. 3.Dominant right vertebral artery. Electronically Signed: Lincoln Strange MD  1/16/2025 9:55 PM EST  Workstation ID: [id number]    CT Angiogram Neck    Result Date: 1/16/2025  Impression: Impression: 1.No acute or cranial process evident. 2.No evidence of intracranial aneurysm, large vessel occlusion, or significant stenosis. 3.Dominant right vertebral artery. Electronically Signed: Lincoln Strange MD  1/16/2025 9:55 PM EST  Workstation ID: [id number]    CT Head Without Contrast Stroke Protocol    Result Date: 1/16/2025  Impression: Impression: 1.No acute intracranial abnormality identified. 2.Mild changes of chronic microvascular ischemia. Electronically Signed: Lincoln Strange MD  1/16/2025 9:11 PM EST  Workstation ID: [id number]     Results for orders placed during the hospital encounter of 03/14/24    Duplex Venous Lower Extremity - Bilateral    Interpretation Summary    Normal bilateral lower extremity venous duplex scan.      Results for orders placed during the hospital encounter of 03/14/24    Duplex Venous Lower Extremity - Bilateral    Interpretation Summary    Normal bilateral lower extremity venous duplex scan.      Results for orders placed during the hospital encounter of 12/06/22    Adult Transthoracic Echo Complete W/ Cont if Necessary Per Protocol    Interpretation Summary    Left ventricular systolic function is normal. Left ventricular ejection fraction appears to be 61 - 65%.    Left ventricular diastolic function was normal.    Saline test results are negative.  Study was  suboptimal.      Labs Pending at Discharge:  Pending Results       Procedure [Order ID] Specimen - Date/Time    Adult Transthoracic Echo Complete W/ Cont if Necessary Per Protocol (With Agitated Saline) [[id number]]             Procedures Performed    01/17 1921 EEG      Consults:   Consults       Date and Time Order Name Status Description    1/16/2025  8:52 PM Inpatient Neurology Consult Stroke Completed     1/16/2025  8:52 PM Inpatient Neurology Consult Stroke Completed               Discharge Details        Discharge Medications        New Medications        Instructions Start Date   topiramate 25 MG tablet  Commonly known as: TOPAMAX   Take 25 mg QHS for 6 days then 25 mg BID x7 days then 25 mg in AM and 50 mg QHS x7 days then 50 mg BID             Continue These Medications        Instructions Start Date   Accu-Chek FastClix Lancets misc   1 each, Subcutaneous, 3 Times Daily, Check fasting glucose every morning and as needed      Accu-Chek Guide Me w/Device kit   1 Cartridge, Not Applicable, Daily PRN      albuterol sulfate  (90 Base) MCG/ACT inhaler  Commonly known as: PROVENTIL HFA;VENTOLIN HFA;PROAIR HFA   2 puffs, Inhalation, Every 6 Hours PRN      ARIPiprazole 2 MG tablet  Commonly known as: Abilify   4 mg, Oral, Daily      aspirin 81 MG chewable tablet   162 mg, Oral, Daily      atorvastatin 40 MG tablet  Commonly known as: LIPITOR   40 mg, Oral, Every Night at Bedtime      busPIRone 15 MG tablet  Commonly known as: BUSPAR   15 mg, Oral, 3 Times Daily      docusate sodium 100 MG capsule  Commonly known as: Colace   100 mg, Oral, 2 Times Daily      eszopiclone 2 MG tablet  Commonly known as: Lunesta   2 mg, Oral, Nightly, Take immediately before bedtime      FreeStyle Germania 3 Sensor misc   1 each, Not Applicable, Every 14 Days      gabapentin 100 MG capsule  Commonly known as: NEURONTIN   100 mg, Daily      glucose blood test strip   Use as instructed      ibuprofen 800 MG tablet  Commonly known as:  ADVIL,MOTRIN   800 mg, Oral, Every 12 Hours PRN      Incontinence Supplies misc   Take As Directed      isosorbide mononitrate 30 MG 24 hr tablet  Commonly known as: IMDUR   30 mg, Oral, Daily      prazosin 1 MG capsule  Commonly known as: MINIPRESS   1 mg, Oral, Nightly      sertraline 100 MG tablet  Commonly known as: ZOLOFT   100 mg, Oral, Daily             Stop These Medications      amoxicillin-clavulanate 875-125 MG per tablet  Commonly known as: AUGMENTIN              No Known Allergies      Discharge Disposition:   Home-Health Care [facility]    Diet:  Hospital:  Diet Order   Procedures    Diet: Diabetic; Consistent Carbohydrate; Fluid Consistency: Thin (IDDSI 0)         Discharge Activity:         CODE STATUS:  Code Status and Medical Interventions: CPR (Attempt to Resuscitate); Full Support   Ordered at: 01/16/25 2225     Code Status (Patient has no pulse and is not breathing):    CPR (Attempt to Resuscitate)     Medical Interventions (Patient has pulse or is breathing):    Full Support         No future appointments.    Additional Instructions for the Follow-ups that You Need to Schedule       Ambulatory Referral to Home Health ([facility])   As directed      Face to Face Visit Date: 1/18/2025   Follow-up provider for Plan of Care?: I treated the patient in an acute care facility and will not continue treatment after discharge.   Follow-up provider: HUSSEIN MONGE [[id number]]   Reason/Clinical Findings: seizure   Describe mobility limitations that make leaving home difficult: adl limited   Nursing/Therapeutic Services Requested: Physical Therapy Skilled Nursing Occupational Therapy   Skilled nursing orders: Medication education   PT orders: Therapeutic exercise   Occupational orders: Activities of daily living   Frequency: 1 Week 1        Ambulatory Referral to Neurology   As directed      1 month hospital follow up- seizures- started Topamax for seizure and chronic migraines.    Pt requesting f/u with one of  the nurse practitioners.    Order Comments: 1 month hospital follow up- seizures- started Topamax for seizure and chronic migraines.                 Time spent on Discharge including face to face service:  45 minutes    Signature: Electronically signed by Andrzej Hunter MD, 01/18/25, 11:56 EST.  [facility] Hospitalist Team

## 2025-01-18 NOTE — PROGRESS NOTES
Patient doing very well    Awake and alert    No neurologic issue but some cough    MRI reviewed, unremarkable    EEG reviewed, unremarkable    Overall neck started for headaches and that is good for seizures to, will slow start and go up    Follow-up with neurology as outpatient nothing else to offer    Or add at this time    Call me if needed

## 2025-01-18 NOTE — CASE MANAGEMENT/SOCIAL WORK
Continued Stay Note   [facility]     Patient Name: Eladia Connor  MRN: [id number]  Today's Date: 1/18/2025    Admit Date: 1/16/2025    Plan: Plan [facility]   Discharge Plan       Row Name 01/18/25 1503       Plan    Plan Plan to [facility] home with  [facility].    Plan Comments [facility] is out of network. Referral placed to [facility] and liaison notified.      Row Name 01/18/25 1410       Plan    Plan Plan to discharge home with  PT. [facility] referral pending.    Plan Comments PT recomming home with  PT. Attending placed orders. CM met with patient at bedside. Patient had no preference on agency. Referral placed to [facility] for PT and liaison notified via secure chat.                  Expected Discharge Date and Time       Expected Discharge Date Expected Discharge Time    Jan 18, 2025           Lucinda Cross RN     Office: [phone]  Fax: [phone]

## 2025-01-18 NOTE — PLAN OF CARE
Goal Outcome Evaluation:   Orders generated as per stroke workup. The patient is currently receiving a regular consistency/thin liquid diet after ST dysphagia evaluation. CT of the head negative. MRI showed no acute intracranial abnormality. Per chart, all deficits have resolved. ST will complete order and sign off at this time as per protocol. Please re-consult if our services are warranted in the future. Thank you.

## 2025-01-18 NOTE — OUTREACH NOTE
Prep Survey      Flowsheet Row Responses   [facility] patient discharged from? [facility]   Is LACE score < 7 ? No   Eligibility [facility]   Date of Admission 01/16/25   Date of Discharge 01/18/25   Discharge Disposition Home or Self Care   Discharge diagnosis Seizures (   Does the patient have one of the following disease processes/diagnoses(primary or secondary)? Other   Does the patient have Home health ordered? No   Is there a DME ordered? No   Prep survey completed? Yes            VALENTINO JEFFREY - Registered Nurse

## 2025-01-18 NOTE — DISCHARGE PLACEMENT REQUEST
"Eladia Connor (59 y.o. Female)       Date of Birth   1965    Social Security Number       Address   [address] [place] [address]    Home Phone   [phone]    MRN   [id number]       Sikhism   Mormon    Marital Status                               Admission Date   25    Admission Type   Emergency    Admitting Provider   Llanes Alvarez, Carlos, MD    Attending Provider   Andrzej Hunter MD    Department, Room/Bed   [facility], 248/       Discharge Date       Discharge Disposition   Home-Health Care [facility]    Discharge Destination                                 Attending Provider: Andrzej Hunter MD    Allergies: No Known Allergies    Isolation: None   Infection: None   Code Status: CPR    Ht: 170.2 cm (67\")   Wt: 141 kg (309 lb 15.5 oz)    Admission Cmt: None   Principal Problem: Seizures [R56.9]                   Active Insurance as of 2025       Primary Coverage       Payor Plan Insurance Group Employer/Plan Group    [facility] MEDICARE REPLACEMENT [facility] MED ADV SNP PPO [id number]       Payor Plan Address Payor Plan Phone Number Payor Plan Fax Number Effective Dates    [address] [address]   2024 - None Entered    [facility] [id number]         Subscriber Name Subscriber Birth Date Member ID       ELADIA CONNOR 1965 [id number]                     Emergency Contacts        (Rel.) Home Phone Work Phone Mobile Phone    BLDAEDIMITRI STOUTYLA (Daughter) [phone] -- [phone]    FARA MADRIGAL (Daughter) -- -- [phone]               [address] [place] [address]  Phone:  [phone]  Fax:  [phone] Date: 2025      Ambulatory Referral to Home Health (Hospital)     Patient:  Eladia Connor MRN:  [id number]   [address] [place] [address] :  1965  SSN:    Phone: [phone] Sex:  F      INSURANCE PAYOR PLAN GROUP " # SUBSCRIBER ID   Primary:    [employer] MEDICARE REPLACEMENT [id number] INDSNP [id number]      Referring Provider Information:  DAISHA DE LA TORRE Phone: [phone] Fax: [phone]       Referral Information:   # Visits:  999 Referral Type: Home Health [42]   Urgency:  Routine Referral Reason: Specialty Services Required   Start Date: Jan 18, 2025 End Date:  To be determined by Insurer   Diagnosis: Seizure (R56.9 [ICD-10-CM] 780.39 [ICD-9-CM])      Refer to Dept:   Refer to Provider:   Refer to Provider Phone:   Refer to Facility:       Face to Face Visit Date: 1/18/2025  Follow-up provider for Plan of Care? I treated the patient in an acute care facility and will not continue treatment after discharge.  Follow-up provider: HUSSEIN MONGE [[id number]]  Reason/Clinical Findings: seizure  Describe mobility limitations that make leaving home difficult: adl limited  Nursing/Therapeutic Services Requested: Physical Therapy  Nursing/Therapeutic Services Requested: Skilled Nursing  Nursing/Therapeutic Services Requested: Occupational Therapy  Skilled nursing orders: Medication education  PT orders: Therapeutic exercise  Occupational orders: Activities of daily living  Frequency: 1 Week 1     This document serves as a request of services and does not constitute Insurance authorization or approval of services.  To determine eligibility, please contact the members Insurance carrier to verify and review coverage.     If you have medical questions regarding this request for services. Please contact [facility] at [phone] during normal business hours.        Authorizing Provider:Daisha De La Torre MD  Authorizing Provider's NPI: [id number]  Order Entered By: Daisha De La Torre MD 1/18/2025 11:54 AM     Electronically signed by: Daisha De La Torre MD 1/18/2025 11:54 AM                        Physical Therapy Notes (last 48 hours)        Sara Walker, PT at 01/17/25 2947  Version 1 of 1          Goal Outcome Evaluation:  Plan of Care Reviewed With: patient           Outcome Evaluation: Eladia Connor is a 59 y.o. female with hx of epilepsy, bipolar disorder and DM who presents with seizure activity followed by L sided weakness and aphasia. At baseline, pt lives with her daugther who is her paid caregiver, in a 2nd floor apt with 12 BRIGHT.  She typically uses SPC in home and RW when out of home.  At time of PT evalaution, pt is A&O x 4 with mild slurring of speech. Pt reports that she has had 2-3 falls at home which she relates to getting up too quickly due to urinary urgency.  Pt does not exhibit signs of Orthostatic hypotension at this time. Pt ambulates 20' x 2 with RW and SBA with uneven gait singh. She toilets with supervision.  She would benefit from  PT at discharge for balance in home setting.    Anticipated Discharge Disposition (PT): home with assist, home with home health                          Electronically signed by Sara Walker, PT at 25 1405       Sara Walker, PT at 25 1406  Version 1 of 1         Patient Name: Eladia Connor  : 1965    MRN: [id number]                              Today's Date: 2025       Admit Date: 2025    Visit Dx:     ICD-10-CM ICD-9-CM   1. Seizure  R56.9 780.39   2. Aphasia  R47.01 784.3   3. Seizures  R56.9 780.39     Patient Active Problem List   Diagnosis    Moderate episode of recurrent major depressive disorder    Anxiety    Other insomnia    Skin lesion    History of abnormal mammogram    Mood swings    Class 1 obesity due to excess calories with serious comorbidity and body mass index (BMI) of 33.0 to 33.9 in adult    Rash of face    Fever    Cough    Rash    Intertrigo    Chest pain in adult    Constipation    Persistent vomiting    Hypotension    Palpitations    Tachycardia    Morbid obesity with BMI of 40.0-44.9, adult    Panic attack    Intractable headache, unspecified chronicity pattern,  unspecified headache type    Latent tuberculosis    Abscess of back    Intractable migraine without status migrainosus    Screening for diabetes mellitus    Screening for thyroid disorder    Screening for endocrine, metabolic and immunity disorder    Depressive disorder    Hypothyroidism    Stress incontinence    Chest pain    Seizures     Past Medical History:   Diagnosis Date    Anxiety     Asthma     Cancer     SKIN    Depression     Diabetes mellitus     GERD (gastroesophageal reflux disease)     Hyperlipidemia     Hypertension     Psoriasis     Seizures 12/06/2022    Sleep apnea     cpap    Stroke 2017    mini          seizures     Past Surgical History:   Procedure Laterality Date    CHOLECYSTECTOMY  1987    ENDOSCOPY N/A 12/30/2022    Procedure: ESOPHAGOGASTRODUODENOSCOPY with gastric biopsy and esophageal dilation #50,#54,#56,#58 bougie;  Surgeon: Patience Arana MD;  Location: [facility] ENDOSCOPY;  Service: Gastroenterology;  Laterality: N/A;  gastritis    EYE SURGERY      KNEE SURGERY        General Information       Row Name 01/17/25 1333          Physical Therapy Time and Intention    Document Type evaluation  -CL     Mode of Treatment physical therapy;individual therapy  -CL       Row Name 01/17/25 1335          General Information    Patient Profile Reviewed yes  -CL     Prior Level of Function min assist:;ADL's;transfer  -CL     Existing Precautions/Restrictions fall;seizures  -CL     Barriers to Rehab previous functional deficit  -CL       Row Name 01/17/25 1335          Living Environment    People in Home child(michelle), adult  -CL       Row Name 01/17/25 1335          Home Main Entrance    Number of Stairs, Main Entrance twelve  -CL       Row Name 01/17/25 1335          Stairs Within Home, Primary    Stairs, Within Home, Primary 2nd floor apt  -CL     Number of Stairs, Within Home, Primary none  -CL       Row Name 01/17/25 1335          Cognition    Orientation Status (Cognition) oriented x 4  -CL        Row Name 01/17/25 1335          Safety Issues/Impairments Affecting Functional Mobility    Impairments Affecting Function (Mobility) endurance/activity tolerance  -CL               User Key  (r) = Recorded By, (t) = Taken By, (c) = Cosigned By      Initials Name Provider Type    Sara Horton, PT Physical Therapist                   Mobility       Row Name 01/17/25 1337          Bed Mobility    Comment, (Bed Mobility) Pt up in chair at start of session  -CL       Row Name 01/17/25 1337          Sit-Stand Transfer    Sit-Stand Owen (Transfers) supervision  -CL     Assistive Device (Sit-Stand Transfers) walker, front-wheeled  -CL       Row Name 01/17/25 1337          Gait/Stairs (Locomotion)    [place]wen Level (Gait) contact guard  -CL     Assistive Device (Gait) walker, front-wheeled  -CL     Patient was able to Ambulate yes  -CL     Distance in Feet (Gait) 20  -CL     Deviations/Abnormal Patterns (Gait) singh decreased  -CL               User Key  (r) = Recorded By, (t) = Taken By, (c) = Cosigned By      Initials Name Provider Type    Sara Horton, PT Physical Therapist                   Obj/Interventions       Row Name 01/17/25 1343          Range of Motion Comprehensive    General Range of Motion bilateral lower extremity ROM WFL  -CL       Row Name 01/17/25 1343          Strength Comprehensive (MMT)    Comment, General Manual Muscle Testing (MMT) Assessment BLEs grossly 4+/5  -CL       Row Name 01/17/25 1343          Balance    Balance Assessment sitting static balance;standing static balance;standing dynamic balance;sitting dynamic balance  -CL     Static Sitting Balance independent  -CL     Dynamic Sitting Balance standby assist  -CL     Position, Sitting Balance sitting in chair  -CL     Dynamic Standing Balance standby assist  -CL     Position/Device Used, Standing Balance walker, front-wheeled  -CL       Row Name 01/17/25 1343          Sensory Assessment (Somatosensory)     Sensory Assessment (Somatosensory) sensation intact  -CL               User Key  (r) = Recorded By, (t) = Taken By, (c) = Cosigned By      Initials Name Provider Type    CL Sara Walker, PT Physical Therapist                   Goals/Plan       Row Name 01/17/25 1404          Bed Mobility Goal 1 (PT)    Activity/Assistive Device (Bed Mobility Goal 1, PT) bed mobility activities, all  -CL     Elliott Level/Cues Needed (Bed Mobility Goal 1, PT) independent  -CL     Time Frame (Bed Mobility Goal 1, PT) long term goal (LTG);2 weeks  -CL       Row Name 01/17/25 1404          Transfer Goal 1 (PT)    Activity/Assistive Device (Transfer Goal 1, PT) sit-to-stand/stand-to-sit;bed-to-chair/chair-to-bed  -CL     [place] Level/Cues Needed (Transfer Goal 1, PT) modified independence  -CL     Time Frame (Transfer Goal 1, PT) long term goal (LTG);2 weeks  -CL       Row Name 01/17/25 1404          Gait Training Goal 1 (PT)    Activity/Assistive Device (Gait Training Goal 1, PT) gait (walking locomotion);assistive device use  -CL     [place]ott Level (Gait Training Goal 1, PT) modified independence  -CL     Distance (Gait Training Goal 1, PT) 50'  -CL     Time Frame (Gait Training Goal 1, PT) long term goal (LTG);2 weeks  -CL       Row Name 01/17/25 1404          Stairs Goal 1 (PT)    Activity/Assistive Device (Stairs Goal 1, PT) ascending stairs;descending stairs  -CL     [place]ott Level/Cues Needed (Stairs Goal 1, PT) modified independence  -CL     Number of Stairs (Stairs Goal 1, PT) 12  -CL     Time Frame (Stairs Goal 1, PT) long term goal (LTG);2 weeks  -CL       Row Name 01/17/25 1404          Therapy Assessment/Plan (PT)    Planned Therapy Interventions (PT) bed mobility training;balance training;gait training;patient/family education;stair training;strengthening;transfer training  -CL               User Key  (r) = Recorded By, (t) = Taken By, (c) = Cosigned By      Initials Name Provider Type    CL Aaron,  Sara MENJIVAR, PT Physical Therapist                   Clinical Impression       Row Name 01/17/25 1351          Pain    Pretreatment Pain Rating 0/10 - no pain  -CL     Posttreatment Pain Rating 0/10 - no pain  -CL       Row Name 01/17/25 1351          Plan of Care Review    Plan of Care Reviewed With patient  -CL     Outcome Evaluation Eladia Connor is a 59 y.o. female with hx of epilepsy, bipolar disorder and DM who presents with seizure activity followed by L sided weakness and aphasia. At baseline, pt lives with her daugther who is her paid caregiver, in a 2nd floor apt with 12 BRIGHT.  She typically uses SPC in home and RW when out of home.  At time of PT evalaution, pt is A&O x 4 with mild slurring of speech. Pt reports that she has had 2-3 falls at home which she relates to getting up too quickly due to urinary urgency.  Pt does not exhibit signs of Orthostatic hypotension at this time. Pt ambulates 20' x 2 with RW and SBA with uneven gait singh. She toilets with supervision.  She would benefit from HH PT at discharge for balance in home setting.  -CL       Row Name 01/17/25 1354          Therapy Assessment/Plan (PT)    Rehab Potential (PT) good  -CL     Therapy Frequency (PT) 2 times/wk  -CL     Predicted Duration of Therapy Intervention (PT) Until discharge  -CL       Row Name 01/17/25 1765          Vital Signs    Pre Systolic BP Rehab 107  -CL     Pre Treatment Diastolic BP 71  -CL     Intra Systolic BP Rehab 116  -CL     Intra Treatment Diastolic BP 69  -CL     Pretreatment Heart Rate (beats/min) 75  -CL     Intratreatment Heart Rate (beats/min) 78  -CL     Pretreatment Resp Rate (breaths/min) 22  -CL     Intratreatment Resp Rate (breaths/min) 16  -CL     Pre SpO2 (%) 93  -CL     O2 Delivery Pre Treatment room air  -CL     Intra SpO2 (%) 93  -CL     O2 Delivery Intra Treatment room air  -CL     Pre Patient Position Sitting  -CL     Intra Patient Position Standing  -CL       Row Name 01/17/25 4930           Positioning and Restraints    Pre-Treatment Position sitting in chair/recliner  -CL     Post Treatment Position chair  -CL     In Chair reclined;call light within reach;encouraged to call for assist;exit alarm on  -CL               User Key  (r) = Recorded By, (t) = Taken By, (c) = Cosigned By      Initials Name Provider Type    CL Sara Walker, PT Physical Therapist                   Outcome Measures       Row Name 01/17/25 1404          How much help from another person do you currently need...    Turning from your back to your side while in flat bed without using bedrails? 4  -CL     Moving from lying on back to sitting on the side of a flat bed without bedrails? 4  -CL     Moving to and from a bed to a chair (including a wheelchair)? 3  -CL     Standing up from a chair using your arms (e.g., wheelchair, bedside chair)? 3  -CL     Climbing 3-5 steps with a railing? 3  -CL     To walk in hospital room? 3  -CL     AM-PAC 6 Clicks Score (PT) 20  -CL       Row Name 01/17/25 1311          Functional Assessment    Outcome Measure Options AM-PAC 6 Clicks Daily Activity (OT)  -SP               User Key  (r) = Recorded By, (t) = Taken By, (c) = Cosigned By      Initials Name Provider Type    CL Sara Walker, PT Physical Therapist    Joselito Madrid OT Occupational Therapist                                 Physical Therapy Education       Title: PT OT SLP Therapies (In Progress)       Topic: Physical Therapy (Done)       Point: Mobility training (Done)       Learning Progress Summary            Patient Acceptance, E,TB, VU by CL at 1/17/2025 1405                                      User Key       Initials Effective Dates Name Provider Type Discipline    CL 03/02/22 -  Sara Walker, PT Physical Therapist PT                  PT Recommendation and Plan  Planned Therapy Interventions (PT): bed mobility training, balance training, gait training, patient/family education, stair training,  strengthening, transfer training  Outcome Evaluation: Eladia Connor is a 59 y.o. female with hx of epilepsy, bipolar disorder and DM who presents with seizure activity followed by L sided weakness and aphasia. At baseline, pt lives with her daugther who is her paid caregiver, in a 2nd floor apt with 12 BRIGHT.  She typically uses SPC in home and RW when out of home.  At time of PT evalaution, pt is A&O x 4 with mild slurring of speech. Pt reports that she has had 2-3 falls at home which she relates to getting up too quickly due to urinary urgency.  Pt does not exhibit signs of Orthostatic hypotension at this time. Pt ambulates 20' x 2 with RW and SBA with uneven gait singh. She toilets with supervision.  She would benefit from  PT at discharge for balance in home setting.     Time Calculation:   PT Evaluation Complexity  History, PT Evaluation Complexity: 3 or more personal factors and/or comorbidities  Examination of Body Systems (PT Eval Complexity): total of 3 or more elements  Clinical Presentation (PT Evaluation Complexity): evolving  Clinical Decision Making (PT Evaluation Complexity): moderate complexity  Overall Complexity (PT Evaluation Complexity): moderate complexity     PT Charges       Row Name 01/17/25 1405             Time Calculation    Start Time 1028  -CL      Stop Time 1045  -CL      Time Calculation (min) 17 min  -CL      PT Received On 01/17/25  -CL      PT - Next Appointment 01/20/25  -CL      PT Goal Re-Cert Due Date 01/31/25  -CL                User Key  (r) = Recorded By, (t) = Taken By, (c) = Cosigned By      Initials Name Provider Type    CL Sara Walker, PT Physical Therapist                  Therapy Charges for Today       Code Description Service Date Service Provider Modifiers Qty    [id number]  PT EVAL MOD COMPLEXITY 3 1/17/2025 Sara Walker, PT GP 1            PT G-Codes  Outcome Measure Options: AM-PAC 6 Clicks Daily Activity (OT)  AM-PAC 6 Clicks Score (PT):  20  AM-PAC 6 Clicks Score (OT): 21  Modified Bowman Scale: 0 - No Symptoms at all.  PT Discharge Summary  Anticipated Discharge Disposition (PT): home with assist, home with home health    Sara Walker, PT  1/17/2025      Electronically signed by Sara Walker, PT at 01/17/25 1404          Occupational Therapy Notes (last 48 hours)        Joselito Lincoln, OT at 01/17/25 1312          Goal Outcome Evaluation:  Plan of Care Reviewed With: patient    Outcome Evaluation: Pt is a 58 y/o female admitted to [facility] on 1/16/25 presenting with seizure followed by stroke like symptoms. CTA of the head and neck showed no acute abnormality. Neurology was consulted ans saw patient per telehealth services. After a discussion of risks versus benefits given this could be post-ictal weakness vs stroke vs conversion disorder, TNK was not ordered. Hospitalist was consulted for further management. PMHx significant for epilepsy, bipolar disorder, and DM. At baseline, pt resides with adult daughter and x3 grandchildren on a 2nd floor apartment, pt reports she has to ambulate up/down flight of stairs. Pt requires assistance for driving (groceries and appointments) and ADLs. Pt uses a cane within home and walker within community. Pt reports daughter is her paid caregiver. She additionally reports she is blind in her right eye and uses glasses for reading. Upon assessment, pt A&O x4 with 5/10 pain in head. Pt demos ability to complete oral hygiene with set-up independently and IND dons socks sitting EOB. Pt comes to standing with CGA and FWW where she ambulated to chair with CGA and FWW. BUE ROM WFLs. MMT RUE 5/5, LUE 4/5 with numbness in L digits. Based on assessment, pt would benefit from continued skilled OT services to maintain her strength, endurance, and activity tolerance while in hospital setting. OT recommending home with HHOT when medically appropriate.    Anticipated Discharge Disposition (OT): home with home health, home  with assist      Electronically signed by Joselito Lincoln, OT at 25 1312       Joselito Lincoln, OT at 25 1312          Patient Name: Eladia Connor  : 1965    MRN: [id number]                              Today's Date: 2025       Admit Date: 2025    Visit Dx:     ICD-10-CM ICD-9-CM   1. Seizure  R56.9 780.39   2. Aphasia  R47.01 784.3   3. Seizures  R56.9 780.39     Patient Active Problem List   Diagnosis    Moderate episode of recurrent major depressive disorder    Anxiety    Other insomnia    Skin lesion    History of abnormal mammogram    Mood swings    Class 1 obesity due to excess calories with serious comorbidity and body mass index (BMI) of 33.0 to 33.9 in adult    Rash of face    Fever    Cough    Rash    Intertrigo    Chest pain in adult    Constipation    Persistent vomiting    Hypotension    Palpitations    Tachycardia    Morbid obesity with BMI of 40.0-44.9, adult    Panic attack    Intractable headache, unspecified chronicity pattern, unspecified headache type    Latent tuberculosis    Abscess of back    Intractable migraine without status migrainosus    Screening for diabetes mellitus    Screening for thyroid disorder    Screening for endocrine, metabolic and immunity disorder    Depressive disorder    Hypothyroidism    Stress incontinence    Chest pain    Seizures     Past Medical History:   Diagnosis Date    Anxiety     Asthma     Cancer     SKIN    Depression     Diabetes mellitus     GERD (gastroesophageal reflux disease)     Hyperlipidemia     Hypertension     Psoriasis     Seizures 2022    Sleep apnea     cpap    Stroke 2017    mini          seizures     Past Surgical History:   Procedure Laterality Date    CHOLECYSTECTOMY  1987    ENDOSCOPY N/A 2022    Procedure: ESOPHAGOGASTRODUODENOSCOPY with gastric biopsy and esophageal dilation #50,#54,#56,#58 bougie;  Surgeon: Patience Arana MD;  Location: [facility] ENDOSCOPY;  Service: Gastroenterology;   Laterality: N/A;  gastritis    EYE SURGERY      KNEE SURGERY        General Information       Row Name 01/17/25 1304          OT Time and Intention    Subjective Information no complaints  -SP     Document Type evaluation  -SP     Mode of Treatment individual therapy;occupational therapy  -SP     Patient Effort good  -SP     Symptoms Noted During/After Treatment none  -SP       Row Name 01/17/25 1304          General Information    Patient Profile Reviewed yes  -SP     Prior Level of Function min assist:;ADL's;dependent:;driving  -SP     Existing Precautions/Restrictions fall;seizures  -SP     Barriers to Rehab medically complex  -SP       Row Name 01/17/25 1304          Living Environment    People in Home child(michelle), adult;grandchild(michelle)  -SP       Row Name 01/17/25 1304          Home Main Entrance    Number of Stairs, Main Entrance twelve;other (see comments)  flight of steps to 2nd floor apartment  -SP       Row Name 01/17/25 1304          Stairs Within Home, Primary    Number of Stairs, Within Home, Primary none  -SP       Row Name 01/17/25 1304          Cognition    Orientation Status (Cognition) oriented x 4  -SP       Row Name 01/17/25 1304          Safety Issues/Impairments Affecting Functional Mobility    Impairments Affecting Function (Mobility) balance;endurance/activity tolerance;strength;pain  -SP               User Key  (r) = Recorded By, (t) = Taken By, (c) = Cosigned By      Initials Name Provider Type    SP Joselito Lincoln OT Occupational Therapist                     Mobility/ADL's       Row Name 01/17/25 1306          Bed Mobility    Bed Mobility bed mobility (all) activities  -SP     All Activities, [place] (Bed Mobility) modified independence  -SP       Row Name 01/17/25 1306          Transfers    Transfers bed-chair transfer  -SP       Row Name 01/17/25 1306          Bed-Chair Transfer    Bed-Chair [place] (Transfers) contact guard  -SP     Assistive Device (Bed-Chair Transfers)  walker, front-wheeled  -SP       Row Name 01/17/25 1306          Functional Mobility    Functional Mobility- Ind. Level contact guard assist  -SP     Functional Mobility- Device walker, front-wheeled  -SP     Patient was able to Ambulate yes  -SP       Row Name 01/17/25 1306          Activities of Daily Living    BADL Assessment/Intervention grooming;lower body dressing  -SP       Row Name 01/17/25 1306          Grooming Assessment/Training    [place] Level (Grooming) oral care regimen;set up;independent  -SP       Row Name 01/17/25 1306          Lower Body Dressing Assessment/Training    [place] Level (Lower Body Dressing) don;socks;independent  -SP     Position (Lower Body Dressing) edge of bed sitting  -SP               User Key  (r) = Recorded By, (t) = Taken By, (c) = Cosigned By      Initials Name Provider Type    SP Joselito Lincoln OT Occupational Therapist                   Obj/Interventions       Row Name 01/17/25 1307          Sensory Assessment (Somatosensory)    Sensory Assessment Reports impaired sensation in LUE  -SP       Row Name 01/17/25 1307          Vision Assessment/Intervention    Visual Impairment/Limitations corrective lenses for reading  -SP     Vision Assessment Comment Reports she is blind in R eye  -SP       Row Name 01/17/25 1307          Range of Motion Comprehensive    General Range of Motion bilateral upper extremity ROM WFL  -SP       Row Name 01/17/25 1307          Strength Comprehensive (MMT)    Comment, General Manual Muscle Testing (MMT) Assessment RUE 5/5, LUE 4/5  -SP       Row Name 01/17/25 1307          Balance    Balance Assessment sitting dynamic balance;sit to stand dynamic balance;standing dynamic balance  -SP     Dynamic Sitting Balance standby assist  -SP     Position, Sitting Balance sitting edge of bed;unsupported  -SP     Sit to Stand Dynamic Balance contact guard  -SP     Dynamic Standing Balance contact guard  -SP     Position/Device Used, Standing  Balance walker, front-wheeled  -SP               User Key  (r) = Recorded By, (t) = Taken By, (c) = Cosigned By      Initials Name Provider Type    SP Joselito Lincoln OT Occupational Therapist                   Goals/Plan       Row Name 01/17/25 1310          Bathing Goal 1 (OT)    Activity/Device (Bathing Goal 1, OT) bathing skills, all  -SP     [place] Level/Cues Needed (Bathing Goal 1, OT) modified independence  -SP     Time Frame (Bathing Goal 1, OT) 2 weeks  -SP     Strategies/Barriers (Bathing Goal 1, OT) until d/c  -SP       Row Name 01/17/25 1310          Toileting Goal 1 (OT)    Activity/Device (Toileting Goal 1, OT) toileting skills, all  -SP     [place] Level/Cues Needed (Toileting Goal 1, OT) modified independence  -SP     Time Frame (Toileting Goal 1, OT) 2 weeks  -SP     Strategies/Barriers (Toileting Goal 1, OT) until d/c  -SP       Row Name 01/17/25 1310          Strength Goal 1 (OT)    Strength Goal 1 (OT) Pt will demo good understanding of BUE HEP  -SP     Time Frame (Strength Goal 1, OT) 2 weeks  -SP     Strategies/Barriers (Strength Goal 1, OT) until d/c  -SP       Row Name 01/17/25 1310          Therapy Assessment/Plan (OT)    Planned Therapy Interventions (OT) activity tolerance training;BADL retraining;occupation/activity based interventions;transfer/mobility retraining;strengthening exercise;ROM/therapeutic exercise;IADL retraining;patient/caregiver education/training  -SP               User Key  (r) = Recorded By, (t) = Taken By, (c) = Cosigned By      Initials Name Provider Type    SP Joselito Lincoln OT Occupational Therapist                   Clinical Impression       Row Name 01/17/25 1304          Pain Assessment    Pretreatment Pain Rating 5/10  -SP     Posttreatment Pain Rating 5/10  -SP     Pain Location head  -SP       Row Name 01/17/25 1308          Plan of Care Review    Plan of Care Reviewed With patient  -SP     Outcome Evaluation Pt is a 60 y/o female admitted to [facility]  on 1/16/25 presenting with seizure followed by stroke like symptoms. CTA of the head and neck showed no acute abnormality. Neurology was consulted ans saw patient per telehealth services. After a discussion of risks versus benefits given this could be post-ictal weakness vs stroke vs conversion disorder, TNK was not ordered. Hospitalist was consulted for further management. PMHx significant for epilepsy, bipolar disorder, and DM. At baseline, pt resides with adult daughter and x3 grandchildren on a 2nd floor apartment, pt reports she has to ambulate up/down flight of stairs. Pt requires assistance for driving (groceries and appointments) and ADLs. Pt uses a cane within home and walker within community. Pt reports daughter is her paid caregiver. She additionally reports she is blind in her right eye and uses glasses for reading. Upon assessment, pt A&O x4 with 5/10 pain in head. Pt demos ability to complete oral hygiene with set-up independently and IND dons socks sitting EOB. Pt comes to standing with CGA and FWW where she ambulated to chair with CGA and FWW. BUE ROM WFLs. MMT RUE 5/5, LUE 4/5 with numbness in L digits. Based on assessment, pt would benefit from continued skilled OT services to maintain her strength, endurance, and activity tolerance while in hospital setting. OT recommending home with HHOT when medically appropriate.  -SP       Row Name 01/17/25 1306          Therapy Assessment/Plan (OT)    Criteria for Skilled Therapeutic Interventions Met (OT) yes;meets criteria;skilled treatment is necessary  -SP     Therapy Frequency (OT) 3 times/wk  -SP     Predicted Duration of Therapy Intervention (OT) until d/c  -SP       Row Name 01/17/25 130          Therapy Plan Review/Discharge Plan (OT)    Anticipated Discharge Disposition (OT) home with home health;home with assist  -SP       Row Name 01/17/25 1308          Vital Signs    Pre Systolic BP Rehab 124  -SP     Pre Treatment Diastolic BP 67  -SP     Intra  Systolic BP Rehab 122  -SP     Intra Treatment Diastolic BP 72  -SP     Pre SpO2 (%) 96  -SP     O2 Delivery Pre Treatment room air  -SP     O2 Delivery Intra Treatment room air  -SP     O2 Delivery Post Treatment room air  -SP     Pre Patient Position Supine  -SP     Intra Patient Position Standing  -SP     Post Patient Position Sitting  -SP       Row Name 01/17/25 1308          Positioning and Restraints    Pre-Treatment Position in bed  -SP     Post Treatment Position chair  -SP     In Chair notified nsg;reclined;call light within reach;encouraged to call for assist;exit alarm on  -SP               User Key  (r) = Recorded By, (t) = Taken By, (c) = Cosigned By      Initials Name Provider Type    SP Joselito Lincoln, ELIO Occupational Therapist                   Outcome Measures       Row Name 01/17/25 1311          How much help from another is currently needed...    Putting on and taking off regular lower body clothing? 3  -SP     Bathing (including washing, rinsing, and drying) 3  -SP     Toileting (which includes using toilet bed pan or urinal) 3  -SP     Putting on and taking off regular upper body clothing 4  -SP     Taking care of personal grooming (such as brushing teeth) 4  -SP     Eating meals 4  -SP     AM-PAC 6 Clicks Score (OT) 21  -SP       Row Name 01/17/25 0200          How much help from another person do you currently need...    Turning from your back to your side while in flat bed without using bedrails? 4  -AR     Moving from lying on back to sitting on the side of a flat bed without bedrails? 4  -AR     Moving to and from a bed to a chair (including a wheelchair)? 3  -AR     Standing up from a chair using your arms (e.g., wheelchair, bedside chair)? 3  -AR     Climbing 3-5 steps with a railing? 2  -AR     To walk in hospital room? 2  -AR     AM-PAC 6 Clicks Score (PT) 18  -AR       Row Name 01/17/25 1311          Functional Assessment    Outcome Measure Options AM-PAC 6 Clicks Daily Activity (OT)   -SP               User Key  (r) = Recorded By, (t) = Taken By, (c) = Cosigned By      Initials Name Provider Type    Raisa Coronado, RN Registered Nurse    Joselito Madrid OT Occupational Therapist                    Occupational Therapy Education       Title: PT OT SLP Therapies (In Progress)       Topic: Occupational Therapy (In Progress)       Point: ADL training (Done)       Description:   Instruct learner(s) on proper safety adaptation and remediation techniques during self care or transfers.   Instruct in proper use of assistive devices.                  Learning Progress Summary            Patient Acceptance, E,TB, VU by SP at 1/17/2025 1311                      Point: Home exercise program (Not Started)       Description:   Instruct learner(s) on appropriate technique for monitoring, assisting and/or progressing therapeutic exercises/activities.                  Learner Progress:  Not documented in this visit.              Point: Precautions (Done)       Description:   Instruct learner(s) on prescribed precautions during self-care and functional transfers.                  Learning Progress Summary            Patient Acceptance, E,TB, VU by SP at 1/17/2025 1311                      Point: Body mechanics (Done)       Description:   Instruct learner(s) on proper positioning and spine alignment during self-care, functional mobility activities and/or exercises.                  Learning Progress Summary            Patient Acceptance, E,TB, VU by SP at 1/17/2025 1311                                      User Key       Initials Effective Dates Name Provider Type Discipline    MONIQUE 11/15/23 -  Joselito Lincoln OT Occupational Therapist OT                  OT Recommendation and Plan  Planned Therapy Interventions (OT): activity tolerance training, BADL retraining, occupation/activity based interventions, transfer/mobility retraining, strengthening exercise, ROM/therapeutic exercise, IADL retraining,  patient/caregiver education/training  Therapy Frequency (OT): 3 times/wk  Plan of Care Review  Plan of Care Reviewed With: patient  Outcome Evaluation: Pt is a 60 y/o female admitted to [facility] on 1/16/25 presenting with seizure followed by stroke like symptoms. CTA of the head and neck showed no acute abnormality. Neurology was consulted ans saw patient per telehealth services. After a discussion of risks versus benefits given this could be post-ictal weakness vs stroke vs conversion disorder, TNK was not ordered. Hospitalist was consulted for further management. PMHx significant for epilepsy, bipolar disorder, and DM. At baseline, pt resides with adult daughter and x3 grandchildren on a 2nd floor apartment, pt reports she has to ambulate up/down flight of stairs. Pt requires assistance for driving (groceries and appointments) and ADLs. Pt uses a cane within home and walker within community. Pt reports daughter is her paid caregiver. She additionally reports she is blind in her right eye and uses glasses for reading. Upon assessment, pt A&O x4 with 5/10 pain in head. Pt demos ability to complete oral hygiene with set-up independently and IND dons socks sitting EOB. Pt comes to standing with CGA and FWW where she ambulated to chair with CGA and FWW. BUE ROM WFLs. MMT RUE 5/5, LUE 4/5 with numbness in L digits. Based on assessment, pt would benefit from continued skilled OT services to maintain her strength, endurance, and activity tolerance while in hospital setting. OT recommending home with HHOT when medically appropriate.     Time Calculation:         Time Calculation- OT       Row Name 01/17/25 1311             Time Calculation- OT    OT Start Time 0906  -SP      OT Stop Time 0930  -SP      OT Time Calculation (min) 24 min  -SP      OT Received On 01/17/25  -SP      OT - Next Appointment 01/20/25  -SP      OT Goal Re-Cert Due Date 01/31/25  -SP                User Key  (r) = Recorded By, (t) = Taken By, (c) =  Cosigned By      Initials Name Provider Type    SP Joselito Lincoln OT Occupational Therapist                  Therapy Charges for Today       Code Description Service Date Service Provider Modifiers Qty    [id number] HC OT EVAL MOD COMPLEXITY 4 1/17/2025 Joselito Lincoln OT GO 1                 Joselito Lincoln OT  1/17/2025    Electronically signed by Joselito Lincoln OT at 01/17/25 1312

## 2025-01-18 NOTE — PLAN OF CARE
Problem: Adult Inpatient Plan of Care  Goal: Plan of Care Review  Outcome: Progressing  Goal: Patient-Specific Goal (Individualized)  Outcome: Progressing  Goal: Absence of Hospital-Acquired Illness or Injury  Outcome: Progressing  Intervention: Identify and Manage Fall Risk  Recent Flowsheet Documentation  Taken 1/18/2025 0400 by Paty Del Toro RN  Safety Promotion/Fall Prevention:   activity supervised   assistive device/personal items within reach   clutter free environment maintained   fall prevention program maintained   lighting adjusted   nonskid shoes/slippers when out of bed   room organization consistent   safety round/check completed  Taken 1/18/2025 0200 by Paty Del Toro RN  Safety Promotion/Fall Prevention:   activity supervised   assistive device/personal items within reach   clutter free environment maintained   fall prevention program maintained   lighting adjusted   nonskid shoes/slippers when out of bed   safety round/check completed   room organization consistent  Taken 1/18/2025 0000 by Paty Del Toro RN  Safety Promotion/Fall Prevention:   activity supervised   assistive device/personal items within reach   fall prevention program maintained   gait belt   clutter free environment maintained   nonskid shoes/slippers when out of bed   room organization consistent   safety round/check completed  Taken 1/17/2025 2000 by Paty Del Toro RN  Safety Promotion/Fall Prevention:   activity supervised   assistive device/personal items within reach   fall prevention program maintained   clutter free environment maintained   lighting adjusted   nonskid shoes/slippers when out of bed   room organization consistent   safety round/check completed  Intervention: Prevent Skin Injury  Recent Flowsheet Documentation  Taken 1/18/2025 0400 by Paty Del Toro RN  Body Position: position changed independently  Skin Protection: transparent dressing maintained  Taken 1/18/2025 0000 by Paty Del Toro RN  Body  Position: position changed independently  Skin Protection: transparent dressing maintained  Taken 1/17/2025 2000 by Paty Del Toro RN  Body Position: position changed independently  Skin Protection: transparent dressing maintained  Intervention: Prevent and Manage VTE (Venous Thromboembolism) Risk  Recent Flowsheet Documentation  Taken 1/18/2025 0000 by Paty Del Toro RN  VTE Prevention/Management:   SCDs (sequential compression devices) off   patient refused intervention  Taken 1/17/2025 2000 by Paty Del Toro RN  VTE Prevention/Management:   SCDs (sequential compression devices) off   patient refused intervention  Intervention: Prevent Infection  Recent Flowsheet Documentation  Taken 1/18/2025 0400 by Paty Del Toro RN  Infection Prevention:   rest/sleep promoted   personal protective equipment utilized   hand hygiene promoted   equipment surfaces disinfected   single patient room provided  Taken 1/18/2025 0200 by Paty Del Toro RN  Infection Prevention:   single patient room provided   rest/sleep promoted   personal protective equipment utilized   hand hygiene promoted   equipment surfaces disinfected   environmental surveillance performed  Taken 1/18/2025 0000 by Paty Del Toro RN  Infection Prevention:   single patient room provided   rest/sleep promoted   personal protective equipment utilized   hand hygiene promoted   equipment surfaces disinfected   environmental surveillance performed  Taken 1/17/2025 2200 by Paty Del Toro RN  Infection Prevention:   single patient room provided   rest/sleep promoted   personal protective equipment utilized   hand hygiene promoted   equipment surfaces disinfected   environmental surveillance performed  Taken 1/17/2025 2000 by Paty Del Toro RN  Infection Prevention:   single patient room provided   rest/sleep promoted   hand hygiene promoted   personal protective equipment utilized   equipment surfaces disinfected   environmental surveillance  performed  Goal: Optimal Comfort and Wellbeing  Outcome: Progressing  Intervention: Monitor Pain and Promote Comfort  Recent Flowsheet Documentation  Taken 1/18/2025 0400 by Paty Del Toro RN  Pain Management Interventions:   quiet environment facilitated   no interventions per patient request   position adjusted   pillow support provided   care clustered  Taken 1/18/2025 0000 by Paty Del Toro RN  Pain Management Interventions:   care clustered   no interventions per patient request   quiet environment facilitated   prayer utilized  Taken 1/17/2025 2000 by Paty Del Toro RN  Pain Management Interventions:   care clustered   prayer utilized   quiet environment facilitated   relaxation techniques promoted  Intervention: Provide Person-Centered Care  Recent Flowsheet Documentation  Taken 1/18/2025 0000 by Paty Del Toro RN  Trust Relationship/Rapport:   care explained   choices provided   emotional support provided   empathic listening provided   questions answered   questions encouraged   reassurance provided   thoughts/feelings acknowledged  Taken 1/17/2025 2000 by Paty Del Toro RN  Trust Relationship/Rapport:   care explained   choices provided   emotional support provided   empathic listening provided   questions answered   questions encouraged   reassurance provided   thoughts/feelings acknowledged  Goal: Readiness for Transition of Care  Outcome: Progressing     Problem: Violence Risk or Actual  Goal: Anger and Impulse Control  Outcome: Progressing  Intervention: Minimize Safety Risk  Recent Flowsheet Documentation  Taken 1/18/2025 0400 by Paty Del Toro RN  Sensory Stimulation Regulation: care clustered  De-Escalation Techniques: stimulation decreased  Enhanced Safety Measures: bed alarm set  Taken 1/18/2025 0200 by Paty Del Toro RN  De-Escalation Techniques: stimulation decreased  Enhanced Safety Measures: bed alarm set  Taken 1/18/2025 0000 by Paty Del Toro RN  Sensory Stimulation  Regulation:   care clustered   television on   quiet environment promoted   lighting decreased  De-Escalation Techniques: stimulation decreased  Enhanced Safety Measures: bed alarm set  Taken 1/17/2025 2000 by Paty Del Toro RN  Sensory Stimulation Regulation:   quiet environment promoted   care clustered  De-Escalation Techniques:   stimulation decreased   quiet time facilitated   medication administered  Enhanced Safety Measures: bed alarm set  Intervention: Promote Self-Control  Recent Flowsheet Documentation  Taken 1/18/2025 0400 by Paty Del Toro RN  Supportive Measures: verbalization of feelings encouraged  Environmental Support: calm environment promoted  Taken 1/18/2025 0000 by Paty Del Toro RN  Supportive Measures:   verbalization of feelings encouraged   self-reflection promoted   self-responsibility promoted   self-care encouraged   relaxation techniques promoted  Environmental Support:   calm environment promoted   rest periods encouraged   caregiver consistency promoted   environmental consistency promoted  Taken 1/17/2025 2000 by Paty Del Toro RN  Supportive Measures: verbalization of feelings encouraged  Environmental Support: calm environment promoted     Problem: Comorbidity Management  Goal: Blood Glucose Level Within Target Range  Outcome: Progressing  Intervention: Monitor and Manage Glycemia  Recent Flowsheet Documentation  Taken 1/18/2025 0400 by Paty Del Toro RN  Medication Review/Management: dosing adjusted  Taken 1/18/2025 0200 by Paty Del Toro RN  Medication Review/Management: medications reviewed  Taken 1/18/2025 0000 by Paty Del Toro RN  Medication Review/Management: medications reviewed  Taken 1/17/2025 2000 by Paty Del Toro RN  Medication Review/Management: medications reviewed  Goal: Blood Pressure in Desired Range  Outcome: Progressing  Intervention: Maintain Blood Pressure Management  Recent Flowsheet Documentation  Taken 1/18/2025 0400 by Paty Del Toro  RN  Medication Review/Management: dosing adjusted  Taken 1/18/2025 0200 by Paty Del Toro RN  Medication Review/Management: medications reviewed  Taken 1/18/2025 0000 by Paty Del Toro RN  Medication Review/Management: medications reviewed  Taken 1/17/2025 2000 by Paty Del Toro RN  Medication Review/Management: medications reviewed     Problem: Stroke, Ischemic (Includes Transient Ischemic Attack)  Goal: Optimal Coping  Outcome: Progressing  Intervention: Support Psychosocial Response to Stroke  Recent Flowsheet Documentation  Taken 1/18/2025 0400 by Paty Del Toro RN  Supportive Measures: verbalization of feelings encouraged  Family/Support System Care:   presence promoted   involvement promoted   self-care encouraged   support provided  Taken 1/18/2025 0000 by Paty Del Toro RN  Supportive Measures:   verbalization of feelings encouraged   self-reflection promoted   self-responsibility promoted   self-care encouraged   relaxation techniques promoted  Family/Support System Care:   support provided   self-care encouraged   presence promoted   involvement promoted  Taken 1/17/2025 2000 by Paty Del Toro RN  Supportive Measures: verbalization of feelings encouraged  Family/Support System Care:   support provided   self-care encouraged   involvement promoted   presence promoted  Goal: Effective Bowel Elimination  Outcome: Progressing  Goal: Optimal Cerebral Tissue Perfusion  Outcome: Progressing  Intervention: Protect and Optimize Cerebral Perfusion  Recent Flowsheet Documentation  Taken 1/18/2025 0400 by Paty Del Toro RN  Sensory Stimulation Regulation: care clustered  Taken 1/18/2025 0000 by Paty Del Toro RN  Sensory Stimulation Regulation:   care clustered   television on   quiet environment promoted   lighting decreased  Cerebral Perfusion Promotion: blood pressure monitored  Taken 1/17/2025 2000 by Paty Del Toro RN  Sensory Stimulation Regulation:   quiet environment promoted   care  clustered  Cerebral Perfusion Promotion: blood pressure monitored  Goal: Optimal Cognitive Function  Outcome: Progressing  Intervention: Optimize Cognitive Function  Recent Flowsheet Documentation  Taken 1/18/2025 0400 by Paty Del Toro RN  Sensory Stimulation Regulation: care clustered  Reorientation Measures: clock in view  Taken 1/18/2025 0000 by Paty Del Toro RN  Sensory Stimulation Regulation:   care clustered   television on   quiet environment promoted   lighting decreased  Reorientation Measures: clock in view  Taken 1/17/2025 2000 by Paty Del Toro RN  Sensory Stimulation Regulation:   quiet environment promoted   care clustered  Reorientation Measures: clock in view  Goal: Improved Communication Skills  Outcome: Progressing  Intervention: Optimize Communication Skills  Recent Flowsheet Documentation  Taken 1/18/2025 0400 by Paty Del Toro RN  Communication Enhancement Strategies: call light answered in person  Taken 1/18/2025 0000 by Paty Del Toro RN  Communication Enhancement Strategies: call light answered in person  Taken 1/17/2025 2000 by Paty Del Toro RN  Communication Enhancement Strategies: call light answered in person  Goal: Optimal Functional Ability  Outcome: Progressing  Intervention: Optimize Functional Ability  Recent Flowsheet Documentation  Taken 1/18/2025 0400 by Paty Del Toro RN  Activity Management: back to bed  Adaptive Equipment Use: used with assistance  Taken 1/18/2025 0000 by Paty Del Toro RN  Activity Management: back to bed  Adaptive Equipment Use: used with assistance  Taken 1/17/2025 2000 by Paty Del Toro RN  Activity Management: up in chair  Goal: Optimal Nutrition Intake  Outcome: Progressing  Goal: Effective Oxygenation and Ventilation  Outcome: Progressing  Intervention: Optimize Oxygenation and Ventilation  Recent Flowsheet Documentation  Taken 1/18/2025 0400 by Paty Del Toro RN  Head of Bed (HOB) Positioning: HOB elevated  Taken 1/18/2025 0000 by  Paty Del Toro RN  Head of Bed ([facility]) Positioning: HOB elevated  Taken 1/17/2025 2000 by Paty Del Toro RN  Head of Bed ([facility]) Positioning: HOB elevated  Goal: Improved Sensorimotor Function  Outcome: Progressing  Intervention: Optimize Sensory and Perceptual Ability  Recent Flowsheet Documentation  Taken 1/18/2025 0400 by Paty Del Toro RN  Pressure Reduction Techniques: weight shift assistance provided  Pressure Reduction Devices: pressure-redistributing mattress utilized  Taken 1/18/2025 0000 by Paty Del Toro RN  Pressure Reduction Techniques:   weight shift assistance provided   rest period provided between sit times  Pressure Reduction Devices: pressure-redistributing mattress utilized  Taken 1/17/2025 2000 by Paty Del Toro RN  Pressure Reduction Techniques: weight shift assistance provided  Pressure Reduction Devices: pressure-redistributing mattress utilized  Goal: Safe and Effective Swallow  Outcome: Progressing  Goal: Effective Urinary Elimination  Outcome: Progressing     Problem: Seizure, Active Management  Goal: Absence of Seizure/Seizure-Related Injury  Outcome: Progressing  Intervention: Prevent Seizure-Related Injury  Recent Flowsheet Documentation  Taken 1/18/2025 0400 by Paty Del Toro RN  Sensory Stimulation Regulation: care clustered  Taken 1/18/2025 0000 by Paty Del Toro RN  Sensory Stimulation Regulation:   care clustered   television on   quiet environment promoted   lighting decreased  Taken 1/17/2025 2000 by Paty Del Toro RN  Sensory Stimulation Regulation:   quiet environment promoted   care clustered     Problem: Fall Injury Risk  Goal: Absence of Fall and Fall-Related Injury  Outcome: Progressing  Intervention: Identify and Manage Contributors  Recent Flowsheet Documentation  Taken 1/18/2025 0400 by Paty Del Toro RN  Medication Review/Management: dosing adjusted  Taken 1/18/2025 0200 by Paty Del Toro RN  Medication Review/Management: medications reviewed  Taken  1/18/2025 0000 by Paty Del Toro, RN  Medication Review/Management: medications reviewed  Taken 1/17/2025 2000 by Paty Del Toro RN  Medication Review/Management: medications reviewed  Intervention: Promote Injury-Free Environment  Recent Flowsheet Documentation  Taken 1/18/2025 0400 by Paty Del Toro RN  Safety Promotion/Fall Prevention:   activity supervised   assistive device/personal items within reach   clutter free environment maintained   fall prevention program maintained   lighting adjusted   nonskid shoes/slippers when out of bed   room organization consistent   safety round/check completed  Taken 1/18/2025 0200 by Paty Del Toro RN  Safety Promotion/Fall Prevention:   activity supervised   assistive device/personal items within reach   clutter free environment maintained   fall prevention program maintained   lighting adjusted   nonskid shoes/slippers when out of bed   safety round/check completed   room organization consistent  Taken 1/18/2025 0000 by Paty Del Toro RN  Safety Promotion/Fall Prevention:   activity supervised   assistive device/personal items within reach   fall prevention program maintained   gait belt   clutter free environment maintained   nonskid shoes/slippers when out of bed   room organization consistent   safety round/check completed  Taken 1/17/2025 2000 by Paty Del Toro, RN  Safety Promotion/Fall Prevention:   activity supervised   assistive device/personal items within reach   fall prevention program maintained   clutter free environment maintained   lighting adjusted   nonskid shoes/slippers when out of bed   room organization consistent   safety round/check completed   Goal Outcome Evaluation:

## 2025-01-19 DIAGNOSIS — E78.2 MIXED HYPERLIPIDEMIA: ICD-10-CM

## 2025-01-20 ENCOUNTER — TRANSITIONAL CARE MANAGEMENT TELEPHONE ENCOUNTER (OUTPATIENT)
Dept: CALL CENTER | Facility: HOSPITAL | Age: 60
End: 2025-01-20
Payer: MEDICARE

## 2025-01-20 RX ORDER — ATORVASTATIN CALCIUM 40 MG/1
40 TABLET, FILM COATED ORAL
Qty: 100 TABLET | Refills: 0 | Status: SHIPPED | OUTPATIENT
Start: 2025-01-20

## 2025-01-20 NOTE — CASE MANAGEMENT/SOCIAL WORK
Case Management Discharge Note      Final Note: Carin LAU              Home Medical Care Coordination complete.      Service Provider Services Address Phone Fax Patient Preferred    [facility] [address] [place] [address] [phone] -- --                 Transportation Services  Private: Car    Final Discharge Disposition Code: 06 - home with home health care

## 2025-01-20 NOTE — OUTREACH NOTE
Call Center TCM Note      Flowsheet Row Responses   [facility] patient discharged from? [facility]   Does the patient have one of the following disease processes/diagnoses(primary or secondary)? Other   TCM attempt successful? Yes   Call start time 1256   Call end time 1302   Is patient permission given to speak with other caregiver? Yes   Person spoke with today (if not patient) and relationship Lazara-daughter.   Meds reviewed with patient/caregiver? Yes   Is the patient having any side effects they believe may be caused by any medication additions or changes? No   Does the patient have all medications ordered at discharge? Yes   Is the patient taking all medications as directed (includes completed medication regime)? Yes   Comments Daughter states will have patient contact PCP and neurology for hospital f/u appts.   Does the patient have an appointment with their PCP within 7-14 days of discharge? No   Nursing Interventions Patient declined scheduling/rescheduling appointment at this time, Routed TCM call to PCP office   Has home health visited the patient within 72 hours of discharge? Unsure   Home health interventions Called Home Health agency   Home health comments Daughter states unsure if patient has heard from . Message left at [facility] to call if has not received order.   Psychosocial issues? No   Did the patient receive a copy of their discharge instructions? Yes   Nursing interventions Reviewed instructions with patient   What is the patient's perception of their health status since discharge? Improving   Is the patient/caregiver able to teach back signs and symptoms related to disease process for when to call PCP? Yes   Is the patient/caregiver able to teach back signs and symptoms related to disease process for when to call 911? Yes   Is the patient/caregiver able to teach back the hierarchy of who to call/visit for symptoms/problems? PCP, Specialist, Home health nurse, Urgent Care, ED, 911 Yes    If the patient is a current smoker, are they able to teach back resources for cessation? Not a smoker   TCM call completed? Yes   Wrap up additional comments Daughter, Lazara, states patient is improving. Denies any further seizure activity. Denies any needs today. TCM complete.   Call end time 1302   Would this patient benefit from a Referral to [facility] Social Work? No   Is the patient interested in additional calls from an ambulatory ? No        Leann from [facility] returned call. States waiting on verbal orders from PCP office, then will start visits. Will route to PCP office.    Jillian Buenrostro RN    1/20/2025, 13:03 EST

## 2025-01-21 DIAGNOSIS — J45.20 MILD INTERMITTENT ASTHMA WITHOUT COMPLICATION: ICD-10-CM

## 2025-01-21 RX ORDER — ALBUTEROL SULFATE 90 UG/1
INHALANT RESPIRATORY (INHALATION)
Qty: 34 G | Refills: 1 | Status: SHIPPED | OUTPATIENT
Start: 2025-01-21

## 2025-01-22 ENCOUNTER — TELEPHONE (OUTPATIENT)
Dept: FAMILY MEDICINE CLINIC | Facility: CLINIC | Age: 60
End: 2025-01-22
Payer: MEDICARE

## 2025-01-22 DIAGNOSIS — F41.9 ANXIETY: ICD-10-CM

## 2025-01-22 DIAGNOSIS — R00.0 TACHYCARDIA: ICD-10-CM

## 2025-01-22 DIAGNOSIS — E11.65 TYPE 2 DIABETES MELLITUS WITH HYPERGLYCEMIA, WITHOUT LONG-TERM CURRENT USE OF INSULIN: ICD-10-CM

## 2025-01-22 DIAGNOSIS — N39.46 MIXED STRESS AND URGE URINARY INCONTINENCE: Primary | ICD-10-CM

## 2025-01-22 DIAGNOSIS — F33.1 MODERATE EPISODE OF RECURRENT MAJOR DEPRESSIVE DISORDER: ICD-10-CM

## 2025-01-22 DIAGNOSIS — M25.541 ARTHRALGIA OF RIGHT HAND: ICD-10-CM

## 2025-01-22 DIAGNOSIS — J45.20 MILD INTERMITTENT ASTHMA WITHOUT COMPLICATION: ICD-10-CM

## 2025-01-22 DIAGNOSIS — E78.2 MIXED HYPERLIPIDEMIA: ICD-10-CM

## 2025-01-22 DIAGNOSIS — R00.2 PALPITATIONS: ICD-10-CM

## 2025-01-22 DIAGNOSIS — G47.09 OTHER INSOMNIA: ICD-10-CM

## 2025-01-22 DIAGNOSIS — F51.5 NIGHTMARES ASSOCIATED WITH CHRONIC POST-TRAUMATIC STRESS DISORDER: ICD-10-CM

## 2025-01-22 DIAGNOSIS — F43.12 NIGHTMARES ASSOCIATED WITH CHRONIC POST-TRAUMATIC STRESS DISORDER: ICD-10-CM

## 2025-01-22 RX ORDER — ASPIRIN 81 MG/1
162 TABLET, CHEWABLE ORAL DAILY
Qty: 90 TABLET | Refills: 1 | Status: SHIPPED | OUTPATIENT
Start: 2025-01-22

## 2025-01-22 RX ORDER — GABAPENTIN 100 MG/1
100 CAPSULE ORAL 2 TIMES DAILY
Qty: 60 CAPSULE | OUTPATIENT
Start: 2025-01-22

## 2025-01-22 RX ORDER — ALBUTEROL SULFATE 90 UG/1
INHALANT RESPIRATORY (INHALATION)
Qty: 34 G | Refills: 1 | Status: CANCELLED | OUTPATIENT
Start: 2025-01-22

## 2025-01-22 RX ORDER — GABAPENTIN 100 MG/1
100 CAPSULE ORAL DAILY
Status: CANCELLED | OUTPATIENT
Start: 2025-01-22

## 2025-01-22 RX ORDER — SERTRALINE HYDROCHLORIDE 100 MG/1
100 TABLET, FILM COATED ORAL DAILY
Qty: 90 TABLET | Refills: 1 | Status: CANCELLED | OUTPATIENT
Start: 2025-01-22

## 2025-01-22 RX ORDER — ISOSORBIDE MONONITRATE 30 MG/1
30 TABLET, EXTENDED RELEASE ORAL DAILY
Qty: 90 TABLET | Refills: 0 | Status: CANCELLED | OUTPATIENT
Start: 2025-01-22

## 2025-01-22 RX ORDER — PRAZOSIN HYDROCHLORIDE 1 MG/1
1 CAPSULE ORAL NIGHTLY
Qty: 90 CAPSULE | Refills: 0 | Status: CANCELLED | OUTPATIENT
Start: 2025-01-22

## 2025-01-22 RX ORDER — LANCETS
1 EACH MISCELLANEOUS 3 TIMES DAILY
Qty: 102 EACH | Refills: 2 | Status: SHIPPED | OUTPATIENT
Start: 2025-01-22

## 2025-01-22 RX ORDER — ESZOPICLONE 2 MG/1
2 TABLET, FILM COATED ORAL NIGHTLY
Qty: 30 TABLET | Refills: 0 | Status: SHIPPED | OUTPATIENT
Start: 2025-01-22

## 2025-01-22 RX ORDER — BUSPIRONE HYDROCHLORIDE 15 MG/1
15 TABLET ORAL 3 TIMES DAILY
Qty: 90 TABLET | Refills: 2 | Status: SHIPPED | OUTPATIENT
Start: 2025-01-22

## 2025-01-22 RX ORDER — IBUPROFEN 800 MG/1
800 TABLET, FILM COATED ORAL EVERY 12 HOURS PRN
Qty: 60 TABLET | Refills: 1 | Status: SHIPPED | OUTPATIENT
Start: 2025-01-22

## 2025-01-22 RX ORDER — ATORVASTATIN CALCIUM 40 MG/1
40 TABLET, FILM COATED ORAL
Qty: 100 TABLET | Refills: 0 | Status: CANCELLED | OUTPATIENT
Start: 2025-01-22

## 2025-01-22 NOTE — TELEPHONE ENCOUNTER
Caller: Eladia Connor    Relationship: Self    Best call back number: [phone]     What was the call regarding: PATIENT IS CALLING TO SEE IF ANOTHER MEDICATION COULD BE CALLED IN. SHE STATES THEE IS AN INTERACTION WITH ONE OF THE MEDICATIONS

## 2025-01-22 NOTE — PROGRESS NOTES
CALLED AND SPOKE WITH PT READ THE MESSAGE FROM DAKSHA PT VOICE UNDERSTOOD.    Yes, no problem doing home care.  Patient has an upcoming appointment to follow-up from hospital stay.  Just make sure patient is aware that she will need to keep this appointment for me to get updated information to keep signing home care orders.  Is patient following up with a neurologist?  If so please get the date so I will I have this in documentation.

## 2025-01-23 ENCOUNTER — TELEPHONE (OUTPATIENT)
Dept: CARDIOLOGY | Facility: CLINIC | Age: 60
End: 2025-01-23

## 2025-01-23 ENCOUNTER — TELEPHONE (OUTPATIENT)
Dept: FAMILY MEDICINE CLINIC | Facility: CLINIC | Age: 60
End: 2025-01-23
Payer: MEDICARE

## 2025-01-23 NOTE — TELEPHONE ENCOUNTER
Caller: LUIS FERNANDO    Relationship: Caregiver (non-relative)    Best call back number: [phone]  WILL NEED VERBAL ORDERS, IF NO ANSWER CAN LEAVE A MESSAGE WITH THE NAME OF THE PATIENT AND THE NAME OF THE CALLER AND THE VERBAL ANSWER      What orders are you requesting : HOME HEALTH PHYSICAL THERAPY AND OCCUPATIONAL THERAPYCHIQUITA

## 2025-01-23 NOTE — TELEPHONE ENCOUNTER
Caller: Eladia Connor    Relationship to patient: Self    Best call back number: [phone]       Additional notes: PT WAS IN HOSPITAL RECENTLY DUE TO SEIZURES - SHE WAS INSTRUCTED TO FU WITH CARDIOLOGY AND GET AN ECHO ORDER AS WELL - PT ASKING FOR CALL BACK FOR ADVISEMENT ON SCHEDULING

## 2025-01-29 ENCOUNTER — OFFICE VISIT (OUTPATIENT)
Dept: CARDIOLOGY | Facility: CLINIC | Age: 60
End: 2025-01-29
Payer: MEDICARE

## 2025-01-29 VITALS
OXYGEN SATURATION: 96 % | WEIGHT: 293 LBS | HEART RATE: 90 BPM | BODY MASS INDEX: 45.99 KG/M2 | DIASTOLIC BLOOD PRESSURE: 64 MMHG | SYSTOLIC BLOOD PRESSURE: 109 MMHG | HEIGHT: 67 IN

## 2025-01-29 DIAGNOSIS — E78.5 DYSLIPIDEMIA: ICD-10-CM

## 2025-01-29 DIAGNOSIS — R07.9 CHEST PAIN IN ADULT: Primary | ICD-10-CM

## 2025-01-29 PROCEDURE — 1160F RVW MEDS BY RX/DR IN RCRD: CPT | Performed by: NURSE PRACTITIONER

## 2025-01-29 PROCEDURE — 1159F MED LIST DOCD IN RCRD: CPT | Performed by: NURSE PRACTITIONER

## 2025-01-29 PROCEDURE — 99214 OFFICE O/P EST MOD 30 MIN: CPT | Performed by: NURSE PRACTITIONER

## 2025-01-29 NOTE — PROGRESS NOTES
"[facility] CARDIOLOGY      REASON FOR FOLLOW-UP:  Follow-up recent hospitalization          Chief Complaint   Patient presents with    Heart Problem         Dear Quincy Lemos APRN        History of Present Illness   Eladia Connor is a 59 year-old female with no known history of ischemic heart disease.  Past medical history includes chronic constipation, depression, dyslipidemia, chronic hypoxic respiratory failure on supplemental oxygen panic attacks.  She has significant family history of coronary artery disease and congestive heart failure in mother, coronary artery bypass grafting and congestive heart failure in father, sister  at age 58 with either an MI or CVA.  Her last office visit was 2023.    TTE 2022-  Interpretation Summary    Left ventricular systolic function is normal. Left ventricular ejection fraction appears to be 61 - 65%.    Left ventricular diastolic function was normal.    Saline test results are negative.  Study was suboptimal.    Ms. Connor has had a hospitalization for possible seizure with difficulty speaking and right leg/left arm weakness and numbness at [facility] on 2022. Obstructive sleep apnea was suspected according to the provider note. She was ultimately transferred to [facility] for EEG. She reported while in the emergency department she had \"swollen veins on both sides of the neck\" for short period of time that was not witnessed by hospital staff. CTA neck performed: No hemodynamically significant stenosis, aneurysmal dilatation or embolic cutoff identified within the cervical carotid or vertebral arteries. CT head showed no acute intracranial abnormality. She has significant problems with constipation and was seen in the emergency department 2022 with moderate stool burden noted within the distal colon, hepatic steatosis reported on CT. On 2022 she was seen at [facility] [facility] for persistent nausea vomiting. GI was consulted and EGD performed on 12/30/2022 with no obvious esophagitis or esophageal ulcers with esophagus progressively dilated and erosive gastritis seen with biopsy taken.    30-day event monitor 3/6/2023-underlying rhythm sinus with minimal heart rate 50, maximum 120 (75).  No evidence of atrial fibrillation, sustained ventricular or supraventricular tachyarrhythmia, no clinically significant pauses or AV blocks.  No significant PAC or PVC burden.  Relatively benign study.    Pharmacological nuclear stress testing 2/2/2023: No evidence of ischemia, low risk study.    The patient was admitted to [facility] 1/16/2025 with breakthrough seizures followed by left-sided weakness and aphasia.  Extensive review of records and imaging reports-CT head, CTA head and neck, MRI brain with no acute findings.  She was evaluated by neurologist and started on Topamax with escalating dose.  Outpatient neurology follow-up and TTE recommended.  She presents today with her daughter in follow-up from that hospitalization.    Today, the patient tells me that she always has some mild left chest tightness that is unchanged, nonradiating, no associated symptoms.  She reports shortness of breath primarily with climbing stairs.  She denies any shortness of breath at rest.  She denies any lower extremity edema      ASSESSMENT:  Chest pain  Chronic hypoxic respiratory failure on supplemental oxygen  Chronic constipation  Anxiety/depression  Seizure disorder  History of dysphagia requiring esophageal dilatation    PLAN:  I will order TTE today.  I explained to the patient that we could consider ischemic evaluation in the near future given her risk factors and family history.  Continue aspirin, statin, isosorbide  Risk factor modification including heart healthy diet, routine exercise as tolerated, healthy weight  Follow-up after echo        CHF Guideline Directed Medical  Therapy  Beta Blocker:   ARNI/ACE/ARB:   SGLT 2 inhibitors:   Diuretics:   Aldosterone Antagonist:   Vasodilators & Nitrates:       Diagnoses and all orders for this visit:    1. Chest pain in adult (Primary)  -     Adult Transthoracic Echo Complete w/ Color, Spectral and Contrast if necessary per protocol; Future    2. Dyslipidemia  -     Adult Transthoracic Echo Complete w/ Color, Spectral and Contrast if necessary per protocol; Future          The following portions of the patient's history were reviewed and updated as appropriate: allergies, current medications, past family history, past medical history, past social history, past surgical history, and problem list.    REVIEW OF SYSTEMS:    Review of Systems   Cardiovascular:  Positive for chest pain and dyspnea on exertion.   Neurological:  Positive for seizures.       Vitals:    01/29/25 1005   BP: 109/64   Pulse: 90   SpO2: 96%         PHYSICAL EXAM:    General: Alert, cooperative, no distress, appears stated age  Head:  Normocephalic, atraumatic, mucous membranes moist  Eyes:  Conjunctiva/corneas clear, EOM's intact     Neck:  Supple,  no JVD or bruit     Lungs: Clear to auscultation bilaterally, no wheezes rhonchi rales are noted  Chest wall: No tenderness  Musculoskeletal:   Ambulates freely without assistance  Heart::  Regular rate and rhythm, S1 and S2 normal, no murmur, rub or gallop  Abdomen: Soft, non-tender, nondistended, bowel sounds active, no abdominal bruit  Extremities: No cyanosis, clubbing, or edema   Pulses: 2+ and symmetric all extremities  Skin:  No rashes or lesions  Neuro/psych: A&O x3. CN II through XII are grossly intact with appropriate affect        Past Medical History:   Diagnosis Date    Anxiety     Asthma     Cancer     SKIN    Depression     Diabetes mellitus     GERD (gastroesophageal reflux disease)     Hyperlipidemia     Hypertension     Psoriasis     Seizures 12/06/2022    Sleep apnea     cpap    Stroke 2017    mini           seizures       Past Surgical History:   Procedure Laterality Date    CHOLECYSTECTOMY  1987    ENDOSCOPY N/A 12/30/2022    Procedure: ESOPHAGOGASTRODUODENOSCOPY with gastric biopsy and esophageal dilation #50,#54,#56,#58 bougie;  Surgeon: Patience Arana MD;  Location: [facility] ENDOSCOPY;  Service: Gastroenterology;  Laterality: N/A;  gastritis    EYE SURGERY      KNEE SURGERY           Current Outpatient Medications:     Accu-Chek FastClix Lancets misc, Inject 1 each under the skin into the appropriate area as directed 3 (Three) Times a Day. Check fasting glucose every morning and as needed, Disp: 102 each, Rfl: 2    albuterol sulfate  (90 Base) MCG/ACT inhaler, USE 2 INHALATIONS BY MOUTH EVERY 6 HOURS AS NEEDED FOR WHEEZING, Disp: 34 g, Rfl: 1    ARIPiprazole (Abilify) 2 MG tablet, Take 2 tablets by mouth Daily for 14 days., Disp: 28 tablet, Rfl: 0    aspirin 81 MG chewable tablet, Chew 2 tablets Daily., Disp: 90 tablet, Rfl: 1    atorvastatin (LIPITOR) 40 MG tablet, TAKE 1 TABLET BY MOUTH EVERY  NIGHT AT BEDTIME, Disp: 100 tablet, Rfl: 0    Blood Glucose Monitoring Suppl (Accu-Chek Guide Me) w/Device kit, Use 1 Cartridge Daily As Needed (every morning and prn)., Disp: 1 kit, Rfl: 0    busPIRone (BUSPAR) 15 MG tablet, Take 1 tablet by mouth 3 (Three) Times a Day., Disp: 90 tablet, Rfl: 2    Continuous Glucose Sensor (FreeStyle Germania 3 Sensor) misc, Use 1 each Every 14 (Fourteen) Days., Disp: 2 each, Rfl: 2    docusate sodium (Colace) 100 MG capsule, Take 1 capsule by mouth 2 (Two) Times a Day., Disp: 60 capsule, Rfl: 1    eszopiclone (Lunesta) 2 MG tablet, Take 1 tablet by mouth Every Night. Take immediately before bedtime, Disp: 30 tablet, Rfl: 0    gabapentin (NEURONTIN) 100 MG capsule, Take 1 capsule by mouth Daily., Disp: , Rfl:     glucose blood test strip, Use as instructed, Disp: 100 each, Rfl: 0    ibuprofen (ADVIL,MOTRIN) 800 MG tablet, Take 1 tablet by mouth Every 12 (Twelve) Hours As Needed for  "Moderate Pain., Disp: 60 tablet, Rfl: 1    Incontinence Supplies misc, 1 each by Other route Take As Directed., Disp: 100 each, Rfl: 1    isosorbide mononitrate (IMDUR) 30 MG 24 hr tablet, TAKE 1 TABLET BY MOUTH DAILY, Disp: 90 tablet, Rfl: 0    prazosin (MINIPRESS) 1 MG capsule, TAKE 1 CAPSULE BY MOUTH AT NIGHT, Disp: 90 capsule, Rfl: 0    sertraline (ZOLOFT) 100 MG tablet, Take 1 tablet by mouth Daily., Disp: 90 tablet, Rfl: 1    topiramate (TOPAMAX) 25 MG tablet, Take 25 mg QHS for 6 days then 25 mg BID x7 days then 25 mg in AM and 50 mg QHS x7 days then 50 mg BID, Disp: 68 tablet, Rfl: 0    No Known Allergies    Family History   Problem Relation Age of Onset    Heart disease Mother     Breast cancer Mother     Heart disease Father     Pancreatic cancer Father     Heart disease Sister     Lung cancer Brother        Social History     Tobacco Use    Smoking status: Never    Smokeless tobacco: Never   Substance Use Topics    Alcohol use: Never           Current Electrocardiogram:  Procedures        EMR Dragon/Transcription:   \"Dictated utilizing Dragon dictation\".     Copied text in this note has been reviewed by me and is accurate as of 01/30/25.    "

## 2025-02-05 ENCOUNTER — OFFICE VISIT (OUTPATIENT)
Dept: FAMILY MEDICINE CLINIC | Facility: CLINIC | Age: 60
End: 2025-02-05
Payer: MEDICARE

## 2025-02-05 VITALS
WEIGHT: 293 LBS | BODY MASS INDEX: 45.99 KG/M2 | HEART RATE: 78 BPM | HEIGHT: 67 IN | DIASTOLIC BLOOD PRESSURE: 76 MMHG | SYSTOLIC BLOOD PRESSURE: 130 MMHG | OXYGEN SATURATION: 97 %

## 2025-02-05 DIAGNOSIS — R45.86 MOOD SWINGS: ICD-10-CM

## 2025-02-05 DIAGNOSIS — G62.9 NEUROPATHY: ICD-10-CM

## 2025-02-05 DIAGNOSIS — Z74.09 MOBILITY IMPAIRED: ICD-10-CM

## 2025-02-05 DIAGNOSIS — Z09 HOSPITAL DISCHARGE FOLLOW-UP: Primary | ICD-10-CM

## 2025-02-05 DIAGNOSIS — F33.1 MODERATE EPISODE OF RECURRENT MAJOR DEPRESSIVE DISORDER: ICD-10-CM

## 2025-02-05 DIAGNOSIS — E11.65 TYPE 2 DIABETES MELLITUS WITH HYPERGLYCEMIA, WITHOUT LONG-TERM CURRENT USE OF INSULIN: ICD-10-CM

## 2025-02-05 DIAGNOSIS — R56.9 SEIZURE: ICD-10-CM

## 2025-02-05 PROCEDURE — 3044F HG A1C LEVEL LT 7.0%: CPT | Performed by: REGISTERED NURSE

## 2025-02-05 PROCEDURE — 1160F RVW MEDS BY RX/DR IN RCRD: CPT | Performed by: REGISTERED NURSE

## 2025-02-05 PROCEDURE — 99215 OFFICE O/P EST HI 40 MIN: CPT | Performed by: REGISTERED NURSE

## 2025-02-05 PROCEDURE — 1126F AMNT PAIN NOTED NONE PRSNT: CPT | Performed by: REGISTERED NURSE

## 2025-02-05 PROCEDURE — 1159F MED LIST DOCD IN RCRD: CPT | Performed by: REGISTERED NURSE

## 2025-02-05 RX ORDER — ARIPIPRAZOLE 2 MG/1
2 TABLET ORAL DAILY
Qty: 30 TABLET | Refills: 1 | Status: SHIPPED | OUTPATIENT
Start: 2025-02-05

## 2025-02-05 RX ORDER — DAPAGLIFLOZIN 5 MG/1
5 TABLET, FILM COATED ORAL DAILY
Qty: 30 TABLET | Refills: 2 | Status: SHIPPED | OUTPATIENT
Start: 2025-02-05

## 2025-02-05 NOTE — PROGRESS NOTES
Chief Complaint  Med Refill    Subjective    History of Present Illness {CC  Problem List  Visit  Diagnosis   Encounters  Notes  Medications  Labs  Result Review Imaging  Media :23}     Eladia Connor presents to [facility] for Med Refill.      History of Present Illness  Patient is a 59 y.o. female who presents to the clinic today for 2-week follow-up for hospital stay for seizures type 2 diabetes, depression, and mobility decline.  Patient denies any chest pain, shortness of breath, or any fevers.  Patient denies any known exposure to COVID, flu, or any other contagious illnesses.       History of Present Illness  In regards to hospital follow-up, she was previously hospitalized at [facility], [place] 2 weeks ago due to stroke-like symptoms, which were later diagnosed as a seizure. She reports persistent weakness but has not experienced any further seizure activity since her discharge. She is currently on Topamax for seizure management.  Patient continues to have decline in mobility is requesting a bariatric rollator walker to help with this.  Patient shares that can be difficult to ambulate more than a couple feet without the walker and a rollator would offer safer option for her.  She has a standard walker but this is difficult to maneuver for her.    In regards to this type 2 diabetes, she is seeking a referral to an endocrinologist for her diabetes management. Currently, she is not on any medication for blood sugar control.  Patient had tried Mounjaro in the past but shares that she had too many GI issues to continue taking the Mounjaro.  We also previously tried to prescribe her Farxiga.  Patient does not remember whether or not she took this medication and has had so many significant medical events that she will go home and look at her pill containers.  I advised her to start this medication to see if this is helpful.  Discussed other standard medications such as metformin  or glipizide but patient has not yet tried metformin or glipizide is not interested in trying either these due to the potential side effects. A refill prescription for Farxiga was provided today.    In regards to depression, she is on Abilify 4 mg daily for depression disorder and mood swings and wants to reduce the dose to 2 mg once daily.  She voices that the Abilify 4 mg feels like a little bit much for her and she feels like she will do better on 2 mg.  Dose will be updated for patient.  She denies any homicidal or suicidal ideations at this time and denies any concerns or issues with the Abilify or her depression at this time.           Review of Systems   Constitutional: Negative.  Negative for activity change, chills, fatigue and fever.   HENT: Negative.  Negative for congestion, dental problem, ear pain, hearing loss, rhinorrhea, sinus pain, sore throat, tinnitus and trouble swallowing.    Eyes: Negative.  Negative for pain and visual disturbance.   Respiratory: Negative.  Negative for cough, chest tightness, shortness of breath and wheezing.    Cardiovascular: Negative.  Negative for chest pain, palpitations and leg swelling.   Gastrointestinal: Negative.  Negative for abdominal pain, diarrhea, nausea and vomiting.   Endocrine: Negative.  Negative for polydipsia, polyphagia and polyuria.   Genitourinary: Negative.  Negative for difficulty urinating, dysuria, frequency and urgency.   Musculoskeletal: Negative.  Positive for back pain, gait problem, joint swelling and myalgias. Negative for arthralgias.   Skin: Negative.  Negative for color change, pallor, rash and wound.   Allergic/Immunologic: Negative.  Negative for environmental allergies.   Neurological: Negative.  Negative for dizziness, speech difficulty, weakness, light-headedness, numbness and headaches.   Hematological: Negative.    Psychiatric/Behavioral: Negative.  Negative for confusion, decreased concentration, self-injury and suicidal ideas.  "The patient is nervous/anxious.    All other systems reviewed and are negative.       Objective     Vital Signs:   /76   Pulse 78   Ht 170.2 cm (67.01\")   Wt (!) 142 kg (313 lb)   SpO2 97%   BMI 49.01 kg/m²   Current Outpatient Medications on File Prior to Visit   Medication Sig Dispense Refill    Accu-Chek FastClix Lancets misc Inject 1 each under the skin into the appropriate area as directed 3 (Three) Times a Day. Check fasting glucose every morning and as needed 102 each 2    albuterol sulfate  (90 Base) MCG/ACT inhaler USE 2 INHALATIONS BY MOUTH EVERY 6 HOURS AS NEEDED FOR WHEEZING 34 g 1    aspirin 81 MG chewable tablet Chew 2 tablets Daily. 90 tablet 1    atorvastatin (LIPITOR) 40 MG tablet TAKE 1 TABLET BY MOUTH EVERY  NIGHT AT BEDTIME 100 tablet 0    Blood Glucose Monitoring Suppl (Accu-Chek Guide Me) w/Device kit Use 1 Cartridge Daily As Needed (every morning and prn). 1 kit 0    busPIRone (BUSPAR) 15 MG tablet Take 1 tablet by mouth 3 (Three) Times a Day. 90 tablet 2    Continuous Glucose Sensor (FreeStyle Germania 3 Sensor) misc Use 1 each Every 14 (Fourteen) Days. 2 each 2    docusate sodium (Colace) 100 MG capsule Take 1 capsule by mouth 2 (Two) Times a Day. 60 capsule 1    eszopiclone (Lunesta) 2 MG tablet Take 1 tablet by mouth Every Night. Take immediately before bedtime 30 tablet 0    gabapentin (NEURONTIN) 100 MG capsule Take 1 capsule by mouth Daily.      glucose blood test strip Use as instructed 100 each 0    ibuprofen (ADVIL,MOTRIN) 800 MG tablet Take 1 tablet by mouth Every 12 (Twelve) Hours As Needed for Moderate Pain. 60 tablet 1    Incontinence Supplies misc 1 each by Other route Take As Directed. 100 each 1    isosorbide mononitrate (IMDUR) 30 MG 24 hr tablet TAKE 1 TABLET BY MOUTH DAILY 90 tablet 0    prazosin (MINIPRESS) 1 MG capsule TAKE 1 CAPSULE BY MOUTH AT NIGHT 90 capsule 0    sertraline (ZOLOFT) 100 MG tablet Take 1 tablet by mouth Daily. 90 tablet 1    topiramate " (TOPAMAX) 25 MG tablet Take 25 mg QHS for 6 days then 25 mg BID x7 days then 25 mg in AM and 50 mg QHS x7 days then 50 mg BID 68 tablet 0     No current facility-administered medications on file prior to visit.        Past Medical History:   Diagnosis Date    Anxiety     Asthma     Cancer     SKIN    Depression     Diabetes mellitus     GERD (gastroesophageal reflux disease)     Hyperlipidemia     Hypertension     Irritable bowel syndrome 3/2017    Psoriasis     Seizures 12/06/2022    Sleep apnea     cpap    Stroke 2017    mini          seizures      Past Surgical History:   Procedure Laterality Date    CHOLECYSTECTOMY  1987    ENDOSCOPY N/A 12/30/2022    Procedure: ESOPHAGOGASTRODUODENOSCOPY with gastric biopsy and esophageal dilation #50,#54,#56,#58 bougie;  Surgeon: Patience Arana MD;  Location: [facility] ENDOSCOPY;  Service: Gastroenterology;  Laterality: N/A;  gastritis    EYE SURGERY      KNEE SURGERY        Family History   Problem Relation Age of Onset    Heart disease Mother         CHF    Breast cancer Mother     Anxiety disorder Mother     Arthritis Mother     Cancer Mother         Breast    COPD Mother     Depression Mother     Diabetes Mother     Hyperlipidemia Mother     Stroke Mother     Thyroid disease Mother     Vision loss Mother         Blind in left eye    Heart disease Father         5 bypass    Pancreatic cancer Father     Anxiety disorder Father     Cancer Father         Pancreatic    Depression Father     Early death Father         Pancreatic cancer    Mental illness Father         Bipolar    Heart disease Sister         Heart attack    Anxiety disorder Sister     Depression Sister     Early death Sister         Stroke    Hyperlipidemia Sister     Mental illness Sister         Bipolar    Stroke Sister     Thyroid disease Sister     Lung cancer Brother     Cancer Brother         Throat    Drug abuse Brother     Early death Brother     Anxiety disorder Sister     Depression Sister     Diabetes  Sister     Learning disabilities Sister     Mental illness Sister         Bipolar    Asthma Daughter     Depression Son     Developmental Disability Son         Austism    Hearing loss Son     Learning disabilities Son     Mental illness Son       Social History     Socioeconomic History    Marital status:    Tobacco Use    Smoking status: Never    Smokeless tobacco: Never   Vaping Use    Vaping status: Never Used   Substance and Sexual Activity    Alcohol use: Not Currently    Drug use: Never    Sexual activity: Not Currently     Partners: Male         Admission on 01/16/2025, Discharged on 01/18/2025   Component Date Value Ref Range Status    Glucose 01/16/2025 135 (H)  70 - 105 mg/dL Final    Serial Number: [id number]:  [id number]    QT Interval 01/16/2025 380  ms Final    QTC Interval 01/16/2025 439  ms Final    Glucose 01/16/2025 145 (H)  65 - 99 mg/dL Final    BUN 01/16/2025 9  8 - 23 mg/dL Final    Creatinine 01/16/2025 0.73  0.57 - 1.00 mg/dL Final    Sodium 01/16/2025 139  136 - 145 mmol/L Final    Potassium 01/16/2025 4.1  3.5 - 5.2 mmol/L Final    Chloride 01/16/2025 104  98 - 107 mmol/L Final    CO2 01/16/2025 24.4  22.0 - 29.0 mmol/L Final    Calcium 01/16/2025 9.6  8.6 - 10.5 mg/dL Final    Total Protein 01/16/2025 7.8  6.0 - 8.5 g/dL Final    Albumin 01/16/2025 4.0  3.5 - 5.2 g/dL Final    ALT (SGPT) 01/16/2025 21  1 - 33 U/L Final    AST (SGOT) 01/16/2025 42 (H)  1 - 32 U/L Final    Alkaline Phosphatase 01/16/2025 176 (H)  39 - 117 U/L Final    Total Bilirubin 01/16/2025 0.5  0.0 - 1.2 mg/dL Final    Globulin 01/16/2025 3.8  gm/dL Final    A/G Ratio 01/16/2025 1.1  g/dL Final    BUN/Creatinine Ratio 01/16/2025 12.3  7.0 - 25.0 Final    Anion Gap 01/16/2025 10.6  5.0 - 15.0 mmol/L Final    eGFR 01/16/2025 93.7  >60.0 mL/min/1.73 Final    Protime 01/16/2025 15.5 (H)  11.7 - 14.2 Seconds Final    INR 01/16/2025 1.21 (H)  0.90 - 1.10 Final    PTT 01/16/2025 30.0  22.7 - 35.4 seconds Final     ABO Type 01/16/2025 O   Final    RH type 01/16/2025 Negative   Final    Antibody Screen 01/16/2025 Negative   Final    T&S Expiration Date 01/16/2025 1/19/2025 11:59:59 PM   Final    Extra Tube 01/16/2025 Hold for add-ons.   Final    Auto resulted.    Extra Tube 01/16/2025 hold for add-on   Final    Auto resulted    Extra Tube 01/16/2025 Hold for add-ons.   Final    Auto resulted.    Extra Tube 01/16/2025 Hold for add-ons.   Final    Auto resulted    WBC 01/16/2025 9.18  3.40 - 10.80 10*3/mm3 Final    RBC 01/16/2025 4.62  3.77 - 5.28 10*6/mm3 Final    Hemoglobin 01/16/2025 12.2  12.0 - 15.9 g/dL Final    Hematocrit 01/16/2025 39.2  34.0 - 46.6 % Final    MCV 01/16/2025 84.8  79.0 - 97.0 fL Final    MCH 01/16/2025 26.4 (L)  26.6 - 33.0 pg Final    MCHC 01/16/2025 31.1 (L)  31.5 - 35.7 g/dL Final    RDW 01/16/2025 14.6  12.3 - 15.4 % Final    RDW-SD 01/16/2025 44.7  37.0 - 54.0 fl Final    MPV 01/16/2025 8.7  6.0 - 12.0 fL Final    Platelets 01/16/2025 184  140 - 450 10*3/mm3 Final    Neutrophil % 01/16/2025 73.7  42.7 - 76.0 % Final    Lymphocyte % 01/16/2025 13.9 (L)  19.6 - 45.3 % Final    Monocyte % 01/16/2025 7.1  5.0 - 12.0 % Final    Eosinophil % 01/16/2025 4.5  0.3 - 6.2 % Final    Basophil % 01/16/2025 0.5  0.0 - 1.5 % Final    Immature Grans % 01/16/2025 0.3  0.0 - 0.5 % Final    Neutrophils, Absolute 01/16/2025 6.76  1.70 - 7.00 10*3/mm3 Final    Lymphocytes, Absolute 01/16/2025 1.28  0.70 - 3.10 10*3/mm3 Final    Monocytes, Absolute 01/16/2025 0.65  0.10 - 0.90 10*3/mm3 Final    Eosinophils, Absolute 01/16/2025 0.41 (H)  0.00 - 0.40 10*3/mm3 Final    Basophils, Absolute 01/16/2025 0.05  0.00 - 0.20 10*3/mm3 Final    Immature Grans, Absolute 01/16/2025 0.03  0.00 - 0.05 10*3/mm3 Final    nRBC 01/16/2025 0.0  0.0 - 0.2 /100 WBC Final    QT Interval 01/16/2025 400  ms Final    QTC Interval 01/16/2025 436  ms Final    Glucose 01/17/2025 126 (H)  65 - 99 mg/dL Final    BUN 01/17/2025 9  8 - 23 mg/dL Final     Creatinine 01/17/2025 0.76  0.57 - 1.00 mg/dL Final    Sodium 01/17/2025 139  136 - 145 mmol/L Final    Potassium 01/17/2025 4.0  3.5 - 5.2 mmol/L Final    Chloride 01/17/2025 105  98 - 107 mmol/L Final    CO2 01/17/2025 23.3  22.0 - 29.0 mmol/L Final    Calcium 01/17/2025 9.2  8.6 - 10.5 mg/dL Final    BUN/Creatinine Ratio 01/17/2025 11.8  7.0 - 25.0 Final    Anion Gap 01/17/2025 10.7  5.0 - 15.0 mmol/L Final    eGFR 01/17/2025 89.3  >60.0 mL/min/1.73 Final    Glucose 01/17/2025 121 (H)  70 - 105 mg/dL Final    Serial Number: [id number]:  [id number]    Hemoglobin A1C 01/16/2025 6.54 (H)  4.80 - 5.60 % Final    Vitamin B-12 01/16/2025 542  211 - 946 pg/mL Final    TSH 01/17/2025 1.990  0.270 - 4.200 uIU/mL Final    Total Cholesterol 01/17/2025 115  0 - 200 mg/dL Final    Triglycerides 01/17/2025 114  0 - 150 mg/dL Final    HDL Cholesterol 01/17/2025 37 (L)  40 - 60 mg/dL Final    LDL Cholesterol  01/17/2025 57  0 - 100 mg/dL Final    VLDL Cholesterol 01/17/2025 21  5 - 40 mg/dL Final    LDL/HDL Ratio 01/17/2025 1.49   Final    Glucose 01/18/2025 129 (H)  70 - 105 mg/dL Final    Serial Number: [id number]:  [id number]    Glucose 01/18/2025 154 (H)  70 - 105 mg/dL Final    Serial Number: [id number]:  [id number]    Glucose 01/17/2025 145 (H)  70 - 105 mg/dL Final    Serial Number: [id number]:  [id number]    Glucose 01/17/2025 138 (H)  70 - 105 mg/dL Final    Serial Number: [id number]:  [id number]    Phosphorus 01/18/2025 3.6  2.5 - 4.5 mg/dL Final    Magnesium 01/18/2025 2.3  1.6 - 2.4 mg/dL Final    Glucose 01/18/2025 121 (H)  65 - 99 mg/dL Final    BUN 01/18/2025 11  8 - 23 mg/dL Final    Creatinine 01/18/2025 0.78  0.57 - 1.00 mg/dL Final    Sodium 01/18/2025 141  136 - 145 mmol/L Final    Potassium 01/18/2025 4.0  3.5 - 5.2 mmol/L Final    Chloride 01/18/2025 107  98 - 107 mmol/L Final    CO2 01/18/2025 24.9  22.0 - 29.0 mmol/L Final    Calcium 01/18/2025 8.9  8.6 - 10.5 mg/dL Final     Total Protein 01/18/2025 7.2  6.0 - 8.5 g/dL Final    Albumin 01/18/2025 3.6  3.5 - 5.2 g/dL Final    ALT (SGPT) 01/18/2025 19  1 - 33 U/L Final    AST (SGOT) 01/18/2025 43 (H)  1 - 32 U/L Final    Alkaline Phosphatase 01/18/2025 158 (H)  39 - 117 U/L Final    Total Bilirubin 01/18/2025 0.4  0.0 - 1.2 mg/dL Final    Globulin 01/18/2025 3.6  gm/dL Final    A/G Ratio 01/18/2025 1.0  g/dL Final    BUN/Creatinine Ratio 01/18/2025 14.1  7.0 - 25.0 Final    Anion Gap 01/18/2025 9.1  5.0 - 15.0 mmol/L Final    eGFR 01/18/2025 86.5  >60.0 mL/min/1.73 Final    WBC 01/18/2025 8.76  3.40 - 10.80 10*3/mm3 Final    RBC 01/18/2025 4.44  3.77 - 5.28 10*6/mm3 Final    Hemoglobin 01/18/2025 11.8 (L)  12.0 - 15.9 g/dL Final    Hematocrit 01/18/2025 38.1  34.0 - 46.6 % Final    MCV 01/18/2025 85.8  79.0 - 97.0 fL Final    MCH 01/18/2025 26.6  26.6 - 33.0 pg Final    MCHC 01/18/2025 31.0 (L)  31.5 - 35.7 g/dL Final    RDW 01/18/2025 14.6  12.3 - 15.4 % Final    RDW-SD 01/18/2025 45.4  37.0 - 54.0 fl Final    MPV 01/18/2025 8.9  6.0 - 12.0 fL Final    Platelets 01/18/2025 173  140 - 450 10*3/mm3 Final    Neutrophil % 01/18/2025 68.4  42.7 - 76.0 % Final    Lymphocyte % 01/18/2025 17.2 (L)  19.6 - 45.3 % Final    Monocyte % 01/18/2025 7.4  5.0 - 12.0 % Final    Eosinophil % 01/18/2025 6.2  0.3 - 6.2 % Final    Basophil % 01/18/2025 0.5  0.0 - 1.5 % Final    Immature Grans % 01/18/2025 0.3  0.0 - 0.5 % Final    Neutrophils, Absolute 01/18/2025 5.99  1.70 - 7.00 10*3/mm3 Final    Lymphocytes, Absolute 01/18/2025 1.51  0.70 - 3.10 10*3/mm3 Final    Monocytes, Absolute 01/18/2025 0.65  0.10 - 0.90 10*3/mm3 Final    Eosinophils, Absolute 01/18/2025 0.54 (H)  0.00 - 0.40 10*3/mm3 Final    Basophils, Absolute 01/18/2025 0.04  0.00 - 0.20 10*3/mm3 Final    Immature Grans, Absolute 01/18/2025 0.03  0.00 - 0.05 10*3/mm3 Final    nRBC 01/18/2025 0.0  0.0 - 0.2 /100 WBC Final   Office Visit on 01/16/2025   Component Date Value Ref Range Status     SARS Antigen 01/16/2025 Not Detected  Not Detected, Presumptive Negative Final    Influenza A Antigen NASRIN 01/16/2025 Not Detected  Not Detected Final    Influenza B Antigen NASRIN 01/16/2025 Not Detected  Not Detected Final    Internal Control 01/16/2025 Passed  Passed Final    Lot Number 01/16/2025 [id number]   Final    Expiration Date 01/16/2025 9,[id number]   Final         Physical Exam  Vitals and nursing note reviewed.   Constitutional:       Appearance: Normal appearance. She is normal weight.   HENT:      Head: Normocephalic and atraumatic.   Cardiovascular:      Rate and Rhythm: Normal rate and regular rhythm.      Pulses: Normal pulses.      Heart sounds: Normal heart sounds. No murmur heard.     No friction rub. No gallop.   Pulmonary:      Effort: Pulmonary effort is normal. No respiratory distress.      Breath sounds: Normal breath sounds. No stridor. No wheezing, rhonchi or rales.   Chest:      Chest wall: No tenderness.   Abdominal:      General: Abdomen is flat. Bowel sounds are normal. There is no distension.      Palpations: Abdomen is soft. There is no mass.      Tenderness: There is no abdominal tenderness. There is no right CVA tenderness, left CVA tenderness, guarding or rebound.      Hernia: No hernia is present.   Skin:     General: Skin is warm and dry.      Capillary Refill: Capillary refill takes less than 2 seconds.      Coloration: Skin is not jaundiced or pale.   Neurological:      General: No focal deficit present.      Mental Status: She is alert and oriented to person, place, and time. Mental status is at baseline.      Motor: No weakness.      Coordination: Coordination normal.      Gait: Gait normal.   Psychiatric:         Mood and Affect: Mood normal.         Behavior: Behavior normal.         Thought Content: Thought content normal.         Judgment: Judgment normal.          Physical Exam         Result Review  Data Reviewed:{ Labs  Result Review  Imaging  Med Tab  Media :23}   I  have reviewed this patient's chart.  I have reviewed previous labs, previous imaging, previous medications, and previous encounters with notes that were available in this patient's chart.    Results  Laboratory Studies  CMP showed good kidney function. Liver function was about the same. Hemoglobin was 0.2 from perfect. Magnesium was great, phosphorus was great.    Imaging  CT of the head, CT peripheral perfusion, CT angio of the neck, CT angio of the head, MRI of the brain were performed during hospital stay.              Assessment and Plan {CC Problem List  Visit Diagnosis  ROS  Review (Popup)  [facility]  BestPractice  Medications  SmartSets  SnapShot Encounters  Media :23}   Diagnoses and all orders for this visit:    1. Hospital discharge follow-up (Primary)    2. Seizure    3. Type 2 diabetes mellitus with hyperglycemia, without long-term current use of insulin  -     dapagliflozin (Farxiga) 5 MG tablet tablet; Take 1 tablet by mouth Daily.  Dispense: 30 tablet; Refill: 2  -     Miscellaneous DME    4. Neuropathy  -     Miscellaneous DME    5. Mobility impaired  -     Miscellaneous DME    6. Moderate episode of recurrent major depressive disorder  -     ARIPiprazole (Abilify) 2 MG tablet; Take 1 tablet by mouth Daily.  Dispense: 30 tablet; Refill: 1    7. Mood swings  -     ARIPiprazole (Abilify) 2 MG tablet; Take 1 tablet by mouth Daily.  Dispense: 30 tablet; Refill: 1        Assessment & Plan  1. Post-hospitalization follow-up.  Her laboratory results are within normal limits, indicating no significant changes or abnormalities. She reports no further seizure activity since her hospital discharge.  If any further seizure-like concerns please follow-up with neurologist.    2. Type 2 diabetes mellitus.  She is currently not on any medication for blood sugar control. She had a previous adverse reaction to Mounjaro. A prescription for Farxiga was provided in June, but she did not take  it. A referral to an endocrinologist will be made for further management of her diabetes. A prescription for Farxiga (dapagliflozin) with 2 additional refills will be provided. The potential side effects of Farxiga, including its mechanism of action and comparison with other diabetic medications, were discussed. It was confirmed that there are no interactions between Farxiga and Topamax.    3. Depression disorder.  She is on Abilify 4 mg twice daily for depression disorder and mood swings and wants to reduce the dose to 2 mg once daily. A prescription for Abilify 2 mg will be provided. The potential side effects of Abilify, including increased tiredness when taken with Topamax, were discussed. She was advised to monitor for excessive tiredness and to report any such symptoms.    4. Mobility issues.  A prescription for a bariatric rollator walker will be provided to assist with her mobility challenges. The prescription will be sent to [facility] pharmacy.         -ER red flags discussed with patient including risk versus benefit and education provided.  -Follow-up with me in 3 months or sooner if needed.    I spent 40 minutes caring for Eladia on this date of service. This time includes time spent by me in the following activities:preparing for the visit, reviewing tests, obtaining and/or reviewing a separately obtained history, performing a medically appropriate examination and/or evaluation , counseling and educating the patient/family/caregiver, ordering medications, tests, or procedures, referring and communicating with other health care professionals , documenting information in the medical record, independently interpreting results and communicating that information with the patient/family/caregiver, and care coordination.    Follow Up {Instructions Charge Capture  Follow-up Communications :23}     Patient was given instructions and counseling regarding her condition or for health maintenance advice.  Please see specific information pulled into the AVS (placed there by myself) if appropriate.    Return in about 3 months (around 5/5/2025) for routine follow up.    Class 3 Severe Obesity (BMI >=40). Obesity-related health conditions include the following: hypertension, diabetes mellitus, and dyslipidemias. Obesity is unchanged. BMI is is above average; BMI management plan is completed. We discussed portion control and increasing exercise.         SCOOBY Cisneros, [facility]-BC      Patient or patient representative verbalized consent for the use of Ambient Listening during the visit with  SCOOBY Cisneros for chart documentation. 2/5/2025  13:13 EST

## 2025-02-10 ENCOUNTER — TELEPHONE (OUTPATIENT)
Dept: FAMILY MEDICINE CLINIC | Facility: CLINIC | Age: 60
End: 2025-02-10

## 2025-02-10 NOTE — TELEPHONE ENCOUNTER
Caller: Eladia Connor    Relationship: Self    Best call back number:     [phone]       What was the call regarding:   ORDER FOR A BARIATRIC ROTATOR.  SANTOS CURRY

## 2025-02-17 NOTE — TELEPHONE ENCOUNTER
Caller: Eladia Connor    Relationship: Self    Best call back number:   Telephone Information:   Mobile [phone]     What is the best time to reach you: ANYTIME    What was the call regarding: PATIENT IS WANTING TO KNOW IF SHE CAN GET AN UPDATE ON THIS REQUEST FOR A BARIATRIC ROLLATOR AS SHE HAS NOT HEARD ANYTHING BACK. PATIENT STATED THAT IT IS NEEDED FOR SHE IS ALSO INQUIRING ABOUT THE ENDOCRINOLOGY REFERRAL THAT WAS SUPPOSED TO BE PLACED FOR HER DIABETES AS NO ONE HAS CONTACTED HER. PLEASE CALLBACK AND ADVISE.    PHARMACY FOR BARIATRIC ROLLATOR:[facility] - [place], [place] - [address] [place] [address] [phone] [facility] [phone]  [phone]

## 2025-02-27 ENCOUNTER — TELEPHONE (OUTPATIENT)
Dept: NEUROLOGY | Facility: CLINIC | Age: 60
End: 2025-02-27
Payer: MEDICARE

## 2025-02-27 NOTE — TELEPHONE ENCOUNTER
Caller: Eladia Connor    Relationship to patient: Self    Best call back number:   Telephone Information:   Mobile [phone]        New or established patient?  [] New  [x] Established    Date of discharge: 1-18-25    Facility discharged from: [facility]    Diagnosis/Symptoms: SEIZURES (NEW DXp    Length of stay (If applicable): 2 DAYS    Specialty Only: Did you see a [facility] health provider?    [x] Yes  [] No  If so, who? Kim Lam, APRN     Additional Details:   PT WAS SCHEDULED ON 2-25-25 WITH PASTORA AND HAD TO CANCEL DUE TO BEING SICK.    PLEASE CALL PT TO GET HER RESCHEDULED.

## 2025-03-03 ENCOUNTER — TELEPHONE (OUTPATIENT)
Dept: FAMILY MEDICINE CLINIC | Facility: CLINIC | Age: 60
End: 2025-03-03
Payer: MEDICARE

## 2025-03-03 RX ORDER — TOPIRAMATE 25 MG/1
TABLET, FILM COATED ORAL
Qty: 68 TABLET | Refills: 0 | Status: SHIPPED | OUTPATIENT
Start: 2025-03-03

## 2025-03-03 NOTE — TELEPHONE ENCOUNTER
Caller: Eladia Connor    Relationship to patient: Self    Best call back number:   Telephone Information:   Mobile [phone]         Patient is needing: PATIENT STATES SHE NEEDS ORDERS FOR A WALKER. SHE STATES THIS WAS DISCUSSED AT LAST APPOINTMENT AND SHE HAS NOT HEARD ANYTHING. PLEASE CALL BACK TO ADVISE.

## 2025-03-03 NOTE — TELEPHONE ENCOUNTER
Pt scheduled for 4/8/25 by HUB. Currently is our next available. Will watch for cancellations and call patient.

## 2025-03-03 NOTE — TELEPHONE ENCOUNTER
Caller: Eladia Connor    Relationship: Self    Best call back number:   Telephone Information:   Mobile [phone]         Requested Prescriptions:   Requested Prescriptions     Pending Prescriptions Disp Refills    topiramate (TOPAMAX) 25 MG tablet 68 tablet 0     Sig: Take 25 mg QHS for 6 days then 25 mg BID x7 days then 25 mg in AM and 50 mg QHS x7 days then 50 mg BID        Pharmacy where request should be sent: [address] [phone] [facility] [phone]      Last office visit with prescribing clinician: 2/5/2025   Last telemedicine visit with prescribing clinician: Visit date not found   Next office visit with prescribing clinician: Visit date not found     Additional details provided by patient:     Does the patient have less than a 3 day supply:  [] Yes  [] No    Would you like a call back once the refill request has been completed: [] Yes [] No    If the office needs to give you a call back, can they leave a voicemail: [] Yes [] No    Blayne Nguyen Rep   03/03/25 12:45 EST

## 2025-03-04 DIAGNOSIS — M25.541 ARTHRALGIA OF RIGHT HAND: ICD-10-CM

## 2025-03-04 RX ORDER — IBUPROFEN 800 MG/1
800 TABLET, FILM COATED ORAL EVERY 12 HOURS PRN
Qty: 120 TABLET | Refills: 0 | Status: SHIPPED | OUTPATIENT
Start: 2025-03-04

## 2025-03-10 ENCOUNTER — TELEPHONE (OUTPATIENT)
Dept: FAMILY MEDICINE CLINIC | Facility: CLINIC | Age: 60
End: 2025-03-10
Payer: MEDICARE

## 2025-03-10 NOTE — TELEPHONE ENCOUNTER
Caller: [facility]    Relationship to patient: Caregiver (non-relative)    Best call back number: [phone]    Patient is needing: CALLING TO ADVISE SHE COMPLETED RECERTIFICATION TODAY. STATES SHE FAXED OVER FORM FOR PCP TO SIGN AND FAX BACK

## 2025-03-25 DIAGNOSIS — F51.5 NIGHTMARES ASSOCIATED WITH CHRONIC POST-TRAUMATIC STRESS DISORDER: ICD-10-CM

## 2025-03-25 DIAGNOSIS — F43.12 NIGHTMARES ASSOCIATED WITH CHRONIC POST-TRAUMATIC STRESS DISORDER: ICD-10-CM

## 2025-03-25 DIAGNOSIS — R00.2 PALPITATIONS: ICD-10-CM

## 2025-03-25 DIAGNOSIS — F33.1 MODERATE EPISODE OF RECURRENT MAJOR DEPRESSIVE DISORDER: ICD-10-CM

## 2025-03-25 DIAGNOSIS — R00.0 TACHYCARDIA: ICD-10-CM

## 2025-03-25 RX ORDER — PRAZOSIN HYDROCHLORIDE 1 MG/1
1 CAPSULE ORAL NIGHTLY
Qty: 90 CAPSULE | Refills: 0 | Status: SHIPPED | OUTPATIENT
Start: 2025-03-25

## 2025-03-25 RX ORDER — ISOSORBIDE MONONITRATE 30 MG/1
30 TABLET, EXTENDED RELEASE ORAL DAILY
Qty: 90 TABLET | Refills: 0 | Status: SHIPPED | OUTPATIENT
Start: 2025-03-25

## 2025-03-25 RX ORDER — SERTRALINE HYDROCHLORIDE 100 MG/1
100 TABLET, FILM COATED ORAL DAILY
Qty: 90 TABLET | Refills: 1 | Status: SHIPPED | OUTPATIENT
Start: 2025-03-25

## 2025-04-07 DIAGNOSIS — E11.65 TYPE 2 DIABETES MELLITUS WITH HYPERGLYCEMIA, WITHOUT LONG-TERM CURRENT USE OF INSULIN: ICD-10-CM

## 2025-04-07 RX ORDER — BLOOD SUGAR DIAGNOSTIC
STRIP MISCELLANEOUS SEE ADMIN INSTRUCTIONS
Qty: 100 EACH | Refills: 3 | Status: SHIPPED | OUTPATIENT
Start: 2025-04-07

## 2025-04-13 NOTE — ANESTHESIA PREPROCEDURE EVALUATION
Anesthesia Evaluation     Patient summary reviewed and Nursing notes reviewed   no history of anesthetic complications:  NPO Solid Status: > 8 hours  NPO Liquid Status: > 2 hours           Airway   Mallampati: II  TM distance: >3 FB  Neck ROM: full  No difficulty expected  Dental - normal exam     Pulmonary - negative pulmonary ROS and normal exam   Cardiovascular - negative cardio ROS and normal exam        Neuro/Psych  (+) seizures, psychiatric history Anxiety and Depression,    GI/Hepatic/Renal/Endo    (+) obesity, morbid obesity,      Musculoskeletal (-) negative ROS    Abdominal   (+) obese,    Substance History - negative use     OB/GYN negative ob/gyn ROS         Other - negative ROS                       Anesthesia Plan    ASA 3       total IV anesthesia  intravenous induction     Anesthetic plan, risks, benefits, and alternatives have been provided, discussed and informed consent has been obtained with: patient.  Pre-procedure education provided  Plan discussed with CAA.        CODE STATUS:    Code Status (Patient has no pulse and is not breathing): CPR (Attempt to Resuscitate)  Medical Interventions (Patient has pulse or is breathing): Full Support  Release to patient: Routine Release      
78

## 2025-04-17 DIAGNOSIS — F33.1 MODERATE EPISODE OF RECURRENT MAJOR DEPRESSIVE DISORDER: ICD-10-CM

## 2025-04-17 DIAGNOSIS — R45.86 MOOD SWINGS: ICD-10-CM

## 2025-04-17 RX ORDER — ARIPIPRAZOLE 2 MG/1
2 TABLET ORAL DAILY
Qty: 30 TABLET | Refills: 1 | Status: SHIPPED | OUTPATIENT
Start: 2025-04-17

## 2025-04-17 NOTE — TELEPHONE ENCOUNTER
Rx Refill Note  Requested Prescriptions     Pending Prescriptions Disp Refills    ARIPiprazole (ABILIFY) 2 MG tablet [Pharmacy Med Name: aripiprazole 2 mg tablet] 30 tablet 1     Sig: TAKE ONE TABLET BY MOUTH EVERY DAY      Last office visit with prescribing clinician: 2/5/2025   Last telemedicine visit with prescribing clinician: Visit date not found   Next office visit with prescribing clinician: Visit date not found                         Would you like a call back once the refill request has been completed: [] Yes [] No    If the office needs to give you a call back, can they leave a voicemail: [] Yes [] No    Stefanie Sawant MA  04/17/25, 09:01 EDT

## 2025-04-22 DIAGNOSIS — E78.2 MIXED HYPERLIPIDEMIA: ICD-10-CM

## 2025-04-22 RX ORDER — ATORVASTATIN CALCIUM 40 MG/1
40 TABLET, FILM COATED ORAL
Qty: 100 TABLET | Refills: 0 | Status: SHIPPED | OUTPATIENT
Start: 2025-04-22

## 2025-04-22 NOTE — TELEPHONE ENCOUNTER
Rx Refill Note  Requested Prescriptions     Pending Prescriptions Disp Refills    atorvastatin (LIPITOR) 40 MG tablet 100 tablet 0     Sig: Take 1 tablet by mouth every night at bedtime.      Last office visit with prescribing clinician: 2/5/2025   Last telemedicine visit with prescribing clinician: Visit date not found   Next office visit with prescribing clinician: Visit date not found                         Would you like a call back once the refill request has been completed: [] Yes [] No    If the office needs to give you a call back, can they leave a voicemail: [] Yes [] No    Blayne Benson Rep  04/22/25, 09:37 EDT

## 2025-04-25 DIAGNOSIS — M25.541 ARTHRALGIA OF RIGHT HAND: ICD-10-CM

## 2025-04-25 RX ORDER — IBUPROFEN 800 MG/1
800 TABLET, FILM COATED ORAL EVERY 12 HOURS PRN
Qty: 60 TABLET | Refills: 0 | Status: SHIPPED | OUTPATIENT
Start: 2025-04-25

## 2025-05-06 ENCOUNTER — TELEPHONE (OUTPATIENT)
Dept: FAMILY MEDICINE CLINIC | Facility: CLINIC | Age: 60
End: 2025-05-06
Payer: MEDICARE

## 2025-05-06 NOTE — TELEPHONE ENCOUNTER
Caller: MARC WILLS    Relationship: Home Health    Best call back number:     [phone]         What was the call regarding:     SHE FAXED OVER RE CERTIFICATION [employer] PAPERWORK TO THE OFFICE.  IT REQUIRES A DOCTOR'S SIGNATURE AND RE FAXED BACK TO MARC.

## 2025-05-06 NOTE — TELEPHONE ENCOUNTER
CALLED AND SPOKE WITH MARC WITH [facility] SHE STATES THAT SHE JUST FINISHED PAPERWORK THAT THEY WILL FAX IT TO US WHEN THEY ARE DONE

## 2025-05-20 DIAGNOSIS — G43.919 INTRACTABLE MIGRAINE WITHOUT STATUS MIGRAINOSUS, UNSPECIFIED MIGRAINE TYPE: Primary | ICD-10-CM

## 2025-05-20 RX ORDER — TOPIRAMATE 50 MG/1
50 TABLET, FILM COATED ORAL 2 TIMES DAILY
Qty: 60 TABLET | Refills: 1 | Status: SHIPPED | OUTPATIENT
Start: 2025-05-20

## 2025-05-20 RX ORDER — TOPIRAMATE 25 MG/1
TABLET, FILM COATED ORAL
Qty: 68 TABLET | Refills: 0 | Status: CANCELLED | OUTPATIENT
Start: 2025-05-20

## 2025-05-20 RX ORDER — GABAPENTIN 100 MG/1
100 CAPSULE ORAL DAILY
OUTPATIENT
Start: 2025-05-20

## 2025-05-20 NOTE — TELEPHONE ENCOUNTER
HUB IS INSTRUCTED TO RELAY BELOW MESSAGE FROM DAKSHA      IF PT CALLS BACK  Gabapentin was discontinued in January. Patient has not been routinely filling this or taking it. Why is this needed at this time? If patient feels a new need has occurred she will need to make an appointment with me to discuss necessity. Thank you.

## 2025-05-20 NOTE — TELEPHONE ENCOUNTER
Caller: Eladia Connor    Relationship: Self    Best call back number:     [phone]       [phone]       Requested Prescriptions:   Requested Prescriptions     Pending Prescriptions Disp Refills    gabapentin (NEURONTIN) 100 MG capsule       Sig: Take 1 capsule by mouth Daily.    topiramate (TOPAMAX) 25 MG tablet 68 tablet 0     Sig: Take 25 mg QHS for 6 days then 25 mg BID x7 days then 25 mg in AM and 50 mg QHS x7 days then 50 mg BID        Pharmacy where request should be sent: [address] [phone] [facility] [phone] FX     Last office visit with prescribing clinician: 2/5/2025   Last telemedicine visit with prescribing clinician: Visit date not found   Next office visit with prescribing clinician: 6/16/2025     Additional details provided by patient: OUT    Does the patient have less than a 3 day supply:  [x] Yes  [] No    Would you like a call back once the refill request has been completed: [] Yes [] No    If the office needs to give you a call back, can they leave a voicemail: [] Yes [] No    Jeff Block   05/20/25 12:27 EDT

## 2025-05-20 NOTE — TELEPHONE ENCOUNTER
Rx Refill Note  Requested Prescriptions     Pending Prescriptions Disp Refills    gabapentin (NEURONTIN) 100 MG capsule       Sig: Take 1 capsule by mouth Daily.    topiramate (TOPAMAX) 25 MG tablet 68 tablet 0     Sig: Take 25 mg QHS for 6 days then 25 mg BID x7 days then 25 mg in AM and 50 mg QHS x7 days then 50 mg BID      Last office visit with prescribing clinician: 2/5/2025   Last telemedicine visit with prescribing clinician: Visit date not found   Next office visit with prescribing clinician: 6/16/2025       UDS Not on File  CSA Not on File  INSPECT - SCAN - [id number] 5/20/2025 (05/20/2025)     Bettie Rincon MA  05/20/25, 14:00 EDT

## 2025-05-23 DIAGNOSIS — F43.12 NIGHTMARES ASSOCIATED WITH CHRONIC POST-TRAUMATIC STRESS DISORDER: ICD-10-CM

## 2025-05-23 DIAGNOSIS — J45.20 MILD INTERMITTENT ASTHMA WITHOUT COMPLICATION: ICD-10-CM

## 2025-05-23 DIAGNOSIS — F51.5 NIGHTMARES ASSOCIATED WITH CHRONIC POST-TRAUMATIC STRESS DISORDER: ICD-10-CM

## 2025-05-23 DIAGNOSIS — R00.2 PALPITATIONS: ICD-10-CM

## 2025-05-23 DIAGNOSIS — F41.9 ANXIETY: ICD-10-CM

## 2025-05-23 DIAGNOSIS — R00.0 TACHYCARDIA: ICD-10-CM

## 2025-05-23 DIAGNOSIS — E11.65 TYPE 2 DIABETES MELLITUS WITH HYPERGLYCEMIA, WITHOUT LONG-TERM CURRENT USE OF INSULIN: ICD-10-CM

## 2025-05-23 DIAGNOSIS — G47.09 OTHER INSOMNIA: ICD-10-CM

## 2025-05-23 RX ORDER — LANCETS
EACH MISCELLANEOUS
Qty: 306 EACH | Refills: 0 | Status: SHIPPED | OUTPATIENT
Start: 2025-05-23

## 2025-05-23 RX ORDER — GABAPENTIN 100 MG/1
100 CAPSULE ORAL 2 TIMES DAILY
Qty: 60 CAPSULE | OUTPATIENT
Start: 2025-05-23

## 2025-05-23 RX ORDER — ISOSORBIDE MONONITRATE 30 MG/1
30 TABLET, EXTENDED RELEASE ORAL DAILY
Qty: 90 TABLET | Refills: 0 | Status: SHIPPED | OUTPATIENT
Start: 2025-05-23

## 2025-05-23 RX ORDER — ESZOPICLONE 2 MG/1
TABLET, FILM COATED ORAL
Qty: 30 TABLET | Refills: 0 | Status: SHIPPED | OUTPATIENT
Start: 2025-05-23

## 2025-05-23 RX ORDER — BUSPIRONE HYDROCHLORIDE 15 MG/1
15 TABLET ORAL 3 TIMES DAILY
Qty: 270 TABLET | Refills: 0 | Status: SHIPPED | OUTPATIENT
Start: 2025-05-23

## 2025-05-23 RX ORDER — PRAZOSIN HYDROCHLORIDE 1 MG/1
1 CAPSULE ORAL NIGHTLY
Qty: 90 CAPSULE | Refills: 0 | Status: SHIPPED | OUTPATIENT
Start: 2025-05-23

## 2025-05-23 RX ORDER — ALBUTEROL SULFATE 90 UG/1
INHALANT RESPIRATORY (INHALATION)
Qty: 34 G | Refills: 1 | Status: SHIPPED | OUTPATIENT
Start: 2025-05-23

## 2025-05-23 NOTE — TELEPHONE ENCOUNTER
Name: Eladia Connor      Relationship: Self      Best Callback Number:  Telephone Information:   Mobile [phone]           HUB PROVIDED THE RELAY MESSAGE FROM THE OFFICE      PATIENT: HAS FURTHER QUESTIONS AND WOULD LIKE A CALL BACK AT THE FOLLOWING PHONE NUMBER     ADDITIONAL INFORMATION: PATIENT STATES THIS WAS FILLED IN APRIL AND IT WAS NEVER DISCONTINUED. ATTEPMTED TO SCHEDULE AN APPOINTMENT AND PATIENT WOULD LIKE A CALL BACK

## 2025-05-27 DIAGNOSIS — E78.2 MIXED HYPERLIPIDEMIA: ICD-10-CM

## 2025-05-27 DIAGNOSIS — E11.65 TYPE 2 DIABETES MELLITUS WITH HYPERGLYCEMIA, WITHOUT LONG-TERM CURRENT USE OF INSULIN: ICD-10-CM

## 2025-05-27 NOTE — TELEPHONE ENCOUNTER
SEND TO [facility]    Rx Refill Note  Requested Prescriptions     Pending Prescriptions Disp Refills    atorvastatin (LIPITOR) 40 MG tablet 100 tablet 0     Sig: Take 1 tablet by mouth every night at bedtime.      Last office visit with prescribing clinician: 2/5/2025   Last telemedicine visit with prescribing clinician: Visit date not found   Next office visit with prescribing clinician: 6/16/2025                         Would you like a call back once the refill request has been completed: [] Yes [] No    If the office needs to give you a call back, can they leave a voicemail: [] Yes [] No    Blayne Lee Rep  05/27/25, 11:57 EDT

## 2025-05-28 RX ORDER — ATORVASTATIN CALCIUM 40 MG/1
40 TABLET, FILM COATED ORAL
Qty: 100 TABLET | Refills: 0 | OUTPATIENT
Start: 2025-05-28

## 2025-05-28 NOTE — TELEPHONE ENCOUNTER
HUB IS INSTRUCTED TO RELAY BELOW MESSAGE       IF PT CALLS BACK   Pt was given 100 tablets on 04/22 and is supposed to take 1 daily so she should still have enough for 2 months left

## 2025-06-03 NOTE — TELEPHONE ENCOUNTER
Caller: Eladia Connor    Relationship: Self    Best call back number:[phone]  Requested Prescriptions:   Requested Prescriptions     Pending Prescriptions Disp Refills    gabapentin (NEURONTIN) 100 MG capsule       Sig: Take 1 capsule by mouth Daily.        Pharmacy where request should be sent:[place] [address] [address] [facility] - [phone]  - [phone] FX     Last office visit with prescribing clinician: 2/5/2025   Last telemedicine visit with prescribing clinician: Visit date not found   Next office visit with prescribing clinician: 6/16/2025     PATIENT IS OUT OF MEDS, SHE HAS A FOLLOW UP  6-16, PLEASE CALL BACK WITH ADVISEMENT       Does the patient have less than a 3 day supply:  [x] Yes  [] No      Blayne Kang Rep   06/03/25 14:57 EDT

## 2025-06-06 RX ORDER — GABAPENTIN 100 MG/1
100 CAPSULE ORAL DAILY
OUTPATIENT
Start: 2025-06-06

## 2025-06-06 NOTE — TELEPHONE ENCOUNTER
HUB IS INSTRUCTED TO RELAY BELOW MESSAGE       IF PT CALLS BACK ON THIS   Please see discussion on 5/20/2025. This medication was discontinued in January. She stated that she refilled it in April but according to the inspect it has not been refilled since January. We will need to discuss this at our appointment before I refill the gabapentin. Thank you.

## 2025-06-12 DIAGNOSIS — M25.541 ARTHRALGIA OF RIGHT HAND: ICD-10-CM

## 2025-06-12 RX ORDER — IBUPROFEN 800 MG/1
800 TABLET, FILM COATED ORAL EVERY 12 HOURS PRN
Qty: 60 TABLET | Refills: 0 | Status: SHIPPED | OUTPATIENT
Start: 2025-06-12

## 2025-06-16 ENCOUNTER — TELEPHONE (OUTPATIENT)
Dept: FAMILY MEDICINE CLINIC | Facility: CLINIC | Age: 60
End: 2025-06-16

## 2025-06-16 NOTE — TELEPHONE ENCOUNTER
Caller: Eladia Connor    Relationship: Self    Best call back number:     What medication are you requesting:       gabapentin (NEURONTIN) 100 MG capsule     TAKES IT TWICE DAILY PER PATIENT         Have you had these symptoms before:    [x] Yes  [] No    Have you been treated for these symptoms before:   [x] Yes  [] No    If a prescription is needed, what is your preferred pharmacy and phone number: [place] [id number]  [place]  [phone] [facility] [phone] FX     Additional notes:

## 2025-06-16 NOTE — TELEPHONE ENCOUNTER
HUB to relay description below.        LVM FOR PT TO CALL OFFICE AND RS MAN THAT'S ON FOR 06-16-25

## 2025-06-16 NOTE — TELEPHONE ENCOUNTER
Name: Eladia Connor YOLANDA      Relationship: Self      Best Callback Number:      [facility] PROVIDED THE RELAY MESSAGE FROM THE OFFICE      PATIENT: SCHEDULED PER NOTE    ADDITIONAL INFORMATION:

## 2025-06-16 NOTE — TELEPHONE ENCOUNTER
Please advise. Do you want this patient to make a follow up appt with you to restart the gabapentin?

## 2025-07-03 DIAGNOSIS — M25.541 ARTHRALGIA OF RIGHT HAND: ICD-10-CM

## 2025-07-03 RX ORDER — IBUPROFEN 800 MG/1
800 TABLET, FILM COATED ORAL EVERY 12 HOURS PRN
Qty: 60 TABLET | Refills: 0 | Status: SHIPPED | OUTPATIENT
Start: 2025-07-03

## 2025-07-08 NOTE — TELEPHONE ENCOUNTER
Caller: [facility]    Relationship to patient: Home Health    Best call back number:[phone]      Patient is needing: RECERTIFICATION ASSESSMENT WAS FAXED OVER AND NEEDS TO BE FAXED BACK

## 2025-07-30 ENCOUNTER — TELEPHONE (OUTPATIENT)
Dept: FAMILY MEDICINE CLINIC | Facility: CLINIC | Age: 60
End: 2025-07-30

## 2025-07-30 NOTE — TELEPHONE ENCOUNTER
Hub staff attempted to follow warm transfer process and was unsuccessful     Caller: Eladia Connor    Relationship to patient: Self    Best call back number: [phone]    Patient is needing: PT REQUESTING TO SPEAK WITH OFFICE DIRECTLY. SAID THAT PCP HAS R/S SEVERAL TIMES ALREADY AND THAT HE IS REFUSING TO REFILL MEDS UNTIL APPT.

## 2025-07-30 NOTE — TELEPHONE ENCOUNTER
Hub is instructed to relay the documentation below to patient  Called pt to RS her appt from today, due to provider leaving @ lunch. The pt was unavailable for the call; therefore, a VM was left for the pt to call the office to RS.

## 2025-08-02 DIAGNOSIS — E11.65 TYPE 2 DIABETES MELLITUS WITH HYPERGLYCEMIA, WITHOUT LONG-TERM CURRENT USE OF INSULIN: ICD-10-CM

## 2025-08-04 RX ORDER — LANCETS
EACH MISCELLANEOUS
Qty: 306 EACH | Refills: 0 | Status: SHIPPED | OUTPATIENT
Start: 2025-08-04

## 2025-08-07 DIAGNOSIS — R00.0 TACHYCARDIA: ICD-10-CM

## 2025-08-07 DIAGNOSIS — F43.12 NIGHTMARES ASSOCIATED WITH CHRONIC POST-TRAUMATIC STRESS DISORDER: ICD-10-CM

## 2025-08-07 DIAGNOSIS — F51.5 NIGHTMARES ASSOCIATED WITH CHRONIC POST-TRAUMATIC STRESS DISORDER: ICD-10-CM

## 2025-08-07 DIAGNOSIS — R00.2 PALPITATIONS: ICD-10-CM

## 2025-08-07 RX ORDER — PRAZOSIN HYDROCHLORIDE 1 MG/1
1 CAPSULE ORAL NIGHTLY
Qty: 90 CAPSULE | Refills: 0 | Status: SHIPPED | OUTPATIENT
Start: 2025-08-07

## 2025-08-07 RX ORDER — ISOSORBIDE MONONITRATE 30 MG/1
30 TABLET, EXTENDED RELEASE ORAL DAILY
Qty: 90 TABLET | Refills: 0 | Status: SHIPPED | OUTPATIENT
Start: 2025-08-07

## 2025-08-25 DIAGNOSIS — F33.1 MODERATE EPISODE OF RECURRENT MAJOR DEPRESSIVE DISORDER: ICD-10-CM

## 2025-08-25 RX ORDER — SERTRALINE HYDROCHLORIDE 100 MG/1
100 TABLET, FILM COATED ORAL DAILY
Qty: 90 TABLET | Refills: 0 | Status: SHIPPED | OUTPATIENT
Start: 2025-08-25

## (undated) DEVICE — BITEBLOCK ENDO W/STRAP 60F A/ LF DISP

## (undated) DEVICE — SINGLE-USE BIOPSY FORCEPS: Brand: RADIAL JAW 4

## (undated) DEVICE — TRAP WIDEEYE POLYP

## (undated) DEVICE — PK ENDO GI 50